# Patient Record
Sex: MALE | Race: WHITE | URBAN - METROPOLITAN AREA
[De-identification: names, ages, dates, MRNs, and addresses within clinical notes are randomized per-mention and may not be internally consistent; named-entity substitution may affect disease eponyms.]

---

## 2017-11-14 ENCOUNTER — INPATIENT (INPATIENT)
Facility: HOSPITAL | Age: 47
LOS: 1 days | Discharge: HOME | End: 2017-11-16
Attending: INTERNAL MEDICINE

## 2017-11-14 DIAGNOSIS — K92.2 GASTROINTESTINAL HEMORRHAGE, UNSPECIFIED: ICD-10-CM

## 2017-11-21 DIAGNOSIS — I10 ESSENTIAL (PRIMARY) HYPERTENSION: ICD-10-CM

## 2017-11-21 DIAGNOSIS — K29.60 OTHER GASTRITIS WITHOUT BLEEDING: ICD-10-CM

## 2017-11-21 DIAGNOSIS — D62 ACUTE POSTHEMORRHAGIC ANEMIA: ICD-10-CM

## 2017-11-21 DIAGNOSIS — Z72.89 OTHER PROBLEMS RELATED TO LIFESTYLE: ICD-10-CM

## 2017-11-21 DIAGNOSIS — K25.4 CHRONIC OR UNSPECIFIED GASTRIC ULCER WITH HEMORRHAGE: ICD-10-CM

## 2017-11-21 DIAGNOSIS — K44.9 DIAPHRAGMATIC HERNIA WITHOUT OBSTRUCTION OR GANGRENE: ICD-10-CM

## 2017-11-21 DIAGNOSIS — D64.9 ANEMIA, UNSPECIFIED: ICD-10-CM

## 2019-02-14 ENCOUNTER — INPATIENT (INPATIENT)
Facility: HOSPITAL | Age: 49
LOS: 2 days | Discharge: HOME | End: 2019-02-17
Attending: STUDENT IN AN ORGANIZED HEALTH CARE EDUCATION/TRAINING PROGRAM | Admitting: STUDENT IN AN ORGANIZED HEALTH CARE EDUCATION/TRAINING PROGRAM

## 2019-02-14 VITALS
DIASTOLIC BLOOD PRESSURE: 105 MMHG | SYSTOLIC BLOOD PRESSURE: 181 MMHG | RESPIRATION RATE: 20 BRPM | HEART RATE: 105 BPM | TEMPERATURE: 97 F | OXYGEN SATURATION: 99 %

## 2019-02-14 DIAGNOSIS — S42.302A UNSPECIFIED FRACTURE OF SHAFT OF HUMERUS, LEFT ARM, INITIAL ENCOUNTER FOR CLOSED FRACTURE: Chronic | ICD-10-CM

## 2019-02-14 LAB
ABO RH CONFIRMATION: SIGNIFICANT CHANGE UP
ALBUMIN SERPL ELPH-MCNC: 4 G/DL — SIGNIFICANT CHANGE UP (ref 3.5–5.2)
ALP SERPL-CCNC: 65 U/L — SIGNIFICANT CHANGE UP (ref 30–115)
ALT FLD-CCNC: 23 U/L — SIGNIFICANT CHANGE UP (ref 0–41)
ANION GAP SERPL CALC-SCNC: 14 MMOL/L — SIGNIFICANT CHANGE UP (ref 7–14)
APTT BLD: 16.6 SEC — CRITICAL LOW (ref 27–39.2)
AST SERPL-CCNC: 23 U/L — SIGNIFICANT CHANGE UP (ref 0–41)
BASOPHILS # BLD AUTO: 0.06 K/UL — SIGNIFICANT CHANGE UP (ref 0–0.2)
BASOPHILS # BLD AUTO: 0.08 K/UL — SIGNIFICANT CHANGE UP (ref 0–0.2)
BASOPHILS NFR BLD AUTO: 0.6 % — SIGNIFICANT CHANGE UP (ref 0–1)
BASOPHILS NFR BLD AUTO: 0.9 % — SIGNIFICANT CHANGE UP (ref 0–1)
BILIRUB SERPL-MCNC: <0.2 MG/DL — SIGNIFICANT CHANGE UP (ref 0.2–1.2)
BUN SERPL-MCNC: 28 MG/DL — HIGH (ref 10–20)
CALCIUM SERPL-MCNC: 8.9 MG/DL — SIGNIFICANT CHANGE UP (ref 8.5–10.1)
CHLORIDE SERPL-SCNC: 101 MMOL/L — SIGNIFICANT CHANGE UP (ref 98–110)
CO2 SERPL-SCNC: 23 MMOL/L — SIGNIFICANT CHANGE UP (ref 17–32)
CREAT SERPL-MCNC: 1 MG/DL — SIGNIFICANT CHANGE UP (ref 0.7–1.5)
EOSINOPHIL # BLD AUTO: 0.13 K/UL — SIGNIFICANT CHANGE UP (ref 0–0.7)
EOSINOPHIL # BLD AUTO: 0.14 K/UL — SIGNIFICANT CHANGE UP (ref 0–0.7)
EOSINOPHIL NFR BLD AUTO: 1.3 % — SIGNIFICANT CHANGE UP (ref 0–8)
EOSINOPHIL NFR BLD AUTO: 1.5 % — SIGNIFICANT CHANGE UP (ref 0–8)
GLUCOSE SERPL-MCNC: 113 MG/DL — HIGH (ref 70–99)
HCT VFR BLD CALC: 22.8 % — LOW (ref 42–52)
HCT VFR BLD CALC: 27.4 % — LOW (ref 42–52)
HGB BLD-MCNC: 7.2 G/DL — CRITICAL LOW (ref 14–18)
HGB BLD-MCNC: 8.8 G/DL — LOW (ref 14–18)
IMM GRANULOCYTES NFR BLD AUTO: 1.4 % — HIGH (ref 0.1–0.3)
IMM GRANULOCYTES NFR BLD AUTO: 1.9 % — HIGH (ref 0.1–0.3)
INR BLD: 0.99 RATIO — SIGNIFICANT CHANGE UP (ref 0.65–1.3)
IRON SATN MFR SERPL: 16 UG/DL — LOW (ref 35–150)
IRON SATN MFR SERPL: 5 % — LOW (ref 15–50)
LACTATE SERPL-SCNC: 1.9 MMOL/L — SIGNIFICANT CHANGE UP (ref 0.5–2.2)
LIDOCAIN IGE QN: 48 U/L — SIGNIFICANT CHANGE UP (ref 7–60)
LYMPHOCYTES # BLD AUTO: 2.45 K/UL — SIGNIFICANT CHANGE UP (ref 1.2–3.4)
LYMPHOCYTES # BLD AUTO: 2.67 K/UL — SIGNIFICANT CHANGE UP (ref 1.2–3.4)
LYMPHOCYTES # BLD AUTO: 26.9 % — SIGNIFICANT CHANGE UP (ref 20.5–51.1)
LYMPHOCYTES # BLD AUTO: 27.5 % — SIGNIFICANT CHANGE UP (ref 20.5–51.1)
MAGNESIUM SERPL-MCNC: 2 MG/DL — SIGNIFICANT CHANGE UP (ref 1.8–2.4)
MCHC RBC-ENTMCNC: 25.2 PG — LOW (ref 27–31)
MCHC RBC-ENTMCNC: 25.2 PG — LOW (ref 27–31)
MCHC RBC-ENTMCNC: 31.6 G/DL — LOW (ref 32–37)
MCHC RBC-ENTMCNC: 32.1 G/DL — SIGNIFICANT CHANGE UP (ref 32–37)
MCV RBC AUTO: 78.5 FL — LOW (ref 80–94)
MCV RBC AUTO: 79.7 FL — LOW (ref 80–94)
MONOCYTES # BLD AUTO: 0.53 K/UL — SIGNIFICANT CHANGE UP (ref 0.1–0.6)
MONOCYTES # BLD AUTO: 0.59 K/UL — SIGNIFICANT CHANGE UP (ref 0.1–0.6)
MONOCYTES NFR BLD AUTO: 5.5 % — SIGNIFICANT CHANGE UP (ref 1.7–9.3)
MONOCYTES NFR BLD AUTO: 6.5 % — SIGNIFICANT CHANGE UP (ref 1.7–9.3)
NEUTROPHILS # BLD AUTO: 5.67 K/UL — SIGNIFICANT CHANGE UP (ref 1.4–6.5)
NEUTROPHILS # BLD AUTO: 6.17 K/UL — SIGNIFICANT CHANGE UP (ref 1.4–6.5)
NEUTROPHILS NFR BLD AUTO: 62.3 % — SIGNIFICANT CHANGE UP (ref 42.2–75.2)
NEUTROPHILS NFR BLD AUTO: 63.7 % — SIGNIFICANT CHANGE UP (ref 42.2–75.2)
NRBC # BLD: 0 /100 WBCS — SIGNIFICANT CHANGE UP (ref 0–0)
NRBC # BLD: 0 /100 WBCS — SIGNIFICANT CHANGE UP (ref 0–0)
PLATELET # BLD AUTO: 279 K/UL — SIGNIFICANT CHANGE UP (ref 130–400)
PLATELET # BLD AUTO: 354 K/UL — SIGNIFICANT CHANGE UP (ref 130–400)
POTASSIUM SERPL-MCNC: 4.5 MMOL/L — SIGNIFICANT CHANGE UP (ref 3.5–5)
POTASSIUM SERPL-SCNC: 4.5 MMOL/L — SIGNIFICANT CHANGE UP (ref 3.5–5)
PROT SERPL-MCNC: 5.8 G/DL — LOW (ref 6–8)
PROTHROM AB SERPL-ACNC: 11.4 SEC — SIGNIFICANT CHANGE UP (ref 9.95–12.87)
RBC # BLD: 2.86 M/UL — LOW (ref 4.7–6.1)
RBC # BLD: 3.49 M/UL — LOW (ref 4.7–6.1)
RBC # FLD: 15.5 % — HIGH (ref 11.5–14.5)
RBC # FLD: 15.7 % — HIGH (ref 11.5–14.5)
SODIUM SERPL-SCNC: 138 MMOL/L — SIGNIFICANT CHANGE UP (ref 135–146)
TIBC SERPL-MCNC: 317 UG/DL — SIGNIFICANT CHANGE UP (ref 220–430)
UIBC SERPL-MCNC: 301 UG/DL — SIGNIFICANT CHANGE UP (ref 110–370)
WBC # BLD: 9.1 K/UL — SIGNIFICANT CHANGE UP (ref 4.8–10.8)
WBC # BLD: 9.7 K/UL — SIGNIFICANT CHANGE UP (ref 4.8–10.8)
WBC # FLD AUTO: 9.1 K/UL — SIGNIFICANT CHANGE UP (ref 4.8–10.8)
WBC # FLD AUTO: 9.7 K/UL — SIGNIFICANT CHANGE UP (ref 4.8–10.8)

## 2019-02-14 RX ORDER — CHLORHEXIDINE GLUCONATE 213 G/1000ML
1 SOLUTION TOPICAL
Qty: 0 | Refills: 0 | Status: DISCONTINUED | OUTPATIENT
Start: 2019-02-14 | End: 2019-02-17

## 2019-02-14 RX ORDER — ONDANSETRON 8 MG/1
4 TABLET, FILM COATED ORAL EVERY 6 HOURS
Qty: 0 | Refills: 0 | Status: DISCONTINUED | OUTPATIENT
Start: 2019-02-14 | End: 2019-02-17

## 2019-02-14 RX ORDER — ACETAMINOPHEN 500 MG
650 TABLET ORAL EVERY 6 HOURS
Qty: 0 | Refills: 0 | Status: DISCONTINUED | OUTPATIENT
Start: 2019-02-14 | End: 2019-02-17

## 2019-02-14 RX ORDER — PANTOPRAZOLE SODIUM 20 MG/1
40 TABLET, DELAYED RELEASE ORAL EVERY 12 HOURS
Qty: 0 | Refills: 0 | Status: DISCONTINUED | OUTPATIENT
Start: 2019-02-14 | End: 2019-02-14

## 2019-02-14 RX ORDER — PANTOPRAZOLE SODIUM 20 MG/1
8 TABLET, DELAYED RELEASE ORAL
Qty: 80 | Refills: 0 | Status: DISCONTINUED | OUTPATIENT
Start: 2019-02-14 | End: 2019-02-15

## 2019-02-14 RX ORDER — FAMOTIDINE 10 MG/ML
20 INJECTION INTRAVENOUS DAILY
Qty: 0 | Refills: 0 | Status: DISCONTINUED | OUTPATIENT
Start: 2019-02-14 | End: 2019-02-15

## 2019-02-14 RX ORDER — SODIUM CHLORIDE 9 MG/ML
1000 INJECTION, SOLUTION INTRAVENOUS ONCE
Qty: 0 | Refills: 0 | Status: COMPLETED | OUTPATIENT
Start: 2019-02-14 | End: 2019-02-14

## 2019-02-14 RX ADMIN — PANTOPRAZOLE SODIUM 10 MG/HR: 20 TABLET, DELAYED RELEASE ORAL at 17:45

## 2019-02-14 RX ADMIN — SODIUM CHLORIDE 2000 MILLILITER(S): 9 INJECTION, SOLUTION INTRAVENOUS at 13:06

## 2019-02-14 RX ADMIN — FAMOTIDINE 104 MILLIGRAM(S): 10 INJECTION INTRAVENOUS at 15:38

## 2019-02-14 NOTE — ED PROVIDER NOTE - CARE PLAN
Principal Discharge DX:	Melena  Secondary Diagnosis:	Gastric ulcer  Secondary Diagnosis:	PUD (peptic ulcer disease)  Secondary Diagnosis:	GI bleed

## 2019-02-14 NOTE — ED PROVIDER NOTE - CLINICAL SUMMARY MEDICAL DECISION MAKING FREE TEXT BOX
47 Y/O M PUD C/O MELENA X 3-4 DAYS. + EPIGASTRIC DISCOMFORT. GI CONSULTED. HGB STABLE, HEMODYNAMICALLY STABLE. PT ADMITTED TO MEDICINE. 47 Y/O M PUD C/O MELENA X 3-4 DAYS. + EPIGASTRIC DISCOMFORT. GI CONSULTED. HGB-8, HEMODYNAMICALLY STABLE. PT ADMITTED TO MEDICINE.

## 2019-02-14 NOTE — ED PROVIDER NOTE - OBJECTIVE STATEMENT
48 y m pmh peptic ulcer with active bleeding requiring endoscopic cauterization 2 years ago pw melena. Melena for the past 3-4 days. Associated with lightheadedness and some epigastric abdominal discomfort. States he feels the same as when he needed endoscopy a few years ago. Unsure who performed the endoscopy. Denies cp, sob, back pain, n/v, diarrhea, brbpr, headache.

## 2019-02-14 NOTE — ED ADULT NURSE NOTE - PAIN RATING/NUMBER SCALE (0-10): REST
Haldol not helping with the aggitation at this time. Low dose Precedex gtt ordered. Hopes of once behavioral issues reside we can start cares (EKG, supplementing potassium).   0

## 2019-02-14 NOTE — ED ADULT NURSE NOTE - NSIMPLEMENTINTERV_GEN_ALL_ED
Implemented All Fall with Harm Risk Interventions:  [place] to call system. Call bell, personal items and telephone within reach. Instruct patient to call for assistance. Room bathroom lighting operational. Non-slip footwear when patient is off stretcher. Physically safe environment: no spills, clutter or unnecessary equipment. Stretcher in lowest position, wheels locked, appropriate side rails in place. Provide visual cue, wrist band, yellow gown, etc. Monitor gait and stability. Monitor for mental status changes and reorient to person, place, and time. Review medications for side effects contributing to fall risk. Reinforce activity limits and safety measures with patient and family. Provide visual clues: red socks.

## 2019-02-14 NOTE — ED PROVIDER NOTE - PROGRESS NOTE DETAILS
GI FELLOW AWARE AND WILL EVALUATE PT IN THE ED. I personally evaluated the patient. I reviewed the Resident’s or Physician Assistant’s note (as assigned above), and agree with the findings and plan except as documented in my note.   47 Y/O M H/O BLEEDING PEPTIC ULCER S/P CAUTERIZATION 2017 BY DR. TRIPATHI C/O MELENA X 4 DAYS. + ASSOCIATED EPIGASTRIC PAIN. NO N/V. NO BRBPR. + DIZZINESS AND LIGHTHEADEDNESS. NO CP, SOB, PALPITATIONS, SCHUSTER. NO OTHER ABNORMAL BLEEDING. NO LOOSE STOOL. NO PRIOR COLONOSCOPY. VITALS NOTED. ALERT OX3 NAD NO PALLOR OR PALE CONJUNCTIVA. OP NORMAL. MMM. LUNGS CLEAR B/L. RRR S1S2. ABD- SOFT + MILD EPIGASTRIC TENDERNESS. NO REBOUND OR GUARDING. BACK NONTENDER.

## 2019-02-14 NOTE — CONSULT NOTE ADULT - SUBJECTIVE AND OBJECTIVE BOX
GI HPI:  47M with pmhx of HTN and PUD s/p upper GIB in 2017 (3u PRBC given at that time) presents from home for melenic stools since sunday. Patient states that he has been straining to defecate but noticing black stools 4-5 episodes/day since sunday every day last bowel movement today before coming to hospital black stools, and it has been associated with mild nausea, abdominal cramping, minimal palpitations. Also reported having heart burn for couple of weeks never used any medication. Denies any dysphagia, odynophagia, weight loss, family history of colon and stomach cancer.  Denies hematemesis, hematochezia, or hematuria, diarrhea, CP, Shortness of breath, LOC, easy bruising or bleeding. Otherwise patient has been in his normal state of health. While in   ED patient was reported to have a near syncopal episode but never lost actual consciousness. Exact details are unclear.     EtOH use: 6 beers per week, last drink over a week ago, uses Exendin ~2x week , never had C-Scope Father had pancreatic cancer, no colon or gastric cancers in family.     Anemia was noted to go as far back as 2016, with hb 9.9 at that time with normocytic MCV.  Admitted to [facility] in 2017 november for low Hb of 5.5 improved to 8.2 and discharged to follow up with GI but never followed up.  Endoscopy in 2017: esophagitis in distal 1/3, hiatal hernia, gastritis in body and antrum, ulcers were found in the antrum, duodenitis in 2nd part of duodenum, pathology is positive for H. pylori.     in ED vitals BP: 180/105, tachycardia 105, 97F, 20 RR, 99% on room air, s/p 1 litre LR, pepcid BP went down to 140/97, 78 HR, rectal examination showed black stools in ED,            PAST MEDICAL & SURGICAL HISTORY  GI bleed  Gastric ulcer  PUD (peptic ulcer disease)      FAMILY HISTORY:  FAMILY HISTORY:      SOCIAL HISTORY:  smoker: less than1/2 a pack  Alcohol: 6 beers/week  Drug: no    ALLERGIES:  No Known Allergies      MEDICATIONS:  MEDICATIONS  (STANDING):  chlorhexidine 4% Liquid 1 Application(s) Topical <User Schedule>  famotidine  IVPB 20 milliGRAM(s) IV Intermittent daily  pantoprazole  Injectable 40 milliGRAM(s) IV Push every 12 hours    MEDICATIONS  (PRN):  acetaminophen   Tablet .. 650 milliGRAM(s) Oral every 6 hours PRN Mild Pain (1 - 3)      HOME MEDICATIONS:  Home Medications:      ROS:     REVIEW OF SYSTEMS  General:  No fevers  Eyes:  No reported pain   ENT:  No sore throat   NECK: No stiffness   CV:  No chest pain   Resp:  No shortness of breath  GI:  See HPI  :  No dysuria  Muscle:  No weakness  Neuro:  No tingling  Endocrine:  No polyuria  Heme:  No ecchymosis          VITALS:   T(F): 97.5 (02-14 @ 13:02), Max: 97.5 (02-14 @ 13:02)  HR: 78 (02-14 @ 13:02) (78 - 105)  BP: 141/97 (02-14 @ 13:02) (141/97 - 181/105)  BP(mean): --  RR: 18 (02-14 @ 13:02) (18 - 20)  SpO2: 100% (02-14 @ 13:02) (99% - 100%)    I&O's Summary      PHYSICAL EXAM:  GENERAL: NAD, speaks in full sentences, no signs of respiratory distress  HEAD:  Atraumatic, Normocephalic  EYES: EOMI, PERRLA, conjunctiva and sclera clear  NECK: Supple, No JVD  CHEST/LUNG: Clear to auscultation bilaterally; No wheeze; No crackles; No accessory muscles used  HEART: Regular rate and rhythm; No murmurs;   ABDOMEN: Soft, Nontender, Nondistended; Bowel sounds present; No guarding  EXTREMITIES:  2+ Peripheral Pulses, No cyanosis or edema  PSYCH: AAOx3  NEUROLOGY: non-focal      LABS:                        8.8    9.10  )-----------( 354      ( 14 Feb 2019 12:29 )             27.4     PT/INR - ( 14 Feb 2019 12:29 )  INR: 0.99          PTT - ( 14 Feb 2019 12:29 )  PTT:16.6<LL>  LIVER FUNCTIONS - ( 14 Feb 2019 12:29 )  Alb: 4.0 g/dL / Pro: 5.8 g/dL / ALK PHOS: 65 U/L / ALT: 23 U/L / AST: 23 U/L / GGT: x           02-14    138  |  101  |  28<H>  ----------------------------<  113<H>  4.5   |  23  |  1.0    Ca    8.9      14 Feb 2019 12:29  Mg     2.0     02-14      RADIOLOGY: GI HPI:  47M with pmhx of HTN not on any medications and PUD s/p upper GIB in 2017 (3u PRBC given at that time) presents from home for melenic stools since sunday. Patient states that he has been straining to defecate but noticing black stools 4-5 episodes/day since sunday every day last bowel movement today before coming to hospital black stools, and it has been associated with mild nausea, abdominal cramping, minimal palpitations. Also reported having heart burn for couple of weeks never used any medication. Denies any dysphagia, odynophagia, weight loss, family history of colon and stomach cancer.  Denies hematemesis, hematochezia, or hematuria, diarrhea, CP, Shortness of breath, LOC, easy bruising or bleeding. Otherwise patient has been in his normal state of health. While in   ED patient was reported to have a near syncopal episode but never lost actual consciousness. Exact details are unclear.     EtOH use: 6 beers per week, last drink over a week ago, uses Exendin ~2x week , never had C-Scope Father had pancreatic cancer, no colon or gastric cancers in family.     Anemia was noted to go as far back as 2016, with hb 9.9 at that time with normocytic MCV.  Admitted to [facility] in 2017 november for low Hb of 5.5 improved to 8.2 and discharged to follow up with GI but never followed up.  Endoscopy in 2017: esophagitis in distal 1/3, hiatal hernia, gastritis in body and antrum, ulcers were found in the antrum, duodenitis in 2nd part of duodenum, pathology is positive for H. pylori.     in ED vitals BP: 180/105, tachycardia 105, 97F, 20 RR, 99% on room air, s/p 1 litre LR, pepcid BP went down to 140/97, 78 HR, rectal examination showed black stools in ED,    PAST MEDICAL & SURGICAL HISTORY  GI bleed  Gastric ulcer  PUD (peptic ulcer disease)      FAMILY HISTORY:  FAMILY HISTORY: father : pancraetic cancer  Mother breast cancer      SOCIAL HISTORY:  smoker: less than1/2 a pack  Alcohol: 6 beers/week  Drug: no    ALLERGIES:  No Known Allergies      MEDICATIONS:  MEDICATIONS  (STANDING):  chlorhexidine 4% Liquid 1 Application(s) Topical <User Schedule>  famotidine  IVPB 20 milliGRAM(s) IV Intermittent daily  pantoprazole  Injectable 40 milliGRAM(s) IV Push every 12 hours    MEDICATIONS  (PRN):  acetaminophen   Tablet .. 650 milliGRAM(s) Oral every 6 hours PRN Mild Pain (1 - 3)      HOME MEDICATIONS:  Home Medications:      ROS:     REVIEW OF SYSTEMS  General:  No fevers  Eyes:  No reported pain   ENT:  No sore throat   NECK: No stiffness   CV:  No chest pain   Resp:  No shortness of breath  GI:  See HPI  :  No dysuria  Muscle:  No weakness  Neuro:  dizziness  Endocrine:  No polyuria  Heme:  No ecchymosis          VITALS:   T(F): 97.5 (02-14 @ 13:02), Max: 97.5 (02-14 @ 13:02)  HR: 78 (02-14 @ 13:02) (78 - 105)  BP: 141/97 (02-14 @ 13:02) (141/97 - 181/105)  BP(mean): --  RR: 18 (02-14 @ 13:02) (18 - 20)  SpO2: 100% (02-14 @ 13:02) (99% - 100%)    I&O's Summary      PHYSICAL EXAM:  GENERAL: NAD, speaks in full sentences, no signs of respiratory distress  HEAD:  Atraumatic, Normocephalic  EYES: EOMI, PERRLA, conjunctiva and sclera clear  NECK: Supple, No JVD  CHEST/LUNG: Clear to auscultation bilaterally; No wheeze; No crackles; No accessory muscles used  HEART: Regular rate and rhythm; No murmurs;   ABDOMEN: Soft, Nontender, Nondistended; Bowel sounds present; No guarding  EXTREMITIES:  2+ Peripheral Pulses, No cyanosis or edema  PSYCH: AAOx3  NEUROLOGY: non-focal  Rectal exam: Melena      LABS:                        8.8    9.10  )-----------( 354      ( 14 Feb 2019 12:29 )             27.4     PT/INR - ( 14 Feb 2019 12:29 )  INR: 0.99          PTT - ( 14 Feb 2019 12:29 )  PTT:16.6<LL>  LIVER FUNCTIONS - ( 14 Feb 2019 12:29 )  Alb: 4.0 g/dL / Pro: 5.8 g/dL / ALK PHOS: 65 U/L / ALT: 23 U/L / AST: 23 U/L / GGT: x           02-14    138  |  101  |  28<H>  ----------------------------<  113<H>  4.5   |  23  |  1.0    Ca    8.9      14 Feb 2019 12:29  Mg     2.0     02-14      RADIOLOGY:

## 2019-02-14 NOTE — H&P ADULT - NSHPLABSRESULTS_GEN_ALL_CORE
8.8    9.10  )-----------( 354      ( 14 Feb 2019 12:29 )             27.4       02-14    138  |  101  |  28<H>  ----------------------------<  113<H>  4.5   |  23  |  1.0    Ca    8.9      14 Feb 2019 12:29  Mg     2.0     02-14    TPro  5.8<L>  /  Alb  4.0  /  TBili  <0.2  /  DBili  x   /  AST  23  /  ALT  23  /  AlkPhos  65  02-14            PT/INR - ( 14 Feb 2019 12:29 )   PT: 11.40 sec;   INR: 0.99 ratio    PTT - ( 14 Feb 2019 12:29 )  PTT:16.6 sec    Lactate Trend  02-14 @ 12:29 Lactate:1.9     POCT Blood Glucose.: 112 mg/dL (14 Feb 2019 13:05)

## 2019-02-14 NOTE — CONSULT NOTE ADULT - ATTENDING COMMENTS
Pt seen and examined with Med Res.  Yesterday we were called to see patient and when we went to the ER he was away at testing.  I have seen him with the Med Resident today.  The patient is a 48M with a history of 4-5 days of solid black stool.  He has a history of heartburn and abdominal discomfort.  He has a history of drinking a six pack of beer per week.  He underwent an egd in 2017 for GI bleeding at [facility] where by report esophagitis, gastritis and antral ulcers were found.  He was HP positive at the time but he did not follow up.  On adm his Hb was 8.8, currently it is 7.8 and he is receiving one unit of PRBC.  He noted two episodes of black stool in the ED yesterday.  He is normotensive, non tachycardic.  His INR is 0.99 and his PLT is 354K.  We will set up egd for later today.

## 2019-02-14 NOTE — H&P ADULT - HISTORY OF PRESENT ILLNESS
47M with pmhx of HTN and PUD s/p GIB in 2017 (3u PRBC given at that time) presents from home for melenic stools for 4 days. Patient states that he has been straining to defecate but noticing black stools and it has been associated with mild nausea, abdominal cramping, minimal palpitations. No hematemesis, hematochezia, or hematuria, diarrhea, CP, Shortness of breath, LOC, easy bruising or bleeding. Otherwise patient has been in his normal state of health. While in ED patient was reported to have a syncopal episode but never lost actual consciousness. Exact details are unclear.   EtOH use: 6 beers per week, last drink over a week ago  Uses Exendin ~2x week   never had C-Scope   Father had pancreatic cancer, no colon or gastric cancers in family    Of Note: Last EGD in 11/2017 showed Esophagitis Gastritis w/ ulcers, and duodenitis. No active bleeding, no interventions taken. Biopsies of the ulcer at that time + H Pylori and Mod-Severe gastritis. Patient had not followed back up with Dr. Jacobs after his hospitalization.   Anemia was noted to go as far back as 2016, with hb 9.9 at that time with normocytic MCV. In 2017 during hospitalization lowest Hb was 5.5.     In ED: HR: 105        BP: 181/105     T: 97.1      RR: 18, 100% on RA   Famotidine 20 and 1L LR  No melenic stools in ED

## 2019-02-14 NOTE — H&P ADULT - ATTENDING COMMENTS
Agree with resident's assessment and plan with following additions/modifications.   ASSESSMENT:  Principal Diagnosis:  Melena , high suspicion for UGIB  Acute symptomatic anemia secondary to GIB  Active nicotine & ETOH dependence    Associated Active Comorbid Conditions:  history of PUD, H Pylori, untreated  Hypertension     PLAN:   Tele monitoring  Type & Screen stat  Monitor H/H Q8hrs, WIll transfuse , if needed , to keep H/H >8/24.  Clear liquids for now, NPO after midnight  Hold anti-coagulation including ASA and anti-hypertension meds  GI Evaluation appreciated  2 large bore peripheral heplocks.  Monitor BP and HR closely.  GI Prophylaxis with Protonix 40mg intravenous push  twice daily .  DVT Prophylaxis : Contraindicated Agree with resident's assessment and plan with following additions/modifications.   ASSESSMENT:  Principal Diagnosis:  Melena , high suspicion for UGIB  Acute symptomatic anemia secondary to GIB  Active nicotine & ETOH dependence    Associated Active Comorbid Conditions:  history of PUD, H Pylori, untreated  Hypertension     PLAN:   Tele monitoring  Type & Screen stat  Monitor H/H Q8hrs, WIll transfuse , if needed , to keep H/H >8/24.  Clear liquids for now, NPO after midnight  Hold anti-coagulation including ASA   GI Evaluation appreciated  2 large bore peripheral heplocks.  Monitor BP and HR closely.  GI Prophylaxis with Protonix  infusion .  DVT Prophylaxis : Contraindicated

## 2019-02-14 NOTE — ED PROVIDER NOTE - PHYSICAL EXAMINATION
CONSTITUTIONAL: Well-developed; well-nourished; in no acute distress.   SKIN: warm, dry  HEAD: Normocephalic; atraumatic.  EYES: Conjunctival pallor.  ENT: No nasal discharge; airway clear.  NECK: Supple; non tender.  CARD: S1, S2 normal; no murmurs, gallops, or rubs. Regular rate and rhythm.   RESP: No wheezes, rales or rhonchi.  ABD: soft ntnd. +melena with no bright red blood on rectal exam.   EXT: Normal ROM.  No clubbing, cyanosis or edema.   LYMPH: No acute cervical adenopathy.  NEURO: Alert, oriented, grossly unremarkable  PSYCH: Cooperative, appropriate.

## 2019-02-14 NOTE — H&P ADULT - ASSESSMENT
47M with pmhx of HTN and PUD s/p GIB in 2017 (3u PRBC given at that time) presents from home for melenic stools for 4 days.    Melenic Stool 2/2 to likely Slow Upper GIB: No clinical evidence of active hemorrhage  GI consulted   Can has clear diet, NPO after midnight  Hx of H Pylori untreated, will need treatment after hospitalization   Protonix IV 40 q12   No hemodynamic instability, tachycardia resolved s/p 1L LR   Avoid NSAIDs, tylenol for pain  2 18g ivs     Microcytic anemia: Likely secondary to chronic UGIB  Check Iron, TIBC, and Ferritin,  active type and screen  Transfuse if Hb <7 or symptomatic      HTN: Better controlled now  Monitor if elevated start amlodipine     GI Ppx: on PPI q12 IV  DVT ppx: held due to suspicion of bleed   Diet: Clears   Full Code  Activity as tolerated 47M with pmhx of HTN and PUD s/p GIB in 2017 (3u PRBC given at that time) presents from home for melenic stools for 4 days.    Melenic Stool 2/2 to likely Slow Upper GIB: No clinical evidence of active hemorrhage  GI consulted   Can has clear diet, NPO after midnight  Hx of H Pylori untreated, will need treatment after hospitalization   Protonix drip    No hemodynamic instability, tachycardia resolved s/p 1L LR   Avoid NSAIDs, tylenol for pain  2 18g ivs     Microcytic anemia: Likely secondary to chronic UGIB  Check Iron, TIBC, and Ferritin,  active type and screen  Transfuse if Hb <7 or symptomatic      HTN: Better controlled now  Monitor if elevated start amlodipine     T-wave inversion on EKG present in 2016, asymptomatic     GI Ppx: on PPI q12 IV  DVT ppx: held due to suspicion of bleed   Diet: Clears   Full Code  Activity as tolerated

## 2019-02-14 NOTE — H&P ADULT - FAMILY HISTORY
Father  Still living? No  Family history of pancreatic cancer, Age at diagnosis: Age Unknown     Mother  Still living? Unknown  Family history of breast cancer, Age at diagnosis: Age Unknown

## 2019-02-14 NOTE — H&P ADULT - NSHPPHYSICALEXAM_GEN_ALL_CORE
GENERAL: NAD, well-developed, Non-toxic, stated age   HEAD:  Abrasion on L cheek, Normocephalic  EYES: EOMI, Sclera White   NECK: Supple, No JVD  CHEST/LUNG: Clear to auscultation bilaterally; No wheezing, rhonchi, or crackles  HEART: Regular rate and rhythm; s1, s2, No murmurs, rubs, or gallops  ABDOMEN: Soft, mild epigastric and b/l upper quadrant tenderness to palpation, Nondistended; Bowel sounds present, No rebound or guarding noted   EXTREMITIES:  No lower extremity edema or calf tenderness to palpation.  No clubbing or cyanosis. Surgical scar on R fore arm well healed   PSYCH: AAOx3  NEUROLOGY: non-focal  SKIN: No rashes or lesions on exposed areas other than face GENERAL: NAD, well-developed, Non-toxic, stated age   HEAD:  Abrasion on L cheek, Normocephalic  EYES: EOMI, Sclera White   NECK: Supple, No JVD  CHEST/LUNG: Clear to auscultation bilaterally; No wheezing, rhonchi, or crackles  HEART: Regular rate and rhythm; s1, s2, No murmurs, rubs, or gallops  ABDOMEN: Soft, mild epigastric and b/l upper quadrant tenderness to palpation, Nondistended; Bowel sounds present, No rebound or guarding noted   RECTAL: Melenic stool (done in by GI resident)  EXTREMITIES:  No lower extremity edema or calf tenderness to palpation.  No clubbing or cyanosis. Surgical scar on R fore arm well healed   PSYCH: AAOx3  NEUROLOGY: non-focal  SKIN: No rashes or lesions on exposed areas other than face

## 2019-02-14 NOTE — H&P ADULT - NSHPSOCIALHISTORY_GEN_ALL_CORE
Substance Use (street drugs): ( x ) never used  (  ) other:  Tobacco Usage:  (   ) never smoked   (   ) former smoker   ( x ) current smoker  (     ) pack year: 1/2 pack per week   Alcohol Usage: 6 beers per week, last drink ~7 days ago

## 2019-02-14 NOTE — ED PROVIDER NOTE - NS ED ROS FT
Eyes:  No visual changes, eye pain or discharge.  ENMT:  No hearing changes, pain, discharge or infections. No neck pain or stiffness.  Cardiac:  No chest pain, SOB or edema.   Respiratory:  No cough or respiratory distress.   GI:  Melena, abd discomfort. No nausea, vomiting, diarrhea  :  No dysuria, frequency or burning.  MS:  No myalgia, muscle weakness, joint pain or back pain.  Neuro:  LH. No headache or weakness.  No LOC.  Skin:  No skin rash.   Endocrine: No history of thyroid disease or diabetes.

## 2019-02-14 NOTE — CONSULT NOTE ADULT - ASSESSMENT
Admitted to [facility] in 2017 november for low Hb of 5.5 improved to 8.2 and discharged to follow up with GI but never followed up.    in ED vitals BP: 180/105, tachycardia 105, 97F, 20 RR, 99% on room air, s/p 1 litre LR, pepcid BP went down to 140/97, 78 HR    #)Hypochromic microcytic anemia/symptomatic/Melena likely upper GI bleed DD: PUD vs gastritis  -Current Hb 8.8, Hb in 2017 is 8.2, but he is feeling dizziness, loss of focus, Hypertensive 180/105, tachycardia of 105 down to 140/97, 78  -BUN 28, INR 0.99, platelet 354, not on any aspirin or anticoagulation  -trend CBC  -active type and screen  -Keep Hb >7  -avoid NSAID  -Keep NPO after midnight  -will schedule for endoscopy tomorrow  -Protonix drip 47M with pmhx of HTN and PUD s/p upper GIB in 2017 (3u PRBC given at that time) presents from home for melenic stools since sunday. Patient states that he has been straining to defecate but noticing black stools 4-5 episodes/day since sunday every day last bowel movement today before coming to hospital black stools, and it has been associated with mild nausea, abdominal cramping, minimal palpitations. Also reported having heart burn for couple of weeks never used any medication. Denies any dysphagia, odynophagia, weight loss, family history of colon and stomach cancer.  Denies hematemesis, hematochezia, or hematuria, diarrhea, CP, Shortness of breath, LOC, easy bruising or bleeding. Otherwise patient has been in his normal state of health. While in   ED patient was reported to have a near syncopal episode but never lost actual consciousness. Exact details are unclear.     EtOH use: 6 beers per week, last drink over a week ago, uses Exendin ~2x week , never had C-Scope Father had pancreatic cancer, no colon or gastric cancers in family.   Anemia was noted to go as far back as 2016, with hb 9.9 at that time with normocytic MCV.  Admitted to [facility] in 2017 november for low Hb of 5.5 improved to 8.2 and discharged to follow up with GI but never followed up.    in ED vitals BP: 180/105, tachycardia 105, 97F, 20 RR, 99% on room air, s/p 1 litre LR, pepcid BP went down to 140/97, 78 HR, rectal examination showed black stools in ED,  Endoscopy in 2017: esophagitis in distal 1/3, hiatal hernia, gastritis in body and antrum, ulcers were found in the antrum, duodenitis in 2nd part of duodenum, pathology is positive for H. pylori.     #)Hypochromic microcytic anemia/symptomatic/Melena likely upper GI bleed DD: PUD vs gastritis  -Current Hb 8.8, Hb in 2017 is 8.2, but he is feeling dizziness, loss of focus, Hypertensive 180/105, tachycardia of 105 down to 140/97, 78  -BUN 28, INR 0.99, platelet 354, not on any aspirin or anticoagulation  -trend CBC  -active type and screen  -Keep Hb >7  -avoid NSAID  -Keep NPO after mid night  -will schedule for endoscopy tomorrow  -Protonix drip    will discuss with attending 47M with pmhx of HTN and PUD s/p upper GIB in 2017 (3u PRBC given at that time) presents from home for melenic stools since sunday. Patient states that he has been straining to defecate but noticing black stools 4-5 episodes/day since sunday every day last bowel movement today before coming to hospital black stools, and it has been associated with mild nausea, abdominal cramping, minimal palpitations. Also reported having heart burn for couple of weeks never used any medication. Denies any dysphagia, odynophagia, weight loss, family history of colon and stomach cancer.  Denies hematemesis, hematochezia, or hematuria, diarrhea, CP, Shortness of breath, LOC, easy bruising or bleeding. Otherwise patient has been in his normal state of health. While in   ED patient was reported to have a near syncopal episode but never lost actual consciousness. Exact details are unclear.     EtOH use: 6 beers per week, last drink over a week ago, uses Exendin ~2x week , never had C-Scope Father had pancreatic cancer, no colon or gastric cancers in family.   Anemia was noted to go as far back as 2016, with hb 9.9 at that time with normocytic MCV.  Admitted to [facility] in 2017 november for low Hb of 5.5 improved to 8.2 and discharged to follow up with GI but never followed up.    in ED vitals BP: 180/105, tachycardia 105, 97F, 20 RR, 99% on room air, s/p 1 litre LR, pepcid BP went down to 140/97, 78 HR, rectal examination showed black stools in ED,  Endoscopy in 2017: esophagitis in distal 1/3, hiatal hernia, gastritis in body and antrum, ulcers were found in the antrum, duodenitis in 2nd part of duodenum, pathology is positive for H. pylori.     #)Hypochromic microcytic anemia/symptomatic/Melena likely upper GI bleed DD: PUD vs gastritis  -Current Hb 8.8, Hb in 2017 is 8.2, but he is feeling dizziness, loss of focus, Hypertensive 180/105, tachycardia of 105 down to 140/97, 78 HR  -Melena on rectal exam  -BUN 28, INR 0.99, platelet 354, not on any aspirin or anticoagulation  -trend CBC q12, repeat at 2000  -2 18 gauge IV  -active type and screen  -Keep Hb >7  -avoid NSAID  -Keep NPO after mid night  -will schedule for endoscopy tomorrow  -Protonix drip      will discuss with attending

## 2019-02-15 ENCOUNTER — TRANSCRIPTION ENCOUNTER (OUTPATIENT)
Age: 49
End: 2019-02-15

## 2019-02-15 DIAGNOSIS — D62 ACUTE POSTHEMORRHAGIC ANEMIA: ICD-10-CM

## 2019-02-15 DIAGNOSIS — A04.8 OTHER SPECIFIED BACTERIAL INTESTINAL INFECTIONS: ICD-10-CM

## 2019-02-15 DIAGNOSIS — K92.2 GASTROINTESTINAL HEMORRHAGE, UNSPECIFIED: ICD-10-CM

## 2019-02-15 DIAGNOSIS — K27.9 PEPTIC ULCER, SITE UNSPECIFIED, UNSPECIFIED AS ACUTE OR CHRONIC, WITHOUT HEMORRHAGE OR PERFORATION: ICD-10-CM

## 2019-02-15 LAB
ALBUMIN SERPL ELPH-MCNC: 3.8 G/DL — SIGNIFICANT CHANGE UP (ref 3.5–5.2)
ALP SERPL-CCNC: 60 U/L — SIGNIFICANT CHANGE UP (ref 30–115)
ALT FLD-CCNC: 24 U/L — SIGNIFICANT CHANGE UP (ref 0–41)
ANION GAP SERPL CALC-SCNC: 12 MMOL/L — SIGNIFICANT CHANGE UP (ref 7–14)
APTT BLD: 26.6 SEC — LOW (ref 27–39.2)
AST SERPL-CCNC: 22 U/L — SIGNIFICANT CHANGE UP (ref 0–41)
BASOPHILS # BLD AUTO: 0.06 K/UL — SIGNIFICANT CHANGE UP (ref 0–0.2)
BASOPHILS NFR BLD AUTO: 0.8 % — SIGNIFICANT CHANGE UP (ref 0–1)
BILIRUB SERPL-MCNC: 0.2 MG/DL — SIGNIFICANT CHANGE UP (ref 0.2–1.2)
BLD GP AB SCN SERPL QL: SIGNIFICANT CHANGE UP
BUN SERPL-MCNC: 15 MG/DL — SIGNIFICANT CHANGE UP (ref 10–20)
CALCIUM SERPL-MCNC: 8.6 MG/DL — SIGNIFICANT CHANGE UP (ref 8.5–10.1)
CHLORIDE SERPL-SCNC: 105 MMOL/L — SIGNIFICANT CHANGE UP (ref 98–110)
CO2 SERPL-SCNC: 24 MMOL/L — SIGNIFICANT CHANGE UP (ref 17–32)
CREAT SERPL-MCNC: 1 MG/DL — SIGNIFICANT CHANGE UP (ref 0.7–1.5)
EOSINOPHIL # BLD AUTO: 0.2 K/UL — SIGNIFICANT CHANGE UP (ref 0–0.7)
EOSINOPHIL NFR BLD AUTO: 2.5 % — SIGNIFICANT CHANGE UP (ref 0–8)
FERRITIN SERPL-MCNC: 7 NG/ML — LOW (ref 30–400)
GLUCOSE SERPL-MCNC: 95 MG/DL — SIGNIFICANT CHANGE UP (ref 70–99)
HCT VFR BLD CALC: 24.3 % — LOW (ref 42–52)
HCT VFR BLD CALC: 27 % — LOW (ref 42–52)
HGB BLD-MCNC: 7.8 G/DL — LOW (ref 14–18)
HGB BLD-MCNC: 8.5 G/DL — LOW (ref 14–18)
IMM GRANULOCYTES NFR BLD AUTO: 2 % — HIGH (ref 0.1–0.3)
INR BLD: 1.08 RATIO — SIGNIFICANT CHANGE UP (ref 0.65–1.3)
LYMPHOCYTES # BLD AUTO: 2.16 K/UL — SIGNIFICANT CHANGE UP (ref 1.2–3.4)
LYMPHOCYTES # BLD AUTO: 27.2 % — SIGNIFICANT CHANGE UP (ref 20.5–51.1)
MCHC RBC-ENTMCNC: 25.6 PG — LOW (ref 27–31)
MCHC RBC-ENTMCNC: 26.1 PG — LOW (ref 27–31)
MCHC RBC-ENTMCNC: 31.5 G/DL — LOW (ref 32–37)
MCHC RBC-ENTMCNC: 32.1 G/DL — SIGNIFICANT CHANGE UP (ref 32–37)
MCV RBC AUTO: 79.7 FL — LOW (ref 80–94)
MCV RBC AUTO: 82.8 FL — SIGNIFICANT CHANGE UP (ref 80–94)
MONOCYTES # BLD AUTO: 0.54 K/UL — SIGNIFICANT CHANGE UP (ref 0.1–0.6)
MONOCYTES NFR BLD AUTO: 6.8 % — SIGNIFICANT CHANGE UP (ref 1.7–9.3)
NEUTROPHILS # BLD AUTO: 4.81 K/UL — SIGNIFICANT CHANGE UP (ref 1.4–6.5)
NEUTROPHILS NFR BLD AUTO: 60.7 % — SIGNIFICANT CHANGE UP (ref 42.2–75.2)
NRBC # BLD: 0 /100 WBCS — SIGNIFICANT CHANGE UP (ref 0–0)
NRBC # BLD: 0 /100 WBCS — SIGNIFICANT CHANGE UP (ref 0–0)
PLATELET # BLD AUTO: 252 K/UL — SIGNIFICANT CHANGE UP (ref 130–400)
PLATELET # BLD AUTO: 293 K/UL — SIGNIFICANT CHANGE UP (ref 130–400)
POTASSIUM SERPL-MCNC: 4.6 MMOL/L — SIGNIFICANT CHANGE UP (ref 3.5–5)
POTASSIUM SERPL-SCNC: 4.6 MMOL/L — SIGNIFICANT CHANGE UP (ref 3.5–5)
PROT SERPL-MCNC: 5.4 G/DL — LOW (ref 6–8)
PROTHROM AB SERPL-ACNC: 12.4 SEC — SIGNIFICANT CHANGE UP (ref 9.95–12.87)
RBC # BLD: 3.05 M/UL — LOW (ref 4.7–6.1)
RBC # BLD: 3.26 M/UL — LOW (ref 4.7–6.1)
RBC # FLD: 15.7 % — HIGH (ref 11.5–14.5)
RBC # FLD: 15.9 % — HIGH (ref 11.5–14.5)
SODIUM SERPL-SCNC: 141 MMOL/L — SIGNIFICANT CHANGE UP (ref 135–146)
TYPE + AB SCN PNL BLD: SIGNIFICANT CHANGE UP
WBC # BLD: 6.35 K/UL — SIGNIFICANT CHANGE UP (ref 4.8–10.8)
WBC # BLD: 7.93 K/UL — SIGNIFICANT CHANGE UP (ref 4.8–10.8)
WBC # FLD AUTO: 6.35 K/UL — SIGNIFICANT CHANGE UP (ref 4.8–10.8)
WBC # FLD AUTO: 7.93 K/UL — SIGNIFICANT CHANGE UP (ref 4.8–10.8)

## 2019-02-15 RX ORDER — CLARITHROMYCIN 500 MG
500 TABLET ORAL
Qty: 0 | Refills: 0 | Status: DISCONTINUED | OUTPATIENT
Start: 2019-02-15 | End: 2019-02-17

## 2019-02-15 RX ORDER — PANTOPRAZOLE SODIUM 20 MG/1
40 TABLET, DELAYED RELEASE ORAL
Qty: 0 | Refills: 0 | Status: DISCONTINUED | OUTPATIENT
Start: 2019-02-15 | End: 2019-02-17

## 2019-02-15 RX ORDER — METRONIDAZOLE 500 MG
500 TABLET ORAL
Qty: 0 | Refills: 0 | Status: DISCONTINUED | OUTPATIENT
Start: 2019-02-15 | End: 2019-02-17

## 2019-02-15 RX ADMIN — PANTOPRAZOLE SODIUM 40 MILLIGRAM(S): 20 TABLET, DELAYED RELEASE ORAL at 17:26

## 2019-02-15 RX ADMIN — Medication 500 MILLIGRAM(S): at 17:26

## 2019-02-15 RX ADMIN — PANTOPRAZOLE SODIUM 10 MG/HR: 20 TABLET, DELAYED RELEASE ORAL at 04:38

## 2019-02-15 NOTE — PROGRESS NOTE ADULT - PROBLEM SELECTOR PLAN 2
- start treatement for H.Pylori ( see GI recommendations)  - PPIs  - Carafate  - f/u with GI for biopsy results

## 2019-02-15 NOTE — PHARMACOTHERAPY INTERVENTION NOTE - COMMENTS
pt on protonix drip and pepcid iv q12h. I recommended on profile review to d/c pepcid. dr kelly cha did

## 2019-02-15 NOTE — PROGRESS NOTE ADULT - ASSESSMENT
47M with pmhx of HTN and PUD s/p GIB in 2017 (3u PRBC given at that time) presents from home for melenic stools for 4 days. Patient states that he has been straining to defecate but noticing black stools and it has been associated with mild nausea, abdominal cramping, minimal palpitations.  Pt has h/o PUD, H.Pylori ( he was treated for H. pylori but did not complete the course. He was admitted for symptomatic anemia and upper GI bleed, consulted by GI, underwent EGD today and four found to have antral ulcer and erosive antritis, biopsy taken.  Today will advance diet, start treatment for H.Pylori,  f/u repeat CBC in AM, is stable plan discharge in 24 hours.     #Progress Note Handoff  Pending (specify):  Consults_________, Tests___CBC in AM _____, Test Results_______, Other_________  Family discussion: no family available   Disposition: Home__xSNF___/Other________/Unknown at this time________

## 2019-02-15 NOTE — PROGRESS NOTE ADULT - PROBLEM SELECTOR PLAN 4
- start Pantoprazole 40 mg BID, Clarithromycin 500mg BID, Flagyl 500 BID for two weeks and follow up with GI in the clinic.

## 2019-02-15 NOTE — PROGRESS NOTE ADULT - ASSESSMENT
________________________________________________________________________________  DAILY PROGRESS NOTE:    ================== SUBJECTIVE ==================    MELODIE HERNADEZ  /   48y  /  Male  /  MRN#: [id number]  Patient is a 48y old Male who presents with a chief complaint of Melena (15 Feb 2019 16:54)  Currently admitted to medicine with the primary diagnosis of Melena    HOSPITAL DAY: 1d     =================== OBJECTIVE ===================    VITAL SIGNS: Last 24 Hours  T(C): 35.7 (15 Feb 2019 14:20), Max: 36.2 (14 Feb 2019 21:00)  T(F): 96.2 (15 Feb 2019 14:20), Max: 97.2 (14 Feb 2019 21:00)  HR: 61 (15 Feb 2019 14:20) (61 - 89)  BP: 167/91 (15 Feb 2019 14:20) (110/56 - 167/91)  BP(mean): --  RR: 18 (15 Feb 2019 14:20) (16 - 20)  SpO2: 100% (15 Feb 2019 13:23) (96% - 100%)    PHYSICAL EXAM:  GENERAL: NAD, well-developed  CHEST/LUNG: Clear to auscultation bilaterally; No wheeze  HEART: Regular rate and rhythm; No murmurs, rubs, or gallops  ABDOMEN: Soft, Nontender, Nondistended; Bowel sounds present  EXTREMITIES:  2+ Peripheral Pulses, No clubbing, cyanosis, or edema  PSYCH: AAOx3  NEUROLOGY: non-focal  General:  Appearance is consistent with chronologic age.  No abnormal facies.   General: AA&Ox3.  Fund of knowledge is intact and normal.  Language with normal repetition, comprehension and naming.  Nondysarthric.     ===================== LABS =====================                        7.8    7.93  )-----------( 293      ( 15 Feb 2019 08:27 )             24.3     02-15    141  |  105  |  15  ----------------------------<  95  4.6   |  24  |  1.0    Ca    8.6      15 Feb 2019 08:27  Mg     2.0     02-14    TPro  5.4<L>  /  Alb  3.8  /  TBili  0.2  /  DBili  x   /  AST  22  /  ALT  24  /  AlkPhos  60  02-15    PT/INR - ( 15 Feb 2019 09:53 )   PT: 12.40 sec;   INR: 1.08 ratio         PTT - ( 15 Feb 2019 09:53 )  PTT:26.6 sec    ================== OTHER SIGNIFICANT FINDINGS ==================    < from: EGD (02.15.19 @ 11:45) >  Impressions:    Normal mucosa in the whole esophagus.    -There is 1 cm antral ulcer with flat red spot and erosive antritis.    -Random biopsies were done in antrum and body of the stomach. .    Normal mucosa in the duodenal bulb, second part of the duodenum and third part  of the duodenum.     Plan: Given the prior positive result, please begin the patient treatment with  H.Pylori regimen (Pantaprozole 40 mg BID, Clarithromycin 500 mg BID,  Metronidazole 500 mg BID for two weeks).  Follow H.pylori treatment with Pantaprozole 40 mg BID for two months.  Outpatient follow up in GI MAP clinic 1-4 weeks.    ================== ALLERGIES ===================  No Known Allergies    ==================== MEDS =====================  chlorhexidine 4% Liquid 1 Application(s) Topical <User Schedule>  metroNIDAZOLE    Tablet 500 milliGRAM(s) Oral two times a day  pantoprazole    Tablet 40 milliGRAM(s) Oral two times a day    PRN MEDICATIONS  acetaminophen   Tablet .. 650 milliGRAM(s) Oral every 6 hours PRN  ondansetron Injectable 4 milliGRAM(s) IV Push every 6 hours PRN    ================= ASSESS/PLAN ==================  Patient is a 48y old Male who presents with a chief complaint of Melena (15 Feb 2019 16:54)  Currently admitted to medicine with the primary diagnosis of Melena      PLAN  47M with pmhx of HTN and PUD s/p GIB in 2017 (3u PRBC given at that time) presents from home for melenic stools for 4 days.    #Melenic Stool 2/2 to likely Slow Upper GIB: No clinical evidence of active hemorrhage  #Microcytic anemia: Likely secondary to chronic UGIB  No hemodynamic instability, tachycardia resolved s/p 1L LR   EGD done today 2/15/2019 result as above (antral ulcer, no active bleeding, biopsied)   As per GI:   - PPI BID, will need to continue for 2 months   - Flagyl 500 BID for 2 weeks   - Calarithromycin 500 BID for 2 weeks (non formulary sent on 2/15/2019)   - Avoid NSAIDs, tylenol for pain  - 2 18g ivs   - Transfuse below 7     #HTN:   - Better controlled now  - Monitor if elevated start amlodipine     #T-wave inversion on EKG present in 2016, asymptomatic     GI PPX: PPI BID   DVT PPX: SCD    DIET:  () Regular  /  () Cardiac  / () Renal  /  () Diabetic  /  () Fluid restriction  /   () NPO  ACTIVITY:  () Ad Lianet  /  () Advance as Tolerated  /  () Bed Rest  /  () Fall Precaution  /  () Seizure precaution    ================= PRESENT TODAY ==================    1-Donahue Catheter:  Indication:  2-IV access:      A)Peripheral: Yes       Indication: Meds and transfustions   3-IV Fluids: -  Indication:  4-Oxygen: Room Air Sat: 98%     ================= DISPOSITION ==================    Patient to be discharged when condition(s) optimized.            Discharge to:  (X) Home  /  () SNF  /  () Hospice  /  () Other    If stable, could discharge saturday or sunday     ================= CODE STATUS =================                  (X) FULL CODE     |     () DNR     |     () DNI

## 2019-02-15 NOTE — PROGRESS NOTE ADULT - PROBLEM SELECTOR PLAN 1
- no active bleeding for last 24 hours  - s/p EGD today  - c/w PPIs  - monitor H/H  - keep Hb above 7.0

## 2019-02-16 ENCOUNTER — TRANSCRIPTION ENCOUNTER (OUTPATIENT)
Age: 49
End: 2019-02-16

## 2019-02-16 LAB
ANION GAP SERPL CALC-SCNC: 14 MMOL/L — SIGNIFICANT CHANGE UP (ref 7–14)
BUN SERPL-MCNC: 12 MG/DL — SIGNIFICANT CHANGE UP (ref 10–20)
CALCIUM SERPL-MCNC: 8.7 MG/DL — SIGNIFICANT CHANGE UP (ref 8.5–10.1)
CHLORIDE SERPL-SCNC: 101 MMOL/L — SIGNIFICANT CHANGE UP (ref 98–110)
CO2 SERPL-SCNC: 26 MMOL/L — SIGNIFICANT CHANGE UP (ref 17–32)
CREAT SERPL-MCNC: 1 MG/DL — SIGNIFICANT CHANGE UP (ref 0.7–1.5)
GLUCOSE SERPL-MCNC: 101 MG/DL — HIGH (ref 70–99)
HCT VFR BLD CALC: 29.4 % — LOW (ref 42–52)
HGB BLD-MCNC: 9.4 G/DL — LOW (ref 14–18)
MCHC RBC-ENTMCNC: 26 PG — LOW (ref 27–31)
MCHC RBC-ENTMCNC: 32 G/DL — SIGNIFICANT CHANGE UP (ref 32–37)
MCV RBC AUTO: 81.4 FL — SIGNIFICANT CHANGE UP (ref 80–94)
NRBC # BLD: 0 /100 WBCS — SIGNIFICANT CHANGE UP (ref 0–0)
PLATELET # BLD AUTO: 331 K/UL — SIGNIFICANT CHANGE UP (ref 130–400)
POTASSIUM SERPL-MCNC: 4.5 MMOL/L — SIGNIFICANT CHANGE UP (ref 3.5–5)
POTASSIUM SERPL-SCNC: 4.5 MMOL/L — SIGNIFICANT CHANGE UP (ref 3.5–5)
RBC # BLD: 3.61 M/UL — LOW (ref 4.7–6.1)
RBC # FLD: 16 % — HIGH (ref 11.5–14.5)
SODIUM SERPL-SCNC: 141 MMOL/L — SIGNIFICANT CHANGE UP (ref 135–146)
WBC # BLD: 7.55 K/UL — SIGNIFICANT CHANGE UP (ref 4.8–10.8)
WBC # FLD AUTO: 7.55 K/UL — SIGNIFICANT CHANGE UP (ref 4.8–10.8)

## 2019-02-16 RX ORDER — PANTOPRAZOLE SODIUM 20 MG/1
1 TABLET, DELAYED RELEASE ORAL
Qty: 120 | Refills: 0 | OUTPATIENT
Start: 2019-02-16 | End: 2019-04-16

## 2019-02-16 RX ORDER — CLARITHROMYCIN 500 MG
1 TABLET ORAL
Qty: 28 | Refills: 0 | OUTPATIENT
Start: 2019-02-16 | End: 2019-03-01

## 2019-02-16 RX ORDER — METRONIDAZOLE 500 MG
1 TABLET ORAL
Qty: 28 | Refills: 0 | OUTPATIENT
Start: 2019-02-16 | End: 2019-03-01

## 2019-02-16 RX ADMIN — Medication 500 MILLIGRAM(S): at 17:18

## 2019-02-16 RX ADMIN — PANTOPRAZOLE SODIUM 40 MILLIGRAM(S): 20 TABLET, DELAYED RELEASE ORAL at 17:18

## 2019-02-16 RX ADMIN — PANTOPRAZOLE SODIUM 40 MILLIGRAM(S): 20 TABLET, DELAYED RELEASE ORAL at 05:26

## 2019-02-16 RX ADMIN — Medication 500 MILLIGRAM(S): at 05:26

## 2019-02-16 NOTE — DISCHARGE NOTE ADULT - ADDITIONAL INSTRUCTIONS
1-Take the antibiotics for 2 weeks and the pantoprazole for 2 MONTHS   2-Follow up at the GI clinic in 1-4 weeks after discharge to get the results of the biopsy

## 2019-02-16 NOTE — DISCHARGE NOTE ADULT - CARE PROVIDER_API CALL
Brayden Jacobs)  Gastroenterology; Internal Medicine  [address] [place] [address]  Phone: [phone]  Fax: [phone]  Follow Up Time: Brayden Jacobs)  Gastroenterology; Internal Medicine  [address] [place] [id number]  Phone: [phone]  Fax: [phone]  Follow Up Time:     Martin Bustillo)  Gastroenterology; Internal Medicine  [address]  [place] [address]  Phone: [phone]  Fax: [phone]  Follow Up Time:

## 2019-02-16 NOTE — DISCHARGE NOTE ADULT - PROVIDER TOKENS
PROVIDER:[TOKEN:[[id number]]] PROVIDER:[TOKEN:[[id number]]],PROVIDER:[TOKEN:[[id number]]]

## 2019-02-16 NOTE — PROGRESS NOTE ADULT - ASSESSMENT
________________________________________________________________________________  DAILY PROGRESS NOTE:    ================== SUBJECTIVE ==================    MELODIE HERNADEZ  /   48y  /  Male  /  MRN#: [id number]  Patient is a 48y old Male who presents with a chief complaint of Melena (16 Feb 2019 14:35)  Currently admitted to medicine with the primary diagnosis of Acute blood loss anemia    HOSPITAL DAY: 2d     OVERNIGHT EVENTS: None    TODAY: Patient was seen this morning at bedside. Currently, the patient reports feeling great, is walking is tolerating diet, is ready to go home     Review of systems is otherwise negative.    =================== OBJECTIVE ===================    VITAL SIGNS: Last 24 Hours  T(C): 36.3 (16 Feb 2019 14:00), Max: 36.6 (15 Feb 2019 20:50)  T(F): 97.3 (16 Feb 2019 14:00), Max: 97.9 (15 Feb 2019 20:50)  HR: 76 (16 Feb 2019 14:00) (76 - 91)  BP: 168/91 (16 Feb 2019 14:00) (133/82 - 168/91)  BP(mean): --  RR: 16 (16 Feb 2019 14:00) (16 - 18)  SpO2: 100% (16 Feb 2019 08:34) (100% - 100%)    PHYSICAL EXAM:  GENERAL: NAD, well-developed  CHEST/LUNG: Clear to auscultation bilaterally; No wheeze  HEART: Regular rate and rhythm; No murmurs, rubs, or gallops  ABDOMEN: Soft, Nontender, Nondistended; Bowel sounds present  EXTREMITIES:  2+ Peripheral Pulses, No clubbing, cyanosis, or edema  PSYCH: AAOx3  NEUROLOGY: non-focal  General:  Appearance is consistent with chronologic age.  No abnormal facies.   General: AA&Ox3.  Fund of knowledge is intact and normal.  Language with normal repetition, comprehension and naming.  Nondysarthric.     ===================== LABS =====================                        9.4    7.55  )-----------( 331      ( 16 Feb 2019 08:43 )             29.4     02-16    141  |  101  |  12  ----------------------------<  101<H>  4.5   |  26  |  1.0    Ca    8.7      16 Feb 2019 08:43    TPro  5.4<L>  /  Alb  3.8  /  TBili  0.2  /  DBili  x   /  AST  22  /  ALT  24  /  AlkPhos  60  02-15    PT/INR - ( 15 Feb 2019 09:53 )   PT: 12.40 sec;   INR: 1.08 ratio         PTT - ( 15 Feb 2019 09:53 )  PTT:26.6 sec    ================== OTHER SIGNIFICANT FINDINGS ==================      < from: EGD (02.15.19 @ 11:45) >  Impressions:    Normal mucosa in the whole esophagus.    -There is 1 cm antral ulcer with flat red spot and erosive antritis.    -Random biopsies were done in antrum and body of the stomach. .    Normal mucosa in the duodenal bulb, second part of the duodenum and third part  of the duodenum.     Plan: Given the prior positive result, please begin the patient treatment with  H.Pylori regimen (Pantaprozole 40 mg BID, Clarithromycin 500 mg BID,  Metronidazole 500 mg BID for two weeks).  Follow H.pylori treatment with Pantaprozole 40 mg BID for two months.  Outpatient follow up in GI MAP clinic 1-4 weeks.    ================== ALLERGIES ===================  No Known Allergies    ==================== MEDS =====================  chlorhexidine 4% Liquid 1 Application(s) Topical <User Schedule>  clarithromycin 500 milliGRAM(s) Oral two times a day  metroNIDAZOLE    Tablet 500 milliGRAM(s) Oral two times a day  pantoprazole    Tablet 40 milliGRAM(s) Oral two times a day    PRN MEDICATIONS  acetaminophen   Tablet .. 650 milliGRAM(s) Oral every 6 hours PRN  ondansetron Injectable 4 milliGRAM(s) IV Push every 6 hours PRN    ================= ASSESS/PLAN ==================  Patient is a 48y old Male who presents with a chief complaint of Melena (15 Feb 2019 16:54)  Currently admitted to medicine with the primary diagnosis of Melena      PLAN  47M with pmhx of HTN and PUD s/p GIB in 2017 (3u PRBC given at that time) presents from home for melenic stools for 4 days.    #Melenic Stool 2/2 to likely Slow Upper GIB: No clinical evidence of active hemorrhage - resolving   #Microcytic anemia: Likely secondary to chronic UGIB  No hemodynamic instability, tachycardia resolved s/p 1L LR   EGD done 2/15/2019 result as above (antral ulcer, no active bleeding, biopsied)   Hemoglobin stable   Tolerating diet   As per GI:   - PPI BID, will need to continue for 2 months   - Flagyl 500 BID for 2 weeks   - Calarithromycin 500 BID for 2 weeks (non formulary sent on 2/15/2019)   - Avoid NSAIDs, tylenol for pain  - Discharge on 2-17/2019 if hemoglobin stable     #HTN:   - Better controlled now  - Monitor if elevated start amlodipine     #T-wave inversion on EKG present in 2016, asymptomatic     GI PPX: PPI BID   DVT PPX: SCD    DIET:  () Regular  /  () Cardiac  / () Renal  /  () Diabetic  /  () Fluid restriction  /   () NPO  ACTIVITY:  () Ad Lianet  /  () Advance as Tolerated  /  () Bed Rest  /  () Fall Precaution  /  () Seizure precaution    ================= PRESENT TODAY ==================    1-Donahue Catheter:  Indication:  2-IV access:      A)Peripheral: Yes       Indication: Meds and transfustions   3-IV Fluids: -  Indication:  4-Oxygen: Room Air Sat: 98%     ================= DISPOSITION ==================    Patient to be discharged when condition(s) optimized.            Discharge to:  (X) Home  /  () SNF  /  () Hospice  /  () Other    If stable, could discharge saturday or sunday     ================= CODE STATUS =================                  (X) FULL CODE     |     () DNR     |     () DNI

## 2019-02-16 NOTE — DISCHARGE NOTE ADULT - CARE PLAN
Principal Discharge DX:	Acute blood loss anemia  Goal:	Resolution  Assessment and plan of treatment:	You had acute blood loss anemia due to  Secondary Diagnosis:	HTN (hypertension) Principal Discharge DX:	Acute blood loss anemia  Goal:	Resolution  Assessment and plan of treatment:	You had acute blood loss anemia due to a gastroentestinal bleed very likely from an ulcer in your stomach. You have to take the clarithromycin and flagyl as prescribed for 2 weeks and pantoprazole 40 twice daily for 2 months.  You have to follow up with your GI in 1-4 weeks  Secondary Diagnosis:	HTN (hypertension)  Goal:	Stability  Assessment and plan of treatment:	Your blood pressure is table. Continue taking your medications as prescribed and follow up with your primary physician in 1-4 weeks after discharge Principal Discharge DX:	Acute blood loss anemia  Goal:	Resolution  Assessment and plan of treatment:	You had acute blood loss anemia due to a gastroentestinal bleed very likely from an ulcer in your stomach as seen on an EGD done on 2/15/2019. You have to take the clarithromycin and flagyl as prescribed for 2 weeks and pantoprazole 40 twice daily for 2 months.  You have to follow up with your GI in 1-4 weeks  Secondary Diagnosis:	HTN (hypertension)  Goal:	Stability  Assessment and plan of treatment:	Your blood pressure is table. Continue taking your medications as prescribed and follow up with your primary physician in 1-4 weeks after discharge

## 2019-02-16 NOTE — DISCHARGE NOTE ADULT - CARE PROVIDERS DIRECT ADDRESSES
,[email or web address]

## 2019-02-16 NOTE — PROGRESS NOTE ADULT - ASSESSMENT
46yo M with Past Medical History HTN and Peptic Ulcer Disease status post GI bleed (2017) admitted for melanotic stools/upper GI bleed secondary to 1cm antral ulcer.  Status post endoscopy.     Acute blood loss anemia secondary to antral ulcer: complicated by previous H.pylori infection.  His case was discussed with gastroenterology.  Complete treatment with triple therapy (Protonix, Metronidazole, and Clarithromycin) x14d.  Follow-up results from stomach bx with gastrenterology as outpatient  Hypertension: blood pressure stable off medicatio  DVT prophylaxis: patient ambulates independently.  Anticipate discharge home in AM

## 2019-02-16 NOTE — DISCHARGE NOTE ADULT - PLAN OF CARE
Resolution You had acute blood loss anemia due to You had acute blood loss anemia due to a gastroentestinal bleed very likely from an ulcer in your stomach. You have to take the clarithromycin and flagyl as prescribed for 2 weeks and pantoprazole 40 twice daily for 2 months.  You have to follow up with your GI in 1-4 weeks Stability Your blood pressure is table. Continue taking your medications as prescribed and follow up with your primary physician in 1-4 weeks after discharge You had acute blood loss anemia due to a gastroentestinal bleed very likely from an ulcer in your stomach as seen on an EGD done on 2/15/2019. You have to take the clarithromycin and flagyl as prescribed for 2 weeks and pantoprazole 40 twice daily for 2 months.  You have to follow up with your GI in 1-4 weeks

## 2019-02-16 NOTE — PROGRESS NOTE ADULT - ASSESSMENT
47M with pmhx of HTN and PUD s/p upper GIB in 2017 (3u PRBC given at that time) presents from home for melenic stools since sunday.  SP EGD 1 cm antral ulcer with pigmented spot and no intervention done     Rec:  Avoid NSAIDS  Continue protonix 40 po BID  Advance diet to GI soft  Continue H Pylori treatment  Follow GI MAP clinic 47M with pmhx of HTN and PUD s/p upper GIB in 2017 (3u PRBC given at that time) presents from home for melenic stools since sunday.  SP EGD 1 cm antral ulcer with pigmented spot and no intervention done     Rec:  Avoid NSAIDS  Continue protonix 40 po BID  Advance diet to GI soft  Continue H Pylori treatment  If Hg stable tomorrow can go home   Follow GI MAP clinic

## 2019-02-16 NOTE — DISCHARGE NOTE ADULT - HOSPITAL COURSE
47M with pmhx of HTN and PUD s/p GIB in 2017 (3u PRBC given at that time) presents from home for melenic stools for 4 days. Patient states that he has been straining to defecate but noticing black stools and it has been associated with mild nausea, abdominal cramping, minimal palpitations. No hematemesis, hematochezia, or hematuria, diarrhea, CP, Shortness of breath, LOC, easy bruising or bleeding. Otherwise patient has been in his normal state of health. While in ED patient was reported to have a syncopal episode but never lost actual consciousness. Exact details are unclear.   EtOH use: 6 beers per week, last drink over a week ago  Uses Exendin ~2x week   never had C-Scope   Father had pancreatic cancer, no colon or gastric cancers in family    Of Note: Last EGD in 11/2017 showed Esophagitis Gastritis w/ ulcers, and duodenitis. No active bleeding, no interventions taken. Biopsies of the ulcer at that time + H Pylori and Mod-Severe gastritis. Patient had not followed back up with Dr. Jacobs after his hospitalization.   Anemia was noted to go as far back as 2016, with hb 9.9 at that time with normocytic MCV. In 2017 during hospitalization lowest Hb was 5.5.     47M with pmhx of HTN and PUD s/p GIB in 2017 (3u PRBC given at that time) presents from home for melenic stools for 4 days.    #Melenic Stool 2/2 to likely Slow Upper GIB: No clinical evidence of active hemorrhage  #Microcytic anemia: Likely secondary to chronic UGIB  No hemodynamic instability, tachycardia resolved s/p 1L LR   EGD done today 2/15/2019 result as above (antral ulcer, no active bleeding, biopsied)   As per GI:   Hemoglobin responded appropriately to 1 PRBC  - PPI BID, will need to continue for 2 months   - Flagyl 500 BID for 2 weeks   - Calarithromycin 500 BID for 2 weeks (non formulary sent on 2/15/2019)   - Avoid NSAIDs, tylenol for pain  - Transfuse below 7     #HTN:   - Better controlled now  - Monitor if elevated start amlodipine     #T-wave inversion on EKG present in 2016, asymptomatic

## 2019-02-16 NOTE — DISCHARGE NOTE ADULT - MEDICATION SUMMARY - MEDICATIONS TO TAKE
I will START or STAY ON the medications listed below when I get home from the hospital:    metroNIDAZOLE 500 mg oral tablet  -- 1 tab(s) by mouth 2 times a day  -- Indication: For Helicobacter pylori (H. pylori) infection    clarithromycin 500 mg oral tablet  -- 1 tab(s) by mouth 2 times a day  -- Indication: For Helicobacter pylori (H. pylori) infection    pantoprazole 40 mg oral delayed release tablet  -- 1 tab(s) by mouth 2 times a day  -- Indication: For Helicobacter pylori (H. pylori) infection I will START or STAY ON the medications listed below when I get home from the hospital:    metroNIDAZOLE 500 mg oral tablet  -- 1 tab(s) by mouth 2 times a day  -- Indication: For infection    clarithromycin 500 mg oral tablet  -- 1 tab(s) by mouth 2 times a day  -- Indication: For infection    pantoprazole 40 mg oral delayed release tablet  -- 1 tab(s) by mouth 2 times a day  -- Indication: For Acid reflux

## 2019-02-17 VITALS — OXYGEN SATURATION: 99 %

## 2019-02-17 LAB
HCT VFR BLD CALC: 29.7 % — LOW (ref 42–52)
HGB BLD-MCNC: 9.3 G/DL — LOW (ref 14–18)
MCHC RBC-ENTMCNC: 25.6 PG — LOW (ref 27–31)
MCHC RBC-ENTMCNC: 31.3 G/DL — LOW (ref 32–37)
MCV RBC AUTO: 81.8 FL — SIGNIFICANT CHANGE UP (ref 80–94)
NRBC # BLD: 0 /100 WBCS — SIGNIFICANT CHANGE UP (ref 0–0)
PLATELET # BLD AUTO: 344 K/UL — SIGNIFICANT CHANGE UP (ref 130–400)
RBC # BLD: 3.63 M/UL — LOW (ref 4.7–6.1)
RBC # FLD: 15.9 % — HIGH (ref 11.5–14.5)
WBC # BLD: 6 K/UL — SIGNIFICANT CHANGE UP (ref 4.8–10.8)
WBC # FLD AUTO: 6 K/UL — SIGNIFICANT CHANGE UP (ref 4.8–10.8)

## 2019-02-17 RX ORDER — CLARITHROMYCIN 500 MG
1 TABLET ORAL
Qty: 24 | Refills: 0
Start: 2019-02-17 | End: 2019-02-28

## 2019-02-17 RX ORDER — PANTOPRAZOLE SODIUM 20 MG/1
1 TABLET, DELAYED RELEASE ORAL
Qty: 120 | Refills: 0 | OUTPATIENT
Start: 2019-02-17 | End: 2019-04-17

## 2019-02-17 RX ORDER — METRONIDAZOLE 500 MG
1 TABLET ORAL
Qty: 24 | Refills: 0
Start: 2019-02-17 | End: 2019-02-28

## 2019-02-17 RX ADMIN — PANTOPRAZOLE SODIUM 40 MILLIGRAM(S): 20 TABLET, DELAYED RELEASE ORAL at 05:08

## 2019-02-17 RX ADMIN — Medication 500 MILLIGRAM(S): at 05:08

## 2019-02-17 RX ADMIN — Medication 500 MILLIGRAM(S): at 11:19

## 2019-02-17 NOTE — PROGRESS NOTE ADULT - ASSESSMENT
48yo M with Past Medical History HTN and Peptic Ulcer Disease status post GI bleed (2017) admitted for melanotic stools/upper GI bleed secondary to 1cm antral ulcer.  Status post endoscopy.     Acute blood loss anemia secondary to antral ulcer: complicated by previous H.pylori infection.  Hemoglobin has remained stable @ 9.3.  Complete treatment with triple therapy (Protonix, Metronidazole, and Clarithromycin) x14d.  Follow-up results from stomach bx with gastrenterology as outpatient  Hypertension: blood pressure stable off medicatio  DVT prophylaxis: patient ambulates independently.  Discharge home; time spent = 35 minutes

## 2019-02-17 NOTE — PROGRESS NOTE ADULT - SUBJECTIVE AND OBJECTIVE BOX
Patient is a 48y old  Male who presents with a chief complaint of melena, pt has h/o PUD, H.Pylori ( he was treated for H. pylori but did not complete the course. He was admitted for symptomatic anemia and upper GI bleed, consulted by GI, underwent EGD today and four found to have antral ulcer and erosive antritis, biopsy taken.  Today, after EGD pt feels OK, still weak, denies BM in last 24 hours.     Vital Signs Last 24 Hrs  T(C): 35.7 (15 Feb 2019 14:20), Max: 36.4 (14 Feb 2019 17:46)  T(F): 96.2 (15 Feb 2019 14:20), Max: 97.6 (14 Feb 2019 17:46)  HR: 61 (15 Feb 2019 14:20) (61 - 89)  BP: 167/91 (15 Feb 2019 14:20) (110/56 - 168/95)  RR: 18 (15 Feb 2019 14:20) (16 - 20)  SpO2: 100% (15 Feb 2019 13:23) (96% - 100%)  PHYSICAL EXAM:  GENERAL: NAD, pale   HEAD:  Atraumatic, Normocephalic  NECK: Supple, No JVD, Normal thyroid  NERVOUS SYSTEM:  Alert & Oriented X3, Good concentration; Motor Strength 5/5 B/L upper and lower extremities; DTRs 2+ intact and symmetric  CHEST/LUNG: Clear to percussion bilaterally; No rales, rhonchi, wheezing, or rubs  HEART: Regular rate and rhythm; No murmurs, rubs, or gallops  ABDOMEN: Soft, Nontender, Nondistended; Bowel sounds present  EXTREMITIES:  2+ Peripheral Pulses, No clubbing, cyanosis, or edema  LYMPH: No lymphadenopathy noted  SKIN: No rashes or lesions      LABS:                        7.8    7.93  )-----------( 293      ( 15 Feb 2019 08:27 )             24.3     02-15    141  |  105  |  15  ----------------------------<  95  4.6   |  24  |  1.0    Ca    8.6      15 Feb 2019 08:27  Mg     2.0     02-14    TPro  5.4<L>  /  Alb  3.8  /  TBili  0.2  /  DBili  x   /  AST  22  /  ALT  24  /  AlkPhos  60  02-15    PT/INR - ( 15 Feb 2019 09:53 )   PT: 12.40 sec;   INR: 1.08 ratio         PTT - ( 15 Feb 2019 09:53 )  PTT:26.6 sec    RADIOLOGY & ADDITIONAL TESTS:  < from: Xray Chest 2 Views PA/Lat (02.14.19 @ 17:05) >  Impression:      No radiographic evidence of acute cardiopulmonary disease.    < from: EGD (02.15.19 @ 11:45) >  Impressions:    Normal mucosa in the whole esophagus.    -There is 1 cm antral ulcer with flat red spot and erosive antritis.    -Random biopsies were done in antrum and body of the stomach. .    Normal mucosa in the duodenal bulb, second part of the duodenum and third part  of the duodenum.     Plan: Given the prior positive result, please begin the patient treatment with  H.Pylori regimen (Pantaprozole 40 mg BID, Clarithromycin 500 mg BID,  Metronidazole 500 mg BID for two weeks).  Follow H.pylori treatment with Pantaprozole 40 mg BID for two months.  Outpatient follow up in [facility] 1-4 weeks.    MEDICATIONS  (STANDING):  chlorhexidine 4% Liquid 1 Application(s) Topical <User Schedule>  metroNIDAZOLE    Tablet 500 milliGRAM(s) Oral two times a day  pantoprazole    Tablet 40 milliGRAM(s) Oral two times a day    MEDICATIONS  (PRN):  acetaminophen   Tablet .. 650 milliGRAM(s) Oral every 6 hours PRN Mild Pain (1 - 3)  ondansetron Injectable 4 milliGRAM(s) IV Push every 6 hours PRN Nausea
MELODIE HERNADEZ [id number]    Hospitalist Note  48yo M with Past Medical History HTN and Peptic Ulcer Disease status post GI bleed (2017) admitted for melanotic stools/upper GI bleed secondary to 1cm antral ulcer.  Status post endoscopy.  No further melena reported.    Overnight events/Updates:  Hemoglobin improved to 9.4 with conservative management.  GI advised treatment with triple therapy due to previous finding of H.pylori.  Patient was non-compliant with follow-up.    Vital Signs Last 24 Hrs  T(C): 36.3 (16 Feb 2019 14:00), Max: 36.6 (15 Feb 2019 20:50)  T(F): 97.3 (16 Feb 2019 14:00), Max: 97.9 (15 Feb 2019 20:50)  HR: 76 (16 Feb 2019 14:00) (76 - 91)  BP: 168/91 (16 Feb 2019 14:00) (133/82 - 168/91)  BP(mean): --  RR: 16 (16 Feb 2019 14:00) (16 - 18)  SpO2: 100% (16 Feb 2019 08:34) (100% - 100%)    Physical Examination:  General: AAO x 3  HEENT: PERRLA, EOMI  CV= S1 & S2 appreciated  Lungs= CTA BL  Abdominal Examination= + BS, Soft, NT/ND  Extremity Examination= No C/C/E    ROS: No chest pain, no shortness of breath.  All other systems reviewed and are within normal limits except for the complaints in the HPI.    MEDICATIONS  (STANDING):  chlorhexidine 4% Liquid 1 Application(s) Topical <User Schedule>  clarithromycin 500 milliGRAM(s) Oral two times a day  metroNIDAZOLE    Tablet 500 milliGRAM(s) Oral two times a day  pantoprazole    Tablet 40 milliGRAM(s) Oral two times a day    MEDICATIONS  (PRN):  acetaminophen   Tablet .. 650 milliGRAM(s) Oral every 6 hours PRN Mild Pain (1 - 3)  ondansetron Injectable 4 milliGRAM(s) IV Push every 6 hours PRN Nausea                            9.4    7.55  )-----------( 331      ( 16 Feb 2019 08:43 )             29.4     02-16    141  |  101  |  12  ----------------------------<  101<H>  4.5   |  26  |  1.0    Ca    8.7      16 Feb 2019 08:43    TPro  5.4<L>  /  Alb  3.8  /  TBili  0.2  /  DBili  x   /  AST  22  /  ALT  24  /  AlkPhos  60  02-15      Case discussed with housestaff & family  NILESH Granger [id number]
MELODIE HERNADEZ [id number]    Hospitalist Note  48yo M with Past Medical History HTN and Peptic Ulcer Disease status post GI bleed (2017) admitted for melanotic stools/upper GI bleed secondary to 1cm antral ulcer.  Status post endoscopy.  No further melena reported.    Overnight events/Updates:  Hemoglobin remains unchanged @ 9.3.  GI advised treatment with triple therapy due to previous finding of H.pylori.  No further melena reported.    Vital Signs Last 24 Hrs  T(C): 35.9 (17 Feb 2019 04:30), Max: 36.6 (16 Feb 2019 20:03)  T(F): 96.7 (17 Feb 2019 04:30), Max: 97.8 (16 Feb 2019 20:03)  HR: 61 (17 Feb 2019 04:30) (61 - 80)  BP: 162/97 (17 Feb 2019 04:30) (151/95 - 168/91)  BP(mean): --  RR: 20 (17 Feb 2019 04:30) (16 - 20)  SpO2: 99% (17 Feb 2019 08:29) (99% - 100%)    Physical Examination:  General: AAO x 3  HEENT: PERRLA, EOMI  CV= S1 & S2 appreciated  Lungs= CTA BL  Abdominal Examination= + BS, Soft, NT/ND  Extremity Examination= No C/C/E    ROS: No chest pain, no shortness of breath.  All other systems reviewed and are within normal limits except for the complaints in the HPI.    MEDICATIONS  (STANDING):  chlorhexidine 4% Liquid 1 Application(s) Topical <User Schedule>  clarithromycin 500 milliGRAM(s) Oral two times a day  metroNIDAZOLE    Tablet 500 milliGRAM(s) Oral two times a day  pantoprazole    Tablet 40 milliGRAM(s) Oral two times a day    MEDICATIONS  (PRN):  acetaminophen   Tablet .. 650 milliGRAM(s) Oral every 6 hours PRN Mild Pain (1 - 3)  ondansetron Injectable 4 milliGRAM(s) IV Push every 6 hours PRN Nausea                            9.3    6.00  )-----------( 344      ( 17 Feb 2019 07:48 )             29.7     02-16    141  |  101  |  12  ----------------------------<  101<H>  4.5   |  26  |  1.0    Ca    8.7      16 Feb 2019 08:43        Case discussed with housestaff & family  NILESH Granger [phone]
We are following the patient for Upper GI bleed     GI HPI Today:  No BM since yesterday   No vomiting no diarrhea    PAST MEDICAL & SURGICAL HISTORY  HTN (hypertension)  GI bleed  Gastric ulcer  PUD (peptic ulcer disease)  Arm fracture, left: s/p surgical repair      ALLERGIES:  No Known Allergies      MEDICATIONS:  MEDICATIONS  (STANDING):  chlorhexidine 4% Liquid 1 Application(s) Topical <User Schedule>  clarithromycin 500 milliGRAM(s) Oral two times a day  metroNIDAZOLE    Tablet 500 milliGRAM(s) Oral two times a day  pantoprazole    Tablet 40 milliGRAM(s) Oral two times a day    MEDICATIONS  (PRN):  acetaminophen   Tablet .. 650 milliGRAM(s) Oral every 6 hours PRN Mild Pain (1 - 3)  ondansetron Injectable 4 milliGRAM(s) IV Push every 6 hours PRN Nausea      REVIEW OF SYSTEMS  General:  No fevers  Eyes:  No reported pain   ENT:  No sore throat   NECK: No stiffness   CV:  No chest pain   Resp:  No shortness of breath  GI:  See HPI  :  No dysuria  Muscle:  No weakness  Neuro:  No tingling  Endocrine:  No polyuria  Heme:  No ecchymosis        VITALS:   T(F): 97.2 (02-16 @ 05:37), Max: 97.9 (02-15 @ 20:50)  HR: 91 (02-16 @ 05:37) (61 - 105)  BP: 133/82 (02-16 @ 05:37) (110/56 - 181/105)  BP(mean): --  RR: 18 (02-16 @ 05:37) (16 - 20)  SpO2: 100% (02-15 @ 13:23) (96% - 100%)        PHYSICAL EXAM:  GENERAL:  Appears in no distress  HEENT:  Conjunctivae Anicteric   CHEST:  Full & symmetric excursion  HEART:  NS1, S2,   ABDOMEN:  Soft, non tender, non-distended, normoactive bowel sounds,  no masses   EXTREMITIES  no edema  SKIN:  No rash  NEURO:  Alert         Blood Work :                        8.5    6.35  )-----------( 252      ( 15 Feb 2019 18:06 )             27.0     PT/INR - ( 15 Feb 2019 09:53 )  INR: 1.08          PTT - ( 15 Feb 2019 09:53 )  PTT:26.6<L>  02-15    141  |  105  |  15  ----------------------------<  95  4.6   |  24  |  1.0    Ca    8.6      15 Feb 2019 08:27  Mg     2.0     02-14      CBC -  ( 15 Feb 2019 18:06 )  Hemoglobin : 8.5    CBC -  ( 15 Feb 2019 08:27 )  Hemoglobin : 7.8    CBC -  ( 14 Feb 2019 21:47 )  Hemoglobin : 7.2    CBC -  ( 14 Feb 2019 12:29 )  Hemoglobin : 8.8      LIVER FUNCTIONS - ( 15 Feb 2019 08:27 )  Alb: 3.8 [3.5 - 5.2] / Pro: 5.4 [6.0 - 8.0] / ALK PHOS: 60 [30 - 115] / ALT: 24 [0 - 41] / AST: 22 [0 - 41] / GGT: x     LIVER FUNCTIONS - ( 14 Feb 2019 12:29 )  Alb: 4.0 [3.5 - 5.2] / Pro: 5.8 [6.0 - 8.0] / ALK PHOS: 65 [30 - 115] / ALT: 23 [0 - 41] / AST: 23 [0 - 41] / GGT: x

## 2019-02-19 LAB — SURGICAL PATHOLOGY STUDY: SIGNIFICANT CHANGE UP

## 2019-02-21 DIAGNOSIS — K92.1 MELENA: ICD-10-CM

## 2019-02-21 DIAGNOSIS — K25.4 CHRONIC OR UNSPECIFIED GASTRIC ULCER WITH HEMORRHAGE: ICD-10-CM

## 2019-02-21 DIAGNOSIS — K27.9 PEPTIC ULCER, SITE UNSPECIFIED, UNSPECIFIED AS ACUTE OR CHRONIC, WITHOUT HEMORRHAGE OR PERFORATION: ICD-10-CM

## 2019-02-21 DIAGNOSIS — D62 ACUTE POSTHEMORRHAGIC ANEMIA: ICD-10-CM

## 2019-02-21 DIAGNOSIS — F17.210 NICOTINE DEPENDENCE, CIGARETTES, UNCOMPLICATED: ICD-10-CM

## 2019-02-21 DIAGNOSIS — I10 ESSENTIAL (PRIMARY) HYPERTENSION: ICD-10-CM

## 2019-02-21 DIAGNOSIS — F10.10 ALCOHOL ABUSE, UNCOMPLICATED: ICD-10-CM

## 2019-02-21 DIAGNOSIS — A04.8 OTHER SPECIFIED BACTERIAL INTESTINAL INFECTIONS: ICD-10-CM

## 2019-02-21 DIAGNOSIS — D50.9 IRON DEFICIENCY ANEMIA, UNSPECIFIED: ICD-10-CM

## 2021-01-27 ENCOUNTER — INPATIENT (INPATIENT)
Facility: HOSPITAL | Age: 51
LOS: 39 days | Discharge: ANOTHER HOSPITAL | End: 2021-03-08
Attending: INTERNAL MEDICINE | Admitting: INTERNAL MEDICINE
Payer: COMMERCIAL

## 2021-01-27 VITALS — DIASTOLIC BLOOD PRESSURE: 146 MMHG | SYSTOLIC BLOOD PRESSURE: 268 MMHG | HEART RATE: 80 BPM

## 2021-01-27 DIAGNOSIS — I61.9 NONTRAUMATIC INTRACEREBRAL HEMORRHAGE, UNSPECIFIED: ICD-10-CM

## 2021-01-27 DIAGNOSIS — S42.302A UNSPECIFIED FRACTURE OF SHAFT OF HUMERUS, LEFT ARM, INITIAL ENCOUNTER FOR CLOSED FRACTURE: Chronic | ICD-10-CM

## 2021-01-27 LAB
ALBUMIN SERPL ELPH-MCNC: 4.7 G/DL — SIGNIFICANT CHANGE UP (ref 3.5–5.2)
ALP SERPL-CCNC: 88 U/L — SIGNIFICANT CHANGE UP (ref 30–115)
ALT FLD-CCNC: 30 U/L — SIGNIFICANT CHANGE UP (ref 0–41)
ANION GAP SERPL CALC-SCNC: 14 MMOL/L — SIGNIFICANT CHANGE UP (ref 7–14)
AST SERPL-CCNC: 31 U/L — SIGNIFICANT CHANGE UP (ref 0–41)
BASE EXCESS BLDV CALC-SCNC: 0.8 MMOL/L — SIGNIFICANT CHANGE UP (ref -2–2)
BASOPHILS # BLD AUTO: 0.09 K/UL — SIGNIFICANT CHANGE UP (ref 0–0.2)
BASOPHILS NFR BLD AUTO: 0.8 % — SIGNIFICANT CHANGE UP (ref 0–1)
BILIRUB SERPL-MCNC: 0.5 MG/DL — SIGNIFICANT CHANGE UP (ref 0.2–1.2)
BLD GP AB SCN SERPL QL: SIGNIFICANT CHANGE UP
BUN SERPL-MCNC: 19 MG/DL — SIGNIFICANT CHANGE UP (ref 10–20)
CA-I SERPL-SCNC: 1.21 MMOL/L — SIGNIFICANT CHANGE UP (ref 1.12–1.3)
CALCIUM SERPL-MCNC: 9.2 MG/DL — SIGNIFICANT CHANGE UP (ref 8.5–10.1)
CHLORIDE SERPL-SCNC: 104 MMOL/L — SIGNIFICANT CHANGE UP (ref 98–110)
CO2 SERPL-SCNC: 21 MMOL/L — SIGNIFICANT CHANGE UP (ref 17–32)
CREAT SERPL-MCNC: 1.1 MG/DL — SIGNIFICANT CHANGE UP (ref 0.7–1.5)
EOSINOPHIL # BLD AUTO: 0.34 K/UL — SIGNIFICANT CHANGE UP (ref 0–0.7)
EOSINOPHIL NFR BLD AUTO: 3 % — SIGNIFICANT CHANGE UP (ref 0–8)
GAS PNL BLDA: SIGNIFICANT CHANGE UP
GAS PNL BLDV: 141 MMOL/L — SIGNIFICANT CHANGE UP (ref 136–145)
GAS PNL BLDV: SIGNIFICANT CHANGE UP
GLUCOSE SERPL-MCNC: 141 MG/DL — HIGH (ref 70–99)
HCO3 BLDV-SCNC: 26 MMOL/L — SIGNIFICANT CHANGE UP (ref 22–29)
HCT VFR BLD CALC: 46.4 % — SIGNIFICANT CHANGE UP (ref 42–52)
HCT VFR BLDA CALC: 51 % — HIGH (ref 34–44)
HGB BLD CALC-MCNC: 16.7 G/DL — SIGNIFICANT CHANGE UP (ref 14–18)
HGB BLD-MCNC: 16.4 G/DL — SIGNIFICANT CHANGE UP (ref 14–18)
IMM GRANULOCYTES NFR BLD AUTO: 1 % — HIGH (ref 0.1–0.3)
LACTATE BLDV-MCNC: 2.1 MMOL/L — HIGH (ref 0.5–1.6)
LACTATE SERPL-SCNC: 2 MMOL/L — SIGNIFICANT CHANGE UP (ref 0.7–2)
LYMPHOCYTES # BLD AUTO: 3.46 K/UL — HIGH (ref 1.2–3.4)
LYMPHOCYTES # BLD AUTO: 30.2 % — SIGNIFICANT CHANGE UP (ref 20.5–51.1)
MCHC RBC-ENTMCNC: 31.7 PG — HIGH (ref 27–31)
MCHC RBC-ENTMCNC: 35.3 G/DL — SIGNIFICANT CHANGE UP (ref 32–37)
MCV RBC AUTO: 89.6 FL — SIGNIFICANT CHANGE UP (ref 80–94)
MONOCYTES # BLD AUTO: 0.96 K/UL — HIGH (ref 0.1–0.6)
MONOCYTES NFR BLD AUTO: 8.4 % — SIGNIFICANT CHANGE UP (ref 1.7–9.3)
NEUTROPHILS # BLD AUTO: 6.48 K/UL — SIGNIFICANT CHANGE UP (ref 1.4–6.5)
NEUTROPHILS NFR BLD AUTO: 56.6 % — SIGNIFICANT CHANGE UP (ref 42.2–75.2)
NRBC # BLD: 0 /100 WBCS — SIGNIFICANT CHANGE UP (ref 0–0)
PCO2 BLDV: 45 MMHG — SIGNIFICANT CHANGE UP (ref 41–51)
PH BLDV: 7.38 — SIGNIFICANT CHANGE UP (ref 7.26–7.43)
PLATELET # BLD AUTO: 294 K/UL — SIGNIFICANT CHANGE UP (ref 130–400)
PO2 BLDV: 123 MMHG — HIGH (ref 20–40)
POTASSIUM BLDV-SCNC: 3.4 MMOL/L — SIGNIFICANT CHANGE UP (ref 3.3–5.6)
POTASSIUM SERPL-MCNC: 4.2 MMOL/L — SIGNIFICANT CHANGE UP (ref 3.5–5)
POTASSIUM SERPL-SCNC: 4.2 MMOL/L — SIGNIFICANT CHANGE UP (ref 3.5–5)
PROT SERPL-MCNC: 6.9 G/DL — SIGNIFICANT CHANGE UP (ref 6–8)
RAPID RVP RESULT: SIGNIFICANT CHANGE UP
RBC # BLD: 5.18 M/UL — SIGNIFICANT CHANGE UP (ref 4.7–6.1)
RBC # FLD: 12.5 % — SIGNIFICANT CHANGE UP (ref 11.5–14.5)
SAO2 % BLDV: 98 % — SIGNIFICANT CHANGE UP
SARS-COV-2 RNA SPEC QL NAA+PROBE: SIGNIFICANT CHANGE UP
SODIUM SERPL-SCNC: 139 MMOL/L — SIGNIFICANT CHANGE UP (ref 135–146)
TROPONIN T SERPL-MCNC: <0.01 NG/ML — SIGNIFICANT CHANGE UP
WBC # BLD: 11.44 K/UL — HIGH (ref 4.8–10.8)
WBC # FLD AUTO: 11.44 K/UL — HIGH (ref 4.8–10.8)

## 2021-01-27 PROCEDURE — 71260 CT THORAX DX C+: CPT | Mod: 26

## 2021-01-27 PROCEDURE — 36556 INSERT NON-TUNNEL CV CATH: CPT

## 2021-01-27 PROCEDURE — 99291 CRITICAL CARE FIRST HOUR: CPT | Mod: 25

## 2021-01-27 PROCEDURE — 71045 X-RAY EXAM CHEST 1 VIEW: CPT | Mod: 26

## 2021-01-27 PROCEDURE — 36620 INSERTION CATHETER ARTERY: CPT

## 2021-01-27 PROCEDURE — 70496 CT ANGIOGRAPHY HEAD: CPT | Mod: 26

## 2021-01-27 PROCEDURE — 93010 ELECTROCARDIOGRAM REPORT: CPT

## 2021-01-27 PROCEDURE — 70498 CT ANGIOGRAPHY NECK: CPT | Mod: 26

## 2021-01-27 PROCEDURE — 70450 CT HEAD/BRAIN W/O DYE: CPT | Mod: 26,59

## 2021-01-27 PROCEDURE — 74177 CT ABD & PELVIS W/CONTRAST: CPT | Mod: 26

## 2021-01-27 PROCEDURE — 99243 OFF/OP CNSLTJ NEW/EST LOW 30: CPT | Mod: 57

## 2021-01-27 PROCEDURE — 72125 CT NECK SPINE W/O DYE: CPT | Mod: 26

## 2021-01-27 PROCEDURE — 31500 INSERT EMERGENCY AIRWAY: CPT

## 2021-01-27 RX ORDER — MIDAZOLAM HYDROCHLORIDE 1 MG/ML
0.02 INJECTION, SOLUTION INTRAMUSCULAR; INTRAVENOUS
Qty: 100 | Refills: 0 | Status: DISCONTINUED | OUTPATIENT
Start: 2021-01-27 | End: 2021-01-27

## 2021-01-27 RX ORDER — CHLORHEXIDINE GLUCONATE 213 G/1000ML
15 SOLUTION TOPICAL EVERY 12 HOURS
Refills: 0 | Status: DISCONTINUED | OUTPATIENT
Start: 2021-01-27 | End: 2021-02-23

## 2021-01-27 RX ORDER — MIDAZOLAM HYDROCHLORIDE 1 MG/ML
0.02 INJECTION, SOLUTION INTRAMUSCULAR; INTRAVENOUS
Qty: 100 | Refills: 0 | Status: DISCONTINUED | OUTPATIENT
Start: 2021-01-27 | End: 2021-01-29

## 2021-01-27 RX ORDER — CEFAZOLIN SODIUM 1 G
VIAL (EA) INJECTION
Refills: 0 | Status: DISCONTINUED | OUTPATIENT
Start: 2021-01-28 | End: 2021-02-01

## 2021-01-27 RX ORDER — PROPOFOL 10 MG/ML
50 INJECTION, EMULSION INTRAVENOUS
Qty: 500 | Refills: 0 | Status: DISCONTINUED | OUTPATIENT
Start: 2021-01-27 | End: 2021-01-29

## 2021-01-27 RX ORDER — FENTANYL CITRATE 50 UG/ML
0.5 INJECTION INTRAVENOUS
Qty: 2500 | Refills: 0 | Status: DISCONTINUED | OUTPATIENT
Start: 2021-01-27 | End: 2021-01-29

## 2021-01-27 RX ORDER — ROCURONIUM BROMIDE 10 MG/ML
100 VIAL (ML) INTRAVENOUS ONCE
Refills: 0 | Status: COMPLETED | OUTPATIENT
Start: 2021-01-27 | End: 2021-01-27

## 2021-01-27 RX ORDER — LEVETIRACETAM 250 MG/1
1000 TABLET, FILM COATED ORAL ONCE
Refills: 0 | Status: COMPLETED | OUTPATIENT
Start: 2021-01-27 | End: 2021-01-27

## 2021-01-27 RX ORDER — NIMODIPINE 60 MG/10ML
60 SOLUTION ORAL ONCE
Refills: 0 | Status: COMPLETED | OUTPATIENT
Start: 2021-01-27 | End: 2021-01-27

## 2021-01-27 RX ORDER — ETOMIDATE 2 MG/ML
20 INJECTION INTRAVENOUS ONCE
Refills: 0 | Status: COMPLETED | OUTPATIENT
Start: 2021-01-27 | End: 2021-01-27

## 2021-01-27 RX ORDER — FENTANYL CITRATE 50 UG/ML
0.5 INJECTION INTRAVENOUS
Qty: 2500 | Refills: 0 | Status: DISCONTINUED | OUTPATIENT
Start: 2021-01-27 | End: 2021-01-27

## 2021-01-27 RX ADMIN — MIDAZOLAM HYDROCHLORIDE 2 MG/KG/HR: 1 INJECTION, SOLUTION INTRAMUSCULAR; INTRAVENOUS at 22:20

## 2021-01-27 RX ADMIN — Medication 100 MILLIGRAM(S): at 21:00

## 2021-01-27 RX ADMIN — PROPOFOL 30 MICROGRAM(S)/KG/MIN: 10 INJECTION, EMULSION INTRAVENOUS at 22:24

## 2021-01-27 RX ADMIN — ETOMIDATE 20 MILLIGRAM(S): 2 INJECTION INTRAVENOUS at 21:00

## 2021-01-27 RX ADMIN — LEVETIRACETAM 400 MILLIGRAM(S): 250 TABLET, FILM COATED ORAL at 23:00

## 2021-01-27 RX ADMIN — FENTANYL CITRATE 5 MICROGRAM(S)/KG/HR: 50 INJECTION INTRAVENOUS at 22:24

## 2021-01-27 NOTE — ED ADULT NURSE NOTE - NSIMPLEMENTINTERV_GEN_ALL_ED
Implemented All Fall with Harm Risk Interventions:  [place] to call system. Call bell, personal items and telephone within reach. Instruct patient to call for assistance. Room bathroom lighting operational. Non-slip footwear when patient is off stretcher. Physically safe environment: no spills, clutter or unnecessary equipment. Stretcher in lowest position, wheels locked, appropriate side rails in place. Provide visual cue, wrist band, yellow gown, etc. Monitor gait and stability. Monitor for mental status changes and reorient to person, place, and time. Review medications for side effects contributing to fall risk. Reinforce activity limits and safety measures with patient and family. Provide visual clues: red socks.

## 2021-01-27 NOTE — ED PROVIDER NOTE - PHYSICAL EXAMINATION
CONSTITUTIONAL: Trouble speaking, can follow some commands  SKIN: warm, dry  HEAD: Normocephalic; atraumatic.  EYES: no conjunctival injection. PERRL.   ENT: No nasal discharge; airway clear.  NECK: Supple; non tender.  CARD: S1, S2 normal; no murmurs, gallops, or rubs. Regular rate and rhythm.   RESP: No wheezes, rales or rhonchi.  ABD: soft ntnd  EXT: Normal ROM.  No clubbing, cyanosis or edema.   LYMPH: No acute cervical adenopathy.  NEURO: Sleepy, trouble speaking, can follow some commands, 3/5 strength in LUE and LLE, 5/5 strenght in RUE and RLE, right sided gaze

## 2021-01-27 NOTE — CONSULT NOTE ADULT - SUBJECTIVE AND OBJECTIVE BOX
HPI:  Patient is a 49 yo male with PMHx of HTN, GI bleed, PUD, on aspirin c/o AMS and left sided weakness since 40 min PTA. The patient was in the bathroom and was on side of toilet 40 min PTA, seen by wife, and called EMS. Patient was confused, trouble speaking, with evident left sided weakness. Baseline alert and oriented x 3.  On arrival to ED, stroke code was called. Patient was transported to CT where a large right sided intraparenchymal hemorrhage with intraventricular extension was discovered.   Patient in CT was AOx0, speech slurred, moved L side spontaneously and purposefully.   Right side gaze deviation noted.           ROS: unable to answer due to altered mental status    PAST MEDICAL & SURGICAL HISTORY:  HTN (hypertension)    GI bleed    Gastric ulcer    PUD (peptic ulcer disease)    Arm fracture, left  s/p surgical repair        Home Medications:  ASA      Allergies    No Known Allergies    Intolerances        MEDICATIONS  (STANDING):  levETIRAcetam  IVPB 1000 milliGRAM(s) IV Intermittent once    MEDICATIONS  (PRN):      ICU Vital Signs Last 24 Hrs  T(C): --  T(F): --  HR: 80 (27 Jan 2021 21:15) (80 - 80)  BP: 268/146 (27 Jan 2021 21:15) (268/146 - 268/146)  BP(mean): --  ABP: --  ABP(mean): --  RR: --  SpO2: --      I&O's Detail      CBC Full  -  ( 27 Jan 2021 20:28 )  WBC Count : 11.44 K/uL  RBC Count : 5.18 M/uL  Hemoglobin : 16.4 g/dL  Hematocrit : 46.4 %  Platelet Count - Automated : 294 K/uL  Mean Cell Volume : 89.6 fL  Mean Cell Hemoglobin : 31.7 pg  Mean Cell Hemoglobin Concentration : 35.3 g/dL  Auto Neutrophil # : 6.48 K/uL  Auto Lymphocyte # : 3.46 K/uL  Auto Monocyte # : 0.96 K/uL  Auto Eosinophil # : 0.34 K/uL  Auto Basophil # : 0.09 K/uL  Auto Neutrophil % : 56.6 %  Auto Lymphocyte % : 30.2 %  Auto Monocyte % : 8.4 %  Auto Eosinophil % : 3.0 %  Auto Basophil % : 0.8 %    01-27    139  |  104  |  19  ----------------------------<  141<H>  4.2   |  21  |  1.1    Ca    9.2      27 Jan 2021 20:28    TPro  6.9  /  Alb  4.7  /  TBili  0.5  /  DBili  x   /  AST  31  /  ALT  30  /  AlkPhos  88  01-27    CARDIAC MARKERS ( 27 Jan 2021 20:28 )  x     / <0.01 ng/mL / x     / x     / x            lethargic  AAOX0. speech garbled  did not follow commands  face symmetric  PERRLA  R sided gaze deviation  Moves L side spontaneously, purposefully  Sens intact L upper and lower extremities  R upper and lower extremities 0/5  RUE extends to noxious stimuli  RLE triple flexion reflex noted    Imaging:  CTH -R BG ICH ( > 30cc)  extending to cortex, 3rd ventricle and cerebral aqueduct with midline shift.   pending CTA head/neck read.     Assessment/Plan  49 yo male with large R basal ganglia ICP with intraventricular extension, brain compression and midline shift.  Patient intubated in ED.     - ASA reversal with PLT  - emergent EVD placement  - admission to MICU for neurocritical care  - neuro checks q1h  - repeat CTH post-EVD placement and in 6 hrs  - Keppra 1 g bolus, then 500mg BID  - Strict BP control, SBP goal <160  - HOB @30 degrees   HPI:  Patient is a 49 yo male with PMHx of HTN, GI bleed, PUD, on aspirin c/o AMS and left sided weakness since 40 min PTA. The patient was in the bathroom and was on side of toilet 40 min PTA, seen by wife, and called EMS. Patient was confused, trouble speaking, with evident left sided weakness. Baseline alert and oriented x 3.  On arrival to ED, stroke code was called. Patient was transported to CT where a large right sided intraparenchymal hemorrhage with intraventricular extension was discovered.   Patient in CT was AOx0, speech slurred, moved L side spontaneously and purposefully.   Right side gaze deviation noted.           ROS: unable to answer due to altered mental status    PAST MEDICAL & SURGICAL HISTORY:  HTN (hypertension)    GI bleed    Gastric ulcer    PUD (peptic ulcer disease)    Arm fracture, left  s/p surgical repair        Home Medications:  ASA      Allergies    No Known Allergies    Intolerances        MEDICATIONS  (STANDING):  levETIRAcetam  IVPB 1000 milliGRAM(s) IV Intermittent once    MEDICATIONS  (PRN):      ICU Vital Signs Last 24 Hrs  T(C): --  T(F): --  HR: 80 (27 Jan 2021 21:15) (80 - 80)  BP: 268/146 (27 Jan 2021 21:15) (268/146 - 268/146)  BP(mean): --  ABP: --  ABP(mean): --  RR: --  SpO2: --      I&O's Detail      CBC Full  -  ( 27 Jan 2021 20:28 )  WBC Count : 11.44 K/uL  RBC Count : 5.18 M/uL  Hemoglobin : 16.4 g/dL  Hematocrit : 46.4 %  Platelet Count - Automated : 294 K/uL  Mean Cell Volume : 89.6 fL  Mean Cell Hemoglobin : 31.7 pg  Mean Cell Hemoglobin Concentration : 35.3 g/dL  Auto Neutrophil # : 6.48 K/uL  Auto Lymphocyte # : 3.46 K/uL  Auto Monocyte # : 0.96 K/uL  Auto Eosinophil # : 0.34 K/uL  Auto Basophil # : 0.09 K/uL  Auto Neutrophil % : 56.6 %  Auto Lymphocyte % : 30.2 %  Auto Monocyte % : 8.4 %  Auto Eosinophil % : 3.0 %  Auto Basophil % : 0.8 %    01-27    139  |  104  |  19  ----------------------------<  141<H>  4.2   |  21  |  1.1    Ca    9.2      27 Jan 2021 20:28    TPro  6.9  /  Alb  4.7  /  TBili  0.5  /  DBili  x   /  AST  31  /  ALT  30  /  AlkPhos  88  01-27    CARDIAC MARKERS ( 27 Jan 2021 20:28 )  x     / <0.01 ng/mL / x     / x     / x            lethargic  AAOX0. speech garbled  did not follow commands  face symmetric  PERRLA  R sided gaze deviation  Moves L side spontaneously, purposefully  Sens intact L upper and lower extremities  R upper and lower extremities 0/5  RUE extends to noxious stimuli  RLE triple flexion reflex noted    Imaging:  CTH -R BG ICH ( > 30cc)  extending to cortex, 3rd ventricle and cerebral aqueduct with midline shift.   pending CTA head/neck read.     Assessment/Plan  49 yo male with large R basal ganglia ICP with intraventricular extension, brain compression and midline shift.  Patient intubated in ED.     - ASA reversal with PLT  - emergent EVD placement  - admission to MICU for neurocritical care  - neuro checks q1h  - repeat CTH post-EVD placement and in 6 hrs  - Keppra 1 g bolus, then 500mg BID  - Strict BP control, SBP goal <160  - HOB @30 degrees  - Ancef 1g q8hrs for the duration of EVD  - EVD to be maintained at 10cm over tragus  - ICP monitoring  - will follow    please call [phone] if any questions

## 2021-01-27 NOTE — CONSULT NOTE ADULT - ASSESSMENT
Impression:  49 yo RHM with PMHx of HTN, GI bleed, PUD,  Mrankin score of 0 at baseline was brought in by EMS for  AMS and left sided weakness. Wife states that patient was having a usual day today went to bathroom and then the next thing she noted was that he was confused and was lying on the bathroom floor, she reports urinary incontinence, patient was confused, had trouble speaking, and could not move his left side.            Impression:  51 yo RHM with PMHx of HTN, GI bleed, PUD,  Mrankin score of 0 at baseline was brought in by EMS after being found down on the bathroom floor with AMS left sided weakness and difficulty speaking. GCS 11 and NIHSS 24 on arrival. CTH -Revealed a right  BG ICH ( > 30cc)  extending to cortex, 3rd and 4th ventricle and cerebral aqueduct with mass effect and midline shift.  CTA head/neck is negative for AVM or aneurysm. Patient was intubated in ed for worsening mental status and GCS. Started on Nicardipine drip for sbp in 200s and EVD was placed by neurosurgery at bedside. Case was discussed during stroke code with neuro attending on call Dr Paloma Hanna.      Suggestion    Neurology:  - Reverse ASA, administer platelets stat.  - Emergent EVD placement (Placed in ed)  - Neuro checks q1h  -Administer Keppra 1 g bolus x 1 now then 500mg BID for seizure prophylaxis  -CTH in 6-8 hrs or sooner if acute change in neuro status  -Strict BP control, SBP goal 140 -160  - Keep HOB @35 degrees  - No anticoagulation or antiplatelets  - Propofol and fentanyl for sedation Titrate to a RASS of -2        Respiratory:  -Ventilator management as per respiratory       Cardiology:  -Continue Nicardipine drip , and keep sbp 140 -160      GI/Nutrition:  -Bowel Regimen ->Senna  -PPx -> Pantoprazole 40 mg q 24      / Renal/ Fluids/ Electrolytes:  -Maintain euvolemia using normotonic fluids to maintain brain perfusion without exacerbating brain edema  -Keep strict I/Os  -Monitor and correct lytes         Hematology  -DVT PPx     SCDs      Infectious Disease:  -Avoid hyperthermia /Tylenol PRN        Musculoskeletol:  -Bedrest        Endocrine:  -Monitor FS, keep normoglycemia        Tubes/ Lines/ Drains:  Central    Peripheral    A-line    Donahue    NGT/ OGT    Chest    EVD        Disposition:  Continue medical management as per primary team in:   ICU   CCU   SICU   Stroke Unit   Stepdown        CODE STATUS  DNI   DNR   FULL Code       Impression:  51 yo RHM with PMHx of HTN, GI bleed, PUD,  Mrankin score of 0 at baseline was brought in by EMS after being found down on the bathroom floor with AMS left sided weakness and difficulty speaking. GCS 11 and NIHSS 24 on arrival. CTH -Revealed a right  BG ICH ( > 30cc)  extending to cortex, 3rd and 4th ventricle and cerebral aqueduct with mass effect and midline shift.  CTA head/neck is negative for AVM or aneurysm. Patient was intubated in ed for worsening mental status and GCS. Started on Nicardipine drip for sbp in 200s and EVD was placed by neurosurgery at bedside. Case was discussed during stroke code with neuro attending on call Dr Paloma Hanna.      Suggestion    Neurology:  - Reverse ASA, administer platelets stat.  - Emergent EVD placement (Placed in ed)  - Neuro checks q1h  -Administer Keppra 1 g bolus x 1 now then 500mg BID for seizure prophylaxis  -CTH in 6-8 hrs or sooner if acute change in neuro status  -Strict BP control, SBP goal 140 -160  - Keep HOB @35 degrees  - No anticoagulation or antiplatelets  - Propofol and fentanyl for sedation Titrate to a RASS of -2        Respiratory:  -Ventilator management as per respiratory       Cardiology:  -Continue Nicardipine drip , and keep sbp 140 -160      GI/Nutrition:  -Bowel Regimen ->Senna  -PPx -> Pantoprazole 40 mg q 24      / Renal/ Fluids/ Electrolytes:  -Maintain euvolemia using normotonic fluids to maintain brain perfusion without exacerbating brain edema  -Keep strict I/Os  -Monitor and correct lytes         Hematology  -DVT PPx     SCDs      Infectious Disease:  -Avoid hyperthermia /Tylenol PRN        Musculoskeletol:  -Bedrest        Endocrine:  -Monitor FS, maintain normoglycemia        Tubes/ Lines/ Drains:  Central    Peripheral    A-line    Donahue    NGT/ OGT    Chest    EVD        Disposition:  Continue medical management as per primary team in:   ICU   CCU   SICU   Stroke Unit   Stepdown        CODE STATUS  DNI   DNR   FULL Code

## 2021-01-27 NOTE — PROCEDURE NOTE - NSICDXPROCEDURE_GEN_ALL_CORE_FT
PROCEDURES:  Ventriculostomy, for ventricular catheter insertion 27-Jan-2021 23:42:39 Noted on CTH 1/27/2021, intraventricular hemorrhage Francine Christian

## 2021-01-27 NOTE — H&P ADULT - ATTENDING COMMENTS
patient seen and examined, agree with above, altered MS, Large ICH bleed/ hypertensive sp EVD, intubation for airway protection, Keep propofol, if needed add fentanyl, CPP more than 70, monitor ICP, push ETT, dec FIOE, repeat Head CT, Neuro/ Neuro sx eval, NPO, protonix, MICU

## 2021-01-27 NOTE — H&P ADULT - HISTORY OF PRESENT ILLNESS
50 years old right handed Male with PMHx of HTN, GI bleed, PUD,  Mrankin score of 0 at baseline was brought in by EMS for  AMS and left sided weakness.   Wife states that patient was having a usual day today went to bathroom and then the next thing she noted was that he was confused and was lying on the bathroom floor, she reports urinary incontinence, patient was confused, had trouble speaking, and could not move his left side.     In the ED, /148, HR 80, saturating well. Code stroke called, NIHSS 24 and GCS 11, CTH showed Moderate to large intraparenchymal right basal ganglia hemorrhage with mild surrounding edema and associated right sulcal effacement as well as decompression into the ventricles. There is approximately 6.3 mm of midline shift. CTA did not show any evidence of active bleeding, AVM or aneurysm or major vascular stenosis or occlusion. Pt became unresponsive in CT scan and had to be intubated and sedated. Patient was intubated in ED for worsening mental status and GCS. Started on Nicardipine drip for sbp in 200s and EVD was placed by neurosurgery at bedside. Pan trauma scan was negative. To be admitted under ICU for further monitoring.

## 2021-01-27 NOTE — CONSULT NOTE ADULT - SUBJECTIVE AND OBJECTIVE BOX
TRAUMA ACTIVATION LEVEL:      MECHANISM OF INJURY:   [] Blunt     [] MVC	  [x] Fall	  [] Pedestrian Struck	  [] Motorcycle     [] Assault     [] Bicycle collision    [] Sports injury    [] Penetrating    [] Gun Shot Wound      [] Stab Wound    GCS: 3T    HPI:  50yM w/ PMHx of HTN, GI bleed, PUD, on ASA seen as Trauma Consult) after being found down on the side of the toilet. Was c/o left sided weakness for 40 mins prior to arrival as per ED. Stated he was in bathroom on the side of the toilet for those 40 mins, was seen by wife which called EMS. On arrival (per documentation): patient was confused, has trouble speaking & had left sided weakness w/ baseline AAOx3. Stroke code called in ED and patient was taken to CT and large right sided intraparenchymal hemorrhage. Mental status worsened and patient was intubated. GCS 3T on evaluation. no external signs of trauma seen.    PAST MEDICAL & SURGICAL HISTORY:  HTN (hypertension)  GI bleed  Gastric ulcer  PUD (peptic ulcer disease)  Arm fracture, left  s/p surgical repair    Allergies  No Known Allergies  Intolerances    Home Medications:    ROS: 10-system review is otherwise negative except HPI above.      Primary Survey:    A - airway intact  B - bilateral breath sounds and good chest rise  C - palpable pulses in all extremities  D - GCS 15 on arrival, REGALADO  Exposure obtained    Vital Signs Last 24 Hrs  HR: 86 (27 Jan 2021 22:29) (80 - 100)  BP: 121/77 (27 Jan 2021 22:29) (121/77 - 268/146)  BP(mean): 88 (27 Jan 2021 22:29) (88 - 96)  RR: 18 (27 Jan 2021 22:29) (18 - 24)  SpO2: 100% (27 Jan 2021 22:29) (100% - 100%)    Secondary Survey:   General: NAD  HEENT: Normocephalic, atraumatic, no scalp lacerations   Neck: Soft, midline trachea. no c-spine tenderness  Chest:  no subcutaneous emphysema   Cardiac: S1, S2, RRR, hypertension to 260 systolic  Respiratory: symmetric chest wall expansion,   Abdomen: Soft, non-distended obese  Groin: Normal appearing, pelvis stable   Ext: Moving b/l upper and lower extremities. bounding Radial b/l UE, b/l DP bounding in LE.   Back: No palpable runoff/stepoff/deformity    Labs:  CAPILLARY BLOOD GLUCOSE                        16.4   11.44 )-----------( 294      ( 27 Jan 2021 20:28 )             46.4       Auto Neutrophil %: 56.6 % (01-27-21 @ 20:28)  Auto Immature Granulocyte %: 1.0 % (01-27-21 @ 20:28)    01-27    139  |  104  |  19  ----------------------------<  141<H>  4.2   |  21  |  1.1    Calcium, Total Serum: 9.2 mg/dL (01-27-21 @ 20:28)    LFTs:             6.9  | 0.5  | 31       ------------------[88      ( 27 Jan 2021 20:28 )  4.7  | x    | 30          Lipase:x      Amylase:x       Blood Gas Venous - Lactate: 2.1 mmoL/L (01-27-21 @ 21:02)  Lactate, Blood: 2.0 mmol/L (01-27-21 @ 20:28)    Coags:    CARDIAC MARKERS ( 27 Jan 2021 20:28 )  x     / <0.01 ng/mL / x     / x     / x        RADIOLOGY & ADDITIONAL STUDIES:  < from: CT Brain Stroke Protocol (01.27.21 @ 20:37) >  IMPRESSION:  Moderate to large intraparenchymal right basal ganglia hemorrhage with mild surrounding edema and associated right sulcal effacement as well as decompression into the ventricles. There is approximately 6.3 mm of midline shift.  < end of copied text >    < from: CT Angio Neck w/ IV Cont (01.27.21 @ 21:08) >  IMPRESSION  The previously noted area of intracerebral hemorrhage in the right basal ganglia region is again identified. Please see report of the CT scan of the head dated 1/27/2021.  No evidence of active bleeding, AVM or aneurysm.  No evidence of major vascular stenosis or occlusion,  < end of copied text >    < from: CT Cervical Spine No Cont (01.27.21 @ 21:08) >  IMPRESSION:  Degenerative changes as described above. No evidence of fracture or facet subluxation.  < end of copied text >    < from: CT Chest w/ IV Cont (01.27.21 @ 21:09) >    IMPRESSION:  No CT evidence for acute traumatic injury to the chest, abdomen or pelvis.  Mild aneurysmal dilatation of the ascending thoracic aorta to 4.1 cm.  < end of copied text >     TRAUMA ACTIVATION LEVEL:      MECHANISM OF INJURY:   [] Blunt     [] MVC	  [x] Fall	  [] Pedestrian Struck	  [] Motorcycle     [] Assault     [] Bicycle collision    [] Sports injury    [] Penetrating    [] Gun Shot Wound      [] Stab Wound    GCS: 3T    HPI:  50yM w/ PMHx of HTN, GI bleed, PUD, on ASA seen as Trauma Consult s/p found down on the side of the toilet. Was c/o left sided weakness for 40 mins prior to arrival as per ED. Stated he was in bathroom on the side of the toilet for those 40 mins, was seen by wife which called EMS. On arrival (per documentation): patient was confused, has trouble speaking & had left sided weakness w/ baseline AAOx3. Stroke code called in ED and patient was taken to CT and large right sided intraparenchymal hemorrhage. Mental status worsened and patient was intubated. GCS 3T at the time of trauma evaluation. no external signs of trauma seen.    PAST MEDICAL & SURGICAL HISTORY:  HTN (hypertension)  GI bleed  Gastric ulcer  PUD (peptic ulcer disease)  Arm fracture, left  s/p surgical repair    Allergies  No Known Allergies  Intolerances    Home Medications:    ROS: 10-system review is otherwise negative except HPI above.      Primary Survey:    A - airway intact  B - bilateral breath sounds and good chest rise  C - palpable pulses in all extremities  D - GCS 15 on arrival, REGALADO  Exposure obtained    Vital Signs Last 24 Hrs  HR: 86 (27 Jan 2021 22:29) (80 - 100)  BP: 121/77 (27 Jan 2021 22:29) (121/77 - 268/146)  BP(mean): 88 (27 Jan 2021 22:29) (88 - 96)  RR: 18 (27 Jan 2021 22:29) (18 - 24)  SpO2: 100% (27 Jan 2021 22:29) (100% - 100%)    Secondary Survey:   General: NAD  HEENT: Normocephalic, atraumatic, no scalp lacerations   Neck: Soft, midline trachea. no c-spine tenderness  Chest:  no subcutaneous emphysema   Cardiac: S1, S2, RRR, hypertension to 260 systolic  Respiratory: symmetric chest wall expansion,   Abdomen: Soft, non-distended obese  Groin: Normal appearing, pelvis stable   Ext: Moving b/l upper and lower extremities. bounding Radial b/l UE, b/l DP bounding in LE.   Back: No palpable runoff/stepoff/deformity    Labs:  CAPILLARY BLOOD GLUCOSE                        16.4   11.44 )-----------( 294      ( 27 Jan 2021 20:28 )             46.4       Auto Neutrophil %: 56.6 % (01-27-21 @ 20:28)  Auto Immature Granulocyte %: 1.0 % (01-27-21 @ 20:28)    01-27    139  |  104  |  19  ----------------------------<  141<H>  4.2   |  21  |  1.1    Calcium, Total Serum: 9.2 mg/dL (01-27-21 @ 20:28)    LFTs:             6.9  | 0.5  | 31       ------------------[88      ( 27 Jan 2021 20:28 )  4.7  | x    | 30          Lipase:x      Amylase:x       Blood Gas Venous - Lactate: 2.1 mmoL/L (01-27-21 @ 21:02)  Lactate, Blood: 2.0 mmol/L (01-27-21 @ 20:28)    Coags:    CARDIAC MARKERS ( 27 Jan 2021 20:28 )  x     / <0.01 ng/mL / x     / x     / x        RADIOLOGY & ADDITIONAL STUDIES:  < from: CT Brain Stroke Protocol (01.27.21 @ 20:37) >  IMPRESSION:  Moderate to large intraparenchymal right basal ganglia hemorrhage with mild surrounding edema and associated right sulcal effacement as well as decompression into the ventricles. There is approximately 6.3 mm of midline shift.  < end of copied text >    < from: CT Angio Neck w/ IV Cont (01.27.21 @ 21:08) >  IMPRESSION  The previously noted area of intracerebral hemorrhage in the right basal ganglia region is again identified. Please see report of the CT scan of the head dated 1/27/2021.  No evidence of active bleeding, AVM or aneurysm.  No evidence of major vascular stenosis or occlusion,  < end of copied text >    < from: CT Cervical Spine No Cont (01.27.21 @ 21:08) >  IMPRESSION:  Degenerative changes as described above. No evidence of fracture or facet subluxation.  < end of copied text >    < from: CT Chest w/ IV Cont (01.27.21 @ 21:09) >    IMPRESSION:  No CT evidence for acute traumatic injury to the chest, abdomen or pelvis.  Mild aneurysmal dilatation of the ascending thoracic aorta to 4.1 cm.  < end of copied text >

## 2021-01-27 NOTE — ED ADULT NURSE NOTE - OBJECTIVE STATEMENT
Wife called ems after finding pt next to side of toilet, with ams.  pt arrives to ed with left sided weakness and ams. As per wife, pt baseline is aox3. pt taken immediately to ct. iv placed / labs sent. pt dx with brain bleed after ct; sent back to ed trauma room 3. pt sedated / intubated. berry placed.  fish hold procedure performed by neurosurgery pa. pt remains on icp monitoring.

## 2021-01-27 NOTE — H&P ADULT - NSHPPHYSICALEXAM_GEN_ALL_CORE
PHYSICAL EXAM:  GENERAL: NAD, lying in bed comfortably  HEAD:  Atraumatic, Normocephalic  EYES: EOMI, PERRLA, conjunctiva and sclera clear  ENT: Moist mucous membranes  NECK: Supple, No JVD  CHEST/LUNG: Clear to auscultation bilaterally; No rales, rhonchi, wheezing, or rubs. Unlabored respirations  HEART: Regular rate and rhythm; No murmurs, rubs, or gallops  ABDOMEN: Bowel sounds present; Soft, Nontender, Nondistended. No hepatomegally  EXTREMITIES:  2+ Peripheral Pulses, brisk capillary refill. No clubbing, cyanosis, or edema  NERVOUS SYSTEM:  Alert & Oriented X3, speech clear. No deficits   MSK: FROM all 4 extremities, full and equal strength  SKIN: No rashes or lesions PHYSICAL EXAM:  GENERAL: intubated and sedated  HEAD:  Atraumatic, Normocephalic  EYES: EOMI, PERRLA, conjunctiva and sclera clear  ENT: Moist mucous membranes  NECK: Supple, No JVD  CHEST/LUNG: Clear to auscultation bilaterally; No rales, rhonchi, wheezing, or rubs. Unlabored respirations  HEART: Regular rate and rhythm; No murmurs, rubs, or gallops  ABDOMEN: Bowel sounds present; Soft, Nontender, Nondistended. No hepatomegally  EXTREMITIES:  2+ Peripheral Pulses, brisk capillary refill. No clubbing, cyanosis, or edema  NERVOUS SYSTEM:  intubated and sedated  SKIN: No rashes or lesions PHYSICAL EXAM:  GENERAL: intubated and sedated  HEAD:  Atraumatic, Normocephalic  EYES: EOMI, PERRLA, conjunctiva and sclera clear  ENT: Moist mucous membranes  NECK: Supple, No JVD  CHEST/LUNG: Clear to auscultation bilaterally; No rales, rhonchi, wheezing, or rubs. Unlabored respirations  HEART: Regular rate and rhythm; No murmurs, rubs, or gallops  ABDOMEN: Bowel sounds present; Soft, Nontender, Nondistended. No hepatomegaly  EXTREMITIES:  2+ Peripheral Pulses, brisk capillary refill. No clubbing, cyanosis, or edema  NERVOUS SYSTEM:  intubated and sedated, withdraw to painful stimuli  SKIN: No rashes or lesions

## 2021-01-27 NOTE — CONSULT NOTE ADULT - ASSESSMENT
ASSESSMENT:  50yM w/ PMHx of HTN, GI bleed, PUD, on ASA seen as Trauma Consult) after being found down on the side of the toilet found hypertensive to 260/145 & intubated with no external signs of trauma. Pan scan negative except for right sided intraparenchymal bleed.    PLAN:   -Labs: (CBC, BMP, Coags, T&S, UA, EtOH level)  - BP control  - admit to medical ICU  - recs as per Neurosurgery & neurology  - PAN scan negative  - no further trauma management needed    Above plan discussed with attending, Dr. Eason and ED team   ASSESSMENT:  50yM w/ PMHx of HTN, GI bleed, PUD, on ASA seen as Trauma Consult) after being found down on the side of the toilet found hypertensive to 260/145 & intubated with no external signs of trauma. Pan scan negative except for right sided intraparenchymal bleed.    PLAN:   -Labs: (CBC, BMP, Coags, T&S, UA, EtOH level)  - BP control  - admit to medical ICU  - recs as per Neurosurgery & neurology  - PAN scan negative  - no acute trauma surgical intervention at this time  - TTS when extubated, stable    Above plan discussed with attending, Dr. Eason and ED team    Senior Note  I have personally examined and evaluated the patient  I agree with the above plan and note, and I have edited where appropriate  External signs of injury on exam: none  Images reviewed, as above. No acute traumatic findings  Mgmt per nsgy, TTS when extubated  Surgical Attending aware and agrees with plan

## 2021-01-27 NOTE — H&P ADULT - NSHPLABSRESULTS_GEN_ALL_CORE
(01-27 @ 20:28)                      16.4  11.44 )-----------( 294                 46.4    Neutrophils = 6.48 (56.6%)  Lymphocytes = 3.46 (30.2%)  Eosinophils = 0.34 (3.0%)  Basophils = 0.09 (0.8%)  Monocytes = 0.96 (8.4%)  Bands = --%    01-27    139  |  104  |  19  ----------------------------<  141<H>  4.2   |  21  |  1.1    Ca    9.2      27 Jan 2021 20:28    TPro  6.9  /  Alb  4.7  /  TBili  0.5  /  DBili  x   /  AST  31  /  ALT  30  /  AlkPhos  88  01-27      CARDIAC MARKERS ( 27 Jan 2021 20:28 )  Trop <0.01 ng/mL / CK x     / CKMB x           RVP:(01-27 @ 20:30)  NotDetec      Venous Blood Gas:  01-27 @ 21:02  7.38/45/123/26/98  VBG Lactate: 2.1    Arterial Blood Gas:  01-27 @ 22:27  7.31/52/106/26/98/-1.5  ABG lactate: --  < from: CT Abdomen and Pelvis w/ IV Cont (01.27.21 @ 21:09) >    IMPRESSION:    No CT evidence for acute traumatic injury to the chest, abdomen or pelvis.    Mild aneurysmal dilatation of the ascending thoracic aorta to 4.1 cm.    < end of copied text >    < from: CT Brain Stroke Protocol (01.27.21 @ 20:37) >    IMPRESSION:    Moderate to large intraparenchymal right basal ganglia hemorrhage with mild surrounding edema and associated right sulcal effacement as well as decompression into the ventricles. There is approximately 6.3 mm of midline shift.    Dr. Jerome Sevilla discussed preliminary findings with NELL PEÑA NP on 1/27/2021 8:56 PM with readback.    < end of copied text >    < from: CT Angio Neck w/ IV Cont (01.27.21 @ 21:08) >    IMPRESSION    The previously noted area of intracerebral hemorrhage in the right basal ganglia region is again identified. Please see report of the CT scan of the head dated 1/27/2021.    No evidence of active bleeding, AVM or aneurysm.    No evidence of major vascular stenosis or occlusion,    < end of copied text >

## 2021-01-27 NOTE — H&P ADULT - ASSESSMENT
IMPRESSION:  CVA/ICH - NIHSS 24 on admission      PLAN:    CNS: intubated and sedated w/ propofol and fentanyl, neuro checks q1, repeat CTH am, c/w Keppra 500mg q12     HEENT: oral care    PULMONARY: HOB at 30, Aspiration precautions, repeat CXR and ABG    CARDIOVASCULAR: c/w nicardipine gtt, keep SBP < 140    GI: GI prophylaxis, NPO    RENAL: I=0, monitor electrolytes and replete as needed    INFECTIOUS DISEASE: off antibiotics    HEMATOLOGICAL:  DVT prophylaxis w/ SCD, hold ASA, will transfuse 2unit plt unit as per neuro.     ENDOCRINE:  Follow up FS.  Insulin protocol if needed. f/u TSH    MUSCULOSKELETAL: Pt/rehab    Disposition: MICU  Code status: Fullcode     IMPRESSION:    CVA / ICH - NIHSS 24 on admission  HO HTN      PLAN:    CNS: intubated and sedated w/ propofol and fentanyl, neuro checks q1, repeat CTH am, c/w Keppra 500mg q12     HEENT: oral care    PULMONARY: HOB at 30, Aspiration precautions, repeat CXR and ABG    CARDIOVASCULAR: c/w nicardipine gtt, keep SBP < 140    GI: GI prophylaxis w/ Protonix IV, NPO    RENAL: I=0, monitor electrolytes and replete as needed    INFECTIOUS DISEASE: off antibiotics    HEMATOLOGICAL:  DVT prophylaxis w/ SCD, hold ASA, will transfuse 2unit platelets unit as per neuro.     ENDOCRINE:  Follow up FS.  Insulin protocol if needed. f/u TSH    MUSCULOSKELETAL: Pt/rehab    Disposition: MICU  Code status: Fullcode     IMPRESSION:    CVA / ICH - NIHSS 24 on admission sp EVD. hypertensive bleed  sp intubation for airway protection      PLAN:    CNS: intubated and sedated w/ propofol and fentanyl, neuro checks q1, repeat CTH am, c/w Keppra 500mg q12, keep CPP more than, monitor ICP    HEENT: oral care    PULMONARY: HOB at 30, Aspiration precautions, push ETT 2 cm, increase RR, DEC FIO2    CARDIOVASCULAR: c/w nicardipine gtt, keep SBP < 140    GI: GI prophylaxis w/ Protonix IV, NPO    RENAL: I=0, monitor electrolytes and replete as needed    INFECTIOUS DISEASE: off antibiotics    HEMATOLOGICAL:  DVT prophylaxis w/ SCD, hold ASA    ENDOCRINE:  Follow up FS.  Insulin protocol if needed. f/u TSH    MUSCULOSKELETAL: Pt/rehab    Disposition: MICU  Code status: Full code  Poor prognosis

## 2021-01-27 NOTE — ED PROVIDER NOTE - OBJECTIVE STATEMENT
Patient is a 51 yo male with PMHx of HTN, GI bleed, PUD c/o AMS and left sided weakness since 40 min PTA. The patient was in the bathroom and was on side of toilet 40 min PTA, seen by wife, and called EMS. Patient was confused, trouble speaking, left sided weakness. Baseline alert and oriented x 3. Patient is a 51 yo male with PMHx of HTN, GI bleed, PUD, on aspirin c/o AMS and left sided weakness since 40 min PTA. The patient was in the bathroom and was on side of toilet 40 min PTA, seen by wife, and called EMS. Patient was confused, trouble speaking, left sided weakness. Baseline alert and oriented x 3.

## 2021-01-27 NOTE — ED PROVIDER NOTE - ATTENDING CONTRIBUTION TO CARE
51 yo M pmh htn pw altered mental status and L sided weakness. LKW approximately 1 hour prior to arrival. Patient in the restroom and found altered by his wife slumped over next to the toilet. Unable to obtain further history from patient, patient not answering questions. I am unable to obtain a comprehensive history, review of systems, past medical history, and/or physical exam due to constraints imposed by the urgency of the patient's clinical condition and/or mental status.     CONSTITUTIONAL: No acute distress, unresponsive and not answering questions.  SKIN: skin exam is warm and dry, no acute rash.  HEAD: Normocephalic; atraumatic.  EYES: PERRL, 3 mm bilateral, no nystagmus, EOM intact; conjunctiva and sclera clear.  ENT: No nasal discharge; airway clear.  NECK: Supple; non tender. + full passive ROM in all directions. No JVD  CARD: S1, S2 normal; no murmurs, gallops, or rubs. Regular rate and rhythm. + Symmetric Strong Pulses  RESP: No wheezes, rales or rhonchi. Good air movement bilaterally  ABD: soft; non-distended; non-tender. No Rebound, No Guarding, No signs of peritonitis, No CVA tenderness. No pulsatile abdominal mass. + Strong and Symmetric Pulses  EXT: Normal ROM. No clubbing, cyanosis or edema. Dp and Pt Pulses intact. Cap refill less than 3 seconds  NEURO: Not responsive, gaze deviation noted, complete paralysis of LUE. Withdrawing all other extremities to pain. Intermittently speaking sentences but not able to answer questions.   PSYCH: I am unable to obtain a comprehensive history, review of systems, past medical history, and/or physical exam due to constraints imposed by the urgency of the patient's clinical condition and/or mental status.

## 2021-01-27 NOTE — CONSULT NOTE ADULT - SUBJECTIVE AND OBJECTIVE BOX
1.Presentation: Altered mental status and left sided weakness      2. Today's Acute Problems:   -Large right Basal ganglia bleed with edema , as well as decompression into the ventricles with 6.3 mm of midline shift.        3.History:  Patient is a 49 yo RHM with PMHx of HTN, GI bleed, PUD,  Mrankin score of 0 at baseline was brought in by EMS for  AMS and left sided weakness. Wife states that patient was having a usual day today went to bathroom and then the next thing she noted was that he was confused and was lying on the bathroom floor, she reports urinary incontinence, patient was confused, had trouble speaking, and could not move his left side.       5.Medications:  Etomidate Injectable 20 milliGRAM(s) IV Push Once  fentaNYL   Infusion. 0.5 MICROgram(s)/kG/Hr IV Continuous <Continuous>  levETIRAcetam  IVPB 1000 milliGRAM(s) IV Intermittent once  midazolam Infusion 0.02 mG/kG/Hr IV Continuous <Continuous>  Rocuronium Injectable 100 milliGRAM(s) IV Push Once      6.Ancillary Mangement:  Chest PT[]  Head of bed >35 [x]  Out of bed to chair []  PT/OT/ST []  Spirometry[]  DVT prophalaxis[x] Mechanical      7.Neuro Exam:  Patient lethargic but arouses to voice , following 1step commands. Needs frequent instructions to follow commands  CN:Pupils 3mm sluggish response                 Gaze deviation to the right                 Partial left facial droop                 Severe dysarthria  Power: Patient moving right side vogorously             Dense left sided weakness noted              No abnormal involuntary mvmts noted   FTN: No dysmetria on the right, unable on the left  HKS: no obvious ataxia noted on the right  Sensory: severely Decreased sensation to pp and temp on the left  B/L upgoing toe        NIHSS 24  LOC: 1     Questions:  2     Commands:  1  VF: 2        Gaze: 2      Facial:   2     RUE: 0  RLE: 0    LUE:  4   LLE:4  Ataxia: 0               Sensory: 2  Language: 0            Dysarthria: 3  Extinction: 1        Last CTH:  < from: CT Brain Stroke Protocol (01.27.21 @ 20:37) >  IMPRESSION:    Moderate to large intraparenchymal right basal ganglia hemorrhage with mild surrounding edema and associated right sulcal effacement as well as decompression into the ventricles. There is approximately 6.3 mm of midline shift.    Dr. Jerome Angelo discussed preliminary findings with NELL PEÑA NP on 1/27/2021 8:56 PM with readback.      JEROME ANGELO M.D., RESIDENT RADIOLOGIST  This document has been electronically signed.  RAULITO MADSEN MD; Attending Interventional Radiologist  This document has been electronically signed. Jan 27 2021  9:32PM    < end of copied text >        Last CTA h/n      GCS:    11 on arrival        ICHs:    ICH Score on admission  = 3  Age >=80  GCS Score: 3-4 (2 pts)   GCS Score: 5-12 (1 pt)   ICH Volume: >30  (+) IVH  (+) Infratentorial      Estimated 30-day mortality  0 points: 0%  1 point: 13%  2 points: 26%  3 points: 72%  4 points: 97%  5 points: 100%  6 points: 100%      8.CVS:  Vital Signs Last 24 Hrs  T(C): --  T(F): --  HR: 80 (27 Jan 2021 21:15) (80 - 80)  BP: 207/133 (27 Jan 2021 21:15)   BP(mean): --  RR: --  SpO2: --          Last EKG:  < from: 12 Lead ECG (02.14.19 @ 13:03) >  Ventricular Rate 65 BPM    Atrial Rate 65 BPM    P-R Interval 190 ms    QRS Duration 106 ms    Q-T Interval 392 ms    QTC Calculation(Bezet) 407 ms    P Axis 25 degrees    R Axis 43 degrees    T Axis 193 degrees    Diagnosis Line Sinus rhythm with marked sinus arrhythmia  T wave abnormality, consider inferolateral ischemia  Abnormal ECG    Confirmed by Darryl Lagunas ([id number]) on 2/14/2019 1:32:31 PM    < end of copied text >        9.Respiratory:      Chest Xray:        10.GI:  Prophylaxis    GI:  Protonix 40 mg   LIVER FUNCTIONS - ( 27 Jan 2021 20:28 )  Alb: 4.7 g/dL / Pro: 6.9 g/dL / ALK PHOS: 88 U/L / ALT: 30 U/L / AST: 31 U/L / GGT: x             11.Renal/Fluids/Electrolytes:    01-27    139  |  104  |  19  ----------------------------<  141<H>  4.2   |  21  |  1.1    Ca    9.2      27 Jan 2021 20:28    TPro  6.9  /  Alb  4.7  /  TBili  0.5  /  DBili  x   /  AST  31  /  ALT  30  /  AlkPhos  88  01-27    Lactate, Blood: 2.0 mmol/L (01-27 @ 20:28)        13.Hematology:                        16.4   11.44 )-----------( 294      ( 27 Jan 2021 20:28 )             46.4           DVT Prophylaxis  Lovenox[]   Heparin[]   Venodyne []   SCD's[x]         1.Presentation: Altered mental status and left sided weakness      2. Today's Acute Problems:   -Large right Basal ganglia bleed ( > 30cc)  extending to cortex, 3rd and 4th ventricle and cerebral aqueduct with edema and midline shift of 6.3mm          3.History:  Patient is a 49 yo RHM with PMHx of HTN, GI bleed, PUD,  Mrankin score of 0 at baseline was brought in by EMS for  AMS and left sided weakness. Wife states that patient was having a usual day today went to bathroom and then the next thing she noted was that he was confused and was lying on the bathroom floor, she reports urinary incontinence, patient was confused, had trouble speaking, and could not move his left side.       5.Medications:  Etomidate Injectable 20 milliGRAM(s) IV Push Once  fentaNYL   Infusion. 0.5 MICROgram(s)/kG/Hr IV Continuous <Continuous>  levETIRAcetam  IVPB 1000 milliGRAM(s) IV Intermittent once  midazolam Infusion 0.02 mG/kG/Hr IV Continuous <Continuous>  Rocuronium Injectable 100 milliGRAM(s) IV Push Once      6.Ancillary Mangement:  Chest PT[]  Head of bed >35 [x]  Out of bed to chair []  PT/OT/ST []  Spirometry[]  DVT prophalaxis[x] Mechanical      7.Neuro Exam:  Patient lethargic but arouses to voice , following 1step commands. Needs frequent instructions to follow commands  CN:Pupils 3mm sluggish response                 Gaze deviation to the right                 Partial left facial droop                 Severe dysarthria  Power: Patient moving right side vogorously             Dense left sided weakness noted              No abnormal involuntary mvmts noted   FTN: No dysmetria on the right, unable on the left  HKS: no obvious ataxia noted on the right  Sensory: severely Decreased sensation to pp and temp on the left  B/L upgoing toe        NIHSS 24  LOC: 1     Questions:  2     Commands:  1  VF: 2        Gaze: 2      Facial:   2     RUE: 0  RLE: 0    LUE:  4   LLE:4  Ataxia: 0               Sensory: 2  Language: 0            Dysarthria: 3  Extinction: 1        Last CTH:  < from: CT Brain Stroke Protocol (01.27.21 @ 20:37) >  IMPRESSION:    Moderate to large intraparenchymal right basal ganglia hemorrhage with mild surrounding edema and associated right sulcal effacement as well as decompression into the ventricles. There is approximately 6.3 mm of midline shift.    Dr. Jerome Angelo discussed preliminary findings with NELL PEÑA NP on 1/27/2021 8:56 PM with readback.      JEROME ANGELO M.D., RESIDENT RADIOLOGIST  This document has been electronically signed.  RAULITO MADSEN MD; Attending Interventional Radiologist  This document has been electronically signed. Jan 27 2021  9:32PM    < end of copied text >        Last CTA h/n  < from: CT Angio Head w/ IV Cont (01.27.21 @ 21:07) >  IMPRESSION    The previously noted area of intracerebral hemorrhage in the right basal ganglia region is again identified. Please see report of the CT scan of the head dated 1/27/2021.    No evidence of active bleeding, AVM or aneurysm.    No evidence of major vascular stenosis or occlusion,      RAULITO MADSEN MD; Attending Interventional Radiologist  This document has been electronically signed. Jan 27 2021 10:05PM    < end of copied text >            GCS:    11 on arrival        ICHs:    ICH Score on admission  = 3  Age >=80  GCS Score: 3-4 (2 pts)   GCS Score: 5-12 (1 pt)   ICH Volume: >30  (+) IVH  (+) Infratentorial              Estimated 30-day mortality  0 points: 0%  1 point: 13%  2 points: 26%  3 points: 72%  4 points: 97%  5 points: 100%  6 points: 100%          8.CVS:  Vital Signs Last 24 Hrs  T(C): --  T(F): --  HR: 80 (27 Jan 2021 21:15) (80 - 80)  BP: 207/133 (27 Jan 2021 21:15)   BP(mean): --  RR: --  SpO2: --          Last EKG:  < from: 12 Lead ECG (02.14.19 @ 13:03) >  Ventricular Rate 65 BPM    Atrial Rate 65 BPM    P-R Interval 190 ms    QRS Duration 106 ms    Q-T Interval 392 ms    QTC Calculation(Bezet) 407 ms    P Axis 25 degrees    R Axis 43 degrees    T Axis 193 degrees    Diagnosis Line Sinus rhythm with marked sinus arrhythmia  T wave abnormality, consider inferolateral ischemia  Abnormal ECG    Confirmed by Darryl Lagunas ([id number]) on 2/14/2019 1:32:31 PM    < end of copied text >          10.GI:  Prophylaxis    GI:  Protonix 40 mg   LIVER FUNCTIONS - ( 27 Jan 2021 20:28 )  Alb: 4.7 g/dL / Pro: 6.9 g/dL / ALK PHOS: 88 U/L / ALT: 30 U/L / AST: 31 U/L / GGT: x                 11.Renal/Fluids/Electrolytes:    01-27    139  |  104  |  19  ----------------------------<  141<H>  4.2   |  21  |  1.1    Ca    9.2      27 Jan 2021 20:28    TPro  6.9  /  Alb  4.7  /  TBili  0.5  /  DBili  x   /  AST  31  /  ALT  30  /  AlkPhos  88  01-27    Lactate, Blood: 2.0 mmol/L (01-27 @ 20:28)        13.Hematology:                        16.4   11.44 )-----------( 294      ( 27 Jan 2021 20:28 )             46.4           DVT Prophylaxis  Lovenox[]   Heparin[]   Venodyne []   SCD's[x]         1.Presentation: Altered mental status and left sided weakness      2. Today's Acute Problems:   -Large right Basal ganglia bleed ( > 30cc)  extending to cortex, 3rd and 4th ventricle and cerebral aqueduct with edema and midline shift of 6.3mm          3.History:  Patient is a 49 yo RHM with PMHx of HTN, GI bleed, PUD,  Mrankin score of 0 at baseline was brought in by EMS for  AMS and left sided weakness. Wife states that patient was having a usual day today went to bathroom and then the next thing she noted was that he was confused and was lying on the bathroom floor, she reports urinary incontinence, patient was confused, had trouble speaking, and could not move his left side.       5.Medications:  Etomidate Injectable 20 milliGRAM(s) IV Push Once  fentaNYL   Infusion. 0.5 MICROgram(s)/kG/Hr IV Continuous <Continuous>  levETIRAcetam  IVPB 1000 milliGRAM(s) IV Intermittent once  midazolam Infusion 0.02 mG/kG/Hr IV Continuous <Continuous>  Rocuronium Injectable 100 milliGRAM(s) IV Push Once      6.Ancillary Mangement:  Chest PT[]  Head of bed >35 [x]  Out of bed to chair []  PT/OT/ST []  Spirometry[]  DVT prophalaxis[x] Mechanical      7.Neuro Exam:  Patient lethargic but arouses to voice , following 1step commands. Needs frequent instructions to follow commands  CN:Pupils 3mm sluggish response                 Gaze deviation to the right                 Partial left facial droop                 Severe dysarthria  Power: Patient moving right side vogorously             Dense left sided weakness noted              No abnormal involuntary mvmts noted   FTN: No dysmetria on the right, unable on the left  HKS: no obvious ataxia noted on the right  Sensory: severely Decreased sensation to pp and temp on the left  B/L upgoing toe        NIHSS 24  LOC: 1     Questions:  2     Commands:  1  VF: 2        Gaze: 2      Facial:   2     RUE: 0  RLE: 0    LUE:  4   LLE:4  Ataxia: 0               Sensory: 2  Language: 0            Dysarthria: 3  Extinction: 1        Last CTH:  < from: CT Brain Stroke Protocol (01.27.21 @ 20:37) >  IMPRESSION:    Moderate to large intraparenchymal right basal ganglia hemorrhage with mild surrounding edema and associated right sulcal effacement as well as decompression into the ventricles. There is approximately 6.3 mm of midline shift.    Dr. Jerome Angelo discussed preliminary findings with NELL PEÑA NP on 1/27/2021 8:56 PM with readback.      JEROME ANGELO M.D., RESIDENT RADIOLOGIST  This document has been electronically signed.  RAULITO MADSEN MD; Attending Interventional Radiologist  This document has been electronically signed. Jan 27 2021  9:32PM    < end of copied text >        Last CTA h/n  < from: CT Angio Head w/ IV Cont (01.27.21 @ 21:07) >  IMPRESSION    The previously noted area of intracerebral hemorrhage in the right basal ganglia region is again identified. Please see report of the CT scan of the head dated 1/27/2021.    No evidence of active bleeding, AVM or aneurysm.    No evidence of major vascular stenosis or occlusion,      RAULITO MADSEN MD; Attending Interventional Radiologist  This document has been electronically signed. Jan 27 2021 10:05PM    < end of copied text >            GCS:    11 on arrival        ICHs:    ICH Score on admission  = 3  Age >=80  GCS Score: 3-4 (2 pts)   GCS Score: 5-12 (1 pt)   ICH Volume: >30  (+) IVH  (+) Infratentorial              Estimated 30-day mortality  0 points: 0%  1 point: 13%  2 points: 26%  3 points: 72%  4 points: 97%  5 points: 100%  6 points: 100%          8.CVS:  Vital Signs Last 24 Hrs  T(C): --  T(F): --  HR: 80 (27 Jan 2021 21:15) (80 - 80)  BP: 207/133 (27 Jan 2021 21:15)   BP(mean): --  RR: --  SpO2: --          Last EKG:  < from: 12 Lead ECG (02.14.19 @ 13:03) >  Ventricular Rate 65 BPM    Atrial Rate 65 BPM    P-R Interval 190 ms    QRS Duration 106 ms    Q-T Interval 392 ms    QTC Calculation(Bezet) 407 ms    P Axis 25 degrees    R Axis 43 degrees    T Axis 193 degrees    Diagnosis Line Sinus rhythm with marked sinus arrhythmia  T wave abnormality, consider inferolateral ischemia  Abnormal ECG    Confirmed by Darryl Lagunas ([id number]) on 2/14/2019 1:32:31 PM    < end of copied text >          10.GI:  Prophylaxis    GI:  Protonix 40 mg   LIVER FUNCTIONS - ( 27 Jan 2021 20:28 )  Alb: 4.7 g/dL / Pro: 6.9 g/dL / ALK PHOS: 88 U/L / ALT: 30 U/L / AST: 31 U/L / GGT: x                 11.Renal/Fluids/Electrolytes:    01-27    139  |  104  |  19  ----------------------------<  141<H>  4.2   |  21  |  1.1    Ca    9.2      27 Jan 2021 20:28    TPro  6.9  /  Alb  4.7  /  TBili  0.5  /  DBili  x   /  AST  31  /  ALT  30  /  AlkPhos  88  01-27    Lactate, Blood: 2.0 mmol/L (01-27 @ 20:28)        13.Hematology:                        16.4   11.44 )-----------( 294      ( 27 Jan 2021 20:28 )             46.4       Respiratory Viral Panel with COVID-19 by SUZIE (01.27.21 @ 20:30)   Rapid RVP Result: [facility]   SARS-CoV-2: NotDete:        DVT Prophylaxis  Lovenox[]   Heparin[]   Venodyne []   SCD's[x]

## 2021-01-27 NOTE — ED PROVIDER NOTE - PROGRESS NOTE DETAILS
MQ: Stroke code, neurology NP at bedside, pending CT scan and labs MQ: Right sided intracranial bleed, called neurosurgery and will see patient, will give platelets because of aspirin use, will give keppra 1g Patient approved for ICU, neurosurgery at bedside to place fish hole and drain. Patient BP improved to systolic 150's. Will place a line and central line.

## 2021-01-27 NOTE — PROCEDURE NOTE - NSINFORMCONSENT_GEN_A_CORE
phone consent /wife/Benefits, risks, and possible complications of procedure explained to patient/caregiver who verbalized understanding and gave verbal consent.

## 2021-01-27 NOTE — ED PROVIDER NOTE - CLINICAL SUMMARY MEDICAL DECISION MAKING FREE TEXT BOX
I personally evaluated the patient. I reviewed the Resident’s or Physician Assistant’s note (as assigned above), and agree with the findings and plan except as documented in my note. Patient evaluated for altered mental status. Stroke code called upon patient arrival. Labs, ekg, CXR, CT head, CTA head and neck, CT pan scan performed in ED. Patient intubated for airway protection after becoming more obtunded after returning from CT scan. Given keppra, sedation. Started on cardene drip, given nimodipine, platelets. HOB elevated to 30 degrees. Neurosurgery consulted, placed fish hole at bedside. Cleared by trauma. Discussed with ICU fellow, approved for ICU. Central line and A line placed. Patient admitted to ICU for further evaluation and treatment.

## 2021-01-27 NOTE — CHART NOTE - NSCHARTNOTEFT_GEN_A_CORE
called for stroke code:  49 yo w/ HTN, PUD/UGIB currently p/w acute confusion and LHP found on floor.  HCT w/ large R BG ICH ( > 30cc)  extending to cortex, 3rd ventricle and cerebral aqueduct with midline shift.  ICH Score 2.  Pt would likely benefit from neurosurgical intervention given relatively high mortality.  Recommend stat neurosurgery evaluation, keppra 500 Q12 prophylaxis, strict BP control keep SBP <160, platelet reversal.  Discussed with Neurology ACP.

## 2021-01-28 PROBLEM — K27.9 PEPTIC ULCER, SITE UNSPECIFIED, UNSPECIFIED AS ACUTE OR CHRONIC, WITHOUT HEMORRHAGE OR PERFORATION: Chronic | Status: ACTIVE | Noted: 2019-02-14

## 2021-01-28 PROBLEM — I10 ESSENTIAL (PRIMARY) HYPERTENSION: Chronic | Status: ACTIVE | Noted: 2019-02-14

## 2021-01-28 PROBLEM — K25.9 GASTRIC ULCER, UNSPECIFIED AS ACUTE OR CHRONIC, WITHOUT HEMORRHAGE OR PERFORATION: Chronic | Status: ACTIVE | Noted: 2019-02-14

## 2021-01-28 PROBLEM — K92.2 GASTROINTESTINAL HEMORRHAGE, UNSPECIFIED: Chronic | Status: ACTIVE | Noted: 2019-02-14

## 2021-01-28 LAB
A1C WITH ESTIMATED AVERAGE GLUCOSE RESULT: 5.3 % — SIGNIFICANT CHANGE UP (ref 4–5.6)
ALBUMIN SERPL ELPH-MCNC: 4.5 G/DL — SIGNIFICANT CHANGE UP (ref 3.5–5.2)
ALP SERPL-CCNC: 87 U/L — SIGNIFICANT CHANGE UP (ref 30–115)
ALT FLD-CCNC: 26 U/L — SIGNIFICANT CHANGE UP (ref 0–41)
ANION GAP SERPL CALC-SCNC: 11 MMOL/L — SIGNIFICANT CHANGE UP (ref 7–14)
APTT BLD: 28.2 SEC — SIGNIFICANT CHANGE UP (ref 27–39.2)
AST SERPL-CCNC: 23 U/L — SIGNIFICANT CHANGE UP (ref 0–41)
BASE EXCESS BLDA CALC-SCNC: -0.2 MMOL/L — SIGNIFICANT CHANGE UP (ref -2–2)
BASOPHILS # BLD AUTO: 0.05 K/UL — SIGNIFICANT CHANGE UP (ref 0–0.2)
BASOPHILS NFR BLD AUTO: 0.5 % — SIGNIFICANT CHANGE UP (ref 0–1)
BILIRUB SERPL-MCNC: 0.7 MG/DL — SIGNIFICANT CHANGE UP (ref 0.2–1.2)
BUN SERPL-MCNC: 23 MG/DL — HIGH (ref 10–20)
CALCIUM SERPL-MCNC: 9.2 MG/DL — SIGNIFICANT CHANGE UP (ref 8.5–10.1)
CHLORIDE SERPL-SCNC: 101 MMOL/L — SIGNIFICANT CHANGE UP (ref 98–110)
CHOLEST SERPL-MCNC: 209 MG/DL — HIGH
CO2 SERPL-SCNC: 27 MMOL/L — SIGNIFICANT CHANGE UP (ref 17–32)
CREAT SERPL-MCNC: 1.6 MG/DL — HIGH (ref 0.7–1.5)
EOSINOPHIL # BLD AUTO: 0.04 K/UL — SIGNIFICANT CHANGE UP (ref 0–0.7)
EOSINOPHIL NFR BLD AUTO: 0.4 % — SIGNIFICANT CHANGE UP (ref 0–8)
ESTIMATED AVERAGE GLUCOSE: 105 MG/DL — SIGNIFICANT CHANGE UP (ref 68–114)
FERRITIN SERPL-MCNC: 77 NG/ML — SIGNIFICANT CHANGE UP (ref 30–400)
FOLATE SERPL-MCNC: 17.7 NG/ML — SIGNIFICANT CHANGE UP
GLUCOSE SERPL-MCNC: 103 MG/DL — HIGH (ref 70–99)
HCG SERPL-ACNC: <0.6 MIU/ML — HIGH
HCO3 BLDA-SCNC: 27 MMOL/L — SIGNIFICANT CHANGE UP (ref 23–27)
HCT VFR BLD CALC: 43.3 % — SIGNIFICANT CHANGE UP (ref 42–52)
HDLC SERPL-MCNC: 66 MG/DL — SIGNIFICANT CHANGE UP
HGB BLD-MCNC: 15.1 G/DL — SIGNIFICANT CHANGE UP (ref 14–18)
IMM GRANULOCYTES NFR BLD AUTO: 1.2 % — HIGH (ref 0.1–0.3)
INR BLD: 1.01 RATIO — SIGNIFICANT CHANGE UP (ref 0.65–1.3)
IRON SATN MFR SERPL: 116 UG/DL — SIGNIFICANT CHANGE UP (ref 35–150)
IRON SATN MFR SERPL: 31 % — SIGNIFICANT CHANGE UP (ref 15–50)
LIPID PNL WITH DIRECT LDL SERPL: 133 MG/DL — HIGH
LYMPHOCYTES # BLD AUTO: 1.38 K/UL — SIGNIFICANT CHANGE UP (ref 1.2–3.4)
LYMPHOCYTES # BLD AUTO: 13 % — LOW (ref 20.5–51.1)
MAGNESIUM SERPL-MCNC: 2.2 MG/DL — SIGNIFICANT CHANGE UP (ref 1.8–2.4)
MCHC RBC-ENTMCNC: 32.1 PG — HIGH (ref 27–31)
MCHC RBC-ENTMCNC: 34.9 G/DL — SIGNIFICANT CHANGE UP (ref 32–37)
MCV RBC AUTO: 91.9 FL — SIGNIFICANT CHANGE UP (ref 80–94)
MONOCYTES # BLD AUTO: 0.9 K/UL — HIGH (ref 0.1–0.6)
MONOCYTES NFR BLD AUTO: 8.5 % — SIGNIFICANT CHANGE UP (ref 1.7–9.3)
NEUTROPHILS # BLD AUTO: 8.11 K/UL — HIGH (ref 1.4–6.5)
NEUTROPHILS NFR BLD AUTO: 76.4 % — HIGH (ref 42.2–75.2)
NON HDL CHOLESTEROL: 143 MG/DL — HIGH
NRBC # BLD: 0 /100 WBCS — SIGNIFICANT CHANGE UP (ref 0–0)
PCO2 BLDA: 50 MMHG — HIGH (ref 38–42)
PH BLDA: 7.34 — LOW (ref 7.38–7.42)
PLATELET # BLD AUTO: 306 K/UL — SIGNIFICANT CHANGE UP (ref 130–400)
PO2 BLDA: 102 MMHG — HIGH (ref 78–95)
POTASSIUM SERPL-MCNC: 4 MMOL/L — SIGNIFICANT CHANGE UP (ref 3.5–5)
POTASSIUM SERPL-SCNC: 4 MMOL/L — SIGNIFICANT CHANGE UP (ref 3.5–5)
PROT SERPL-MCNC: 6.5 G/DL — SIGNIFICANT CHANGE UP (ref 6–8)
PROTHROM AB SERPL-ACNC: 11.6 SEC — SIGNIFICANT CHANGE UP (ref 9.95–12.87)
RBC # BLD: 4.71 M/UL — SIGNIFICANT CHANGE UP (ref 4.7–6.1)
RBC # FLD: 13 % — SIGNIFICANT CHANGE UP (ref 11.5–14.5)
SAO2 % BLDA: 96 % — SIGNIFICANT CHANGE UP (ref 94–98)
SODIUM SERPL-SCNC: 139 MMOL/L — SIGNIFICANT CHANGE UP (ref 135–146)
TIBC SERPL-MCNC: 372 UG/DL — SIGNIFICANT CHANGE UP (ref 220–430)
TRIGL SERPL-MCNC: 141 MG/DL — SIGNIFICANT CHANGE UP
TROPONIN T SERPL-MCNC: <0.01 NG/ML — SIGNIFICANT CHANGE UP
TSH SERPL-MCNC: 0.6 UIU/ML — SIGNIFICANT CHANGE UP (ref 0.27–4.2)
UIBC SERPL-MCNC: 256 UG/DL — SIGNIFICANT CHANGE UP (ref 110–370)
VIT B12 SERPL-MCNC: 349 PG/ML — SIGNIFICANT CHANGE UP (ref 232–1245)
WBC # BLD: 10.61 K/UL — SIGNIFICANT CHANGE UP (ref 4.8–10.8)
WBC # FLD AUTO: 10.61 K/UL — SIGNIFICANT CHANGE UP (ref 4.8–10.8)

## 2021-01-28 PROCEDURE — 70450 CT HEAD/BRAIN W/O DYE: CPT | Mod: 26,59

## 2021-01-28 PROCEDURE — 99291 CRITICAL CARE FIRST HOUR: CPT

## 2021-01-28 PROCEDURE — 71045 X-RAY EXAM CHEST 1 VIEW: CPT | Mod: 26

## 2021-01-28 PROCEDURE — 71045 X-RAY EXAM CHEST 1 VIEW: CPT | Mod: 26,77

## 2021-01-28 PROCEDURE — 70496 CT ANGIOGRAPHY HEAD: CPT | Mod: 26

## 2021-01-28 PROCEDURE — 99284 EMERGENCY DEPT VISIT MOD MDM: CPT

## 2021-01-28 PROCEDURE — 61107 TDH PNXR IMPLT VENTR CATH: CPT

## 2021-01-28 PROCEDURE — 61120 BURR HOLE FOR VENTR PUNCTURE: CPT

## 2021-01-28 RX ORDER — CEFAZOLIN SODIUM 1 G
500 VIAL (EA) INJECTION ONCE
Refills: 0 | Status: COMPLETED | OUTPATIENT
Start: 2021-01-28 | End: 2021-01-28

## 2021-01-28 RX ORDER — NICARDIPINE HYDROCHLORIDE 30 MG/1
5 CAPSULE, EXTENDED RELEASE ORAL
Qty: 40 | Refills: 0 | Status: DISCONTINUED | OUTPATIENT
Start: 2021-01-28 | End: 2021-02-03

## 2021-01-28 RX ORDER — SENNA PLUS 8.6 MG/1
2 TABLET ORAL AT BEDTIME
Refills: 0 | Status: DISCONTINUED | OUTPATIENT
Start: 2021-01-28 | End: 2021-03-08

## 2021-01-28 RX ORDER — LEVETIRACETAM 250 MG/1
500 TABLET, FILM COATED ORAL EVERY 12 HOURS
Refills: 0 | Status: DISCONTINUED | OUTPATIENT
Start: 2021-01-28 | End: 2021-03-08

## 2021-01-28 RX ORDER — POLYETHYLENE GLYCOL 3350 17 G/17G
17 POWDER, FOR SOLUTION ORAL DAILY
Refills: 0 | Status: DISCONTINUED | OUTPATIENT
Start: 2021-01-28 | End: 2021-03-03

## 2021-01-28 RX ORDER — CHLORHEXIDINE GLUCONATE 213 G/1000ML
1 SOLUTION TOPICAL
Refills: 0 | Status: DISCONTINUED | OUTPATIENT
Start: 2021-01-28 | End: 2021-03-08

## 2021-01-28 RX ORDER — PANTOPRAZOLE SODIUM 20 MG/1
40 TABLET, DELAYED RELEASE ORAL EVERY 12 HOURS
Refills: 0 | Status: DISCONTINUED | OUTPATIENT
Start: 2021-01-28 | End: 2021-03-08

## 2021-01-28 RX ORDER — CEFAZOLIN SODIUM 1 G
500 VIAL (EA) INJECTION EVERY 8 HOURS
Refills: 0 | Status: DISCONTINUED | OUTPATIENT
Start: 2021-01-28 | End: 2021-02-01

## 2021-01-28 RX ADMIN — MIDAZOLAM HYDROCHLORIDE 2 MG/KG/HR: 1 INJECTION, SOLUTION INTRAMUSCULAR; INTRAVENOUS at 06:05

## 2021-01-28 RX ADMIN — CHLORHEXIDINE GLUCONATE 15 MILLILITER(S): 213 SOLUTION TOPICAL at 05:44

## 2021-01-28 RX ADMIN — CHLORHEXIDINE GLUCONATE 15 MILLILITER(S): 213 SOLUTION TOPICAL at 17:58

## 2021-01-28 RX ADMIN — Medication 100 MILLIGRAM(S): at 17:56

## 2021-01-28 RX ADMIN — LEVETIRACETAM 420 MILLIGRAM(S): 250 TABLET, FILM COATED ORAL at 05:39

## 2021-01-28 RX ADMIN — NICARDIPINE HYDROCHLORIDE 25 MG/HR: 30 CAPSULE, EXTENDED RELEASE ORAL at 02:27

## 2021-01-28 RX ADMIN — POLYETHYLENE GLYCOL 3350 17 GRAM(S): 17 POWDER, FOR SOLUTION ORAL at 17:54

## 2021-01-28 RX ADMIN — NIMODIPINE 60 MILLIGRAM(S): 60 SOLUTION ORAL at 01:00

## 2021-01-28 RX ADMIN — PANTOPRAZOLE SODIUM 40 MILLIGRAM(S): 20 TABLET, DELAYED RELEASE ORAL at 02:25

## 2021-01-28 RX ADMIN — LEVETIRACETAM 420 MILLIGRAM(S): 250 TABLET, FILM COATED ORAL at 17:54

## 2021-01-28 RX ADMIN — Medication 100 MILLIGRAM(S): at 02:25

## 2021-01-28 RX ADMIN — PANTOPRAZOLE SODIUM 40 MILLIGRAM(S): 20 TABLET, DELAYED RELEASE ORAL at 17:53

## 2021-01-28 RX ADMIN — CHLORHEXIDINE GLUCONATE 1 APPLICATION(S): 213 SOLUTION TOPICAL at 05:44

## 2021-01-28 NOTE — PROGRESS NOTE ADULT - SUBJECTIVE AND OBJECTIVE BOX
HPI:  50 years old right handed Male with PMHx of HTN, GI bleed, PUD,  Mrankin score of 0 at baseline was brought in by EMS for  AMS and left sided weakness.   Wife states that patient was having a usual day today went to bathroom and then the next thing she noted was that he was confused and was lying on the bathroom floor, she reports urinary incontinence, patient was confused, had trouble speaking, and could not move his left side.     In the ED, /148, HR 80, saturating well. Code stroke called, NIHSS 24 and GCS 11, CTH showed Moderate to large intraparenchymal right basal ganglia hemorrhage with mild surrounding edema and associated right sulcal effacement as well as decompression into the ventricles. There is approximately 6.3 mm of midline shift. CTA did not show any evidence of active bleeding, AVM or aneurysm or major vascular stenosis or occlusion. Pt became unresponsive in CT scan and had to be intubated and sedated. Patient was intubated in ED for worsening mental status and GCS. Started on Nicardipine drip for sbp in 200s and EVD was placed by neurosurgery at bedside. Pan trauma scan was negative. To be admitted under ICU for further monitoring.  (27 Jan 2021 23:13)    NCC following for ICH management.    ICU Vital Signs Last 24 Hrs  T(C): 36.6 (28 Jan 2021 07:00), Max: 37.1 (28 Jan 2021 04:00)  T(F): 97.9 (28 Jan 2021 07:00), Max: 98.8 (28 Jan 2021 04:00)  HR: 67 (28 Jan 2021 07:57) (62 - 100)  BP: 139/68 (28 Jan 2021 07:00) (108/67 - 268/146)  BP(mean): 76 (28 Jan 2021 02:51) (76 - 102)  ABP: --  ABP(mean): --  RR: 18 (28 Jan 2021 07:00) (18 - 24)  SpO2: 98% (28 Jan 2021 07:57) (98% - 100%)  CPP 80  ICP 2 - 10    I&O's Detail    27 Jan 2021 07:01  -  28 Jan 2021 07:00  --------------------------------------------------------  IN:    FentaNYL: 41.6 mL    IV PiggyBack: 100 mL    Midazolam: 31.3 mL    NiCARdipine: 100 mL    Propofol: 24.8 mL  Total IN: 297.7 mL    OUT:    Drain (mL): 100 mL    Indwelling Catheter - Urethral (mL): 135 mL  Total OUT: 235 mL    Total NET: 62.7 mL    MEDICATIONS  (STANDING):  ceFAZolin   IVPB      ceFAZolin   IVPB 500 milliGRAM(s) IV Intermittent every 8 hours  chlorhexidine 0.12% Liquid 15 milliLiter(s) Oral Mucosa every 12 hours  chlorhexidine 4% Liquid 1 Application(s) Topical <User Schedule>  fentaNYL   Infusion. 0.5 MICROgram(s)/kG/Hr (5 mL/Hr) IV Continuous <Continuous>  levETIRAcetam  IVPB 500 milliGRAM(s) IV Intermittent every 12 hours  midazolam Infusion 0.02 mG/kG/Hr (2 mL/Hr) IV Continuous <Continuous>  niCARdipine Infusion 5 mG/Hr (25 mL/Hr) IV Continuous <Continuous>  pantoprazole  Injectable 40 milliGRAM(s) IV Push every 12 hours  propofol Infusion 50 MICROgram(s)/kG/Min (30 mL/Hr) IV Continuous <Continuous>    MEDICATIONS  (PRN):    Labs:                          15.1   10.61 )-----------( 306      ( 28 Jan 2021 05:58 )             43.3     01-28    139  |  101  |  23<H>  ----------------------------<  103<H>  4.0   |  27  |  1.6<H>    Ca    9.2      28 Jan 2021 05:58  Mg     2.2     01-28    TPro  6.5  /  Alb  4.5  /  TBili  0.7  /  DBili  x   /  AST  23  /  ALT  26  /  AlkPhos  87  01-28    LIVER FUNCTIONS - ( 28 Jan 2021 05:58 )  Alb: 4.5 g/dL / Pro: 6.5 g/dL / ALK PHOS: 87 U/L / ALT: 26 U/L / AST: 23 U/L / GGT: x           PT/INR - ( 28 Jan 2021 05:58 )   PT: 11.60 sec;   INR: 1.01 ratio         PTT - ( 28 Jan 2021 05:58 )  PTT:28.2 sec    ABG - ( 28 Jan 2021 04:48 )  pH, Arterial: 7.34  pH, Blood: x     /  pCO2: 50    /  pO2: 102   / HCO3: 27    / Base Excess: -0.2  /  SaO2: 96        Mode: AC/ CMV (Assist Control/ Continuous Mandatory Ventilation)  RR (machine): 24  TV (machine): 450  FiO2: 50  PEEP: 5  ITime: 1  MAP: 14  PIP: 30    Troponin T, Serum: <0.01 ng/mL  Magnesium, Serum: 2.2 mg/dL     Lipid Profile (01.28.21 @ 05:58)   Cholesterol, Serum: 209 mg/dL   Triglycerides, Serum: 141 mg/dL   HDL Cholesterol, Serum: 66 mg/dL   Non HDL Cholesterol: 143      Radiology:    < from: CT Brain Stroke Protocol (01.27.21 @ 20:37) >    EXAM:  CT BRAIN STROKE PROTOCOL            PROCEDURE DATE:  01/27/2021      < end of copied text >  < from: CT Brain Stroke Protocol (01.27.21 @ 20:37) >    IMPRESSION:    Moderate to large intraparenchymal right basal ganglia hemorrhage with mild surrounding edema and associated right sulcal effacement as well as decompression into the ventricles. There is approximately 6.3 mm of midline shift.    < end of copied text >  < from: CT Angio Head w/ IV Cont (01.27.21 @ 21:07) >    EXAM:  CT ANGIO NECK (W)AW IC        EXAM:  CT ANGIO BRAIN (W)AW IC            PROCEDURE DATE:  01/27/2021      < end of copied text >  < from: CT Angio Head w/ IV Cont (01.27.21 @ 21:07) >  IMPRESSION    The previously noted area of intracerebral hemorrhage in the right basal ganglia region is again identified. Please see report of the CT scan of the head dated 1/27/2021.    No evidence of active bleeding, AVM or aneurysm.    No evidence of major vascular stenosis or occlusion,    < end of copied text >    ROS: Unable to obtain 2/2 to patient status    Physical Exam:    Gen: Middle aged male, intubated, sedated, NAD    MSE: GCS 3, RASS -5, CPOT 0    CN: Pupils 2 mm pinpoint b/L, EOM differed, - cough/gag, + corneals    Motor: Flaccid throughout, - posturing, - myoclonus    Sensory: Absent to noxious throughout.     mRS:  0 No symptoms at all    1 No significant disability despite symptoms; able to carry out all usual duties and activities without assistance    2 Slight disability; unable to carry out all previous activities, but able to look after own affairs    3 Moderate disability; requiring some help, but able to walk without assistance    4 Moderately severe disability; unable to walk without assistance and unable to attend to own bodily needs without assistance    5 Severe disability; bedridden, incontinent and requiring constant nursing care and attention    6 Dead    Assessment and Plan:    51 yo Male with PMHx of HTN, GI bleed, PUD, found on the floor of the bathroom with left hemiparesis and dyarthria. CT scan showed right basal ganglia ICH with mild surrounding edema and associated right sulcal effacement and IVH with a 6.3 mm of midline shift. Patient intubated after becoming obtunded, NSx consulted and an EVD was placed. Patient now remains intubated, heavily sedated.     Plan:    #Neuro  - Q1H neuro exams  - Stop Versed drip  - Wean sedation to a RASS - 1  - Maintain CPP > 70, ICP < 20  - If ICP > 20, Bolus fentanyl 50 mcg, increase sedation, consider hypertonics, and STAT CT head  - Continue Keppra  - Na goal 135 - 145  - Appreciate NSx recs, EVD right now @ 10 mmHg  - Repeat CT head this AM  - Normothermia- If patient spikes fever, consider tylenol OTC and infectious workup  - Continue Statin  - Hold AC    #CV  - SBP < 150 as per NSx  - Continue Cardene drip to SBP goal considering CPP maintained above 70  - Normovolemia  - ECHO    #Pulm  - HOB 45 degrees  - Vent management per primary team  - Maintain Plateau pressure < 30 in order to optimize venous drainage and facilitate CPP    #GI/  - Start EN as per nutrition recs  - Strict Is/Os  - Protonix prophylaxis    #Skin  - As per nursing    #Heme  - SCDs    #ID  - As per primary team                   HPI:  50 years old right handed Male with PMHx of HTN, GI bleed, PUD,  Mrankin score of 0 at baseline was brought in by EMS for  AMS and left sided weakness.   Wife states that patient was having a usual day today went to bathroom and then the next thing she noted was that he was confused and was lying on the bathroom floor, she reports urinary incontinence, patient was confused, had trouble speaking, and could not move his left side.     In the ED, /148, HR 80, saturating well. Code stroke called, NIHSS 24 and GCS 11, CTH showed Moderate to large intraparenchymal right basal ganglia hemorrhage with mild surrounding edema and associated right sulcal effacement as well as decompression into the ventricles. There is approximately 6.3 mm of midline shift. CTA did not show any evidence of active bleeding, AVM or aneurysm or major vascular stenosis or occlusion. Pt became unresponsive in CT scan and had to be intubated and sedated. Patient was intubated in ED for worsening mental status and GCS. Started on Nicardipine drip for sbp in 200s and EVD was placed by neurosurgery at bedside. Pan trauma scan was negative. To be admitted under ICU for further monitoring.  (27 Jan 2021 23:13)    NCC following for ICH management.    ICU Vital Signs Last 24 Hrs  T(C): 36.6 (28 Jan 2021 07:00), Max: 37.1 (28 Jan 2021 04:00)  T(F): 97.9 (28 Jan 2021 07:00), Max: 98.8 (28 Jan 2021 04:00)  HR: 67 (28 Jan 2021 07:57) (62 - 100)  BP: 139/68 (28 Jan 2021 07:00) (108/67 - 268/146)  BP(mean): 76 (28 Jan 2021 02:51) (76 - 102)  ABP: --  ABP(mean): --  RR: 18 (28 Jan 2021 07:00) (18 - 24)  SpO2: 98% (28 Jan 2021 07:57) (98% - 100%)  CPP 80  ICP 2 - 10    I&O's Detail    27 Jan 2021 07:01  -  28 Jan 2021 07:00  --------------------------------------------------------  IN:    FentaNYL: 41.6 mL    IV PiggyBack: 100 mL    Midazolam: 31.3 mL    NiCARdipine: 100 mL    Propofol: 24.8 mL  Total IN: 297.7 mL    OUT:    Drain (mL): 100 mL    Indwelling Catheter - Urethral (mL): 135 mL  Total OUT: 235 mL    Total NET: 62.7 mL    MEDICATIONS  (STANDING):  ceFAZolin   IVPB      ceFAZolin   IVPB 500 milliGRAM(s) IV Intermittent every 8 hours  chlorhexidine 0.12% Liquid 15 milliLiter(s) Oral Mucosa every 12 hours  chlorhexidine 4% Liquid 1 Application(s) Topical <User Schedule>  fentaNYL   Infusion. 0.5 MICROgram(s)/kG/Hr (5 mL/Hr) IV Continuous <Continuous>  levETIRAcetam  IVPB 500 milliGRAM(s) IV Intermittent every 12 hours  midazolam Infusion 0.02 mG/kG/Hr (2 mL/Hr) IV Continuous <Continuous>  niCARdipine Infusion 5 mG/Hr (25 mL/Hr) IV Continuous <Continuous>  pantoprazole  Injectable 40 milliGRAM(s) IV Push every 12 hours  propofol Infusion 50 MICROgram(s)/kG/Min (30 mL/Hr) IV Continuous <Continuous>    MEDICATIONS  (PRN):    Labs:                          15.1   10.61 )-----------( 306      ( 28 Jan 2021 05:58 )             43.3     01-28    139  |  101  |  23<H>  ----------------------------<  103<H>  4.0   |  27  |  1.6<H>    Ca    9.2      28 Jan 2021 05:58  Mg     2.2     01-28    TPro  6.5  /  Alb  4.5  /  TBili  0.7  /  DBili  x   /  AST  23  /  ALT  26  /  AlkPhos  87  01-28    LIVER FUNCTIONS - ( 28 Jan 2021 05:58 )  Alb: 4.5 g/dL / Pro: 6.5 g/dL / ALK PHOS: 87 U/L / ALT: 26 U/L / AST: 23 U/L / GGT: x           PT/INR - ( 28 Jan 2021 05:58 )   PT: 11.60 sec;   INR: 1.01 ratio         PTT - ( 28 Jan 2021 05:58 )  PTT:28.2 sec    ABG - ( 28 Jan 2021 04:48 )  pH, Arterial: 7.34  pH, Blood: x     /  pCO2: 50    /  pO2: 102   / HCO3: 27    / Base Excess: -0.2  /  SaO2: 96        Mode: AC/ CMV (Assist Control/ Continuous Mandatory Ventilation)  RR (machine): 24  TV (machine): 450  FiO2: 50  PEEP: 5  ITime: 1  MAP: 14  PIP: 30    Troponin T, Serum: <0.01 ng/mL  Magnesium, Serum: 2.2 mg/dL     Lipid Profile (01.28.21 @ 05:58)   Cholesterol, Serum: 209 mg/dL   Triglycerides, Serum: 141 mg/dL   HDL Cholesterol, Serum: 66 mg/dL   Non HDL Cholesterol: 143      Radiology:    < from: CT Brain Stroke Protocol (01.27.21 @ 20:37) >    EXAM:  CT BRAIN STROKE PROTOCOL            PROCEDURE DATE:  01/27/2021      < end of copied text >  < from: CT Brain Stroke Protocol (01.27.21 @ 20:37) >    IMPRESSION:    Moderate to large intraparenchymal right basal ganglia hemorrhage with mild surrounding edema and associated right sulcal effacement as well as decompression into the ventricles. There is approximately 6.3 mm of midline shift.    < end of copied text >  < from: CT Angio Head w/ IV Cont (01.27.21 @ 21:07) >    EXAM:  CT ANGIO NECK (W)AW IC        EXAM:  CT ANGIO BRAIN (W)AW IC            PROCEDURE DATE:  01/27/2021      < end of copied text >  < from: CT Angio Head w/ IV Cont (01.27.21 @ 21:07) >  IMPRESSION    The previously noted area of intracerebral hemorrhage in the right basal ganglia region is again identified. Please see report of the CT scan of the head dated 1/27/2021.    No evidence of active bleeding, AVM or aneurysm.    No evidence of major vascular stenosis or occlusion,    < end of copied text >    ROS: Unable to obtain 2/2 to patient status    Physical Exam:    Gen: Middle aged male, intubated, sedated, NAD    MSE: GCS 3, RASS -5, CPOT 0    CN: Pupils 2 mm pinpoint b/L, EOM differed, - cough/gag, + corneals    Motor: Flaccid throughout, - posturing, - myoclonus    Sensory: Absent to noxious throughout.     mRS:  0 No symptoms at all    1 No significant disability despite symptoms; able to carry out all usual duties and activities without assistance    2 Slight disability; unable to carry out all previous activities, but able to look after own affairs    3 Moderate disability; requiring some help, but able to walk without assistance    4 Moderately severe disability; unable to walk without assistance and unable to attend to own bodily needs without assistance    5 Severe disability; bedridden, incontinent and requiring constant nursing care and attention    6 Dead    Assessment and Plan:    51 yo Male with PMHx of HTN, GI bleed, PUD, found on the floor of the bathroom with left hemiparesis and dyarthria. CT scan showed right basal ganglia ICH with mild surrounding edema and associated right sulcal effacement and IVH with a 6.3 mm of midline shift. Patient intubated after becoming obtunded, NSx consulted and an EVD was placed. Patient now remains intubated, heavily sedated.     Plan:    #Neuro  - Q1H neuro exams  - Stop Versed and fentanyl drips  - Wean sedation to a RASS - 1  - Fentanyl 50 mcg PRN boluses for agitation and vent synchrony.   - Maintain CPP > 70, ICP < 20  - If ICP > 20, Bolus fentanyl 50 mcg, increase sedation, consider hypertonics, and STAT CT head  - Continue Keppra  - Na goal 135 - 145  - Appreciate NSx recs, EVD right now @ 10 mmHg  - Repeat CT head this AM  - Normothermia- If patient spikes fever, consider tylenol OTC and infectious workup  - Continue Statin  - Hold AC    #CV  - SBP < 150 as per NSx  - Continue Cardene drip to SBP goal considering CPP maintained above 70  - Normovolemia  - ECHO    #Pulm  - HOB 45 degrees  - Vent management per primary team  - Maintain Plateau pressure < 30 in order to optimize venous drainage and facilitate CPP (currently 19)  - PCo2 35 - 45, Avoid hyper or hypocarbia.    #GI/  - Start EN as per nutrition recs  - Strict Is/Os  - Protonix prophylaxis    #Skin  - As per nursing    #Heme  - SCDs    #ID  - As per primary team

## 2021-01-28 NOTE — PROGRESS NOTE ADULT - SUBJECTIVE AND OBJECTIVE BOX
NEUROSURGERY PROGRESS NOTE    [id number]  01-28-21 @ 10:54  [facility] ER Hold 004 A    50y  No Known Allergies      Neurosurgical Operations/procedures during this hospitalization: Placement of LEFT frontal EVD      SUBJECTIVE:    Patient is a 50y Male . Patient is s/p LEFT frontal placement of EVD for IPH with IVH extension. Patient sedated and intubated Unable to assess patient's current complaints due to intubation/sedation; currently on Propofol 10mcg, Fentanyl 1mcg and nicardipine for BP control (5mg). At this time patient does not open eyes or follow commands.    Admitting HPI: 49 yo gentleman w/ PMH HTN, PUD, GI bleed on ASA, presented with AMS and LEFT-sided weakness prior to arrival to hospital. Per report, patient was in the bathroom on the side of the toilet for approx 40 mins, and was seen by wife, who then contacted EMS. In the ED n presentation, patient was confused , difficulty with speech and LEFT-sided weakness. Per wife, patient is alert and oriented at baseline.     In the ED, stroke code was called, at which time patient underwent a CT of the head, which demonstrated an IPH with IVH extension. At that time, patient's exam revealed slurred speech, spontaneous movement of the LEFT side and a right-sided gaze deviation.  After review of imaging and examination a LEFT-sided EVD was placed with ICP monitoring.       REVIEW OF SYSTEMS: All other systems negative and/or noncontributory, except as mentioned in the HPI.    OBJECTIVE:    Focused Physical Exam:    T(C): 36.7 (01-28-21 @ 07:30), Max: 37.1 (01-28-21 @ 04:00)  HR: 67 (01-28-21 @ 07:57) (62 - 100)  BP: 153/68 (01-28-21 @ 07:30) (108/67 - 268/146)  RR: 18 (01-28-21 @ 07:30) (18 - 24)  SpO2: 98% (01-28-21 @ 07:57) (98% - 100%)    01-27-21 @ 07:01  -  01-28-21 @ 07:00  --------------------------------------------------------  IN: 297.7 mL / OUT: 235 mL / NET: 62.7 mL    01-28-21 @ 07:01  -  01-28-21 @ 10:54  --------------------------------------------------------  IN: 0 mL / OUT: 700 mL / NET: -700 mL      Neuro:   GCS = E: 1  V: 1T  M: 5 (on RIGHT)    Mental status: intubated and sedated    RUE and RLE: localizes to painful stimuli.    No movement on LEFT    No CSF or blood noted from nares, ears or mouth; No Stinson's sign  Normocephalic, No visible signs of trauma    Pupils:  PERRLA (Right- 2 pinpoint NR/ Left- 2 pinpoint NR) no michelle-orbital ecchymosis EOMI     Equal rise and fall of the chest w/ unlaboured, quiet breathing    Drains: EVD draining with bright red blood; ICP at time of examination 10

## 2021-01-28 NOTE — CHART NOTE - NSCHARTNOTEFT_GEN_A_CORE
Spoke with patient's wife Em Romero [phone] at 5:30 pm and updated her on patient's condition and plan of care. Results of the repeat CT head were discussed with her.

## 2021-01-28 NOTE — PROGRESS NOTE ADULT - ATTENDING COMMENTS
History, events, data and scans reviewed, patient examined at bedside on 01/28/2021. I agree and approved plan of care as outlined above.  NEURO:  A: R BG hemorrhage with mass effect/edema/IVH & Hydrocephalus  P: Q1H neurochecks  - Stop Versed and fentanyl drips  - Wean sedation to a RASS - 1  - Fentanyl 50 mcg PRN boluses for agitation and vent synchrony.   - Maintain CPP > 70, ICP < 20  - If ICP > 20, Bolus fentanyl 50 mcg, increase sedation, consider hyperosmolar therapy, and STAT CT head  - Continue Keppra  - Na goal high normal ranges  - Appreciate NSx recs, EVD right now @ 10 mmHg  - Repeat CT head this AM  - Normothermia- If patient spikes fever, start patient on artic sun with shivering control as per BSAS protocol  - Continue Statin  - Hold AC    CV  A: - HTN, infrequent PVC's  P: - SBP < 150 as per NSx  - Continue Cardene drip to SBP goal considering CPP maintained above 70  - Normovolemia  - Monitor QTc  -2D-Echo  RESP  On MV (, rate 20, PEEP 5)  - HOB 45 degrees  - Maintain Plateau pressure < 30 in order to optimize venous drainage and facilitate CPP (currently 19)  - PCo2 35 - 45, Avoid hyper or hypocarbia.    FLUID/ELEC  Strict Is/Os, maintain euvolemia, normonatremia    Heme  - DVT proph: mechanical for now, pharmacological from tomorrow if repeat CT head -ve    ID  continous temp monitoring via bladder or rectal probe, fever work up and aggressive control with shivering control measures

## 2021-01-28 NOTE — ED PROCEDURE NOTE - CPROC ED TIME OUT STATEMENT1
“Patient's name, , procedure and correct site were confirmed during the [facility] Timeout.”
“Patient's name, , procedure and correct site were confirmed during the [facility] Timeout.”
“Patient's name, , procedure and correct site were confirmed during the [facility] Timeout.”

## 2021-01-28 NOTE — PROGRESS NOTE ADULT - ASSESSMENT
IMPRESSION:    CVA / ICH - NIHSS 24 on admission sp EVD. hypertensive bleed  sp intubation for airway protection      PLAN:    CNS: intubated and sedated w/ propofol and fentanyl > wean sedation and do neuro checks q1.  repeat CTH today.  c/w Keppra 500mg q12.  monitor ICP, If ICP > 20, Bolus fentanyl 50 mcg, increase sedation, consider hypertonics, and STAT CT head    HEENT: oral care    PULMONARY: HOB at 30, Aspiration precautions.  Vent changes per pulm.    CARDIOVASCULAR: c/w nicardipine gtt, keep SBP < 140    GI: GI prophylaxis w/ Protonix IV, NPO.  Bowel regimen.    RENAL: I=0, monitor electrolytes and replete as needed.  Na goal 135 - 145.    INFECTIOUS DISEASE: off antibiotics    HEMATOLOGICAL:  DVT prophylaxis w/ SCD, hold ASA    ENDOCRINE:  Follow up FS.  Insulin protocol if needed. f/u TSH    MUSCULOSKELETAL: Pt/rehab    Disposition: MICU  Code status: Full code  Poor prognosis

## 2021-01-28 NOTE — ED ADULT NURSE REASSESSMENT NOTE - NS ED NURSE REASSESS COMMENT FT1
pt intubated/ sedated with medications as per order.  pt s/p fish hole procedure. sent for rpt ct. returned in no distress. Arterial and central line placed by resident in ed.  pt on continuous pulse ox / cardiac monitor. arterial bp and icp monitored during shift.  pt has 40 cc of bloody output in external drainage system.200 cc of urine in berry output. spoke with resident at [id number]; made aware pt received 2 u of platelets. goal of systolic bp under 140 maintained.

## 2021-01-28 NOTE — CHART NOTE - NSCHARTNOTEFT_GEN_A_CORE
Spoke with patient's wife Em Romero [phone]. Updated her on patient's condition and plan of care.

## 2021-01-28 NOTE — PROGRESS NOTE ADULT - ASSESSMENT
s/p Placement of LEFT-sided EVD for IPH w/ IVH extension       - Repeat CT head with Brain[facility] (complete; awaiting final read)       Recommendations to follow     - Continue with Q1 hour NC     -  Monitor the drain output and ICP; record both        Keep EVD at [id number]    -  Continue Abx x 24 hours/until drain is removed        - Call NSS if any changes in GCS/motor/Sensory exam    Patient case, exam, labs and imaging discussed with and or evaluated by Neurosurgery Attending Dr Goins

## 2021-01-28 NOTE — PROGRESS NOTE ADULT - SUBJECTIVE AND OBJECTIVE BOX
Patient Information:  MELODIE HERNADEZ / 50y / Male / MRN#:[id number]    Hospital Day:     HPI:  50 years old right handed Male with PMHx of HTN, GI bleed, PUD,  Mrankin score of 0 at baseline was brought in by EMS for  AMS and left sided weakness.   Wife states that patient was having a usual day today went to bathroom and then the next thing she noted was that he was confused and was lying on the bathroom floor, she reports urinary incontinence, patient was confused, had trouble speaking, and could not move his left side.     In the ED, /148, HR 80, saturating well. Code stroke called, NIHSS 24 and GCS 11, CTH showed Moderate to large intraparenchymal right basal ganglia hemorrhage with mild surrounding edema and associated right sulcal effacement as well as decompression into the ventricles. There is approximately 6.3 mm of midline shift. CTA did not show any evidence of active bleeding, AVM or aneurysm or major vascular stenosis or occlusion. Pt became unresponsive in CT scan and had to be intubated and sedated. Patient was intubated in ED for worsening mental status and GCS. Started on Nicardipine drip for sbp in 200s and EVD was placed by neurosurgery at bedside. Pan trauma scan was negative. To be admitted under ICU for further monitoring.     Interval History:  Patient seen and examined at bedside.     Past Medical History:  HTN (hypertension)    GI bleed    Gastric ulcer    PUD (peptic ulcer disease)      Past Surgical History:  Arm fracture, left      Allergies:  No Known Allergies    Medications:  PRN:    Standing:  ceFAZolin   IVPB      ceFAZolin   IVPB 500 milliGRAM(s) IV Intermittent every 8 hours  chlorhexidine 0.12% Liquid 15 milliLiter(s) Oral Mucosa every 12 hours  chlorhexidine 4% Liquid 1 Application(s) Topical <User Schedule>  fentaNYL   Infusion. 0.5 MICROgram(s)/kG/Hr (5 mL/Hr) IV Continuous <Continuous>  levETIRAcetam  IVPB 500 milliGRAM(s) IV Intermittent every 12 hours  midazolam Infusion 0.02 mG/kG/Hr (2 mL/Hr) IV Continuous <Continuous>  niCARdipine Infusion 5 mG/Hr (25 mL/Hr) IV Continuous <Continuous>  pantoprazole  Injectable 40 milliGRAM(s) IV Push every 12 hours  propofol Infusion 50 MICROgram(s)/kG/Min (30 mL/Hr) IV Continuous <Continuous>    Home:    Vitals:  T(C): 37.1, Max: 37.1 (01-28-21 @ 04:00)  T(F): 98.8, Max: 98.8 (01-28-21 @ 04:00)  HR: 70 (62 - 100)  BP: 153/68 (108/67 - 268/146)  RR: 24 (18 - 24)  SpO2: 98% (98% - 100%)    Physical Exam:  General: On MV  Heart: Regular rate and rhythm; S1, S2; No murmurs.  Lungs: Clear to auscultation bilaterally.  Abdomen: Soft, nontender, nondistended.  Extremities: No edema in upper or lower extremities.  Neuro: Sedated    Labs:  CBC (01-28 @ 05:58)                        Hgb: 15.1   WBC: 10.61 )-----------------( Plts: 306                              Hct: 43.3     Chem (01-28 @ 05:58)  Na: 139  |     Cl: 101     |  BUN: 23  -----------------------------------------< Gluc: 103    K: 4.0   |    HCO3: 27  |  Cr: 1.6    Ca 9.2 (01-28 @ 05:58)  Mg 2.2 (01-28 @ 05:58)    LFTs (01-28 @ 05:58)  TPro 6.5  /  Alb 4.5  TBili 0.7  /  DBili     AST 23  /  ALT 26  /  AlkPhos 87    PT/INR (01-28 @ 05:58)  PT: 11.60; INR: 1.01   PTT: 28.2           ABG (01-28 @ 09:00)  pH, Arterial: 7.39 pH, Blood: X /  pCO2: 44 /  pO2: 80 / HCO3: 26 / Base Excess: 0.8 /  SaO2: 95       Microbiology:    Radiology:

## 2021-01-28 NOTE — ED PROCEDURE NOTE - CPROC ED INDICATIONS1
airway protection/critical patient/mental status change
arterial puncture to obtain ABG's/critical patient/monitoring purposes
emergency venous access

## 2021-01-29 LAB
ALBUMIN SERPL ELPH-MCNC: 3.8 G/DL — SIGNIFICANT CHANGE UP (ref 3.5–5.2)
ALP SERPL-CCNC: 84 U/L — SIGNIFICANT CHANGE UP (ref 30–115)
ALT FLD-CCNC: 23 U/L — SIGNIFICANT CHANGE UP (ref 0–41)
ANION GAP SERPL CALC-SCNC: 9 MMOL/L — SIGNIFICANT CHANGE UP (ref 7–14)
AST SERPL-CCNC: 48 U/L — HIGH (ref 0–41)
BASOPHILS # BLD AUTO: 0.04 K/UL — SIGNIFICANT CHANGE UP (ref 0–0.2)
BASOPHILS NFR BLD AUTO: 0.3 % — SIGNIFICANT CHANGE UP (ref 0–1)
BILIRUB SERPL-MCNC: 0.8 MG/DL — SIGNIFICANT CHANGE UP (ref 0.2–1.2)
BUN SERPL-MCNC: 28 MG/DL — HIGH (ref 10–20)
CALCIUM SERPL-MCNC: 9 MG/DL — SIGNIFICANT CHANGE UP (ref 8.5–10.1)
CHLORIDE SERPL-SCNC: 106 MMOL/L — SIGNIFICANT CHANGE UP (ref 98–110)
CO2 SERPL-SCNC: 24 MMOL/L — SIGNIFICANT CHANGE UP (ref 17–32)
CREAT SERPL-MCNC: 1.4 MG/DL — SIGNIFICANT CHANGE UP (ref 0.7–1.5)
EOSINOPHIL # BLD AUTO: 0.02 K/UL — SIGNIFICANT CHANGE UP (ref 0–0.7)
EOSINOPHIL NFR BLD AUTO: 0.1 % — SIGNIFICANT CHANGE UP (ref 0–8)
GLUCOSE SERPL-MCNC: 112 MG/DL — HIGH (ref 70–99)
HCT VFR BLD CALC: 42.7 % — SIGNIFICANT CHANGE UP (ref 42–52)
HGB BLD-MCNC: 14.3 G/DL — SIGNIFICANT CHANGE UP (ref 14–18)
IMM GRANULOCYTES NFR BLD AUTO: 1 % — HIGH (ref 0.1–0.3)
LYMPHOCYTES # BLD AUTO: 0.97 K/UL — LOW (ref 1.2–3.4)
LYMPHOCYTES # BLD AUTO: 7.1 % — LOW (ref 20.5–51.1)
MCHC RBC-ENTMCNC: 31.1 PG — HIGH (ref 27–31)
MCHC RBC-ENTMCNC: 33.5 G/DL — SIGNIFICANT CHANGE UP (ref 32–37)
MCV RBC AUTO: 92.8 FL — SIGNIFICANT CHANGE UP (ref 80–94)
MONOCYTES # BLD AUTO: 1.06 K/UL — HIGH (ref 0.1–0.6)
MONOCYTES NFR BLD AUTO: 7.8 % — SIGNIFICANT CHANGE UP (ref 1.7–9.3)
NEUTROPHILS # BLD AUTO: 11.35 K/UL — HIGH (ref 1.4–6.5)
NEUTROPHILS NFR BLD AUTO: 83.7 % — HIGH (ref 42.2–75.2)
NRBC # BLD: 0 /100 WBCS — SIGNIFICANT CHANGE UP (ref 0–0)
PLATELET # BLD AUTO: 277 K/UL — SIGNIFICANT CHANGE UP (ref 130–400)
POTASSIUM SERPL-MCNC: 3.8 MMOL/L — SIGNIFICANT CHANGE UP (ref 3.5–5)
POTASSIUM SERPL-SCNC: 3.8 MMOL/L — SIGNIFICANT CHANGE UP (ref 3.5–5)
PROT SERPL-MCNC: 5.9 G/DL — LOW (ref 6–8)
RBC # BLD: 4.6 M/UL — LOW (ref 4.7–6.1)
RBC # FLD: 12.9 % — SIGNIFICANT CHANGE UP (ref 11.5–14.5)
SODIUM SERPL-SCNC: 139 MMOL/L — SIGNIFICANT CHANGE UP (ref 135–146)
WBC # BLD: 13.57 K/UL — HIGH (ref 4.8–10.8)
WBC # FLD AUTO: 13.57 K/UL — HIGH (ref 4.8–10.8)

## 2021-01-29 PROCEDURE — 99291 CRITICAL CARE FIRST HOUR: CPT

## 2021-01-29 PROCEDURE — 71045 X-RAY EXAM CHEST 1 VIEW: CPT | Mod: 26

## 2021-01-29 RX ORDER — PROPOFOL 10 MG/ML
20 INJECTION, EMULSION INTRAVENOUS
Qty: 1000 | Refills: 0 | Status: DISCONTINUED | OUTPATIENT
Start: 2021-01-29 | End: 2021-02-19

## 2021-01-29 RX ORDER — MIDAZOLAM HYDROCHLORIDE 1 MG/ML
4 INJECTION, SOLUTION INTRAMUSCULAR; INTRAVENOUS ONCE
Refills: 0 | Status: DISCONTINUED | OUTPATIENT
Start: 2021-01-29 | End: 2021-01-29

## 2021-01-29 RX ORDER — PROPOFOL 10 MG/ML
20 INJECTION, EMULSION INTRAVENOUS
Qty: 500 | Refills: 0 | Status: DISCONTINUED | OUTPATIENT
Start: 2021-01-29 | End: 2021-01-29

## 2021-01-29 RX ORDER — PROPOFOL 10 MG/ML
75 INJECTION, EMULSION INTRAVENOUS
Qty: 500 | Refills: 0 | Status: DISCONTINUED | OUTPATIENT
Start: 2021-01-29 | End: 2021-01-29

## 2021-01-29 RX ADMIN — PROPOFOL 12 MICROGRAM(S)/KG/MIN: 10 INJECTION, EMULSION INTRAVENOUS at 06:00

## 2021-01-29 RX ADMIN — PROPOFOL 12 MICROGRAM(S)/KG/MIN: 10 INJECTION, EMULSION INTRAVENOUS at 22:32

## 2021-01-29 RX ADMIN — Medication 100 MILLIGRAM(S): at 22:32

## 2021-01-29 RX ADMIN — Medication 100 MILLIGRAM(S): at 12:56

## 2021-01-29 RX ADMIN — Medication 100 MILLIGRAM(S): at 01:07

## 2021-01-29 RX ADMIN — CHLORHEXIDINE GLUCONATE 1 APPLICATION(S): 213 SOLUTION TOPICAL at 10:58

## 2021-01-29 RX ADMIN — LEVETIRACETAM 420 MILLIGRAM(S): 250 TABLET, FILM COATED ORAL at 17:46

## 2021-01-29 RX ADMIN — SENNA PLUS 2 TABLET(S): 8.6 TABLET ORAL at 22:32

## 2021-01-29 RX ADMIN — NICARDIPINE HYDROCHLORIDE 25 MG/HR: 30 CAPSULE, EXTENDED RELEASE ORAL at 02:53

## 2021-01-29 RX ADMIN — CHLORHEXIDINE GLUCONATE 15 MILLILITER(S): 213 SOLUTION TOPICAL at 17:01

## 2021-01-29 RX ADMIN — PANTOPRAZOLE SODIUM 40 MILLIGRAM(S): 20 TABLET, DELAYED RELEASE ORAL at 17:01

## 2021-01-29 RX ADMIN — CHLORHEXIDINE GLUCONATE 15 MILLILITER(S): 213 SOLUTION TOPICAL at 06:50

## 2021-01-29 RX ADMIN — LEVETIRACETAM 420 MILLIGRAM(S): 250 TABLET, FILM COATED ORAL at 06:49

## 2021-01-29 RX ADMIN — PROPOFOL 12 MICROGRAM(S)/KG/MIN: 10 INJECTION, EMULSION INTRAVENOUS at 01:28

## 2021-01-29 RX ADMIN — POLYETHYLENE GLYCOL 3350 17 GRAM(S): 17 POWDER, FOR SOLUTION ORAL at 11:44

## 2021-01-29 RX ADMIN — Medication 100 MILLIGRAM(S): at 06:49

## 2021-01-29 RX ADMIN — NICARDIPINE HYDROCHLORIDE 25 MG/HR: 30 CAPSULE, EXTENDED RELEASE ORAL at 06:15

## 2021-01-29 NOTE — CONSULT NOTE ADULT - SUBJECTIVE AND OBJECTIVE BOX
Patient is a 50y old  Male who presents with a chief complaint of Altered mental status (28 Jan 2021 12:27)      HPI:  50 years old right handed Male with PMHx of HTN, GI bleed, PUD,  Mrankin score of 0 at baseline was brought in by EMS for  AMS and left sided weakness.   Wife states that patient was having a usual day today went to bathroom and then the next thing she noted was that he was confused and was lying on the bathroom floor, she reports urinary incontinence, patient was confused, had trouble speaking, and could not move his left side.     In the ED, /148, HR 80, saturating well. Code stroke called, NIHSS 24 and GCS 11, CTH showed Moderate to large intraparenchymal right basal ganglia hemorrhage with mild surrounding edema and associated right sulcal effacement as well as decompression into the ventricles. There is approximately 6.3 mm of midline shift. CTA did not show any evidence of active bleeding, AVM or aneurysm or major vascular stenosis or occlusion. Pt became unresponsive in CT scan and had to be intubated and sedated. Patient was intubated in ED for worsening mental status and GCS. Started on Nicardipine drip for sbp in 200s and EVD was placed by neurosurgery at bedside. Pan trauma scan was negative. To be admitted under ICU for further monitoring.  (27 Jan 2021 23:13)      PAST MEDICAL & SURGICAL HISTORY:  HTN (hypertension)    GI bleed    Gastric ulcer    PUD (peptic ulcer disease)    Arm fracture, left  s/p surgical repair        SOCIAL HX:   Smoking         UTO                 ETOH                            Other    FAMILY HISTORY:  Family history of breast cancer (Mother)    Family history of pancreatic cancer (Father)    :  No known cardiovacular family hisotry     Review Of Systems:     All ROS are negative except per HPI       Allergies    No Known Allergies    Intolerances          PHYSICAL EXAM    ICU Vital Signs Last 24 Hrs  T(C): 37 (29 Jan 2021 07:47), Max: 37.8 (28 Jan 2021 22:30)  T(F): 98.6 (29 Jan 2021 07:47), Max: 100 (28 Jan 2021 22:30)  HR: 75 (29 Jan 2021 08:00) (69 - 87)  BP: 132/70 (29 Jan 2021 08:00) (129/68 - 141/75)  BP(mean): 94 (29 Jan 2021 08:00) (76 - 101)  ABP: 134/55 (29 Jan 2021 08:00) (125/55 - 155/61)  ABP(mean): 78 (29 Jan 2021 08:00) (73 - 82)  RR: 26 (29 Jan 2021 08:00) (24 - 26)  SpO2: 99% (29 Jan 2021 07:00) (97% - 100%)      CONSTITUTIONAL:  Well nourished.  NAD    ENT:   Airway patent,   Mouth with normal mucosa.   No thrush    EYES:   pupils equal,   round and reactive to light.    CARDIAC:   Normal rate,   Regular rhythm.    Heart sounds S1, S2.   No edema      Vascular:   normal systolic impulse  no bruits    RESPIRATORY:   No wheezing   Normal chest expansion  No use of accessory muscles    GASTROINTESTINAL:  Abdomen soft   Non-tender,   No guarding,   + BS    GENITOURINARY  normal genitalia for sex  no edema    MUSCULOSKELETAL:   Range of motion is not limited,  Nno clubbing, cyanosis    NEUROLOGICAL:   Sedated  Left sided weakness     SKIN:   Skin normal color for race,   Warm and dry  No evidence of rash.    PSYCHIATRIC:   Sedated  No apparent risk to self or others.    HEME LYMPH:   No cervical  lymphadenopathy.  No inguinal lymphadenopathy            01-28-21 @ 07:01  -  01-29-21 @ 07:00  --------------------------------------------------------  IN:    IV PiggyBack: 100 mL    NiCARdipine: 377.5 mL    Propofol: 68.7 mL  Total IN: 546.2 mL    OUT:    Drain (mL): 193 mL    Indwelling Catheter - Urethral (mL): 1472 mL  Total OUT: 1665 mL    Total NET: -1118.8 mL      01-29-21 @ 07:01 - 01-29-21 @ 08:27  --------------------------------------------------------  IN:    NiCARdipine: 50 mL    Propofol: 12.5 mL  Total IN: 62.5 mL    OUT:    Drain (mL): 17 mL    Indwelling Catheter - Urethral (mL): 85 mL  Total OUT: 102 mL    Total NET: -39.5 mL          LABS:                          14.3   13.57 )-----------( 277      ( 29 Jan 2021 06:37 )             42.7                                               01-28    139  |  101  |  23<H>  ----------------------------<  103<H>  4.0   |  27  |  1.6<H>    Creatinine Trend  BUN 23, Cr 1.6, (01-28-21 @ 05:58)  Creatinine Trend  BUN 19, Cr 1.1, (01-27-21 @ 20:28)      Ca    9.2      28 Jan 2021 05:58  Mg     2.2     01-28    TPro  6.5  /  Alb  4.5  /  TBili  0.7  /  DBili  x   /  AST  23  /  ALT  26  /  AlkPhos  87  01-28      PT/INR - ( 28 Jan 2021 05:58 )   PT: 11.60 sec;   INR: 1.01 ratio         PTT - ( 28 Jan 2021 05:58 )  PTT:28.2 sec                                           CARDIAC MARKERS ( 28 Jan 2021 05:58 )  x     / <0.01 ng/mL / x     / x     / x      CARDIAC MARKERS ( 27 Jan 2021 20:28 )  x     / <0.01 ng/mL / x     / x     / x                                                LIVER FUNCTIONS - ( 28 Jan 2021 05:58 )  Alb: 4.5 g/dL / Pro: 6.5 g/dL / ALK PHOS: 87 U/L / ALT: 26 U/L / AST: 23 U/L / GGT: x                                                                                               Mode: AC/ CMV (Assist Control/ Continuous Mandatory Ventilation)  RR (machine): 24  TV (machine): 450  FiO2: 50  PEEP: 5  ITime: 1  MAP: 9  PIP: 18                                      ABG - ( 29 Jan 2021 02:58 )  pH, Arterial: 7.42  pH, Blood: x     /  pCO2: 39    /  pO2: 98    / HCO3: 25    / Base Excess: 0.8   /  SaO2: 98                  X-Rays reviewed:                                                                                    ECHO    CXR interpreted by me: ET high.  OG OK>  TLC OK>  LLL atelectasis     MEDICATIONS  (STANDING):  ceFAZolin   IVPB      ceFAZolin   IVPB 500 milliGRAM(s) IV Intermittent every 8 hours  chlorhexidine 0.12% Liquid 15 milliLiter(s) Oral Mucosa every 12 hours  chlorhexidine 4% Liquid 1 Application(s) Topical <User Schedule>  levETIRAcetam  IVPB 500 milliGRAM(s) IV Intermittent every 12 hours  niCARdipine Infusion 5 mG/Hr (25 mL/Hr) IV Continuous <Continuous>  pantoprazole  Injectable 40 milliGRAM(s) IV Push every 12 hours  polyethylene glycol 3350 17 Gram(s) Oral daily  propofol Infusion 20 MICROgram(s)/kG/Min (12 mL/Hr) IV Continuous <Continuous>  senna 2 Tablet(s) Oral at bedtime    MEDICATIONS  (PRN):

## 2021-01-29 NOTE — PROGRESS NOTE ADULT - SUBJECTIVE AND OBJECTIVE BOX
49 yo gentleman w/ PMH HTN, PUD, GI bleed on ASA, presented with AMS and LEFT-sided weakness prior to arrival to hospital. Per report, patient was in the bathroom on the side of the toilet for approx 40 mins, and was seen by wife, who then contacted EMS. In the ED n presentation, patient was confused , difficulty with speech and LEFT-sided weakness. Per wife, patient is alert and oriented at baseline.     In the ED, stroke code was called, at which time patient underwent a CT of the head, which demonstrated an IPH with IVH extension. At that time, patient's exam revealed slurred speech, spontaneous movement of the LEFT side and a right-sided gaze deviation.  After review of imaging and examination a LEFT-sided EVD was placed with ICP monitoring.       T=98.6  KA=331/63  RR=24  HR=72  ICP=5-7  EVD output=5-12/hr    intubated and propofol held x 20 minutes for exam  no eye opening but follows simple command by showed me the thumb  pupils are 2mm and reacted to light bilaterally  head wound site dry and clean, EVD in place and potent  moves right side spontaneously and antigravity   left hemiplegia

## 2021-01-29 NOTE — PROGRESS NOTE ADULT - ATTENDING COMMENTS
History, events, data and scans reviewed, patient examined at bedside on 01/28/2021. I agree and approved plan of care as outlined above.  NEURO:  A: R BG hemorrhage with mass effect/edema/IVH & Hydrocephalus  P: Q1H neurochecks  - Continue to wean sedation as tolerated  - Wean sedation to a RASS - 1  - Fentanyl 50 mcg PRN boluses for agitation and vent synchrony.   - Maintain CPP > 70, ICP < 20  - If ICP > 20, Bolus fentanyl 50 mcg, increase sedation, consider hyperosmolar therapy, and STAT CT head  - Continue Keppra  - Na goal high normal ranges  - Appreciate NSx recs, EVD right now @ 10 mmHg  - Repeat CT head this AM  - Normothermia- If patient spikes fever, start patient on artic sun with shivering control as per BSAS protocol  - Continue Statin  - Hold AC    CV  A: - HTN, infrequent PVC's  P: - SBP < 150 as per NSx  - Continue Cardene drip to SBP goal considering CPP maintained above 70  - Normovolemia  - Monitor QTc  -2D-Echo  RESP  On MV (, rate 20, PEEP 5)  - HOB 45 degrees  - Maintain Plateau pressure < 30 in order to optimize venous drainage and facilitate CPP (currently 19)  - PCo2 35 - 45, Avoid hyper or hypocarbia.    FLUID/ELECT  Strict Is/Os, maintain euvolemia, normonatremia    PROPHYLAXIS  DVT: Mechanical and pharmocological  GI: PPI inhibitors, NGT feeding    ID  continous temp monitoring via bladder or rectal probe, fever work up and aggressive control with shivering control measures

## 2021-01-29 NOTE — PROGRESS NOTE ADULT - ASSESSMENT
s/p Placement of LEFT-sided EVD for IPH w/ IVH extension       - follow up CT head stable     - Continue with Q1 hour     -  Monitor the drain output and ICP        raise EVD to 15cm H20    -  Continue Abx x 24 hours/until drain is removed        - Call NSS if any changes in GCS/motor/Sensory exam    Patient case, exam, labs and imaging discussed with vent unit resident and neurosurgery attending

## 2021-01-29 NOTE — PROGRESS NOTE ADULT - SUBJECTIVE AND OBJECTIVE BOX
Specialty: Neuro Critical Care     HPI:  50 years old right handed Male with PMHx of HTN, GI bleed, PUD,  Mrankin score of 0 at baseline was brought in by EMS for  AMS and left sided weakness.   Wife states that patient was having a usual day today went to bathroom and then the next thing she noted was that he was confused and was lying on the bathroom floor, she reports urinary incontinence, patient was confused, had trouble speaking, and could not move his left side.   In the ED, /148, HR 80, saturating well. Code stroke called, NIHSS 24 and GCS 11, CTH showed Moderate to large intraparenchymal right basal ganglia hemorrhage with mild surrounding edema and associated right sulcal effacement as well as decompression into the ventricles. There is approximately 6.3 mm of midline shift. CTA did not show any evidence of active bleeding, AVM or aneurysm or major vascular stenosis or occlusion. Pt became unresponsive in CT scan and had to be intubated and sedated. Patient was intubated in ED for worsening mental status and GCS. Started on Nicardipine drip for sbp in 200s and EVD was placed by neurosurgery at bedside. Pan trauma scan was negative. To be admitted under ICU for further monitoring.  (27 Jan 2021 23:13)    Past Medical and Surgical Hx:  HTN (hypertension)  GI bleed  Gastric ulcer  PUD (peptic ulcer disease)  Arm fracture, left  s/p surgical repair    Allergies: No Known Allergies    ROS: Patient unable to participate in ROS due to neurologic status.    PE:  General: mechanically vented, sedated, male of appropriate age, lying supine in bed. NAD  Neuro:  MSE: mechanically vented, sedated on Propofol, unarousable to voice, however withdrawal to noxious stimuli. Does not open eyes or follow commands. No blink to threat  CN: Pupils 3mm brisk bilaterally. Slight right gaze preference that can be overcome. (+) corneals (+) cough (+) gag  Motor: Withdrawal to noxious stimuli in RUE/RLE. No movement of LUE/LLE to noxious stimuli  Sensory: No facial grimacing noted to noxious stimuli  EVD drain in place; currently @ 15mmHg    Vital Signs:  ICU Vital Signs Last 24 Hrs  T(C): 37.2 (29 Jan 2021 12:00), Max: 37.8 (28 Jan 2021 22:30)  T(F): 98.9 (29 Jan 2021 12:00), Max: 100 (28 Jan 2021 22:30)  HR: 73 (29 Jan 2021 12:00) (70 - 87)  BP: 132/70 (29 Jan 2021 08:00) (129/68 - 141/75)  BP(mean): 94 (29 Jan 2021 08:00) (76 - 101)  ABP: 129/64 (29 Jan 2021 12:00) (125/55 - 155/61)  ABP(mean): 82 (29 Jan 2021 12:00) (73 - 98)  RR: 26 (29 Jan 2021 12:00) (24 - 26)  SpO2: 100% (29 Jan 2021 12:00) (98% - 100%)    I/O Detail:  28 Jan 2021 07:01  -  29 Jan 2021 07:00  --------------------------------------------------------  IN:    IV PiggyBack: 100 mL    NiCARdipine: 377.5 mL    Propofol: 68.7 mL  Total IN: 546.2 mL    OUT:    Drain (mL): 193 mL    Indwelling Catheter - Urethral (mL): 1472 mL  Total OUT: 1665 mL    Total NET: -1118.8 mL      29 Jan 2021 07:01  -  29 Jan 2021 14:54  --------------------------------------------------------  IN:    Enteral Tube Flush: 130 mL    IV PiggyBack: 50 mL    NiCARdipine: 150 mL    Propofol: 105.5 mL  Total IN: 435.5 mL    OUT:    Drain (mL): 47 mL    Indwelling Catheter - Urethral (mL): 200 mL  Total OUT: 247 mL    Total NET: 188.5 mL    Labs:  01-29    139  |  106  |  28<H>  ----------------------------<  112<H>  3.8   |  24  |  1.4    Ca    9.0      29 Jan 2021 11:50  Mg     2.2     01-28    TPro  5.9<L>  /  Alb  3.8  /  TBili  0.8  /  DBili  x   /  AST  48<H>  /  ALT  23  /  AlkPhos  84  01-29                          14.3   13.57 )-----------( 277      ( 29 Jan 2021 06:37 )             42.7     PT/INR - ( 28 Jan 2021 05:58 )   PT: 11.60 sec;   INR: 1.01 ratio    PTT - ( 28 Jan 2021 05:58 )  PTT:28.2 sec    ABG - ( 29 Jan 2021 02:58 )  pH, Arterial: 7.42  pH, Blood: x     /  pCO2: 39    /  pO2: 98    / HCO3: 25    / Base Excess: 0.8   /  SaO2: 98        Medications Current and PRN:  MEDICATIONS  (STANDING):  ceFAZolin   IVPB      ceFAZolin   IVPB 500 milliGRAM(s) IV Intermittent every 8 hours  chlorhexidine 0.12% Liquid 15 milliLiter(s) Oral Mucosa every 12 hours  chlorhexidine 4% Liquid 1 Application(s) Topical <User Schedule>  levETIRAcetam  IVPB 500 milliGRAM(s) IV Intermittent every 12 hours  niCARdipine Infusion 5 mG/Hr (25 mL/Hr) IV Continuous <Continuous>  pantoprazole  Injectable 40 milliGRAM(s) IV Push every 12 hours  polyethylene glycol 3350 17 Gram(s) Oral daily  propofol Infusion 20 MICROgram(s)/kG/Min (12 mL/Hr) IV Continuous <Continuous>  senna 2 Tablet(s) Oral at bedtime    Imaging:  EXAM:  CT VENOGRAM BRAIN (W)AW IC (1/28/2021):   IMPRESSION:  No evidence of dural venous sinus thrombosis or stenosis.  Similar appearance of right basal ganglia intraparenchymal hemorrhage with local mass effect extending into the ventricles as well as the right frontal and temporal lobes.    EXAM:  CT BRAIN (1/28/2021):   IMPRESSION:  No significant change in large acute right basal ganglia hemorrhage with intraventricular extension as well as extending into right frontal and temporal lobes. Unchanged local mass effect with 5mm of leftward midline shift.  Similar-appearing minimal subarachnoid hemorrhage noted in the left parietal sulci.    EXAM:  CT BRAIN (1/28/2021):   IMPRESSION:  Slightly increased size of the right basal ganglia acute intraparenchymal hemorrhage with extension into the ventricles as well as the frontal and temporal lobes status post placement of a left frontal approach ventriculostomy catheter. Unchanged 0.7 cm midline shift to the left.  Minimal new subarachnoid hemorrhage noted in the left frontoparietal sulci    EXAM:  CT ABDOMEN AND PELVIS IC/CT CHEST IC (1/27/2021):   IMPRESSION:  No CT evidence for acute traumatic injury to the chest, abdomen or pelvis.  Mild aneurysmal dilatation of the ascending thoracic aorta to 4.1 cm.    EXAM:  CT CERVICAL SPINE (1/27/2021):   IMPRESSION:  Degenerative changes as described above. No evidence of fracture or facet subluxation.    EXAM:  CT ANGIO NECK (W)AW IC/CT ANGIO BRAIN (W)AW IC (1/27/2021):   IMPRESSION  The previously noted area of intracerebral hemorrhage in the right basal ganglia region is again identified. Please see report of the CT scan of the head dated 1/27/2021.  No evidence of active bleeding, AVM or aneurysm.  No evidence of major vascular stenosis or occlusion,    EXAM:  CT BRAIN STROKE PROTOCOL (1/27/2021):   IMPRESSION:  Moderate to large intraparenchymal right basal ganglia hemorrhage with mild surrounding edema and associated right sulcal effacement as well as decompression into the ventricles. There is approximately 6.3 mm of midline shift.    Assessment:  50-year-old male with PMHx GIB, HTN, and PUD, BIBA by EMS, found of floor of bathroom with AMS, left hemiparesis, and dysarthria. In ED, /148. Stroke code initiated. Initial NIH = 24/GCS = 11. CTH revealed moderate to large intraparenchymal right basal ganglia ICH with mild edema and associated right sulcal effacement with decompression of ventricles and 6.3mm midline shift. CTA negative for active bleed, AVM, aneurysm major vascular stenosis or occlusion. Intubated for airway protection r/t worsening mental status and neurological decline. NeuroSx c/s, s/p EVD placement. Admitted to ICU for management. On examination today, mechanically vented, sedated on Propofol, PERRLA, does not open eyes to voice or noxious stimuli, does not follow commands, withdrawal to pain RUE/RLE, with left-sided hemiparesis and intact brainstem reflexes. EVD drain @ 15mmHg, open to drain. ICP range 5-12; CPP range 70-76.     Plan:  #Neuro:  - Neuro checks q1hr  - Can do NIHSS qshift  - Wean sedation to RASS -1  - Utilize Fentanyl IVP prn for agitation/vent synchrony  - Maintain CPP > 65; ICP < 20  - Keep Serum Na 135-145  - Repeat CTH, 1/30/2021, unless significant neurological change, then stat CTH and notify NCC team  - Continue Keppra 500mg IV q12hrs  - Keep normothermic; trend WBC/temperatures  - Hold A/C  - Appreciate NeuroSx recs: EVD drain @ 15mmHg; continue ABTx until EVD D/C; monitor ICP and EVD output    #CV:  -Keep SBP < 150, utilize Nicardipine as needed  - Keep euvolemic     #Resp:  - Ventilator management as per primary team  - HOB 35 degrees  - Aspiration precautions   - Maintain Plateau pressure < 30 in order to optimize venous drainage and facilitate CPP   - Keep pCO2 35 - 45, Avoid hypercarbia/hypocarbia    #Renal/Fluid/Electolytes:  - Strict I/Os  - Monitor lytes, replete as needed    #GI:  - Continue Protonix 40mg IV q12hrs  - Continue Miralax 17grams PO q24hrs  - Continue Senna 2 tablets PO q24hrs HS    #DVT prophylaxis:  - Hold A/C at this time  - SCS while in bed          MANJINDER Veliz-BC  Please feel free to contact for any questions or concerns   #[phone]

## 2021-01-29 NOTE — CONSULT NOTE ADULT - ASSESSMENT
IMPRESSION:  Large ICB likely hypertensive  SP IVD  MAGDALENE     PLAN:    CNS: Adequate sedation.  AEDs.  FU with Neuro and NeuroSX.      HEENT: Oral care    PULMONARY:  HOB @ 45 degrees.  Vent changes as follows: Wean O2 as tolerated  Advance ET 2 cm s     CARDIOVASCULAR:  BP control.      GI: GI prophylaxis.  OG Feeding.  Bowel regimen     RENAL:  Follow up lytes.  Correct as needed    INFECTIOUS DISEASE: Follow up cultures    HEMATOLOGICAL:  DVT prophylaxis seq.      ENDOCRINE:  Follow up FS.  Insulin protocol if needed.    MUSCULOSKELETAL:  Bed rest    Prognosis guarded

## 2021-01-30 LAB
ALBUMIN SERPL ELPH-MCNC: 3.9 G/DL — SIGNIFICANT CHANGE UP (ref 3.5–5.2)
ALP SERPL-CCNC: 74 U/L — SIGNIFICANT CHANGE UP (ref 30–115)
ALT FLD-CCNC: 23 U/L — SIGNIFICANT CHANGE UP (ref 0–41)
ANION GAP SERPL CALC-SCNC: 10 MMOL/L — SIGNIFICANT CHANGE UP (ref 7–14)
AST SERPL-CCNC: 56 U/L — HIGH (ref 0–41)
BASOPHILS # BLD AUTO: 0.05 K/UL — SIGNIFICANT CHANGE UP (ref 0–0.2)
BASOPHILS NFR BLD AUTO: 0.4 % — SIGNIFICANT CHANGE UP (ref 0–1)
BILIRUB SERPL-MCNC: 0.7 MG/DL — SIGNIFICANT CHANGE UP (ref 0.2–1.2)
BUN SERPL-MCNC: 31 MG/DL — HIGH (ref 10–20)
CALCIUM SERPL-MCNC: 8.6 MG/DL — SIGNIFICANT CHANGE UP (ref 8.5–10.1)
CHLORIDE SERPL-SCNC: 104 MMOL/L — SIGNIFICANT CHANGE UP (ref 98–110)
CO2 SERPL-SCNC: 24 MMOL/L — SIGNIFICANT CHANGE UP (ref 17–32)
CREAT SERPL-MCNC: 1.3 MG/DL — SIGNIFICANT CHANGE UP (ref 0.7–1.5)
EOSINOPHIL # BLD AUTO: 0.09 K/UL — SIGNIFICANT CHANGE UP (ref 0–0.7)
EOSINOPHIL NFR BLD AUTO: 0.8 % — SIGNIFICANT CHANGE UP (ref 0–8)
GLUCOSE BLDC GLUCOMTR-MCNC: 113 MG/DL — HIGH (ref 70–99)
GLUCOSE SERPL-MCNC: 116 MG/DL — HIGH (ref 70–99)
HCT VFR BLD CALC: 41.5 % — LOW (ref 42–52)
HGB BLD-MCNC: 13.8 G/DL — LOW (ref 14–18)
IMM GRANULOCYTES NFR BLD AUTO: 0.5 % — HIGH (ref 0.1–0.3)
LYMPHOCYTES # BLD AUTO: 1.35 K/UL — SIGNIFICANT CHANGE UP (ref 1.2–3.4)
LYMPHOCYTES # BLD AUTO: 12.1 % — LOW (ref 20.5–51.1)
MAGNESIUM SERPL-MCNC: 2.4 MG/DL — SIGNIFICANT CHANGE UP (ref 1.8–2.4)
MCHC RBC-ENTMCNC: 31.2 PG — HIGH (ref 27–31)
MCHC RBC-ENTMCNC: 33.3 G/DL — SIGNIFICANT CHANGE UP (ref 32–37)
MCV RBC AUTO: 93.9 FL — SIGNIFICANT CHANGE UP (ref 80–94)
MONOCYTES # BLD AUTO: 0.89 K/UL — HIGH (ref 0.1–0.6)
MONOCYTES NFR BLD AUTO: 7.9 % — SIGNIFICANT CHANGE UP (ref 1.7–9.3)
NEUTROPHILS # BLD AUTO: 8.76 K/UL — HIGH (ref 1.4–6.5)
NEUTROPHILS NFR BLD AUTO: 78.3 % — HIGH (ref 42.2–75.2)
NRBC # BLD: 0 /100 WBCS — SIGNIFICANT CHANGE UP (ref 0–0)
PLATELET # BLD AUTO: 253 K/UL — SIGNIFICANT CHANGE UP (ref 130–400)
POTASSIUM SERPL-MCNC: 3.7 MMOL/L — SIGNIFICANT CHANGE UP (ref 3.5–5)
POTASSIUM SERPL-SCNC: 3.7 MMOL/L — SIGNIFICANT CHANGE UP (ref 3.5–5)
PROT SERPL-MCNC: 6 G/DL — SIGNIFICANT CHANGE UP (ref 6–8)
RBC # BLD: 4.42 M/UL — LOW (ref 4.7–6.1)
RBC # FLD: 13.1 % — SIGNIFICANT CHANGE UP (ref 11.5–14.5)
SODIUM SERPL-SCNC: 138 MMOL/L — SIGNIFICANT CHANGE UP (ref 135–146)
WBC # BLD: 11.2 K/UL — HIGH (ref 4.8–10.8)
WBC # FLD AUTO: 11.2 K/UL — HIGH (ref 4.8–10.8)

## 2021-01-30 PROCEDURE — 71045 X-RAY EXAM CHEST 1 VIEW: CPT | Mod: 26

## 2021-01-30 PROCEDURE — 99233 SBSQ HOSP IP/OBS HIGH 50: CPT

## 2021-01-30 PROCEDURE — 99291 CRITICAL CARE FIRST HOUR: CPT

## 2021-01-30 PROCEDURE — 71045 X-RAY EXAM CHEST 1 VIEW: CPT | Mod: 26,77

## 2021-01-30 RX ORDER — DEXMEDETOMIDINE HYDROCHLORIDE IN 0.9% SODIUM CHLORIDE 4 UG/ML
0.2 INJECTION INTRAVENOUS
Qty: 400 | Refills: 0 | Status: DISCONTINUED | OUTPATIENT
Start: 2021-01-30 | End: 2021-02-01

## 2021-01-30 RX ORDER — ACETAMINOPHEN 500 MG
1000 TABLET ORAL ONCE
Refills: 0 | Status: COMPLETED | OUTPATIENT
Start: 2021-01-30 | End: 2021-01-30

## 2021-01-30 RX ADMIN — Medication 100 MILLIGRAM(S): at 04:37

## 2021-01-30 RX ADMIN — PROPOFOL 12 MICROGRAM(S)/KG/MIN: 10 INJECTION, EMULSION INTRAVENOUS at 04:43

## 2021-01-30 RX ADMIN — PANTOPRAZOLE SODIUM 40 MILLIGRAM(S): 20 TABLET, DELAYED RELEASE ORAL at 04:40

## 2021-01-30 RX ADMIN — CHLORHEXIDINE GLUCONATE 1 APPLICATION(S): 213 SOLUTION TOPICAL at 04:42

## 2021-01-30 RX ADMIN — CHLORHEXIDINE GLUCONATE 15 MILLILITER(S): 213 SOLUTION TOPICAL at 18:38

## 2021-01-30 RX ADMIN — Medication 100 MILLIGRAM(S): at 23:05

## 2021-01-30 RX ADMIN — Medication 100 MILLIGRAM(S): at 14:46

## 2021-01-30 RX ADMIN — PROPOFOL 12 MICROGRAM(S)/KG/MIN: 10 INJECTION, EMULSION INTRAVENOUS at 00:43

## 2021-01-30 RX ADMIN — LEVETIRACETAM 420 MILLIGRAM(S): 250 TABLET, FILM COATED ORAL at 20:21

## 2021-01-30 RX ADMIN — DEXMEDETOMIDINE HYDROCHLORIDE IN 0.9% SODIUM CHLORIDE 5 MICROGRAM(S)/KG/HR: 4 INJECTION INTRAVENOUS at 22:26

## 2021-01-30 RX ADMIN — PROPOFOL 12 MICROGRAM(S)/KG/MIN: 10 INJECTION, EMULSION INTRAVENOUS at 22:26

## 2021-01-30 RX ADMIN — SENNA PLUS 2 TABLET(S): 8.6 TABLET ORAL at 21:12

## 2021-01-30 RX ADMIN — CHLORHEXIDINE GLUCONATE 15 MILLILITER(S): 213 SOLUTION TOPICAL at 04:40

## 2021-01-30 RX ADMIN — POLYETHYLENE GLYCOL 3350 17 GRAM(S): 17 POWDER, FOR SOLUTION ORAL at 14:45

## 2021-01-30 RX ADMIN — PANTOPRAZOLE SODIUM 40 MILLIGRAM(S): 20 TABLET, DELAYED RELEASE ORAL at 18:38

## 2021-01-30 RX ADMIN — LEVETIRACETAM 420 MILLIGRAM(S): 250 TABLET, FILM COATED ORAL at 04:39

## 2021-01-30 RX ADMIN — Medication 400 MILLIGRAM(S): at 18:39

## 2021-01-30 NOTE — DIETITIAN INITIAL EVALUATION ADULT. - OTHER INFO
Pertinent Medical Information: Admitted with AMS. Large ICB noted. SP IVD. MAGDALENE noted. Intubated at this time.    Jevity 1.2 at 40 mL/hr ordered 1/29.    Ht: 185.4 cm. Wt: 100 kg (1/27). BMI: 29.1 IBW: 83.6 kg. Pertinent Medical Information: Admitted with AMS. Large ICB noted. SP IVD. MAGDALENE noted. Intubated at this time. Tmx 37.8 C. Ve 9.    Jevity 1.2 at 40 mL/hr ordered 1/29. EN regimen at goal to provide 1152 kcal, 53 g protein, 778 mL free H2O.    Ht: 185.4 cm. Wt: 100 kg (1/27). BMI: 29.1 IBW: 83.6 kg.    1+ generalized edema + 1+ edema (head). Intubated. Last BM 1/29. OG tube in. Skin: ecchymosis noted.    Unable to obtain nutrition hx at this time in setting of intubation.

## 2021-01-30 NOTE — DIETITIAN INITIAL EVALUATION ADULT. - PERTINENT MEDS FT
propofol at 12 mL/hr (to provide 317 kcal/day if infused over 24 hour duration), protonix, miralax, senna

## 2021-01-30 NOTE — PROGRESS NOTE ADULT - SUBJECTIVE AND OBJECTIVE BOX
Neuro Critical Care     HPI:  50 years old right handed Male with PMHx of HTN, GI bleed, PUD,  Mrankin score of 0 at baseline was brought in by EMS for  AMS and left sided weakness.   Wife states that patient was having a usual day today went to bathroom and then the next thing she noted was that he was confused and was lying on the bathroom floor, she reports urinary incontinence, patient was confused, had trouble speaking, and could not move his left side.   In the ED, /148, HR 80, saturating well. Code stroke called, NIHSS 24 and GCS 11, CTH showed Moderate to large intraparenchymal right basal ganglia hemorrhage with mild surrounding edema and associated right sulcal effacement as well as decompression into the ventricles. There is approximately 6.3 mm of midline shift. CTA did not show any evidence of active bleeding, AVM or aneurysm or major vascular stenosis or occlusion. Pt became unresponsive in CT scan and had to be intubated and sedated. Patient was intubated in ED for worsening mental status and GCS. Started on Nicardipine drip for sbp in 200s and EVD was placed by neurosurgery at bedside. Pan trauma scan was negative. To be admitted under ICU for further monitoring.  (27 Jan 2021 23:13)    Past Medical and Surgical Hx:  HTN (hypertension)  GI bleed  Gastric ulcer  PUD (peptic ulcer disease)  Arm fracture, left  s/p surgical repair    Allergies: No Known Allergies    ROS: Patient unable to participate in ROS due to neurologic status.    PE:  General: mechanically vented, sedated, male of appropriate age, lying supine in bed. NAD  Neuro:  MSE: mechanically vented, sedated on Propofol, unarousable to voice, however withdrawal to noxious stimuli. Does not open eyes or follow commands. No blink to threat  CN: Pupils 3mm brisk bilaterally. Slight right gaze preference that can be overcome. (+) corneals (+) cough (+) gag  Motor: Withdrawal to noxious stimuli in RUE/RLE. No movement of LUE/LLE to noxious stimuli  Sensory: No facial grimacing noted to noxious stimuli  EVD drain in place; currently @ 15mmHg    Vital Signs:  ICU Vital Signs Last 24 Hrs  T(C): 37.2 (29 Jan 2021 12:00), Max: 37.8 (28 Jan 2021 22:30)  T(F): 98.9 (29 Jan 2021 12:00), Max: 100 (28 Jan 2021 22:30)  HR: 73 (29 Jan 2021 12:00) (70 - 87)  BP: 132/70 (29 Jan 2021 08:00) (129/68 - 141/75)  BP(mean): 94 (29 Jan 2021 08:00) (76 - 101)  ABP: 129/64 (29 Jan 2021 12:00) (125/55 - 155/61)  ABP(mean): 82 (29 Jan 2021 12:00) (73 - 98)  RR: 26 (29 Jan 2021 12:00) (24 - 26)  SpO2: 100% (29 Jan 2021 12:00) (98% - 100%)    I/O Detail:  28 Jan 2021 07:01  -  29 Jan 2021 07:00  --------------------------------------------------------  IN:    IV PiggyBack: 100 mL    NiCARdipine: 377.5 mL    Propofol: 68.7 mL  Total IN: 546.2 mL    OUT:    Drain (mL): 193 mL    Indwelling Catheter - Urethral (mL): 1472 mL  Total OUT: 1665 mL    Total NET: -1118.8 mL      29 Jan 2021 07:01  -  29 Jan 2021 14:54  --------------------------------------------------------  IN:    Enteral Tube Flush: 130 mL    IV PiggyBack: 50 mL    NiCARdipine: 150 mL    Propofol: 105.5 mL  Total IN: 435.5 mL    OUT:    Drain (mL): 47 mL    Indwelling Catheter - Urethral (mL): 200 mL  Total OUT: 247 mL    Total NET: 188.5 mL    Labs:  01-29    139  |  106  |  28<H>  ----------------------------<  112<H>  3.8   |  24  |  1.4    Ca    9.0      29 Jan 2021 11:50  Mg     2.2     01-28    TPro  5.9<L>  /  Alb  3.8  /  TBili  0.8  /  DBili  x   /  AST  48<H>  /  ALT  23  /  AlkPhos  84  01-29                          14.3   13.57 )-----------( 277      ( 29 Jan 2021 06:37 )             42.7     PT/INR - ( 28 Jan 2021 05:58 )   PT: 11.60 sec;   INR: 1.01 ratio    PTT - ( 28 Jan 2021 05:58 )  PTT:28.2 sec    ABG - ( 29 Jan 2021 02:58 )  pH, Arterial: 7.42  pH, Blood: x     /  pCO2: 39    /  pO2: 98    / HCO3: 25    / Base Excess: 0.8   /  SaO2: 98        Medications Current and PRN:  MEDICATIONS  (STANDING):  ceFAZolin   IVPB      ceFAZolin   IVPB 500 milliGRAM(s) IV Intermittent every 8 hours  chlorhexidine 0.12% Liquid 15 milliLiter(s) Oral Mucosa every 12 hours  chlorhexidine 4% Liquid 1 Application(s) Topical <User Schedule>  levETIRAcetam  IVPB 500 milliGRAM(s) IV Intermittent every 12 hours  niCARdipine Infusion 5 mG/Hr (25 mL/Hr) IV Continuous <Continuous>  pantoprazole  Injectable 40 milliGRAM(s) IV Push every 12 hours  polyethylene glycol 3350 17 Gram(s) Oral daily  propofol Infusion 20 MICROgram(s)/kG/Min (12 mL/Hr) IV Continuous <Continuous>  senna 2 Tablet(s) Oral at bedtime    Imaging:  EXAM:  CT VENOGRAM BRAIN (W)AW IC (1/28/2021):   IMPRESSION:  No evidence of dural venous sinus thrombosis or stenosis.  Similar appearance of right basal ganglia intraparenchymal hemorrhage with local mass effect extending into the ventricles as well as the right frontal and temporal lobes.    EXAM:  CT BRAIN (1/28/2021):   IMPRESSION:  No significant change in large acute right basal ganglia hemorrhage with intraventricular extension as well as extending into right frontal and temporal lobes. Unchanged local mass effect with 5mm of leftward midline shift.  Similar-appearing minimal subarachnoid hemorrhage noted in the left parietal sulci.    EXAM:  CT BRAIN (1/28/2021):   IMPRESSION:  Slightly increased size of the right basal ganglia acute intraparenchymal hemorrhage with extension into the ventricles as well as the frontal and temporal lobes status post placement of a left frontal approach ventriculostomy catheter. Unchanged 0.7 cm midline shift to the left.  Minimal new subarachnoid hemorrhage noted in the left frontoparietal sulci    EXAM:  CT ABDOMEN AND PELVIS IC/CT CHEST IC (1/27/2021):   IMPRESSION:  No CT evidence for acute traumatic injury to the chest, abdomen or pelvis.  Mild aneurysmal dilatation of the ascending thoracic aorta to 4.1 cm.    EXAM:  CT CERVICAL SPINE (1/27/2021):   IMPRESSION:  Degenerative changes as described above. No evidence of fracture or facet subluxation.    EXAM:  CT ANGIO NECK (W)AW IC/CT ANGIO BRAIN (W)AW IC (1/27/2021):   IMPRESSION  The previously noted area of intracerebral hemorrhage in the right basal ganglia region is again identified. Please see report of the CT scan of the head dated 1/27/2021.  No evidence of active bleeding, AVM or aneurysm.  No evidence of major vascular stenosis or occlusion,    EXAM:  CT BRAIN STROKE PROTOCOL (1/27/2021):   IMPRESSION:  Moderate to large intraparenchymal right basal ganglia hemorrhage with mild surrounding edema and associated right sulcal effacement as well as decompression into the ventricles. There is approximately 6.3 mm of midline shift.    Assessment:  50-year-old male with PMHx GIB, HTN, and PUD, BIBA by EMS, found of floor of bathroom with AMS, left hemiparesis, and dysarthria. In ED, /148. Stroke code initiated. Initial NIH = 24/GCS = 11. CTH revealed moderate to large intraparenchymal right basal ganglia ICH with mild edema and associated right sulcal effacement with decompression of ventricles and 6.3mm midline shift. CTA negative for active bleed, AVM, aneurysm major vascular stenosis or occlusion. Intubated for airway protection r/t worsening mental status and neurological decline. NeuroSx c/s, s/p EVD placement. Admitted to ICU for management. On examination today, mechanically vented, sedated on Propofol, PERRLA, does not open eyes to voice or noxious stimuli, does not follow commands, withdrawal to pain RUE/RLE, with left-sided hemiparesis and intact brainstem reflexes. EVD drain @ 15mmHg, open to drain. ICP range 5-12; CPP range 70-76.     NEURO:  A: R BG hemorrhage with mass effect/edema/IVH & Hydrocephalus  P: Q1H neurochecks  - Continue to wean sedation as tolerated  - Wean sedation to a RASS - 1  - Fentanyl 50 mcg PRN boluses for agitation and vent synchrony.   - Maintain CPP > 70, ICP < 20  - If ICP > 20, Bolus fentanyl 50 mcg, increase sedation, consider hyperosmolar therapy, and STAT CT head  - Continue Keppra  - Na goal high normal ranges  - Appreciate NSx recs, EVD right now @ 15 mmHg  - Repeat CT head this AM  - Normothermia- If patient spikes fever, start patient on artic sun with shivering control as per BSAS protocol  - Continue Statin  - Hold AC    CV  A: - HTN, infrequent PVC's  P: - SBP < 150 as per NSx  - Continue Cardene drip to SBP goal considering CPP maintained above 70  - Normovolemia  - Monitor QTc  -2D-Echo  RESP  On MV (, rate 20, PEEP 5)  - HOB 45 degrees  - Maintain Plateau pressure < 30 in order to optimize venous drainage and facilitate CPP (currently 19)  - PCo2 35 - 45, Avoid hyper or hypocarbia.    FLUID/ELECT  Strict Is/Os, maintain euvolemia, normonatremia    PROPHYLAXIS  DVT: Mechanical and pharmocological  GI: PPI inhibitors, NGT feeding    ID  continous temp monitoring via bladder or rectal probe, fever work up and aggressive control with shivering control measures .

## 2021-01-30 NOTE — DIETITIAN INITIAL EVALUATION ADULT. - ADD RECOMMEND
Recommendation: Monitor Mg/PO4/K throughout initiation of EN; replete as needed. Change EN formula to Vital HP at 55 mL/hr. Regimen at goal to provide 1320 kcal, 116 g protein, 1109 mL free H2O. Flushes per LIP.

## 2021-01-30 NOTE — PROGRESS NOTE ADULT - ATTENDING COMMENTS
I have personally seen and examined this patient on 1/30. I have fully participated in the care of this patient.  I have reviewed all pertinent clinical information, including history, physical exam, plan and note.  I have reviewed all pertinent clinical information and reviewed all relevant imaging and diagnostic studies personally.  Recommendations as above.  Agree with above assessment except as noted.

## 2021-01-30 NOTE — PROGRESS NOTE ADULT - SUBJECTIVE AND OBJECTIVE BOX
Subjective: 50yMale with a pmhx of INTRACRANIAL BLEED                                     ADM/AA    INTRACRANIAL BLEED    ^MED EVAL    Family history of breast cancer (Mother)    Family history of pancreatic cancer (Father)    Handoff    MEWS Score    HTN (hypertension)    GI bleed    Gastric ulcer    PUD (peptic ulcer disease)    Intracranial bleed    Intraparenchymal hemorrhage of brain    Insertion, external ventricular drain    Ventriculostomy, for ventricular catheter insertion    Arm fracture, left    MED EVAL    90+    SysAdmin_VisitLink      s/p R basal ganglia hemorrhage with intraventricular extension  s/p EVD 1.27.21    Pt with PMH HTN, PUD, GI bleed on ASA, presented with AMS and LEFT-sided weakness     Pt seen and examined at bedside.  Remains intubated, sedated on Propofol.  EVD @15cm H2O, drained 251cc in 24hrs,  ICP's 8-11.  Pt responding to loud voice, able to follow commands on R side.  Remains weak on left side.     Allergies    No Known Allergies    Intolerances        Imaging:    Vital Signs Last 24 Hrs  T(C): 36.7 (30 Jan 2021 08:00), Max: 37.2 (29 Jan 2021 12:00)  T(F): 98 (30 Jan 2021 08:00), Max: 99 (29 Jan 2021 20:00)  HR: 77 (30 Jan 2021 09:00) (65 - 78)  BP: 152/84 (30 Jan 2021 09:00) (141/83 - 152/84)  BP(mean): 85 (29 Jan 2021 16:00) (85 - 85)  RR: 21 (30 Jan 2021 09:00) (21 - 26)  SpO2: 100% (30 Jan 2021 09:00) (99% - 100%)      ceFAZolin   IVPB      ceFAZolin   IVPB 500 milliGRAM(s) IV Intermittent every 8 hours  chlorhexidine 0.12% Liquid 15 milliLiter(s) Oral Mucosa every 12 hours  chlorhexidine 4% Liquid 1 Application(s) Topical <User Schedule>  levETIRAcetam  IVPB 500 milliGRAM(s) IV Intermittent every 12 hours  niCARdipine Infusion 5 mG/Hr IV Continuous <Continuous>  pantoprazole  Injectable 40 milliGRAM(s) IV Push every 12 hours  polyethylene glycol 3350 17 Gram(s) Oral daily  propofol Infusion 20 MICROgram(s)/kG/Min IV Continuous <Continuous>  senna 2 Tablet(s) Oral at bedtime        01-29-21 @ 07:01  -  01-30-21 @ 07:00  --------------------------------------------------------  IN: 1668.5 mL / OUT: 1176 mL / NET: 492.5 mL    01-30-21 @ 07:01 - 01-30-21 @ 09:46  --------------------------------------------------------  IN: 140 mL / OUT: 103 mL / NET: 37 mL        REVIEW OF SYSTEMS    [ ] A ten-point review of systems was otherwise negative except as noted.  [x ] Due to altered mental status/intubation, subjective information were not able to be obtained from the patient. History was obtained, to the extent possible, from review of the chart and collateral sources of information.      Neuro Exam:  Intubated, sedated on Propofol  not opening eyes  follows commands on right to show "thumbs up"  Pupils reactive  Motor:   moving right side spontaneously   left side hemiparesis  EVD in place, draining clear CSF briskly        CBC Full  -  ( 30 Jan 2021 04:30 )  WBC Count : 11.20 K/uL  RBC Count : 4.42 M/uL  Hemoglobin : 13.8 g/dL  Hematocrit : 41.5 %  Platelet Count - Automated : 253 K/uL  Mean Cell Volume : 93.9 fL  Mean Cell Hemoglobin : 31.2 pg  Mean Cell Hemoglobin Concentration : 33.3 g/dL  Auto Neutrophil # : 8.76 K/uL  Auto Lymphocyte # : 1.35 K/uL  Auto Monocyte # : 0.89 K/uL  Auto Eosinophil # : 0.09 K/uL  Auto Basophil # : 0.05 K/uL  Auto Neutrophil % : 78.3 %  Auto Lymphocyte % : 12.1 %  Auto Monocyte % : 7.9 %  Auto Eosinophil % : 0.8 %  Auto Basophil % : 0.4 %    01-30    138  |  104  |  31<H>  ----------------------------<  116<H>  3.7   |  24  |  1.3    Ca    8.6      30 Jan 2021 04:30  Mg     2.4     01-30    TPro  6.0  /  Alb  3.9  /  TBili  0.7  /  DBili  x   /  AST  56<H>  /  ALT  23  /  AlkPhos  74  01-30            Assessment/Plan:   raise EVD to 20cm H2O  cont to monitor ICP and record  if ICP >20 for longer than 15 minutes while pt not agitated, call [phone]  cont neuro checks q 1hr  cont antibx while EVD in place  Call NeuroSgx  if any changes in GCS/motor/Sensory exam  d/w attg

## 2021-01-30 NOTE — PROGRESS NOTE ADULT - ASSESSMENT
IMPRESSION:  Large ICB likely hypertensive  SP IVD  MAGDALENE     PLAN:    CNS: Adequate sedation.  AEDs.  FU with Neuro and NeuroSX. regarding the drain     HEENT: Oral care    PULMONARY:  HOB @ 45 degrees.  Vent changes as follows: Wean O2 as tolerated again  Advance ET 2 cm s     CARDIOVASCULAR:  BP control.      GI: GI prophylaxis.  OG Feeding.  Bowel regimen     RENAL:  Follow up lytes.  Correct as needed    INFECTIOUS DISEASE: Follow up cultures continue abx while he has the drain   follow cx     HEMATOLOGICAL:  DVT prophylaxis seq.      ENDOCRINE:  Follow up FS.  Insulin protocol if needed.    MUSCULOSKELETAL:  Bed rest    Prognosis guarded          IMPRESSION:  Large ICB likely hypertensive  SP IVD  MAGDALENE     PLAN:    CNS: Adequate sedation.  AEDs.  FU with Neuro and NeuroSX. regarding the drain     HEENT: Oral care    PULMONARY:  HOB @ 45 degrees.  Vent changes as follows: Wean O2 as tolerated again  Advance ET 2 cm s     CARDIOVASCULAR:  BP control.  nicardipine drip     GI: GI prophylaxis.  OG Feeding.  Bowel regimen     RENAL:  Follow up lytes.  Correct as needed    INFECTIOUS DISEASE: Follow up cultures continue abx while he has the drain   follow cx     HEMATOLOGICAL:  DVT prophylaxis seq.      ENDOCRINE:  Follow up FS.  Insulin protocol if needed.    MUSCULOSKELETAL:  Bed rest    Prognosis guarded

## 2021-01-30 NOTE — DIETITIAN INITIAL EVALUATION ADULT. - RD TO REMAIN AVAILABLE
Nutrition Intervention: Enteral Nutrition. Monitor: Energy intake, glucose profile, renal profile, nutrition focused physical findings, body composition./yes

## 2021-01-30 NOTE — DIETITIAN INITIAL EVALUATION ADULT. - OTHER CALCULATIONS
Energy: 4423-5007 kcal/day (% [id number] d/t borderline obese BMI). Protein: 100-120 g/day (1-1.2 g/kg ABW). Fluids: per LIP

## 2021-01-30 NOTE — PROGRESS NOTE ADULT - SUBJECTIVE AND OBJECTIVE BOX
Patient is a 50y old  Male who presents with a chief complaint of Altered mental status (30 Jan 2021 03:45)      Over Night Events:  Patient seen and examined.   no acute changes     ROS:  See HPI    PHYSICAL EXAM    ICU Vital Signs Last 24 Hrs  T(C): 37.1 (30 Jan 2021 04:00), Max: 37.2 (29 Jan 2021 12:00)  T(F): 98.7 (30 Jan 2021 04:00), Max: 99 (29 Jan 2021 20:00)  HR: 65 (30 Jan 2021 06:00) (65 - 78)  BP: 141/83 (29 Jan 2021 16:00) (132/70 - 141/83)  BP(mean): 85 (29 Jan 2021 16:00) (76 - 94)  ABP: 140/72 (30 Jan 2021 06:00) (124/68 - 159/77)  ABP(mean): 93 (30 Jan 2021 06:00) (78 - 99)  RR: 24 (30 Jan 2021 06:00) (24 - 26)  SpO2: 99% (30 Jan 2021 06:00) (99% - 100%)      General: not awake   HEENT:      et tube          Lymph Nodes: NO cervical LN   Lungs: Bilateral BS  Cardiovascular: Regular   Abdomen: Soft, Positive BS  Extremities: No clubbing   Skin: warm   Neurological:   Musculoskeletal: move all ext     I&O's Detail    29 Jan 2021 07:01  -  30 Jan 2021 07:00  --------------------------------------------------------  IN:    Enteral Tube Flush: 230 mL    IV PiggyBack: 150 mL    Jevity: 440 mL    NiCARdipine: 260 mL    Propofol: 588.5 mL  Total IN: 1668.5 mL    OUT:    Drain (mL): 251 mL    Indwelling Catheter - Urethral (mL): 925 mL  Total OUT: 1176 mL    Total NET: 492.5 mL          LABS:                          13.8   11.20 )-----------( 253      ( 30 Jan 2021 04:30 )             41.5         30 Jan 2021 04:30    138    |  104    |  31     ----------------------------<  116    3.7     |  24     |  1.3      Ca    8.6        30 Jan 2021 04:30  Mg     2.4       30 Jan 2021 04:30    TPro  6.0    /  Alb  3.9    /  TBili  0.7    /  DBili  x      /  AST  56     /  ALT  23     /  AlkPhos  74     30 Jan 2021 04:30  Amylase x     lipase x                                                                                            Lactate, Blood: 2.0 mmol/L (01-27-21 @ 20:28)                                                                                                       Mode: AC/ CMV (Assist Control/ Continuous Mandatory Ventilation)  RR (machine): 24  TV (machine): 450  FiO2: 40  PEEP: 5  ITime: 1  MAP: 11  PIP: 22                                      ABG - ( 30 Jan 2021 04:08 )  pH, Arterial: 7.45  pH, Blood: x     /  pCO2: 36    /  pO2: 115   / HCO3: 25    / Base Excess: 1.4   /  SaO2: 97                  MEDICATIONS  (STANDING):  ceFAZolin   IVPB      ceFAZolin   IVPB 500 milliGRAM(s) IV Intermittent every 8 hours  chlorhexidine 0.12% Liquid 15 milliLiter(s) Oral Mucosa every 12 hours  chlorhexidine 4% Liquid 1 Application(s) Topical <User Schedule>  levETIRAcetam  IVPB 500 milliGRAM(s) IV Intermittent every 12 hours  niCARdipine Infusion 5 mG/Hr (25 mL/Hr) IV Continuous <Continuous>  pantoprazole  Injectable 40 milliGRAM(s) IV Push every 12 hours  polyethylene glycol 3350 17 Gram(s) Oral daily  propofol Infusion 20 MICROgram(s)/kG/Min (12 mL/Hr) IV Continuous <Continuous>  senna 2 Tablet(s) Oral at bedtime    MEDICATIONS  (PRN):          Xrays:  TLC:  OG:  ET tube:                                                                                    no infiltrate    ECHO:  CAM ICU:

## 2021-01-31 LAB
ALBUMIN SERPL ELPH-MCNC: 3.6 G/DL — SIGNIFICANT CHANGE UP (ref 3.5–5.2)
ALP SERPL-CCNC: 77 U/L — SIGNIFICANT CHANGE UP (ref 30–115)
ALT FLD-CCNC: 23 U/L — SIGNIFICANT CHANGE UP (ref 0–41)
ANION GAP SERPL CALC-SCNC: 10 MMOL/L — SIGNIFICANT CHANGE UP (ref 7–14)
AST SERPL-CCNC: 42 U/L — HIGH (ref 0–41)
BASOPHILS # BLD AUTO: 0.04 K/UL — SIGNIFICANT CHANGE UP (ref 0–0.2)
BASOPHILS NFR BLD AUTO: 0.4 % — SIGNIFICANT CHANGE UP (ref 0–1)
BILIRUB SERPL-MCNC: 0.9 MG/DL — SIGNIFICANT CHANGE UP (ref 0.2–1.2)
BUN SERPL-MCNC: 31 MG/DL — HIGH (ref 10–20)
CALCIUM SERPL-MCNC: 9 MG/DL — SIGNIFICANT CHANGE UP (ref 8.5–10.1)
CHLORIDE SERPL-SCNC: 105 MMOL/L — SIGNIFICANT CHANGE UP (ref 98–110)
CO2 SERPL-SCNC: 24 MMOL/L — SIGNIFICANT CHANGE UP (ref 17–32)
CREAT SERPL-MCNC: 1.6 MG/DL — HIGH (ref 0.7–1.5)
EOSINOPHIL # BLD AUTO: 0.28 K/UL — SIGNIFICANT CHANGE UP (ref 0–0.7)
EOSINOPHIL NFR BLD AUTO: 2.8 % — SIGNIFICANT CHANGE UP (ref 0–8)
GLUCOSE SERPL-MCNC: 105 MG/DL — HIGH (ref 70–99)
HCT VFR BLD CALC: 40.1 % — LOW (ref 42–52)
HGB BLD-MCNC: 13.7 G/DL — LOW (ref 14–18)
IMM GRANULOCYTES NFR BLD AUTO: 0.9 % — HIGH (ref 0.1–0.3)
LYMPHOCYTES # BLD AUTO: 1.01 K/UL — LOW (ref 1.2–3.4)
LYMPHOCYTES # BLD AUTO: 10.1 % — LOW (ref 20.5–51.1)
MAGNESIUM SERPL-MCNC: 2.4 MG/DL — SIGNIFICANT CHANGE UP (ref 1.8–2.4)
MCHC RBC-ENTMCNC: 31.9 PG — HIGH (ref 27–31)
MCHC RBC-ENTMCNC: 34.2 G/DL — SIGNIFICANT CHANGE UP (ref 32–37)
MCV RBC AUTO: 93.5 FL — SIGNIFICANT CHANGE UP (ref 80–94)
MONOCYTES # BLD AUTO: 0.81 K/UL — HIGH (ref 0.1–0.6)
MONOCYTES NFR BLD AUTO: 8.1 % — SIGNIFICANT CHANGE UP (ref 1.7–9.3)
NEUTROPHILS # BLD AUTO: 7.77 K/UL — HIGH (ref 1.4–6.5)
NEUTROPHILS NFR BLD AUTO: 77.7 % — HIGH (ref 42.2–75.2)
NRBC # BLD: 0 /100 WBCS — SIGNIFICANT CHANGE UP (ref 0–0)
PLATELET # BLD AUTO: 242 K/UL — SIGNIFICANT CHANGE UP (ref 130–400)
POTASSIUM SERPL-MCNC: 3.9 MMOL/L — SIGNIFICANT CHANGE UP (ref 3.5–5)
POTASSIUM SERPL-SCNC: 3.9 MMOL/L — SIGNIFICANT CHANGE UP (ref 3.5–5)
PROT SERPL-MCNC: 5.8 G/DL — LOW (ref 6–8)
RBC # BLD: 4.29 M/UL — LOW (ref 4.7–6.1)
RBC # FLD: 12.9 % — SIGNIFICANT CHANGE UP (ref 11.5–14.5)
SODIUM SERPL-SCNC: 139 MMOL/L — SIGNIFICANT CHANGE UP (ref 135–146)
WBC # BLD: 10 K/UL — SIGNIFICANT CHANGE UP (ref 4.8–10.8)
WBC # FLD AUTO: 10 K/UL — SIGNIFICANT CHANGE UP (ref 4.8–10.8)

## 2021-01-31 PROCEDURE — 71045 X-RAY EXAM CHEST 1 VIEW: CPT | Mod: 26

## 2021-01-31 PROCEDURE — 99233 SBSQ HOSP IP/OBS HIGH 50: CPT

## 2021-01-31 PROCEDURE — 70450 CT HEAD/BRAIN W/O DYE: CPT | Mod: 26

## 2021-01-31 PROCEDURE — 71045 X-RAY EXAM CHEST 1 VIEW: CPT | Mod: 26,77

## 2021-01-31 RX ORDER — ACETAMINOPHEN 500 MG
650 TABLET ORAL EVERY 6 HOURS
Refills: 0 | Status: DISCONTINUED | OUTPATIENT
Start: 2021-01-31 | End: 2021-02-02

## 2021-01-31 RX ORDER — ACETAMINOPHEN 500 MG
1000 TABLET ORAL ONCE
Refills: 0 | Status: COMPLETED | OUTPATIENT
Start: 2021-01-31 | End: 2021-01-31

## 2021-01-31 RX ADMIN — Medication 400 MILLIGRAM(S): at 20:59

## 2021-01-31 RX ADMIN — CHLORHEXIDINE GLUCONATE 15 MILLILITER(S): 213 SOLUTION TOPICAL at 19:31

## 2021-01-31 RX ADMIN — LEVETIRACETAM 420 MILLIGRAM(S): 250 TABLET, FILM COATED ORAL at 04:32

## 2021-01-31 RX ADMIN — Medication 100 MILLIGRAM(S): at 15:50

## 2021-01-31 RX ADMIN — PANTOPRAZOLE SODIUM 40 MILLIGRAM(S): 20 TABLET, DELAYED RELEASE ORAL at 04:33

## 2021-01-31 RX ADMIN — CHLORHEXIDINE GLUCONATE 1 APPLICATION(S): 213 SOLUTION TOPICAL at 04:33

## 2021-01-31 RX ADMIN — PROPOFOL 12 MICROGRAM(S)/KG/MIN: 10 INJECTION, EMULSION INTRAVENOUS at 03:28

## 2021-01-31 RX ADMIN — PROPOFOL 12 MICROGRAM(S)/KG/MIN: 10 INJECTION, EMULSION INTRAVENOUS at 06:00

## 2021-01-31 RX ADMIN — SENNA PLUS 2 TABLET(S): 8.6 TABLET ORAL at 23:48

## 2021-01-31 RX ADMIN — POLYETHYLENE GLYCOL 3350 17 GRAM(S): 17 POWDER, FOR SOLUTION ORAL at 14:39

## 2021-01-31 RX ADMIN — PANTOPRAZOLE SODIUM 40 MILLIGRAM(S): 20 TABLET, DELAYED RELEASE ORAL at 19:32

## 2021-01-31 RX ADMIN — CHLORHEXIDINE GLUCONATE 15 MILLILITER(S): 213 SOLUTION TOPICAL at 04:33

## 2021-01-31 RX ADMIN — Medication 100 MILLIGRAM(S): at 04:32

## 2021-01-31 RX ADMIN — LEVETIRACETAM 420 MILLIGRAM(S): 250 TABLET, FILM COATED ORAL at 19:31

## 2021-01-31 RX ADMIN — Medication 100 MILLIGRAM(S): at 23:48

## 2021-01-31 NOTE — PROGRESS NOTE ADULT - ASSESSMENT
IMPRESSION:  Large ICB likely hypertensive  SP IVD  MAGDALENE     PLAN:    CNS: Adequate sedation.  AEDs.  FU with Neuro and NeuroSX.   ICP monitoring as per neuro sx / neurology     HEENT: Oral care    PULMONARY:  HOB @ 45 degrees.   no Vent changes     CARDIOVASCULAR:  BP control.  nicardipine drip as needed to keep SBP less then 150     GI: GI prophylaxis.  OG Feeding.  Bowel regimen     RENAL:  Follow up lytes.  Correct as needed monitor is and os   follow cr     INFECTIOUS DISEASE: Follow up cultures continue abx while he has the drain   follow cx     HEMATOLOGICAL:  DVT prophylaxis seq.      ENDOCRINE:  Follow up FS.  Insulin protocol if needed.    MUSCULOSKELETAL:  Bed rest    Prognosis guarded

## 2021-01-31 NOTE — PROGRESS NOTE ADULT - ATTENDING COMMENTS
pt seen and examined. TOlerating EVD at 20. Will check CTH, pending results will clamp patient. Recheck CTH in 24 hr.

## 2021-01-31 NOTE — PROGRESS NOTE ADULT - SUBJECTIVE AND OBJECTIVE BOX
Patient is a 50y old  Male who presents with a chief complaint of Altered mental status (30 Jan 2021 20:51)      Over Night Events:  Patient seen and examined.   on sedation has drain intraventricular   off nicardipine this morning     ROS:  See HPI    PHYSICAL EXAM    ICU Vital Signs Last 24 Hrs  T(C): 36.7 (31 Jan 2021 04:00), Max: 38 (30 Jan 2021 20:00)  T(F): 98 (31 Jan 2021 04:00), Max: 100.4 (30 Jan 2021 20:00)  HR: 59 (31 Jan 2021 07:00) (56 - 77)  BP: 139/80 (31 Jan 2021 07:00) (126/67 - 152/84)  BP(mean): --  ABP: 133/68 (31 Jan 2021 07:00) (111/65 - 161/74)  ABP(mean): 101 (30 Jan 2021 09:00) (101 - 101)  RR: 15 (31 Jan 2021 07:00) (15 - 22)  SpO2: 74% (31 Jan 2021 07:00) (74% - 100%)      General on sedation   HEENT:   et tube             Lymph Nodes: NO cervical LN   Lungs: Bilateral BS  Cardiovascular: Regular   Abdomen: Soft, Positive BS  Extremities: No clubbing   Skin: warm   Neurological: positive gag , corneal   Musculoskeletal: move all ext     I&O's Detail    30 Jan 2021 07:01  -  31 Jan 2021 07:00  --------------------------------------------------------  IN:    Dexmedetomidine: 90 mL    IV PiggyBack: 300 mL    Jevity: 320 mL    NiCARdipine: 10 mL    Propofol: 720 mL  Total IN: 1440 mL    OUT:    Drain (mL): 259 mL    Indwelling Catheter - Urethral (mL): 790 mL  Total OUT: 1049 mL    Total NET: 391 mL          LABS:                          13.7   10.00 )-----------( 242      ( 31 Jan 2021 04:30 )             40.1         31 Jan 2021 04:30    139    |  105    |  31     ----------------------------<  105    3.9     |  24     |  1.6      Ca    9.0        31 Jan 2021 04:30  Mg     2.4       31 Jan 2021 04:30    TPro  5.8    /  Alb  3.6    /  TBili  0.9    /  DBili  x      /  AST  42     /  ALT  23     /  AlkPhos  77     31 Jan 2021 04:30  Amylase x     lipase x                                                                                                                                                                                                 Mode: AC/ CMV (Assist Control/ Continuous Mandatory Ventilation)  RR (machine): 24  TV (machine): 450  FiO2: 40  PEEP: 5  ITime: 1  MAP: 10  PIP: 18                                      ABG - ( 31 Jan 2021 02:40 )  pH, Arterial: 7.40  pH, Blood: x     /  pCO2: 41    /  pO2: 112   / HCO3: 26    / Base Excess: 0.5   /  SaO2: 97                  MEDICATIONS  (STANDING):  ceFAZolin   IVPB      ceFAZolin   IVPB 500 milliGRAM(s) IV Intermittent every 8 hours  chlorhexidine 0.12% Liquid 15 milliLiter(s) Oral Mucosa every 12 hours  chlorhexidine 4% Liquid 1 Application(s) Topical <User Schedule>  dexMEDEtomidine Infusion 0.2 MICROgram(s)/kG/Hr (5 mL/Hr) IV Continuous <Continuous>  levETIRAcetam  IVPB 500 milliGRAM(s) IV Intermittent every 12 hours  niCARdipine Infusion 5 mG/Hr (25 mL/Hr) IV Continuous <Continuous>  pantoprazole  Injectable 40 milliGRAM(s) IV Push every 12 hours  polyethylene glycol 3350 17 Gram(s) Oral daily  propofol Infusion 20 MICROgram(s)/kG/Min (12 mL/Hr) IV Continuous <Continuous>  senna 2 Tablet(s) Oral at bedtime    MEDICATIONS  (PRN):          Xrays:  TLC:  OG:  ET tube:                                                                                    no infiltrate    ECHO:  CAM ICU:

## 2021-01-31 NOTE — PROGRESS NOTE ADULT - SUBJECTIVE AND OBJECTIVE BOX
Subjective: 50yMale with a pmhx of INTRACRANIAL BLEED                                     ADM/AA    INTRACRANIAL BLEED    ^MED EVAL    Family history of breast cancer (Mother)    Family history of pancreatic cancer (Father)    Handoff    MEWS Score    HTN (hypertension)    GI bleed    Gastric ulcer    PUD (peptic ulcer disease)    Intracranial bleed    Intraparenchymal hemorrhage of brain    Insertion, external ventricular drain    Ventriculostomy, for ventricular catheter insertion    Arm fracture, left    MED EVAL    90+    SysAdmin_VisitLink      HD#5  s/p R basal ganglia hemorrhage with intraventricular extension  s/p EVD 1.27.21    Pt with PMH HTN, PUD, GI bleed on ASA, presented with AMS and LEFT-sided weakness     Pt seen and examined at bedside.  Remains intubated, sedated on Propofol.  EVD @20 cm H2O, drained 259 cc in 24hrs,  ICP's 8-14.  Pt responding to loud voice, able to follow commands on R side.  Remains weak on left side.     Allergies    No Known Allergies    Intolerances        Imaging:    Vital Signs Last 24 Hrs  T(C): 37.8 (31 Jan 2021 09:00), Max: 38 (30 Jan 2021 20:00)  T(F): 100.1 (31 Jan 2021 09:00), Max: 100.4 (30 Jan 2021 20:00)  HR: 56 (31 Jan 2021 10:00) (56 - 74)  BP: 146/88 (31 Jan 2021 10:00) (126/67 - 146/88)  BP(mean): --  RR: 18 (31 Jan 2021 10:00) (14 - 22)  SpO2: 98% (31 Jan 2021 10:00) (98% - 100%)      ceFAZolin   IVPB      ceFAZolin   IVPB 500 milliGRAM(s) IV Intermittent every 8 hours  chlorhexidine 0.12% Liquid 15 milliLiter(s) Oral Mucosa every 12 hours  chlorhexidine 4% Liquid 1 Application(s) Topical <User Schedule>  dexMEDEtomidine Infusion 0.2 MICROgram(s)/kG/Hr IV Continuous <Continuous>  levETIRAcetam  IVPB 500 milliGRAM(s) IV Intermittent every 12 hours  niCARdipine Infusion 5 mG/Hr IV Continuous <Continuous>  pantoprazole  Injectable 40 milliGRAM(s) IV Push every 12 hours  polyethylene glycol 3350 17 Gram(s) Oral daily  propofol Infusion 20 MICROgram(s)/kG/Min IV Continuous <Continuous>  senna 2 Tablet(s) Oral at bedtime        01-30-21 @ 07:01  -  01-31-21 @ 07:00  --------------------------------------------------------  IN: 1440 mL / OUT: 1049 mL / NET: 391 mL        REVIEW OF SYSTEMS    [ ] A ten-point review of systems was otherwise negative except as noted.  [x ] Due to altered mental status/intubation, subjective information were not able to be obtained from the patient. History was obtained, to the extent possible, from review of the chart and collateral sources of information.      Neuro Exam:  Intubated, sedated on Propofol  not opening eyes  able to follow commands on R side to show his thumb  Pupils reactive  Motor:   moving right side spontaneously   left side hemiparesis  EVD in place, draining clear CSF briskly        CBC Full  -  ( 31 Jan 2021 04:30 )  WBC Count : 10.00 K/uL  RBC Count : 4.29 M/uL  Hemoglobin : 13.7 g/dL  Hematocrit : 40.1 %  Platelet Count - Automated : 242 K/uL  Mean Cell Volume : 93.5 fL  Mean Cell Hemoglobin : 31.9 pg  Mean Cell Hemoglobin Concentration : 34.2 g/dL  Auto Neutrophil # : 7.77 K/uL  Auto Lymphocyte # : 1.01 K/uL  Auto Monocyte # : 0.81 K/uL  Auto Eosinophil # : 0.28 K/uL  Auto Basophil # : 0.04 K/uL  Auto Neutrophil % : 77.7 %  Auto Lymphocyte % : 10.1 %  Auto Monocyte % : 8.1 %  Auto Eosinophil % : 2.8 %  Auto Basophil % : 0.4 %    01-31    139  |  105  |  31<H>  ----------------------------<  105<H>  3.9   |  24  |  1.6<H>    Ca    9.0      31 Jan 2021 04:30  Mg     2.4     01-31    TPro  5.8<L>  /  Alb  3.6  /  TBili  0.9  /  DBili  x   /  AST  42<H>  /  ALT  23  /  AlkPhos  77  01-31            Assessment/Plan:   possible CTH today  will likely clamp EVD until tomorrow   repeat CTH in AM  cont to monitor ICP and record  if ICP >20 for longer than 15 minutes while pt not agitated, call x [phone]  cont neuro checks q 1hr  cont antibx while EVD in place  will d/w attg

## 2021-01-31 NOTE — PROGRESS NOTE ADULT - SUBJECTIVE AND OBJECTIVE BOX
Called by housestaff to evaluate elevated ICP in pt which had the drain clamped earlier today. Neuro  exam unchanged from earlier today, right lat gaze, left hemiparesis but purposeful. Spoke to Neurosurgery Attdg and informed that ICP have sustained pressure of 23 -25 for several minutes.  As per Attdg will unclamp drain and keep drain at 20 cm. Will reevaluate in am.

## 2021-02-01 LAB
ALBUMIN SERPL ELPH-MCNC: 3.2 G/DL — LOW (ref 3.5–5.2)
ALBUMIN SERPL ELPH-MCNC: 3.6 G/DL — SIGNIFICANT CHANGE UP (ref 3.5–5.2)
ALP SERPL-CCNC: 67 U/L — SIGNIFICANT CHANGE UP (ref 30–115)
ALP SERPL-CCNC: 77 U/L — SIGNIFICANT CHANGE UP (ref 30–115)
ALT FLD-CCNC: 27 U/L — SIGNIFICANT CHANGE UP (ref 0–41)
ALT FLD-CCNC: 27 U/L — SIGNIFICANT CHANGE UP (ref 0–41)
ANION GAP SERPL CALC-SCNC: 10 MMOL/L — SIGNIFICANT CHANGE UP (ref 7–14)
ANION GAP SERPL CALC-SCNC: 11 MMOL/L — SIGNIFICANT CHANGE UP (ref 7–14)
APPEARANCE CSF: ABNORMAL
APPEARANCE SPUN FLD: ABNORMAL
APPEARANCE UR: CLEAR — SIGNIFICANT CHANGE UP
AST SERPL-CCNC: 39 U/L — SIGNIFICANT CHANGE UP (ref 0–41)
AST SERPL-CCNC: 48 U/L — HIGH (ref 0–41)
BACTERIA # UR AUTO: NEGATIVE — SIGNIFICANT CHANGE UP
BASOPHILS # BLD AUTO: 0.03 K/UL — SIGNIFICANT CHANGE UP (ref 0–0.2)
BASOPHILS NFR BLD AUTO: 0.3 % — SIGNIFICANT CHANGE UP (ref 0–1)
BILIRUB SERPL-MCNC: 0.6 MG/DL — SIGNIFICANT CHANGE UP (ref 0.2–1.2)
BILIRUB SERPL-MCNC: 1 MG/DL — SIGNIFICANT CHANGE UP (ref 0.2–1.2)
BILIRUB UR-MCNC: NEGATIVE — SIGNIFICANT CHANGE UP
BUN SERPL-MCNC: 23 MG/DL — HIGH (ref 10–20)
BUN SERPL-MCNC: 29 MG/DL — HIGH (ref 10–20)
CALCIUM SERPL-MCNC: 8.4 MG/DL — LOW (ref 8.5–10.1)
CALCIUM SERPL-MCNC: 8.8 MG/DL — SIGNIFICANT CHANGE UP (ref 8.5–10.1)
CHLORIDE SERPL-SCNC: 106 MMOL/L — SIGNIFICANT CHANGE UP (ref 98–110)
CHLORIDE SERPL-SCNC: 107 MMOL/L — SIGNIFICANT CHANGE UP (ref 98–110)
CO2 SERPL-SCNC: 20 MMOL/L — SIGNIFICANT CHANGE UP (ref 17–32)
CO2 SERPL-SCNC: 23 MMOL/L — SIGNIFICANT CHANGE UP (ref 17–32)
COLOR CSF: SIGNIFICANT CHANGE UP
COLOR SPEC: YELLOW — SIGNIFICANT CHANGE UP
CREAT SERPL-MCNC: 1.1 MG/DL — SIGNIFICANT CHANGE UP (ref 0.7–1.5)
CREAT SERPL-MCNC: 1.1 MG/DL — SIGNIFICANT CHANGE UP (ref 0.7–1.5)
DIFF PNL FLD: ABNORMAL
EOSINOPHIL # BLD AUTO: 0.16 K/UL — SIGNIFICANT CHANGE UP (ref 0–0.7)
EOSINOPHIL NFR BLD AUTO: 1.5 % — SIGNIFICANT CHANGE UP (ref 0–8)
EPI CELLS # UR: 2 /HPF — SIGNIFICANT CHANGE UP (ref 0–5)
GLUCOSE BLDC GLUCOMTR-MCNC: 113 MG/DL — HIGH (ref 70–99)
GLUCOSE BLDC GLUCOMTR-MCNC: 143 MG/DL — HIGH (ref 70–99)
GLUCOSE BLDC GLUCOMTR-MCNC: 90 MG/DL — SIGNIFICANT CHANGE UP (ref 70–99)
GLUCOSE CSF-MCNC: 76 MG/DL — HIGH (ref 45–75)
GLUCOSE SERPL-MCNC: 117 MG/DL — HIGH (ref 70–99)
GLUCOSE SERPL-MCNC: 125 MG/DL — HIGH (ref 70–99)
GLUCOSE UR QL: NEGATIVE — SIGNIFICANT CHANGE UP
HCT VFR BLD CALC: 41.1 % — LOW (ref 42–52)
HGB BLD-MCNC: 13.8 G/DL — LOW (ref 14–18)
HYALINE CASTS # UR AUTO: 3 /LPF — SIGNIFICANT CHANGE UP (ref 0–7)
IMM GRANULOCYTES NFR BLD AUTO: 0.9 % — HIGH (ref 0.1–0.3)
KETONES UR-MCNC: NEGATIVE — SIGNIFICANT CHANGE UP
LEUKOCYTE ESTERASE UR-ACNC: NEGATIVE — SIGNIFICANT CHANGE UP
LYMPHOCYTES # BLD AUTO: 0.88 K/UL — LOW (ref 1.2–3.4)
LYMPHOCYTES # BLD AUTO: 8.4 % — LOW (ref 20.5–51.1)
LYMPHOCYTES # CSF: 8 % — SIGNIFICANT CHANGE UP (ref 40–80)
MAGNESIUM SERPL-MCNC: 2.4 MG/DL — SIGNIFICANT CHANGE UP (ref 1.8–2.4)
MCHC RBC-ENTMCNC: 31.2 PG — HIGH (ref 27–31)
MCHC RBC-ENTMCNC: 33.6 G/DL — SIGNIFICANT CHANGE UP (ref 32–37)
MCV RBC AUTO: 92.8 FL — SIGNIFICANT CHANGE UP (ref 80–94)
MONOCYTES # BLD AUTO: 0.87 K/UL — HIGH (ref 0.1–0.6)
MONOCYTES NFR BLD AUTO: 8.3 % — SIGNIFICANT CHANGE UP (ref 1.7–9.3)
MONOS+MACROS NFR CSF: 8 % — LOW (ref 15–45)
MRSA PCR RESULT.: NEGATIVE — SIGNIFICANT CHANGE UP
NEUTROPHILS # BLD AUTO: 8.49 K/UL — HIGH (ref 1.4–6.5)
NEUTROPHILS # CSF: 84 % — HIGH (ref 0–6)
NEUTROPHILS NFR BLD AUTO: 80.6 % — HIGH (ref 42.2–75.2)
NITRITE UR-MCNC: NEGATIVE — SIGNIFICANT CHANGE UP
NRBC # BLD: 0 /100 WBCS — SIGNIFICANT CHANGE UP (ref 0–0)
NRBC NFR CSF: 6 /UL — HIGH (ref 0–5)
PH UR: 6 — SIGNIFICANT CHANGE UP (ref 5–8)
PLATELET # BLD AUTO: 239 K/UL — SIGNIFICANT CHANGE UP (ref 130–400)
POTASSIUM SERPL-MCNC: 3.8 MMOL/L — SIGNIFICANT CHANGE UP (ref 3.5–5)
POTASSIUM SERPL-MCNC: 4 MMOL/L — SIGNIFICANT CHANGE UP (ref 3.5–5)
POTASSIUM SERPL-SCNC: 3.8 MMOL/L — SIGNIFICANT CHANGE UP (ref 3.5–5)
POTASSIUM SERPL-SCNC: 4 MMOL/L — SIGNIFICANT CHANGE UP (ref 3.5–5)
PROT CSF-MCNC: 47 MG/DL — HIGH (ref 15–45)
PROT SERPL-MCNC: 5.8 G/DL — LOW (ref 6–8)
PROT SERPL-MCNC: 5.9 G/DL — LOW (ref 6–8)
PROT UR-MCNC: ABNORMAL
RBC # BLD: 4.43 M/UL — LOW (ref 4.7–6.1)
RBC # CSF: SIGNIFICANT CHANGE UP /UL (ref 0–0)
RBC # FLD: 12 % — SIGNIFICANT CHANGE UP (ref 11.5–14.5)
RBC CASTS # UR COMP ASSIST: 61 /HPF — HIGH (ref 0–4)
SODIUM SERPL-SCNC: 138 MMOL/L — SIGNIFICANT CHANGE UP (ref 135–146)
SODIUM SERPL-SCNC: 139 MMOL/L — SIGNIFICANT CHANGE UP (ref 135–146)
SP GR SPEC: 1.04 — HIGH (ref 1.01–1.03)
TUBE TYPE: SIGNIFICANT CHANGE UP
UROBILINOGEN FLD QL: ABNORMAL
VANCOMYCIN TROUGH SERPL-MCNC: 4.4 UG/ML — LOW (ref 5–10)
WBC # BLD: 10.52 K/UL — SIGNIFICANT CHANGE UP (ref 4.8–10.8)
WBC # FLD AUTO: 10.52 K/UL — SIGNIFICANT CHANGE UP (ref 4.8–10.8)
WBC UR QL: 4 /HPF — SIGNIFICANT CHANGE UP (ref 0–5)

## 2021-02-01 PROCEDURE — 71045 X-RAY EXAM CHEST 1 VIEW: CPT | Mod: 26

## 2021-02-01 PROCEDURE — 93970 EXTREMITY STUDY: CPT | Mod: 26

## 2021-02-01 PROCEDURE — 99233 SBSQ HOSP IP/OBS HIGH 50: CPT

## 2021-02-01 RX ORDER — FENTANYL CITRATE 50 UG/ML
0.5 INJECTION INTRAVENOUS
Qty: 2500 | Refills: 0 | Status: DISCONTINUED | OUTPATIENT
Start: 2021-02-01 | End: 2021-02-02

## 2021-02-01 RX ORDER — IBUPROFEN 200 MG
400 TABLET ORAL ONCE
Refills: 0 | Status: COMPLETED | OUTPATIENT
Start: 2021-02-01 | End: 2021-02-01

## 2021-02-01 RX ORDER — CEFEPIME 1 G/1
INJECTION, POWDER, FOR SOLUTION INTRAMUSCULAR; INTRAVENOUS
Refills: 0 | Status: DISCONTINUED | OUTPATIENT
Start: 2021-02-01 | End: 2021-02-03

## 2021-02-01 RX ORDER — VANCOMYCIN HCL 1 G
VIAL (EA) INTRAVENOUS
Refills: 0 | Status: DISCONTINUED | OUTPATIENT
Start: 2021-02-01 | End: 2021-02-01

## 2021-02-01 RX ORDER — VANCOMYCIN HCL 1 G
1500 VIAL (EA) INTRAVENOUS ONCE
Refills: 0 | Status: COMPLETED | OUTPATIENT
Start: 2021-02-01 | End: 2021-02-01

## 2021-02-01 RX ORDER — VANCOMYCIN HCL 1 G
1500 VIAL (EA) INTRAVENOUS EVERY 12 HOURS
Refills: 0 | Status: DISCONTINUED | OUTPATIENT
Start: 2021-02-01 | End: 2021-02-03

## 2021-02-01 RX ORDER — FENTANYL CITRATE 50 UG/ML
0.5 INJECTION INTRAVENOUS
Qty: 2500 | Refills: 0 | Status: DISCONTINUED | OUTPATIENT
Start: 2021-02-01 | End: 2021-02-01

## 2021-02-01 RX ORDER — CEFEPIME 1 G/1
2000 INJECTION, POWDER, FOR SOLUTION INTRAMUSCULAR; INTRAVENOUS ONCE
Refills: 0 | Status: COMPLETED | OUTPATIENT
Start: 2021-02-01 | End: 2021-02-01

## 2021-02-01 RX ORDER — VANCOMYCIN HCL 1 G
1500 VIAL (EA) INTRAVENOUS EVERY 12 HOURS
Refills: 0 | Status: DISCONTINUED | OUTPATIENT
Start: 2021-02-01 | End: 2021-02-01

## 2021-02-01 RX ORDER — CEFEPIME 1 G/1
2000 INJECTION, POWDER, FOR SOLUTION INTRAMUSCULAR; INTRAVENOUS EVERY 12 HOURS
Refills: 0 | Status: DISCONTINUED | OUTPATIENT
Start: 2021-02-01 | End: 2021-02-03

## 2021-02-01 RX ORDER — FENTANYL CITRATE 50 UG/ML
50 INJECTION INTRAVENOUS ONCE
Refills: 0 | Status: DISCONTINUED | OUTPATIENT
Start: 2021-02-01 | End: 2021-02-01

## 2021-02-01 RX ADMIN — LEVETIRACETAM 420 MILLIGRAM(S): 250 TABLET, FILM COATED ORAL at 17:55

## 2021-02-01 RX ADMIN — Medication 400 MILLIGRAM(S): at 05:22

## 2021-02-01 RX ADMIN — Medication 500 MILLIGRAM(S): at 19:00

## 2021-02-01 RX ADMIN — PANTOPRAZOLE SODIUM 40 MILLIGRAM(S): 20 TABLET, DELAYED RELEASE ORAL at 05:24

## 2021-02-01 RX ADMIN — SENNA PLUS 2 TABLET(S): 8.6 TABLET ORAL at 21:33

## 2021-02-01 RX ADMIN — PROPOFOL 12 MICROGRAM(S)/KG/MIN: 10 INJECTION, EMULSION INTRAVENOUS at 18:10

## 2021-02-01 RX ADMIN — FENTANYL CITRATE 50 MICROGRAM(S): 50 INJECTION INTRAVENOUS at 10:00

## 2021-02-01 RX ADMIN — FENTANYL CITRATE 5 MICROGRAM(S)/KG/HR: 50 INJECTION INTRAVENOUS at 11:03

## 2021-02-01 RX ADMIN — LEVETIRACETAM 420 MILLIGRAM(S): 250 TABLET, FILM COATED ORAL at 05:23

## 2021-02-01 RX ADMIN — PROPOFOL 12 MICROGRAM(S)/KG/MIN: 10 INJECTION, EMULSION INTRAVENOUS at 14:18

## 2021-02-01 RX ADMIN — CHLORHEXIDINE GLUCONATE 15 MILLILITER(S): 213 SOLUTION TOPICAL at 17:55

## 2021-02-01 RX ADMIN — PROPOFOL 12 MICROGRAM(S)/KG/MIN: 10 INJECTION, EMULSION INTRAVENOUS at 10:39

## 2021-02-01 RX ADMIN — CHLORHEXIDINE GLUCONATE 1 APPLICATION(S): 213 SOLUTION TOPICAL at 05:23

## 2021-02-01 RX ADMIN — Medication 650 MILLIGRAM(S): at 03:06

## 2021-02-01 RX ADMIN — NICARDIPINE HYDROCHLORIDE 25 MG/HR: 30 CAPSULE, EXTENDED RELEASE ORAL at 14:17

## 2021-02-01 RX ADMIN — Medication 650 MILLIGRAM(S): at 21:33

## 2021-02-01 RX ADMIN — PANTOPRAZOLE SODIUM 40 MILLIGRAM(S): 20 TABLET, DELAYED RELEASE ORAL at 18:00

## 2021-02-01 RX ADMIN — CEFEPIME 100 MILLIGRAM(S): 1 INJECTION, POWDER, FOR SOLUTION INTRAMUSCULAR; INTRAVENOUS at 05:23

## 2021-02-01 RX ADMIN — Medication 250 MILLIGRAM(S): at 05:23

## 2021-02-01 RX ADMIN — CEFEPIME 100 MILLIGRAM(S): 1 INJECTION, POWDER, FOR SOLUTION INTRAMUSCULAR; INTRAVENOUS at 18:00

## 2021-02-01 RX ADMIN — Medication 650 MILLIGRAM(S): at 17:07

## 2021-02-01 RX ADMIN — POLYETHYLENE GLYCOL 3350 17 GRAM(S): 17 POWDER, FOR SOLUTION ORAL at 11:03

## 2021-02-01 RX ADMIN — CHLORHEXIDINE GLUCONATE 15 MILLILITER(S): 213 SOLUTION TOPICAL at 05:23

## 2021-02-01 NOTE — CONSULT NOTE ADULT - SUBJECTIVE AND OBJECTIVE BOX
MARICARMENMELODIE  50y, Male  Allergy: No Known Allergies      All historical available data reviewed.    HPI:  50 years old right handed Male with PMHx of HTN, GI bleed, PUD,  Mrankin score of 0 at baseline was brought in by EMS for  AMS and left sided weakness.   Wife states that patient was having a usual day today went to bathroom and then the next thing she noted was that he was confused and was lying on the bathroom floor, she reports urinary incontinence, patient was confused, had trouble speaking, and could not move his left side.     In the ED, /148, HR 80, saturating well. Code stroke called, NIHSS 24 and GCS 11, CTH showed Moderate to large intraparenchymal right basal ganglia hemorrhage with mild surrounding edema and associated right sulcal effacement as well as decompression into the ventricles. There is approximately 6.3 mm of midline shift. CTA did not show any evidence of active bleeding, AVM or aneurysm or major vascular stenosis or occlusion. Pt became unresponsive in CT scan and had to be intubated and sedated. Patient was intubated in ED for worsening mental status and GCS. Started on Nicardipine drip for sbp in 200s and EVD was placed by neurosurgery at bedside. Pan trauma scan was negative. To be admitted under ICU for further monitoring.  (2021 23:13)    s/p EVD placement     FAMILY HISTORY:  Family history of breast cancer (Mother)    Family history of pancreatic cancer (Father)      PAST MEDICAL & SURGICAL HISTORY:  HTN (hypertension)    GI bleed    Gastric ulcer    PUD (peptic ulcer disease)    Arm fracture, left  s/p surgical repair          VITALS:  T(F): 100.6, Max: 102.5 (21 @ 04:15)  HR: 68  BP: 143/71  RR: 27Vital Signs Last 24 Hrs  T(C): 38.1 (2021 13:00), Max: 39.2 (2021 04:15)  T(F): 100.6 (2021 13:00), Max: 102.5 (2021 04:15)  HR: 68 (2021 14:28) (52 - 82)  BP: 143/71 (2021 14:28) (123/69 - 177/84)  BP(mean): 99 (2021 14:28) (84 - 125)  RR: 27 (2021 14:28) (15 - 31)  SpO2: 100% (2021 14:28) (97% - 100%)    TESTS & MEASUREMENTS:                        13.8   10.52 )-----------( 239      ( 2021 05:00 )             41.1     02-    139  |  106  |  23<H>  ----------------------------<  117<H>  3.8   |  23  |  1.1    Ca    8.8      2021 05:00  Mg     2.4         TPro  5.9<L>  /  Alb  3.6  /  TBili  1.0  /  DBili  x   /  AST  48<H>  /  ALT  27  /  AlkPhos  77  02-    LIVER FUNCTIONS - ( 2021 05:00 )  Alb: 3.6 g/dL / Pro: 5.9 g/dL / ALK PHOS: 77 U/L / ALT: 27 U/L / AST: 48 U/L / GGT: x             Urinalysis Basic - ( 2021 05:00 )    Color: Yellow / Appearance: Clear / S.038 / pH: x  Gluc: x / Ketone: Negative  / Bili: Negative / Urobili: 3 mg/dL   Blood: x / Protein: 30 mg/dL / Nitrite: Negative   Leuk Esterase: Negative / RBC: 61 /HPF / WBC 4 /HPF   Sq Epi: x / Non Sq Epi: 2 /HPF / Bacteria: Negative          RADIOLOGY & ADDITIONAL TESTS:  Personal review of radiological diagnostics performed  Echo and EKG results noted when applicable.     MEDICATIONS:  acetaminophen   Tablet .. 650 milliGRAM(s) Oral every 6 hours PRN  cefepime   IVPB      cefepime   IVPB 2000 milliGRAM(s) IV Intermittent every 12 hours  chlorhexidine 0.12% Liquid 15 milliLiter(s) Oral Mucosa every 12 hours  chlorhexidine 4% Liquid 1 Application(s) Topical <User Schedule>  fentaNYL   Infusion 0.5 MICROgram(s)/kG/Hr IV Continuous <Continuous>  levETIRAcetam  IVPB 500 milliGRAM(s) IV Intermittent every 12 hours  niCARdipine Infusion 5 mG/Hr IV Continuous <Continuous>  pantoprazole  Injectable 40 milliGRAM(s) IV Push every 12 hours  polyethylene glycol 3350 17 Gram(s) Oral daily  propofol Infusion 20 MICROgram(s)/kG/Min IV Continuous <Continuous>  senna 2 Tablet(s) Oral at bedtime  vancomycin  IVPB 1500 milliGRAM(s) IV Intermittent every 12 hours      ANTIBIOTICS:  cefepime   IVPB      cefepime   IVPB 2000 milliGRAM(s) IV Intermittent every 12 hours  vancomycin  IVPB 1500 milliGRAM(s) IV Intermittent every 12 hours

## 2021-02-01 NOTE — PROGRESS NOTE ADULT - SUBJECTIVE AND OBJECTIVE BOX
Subjective: 50yMale with a pmhx of INTRACRANIAL BLEED                                     ADM/AA    INTRACRANIAL BLEED    ^MED EVAL    Family history of breast cancer (Mother)    Family history of pancreatic cancer (Father)    Handoff    MEWS Score    HTN (hypertension)    GI bleed    Gastric ulcer    PUD (peptic ulcer disease)    Intracranial bleed    Intraparenchymal hemorrhage of brain    Insertion, external ventricular drain    Ventriculostomy, for ventricular catheter insertion    Arm fracture, left    MED EVAL    90+    SysAdmin_VisitLink      Patient is a 50 year-old male HOD #5 s/p EVD placement 1.27.21. Patient was seen and examined today in the Vent unit with Dr. Rosario. Patient lying in bed under sedation with fentanyl, propofol, and on cardene gtt. Patient not following commands but withdrawing extremities to noxious stimuli R>L. Over night, patient's ICP increased to 23-25 so EVD was left open to drain. EVD set to 20 cm H2O currently and has drained 246 cc/24 hours.     Allergies    No Known Allergies    Intolerances        Vital Signs Last 24 Hrs  T(C): 37.8 (01 Feb 2021 09:00), Max: 39.2 (01 Feb 2021 04:15)  T(F): 100.1 (01 Feb 2021 09:00), Max: 102.5 (01 Feb 2021 04:15)  HR: 56 (01 Feb 2021 11:00) (56 - 82)  BP: 137/85 (01 Feb 2021 11:00) (123/69 - 166/97)  BP(mean): 106 (01 Feb 2021 11:00) (84 - 111)  RR: 24 (01 Feb 2021 11:00) (15 - 31)  SpO2: 99% (01 Feb 2021 11:00) (97% - 100%)      acetaminophen   Tablet .. 650 milliGRAM(s) Oral every 6 hours PRN  cefepime   IVPB      cefepime   IVPB 2000 milliGRAM(s) IV Intermittent every 12 hours  chlorhexidine 0.12% Liquid 15 milliLiter(s) Oral Mucosa every 12 hours  chlorhexidine 4% Liquid 1 Application(s) Topical <User Schedule>  fentaNYL   Infusion 0.5 MICROgram(s)/kG/Hr IV Continuous <Continuous>  levETIRAcetam  IVPB 500 milliGRAM(s) IV Intermittent every 12 hours  niCARdipine Infusion 5 mG/Hr IV Continuous <Continuous>  pantoprazole  Injectable 40 milliGRAM(s) IV Push every 12 hours  polyethylene glycol 3350 17 Gram(s) Oral daily  propofol Infusion 20 MICROgram(s)/kG/Min IV Continuous <Continuous>  senna 2 Tablet(s) Oral at bedtime  vancomycin  IVPB 1500 milliGRAM(s) IV Intermittent every 12 hours        01-31-21 @ 07:01  -  02-01-21 @ 07:00  --------------------------------------------------------  IN: 1817.5 mL / OUT: 1842 mL / NET: -24.5 mL    02-01-21 @ 07:01  -  02-01-21 @ 13:05  --------------------------------------------------------  IN: 165 mL / OUT: 202 mL / NET: -37 mL        REVIEW OF SYSTEMS    [ ] A ten-point review of systems was otherwise negative except as noted.  [X] Due to altered mental status/intubation, subjective information were not able to be obtained from the patient. History was obtained, to the extent possible, from review of the chart and collateral sources of information.      Physical Exam:  General: Lying in bed, intubated, on sedation, not following commmands  Not opening eyes  Pupils pinpoint, not-reactive  Motor: Moving right side spontaneously, withdrawing left side to noxious stimuli  Drains: EVD at 20 cm H2O, drained 246 cc/24 hrs, draining csf, ICP 10-16        CBC Full  -  ( 01 Feb 2021 05:00 )  WBC Count : 10.52 K/uL  RBC Count : 4.43 M/uL  Hemoglobin : 13.8 g/dL  Hematocrit : 41.1 %  Platelet Count - Automated : 239 K/uL  Mean Cell Volume : 92.8 fL  Mean Cell Hemoglobin : 31.2 pg  Mean Cell Hemoglobin Concentration : 33.6 g/dL  Auto Neutrophil # : 8.49 K/uL  Auto Lymphocyte # : 0.88 K/uL  Auto Monocyte # : 0.87 K/uL  Auto Eosinophil # : 0.16 K/uL  Auto Basophil # : 0.03 K/uL  Auto Neutrophil % : 80.6 %  Auto Lymphocyte % : 8.4 %  Auto Monocyte % : 8.3 %  Auto Eosinophil % : 1.5 %  Auto Basophil % : 0.3 %    02-01    139  |  106  |  23<H>  ----------------------------<  117<H>  3.8   |  23  |  1.1    Ca    8.8      01 Feb 2021 05:00  Mg     2.4     02-01    TPro  5.9<L>  /  Alb  3.6  /  TBili  1.0  /  DBili  x   /  AST  48<H>  /  ALT  27  /  AlkPhos  77  02-01        Imaging:  < from: CT Head No Cont (01.31.21 @ 13:17) >  IMPRESSION:    There is motion artifact.    1.  No significant interval change in the size/configuration of the right frontal lobe/basal ganglia parenchymal hematoma measuring up to 6.4 x 3.5 x 5.0 cm (trv, AP, CC) with rupture into the right lateral ventricle. The surrounding edema has mildly increased. The mass effect with 6 mm right to left midline shift is about stable.    2.  Mild increase in the intraventricular hemorrhage. Slight increase in ventricular size.    3.  No significant change in scattered subarachnoid hemorrhage.    4.  Stable position of a left transfrontal EVD with distal tip terminating in the region of the suprasellar cistern.      Assessment/Plan:   50 year-old male HOD #5 s/p EVD placement 1.27.21.  -Keep EVD draining at 20 cm H2O  -Obtain CSF cultures  -Continue to monitor ICP's and record  -Keep sedation minimal to obtain proper Neuro exam  -Neuro Checks q 1hr  -Maintain SBP <150  -Continue abx until EVD removed  -Discuss with attending

## 2021-02-01 NOTE — PROGRESS NOTE ADULT - ATTENDING COMMENTS
Patient seen today with neurocritical care team NP Remi.     I was physically present for the key portions of the evaluation and management (E/M) service provided.  I agree with the above history, physical, and plan with the following additions/modifications     R BG hemorrhage, EVD clamp trial today with increasing ICPs, will reattempt tomorrow.  Minimize sedation as able, recommend sending CSF for culture, SBTs as tolerated.    Thomas Koroma MD  Attending Neurointensivist

## 2021-02-01 NOTE — PROGRESS NOTE ADULT - SUBJECTIVE AND OBJECTIVE BOX
Specialty: Neuro Critical Care     HPI:  50 years old right handed Male with PMHx of HTN, GI bleed, PUD,  Mrankin score of 0 at baseline was brought in by EMS for  AMS and left sided weakness.   Wife states that patient was having a usual day today went to bathroom and then the next thing she noted was that he was confused and was lying on the bathroom floor, she reports urinary incontinence, patient was confused, had trouble speaking, and could not move his left side.   In the ED, /148, HR 80, saturating well. Code stroke called, NIHSS 24 and GCS 11, CTH showed Moderate to large intraparenchymal right basal ganglia hemorrhage with mild surrounding edema and associated right sulcal effacement as well as decompression into the ventricles. There is approximately 6.3 mm of midline shift. CTA did not show any evidence of active bleeding, AVM or aneurysm or major vascular stenosis or occlusion. Pt became unresponsive in CT scan and had to be intubated and sedated. Patient was intubated in ED for worsening mental status and GCS. Started on Nicardipine drip for sbp in 200s and EVD was placed by neurosurgery at bedside. Pan trauma scan was negative. To be admitted under ICU for further monitoring.  (27 Jan 2021 23:13)    Past Medical and Surgical Hx:  HTN (hypertension)  GI bleed  Gastric ulcer  PUD (peptic ulcer disease)  Arm fracture, left  s/p surgical repair    Allergies: No Known Allergies    ROS: Patient unable to participate in ROS due to neurologic status.    PE:  General: mechanically vented, sedated on Propofol, male of appropriate age, lying supine in bed. NAD  Neuro:  MSE: mechanically vented, sedated on Propofol @ 50mcg, restless, however, does not follow commands, does not open eyes to voice or noxious stimuli. No blink to threat  CN: Pupils 3mm brisk bilaterally. Right eye right gaze preference that can be overcome. No nystagmus noted. (+) corneal reflex (+) cough (+) gag (+) oculocephalic reflex  Motor: Good muscle bulk/tone. Right UE/LE moves spontaneously however does not follow commands. LUE withdrawal to noxious stimuli. LLE no movement noted to noxious stimuli.  Sensory: No facial grimacing noted to noxious stimuli  EVD drain unclamped @ 20mmHg    Vital Signs:  ICU Vital Signs Last 24 Hrs  T(C): 38.1 (01 Feb 2021 13:00), Max: 39.2 (01 Feb 2021 04:15)  T(F): 100.6 (01 Feb 2021 13:00), Max: 102.5 (01 Feb 2021 04:15)  HR: 68 (01 Feb 2021 14:28) (52 - 82)  BP: 140/75 (01 Feb 2021 15:00) (123/69 - 177/84)  BP(mean): 101 (01 Feb 2021 15:00) (84 - 125)  ABP: 138/67 (01 Feb 2021 02:00) (131/73 - 179/72)  ABP(mean): 87 (01 Feb 2021 02:00) (87 - 108)  RR: 26 (01 Feb 2021 15:00) (15 - 31)  SpO2: 100% (01 Feb 2021 15:00) (97% - 100%)    I/O Detail:  31 Jan 2021 07:01  -  01 Feb 2021 07:00  --------------------------------------------------------  IN:    Dexmedetomidine: 107.5 mL    Jevity: 350 mL    NiCARdipine: 640 mL    Propofol: 720 mL  Total IN: 1817.5 mL    OUT:    Drain (mL): 246 mL    Incontinent per Collection Bag (mL): 1 mL    Indwelling Catheter - Urethral (mL): 1595 mL  Total OUT: 1842 mL    Total NET: -24.5 mL      01 Feb 2021 07:01  -  01 Feb 2021 15:35  --------------------------------------------------------  IN:    Enteral Tube Flush: 40 mL    FentaNYL: 25 mL    Jevity: 160 mL    NiCARdipine: 237.5 mL    Propofol: 240 mL  Total IN: 702.5 mL    OUT:    Dexmedetomidine: 0 mL    Drain (mL): 112 mL    Indwelling Catheter - Urethral (mL): 350 mL  Total OUT: 462 mL    Total NET: 240.5 mL    Labs:  02-01    139  |  106  |  23<H>  ----------------------------<  117<H>  3.8   |  23  |  1.1    Ca    8.8      01 Feb 2021 05:00  Mg     2.4     02-01    TPro  5.9<L>  /  Alb  3.6  /  TBili  1.0  /  DBili  x   /  AST  48<H>  /  ALT  27  /  AlkPhos  77  02-01                          13.8   10.52 )-----------( 239      ( 01 Feb 2021 05:00 )             41.1     Medications Current and PRN:  MEDICATIONS  (STANDING):  cefepime   IVPB      cefepime   IVPB 2000 milliGRAM(s) IV Intermittent every 12 hours  chlorhexidine 0.12% Liquid 15 milliLiter(s) Oral Mucosa every 12 hours  chlorhexidine 4% Liquid 1 Application(s) Topical <User Schedule>  fentaNYL   Infusion 0.5 MICROgram(s)/kG/Hr (5 mL/Hr) IV Continuous <Continuous>  levETIRAcetam  IVPB 500 milliGRAM(s) IV Intermittent every 12 hours  niCARdipine Infusion 5 mG/Hr (25 mL/Hr) IV Continuous <Continuous>  pantoprazole  Injectable 40 milliGRAM(s) IV Push every 12 hours  polyethylene glycol 3350 17 Gram(s) Oral daily  propofol Infusion 20 MICROgram(s)/kG/Min (12 mL/Hr) IV Continuous <Continuous>  senna 2 Tablet(s) Oral at bedtime  vancomycin  IVPB 1500 milliGRAM(s) IV Intermittent every 12 hours    MEDICATIONS  (PRN):  acetaminophen   Tablet .. 650 milliGRAM(s) Oral every 6 hours PRN Temp greater or equal to 38C (100.4F)    Imaging:  EXAM:  CT BRAIN (1/31/2021):   IMPRESSION:  There is motion artifact.  1.  No significant interval change in the size/configuration of the right frontal lobe/basal ganglia parenchymal hematoma measuring up to 6.4 x 3.5 x 5.0 cm (trv, AP, CC) with rupture into the right lateral ventricle. The surrounding edema has mildly increased. The mass effect with 6 mm right to left midline shift is about stable.  2.  Mild increase in the intraventricular hemorrhage. Slight increase in ventricular size.  3.  No significant change in scattered subarachnoid hemorrhage.  4.  Stable position of a left transfrontal EVD with distal tip terminating in the region of the suprasellar cistern.    EXAM:  CT VENOGRAM BRAIN (W)AW IC (1/28/2021):   IMPRESSION:  No evidence of dural venous sinus thrombosis or stenosis.  Similar appearance of right basal ganglia intraparenchymal hemorrhage with local mass effect extending into the ventricles as well as the right frontal and temporal lobes.    EXAM:  CT BRAIN (1/28/2021):   IMPRESSION:  No significant change in large acute right basal ganglia hemorrhage with intraventricular extension as well as extending into right frontal and temporal lobes. Unchanged local mass effect with 5mm of leftward midline shift.  Similar-appearing minimal subarachnoid hemorrhage noted in the left parietal sulci.    EXAM:  CT ANGIO NECK (W)AW IC/CT ANGIO BRAIN (W)AW IC (1/27/2021):   IMPRESSION  The previously noted area of intracerebral hemorrhage in the right basal ganglia region is again identified. Please see report of the CT scan of the head dated 1/27/2021.  No evidence of active bleeding, AVM or aneurysm.  No evidence of major vascular stenosis or occlusion,    EXAM:  CT BRAIN STROKE PROTOCOL (1/27/2021):   IMPRESSION:  Moderate to large intraparenchymal right basal ganglia hemorrhage with mild surrounding edema and associated right sulcal effacement as well as decompression into the ventricles. There is approximately 6.3 mm of midline shift.    Assessment:  50-year-old male with PMHx GIB, HTN, and PUD, BIBA by EMS, found of floor of bathroom with AMS, left hemiparesis, and dysarthria. In ED, /148. Stroke code initiated. Initial NIH = 24/GCS = 11. CTH revealed moderate to large intraparenchymal right basal ganglia ICH with mild edema and associated right sulcal effacement with decompression of ventricles and 6.3mm midline shift. CTA negative for active bleed, AVM, aneurysm major vascular stenosis or occlusion. Intubated for airway protection r/t worsening mental status and neurological decline. NeuroSx c/s, s/p EVD placement. Admitted to ICU for management. On examination today, mechanically vented, sedated on Propofol, PERRLA, does not open eyes to voice or noxious stimuli, does not follow commands, RUE/RLE moves spontaneously yet not to commands, LUE withdrawal to pain, LLE no movement to noxious stimuli, with intact brainstem reflexes. EVD drain @ 20mmHg, open to drain. ICP range 8-17; CPP range 73-86     Plan:  #Neuro:  - Neuro checks q1hr  - EVD as per NeuroSx; keep unclamped @ 20mmHg  - Keep ICP < 20, CPP > 70  - Continue Keppra 500mg IV q12hrs  - Continue to wean sedation as tolerated for neurological prognostication  - Fentanyl IVP prn for agitation/vent synchrony  - Keep serum Na 135-145    #CV:  - Keep SBP < 150 as per NeuroSx  - Utilize Nicardipine as needed while maintaining CPP > 70    #Resp:  - Ventilator management as per primary team  - HOB 35 degrees  - Aspiration precautions  - SAT/SBT as tolerated    #Renal/Fluid/Electolytes:  - Strict I&Os  - Keep euvolemic   - Monitor lytes, replete as needed     #ID:  - Trend temperatures; Tylenol 650mg PO q6hrs prn for temp > 38C  - Keep normothermic  - F/U CSF cultures, drawn by NeuroSx 2/2 increased temps, r/o ventriculitis  - F/U blood cultures, drawn 2/1/2021  - ID recs appreciated: Continue Cefepime/Vancomycin    #GI:  - Continue Protonix 40mg IV q12hrs  - Continue Miralax 17 grams PO q24hrs  - Continue Senna 2 tablets PO q24hrs HS    #DVT prophylaxis:  - SCS while in bed      [facility]  Please feel free to contact for any questions or concerns   #[phone]

## 2021-02-01 NOTE — CONSULT NOTE ADULT - ADDITIONAL PE
on vent  Fio2 40%  no pressors  on propafol  non responsive, does not follow commands, does not open eyes

## 2021-02-01 NOTE — CHART NOTE - NSCHARTNOTEFT_GEN_A_CORE
10mL CSF drawn from ventriculostomy in a sterile manner with out complication. Fluid was sent for culture and sensitivity.

## 2021-02-01 NOTE — CONSULT NOTE ADULT - ASSESSMENT
50 years old right handed Male with PMHx of HTN, GI bleed, PUD, was brought in by EMS for  AMS and left sided weakness.   Wife states that patient was having a usual day today went to bathroom and then the next thing she noted was that he was confused and was lying on the bathroom floor, she reports urinary incontinence, patient was confused, had trouble speaking, and could not move his left side.   CTH showed Moderate to large intraparenchymal right basal ganglia hemorrhage with mild surrounding edema and associated right sulcal effacement as well as decompression into the ventricles. There is approximately 6.3 mm of midline shift.   CTA did not show any evidence of active bleeding, AVM or aneurysm or major vascular stenosis or occlusion. Pt became unresponsive in CT scan and had to be intubated and sedated.  EVD was placed by neurosurgery 1/27   ID called for ABx management    IMPRESSION;  CT Head 1/31  No significant interval change in the size/configuration of the right frontal lobe/basal ganglia parenchymal hematoma measuring up to 6.4 x 3.5 x 5.0 cm (trv, AP, CC) with rupture into the right lateral ventricle. The surrounding edema has mildly increased. The mass effect with 6 mm right to left midline shift is about stable.  Mild increase in the intraventricular hemorrhage. Slight increase in ventricular size.  No significant change in scattered subarachnoid hemorrhage.  Stable position of a left transfrontal EVD with distal tip terminating in the region of the suprasellar cistern.    Right frontal/basal ganglia parenchymal hematoma with rupture into lateral right ventral with stable mass effect with midline shift.  Increasing IVH. No change in SAH    The CNS bleed will cause central fevers  Agree with ABx coverage while EVD in place  Susana JASSO    RECOMMENDATIONS;  BCx   Send of CSF for cell count and bacterial cultures  Vancomycin 1,5  gm iv q12h with trough before the 5th dose and maintain trough around 20   Cefepime 2 gm iv q8h

## 2021-02-01 NOTE — PROGRESS NOTE ADULT - SUBJECTIVE AND OBJECTIVE BOX
OVERNIGHT EVENTS: events noted, still intubated, ventilated, on propofol, cardene 5, spiking T, sp head ct    Vital Signs Last 24 Hrs  T(C): 37.8 (2021 06:00), Max: 39.2 (2021 04:15)  T(F): 100 (2021 06:00), Max: 102.5 (2021 04:15)  HR: 62 (2021 08:00) (56 - 82)  BP: 136/81 (2021 08:00) (123/69 - 166/97)  BP(mean): 102 (2021 08:00) (84 - 111)  RR: 25 (2021 08:00) (15 - 31)  SpO2: 99% (2021 08:00) (97% - 100%)    PHYSICAL EXAMINATION:    GENERAL: sedated  HEENT: Head is normocephalic and atraumatic.     NECK: Supple.    LUNGS: dec bs both bases    HEART: Regular rate and rhythm without murmur.    ABDOMEN: Soft, nontender, and nondistended.      EXTREMITIES: Without any cyanosis, clubbing, rash, lesions or edema.    NEUROLOGIC: l side weakness    SKIN: No ulceration or induration present.      LABS:                        13.8   10.52 )-----------( 239      ( 2021 05:00 )             41.1     02-    139  |  106  |  23<H>  ----------------------------<  117<H>  3.8   |  23  |  1.1    Ca    8.8      2021 05:00  Mg     2.4     02-    TPro  5.9<L>  /  Alb  3.6  /  TBili  1.0  /  DBili  x   /  AST  48<H>  /  ALT  27  /  AlkPhos  77  02-      Urinalysis Basic - ( 2021 05:00 )    Color: Yellow / Appearance: Clear / S.038 / pH: x  Gluc: x / Ketone: Negative  / Bili: Negative / Urobili: 3 mg/dL   Blood: x / Protein: 30 mg/dL / Nitrite: Negative   Leuk Esterase: Negative / RBC: 61 /HPF / WBC 4 /HPF   Sq Epi: x / Non Sq Epi: 2 /HPF / Bacteria: Negative      ABG - ( 2021 04:14 )  pH, Arterial: 7.48  pH, Blood: x     /  pCO2: 34    /  pO2: 96    / HCO3: 25    / Base Excess: 2.2   /  SaO2: 96          24/450/40/5                      21 @ 07:01  21 @ 07:00  --------------------------------------------------------  IN: 1817.5 mL / OUT: 1842 mL / NET: -24.5 mL    21 @ 07:21 @ 08:34  --------------------------------------------------------  IN: 55 mL / OUT: 50 mL / NET: 5 mL        MICROBIOLOGY:      MEDICATIONS  (STANDING):  cefepime   IVPB      cefepime   IVPB 2000 milliGRAM(s) IV Intermittent every 12 hours  chlorhexidine 0.12% Liquid 15 milliLiter(s) Oral Mucosa every 12 hours  chlorhexidine 4% Liquid 1 Application(s) Topical <User Schedule>  dexMEDEtomidine Infusion 0.2 MICROgram(s)/kG/Hr (5 mL/Hr) IV Continuous <Continuous>  levETIRAcetam  IVPB 500 milliGRAM(s) IV Intermittent every 12 hours  niCARdipine Infusion 5 mG/Hr (25 mL/Hr) IV Continuous <Continuous>  pantoprazole  Injectable 40 milliGRAM(s) IV Push every 12 hours  polyethylene glycol 3350 17 Gram(s) Oral daily  propofol Infusion 20 MICROgram(s)/kG/Min (12 mL/Hr) IV Continuous <Continuous>  senna 2 Tablet(s) Oral at bedtime  vancomycin  IVPB      vancomycin  IVPB 1500 milliGRAM(s) IV Intermittent every 12 hours    MEDICATIONS  (PRN):  acetaminophen   Tablet .. 650 milliGRAM(s) Oral every 6 hours PRN Temp greater or equal to 38C (100.4F)      RADIOLOGY & ADDITIONAL STUDIES:

## 2021-02-01 NOTE — PROGRESS NOTE ADULT - ASSESSMENT
IMPRESSION:    Large ICB hypertensive/ SP evd, intubated, for airway protection  spiking T    PLAN:    CNS: propofol .  AEDs.  FU with Neuro and NeuroSX. repeat Head CR reviewed  ICP monitoring as per neuro sx / neurology , CPP more than 70, Neuro fluid CSF fluid    HEENT: Oral care    PULMONARY:  HOB @ 45 degrees.  push ETT, no changes    CARDIOVASCULAR:  BP control.  nicardipine drip as needed to keep SBP less then 140     GI: GI prophylaxis.  OG Feeding.  Bowel regimen     RENAL:  Follow up lytes.  Correct as needed monitor is and os   follow cr     INFECTIOUS DISEASE: Follow up cultures , ID eval  HEMATOLOGICAL:  DVT prophylaxis seq.  LE doppler    ENDOCRINE:  Follow up FS.  Insulin protocol if needed.    MUSCULOSKELETAL:  Bed rest    Prognosis guarded

## 2021-02-01 NOTE — CHART NOTE - NSCHARTNOTEFT_GEN_A_CORE
Registered Dietitian Follow-Up     Patient Profile Reviewed                           Yes [x]   No []     Nutrition History Previously Obtained        Yes []  No [x]--unable to obtain as pt remains intubated        Pertinent Medical Interventions: Pt remains intubated and ventilated, on propofol, cardene 5, spiking T, sp head ct. Repeat Head CR reviewed; ICP monitoring as per neuro sx/neurology.    Diet order: Jevity 1.2 @ 40ml/hr (1944kcal, 53gm pro, 775ml free water. includes additional kcal from propofol--TF currently not running, plan to restart today.       Anthropometrics:  - Ht. 73"  - Wt.: 221#/100kg (1/27)-no new wts   - %wt change  - BMI: 29.1   - IBW: 184#     Pertinent Lab Data: (2/1): H/H 13.8/41.1, BUN 23, Gluc 117     Pertinent Meds: abx, fentanyl, propofol @ 30ml/hr (provides additional 792kcal), keppra, nicardipine, protonix, miralax, senna      Physical Findings:  - Appearance: intubated/ventilated; no edema note currently   - GI function: LBM 1/29   - Tubes: OGT   - Oral/Mouth cavity: NPO   - Skin: intact      Nutrition Requirements  Weight Used: CBW: 221#/100kg, Ve: 12.8, Tmax: 39.2; kcal needs slightly readjusted today      Estimated Energy Needs: 8202-5004 kcal/day (% [id number]: 2568 d/t borderline obese BMI).  Estimated Protein Needs: 100-120 g/day (1-1.2 g/kg ABW)  Estimated Fluid Needs: per Vent team      Nutrient Intake: not meeting kcal/pro needs at this time      Previous Nutrition Diagnosis: Inadequate protein-energy intake (ongoing)      Nutrition Intervention: enteral nutrition    Recommendations:   1. As pt receiving significant amount of additional kcal from propofol, would benefit switching to low-fat formula: Vital HP @ 25ml/hr, increase by 15ml/hr Q4H, until goal rate of 55ml/hr is reached. TF @ goal rate to provide a total of 2112kcal, 116gm pro, 1104ml free water (includes additional kcal provided by propofol). Additional flushes per LIP. All recs discussed with LIP ([phone])      Goal/Expected Outcome: Pt to meet % of estimated nutrient needs within 3 days      Indicator/Monitoring: RD to monitor diet order, energy intake, glucose profile, nutrition focused physical findings

## 2021-02-02 LAB
ALBUMIN SERPL ELPH-MCNC: 3.4 G/DL — LOW (ref 3.5–5.2)
ALP SERPL-CCNC: 68 U/L — SIGNIFICANT CHANGE UP (ref 30–115)
ALT FLD-CCNC: 26 U/L — SIGNIFICANT CHANGE UP (ref 0–41)
ANION GAP SERPL CALC-SCNC: 10 MMOL/L — SIGNIFICANT CHANGE UP (ref 7–14)
AST SERPL-CCNC: 30 U/L — SIGNIFICANT CHANGE UP (ref 0–41)
BASOPHILS # BLD AUTO: 0.05 K/UL — SIGNIFICANT CHANGE UP (ref 0–0.2)
BASOPHILS NFR BLD AUTO: 0.4 % — SIGNIFICANT CHANGE UP (ref 0–1)
BILIRUB SERPL-MCNC: 1 MG/DL — SIGNIFICANT CHANGE UP (ref 0.2–1.2)
BUN SERPL-MCNC: 23 MG/DL — HIGH (ref 10–20)
CALCIUM SERPL-MCNC: 8 MG/DL — LOW (ref 8.5–10.1)
CHLORIDE SERPL-SCNC: 106 MMOL/L — SIGNIFICANT CHANGE UP (ref 98–110)
CO2 SERPL-SCNC: 22 MMOL/L — SIGNIFICANT CHANGE UP (ref 17–32)
CREAT SERPL-MCNC: 1.1 MG/DL — SIGNIFICANT CHANGE UP (ref 0.7–1.5)
CRYPTOC AG CSF-ACNC: NEGATIVE — SIGNIFICANT CHANGE UP
CSF PCR RESULT: SIGNIFICANT CHANGE UP
CULTURE RESULTS: NO GROWTH — SIGNIFICANT CHANGE UP
EOSINOPHIL # BLD AUTO: 0.45 K/UL — SIGNIFICANT CHANGE UP (ref 0–0.7)
EOSINOPHIL NFR BLD AUTO: 4 % — SIGNIFICANT CHANGE UP (ref 0–8)
GLUCOSE SERPL-MCNC: 148 MG/DL — HIGH (ref 70–99)
GRAM STN FLD: SIGNIFICANT CHANGE UP
HCT VFR BLD CALC: 39.4 % — LOW (ref 42–52)
HGB BLD-MCNC: 13.3 G/DL — LOW (ref 14–18)
IMM GRANULOCYTES NFR BLD AUTO: 0.8 % — HIGH (ref 0.1–0.3)
LABORATORY COMMENT REPORT: SIGNIFICANT CHANGE UP
LDH CSF L TO P-CCNC: 112 U/L — SIGNIFICANT CHANGE UP
LDH FLD-CCNC: 112 U/L — SIGNIFICANT CHANGE UP
LYMPHOCYTES # BLD AUTO: 0.84 K/UL — LOW (ref 1.2–3.4)
LYMPHOCYTES # BLD AUTO: 7.4 % — LOW (ref 20.5–51.1)
MAGNESIUM SERPL-MCNC: 2.2 MG/DL — SIGNIFICANT CHANGE UP (ref 1.8–2.4)
MCHC RBC-ENTMCNC: 31.7 PG — HIGH (ref 27–31)
MCHC RBC-ENTMCNC: 33.8 G/DL — SIGNIFICANT CHANGE UP (ref 32–37)
MCV RBC AUTO: 94 FL — SIGNIFICANT CHANGE UP (ref 80–94)
MONOCYTES # BLD AUTO: 1.03 K/UL — HIGH (ref 0.1–0.6)
MONOCYTES NFR BLD AUTO: 9.1 % — SIGNIFICANT CHANGE UP (ref 1.7–9.3)
NEUTROPHILS # BLD AUTO: 8.88 K/UL — HIGH (ref 1.4–6.5)
NEUTROPHILS NFR BLD AUTO: 78.3 % — HIGH (ref 42.2–75.2)
NIGHT BLUE STAIN TISS: SIGNIFICANT CHANGE UP
NRBC # BLD: 0 /100 WBCS — SIGNIFICANT CHANGE UP (ref 0–0)
PHOSPHATE SERPL-MCNC: 3.3 MG/DL — SIGNIFICANT CHANGE UP (ref 2.1–4.9)
PLATELET # BLD AUTO: 230 K/UL — SIGNIFICANT CHANGE UP (ref 130–400)
POTASSIUM SERPL-MCNC: 3.7 MMOL/L — SIGNIFICANT CHANGE UP (ref 3.5–5)
POTASSIUM SERPL-SCNC: 3.7 MMOL/L — SIGNIFICANT CHANGE UP (ref 3.5–5)
PROCALCITONIN SERPL-MCNC: 0.07 NG/ML — SIGNIFICANT CHANGE UP (ref 0.02–0.1)
PROT SERPL-MCNC: 5.5 G/DL — LOW (ref 6–8)
RBC # BLD: 4.19 M/UL — LOW (ref 4.7–6.1)
RBC # FLD: 12.4 % — SIGNIFICANT CHANGE UP (ref 11.5–14.5)
SODIUM SERPL-SCNC: 138 MMOL/L — SIGNIFICANT CHANGE UP (ref 135–146)
SOURCE HSV 1/2: SIGNIFICANT CHANGE UP
SPECIMEN SOURCE: SIGNIFICANT CHANGE UP
WBC # BLD: 11.34 K/UL — HIGH (ref 4.8–10.8)
WBC # FLD AUTO: 11.34 K/UL — HIGH (ref 4.8–10.8)

## 2021-02-02 PROCEDURE — 99231 SBSQ HOSP IP/OBS SF/LOW 25: CPT

## 2021-02-02 PROCEDURE — 71045 X-RAY EXAM CHEST 1 VIEW: CPT | Mod: 26

## 2021-02-02 PROCEDURE — 74018 RADEX ABDOMEN 1 VIEW: CPT | Mod: 26

## 2021-02-02 RX ORDER — HYDRALAZINE HCL 50 MG
2.5 TABLET ORAL ONCE
Refills: 0 | Status: COMPLETED | OUTPATIENT
Start: 2021-02-02 | End: 2021-02-02

## 2021-02-02 RX ORDER — MEPERIDINE HYDROCHLORIDE 50 MG/ML
12.5 INJECTION INTRAMUSCULAR; INTRAVENOUS; SUBCUTANEOUS EVERY 4 HOURS
Refills: 0 | Status: DISCONTINUED | OUTPATIENT
Start: 2021-02-02 | End: 2021-02-04

## 2021-02-02 RX ORDER — ACETAMINOPHEN 500 MG
650 TABLET ORAL EVERY 6 HOURS
Refills: 0 | Status: DISCONTINUED | OUTPATIENT
Start: 2021-02-02 | End: 2021-02-04

## 2021-02-02 RX ADMIN — CEFEPIME 100 MILLIGRAM(S): 1 INJECTION, POWDER, FOR SOLUTION INTRAMUSCULAR; INTRAVENOUS at 20:54

## 2021-02-02 RX ADMIN — PANTOPRAZOLE SODIUM 40 MILLIGRAM(S): 20 TABLET, DELAYED RELEASE ORAL at 17:14

## 2021-02-02 RX ADMIN — LEVETIRACETAM 420 MILLIGRAM(S): 250 TABLET, FILM COATED ORAL at 05:21

## 2021-02-02 RX ADMIN — PANTOPRAZOLE SODIUM 40 MILLIGRAM(S): 20 TABLET, DELAYED RELEASE ORAL at 05:21

## 2021-02-02 RX ADMIN — LEVETIRACETAM 420 MILLIGRAM(S): 250 TABLET, FILM COATED ORAL at 17:42

## 2021-02-02 RX ADMIN — CHLORHEXIDINE GLUCONATE 15 MILLILITER(S): 213 SOLUTION TOPICAL at 17:14

## 2021-02-02 RX ADMIN — Medication 500 MILLIGRAM(S): at 21:09

## 2021-02-02 RX ADMIN — CEFEPIME 100 MILLIGRAM(S): 1 INJECTION, POWDER, FOR SOLUTION INTRAMUSCULAR; INTRAVENOUS at 05:20

## 2021-02-02 RX ADMIN — CHLORHEXIDINE GLUCONATE 15 MILLILITER(S): 213 SOLUTION TOPICAL at 05:21

## 2021-02-02 RX ADMIN — PROPOFOL 12 MICROGRAM(S)/KG/MIN: 10 INJECTION, EMULSION INTRAVENOUS at 22:54

## 2021-02-02 RX ADMIN — CHLORHEXIDINE GLUCONATE 1 APPLICATION(S): 213 SOLUTION TOPICAL at 07:20

## 2021-02-02 RX ADMIN — Medication 650 MILLIGRAM(S): at 16:56

## 2021-02-02 RX ADMIN — SENNA PLUS 2 TABLET(S): 8.6 TABLET ORAL at 22:54

## 2021-02-02 RX ADMIN — Medication 500 MILLIGRAM(S): at 05:21

## 2021-02-02 RX ADMIN — POLYETHYLENE GLYCOL 3350 17 GRAM(S): 17 POWDER, FOR SOLUTION ORAL at 12:17

## 2021-02-02 RX ADMIN — Medication 650 MILLIGRAM(S): at 05:22

## 2021-02-02 RX ADMIN — Medication 650 MILLIGRAM(S): at 22:54

## 2021-02-02 RX ADMIN — Medication 2.5 MILLIGRAM(S): at 23:30

## 2021-02-02 NOTE — PROGRESS NOTE ADULT - SUBJECTIVE AND OBJECTIVE BOX
Subjective: 50yMale with a pmhx of INTRACRANIAL BLEED                                     ADM/AA    INTRACRANIAL BLEED    ^MED EVAL    Family history of breast cancer (Mother)    Family history of pancreatic cancer (Father)    Handoff    MEWS Score    HTN (hypertension)    GI bleed    Gastric ulcer    PUD (peptic ulcer disease)    Intracranial bleed    Intraparenchymal hemorrhage of brain    Insertion, external ventricular drain    Ventriculostomy, for ventricular catheter insertion    Arm fracture, left    MED EVAL    90+    SysAdmin_VisitLink        HD#7  s/p R basal ganglia hemorrhage with intraventricular extension  s/p EVD 1.27.21    Pt with PMH HTN, PUD, GI bleed on ASA, presented with AMS and LEFT-sided weakness     Pt seen and examined at bedside.  Remains intubated, now sedated on Propofol and Fentanyl.  Pt had EVD clamped on Sunday night but failed clamp trial with ICP's >25 so  EVD opened and left at @20 cm H2O, drained 307 cc in 24hrs,  ICP's 14-19.   Pt well sedated, not responding to voice.  Only slight movt R toes with pain.     Allergies    No Known Allergies    Intolerances        Imaging:    Vital Signs Last 24 Hrs  T(C): 37 (02 Feb 2021 08:00), Max: 39.5 (01 Feb 2021 20:00)  T(F): 98.6 (02 Feb 2021 08:00), Max: 103.1 (01 Feb 2021 20:00)  HR: 56 (02 Feb 2021 11:00) (49 - 78)  BP: 129/81 (02 Feb 2021 08:00) (122/77 - 177/84)  BP(mean): 99 (02 Feb 2021 08:00) (89 - 125)  RR: 24 (02 Feb 2021 11:00) (24 - 28)  SpO2: 96% (02 Feb 2021 11:00) (95% - 100%)      acetaminophen   Tablet .. 650 milliGRAM(s) Oral every 6 hours PRN  cefepime   IVPB      cefepime   IVPB 2000 milliGRAM(s) IV Intermittent every 12 hours  chlorhexidine 0.12% Liquid 15 milliLiter(s) Oral Mucosa every 12 hours  chlorhexidine 4% Liquid 1 Application(s) Topical <User Schedule>  levETIRAcetam  IVPB 500 milliGRAM(s) IV Intermittent every 12 hours  niCARdipine Infusion 5 mG/Hr IV Continuous <Continuous>  pantoprazole  Injectable 40 milliGRAM(s) IV Push every 12 hours  polyethylene glycol 3350 17 Gram(s) Oral daily  propofol Infusion 20 MICROgram(s)/kG/Min IV Continuous <Continuous>  senna 2 Tablet(s) Oral at bedtime  vancomycin  IVPB 1500 milliGRAM(s) IV Intermittent every 12 hours        02-01-21 @ 07:01  -  02-02-21 @ 07:00  --------------------------------------------------------  IN: 3953.5 mL / OUT: 1282 mL / NET: 2671.5 mL    02-02-21 @ 07:01  -  02-02-21 @ 11:06  --------------------------------------------------------  IN: 514 mL / OUT: 155 mL / NET: 359 mL        REVIEW OF SYSTEMS    [ ] A ten-point review of systems was otherwise negative except as noted.  [ x] Due to altered mental status/intubation, subjective information were not able to be obtained from the patient. History was obtained, to the extent possible, from review of the chart and collateral sources of information.        Neuro Exam:  Intubated, sedated on Propofol  not opening eyes  not following commands on sedation  Pupils reactive  Motor:   withdraws R side to pain  left side hemiparesis  EVD in place, draining clear CSF briskly      Wound:    CBC Full  -  ( 02 Feb 2021 04:30 )  WBC Count : 11.34 K/uL  RBC Count : 4.19 M/uL  Hemoglobin : 13.3 g/dL  Hematocrit : 39.4 %  Platelet Count - Automated : 230 K/uL  Mean Cell Volume : 94.0 fL  Mean Cell Hemoglobin : 31.7 pg  Mean Cell Hemoglobin Concentration : 33.8 g/dL  Auto Neutrophil # : 8.88 K/uL  Auto Lymphocyte # : 0.84 K/uL  Auto Monocyte # : 1.03 K/uL  Auto Eosinophil # : 0.45 K/uL  Auto Basophil # : 0.05 K/uL  Auto Neutrophil % : 78.3 %  Auto Lymphocyte % : 7.4 %  Auto Monocyte % : 9.1 %  Auto Eosinophil % : 4.0 %  Auto Basophil % : 0.4 %    02-02    138  |  106  |  23<H>  ----------------------------<  148<H>  3.7   |  22  |  1.1    Ca    8.0<L>      02 Feb 2021 04:30  Phos  3.3     02-02  Mg     2.2     02-02    TPro  5.5<L>  /  Alb  3.4<L>  /  TBili  1.0  /  DBili  x   /  AST  30  /  ALT  26  /  AlkPhos  68  02-02            Assessment/Plan:   cont to monitor ICP and record  if ICP >20 for longer than 15 minutes while pt not agitated, call x [phone]  cont neuro checks q 1hr  cont antibx while EVD in place  will d/w attg       Subjective: 50yMale with a pmhx of INTRACRANIAL BLEED                                     ADM/AA    INTRACRANIAL BLEED    ^MED EVAL    Family history of breast cancer (Mother)    Family history of pancreatic cancer (Father)    Handoff    MEWS Score    HTN (hypertension)    GI bleed    Gastric ulcer    PUD (peptic ulcer disease)    Intracranial bleed    Intraparenchymal hemorrhage of brain    Insertion, external ventricular drain    Ventriculostomy, for ventricular catheter insertion    Arm fracture, left    MED EVAL    90+    SysAdmin_VisitLink        HD#7  s/p R basal ganglia hemorrhage with intraventricular extension  s/p EVD 1.27.21    Pt with PMH HTN, PUD, GI bleed on ASA, presented with AMS and LEFT-sided weakness     Pt seen and examined at bedside.  Remains intubated, now sedated on Propofol and Fentanyl.  Pt had EVD clamped on Sunday night but failed clamp trial with ICP's >25 so  EVD opened and left at @20 cm H2O, drained 307 cc in 24hrs,  ICP's 14-19.   Pt well sedated, not responding to voice.  Only slight movt R toes with pain.     Allergies    No Known Allergies    Intolerances        Imaging:    Vital Signs Last 24 Hrs  T(C): 37 (02 Feb 2021 08:00), Max: 39.5 (01 Feb 2021 20:00)  T(F): 98.6 (02 Feb 2021 08:00), Max: 103.1 (01 Feb 2021 20:00)  HR: 56 (02 Feb 2021 11:00) (49 - 78)  BP: 129/81 (02 Feb 2021 08:00) (122/77 - 177/84)  BP(mean): 99 (02 Feb 2021 08:00) (89 - 125)  RR: 24 (02 Feb 2021 11:00) (24 - 28)  SpO2: 96% (02 Feb 2021 11:00) (95% - 100%)      acetaminophen   Tablet .. 650 milliGRAM(s) Oral every 6 hours PRN  cefepime   IVPB      cefepime   IVPB 2000 milliGRAM(s) IV Intermittent every 12 hours  chlorhexidine 0.12% Liquid 15 milliLiter(s) Oral Mucosa every 12 hours  chlorhexidine 4% Liquid 1 Application(s) Topical <User Schedule>  levETIRAcetam  IVPB 500 milliGRAM(s) IV Intermittent every 12 hours  niCARdipine Infusion 5 mG/Hr IV Continuous <Continuous>  pantoprazole  Injectable 40 milliGRAM(s) IV Push every 12 hours  polyethylene glycol 3350 17 Gram(s) Oral daily  propofol Infusion 20 MICROgram(s)/kG/Min IV Continuous <Continuous>  senna 2 Tablet(s) Oral at bedtime  vancomycin  IVPB 1500 milliGRAM(s) IV Intermittent every 12 hours        02-01-21 @ 07:01  -  02-02-21 @ 07:00  --------------------------------------------------------  IN: 3953.5 mL / OUT: 1282 mL / NET: 2671.5 mL    02-02-21 @ 07:01  -  02-02-21 @ 11:06  --------------------------------------------------------  IN: 514 mL / OUT: 155 mL / NET: 359 mL        REVIEW OF SYSTEMS    [ ] A ten-point review of systems was otherwise negative except as noted.  [ x] Due to altered mental status/intubation, subjective information were not able to be obtained from the patient. History was obtained, to the extent possible, from review of the chart and collateral sources of information.        Neuro Exam:  Intubated, sedated on Propofol and Fentanyl  not opening eyes  not following commands on sedation  Pupils reactive  Motor:   withdraws R side to pain  left side hemiparesis  EVD in place, draining clear CSF briskly      Wound:    CBC Full  -  ( 02 Feb 2021 04:30 )  WBC Count : 11.34 K/uL  RBC Count : 4.19 M/uL  Hemoglobin : 13.3 g/dL  Hematocrit : 39.4 %  Platelet Count - Automated : 230 K/uL  Mean Cell Volume : 94.0 fL  Mean Cell Hemoglobin : 31.7 pg  Mean Cell Hemoglobin Concentration : 33.8 g/dL  Auto Neutrophil # : 8.88 K/uL  Auto Lymphocyte # : 0.84 K/uL  Auto Monocyte # : 1.03 K/uL  Auto Eosinophil # : 0.45 K/uL  Auto Basophil # : 0.05 K/uL  Auto Neutrophil % : 78.3 %  Auto Lymphocyte % : 7.4 %  Auto Monocyte % : 9.1 %  Auto Eosinophil % : 4.0 %  Auto Basophil % : 0.4 %    02-02    138  |  106  |  23<H>  ----------------------------<  148<H>  3.7   |  22  |  1.1    Ca    8.0<L>      02 Feb 2021 04:30  Phos  3.3     02-02  Mg     2.2     02-02    TPro  5.5<L>  /  Alb  3.4<L>  /  TBili  1.0  /  DBili  x   /  AST  30  /  ALT  26  /  AlkPhos  68  02-02            Assessment/Plan:   keep EVD open @ 20cm H2O  cont to monitor ICP and record  if ICP >20 for longer than 15 minutes while pt not agitated, call x [phone]  cont neuro checks q 1hr  cont antibx while EVD in place  f/u Blood cxs and CSF cxs  will d/w attg

## 2021-02-02 NOTE — PROGRESS NOTE ADULT - ATTENDING COMMENTS
History, events, data and scans reviewed. Patient examined at bedside on 02/03/2021. I agree and approved plan of care as outlined above.

## 2021-02-02 NOTE — PROGRESS NOTE ADULT - SUBJECTIVE AND OBJECTIVE BOX
OVERNIGHT EVENTS: events noted, on propofol, fentanyl, cardene drip, ICP 14-18 and CPP 17    Vital Signs Last 24 Hrs  T(C): 38.9 (2021 04:00), Max: 39.5 (2021 20:00)  T(F): 102.1 (2021 04:00), Max: 103.1 (2021 20:00)  HR: 57 (2021 07:00) (49 - 78)  BP: 143/85 (2021 07:00) (122/77 - 177/84)  BP(mean): 108 (2021 07:00) (89 - 125)  RR: 24 (2021 07:00) (24 - 28)  SpO2: 95% (2021 07:00) (95% - 100%)    PHYSICAL EXAMINATION:    GENERAL: SEDATED    HEENT: Head is normocephalic and atraumatic.    NECK: Supple.    LUNGS: DEC BS BOTH BASES    HEART: Regular rate and rhythm without murmur.    ABDOMEN: Soft, nontender, and nondistended.      EXTREMITIES: Without any cyanosis, clubbing, rash, lesions or edema.    NEUROLOGIC: moves r side    SKIN: No ulceration or induration present.      LABS:                        13.3   11.34 )-----------( 230      ( 2021 04:30 )             39.4     02-02    138  |  106  |  23<H>  ----------------------------<  148<H>  3.7   |  22  |  1.1    Ca    8.0<L>      2021 04:30  Phos  3.3     02-02  Mg     2.2     02-02    TPro  5.5<L>  /  Alb  3.4<L>  /  TBili  1.0  /  DBili  x   /  AST  30  /  ALT  26  /  AlkPhos  68  02-02      Urinalysis Basic - ( 2021 05:00 )    Color: Yellow / Appearance: Clear / S.038 / pH: x  Gluc: x / Ketone: Negative  / Bili: Negative / Urobili: 3 mg/dL   Blood: x / Protein: 30 mg/dL / Nitrite: Negative   Leuk Esterase: Negative / RBC: 61 /HPF / WBC 4 /HPF   Sq Epi: x / Non Sq Epi: 2 /HPF / Bacteria: Negative      ABG - ( 2021 03:46 )  pH, Arterial: 7.40  pH, Blood: x     /  pCO2: 40    /  pO2: 67    / HCO3: 25    / Base Excess: x     /  SaO2: 93          450/24/40/5                Procalcitonin, Serum: 0.07 ng/mL (21 @ 12:11)        21 @ 07:01  -  21 @ 07:00  --------------------------------------------------------  IN: 3953.5 mL / OUT: 1282 mL / NET: 2671.5 mL        MICROBIOLOGY:      MEDICATIONS  (STANDING):  cefepime   IVPB      cefepime   IVPB 2000 milliGRAM(s) IV Intermittent every 12 hours  chlorhexidine 0.12% Liquid 15 milliLiter(s) Oral Mucosa every 12 hours  chlorhexidine 4% Liquid 1 Application(s) Topical <User Schedule>  fentaNYL   Infusion 0.5 MICROgram(s)/kG/Hr (5 mL/Hr) IV Continuous <Continuous>  levETIRAcetam  IVPB 500 milliGRAM(s) IV Intermittent every 12 hours  niCARdipine Infusion 5 mG/Hr (25 mL/Hr) IV Continuous <Continuous>  pantoprazole  Injectable 40 milliGRAM(s) IV Push every 12 hours  polyethylene glycol 3350 17 Gram(s) Oral daily  propofol Infusion 20 MICROgram(s)/kG/Min (12 mL/Hr) IV Continuous <Continuous>  senna 2 Tablet(s) Oral at bedtime  vancomycin  IVPB 1500 milliGRAM(s) IV Intermittent every 12 hours    MEDICATIONS  (PRN):  acetaminophen   Tablet .. 650 milliGRAM(s) Oral every 6 hours PRN Temp greater or equal to 38C (100.4F)      RADIOLOGY & ADDITIONAL STUDIES:

## 2021-02-02 NOTE — PROGRESS NOTE ADULT - ASSESSMENT
IMPRESSION:    Large ICB (RBG) hypertensive/ SP evd, intubated, for airway protection  spiking T    PLAN:    CNS: propofol . DC FENTANYL, AEDs.  FU with Neuro and NeuroSX.   ICP monitoring as per neuro sx / neurology , CPP more than 70, Neuro fluid CSF fluid    HEENT: Oral care    PULMONARY:  HOB @ 45 degrees.  no changes in vent settings    CARDIOVASCULAR:  BP control.  nicardipine drip as needed to keep SBP less then 140     GI: GI prophylaxis.  OG Feeding.  Bowel regimen , falt plate    RENAL:  Follow up lytes.  Correct as needed monitor is and os       INFECTIOUS DISEASE: Follow up cultures , ID f/up  HEMATOLOGICAL:  DVT prophylaxis seq.  LE doppler    ENDOCRINE:  Follow up FS.  Insulin protocol if needed.    MUSCULOSKELETAL:  Bed rest    Prognosis guarded

## 2021-02-02 NOTE — PROGRESS NOTE ADULT - SUBJECTIVE AND OBJECTIVE BOX
MELODIE HERNADEZ  50y, Male    All available historical data reviewed    OVERNIGHT EVENTS:  fevers  non responsive  fio2 40%    ROS:  unable to obtain history secondary to patient's mental status and/or sedation    VITALS:  T(F): 98.6, Max: 103.1 (21 @ 20:00)  HR: 59  BP: 129/81  RR: 24Vital Signs Last 24 Hrs  T(C): 37 (2021 08:00), Max: 39.5 (2021 20:00)  T(F): 98.6 (2021 08:00), Max: 103.1 (2021 20:00)  HR: 59 (2021 12:00) (49 - 78)  BP: 129/81 (2021 08:00) (122/77 - 177/84)  BP(mean): 99 (2021 08:00) (89 - 125)  RR: 24 (2021 12:00) (24 - 28)  SpO2: 95% (2021 12:00) (95% - 100%)    TESTS & MEASUREMENTS:                        13.3   11.34 )-----------( 230      ( 2021 04:30 )             39.4     02-    138  |  106  |  23<H>  ----------------------------<  148<H>  3.7   |  22  |  1.1    Ca    8.0<L>      2021 04:30  Phos  3.3     02-  Mg     2.2     02-    TPro  5.5<L>  /  Alb  3.4<L>  /  TBili  1.0  /  DBili  x   /  AST  30  /  ALT  26  /  AlkPhos  68  02-02    LIVER FUNCTIONS - ( 2021 04:30 )  Alb: 3.4 g/dL / Pro: 5.5 g/dL / ALK PHOS: 68 U/L / ALT: 26 U/L / AST: 30 U/L / GGT: x             Culture - Acid Fast - CSF (collected 21 @ 20:10)  Source: .CSF CSF    Culture - Fungal, CSF (collected 21 @ 20:10)  Source: .CSF CSF  Preliminary Report (21 @ 09:12):    Testing in progress    Culture - CSF with Gram Stain (collected 21 @ 20:10)  Source: .CSF CSF  Gram Stain (21 @ 08:53):    polymorphonuclear leukocytes seen    No organisms seen    by cytocentrifuge    Culture - Urine (collected 21 @ 05:00)  Source: .Urine Clean Catch (Midstream)  Final Report (21 @ 11:16):    No growth      Urinalysis Basic - ( 2021 05:00 )    Color: Yellow / Appearance: Clear / S.038 / pH: x  Gluc: x / Ketone: Negative  / Bili: Negative / Urobili: 3 mg/dL   Blood: x / Protein: 30 mg/dL / Nitrite: Negative   Leuk Esterase: Negative / RBC: 61 /HPF / WBC 4 /HPF   Sq Epi: x / Non Sq Epi: 2 /HPF / Bacteria: Negative          RADIOLOGY & ADDITIONAL TESTS:  Personal review of radiological diagnostics performed  Echo and EKG results noted when applicable.     MEDICATIONS:  acetaminophen   Tablet .. 650 milliGRAM(s) Oral every 6 hours PRN  cefepime   IVPB      cefepime   IVPB 2000 milliGRAM(s) IV Intermittent every 12 hours  chlorhexidine 0.12% Liquid 15 milliLiter(s) Oral Mucosa every 12 hours  chlorhexidine 4% Liquid 1 Application(s) Topical <User Schedule>  levETIRAcetam  IVPB 500 milliGRAM(s) IV Intermittent every 12 hours  niCARdipine Infusion 5 mG/Hr IV Continuous <Continuous>  pantoprazole  Injectable 40 milliGRAM(s) IV Push every 12 hours  polyethylene glycol 3350 17 Gram(s) Oral daily  propofol Infusion 20 MICROgram(s)/kG/Min IV Continuous <Continuous>  senna 2 Tablet(s) Oral at bedtime  vancomycin  IVPB 1500 milliGRAM(s) IV Intermittent every 12 hours      ANTIBIOTICS:  cefepime   IVPB 2000 milliGRAM(s) IV Intermittent every 12 hours  cefepime   IVPB      vancomycin  IVPB 1500 milliGRAM(s) IV Intermittent every 12 hours

## 2021-02-02 NOTE — PROGRESS NOTE ADULT - SUBJECTIVE AND OBJECTIVE BOX
Specialty: Neuro Critical Care     HPI:  50 years old right handed Male with PMHx of HTN, GI bleed, PUD,  Mrankin score of 0 at baseline was brought in by EMS for  AMS and left sided weakness.   Wife states that patient was having a usual day today went to bathroom and then the next thing she noted was that he was confused and was lying on the bathroom floor, she reports urinary incontinence, patient was confused, had trouble speaking, and could not move his left side.   In the ED, /148, HR 80, saturating well. Code stroke called, NIHSS 24 and GCS 11, CTH showed Moderate to large intraparenchymal right basal ganglia hemorrhage with mild surrounding edema and associated right sulcal effacement as well as decompression into the ventricles. There is approximately 6.3 mm of midline shift. CTA did not show any evidence of active bleeding, AVM or aneurysm or major vascular stenosis or occlusion. Pt became unresponsive in CT scan and had to be intubated and sedated. Patient was intubated in ED for worsening mental status and GCS. Started on Nicardipine drip for sbp in 200s and EVD was placed by neurosurgery at bedside. Pan trauma scan was negative. To be admitted under ICU for further monitoring.  (27 Jan 2021 23:13)    Past Medical and Surgical Hx:  HTN (hypertension)  GI bleed  Gastric ulcer  PUD (peptic ulcer disease)  Arm fracture, left  s/p surgical repair    Allergies: No Known Allergies    ROS: Patient unable to participate in ROS due to neurologic status.    PE:  General: mechanically vented, sedated on Propofol and Fentanyl, male of appropriate age, lying supine in bed. NAD  Neuro:  MSE: mechanically vented, sedated on Propofol and Fentanyl, does not follow commands, flutters eyes to voice and noxious stimuli. No blink to threat  CN: Pupils 3mm brisk bilaterally. Right eye right gaze preference that can be overcome. No nystagmus noted. (+) corneal reflex (+) cough (+) gag (+) oculocephalic reflex  Motor: Good muscle bulk/tone. Right UE/LE moves spontaneously however does not follow commands. LUE withdrawal to noxious stimuli. LLE no movement noted to noxious stimuli.  Sensory: No facial grimacing noted to noxious stimuli  EVD drain unclamped @ 20mmHg    Vital Signs:  ICU Vital Signs Last 24 Hrs  T(C): 37 (02 Feb 2021 08:00), Max: 39.5 (01 Feb 2021 20:00)  T(F): 98.6 (02 Feb 2021 08:00), Max: 103.1 (01 Feb 2021 20:00)  HR: 63 (02 Feb 2021 14:00) (49 - 92)  BP: 129/81 (02 Feb 2021 08:00) (122/77 - 164/95)  BP(mean): 99 (02 Feb 2021 08:00) (89 - 118)  ABP: 146/73 (02 Feb 2021 14:00) (121/79 - 190/92)  ABP(mean): 95 (02 Feb 2021 14:00) (91 - 117)  RR: 24 (02 Feb 2021 14:00) (24 - 26)  SpO2: 93% (02 Feb 2021 14:00) (93% - 100%)    I/O Detail:  01 Feb 2021 07:01  -  02 Feb 2021 07:00  --------------------------------------------------------  IN:    Enteral Tube Flush: 195 mL    FentaNYL: 245 mL    IV PiggyBack: 50 mL    IV PiggyBack: 100 mL    IV PiggyBack: 500 mL    IV PiggyBack: 650 mL    Jevity: 160 mL    NiCARdipine: 632.5 mL    Propofol: 541 mL    Vital High Protein: 880 mL  Total IN: 3953.5 mL    OUT:    Dexmedetomidine: 0 mL    Drain (mL): 307 mL    Indwelling Catheter - Urethral (mL): 975 mL  Total OUT: 1282 mL    Total NET: 2671.5 mL      02 Feb 2021 07:01  -  02 Feb 2021 14:58  --------------------------------------------------------  IN:    FentaNYL: 135 mL    NiCARdipine: 325 mL    Propofol: 93 mL    Vital High Protein: 440 mL  Total IN: 993 mL    OUT:    Drain (mL): 93 mL    Voided (mL): 280 mL  Total OUT: 373 mL    Total NET: 620 mL    EVD output for 24 hours: 307    Labs:  02-02    138  |  106  |  23<H>  ----------------------------<  148<H>  3.7   |  22  |  1.1    Ca    8.0<L>      02 Feb 2021 04:30  Phos  3.3     02-02  Mg     2.2     02-02    TPro  5.5<L>  /  Alb  3.4<L>  /  TBili  1.0  /  DBili  x   /  AST  30  /  ALT  26  /  AlkPhos  68  02-02                          13.3   11.34 )-----------( 230      ( 02 Feb 2021 04:30 )             39.4     ABG - ( 02 Feb 2021 03:46 )  pH, Arterial: 7.40  pH, Blood: x     /  pCO2: 40    /  pO2: 67    / HCO3: 25    / Base Excess: x     /  SaO2: 93        Microbiology:  MRSA: 2/1/2021: negative     Urine culture: 2/1/2021: negative    CSF: 2/1/2021:  Acid Fast Bacilli Smear: less than 5cc CSF received, unable to perform AFB smear  Gram stain: no organisms seen  Fungal: testing in progress  Lactate dehydrogenase: 112   Protein: 47  Glucose: 76  Cerebrospinal Fluid Cell Count-1 (02.01.21 @ 20:10)   Tube Type: Tube 1   CSF Appearance: Cloudy   CSF Lymphocytes: 8 %   CSF Monocytes/Macrophages: 8 %   CSF Segmented Neutrophils: 84 %   Appearance Spun: Xanthochromic   RBC Count - Spinal Fluid: 61907 /uL   Total Nucleated Cell Count, CSF: 6 /uL   CSF Color: Pink     Medications Current and PRN:  MEDICATIONS  (STANDING):  cefepime   IVPB      cefepime   IVPB 2000 milliGRAM(s) IV Intermittent every 12 hours  chlorhexidine 0.12% Liquid 15 milliLiter(s) Oral Mucosa every 12 hours  chlorhexidine 4% Liquid 1 Application(s) Topical <User Schedule>  levETIRAcetam  IVPB 500 milliGRAM(s) IV Intermittent every 12 hours  niCARdipine Infusion 5 mG/Hr (25 mL/Hr) IV Continuous <Continuous>  pantoprazole  Injectable 40 milliGRAM(s) IV Push every 12 hours  polyethylene glycol 3350 17 Gram(s) Oral daily  propofol Infusion 20 MICROgram(s)/kG/Min (12 mL/Hr) IV Continuous <Continuous>  senna 2 Tablet(s) Oral at bedtime  vancomycin  IVPB 1500 milliGRAM(s) IV Intermittent every 12 hours    MEDICATIONS  (PRN):  acetaminophen   Tablet .. 650 milliGRAM(s) Oral every 6 hours PRN Temp greater or equal to 38C (100.4F)    Imaging:  EXAM:  CT BRAIN (1/31/2021):   IMPRESSION:  There is motion artifact.  1.  No significant interval change in the size/configuration of the right frontal lobe/basal ganglia parenchymal hematoma measuring up to 6.4 x 3.5 x 5.0 cm (trv, AP, CC) with rupture into the right lateral ventricle. The surrounding edema has mildly increased. The mass effect with 6 mm right to left midline shift is about stable.  2.  Mild increase in the intraventricular hemorrhage. Slight increase in ventricular size.  3.  No significant change in scattered subarachnoid hemorrhage.  4.  Stable position of a left transfrontal EVD with distal tip terminating in the region of the suprasellar cistern.    EXAM:  CT VENOGRAM BRAIN (W)AW IC (1/28/2021):   IMPRESSION:  No evidence of dural venous sinus thrombosis or stenosis.  Similar appearance of right basal ganglia intraparenchymal hemorrhage with local mass effect extending into the ventricles as well as the right frontal and temporal lobes.    EXAM:  CT BRAIN (1/28/2021):   IMPRESSION:  No significant change in large acute right basal ganglia hemorrhage with intraventricular extension as well as extending into right frontal and temporal lobes. Unchanged local mass effect with 5mm of leftward midline shift.  Similar-appearing minimal subarachnoid hemorrhage noted in the left parietal sulci.    EXAM:  CT ANGIO NECK (W)AW IC/CT ANGIO BRAIN (W)AW IC (1/27/2021):   IMPRESSION  The previously noted area of intracerebral hemorrhage in the right basal ganglia region is again identified. Please see report of the CT scan of the head dated 1/27/2021.  No evidence of active bleeding, AVM or aneurysm.  No evidence of major vascular stenosis or occlusion,    EXAM:  CT BRAIN STROKE PROTOCOL (1/27/2021):   IMPRESSION:  Moderate to large intraparenchymal right basal ganglia hemorrhage with mild surrounding edema and associated right sulcal effacement as well as decompression into the ventricles. There is approximately 6.3 mm of midline shift.    Assessment:  50-year-old male with PMHx GIB, HTN, and PUD, BIBA by EMS, found of floor of bathroom with AMS, left hemiparesis, and dysarthria. In ED, /148. Stroke code initiated. Initial NIH = 24/GCS = 11. CTH revealed moderate to large intraparenchymal right basal ganglia ICH with mild edema and associated right sulcal effacement with decompression of ventricles and 6.3mm midline shift. CTA negative for active bleed, AVM, aneurysm major vascular stenosis or occlusion. Intubated for airway protection r/t worsening mental status and neurological decline. NeuroSx c/s, s/p EVD placement. Admitted to ICU for management. On examination today, mechanically vented, sedated on Propofol and Fentanyl, PERRLA, does not open eyes to voice or noxious stimuli, does not follow commands, RUE/RLE moves spontaneously yet not to commands, LUE withdrawal to pain, LLE no movement to noxious stimuli, with intact brainstem reflexes. EVD drain @ 98paF3P, open to drain. ICP range 15-18; CPP range 77-83. Patient continues to spike temperatures, WBC up-trending 10.52 --> 11.34. CSF cultures sent my NeuroSx, 2/1/2021, blood cultures sent, 2/1/2021.     Plan:  #Neuro:  - Neuro checks q1hr  - EVD as per NeuroSx; keep unclamped @ [id number]  - Keep ICP < 20, CPP > 70  - Continue Keppra 500mg IV q12hrs  - Continue to wean sedation as tolerated for neurological prognostication  - Fentanyl IVP prn for agitation/vent synchrony  - Keep serum Na 135-145    #CV:  - Keep SBP < 150 as per NeuroSx  - Utilize Nicardipine as needed while maintaining CPP > 70  - Recommend initiating PO antihypertensive agents to wean off Nicardipine gtt    #Resp:  - Ventilator management as per primary team  - HOB > 35 degrees  - Aspiration precautions  - SAT/SBT as tolerated    #Renal/Fluid/Electolytes:  - Strict I&Os  - Keep euvolemic   - Monitor lytes, replete as needed     #ID:  - Continuous temperature monitoring  - Keep normothermic; Tylenol 650mg PO q6hrs prn for temp > 38C  - F/U CSF cultures, drawn by NeuroSx 2/2 increased temps, r/o ventriculitis  - F/U blood cultures, drawn 2/1/2021  - ID recs appreciated: Continue Cefepime/Vancomycin    #GI:  - Continue Protonix 40mg IV q12hrs  - Continue Miralax 17 grams PO q24hrs  - Continue Senna 2 tablets PO q24hrs HS    #DVT prophylaxis:  - SCS while in bed  - Okay to start DVT prophylaxis if cleared by NeuroSx and after obtaining B/L LE venous duplex to r/o DVT      Eli Khalil, [facility]  Please feel free to contact for any questions or concerns   #[phone] Specialty: Neuro Critical Care     HPI:  50 years old right handed Male with PMHx of HTN, GI bleed, PUD,  Mrankin score of 0 at baseline was brought in by EMS for  AMS and left sided weakness.   Wife states that patient was having a usual day today went to bathroom and then the next thing she noted was that he was confused and was lying on the bathroom floor, she reports urinary incontinence, patient was confused, had trouble speaking, and could not move his left side.   In the ED, /148, HR 80, saturating well. Code stroke called, NIHSS 24 and GCS 11, CTH showed Moderate to large intraparenchymal right basal ganglia hemorrhage with mild surrounding edema and associated right sulcal effacement as well as decompression into the ventricles. There is approximately 6.3 mm of midline shift. CTA did not show any evidence of active bleeding, AVM or aneurysm or major vascular stenosis or occlusion. Pt became unresponsive in CT scan and had to be intubated and sedated. Patient was intubated in ED for worsening mental status and GCS. Started on Nicardipine drip for sbp in 200s and EVD was placed by neurosurgery at bedside. Pan trauma scan was negative. To be admitted under ICU for further monitoring.  (27 Jan 2021 23:13)    Past Medical and Surgical Hx:  HTN (hypertension)  GI bleed  Gastric ulcer  PUD (peptic ulcer disease)  Arm fracture, left  s/p surgical repair    Allergies: No Known Allergies    ROS: Patient unable to participate in ROS due to neurologic status.    PE:  General: mechanically vented, sedated on Propofol and Fentanyl, male of appropriate age, lying supine in bed. NAD  Neuro:  MSE: mechanically vented, sedated on Propofol and Fentanyl, does not follow commands, flutters eyes to voice and noxious stimuli. No blink to threat  CN: Pupils 3mm brisk bilaterally. Right eye right gaze preference that can be overcome. No nystagmus noted. (+) corneal reflex (+) cough (+) gag (+) oculocephalic reflex  Motor: Good muscle bulk/tone. Right UE/LE moves spontaneously however does not follow commands. LUE withdrawal to noxious stimuli. LLE no movement noted to noxious stimuli.  Sensory: No facial grimacing noted to noxious stimuli  EVD drain unclamped @ 20mmHg    Vital Signs:  ICU Vital Signs Last 24 Hrs  T(C): 37 (02 Feb 2021 08:00), Max: 39.5 (01 Feb 2021 20:00)  T(F): 98.6 (02 Feb 2021 08:00), Max: 103.1 (01 Feb 2021 20:00)  HR: 63 (02 Feb 2021 14:00) (49 - 92)  BP: 129/81 (02 Feb 2021 08:00) (122/77 - 164/95)  BP(mean): 99 (02 Feb 2021 08:00) (89 - 118)  ABP: 146/73 (02 Feb 2021 14:00) (121/79 - 190/92)  ABP(mean): 95 (02 Feb 2021 14:00) (91 - 117)  RR: 24 (02 Feb 2021 14:00) (24 - 26)  SpO2: 93% (02 Feb 2021 14:00) (93% - 100%)    I/O Detail:  01 Feb 2021 07:01  -  02 Feb 2021 07:00  --------------------------------------------------------  IN:    Enteral Tube Flush: 195 mL    FentaNYL: 245 mL    IV PiggyBack: 50 mL    IV PiggyBack: 100 mL    IV PiggyBack: 500 mL    IV PiggyBack: 650 mL    Jevity: 160 mL    NiCARdipine: 632.5 mL    Propofol: 541 mL    Vital High Protein: 880 mL  Total IN: 3953.5 mL    OUT:    Dexmedetomidine: 0 mL    Drain (mL): 307 mL    Indwelling Catheter - Urethral (mL): 975 mL  Total OUT: 1282 mL    Total NET: 2671.5 mL      02 Feb 2021 07:01  -  02 Feb 2021 14:58  --------------------------------------------------------  IN:    FentaNYL: 135 mL    NiCARdipine: 325 mL    Propofol: 93 mL    Vital High Protein: 440 mL  Total IN: 993 mL    OUT:    Drain (mL): 93 mL    Voided (mL): 280 mL  Total OUT: 373 mL    Total NET: 620 mL    EVD output for 24 hours: 307    Labs:  02-02    138  |  106  |  23<H>  ----------------------------<  148<H>  3.7   |  22  |  1.1    Ca    8.0<L>      02 Feb 2021 04:30  Phos  3.3     02-02  Mg     2.2     02-02    TPro  5.5<L>  /  Alb  3.4<L>  /  TBili  1.0  /  DBili  x   /  AST  30  /  ALT  26  /  AlkPhos  68  02-02                          13.3   11.34 )-----------( 230      ( 02 Feb 2021 04:30 )             39.4     ABG - ( 02 Feb 2021 03:46 )  pH, Arterial: 7.40  pH, Blood: x     /  pCO2: 40    /  pO2: 67    / HCO3: 25    / Base Excess: x     /  SaO2: 93        Microbiology:  MRSA: 2/1/2021: negative     Urine culture: 2/1/2021: negative    CSF: 2/1/2021:  Acid Fast Bacilli Smear: less than 5cc CSF received, unable to perform AFB smear  Gram stain: no organisms seen  Fungal: testing in progress  Lactate dehydrogenase: 112   Protein: 47  Glucose: 76  Cerebrospinal Fluid Cell Count-1 (02.01.21 @ 20:10)   Tube Type: Tube 1   CSF Appearance: Cloudy   CSF Lymphocytes: 8 %   CSF Monocytes/Macrophages: 8 %   CSF Segmented Neutrophils: 84 %   Appearance Spun: Xanthochromic   RBC Count - Spinal Fluid: 04571 /uL   Total Nucleated Cell Count, CSF: 6 /uL   CSF Color: Pink     Medications Current and PRN:  MEDICATIONS  (STANDING):  cefepime   IVPB      cefepime   IVPB 2000 milliGRAM(s) IV Intermittent every 12 hours  chlorhexidine 0.12% Liquid 15 milliLiter(s) Oral Mucosa every 12 hours  chlorhexidine 4% Liquid 1 Application(s) Topical <User Schedule>  levETIRAcetam  IVPB 500 milliGRAM(s) IV Intermittent every 12 hours  niCARdipine Infusion 5 mG/Hr (25 mL/Hr) IV Continuous <Continuous>  pantoprazole  Injectable 40 milliGRAM(s) IV Push every 12 hours  polyethylene glycol 3350 17 Gram(s) Oral daily  propofol Infusion 20 MICROgram(s)/kG/Min (12 mL/Hr) IV Continuous <Continuous>  senna 2 Tablet(s) Oral at bedtime  vancomycin  IVPB 1500 milliGRAM(s) IV Intermittent every 12 hours    MEDICATIONS  (PRN):  acetaminophen   Tablet .. 650 milliGRAM(s) Oral every 6 hours PRN Temp greater or equal to 38C (100.4F)    Imaging:  EXAM:  CT BRAIN (1/31/2021):   IMPRESSION:  There is motion artifact.  1.  No significant interval change in the size/configuration of the right frontal lobe/basal ganglia parenchymal hematoma measuring up to 6.4 x 3.5 x 5.0 cm (trv, AP, CC) with rupture into the right lateral ventricle. The surrounding edema has mildly increased. The mass effect with 6 mm right to left midline shift is about stable.  2.  Mild increase in the intraventricular hemorrhage. Slight increase in ventricular size.  3.  No significant change in scattered subarachnoid hemorrhage.  4.  Stable position of a left transfrontal EVD with distal tip terminating in the region of the suprasellar cistern.    EXAM:  CT VENOGRAM BRAIN (W)AW IC (1/28/2021):   IMPRESSION:  No evidence of dural venous sinus thrombosis or stenosis.  Similar appearance of right basal ganglia intraparenchymal hemorrhage with local mass effect extending into the ventricles as well as the right frontal and temporal lobes.    EXAM:  CT BRAIN (1/28/2021):   IMPRESSION:  No significant change in large acute right basal ganglia hemorrhage with intraventricular extension as well as extending into right frontal and temporal lobes. Unchanged local mass effect with 5mm of leftward midline shift.  Similar-appearing minimal subarachnoid hemorrhage noted in the left parietal sulci.    EXAM:  CT ANGIO NECK (W)AW IC/CT ANGIO BRAIN (W)AW IC (1/27/2021):   IMPRESSION  The previously noted area of intracerebral hemorrhage in the right basal ganglia region is again identified. Please see report of the CT scan of the head dated 1/27/2021.  No evidence of active bleeding, AVM or aneurysm.  No evidence of major vascular stenosis or occlusion,    EXAM:  CT BRAIN STROKE PROTOCOL (1/27/2021):   IMPRESSION:  Moderate to large intraparenchymal right basal ganglia hemorrhage with mild surrounding edema and associated right sulcal effacement as well as decompression into the ventricles. There is approximately 6.3 mm of midline shift.    Assessment:  50-year-old male with PMHx GIB, HTN, and PUD, BIBA by EMS, found of floor of bathroom with AMS, left hemiparesis, and dysarthria. In ED, /148. Stroke code initiated. Initial NIH = 24/GCS = 11. CTH revealed moderate to large intraparenchymal right basal ganglia ICH with mild edema and associated right sulcal effacement with decompression of ventricles and 6.3mm midline shift. CTA negative for active bleed, AVM, aneurysm major vascular stenosis or occlusion. Intubated for airway protection r/t worsening mental status and neurological decline. NeuroSx c/s, s/p EVD placement. Admitted to ICU for management. On examination today, mechanically vented, sedated on Propofol and Fentanyl, PERRLA, does not open eyes to voice or noxious stimuli, does not follow commands, RUE/RLE moves spontaneously yet not to commands, LUE withdrawal to pain, LLE no movement to noxious stimuli, with intact brainstem reflexes. EVD drain @ [id number], open to drain. ICP range 15-18; CPP range 77-83. Patient continues to spike temperatures, WBC up-trending 10.52 --> 11.34. CSF cultures sent my NeuroSx, 2/1/2021, blood cultures sent, 2/1/2021.     Plan:  #Neuro:  - Neuro checks q1hr  - EVD as per NeuroSx; keep unclamped @ [id number]  - Keep ICP < 20, CPP > 70  - Continue Keppra 500mg IV q12hrs  - Continue to wean sedation as tolerated for neurological prognostication  - Fentanyl IVP prn for agitation/vent synchrony  - Keep serum Na 135-145    #CV:  - Keep SBP < 150 as per NeuroSx  - Utilize Nicardipine as needed while maintaining CPP > 70  - Recommend initiating PO antihypertensive agents to wean off Nicardipine gtt    #Resp:  - Ventilator management as per primary team  - HOB > 35 degrees  - Aspiration precautions  - SAT/SBT as tolerated    #Renal/Fluid/Electolytes:  - Strict I&Os  - Keep euvolemic   - Monitor lytes, replete as needed     #ID:  - Continuous temperature monitoring  - Keep normothermic; Tylenol 650mg PO q6hrs prn for temp > 38C  - F/U CSF cultures, drawn by NeuroSx 2/2 increased temps, r/o ventriculitis  - F/U blood cultures, drawn 2/1/2021  - ID recs appreciated: Continue Cefepime/Vancomycin    #GI:  - Continue Protonix 40mg IV q12hrs  - Continue Miralax 17 grams PO q24hrs  - Continue Senna 2 tablets PO q24hrs HS    #DVT prophylaxis:  - SCS while in bed  - Okay to start DVT prophylaxis if cleared by NeuroSx and after obtaining B/L LE venous duplex to r/o DVT      Eli Khalil, [facility]-BC  Please feel free to contact for any questions or concerns   #[phone]      ***Addendum, 2/2/2021 @ 1820***  Patient re-evaluated @ 1815, seen to still have increased temperatures, recommend:  - Tylenol 650mg standing order instead of prn  - TTM [place] to goal temperature of 36C  - Bedside shivering assessment protocol  - Infectious work-up still pending, remains on Cefepime/Vanco    Spoke with primary team regarding additional recommendations

## 2021-02-02 NOTE — PROGRESS NOTE ADULT - ASSESSMENT
· Assessment	  50 years old right handed Male with PMHx of HTN, GI bleed, PUD, was brought in by EMS for  AMS and left sided weakness.   Wife states that patient was having a usual day today went to bathroom and then the next thing she noted was that he was confused and was lying on the bathroom floor, she reports urinary incontinence, patient was confused, had trouble speaking, and could not move his left side.   CTH showed Moderate to large intraparenchymal right basal ganglia hemorrhage with mild surrounding edema and associated right sulcal effacement as well as decompression into the ventricles. There is approximately 6.3 mm of midline shift.   CTA did not show any evidence of active bleeding, AVM or aneurysm or major vascular stenosis or occlusion. Pt became unresponsive in CT scan and had to be intubated and sedated.  EVD was placed by neurosurgery 1/27   ID called for ABx management    IMPRESSION;  CSF : 2/1 no evidence of an infectious etiology  Fevers related to extensive CNS bleed  CT Head 1/31  No significant interval change in the size/configuration of the right frontal lobe/basal ganglia parenchymal hematoma measuring up to 6.4 x 3.5 x 5.0 cm (trv, AP, CC) with rupture into the right lateral ventricle. The surrounding edema has mildly increased. The mass effect with 6 mm right to left midline shift is about stable.  Mild increase in the intraventricular hemorrhage. Slight increase in ventricular size.  No significant change in scattered subarachnoid hemorrhage.  Stable position of a left transfrontal EVD with distal tip terminating in the region of the suprasellar cistern.    Right frontal/basal ganglia parenchymal hematoma with rupture into lateral right ventral with stable mass effect with midline shift.  Increasing IVH. No change in SAH    The CNS bleed with central fevers  Susana JASSO    RECOMMENDATIONS;  BCx   F/u CSF bacterial cultures  Vancomycin 1,5  gm iv q12h with trough before the 5th dose and maintain trough around 20   Cefepime 2 gm iv q8h  Will descalate ABx once CSF resulted

## 2021-02-03 LAB
ALBUMIN SERPL ELPH-MCNC: 3.3 G/DL — LOW (ref 3.5–5.2)
ALP SERPL-CCNC: 66 U/L — SIGNIFICANT CHANGE UP (ref 30–115)
ALT FLD-CCNC: 27 U/L — SIGNIFICANT CHANGE UP (ref 0–41)
ANION GAP SERPL CALC-SCNC: 10 MMOL/L — SIGNIFICANT CHANGE UP (ref 7–14)
AST SERPL-CCNC: 29 U/L — SIGNIFICANT CHANGE UP (ref 0–41)
BASOPHILS # BLD AUTO: 0.04 K/UL — SIGNIFICANT CHANGE UP (ref 0–0.2)
BASOPHILS NFR BLD AUTO: 0.4 % — SIGNIFICANT CHANGE UP (ref 0–1)
BILIRUB SERPL-MCNC: 0.7 MG/DL — SIGNIFICANT CHANGE UP (ref 0.2–1.2)
BUN SERPL-MCNC: 27 MG/DL — HIGH (ref 10–20)
CALCIUM SERPL-MCNC: 8.2 MG/DL — LOW (ref 8.5–10.1)
CHLORIDE SERPL-SCNC: 105 MMOL/L — SIGNIFICANT CHANGE UP (ref 98–110)
CO2 SERPL-SCNC: 23 MMOL/L — SIGNIFICANT CHANGE UP (ref 17–32)
CREAT SERPL-MCNC: 1 MG/DL — SIGNIFICANT CHANGE UP (ref 0.7–1.5)
EOSINOPHIL # BLD AUTO: 0.34 K/UL — SIGNIFICANT CHANGE UP (ref 0–0.7)
EOSINOPHIL NFR BLD AUTO: 3.3 % — SIGNIFICANT CHANGE UP (ref 0–8)
FUNGITELL: <31 PG/ML — SIGNIFICANT CHANGE UP
GLUCOSE SERPL-MCNC: 129 MG/DL — HIGH (ref 70–99)
HCT VFR BLD CALC: 38.8 % — LOW (ref 42–52)
HGB BLD-MCNC: 13.2 G/DL — LOW (ref 14–18)
IMM GRANULOCYTES NFR BLD AUTO: 0.9 % — HIGH (ref 0.1–0.3)
INR BLD: 1.1 RATIO — SIGNIFICANT CHANGE UP (ref 0.65–1.3)
LYMPHOCYTES # BLD AUTO: 0.95 K/UL — LOW (ref 1.2–3.4)
LYMPHOCYTES # BLD AUTO: 9.2 % — LOW (ref 20.5–51.1)
MAGNESIUM SERPL-MCNC: 2.2 MG/DL — SIGNIFICANT CHANGE UP (ref 1.8–2.4)
MCHC RBC-ENTMCNC: 31.5 PG — HIGH (ref 27–31)
MCHC RBC-ENTMCNC: 34 G/DL — SIGNIFICANT CHANGE UP (ref 32–37)
MCV RBC AUTO: 92.6 FL — SIGNIFICANT CHANGE UP (ref 80–94)
MONOCYTES # BLD AUTO: 1.06 K/UL — HIGH (ref 0.1–0.6)
MONOCYTES NFR BLD AUTO: 10.2 % — HIGH (ref 1.7–9.3)
NEUTROPHILS # BLD AUTO: 7.9 K/UL — HIGH (ref 1.4–6.5)
NEUTROPHILS NFR BLD AUTO: 76 % — HIGH (ref 42.2–75.2)
NRBC # BLD: 0 /100 WBCS — SIGNIFICANT CHANGE UP (ref 0–0)
PLATELET # BLD AUTO: 232 K/UL — SIGNIFICANT CHANGE UP (ref 130–400)
POTASSIUM SERPL-MCNC: 4.3 MMOL/L — SIGNIFICANT CHANGE UP (ref 3.5–5)
POTASSIUM SERPL-SCNC: 4.3 MMOL/L — SIGNIFICANT CHANGE UP (ref 3.5–5)
PROCALCITONIN SERPL-MCNC: 0.07 NG/ML — SIGNIFICANT CHANGE UP (ref 0.02–0.1)
PROT SERPL-MCNC: 5.5 G/DL — LOW (ref 6–8)
PROTHROM AB SERPL-ACNC: 12.7 SEC — SIGNIFICANT CHANGE UP (ref 9.95–12.87)
RBC # BLD: 4.19 M/UL — LOW (ref 4.7–6.1)
RBC # FLD: 12.2 % — SIGNIFICANT CHANGE UP (ref 11.5–14.5)
SODIUM SERPL-SCNC: 138 MMOL/L — SIGNIFICANT CHANGE UP (ref 135–146)
WBC # BLD: 10.38 K/UL — SIGNIFICANT CHANGE UP (ref 4.8–10.8)
WBC # FLD AUTO: 10.38 K/UL — SIGNIFICANT CHANGE UP (ref 4.8–10.8)

## 2021-02-03 PROCEDURE — 71045 X-RAY EXAM CHEST 1 VIEW: CPT | Mod: 26

## 2021-02-03 PROCEDURE — 99024 POSTOP FOLLOW-UP VISIT: CPT

## 2021-02-03 PROCEDURE — 99233 SBSQ HOSP IP/OBS HIGH 50: CPT

## 2021-02-03 PROCEDURE — 70450 CT HEAD/BRAIN W/O DYE: CPT | Mod: 26

## 2021-02-03 PROCEDURE — 71045 X-RAY EXAM CHEST 1 VIEW: CPT | Mod: 26,77

## 2021-02-03 RX ORDER — SODIUM CHLORIDE 9 MG/ML
30 INJECTION INTRAMUSCULAR; INTRAVENOUS; SUBCUTANEOUS ONCE
Refills: 0 | Status: COMPLETED | OUTPATIENT
Start: 2021-02-03 | End: 2021-02-03

## 2021-02-03 RX ORDER — LABETALOL HCL 100 MG
0.5 TABLET ORAL
Qty: 400 | Refills: 0 | Status: DISCONTINUED | OUTPATIENT
Start: 2021-02-03 | End: 2021-02-04

## 2021-02-03 RX ORDER — FENTANYL CITRATE 50 UG/ML
50 INJECTION INTRAVENOUS ONCE
Refills: 0 | Status: DISCONTINUED | OUTPATIENT
Start: 2021-02-03 | End: 2021-02-03

## 2021-02-03 RX ORDER — NICARDIPINE HYDROCHLORIDE 30 MG/1
15 CAPSULE, EXTENDED RELEASE ORAL
Qty: 40 | Refills: 0 | Status: DISCONTINUED | OUTPATIENT
Start: 2021-02-03 | End: 2021-03-08

## 2021-02-03 RX ORDER — FENTANYL CITRATE 50 UG/ML
0.5 INJECTION INTRAVENOUS
Qty: 2500 | Refills: 0 | Status: DISCONTINUED | OUTPATIENT
Start: 2021-02-03 | End: 2021-02-07

## 2021-02-03 RX ORDER — MIDAZOLAM HYDROCHLORIDE 1 MG/ML
0.02 INJECTION, SOLUTION INTRAMUSCULAR; INTRAVENOUS
Qty: 100 | Refills: 0 | Status: DISCONTINUED | OUTPATIENT
Start: 2021-02-03 | End: 2021-02-09

## 2021-02-03 RX ORDER — MAGNESIUM SULFATE 500 MG/ML
1 VIAL (ML) INJECTION
Qty: 20 | Refills: 0 | Status: DISCONTINUED | OUTPATIENT
Start: 2021-02-03 | End: 2021-02-03

## 2021-02-03 RX ORDER — SODIUM CHLORIDE 9 MG/ML
500 INJECTION, SOLUTION INTRAVENOUS
Refills: 0 | Status: DISCONTINUED | OUTPATIENT
Start: 2021-02-03 | End: 2021-02-04

## 2021-02-03 RX ORDER — ACETAMINOPHEN 500 MG
1000 TABLET ORAL ONCE
Refills: 0 | Status: COMPLETED | OUTPATIENT
Start: 2021-02-03 | End: 2021-02-03

## 2021-02-03 RX ADMIN — FENTANYL CITRATE 5 MICROGRAM(S)/KG/HR: 50 INJECTION INTRAVENOUS at 02:01

## 2021-02-03 RX ADMIN — PANTOPRAZOLE SODIUM 40 MILLIGRAM(S): 20 TABLET, DELAYED RELEASE ORAL at 17:47

## 2021-02-03 RX ADMIN — LEVETIRACETAM 420 MILLIGRAM(S): 250 TABLET, FILM COATED ORAL at 17:47

## 2021-02-03 RX ADMIN — PROPOFOL 12 MICROGRAM(S)/KG/MIN: 10 INJECTION, EMULSION INTRAVENOUS at 05:30

## 2021-02-03 RX ADMIN — Medication 400 MILLIGRAM(S): at 04:15

## 2021-02-03 RX ADMIN — SODIUM CHLORIDE 30 MILLILITER(S): 9 INJECTION INTRAMUSCULAR; INTRAVENOUS; SUBCUTANEOUS at 12:08

## 2021-02-03 RX ADMIN — FENTANYL CITRATE 50 MICROGRAM(S): 50 INJECTION INTRAVENOUS at 10:17

## 2021-02-03 RX ADMIN — MIDAZOLAM HYDROCHLORIDE 2 MG/KG/HR: 1 INJECTION, SOLUTION INTRAMUSCULAR; INTRAVENOUS at 09:54

## 2021-02-03 RX ADMIN — FENTANYL CITRATE 50 MICROGRAM(S): 50 INJECTION INTRAVENOUS at 07:24

## 2021-02-03 RX ADMIN — CHLORHEXIDINE GLUCONATE 15 MILLILITER(S): 213 SOLUTION TOPICAL at 17:47

## 2021-02-03 RX ADMIN — CEFEPIME 100 MILLIGRAM(S): 1 INJECTION, POWDER, FOR SOLUTION INTRAMUSCULAR; INTRAVENOUS at 04:15

## 2021-02-03 RX ADMIN — CHLORHEXIDINE GLUCONATE 1 APPLICATION(S): 213 SOLUTION TOPICAL at 05:14

## 2021-02-03 RX ADMIN — Medication 650 MILLIGRAM(S): at 17:47

## 2021-02-03 RX ADMIN — CHLORHEXIDINE GLUCONATE 15 MILLILITER(S): 213 SOLUTION TOPICAL at 04:15

## 2021-02-03 RX ADMIN — PANTOPRAZOLE SODIUM 40 MILLIGRAM(S): 20 TABLET, DELAYED RELEASE ORAL at 04:15

## 2021-02-03 RX ADMIN — Medication 650 MILLIGRAM(S): at 11:00

## 2021-02-03 RX ADMIN — LEVETIRACETAM 420 MILLIGRAM(S): 250 TABLET, FILM COATED ORAL at 05:30

## 2021-02-03 RX ADMIN — Medication 500 MILLIGRAM(S): at 04:15

## 2021-02-03 RX ADMIN — POLYETHYLENE GLYCOL 3350 17 GRAM(S): 17 POWDER, FOR SOLUTION ORAL at 11:00

## 2021-02-03 NOTE — PROGRESS NOTE ADULT - ATTENDING COMMENTS
Patient seen today with neurocritical care team LAQUITA Mead.    I was physically present for the key portions of the evaluation and management (E/M) service provided.  I agree with the above history, physical, and plan with the following additions/modifications    Patient with right BG IPH, now over the past 24h failed clamp trial and increasing ICPs, up to 20's today in spite of decreasing EVD to 10 and sedation.  Unclear if due to fever and shivering, vs worsening bleed, vs, EVD shift/failure (no waveform though still draining).  Needs repeat CTH today, continue sedation as needed for vent synchrony, would dose hypertonic saline now for Na of 138 empirically.  For shivering, can start skin counterwarming with elena hugger in conjunction with arctic sun, and magnesium drip if still shivering.  Neurology will follow closely.      Thomas Koroma MD  Attending Neurointensivist

## 2021-02-03 NOTE — PROGRESS NOTE ADULT - SUBJECTIVE AND OBJECTIVE BOX
MELODIE HERNADEZ  50y, Male    All available historical data reviewed    OVERNIGHT EVENTS:  fevers  remains non responsive  moves right U/LEs  no pressors  no diarrhea    ROS:  unable to obtain history secondary to patient's mental status and/or sedation    VITALS:  T(F): 97.5, Max: 101.9 (02-03-21 @ 00:00)  HR: 92  BP: 160/83  RR: 26Vital Signs Last 24 Hrs  T(C): 36.4 (03 Feb 2021 11:00), Max: 38.8 (03 Feb 2021 00:00)  T(F): 97.5 (03 Feb 2021 11:00), Max: 101.9 (03 Feb 2021 00:00)  HR: 92 (03 Feb 2021 11:00) (56 - 92)  BP: 160/83 (03 Feb 2021 07:00) (142/80 - 160/94)  BP(mean): 114 (03 Feb 2021 07:00) (103 - 122)  RR: 26 (03 Feb 2021 11:00) (24 - 33)  SpO2: 97% (03 Feb 2021 11:00) (92% - 98%)    TESTS & MEASUREMENTS:                        13.2   10.38 )-----------( 232      ( 03 Feb 2021 04:30 )             38.8     02-03    138  |  105  |  27<H>  ----------------------------<  129<H>  4.3   |  23  |  1.0    Ca    8.2<L>      03 Feb 2021 04:30  Phos  3.3     02-02  Mg     2.2     02-03    TPro  5.5<L>  /  Alb  3.3<L>  /  TBili  0.7  /  DBili  x   /  AST  29  /  ALT  27  /  AlkPhos  66  02-03    LIVER FUNCTIONS - ( 03 Feb 2021 04:30 )  Alb: 3.3 g/dL / Pro: 5.5 g/dL / ALK PHOS: 66 U/L / ALT: 27 U/L / AST: 29 U/L / GGT: x             Culture - Acid Fast - CSF (collected 02-01-21 @ 20:10)  Source: .CSF CSF    Culture - Fungal, CSF (collected 02-01-21 @ 20:10)  Source: .CSF CSF  Preliminary Report (02-02-21 @ 09:12):    Testing in progress    Culture - CSF with Gram Stain (collected 02-01-21 @ 20:10)  Source: .CSF CSF  Gram Stain (02-02-21 @ 08:53):    polymorphonuclear leukocytes seen    No organisms seen    by cytocentrifuge  Preliminary Report (02-02-21 @ 15:32):    No growth    Culture - Blood (collected 02-01-21 @ 18:13)  Source: .Blood None  Preliminary Report (02-03-21 @ 01:01):    No growth to date.    Culture - Blood (collected 02-01-21 @ 18:13)  Source: .Blood None  Preliminary Report (02-03-21 @ 01:01):    No growth to date.    Culture - Blood (collected 02-01-21 @ 11:00)  Source: .Blood Blood-Arterial  Preliminary Report (02-02-21 @ 23:02):    No growth to date.    Culture - Urine (collected 02-01-21 @ 05:00)  Source: .Urine Clean Catch (Midstream)  Final Report (02-02-21 @ 11:16):    No growth    Culture - Blood (collected 02-01-21 @ 04:47)  Source: .Blood Blood-Arterial  Preliminary Report (02-02-21 @ 16:01):    No growth to date.            RADIOLOGY & ADDITIONAL TESTS:  Personal review of radiological diagnostics performed  Echo and EKG results noted when applicable.     MEDICATIONS:  acetaminophen   Tablet .. 650 milliGRAM(s) Oral every 6 hours  cefepime   IVPB 2000 milliGRAM(s) IV Intermittent every 12 hours  cefepime   IVPB      chlorhexidine 0.12% Liquid 15 milliLiter(s) Oral Mucosa every 12 hours  chlorhexidine 4% Liquid 1 Application(s) Topical <User Schedule>  fentaNYL   Infusion. 0.5 MICROgram(s)/kG/Hr IV Continuous <Continuous>  levETIRAcetam  IVPB 500 milliGRAM(s) IV Intermittent every 12 hours  meperidine     Injectable 12.5 milliGRAM(s) IV Push every 4 hours PRN  midazolam Infusion 0.02 mG/kG/Hr IV Continuous <Continuous>  niCARdipine Infusion 5 mG/Hr IV Continuous <Continuous>  pantoprazole  Injectable 40 milliGRAM(s) IV Push every 12 hours  polyethylene glycol 3350 17 Gram(s) Oral daily  propofol Infusion 20 MICROgram(s)/kG/Min IV Continuous <Continuous>  senna 2 Tablet(s) Oral at bedtime  sodium chloride 23.4% Injectable 30 milliLiter(s) IV Push once  vancomycin  IVPB 1500 milliGRAM(s) IV Intermittent every 12 hours      ANTIBIOTICS:  cefepime   IVPB 2000 milliGRAM(s) IV Intermittent every 12 hours  cefepime   IVPB      vancomycin  IVPB 1500 milliGRAM(s) IV Intermittent every 12 hours

## 2021-02-03 NOTE — PROGRESS NOTE ADULT - ASSESSMENT
IMPRESSION:    Large ICB (RBG) hypertensive/ SP evd, intubated, for airway protection  spiking T    PLAN:    CNS: propofol . Add versed.  FU with Neuro and NeuroSX. archtic sun  ICP monitoring as per neuro sx / neurology , CPP more than 70, Neuro fluid CSF fluid    HEENT: Oral care    PULMONARY:  HOB @ 45 degrees.  increase FIO2, Repeat CXR    CARDIOVASCULAR:  BP control.  nicardipine drip as needed to keep SBP less then 140     GI: GI prophylaxis.  OG Feeding.  Bowel regimen , Fleet enema    RENAL:  Follow up lytes.  Correct as needed monitor is and os       INFECTIOUS DISEASE: Follow up cultures , ID f/up  HEMATOLOGICAL:  DVT prophylaxis seq.      ENDOCRINE:  Follow up FS.  Insulin protocol if needed.    MUSCULOSKELETAL:  Bed rest    Prognosis guarded

## 2021-02-03 NOTE — PROGRESS NOTE ADULT - SUBJECTIVE AND OBJECTIVE BOX
Interval Hx: Patient experiencing persistent fevers, Tmax 101.9F. Started on arctic sun. This AM BP increased to SBP in 200s, ICP increased to 23. EVD lowered to 10mmHg by neurosurgery. Patient experienced multiple episodes of emesis. High concern for re-bleeding. Administered 30mLs 23.4% hypertonic saline, arranging for STAT CTH.      ROS:  Patient unable to participate in ROS due to neurologic status      Medications:  MEDICATIONS  (STANDING):  acetaminophen   Tablet .. 650 milliGRAM(s) Oral every 6 hours  chlorhexidine 0.12% Liquid 15 milliLiter(s) Oral Mucosa every 12 hours  chlorhexidine 4% Liquid 1 Application(s) Topical <User Schedule>  fentaNYL   Infusion. 0.5 MICROgram(s)/kG/Hr (5 mL/Hr) IV Continuous <Continuous>  levETIRAcetam  IVPB 500 milliGRAM(s) IV Intermittent every 12 hours  magnesium sulfate Infusion 1 Gm/Hr (25 mL/Hr) IV Continuous <Continuous>  midazolam Infusion 0.02 mG/kG/Hr (2 mL/Hr) IV Continuous <Continuous>  niCARdipine Infusion 5 mG/Hr (25 mL/Hr) IV Continuous <Continuous>  pantoprazole  Injectable 40 milliGRAM(s) IV Push every 12 hours  polyethylene glycol 3350 17 Gram(s) Oral daily  propofol Infusion 20 MICROgram(s)/kG/Min (12 mL/Hr) IV Continuous <Continuous>  senna 2 Tablet(s) Oral at bedtime    MEDICATIONS  (PRN):  meperidine     Injectable 12.5 milliGRAM(s) IV Push every 4 hours PRN Shivering      Intake & output:  I&O's Summary    02 Feb 2021 07:01  -  03 Feb 2021 07:00  --------------------------------------------------------  IN: 4033.5 mL / OUT: 1555 mL / NET: 2478.5 mL    03 Feb 2021 07:01  -  03 Feb 2021 14:11  --------------------------------------------------------  IN: 1168.5 mL / OUT: 740 mL / NET: 428.5 mL      Vital signs:  ICU Vital Signs Last 24 Hrs  T(C): 36 (03 Feb 2021 12:00), Max: 38.8 (03 Feb 2021 00:00)  T(F): 96.8 (03 Feb 2021 12:00), Max: 101.9 (03 Feb 2021 00:00)  HR: 98 (03 Feb 2021 12:00) (56 - 98)  BP: 160/83 (03 Feb 2021 07:00) (142/80 - 160/94)  BP(mean): 114 (03 Feb 2021 07:00) (103 - 122)  ABP: 134/59 (03 Feb 2021 13:00) (119/65 - 197/70)  ABP(mean): 77 (03 Feb 2021 13:00) (77 - 99)  RR: 24 (03 Feb 2021 13:00) (24 - 33)  SpO2: 98% (03 Feb 2021 13:00) (92% - 98%)    Vent settings:  Mode: AC/ CMV (Assist Control/ Continuous Mandatory Ventilation)  RR (machine): 24  TV (machine): 450  FiO2: 70  PEEP: 5  ITime: 1  MAP: 3  PIP: 18      Physical exam:  Gen: Ill-appearing male, intubated and sedated.  Mental status: Sedated. Does not open eyes or move extremities to noxious stimuli.   Cranial nerves: Pupils equal, sluggishly reactive. Right eye abducted. (+) corneals, gag.  Motor:  No spontaneous movements. No response to noxious stimuli.  Sensation: No response to noxious stimuli      Labs:                        13.2   10.38 )-----------( 232      ( 03 Feb 2021 04:30 )             38.8   02-03    138  |  105  |  27<H>  ----------------------------<  129<H>  4.3   |  23  |  1.0    Ca    8.2<L>      03 Feb 2021 04:30  Phos  3.3     02-02  Mg     2.2     02-03    TPro  5.5<L>  /  Alb  3.3<L>  /  TBili  0.7  /  DBili  x   /  AST  29  /  ALT  27  /  AlkPhos  66  02-03      PT/INR - ( 03 Feb 2021 04:30 )   PT: 12.70 sec;   INR: 1.10 ratio        ABG - ( 03 Feb 2021 02:46 )  pH, Arterial: 7.40  pH, Blood: x     /  pCO2: 41    /  pO2: 58    / HCO3: 25    / Base Excess: 0.6   /  SaO2: 90          Culture - CSF with Gram Stain . (02.01.21 @ 20:10)   Gram Stain: polymorphonuclear leukocytes seen No organisms seen by cytocentrifuge     Lactate Dehydrogenase, CSF (02.01.21 @ 20:10)   Lactate Dehydrogenase, CSF: 112  Protein, CSF (02.01.21 @ 20:10)   Protein, CSF: 47 mg/dL   Glucose, CSF (02.01.21 @ 20:10)   Glucose, CSF: 76 mg/dL   Herpes Simplex Virus 1/2 PCR, CSF (02.01.21 @ 20:10)   Source HSV 1/2: CSF     Cerebrospinal Fluid Cell Count-1 (02.01.21 @ 20:10)   CSF Color: Pink   RBC Count - Spinal Fluid: 10545 /uL   Total Nucleated Cell Count, CSF: 6 /uL   Tube Type: Tube 1   CSF Appearance: Cloudy   CSF Lymphocytes: 8 %   CSF Monocytes/Macrophages: 8 %   CSF Segmented Neutrophils: 84 %   Appearance Spun: Xanthochromic     Culture - Blood (02.01.21 @ 18:13)   Specimen Source: .Blood None     Imaging:  EXAM:  CT BRAIN          PROCEDURE DATE:  01/31/2021      FINDINGS/  IMPRESSION:    There is motion artifact.    1.  No significant interval change in the size/configuration of the right frontal lobe/basal ganglia parenchymal hematoma measuring up to 6.4 x 3.5 x 5.0 cm (trv, AP, CC) with rupture into the right lateral ventricle. The surrounding edema has mildly increased. The mass effect with 6 mm right to left midline shift is about stable.    2.  Mild increase in the intraventricular hemorrhage. Slight increase in ventricular size.    3.  No significant change in scattered subarachnoid hemorrhage.    4.  Stable position of a left transfrontal EVD with distal tip terminating in the region of the suprasellar cistern.      Assessment: 50-year-old male with PMHx GIB, HTN, and PUD, BIBA by EMS, found of floor of bathroom with AMS, left hemiparesis, and dysarthria. In ED, /148. Stroke code initiated. Initial NIH = 24/GCS = 11. CTH revealed moderate to large intraparenchymal right basal ganglia ICH with mild edema and associated right sulcal effacement with decompression of ventricles and 6.3mm midline shift. CTA negative for active bleed, AVM, aneurysm major vascular stenosis or occlusion. Intubated for airway protection r/t worsening mental status and neurological decline. NeuroSx c/s, s/p EVD placement. Admitted to ICU for management. Patient experiencing persistent fevers, cooling initiated. Infectious workup has been negative. Today became hypertensive, ICP increased, began vomiting. EVD lowered to 10mmHg by neurosurgery. Given 30mL bolus of 23.4% saline, going for STAT CTH. High concern for re-bleeding.      Interval Hx: Patient experiencing persistent fevers, Tmax 101.9F. Started on arctic sun. Agitated, requiring increased sedation. This AM BP increased to SBP in 200s, ICP increased to 23. EVD lowered to 10mmHg by neurosurgery. Patient experienced multiple episodes of emesis. Concern for re-bleeding, administered 30mLs 23.4% hypertonic saline, STAT CTH pending      ROS:  Patient unable to participate in ROS due to neurologic status      Medications:  MEDICATIONS  (STANDING):  acetaminophen   Tablet .. 650 milliGRAM(s) Oral every 6 hours  chlorhexidine 0.12% Liquid 15 milliLiter(s) Oral Mucosa every 12 hours  chlorhexidine 4% Liquid 1 Application(s) Topical <User Schedule>  fentaNYL   Infusion. 0.5 MICROgram(s)/kG/Hr (5 mL/Hr) IV Continuous <Continuous>  levETIRAcetam  IVPB 500 milliGRAM(s) IV Intermittent every 12 hours  magnesium sulfate Infusion 1 Gm/Hr (25 mL/Hr) IV Continuous <Continuous>  midazolam Infusion 0.02 mG/kG/Hr (2 mL/Hr) IV Continuous <Continuous>  niCARdipine Infusion 5 mG/Hr (25 mL/Hr) IV Continuous <Continuous>  pantoprazole  Injectable 40 milliGRAM(s) IV Push every 12 hours  polyethylene glycol 3350 17 Gram(s) Oral daily  propofol Infusion 20 MICROgram(s)/kG/Min (12 mL/Hr) IV Continuous <Continuous>  senna 2 Tablet(s) Oral at bedtime    MEDICATIONS  (PRN):  meperidine     Injectable 12.5 milliGRAM(s) IV Push every 4 hours PRN Shivering      Intake & output:  I&O's Summary    02 Feb 2021 07:01  -  03 Feb 2021 07:00  --------------------------------------------------------  IN: 4033.5 mL / OUT: 1555 mL / NET: 2478.5 mL    03 Feb 2021 07:01  -  03 Feb 2021 14:11  --------------------------------------------------------  IN: 1168.5 mL / OUT: 740 mL / NET: 428.5 mL      Vital signs:  ICU Vital Signs Last 24 Hrs  T(C): 36 (03 Feb 2021 12:00), Max: 38.8 (03 Feb 2021 00:00)  T(F): 96.8 (03 Feb 2021 12:00), Max: 101.9 (03 Feb 2021 00:00)  HR: 98 (03 Feb 2021 12:00) (56 - 98)  BP: 160/83 (03 Feb 2021 07:00) (142/80 - 160/94)  BP(mean): 114 (03 Feb 2021 07:00) (103 - 122)  ABP: 134/59 (03 Feb 2021 13:00) (119/65 - 197/70)  ABP(mean): 77 (03 Feb 2021 13:00) (77 - 99)  RR: 24 (03 Feb 2021 13:00) (24 - 33)  SpO2: 98% (03 Feb 2021 13:00) (92% - 98%)    Vent settings:  Mode: AC/ CMV (Assist Control/ Continuous Mandatory Ventilation)  RR (machine): 24  TV (machine): 450  FiO2: 70  PEEP: 5  ITime: 1  MAP: 3  PIP: 18      Physical exam:  Gen: Ill-appearing male, intubated and sedated.  Mental status: Sedated. Does not open eyes or move extremities to noxious stimuli.   Cranial nerves: Pupils equal, sluggishly reactive. Right eye abducted. (+) corneals, gag.  Motor:  No spontaneous movements. No response to noxious stimuli.  Sensation: No response to noxious stimuli      Labs:                        13.2   10.38 )-----------( 232      ( 03 Feb 2021 04:30 )             38.8   02-03    138  |  105  |  27<H>  ----------------------------<  129<H>  4.3   |  23  |  1.0    Ca    8.2<L>      03 Feb 2021 04:30  Phos  3.3     02-02  Mg     2.2     02-03    TPro  5.5<L>  /  Alb  3.3<L>  /  TBili  0.7  /  DBili  x   /  AST  29  /  ALT  27  /  AlkPhos  66  02-03      PT/INR - ( 03 Feb 2021 04:30 )   PT: 12.70 sec;   INR: 1.10 ratio        ABG - ( 03 Feb 2021 02:46 )  pH, Arterial: 7.40  pH, Blood: x     /  pCO2: 41    /  pO2: 58    / HCO3: 25    / Base Excess: 0.6   /  SaO2: 90          Culture - CSF with Gram Stain . (02.01.21 @ 20:10)   Gram Stain: polymorphonuclear leukocytes seen No organisms seen by cytocentrifuge     Lactate Dehydrogenase, CSF (02.01.21 @ 20:10)   Lactate Dehydrogenase, CSF: 112  Protein, CSF (02.01.21 @ 20:10)   Protein, CSF: 47 mg/dL   Glucose, CSF (02.01.21 @ 20:10)   Glucose, CSF: 76 mg/dL   Herpes Simplex Virus 1/2 PCR, CSF (02.01.21 @ 20:10)   Source HSV 1/2: CSF     Cerebrospinal Fluid Cell Count-1 (02.01.21 @ 20:10)   CSF Color: Pink   RBC Count - Spinal Fluid: 20169 /uL   Total Nucleated Cell Count, CSF: 6 /uL   Tube Type: Tube 1   CSF Appearance: Cloudy   CSF Lymphocytes: 8 %   CSF Monocytes/Macrophages: 8 %   CSF Segmented Neutrophils: 84 %   Appearance Spun: Xanthochromic     Culture - Blood (02.01.21 @ 18:13)   Specimen Source: .Blood None     Imaging:  EXAM:  CT BRAIN          PROCEDURE DATE:  01/31/2021      FINDINGS/  IMPRESSION:    There is motion artifact.    1.  No significant interval change in the size/configuration of the right frontal lobe/basal ganglia parenchymal hematoma measuring up to 6.4 x 3.5 x 5.0 cm (trv, AP, CC) with rupture into the right lateral ventricle. The surrounding edema has mildly increased. The mass effect with 6 mm right to left midline shift is about stable.    2.  Mild increase in the intraventricular hemorrhage. Slight increase in ventricular size.    3.  No significant change in scattered subarachnoid hemorrhage.    4.  Stable position of a left transfrontal EVD with distal tip terminating in the region of the suprasellar cistern.      Assessment: 50-year-old male with PMHx GIB, HTN, and PUD, BIBA by EMS, found of floor of bathroom with AMS, left hemiparesis, and dysarthria. In ED, /148. Stroke code initiated. Initial NIH = 24/GCS = 11. CTH revealed moderate to large intraparenchymal right basal ganglia ICH with mild edema and associated right sulcal effacement with decompression of ventricles and 6.3mm midline shift. CTA negative for active bleed, AVM, aneurysm major vascular stenosis or occlusion. Intubated for airway protection r/t worsening mental status and neurological decline. NeuroSx c/s, s/p EVD placement. Admitted to ICU for management. Patient experiencing persistent fevers, cooling initiated. Infectious workup has been negative. Today became hypertensive, ICP increased, began vomiting. EVD lowered to 10mmHg by neurosurgery. Given 30mL bolus of 23.4% saline, STAT CTH pending.    Recommendations:    #Neuro:  - CTH pending  - vEEG  - Neuro checks q1hr  - EVD as per NeuroSx  - Keep ICP < 20, CPP > 70  - Continue Keppra 500mg IV q12hrs  - Continue sedation as needed for agitation    #CV:  - Keep SBP < 150 as per NeuroSx  - Utilize Nicardipine as needed while maintaining CPP > 70  - Can add additional agent given persistently high BP today, such as labetalol or hydralazine    #Resp:  - Ventilator management as per primary team  - HOB > 35 degrees  - Aspiration precautions  - SAT/SBT as tolerated    #Renal/Fluid/Electolytes:  - Monitor Na  - Strict I&Os  - Keep euvolemic     #ID:  - Targeted temperature management, goal temperature 36C  - Continuous temperature monitoring  - Bedside shivering assessment; skin counter warming to prevent shivering  - If patient does begin shivering, can start 0.5g/hr magnesium gtt, can also use Buspar or Meperidine  - No abx recommended by ID, NGTD on cultures    #GI:  - Continue Protonix 40mg IV q12hrs  - Continue Miralax 17 grams PO q24hrs  - Continue Senna 2 tablets PO q24hrs HS    #DVT prophylaxis:  - SCDs

## 2021-02-03 NOTE — PROGRESS NOTE ADULT - ASSESSMENT
· Assessment	  50 years old right handed Male with PMHx of HTN, GI bleed, PUD, was brought in by EMS for  AMS and left sided weakness.   Wife states that patient was having a usual day today went to bathroom and then the next thing she noted was that he was confused and was lying on the bathroom floor, she reports urinary incontinence, patient was confused, had trouble speaking, and could not move his left side.   CTH showed Moderate to large intraparenchymal right basal ganglia hemorrhage with mild surrounding edema and associated right sulcal effacement as well as decompression into the ventricles. There is approximately 6.3 mm of midline shift.   CTA did not show any evidence of active bleeding, AVM or aneurysm or major vascular stenosis or occlusion. Pt became unresponsive in CT scan and had to be intubated and sedated.  EVD was placed by neurosurgery 1/27   ID called for ABx management    IMPRESSION;  CSF : 2/1 no evidence of an infectious etiology  CSF cultures NG  BCx NG  Fevers related to extensive CNS bleed  CT Head 1/31  No significant interval change in the size/configuration of the right frontal lobe/basal ganglia parenchymal hematoma measuring up to 6.4 x 3.5 x 5.0 cm (trv, AP, CC) with rupture into the right lateral ventricle. The surrounding edema has mildly increased. The mass effect with 6 mm right to left midline shift is about stable.  Mild increase in the intraventricular hemorrhage. Slight increase in ventricular size.  No significant change in scattered subarachnoid hemorrhage.  Stable position of a left transfrontal EVD with distal tip terminating in the region of the suprasellar cistern.    Right frontal/basal ganglia parenchymal hematoma with rupture into lateral right ventral with stable mass effect with midline shift.  Increasing IVH. No change in SAH    The CNS bleed with central fevers  Susana FERRER NG    RECOMMENDATIONS;  See no need for ABx  recall prn please

## 2021-02-03 NOTE — PROGRESS NOTE ADULT - SUBJECTIVE AND OBJECTIVE BOX
Subjective: 50yMale with a pmhx of INTRACRANIAL BLEED                                     ADM/AA    INTRACRANIAL BLEED    ^MED EVAL    Family history of breast cancer (Mother)    Family history of pancreatic cancer (Father)    Handoff    MEWS Score    HTN (hypertension)    GI bleed    Gastric ulcer    PUD (peptic ulcer disease)    Intracranial bleed    Intraparenchymal hemorrhage of brain    Insertion, external ventricular drain    Ventriculostomy, for ventricular catheter insertion    Arm fracture, left    MED EVAL    90+    [email or web address]        HD#8  s/p R basal ganglia hemorrhage with intraventricular extension  s/p EVD 1.27.21    Pt with PMH HTN, PUD, GI bleed on ASA, presented with AMS and LEFT-sided weakness     Pt seen and examined at bedside.  Remains intubated, now sedated on Propofol and Fentanyl.  EVD open at @20 cm H2O, drained 35cc in 24hrs,  ICP's 15-23.  Patient hypertensive to SBP ~220.    Pt sedated, not responding to voice.  No movement of any extremities to noxious stimuli.       Allergies    No Known Allergies    Intolerances        Vital Signs Last 24 Hrs  T(C): 37.4 (03 Feb 2021 10:00), Max: 38.8 (03 Feb 2021 00:00)  T(F): 99.3 (03 Feb 2021 10:00), Max: 101.9 (03 Feb 2021 00:00)  HR: 76 (03 Feb 2021 10:00) (56 - 92)  BP: 160/83 (03 Feb 2021 07:00) (142/80 - 160/94)  BP(mean): 114 (03 Feb 2021 07:00) (103 - 122)  RR: 30 (03 Feb 2021 10:00) (24 - 33)  SpO2: 92% (03 Feb 2021 10:00) (92% - 98%)      acetaminophen   Tablet .. 650 milliGRAM(s) Oral every 6 hours  cefepime   IVPB      cefepime   IVPB 2000 milliGRAM(s) IV Intermittent every 12 hours  chlorhexidine 0.12% Liquid 15 milliLiter(s) Oral Mucosa every 12 hours  chlorhexidine 4% Liquid 1 Application(s) Topical <User Schedule>  fentaNYL   Infusion. 0.5 MICROgram(s)/kG/Hr IV Continuous <Continuous>  levETIRAcetam  IVPB 500 milliGRAM(s) IV Intermittent every 12 hours  meperidine     Injectable 12.5 milliGRAM(s) IV Push every 4 hours PRN  midazolam Infusion 0.02 mG/kG/Hr IV Continuous <Continuous>  niCARdipine Infusion 5 mG/Hr IV Continuous <Continuous>  pantoprazole  Injectable 40 milliGRAM(s) IV Push every 12 hours  polyethylene glycol 3350 17 Gram(s) Oral daily  propofol Infusion 20 MICROgram(s)/kG/Min IV Continuous <Continuous>  senna 2 Tablet(s) Oral at bedtime  vancomycin  IVPB 1500 milliGRAM(s) IV Intermittent every 12 hours        02-02-21 @ 07:01  -  02-03-21 @ 07:00  --------------------------------------------------------  IN: 4033.5 mL / OUT: 1555 mL / NET: 2478.5 mL    02-03-21 @ 07:01  -  02-03-21 @ 10:49  --------------------------------------------------------  IN: 696 mL / OUT: 465 mL / NET: 231 mL        REVIEW OF SYSTEMS    [ ] A ten-point review of systems was otherwise negative except as noted.  [ ] Due to altered mental status/intubation, subjective information were not able to be obtained from the patient. History was obtained, to the extent possible, from review of the chart and collateral sources of information.      Exam:  intubated, sedated  Pupils 2mm, very sluggish  R gaze deviation  No movement to noxious stimuli x4  No spontaneous movements of extremities  known L hemiparesis  +corneals  +gag  EVD in place, slightly red-tinged fluid in reservoir  EVD outpt 11cc      CBC Full  -  ( 03 Feb 2021 04:30 )  WBC Count : 10.38 K/uL  RBC Count : 4.19 M/uL  Hemoglobin : 13.2 g/dL  Hematocrit : 38.8 %  Platelet Count - Automated : 232 K/uL  Mean Cell Volume : 92.6 fL  Mean Cell Hemoglobin : 31.5 pg  Mean Cell Hemoglobin Concentration : 34.0 g/dL  Auto Neutrophil # : 7.90 K/uL  Auto Lymphocyte # : 0.95 K/uL  Auto Monocyte # : 1.06 K/uL  Auto Eosinophil # : 0.34 K/uL  Auto Basophil # : 0.04 K/uL  Auto Neutrophil % : 76.0 %  Auto Lymphocyte % : 9.2 %  Auto Monocyte % : 10.2 %  Auto Eosinophil % : 3.3 %  Auto Basophil % : 0.4 %    02-03    138  |  105  |  27<H>  ----------------------------<  129<H>  4.3   |  23  |  1.0    Ca    8.2<L>      03 Feb 2021 04:30  Phos  3.3     02-02  Mg     2.2     02-03    TPro  5.5<L>  /  Alb  3.3<L>  /  TBili  0.7  /  DBili  x   /  AST  29  /  ALT  27  /  AlkPhos  66  02-03    PT/INR - ( 03 Feb 2021 04:30 )   PT: 12.70 sec;   INR: 1.10 ratio        Assessment/Plan:   49yo male with R basal ganglia hemorrhage s/p EVD placement.      EVD lowered and open @ 10cm H2O  cont to monitor ICP and outpt and record  if ICP >20 for longer than 15 minutes while pt not agitated, call x [phone]  cont neuro checks q 1hr  cont antibx while EVD in place  f/u Blood cxs and CSF cxs  will d/w attg

## 2021-02-03 NOTE — PROGRESS NOTE ADULT - SUBJECTIVE AND OBJECTIVE BOX
OVERNIGHT EVENTS: events noted, still intubated, ventilated, spiking T, on fentanyl, propofol, cardene, spiking T, ICP 17    Vital Signs Last 24 Hrs  T(C): 36.9 (03 Feb 2021 08:00), Max: 38.8 (03 Feb 2021 00:00)  T(F): 98.5 (03 Feb 2021 08:00), Max: 101.9 (03 Feb 2021 00:00)  HR: 71 (03 Feb 2021 08:00) (50 - 92)  BP: 160/83 (03 Feb 2021 07:00) (142/80 - 160/94)  BP(mean): 114 (03 Feb 2021 07:00) (103 - 122)  RR: 30 (03 Feb 2021 08:00) (24 - 33)  SpO2: 96% (03 Feb 2021 08:00) (93% - 98%)    PHYSICAL EXAMINATION:    GENERAL: ILL looking    HEENT: Head is normocephalic and atraumatic.     NECK: Supple.    LUNGS: bl rhonchi    HEART: Regular rate and rhythm without murmur.    ABDOMEN: Soft, nontender, and nondistended.      EXTREMITIES: Without any cyanosis, clubbing, rash, lesions or edema.    NEUROLOGIC: SEDATED    SKIN: No ulceration or induration present.      LABS:                        13.2   10.38 )-----------( 232      ( 03 Feb 2021 04:30 )             38.8     02-03    138  |  105  |  27<H>  ----------------------------<  129<H>  4.3   |  23  |  1.0    Ca    8.2<L>      03 Feb 2021 04:30  Phos  3.3     02-02  Mg     2.2     02-03    TPro  5.5<L>  /  Alb  3.3<L>  /  TBili  0.7  /  DBili  x   /  AST  29  /  ALT  27  /  AlkPhos  66  02-03    PT/INR - ( 03 Feb 2021 04:30 )   PT: 12.70 sec;   INR: 1.10 ratio             ABG - ( 03 Feb 2021 02:46 )  pH, Arterial: 7.40  pH, Blood: x     /  pCO2: 41    /  pO2: 58    / HCO3: 25    / Base Excess: 0.6   /  SaO2: 90          450/24/5/60                Procalcitonin, Serum: 0.07 ng/mL (02-01-21 @ 12:11)        02-02-21 @ 07:01  -  02-03-21 @ 07:00  --------------------------------------------------------  IN: 4033.5 mL / OUT: 1555 mL / NET: 2478.5 mL    02-03-21 @ 07:01  -  02-03-21 @ 08:12  --------------------------------------------------------  IN: 172 mL / OUT: 190 mL / NET: -18 mL        MICROBIOLOGY:  Culture Results:   No growth (02-01 @ 20:10)  Culture Results:   Testing in progress (02-01 @ 20:10)  Culture Results:   No growth to date. (02-01 @ 18:13)  Culture Results:   No growth to date. (02-01 @ 18:13)  Culture Results:   No growth to date. (02-01 @ 11:00)  Culture Results:   No growth (02-01 @ 05:00)  Culture Results:   No growth to date. (02-01 @ 04:47)      MEDICATIONS  (STANDING):  acetaminophen   Tablet .. 650 milliGRAM(s) Oral every 6 hours  cefepime   IVPB 2000 milliGRAM(s) IV Intermittent every 12 hours  cefepime   IVPB      chlorhexidine 0.12% Liquid 15 milliLiter(s) Oral Mucosa every 12 hours  chlorhexidine 4% Liquid 1 Application(s) Topical <User Schedule>  fentaNYL   Infusion. 0.5 MICROgram(s)/kG/Hr (5 mL/Hr) IV Continuous <Continuous>  levETIRAcetam  IVPB 500 milliGRAM(s) IV Intermittent every 12 hours  niCARdipine Infusion 5 mG/Hr (25 mL/Hr) IV Continuous <Continuous>  pantoprazole  Injectable 40 milliGRAM(s) IV Push every 12 hours  polyethylene glycol 3350 17 Gram(s) Oral daily  propofol Infusion 20 MICROgram(s)/kG/Min (12 mL/Hr) IV Continuous <Continuous>  senna 2 Tablet(s) Oral at bedtime  vancomycin  IVPB 1500 milliGRAM(s) IV Intermittent every 12 hours    MEDICATIONS  (PRN):  meperidine     Injectable 12.5 milliGRAM(s) IV Push every 4 hours PRN Shivering      RADIOLOGY & ADDITIONAL STUDIES:

## 2021-02-04 LAB
ALBUMIN SERPL ELPH-MCNC: 1.3 G/DL — LOW (ref 3.5–5.2)
ALBUMIN SERPL ELPH-MCNC: 2.9 G/DL — LOW (ref 3.5–5.2)
ALBUMIN SERPL ELPH-MCNC: 3 G/DL — LOW (ref 3.5–5.2)
ALP SERPL-CCNC: 33 U/L — SIGNIFICANT CHANGE UP (ref 30–115)
ALP SERPL-CCNC: 66 U/L — SIGNIFICANT CHANGE UP (ref 30–115)
ALP SERPL-CCNC: 69 U/L — SIGNIFICANT CHANGE UP (ref 30–115)
ALT FLD-CCNC: 13 U/L — SIGNIFICANT CHANGE UP (ref 0–41)
ALT FLD-CCNC: 26 U/L — SIGNIFICANT CHANGE UP (ref 0–41)
ALT FLD-CCNC: 30 U/L — SIGNIFICANT CHANGE UP (ref 0–41)
ANION GAP SERPL CALC-SCNC: 8 MMOL/L — SIGNIFICANT CHANGE UP (ref 7–14)
ANION GAP SERPL CALC-SCNC: 9 MMOL/L — SIGNIFICANT CHANGE UP (ref 7–14)
AST SERPL-CCNC: 14 U/L — SIGNIFICANT CHANGE UP (ref 0–41)
AST SERPL-CCNC: 27 U/L — SIGNIFICANT CHANGE UP (ref 0–41)
AST SERPL-CCNC: 29 U/L — SIGNIFICANT CHANGE UP (ref 0–41)
BASOPHILS # BLD AUTO: 0.04 K/UL — SIGNIFICANT CHANGE UP (ref 0–0.2)
BASOPHILS NFR BLD AUTO: 0.4 % — SIGNIFICANT CHANGE UP (ref 0–1)
BILIRUB DIRECT SERPL-MCNC: 0.4 MG/DL — HIGH (ref 0–0.2)
BILIRUB INDIRECT FLD-MCNC: 0.1 MG/DL — LOW (ref 0.2–1.2)
BILIRUB SERPL-MCNC: 0.5 MG/DL — SIGNIFICANT CHANGE UP (ref 0.2–1.2)
BILIRUB SERPL-MCNC: 1 MG/DL — SIGNIFICANT CHANGE UP (ref 0.2–1.2)
BILIRUB SERPL-MCNC: 1 MG/DL — SIGNIFICANT CHANGE UP (ref 0.2–1.2)
BUN SERPL-MCNC: 16 MG/DL — SIGNIFICANT CHANGE UP (ref 10–20)
BUN SERPL-MCNC: 20 MG/DL — SIGNIFICANT CHANGE UP (ref 10–20)
CALCIUM SERPL-MCNC: 8.5 MG/DL — SIGNIFICANT CHANGE UP (ref 8.5–10.1)
CALCIUM SERPL-MCNC: 8.5 MG/DL — SIGNIFICANT CHANGE UP (ref 8.5–10.1)
CHLORIDE SERPL-SCNC: 110 MMOL/L — SIGNIFICANT CHANGE UP (ref 98–110)
CHLORIDE SERPL-SCNC: 112 MMOL/L — HIGH (ref 98–110)
CO2 SERPL-SCNC: 22 MMOL/L — SIGNIFICANT CHANGE UP (ref 17–32)
CO2 SERPL-SCNC: 24 MMOL/L — SIGNIFICANT CHANGE UP (ref 17–32)
CREAT SERPL-MCNC: 0.7 MG/DL — SIGNIFICANT CHANGE UP (ref 0.7–1.5)
CREAT SERPL-MCNC: 0.8 MG/DL — SIGNIFICANT CHANGE UP (ref 0.7–1.5)
CULTURE RESULTS: NO GROWTH — SIGNIFICANT CHANGE UP
EOSINOPHIL # BLD AUTO: 0.71 K/UL — HIGH (ref 0–0.7)
EOSINOPHIL NFR BLD AUTO: 6.4 % — SIGNIFICANT CHANGE UP (ref 0–8)
GLUCOSE SERPL-MCNC: 92 MG/DL — SIGNIFICANT CHANGE UP (ref 70–99)
GLUCOSE SERPL-MCNC: 95 MG/DL — SIGNIFICANT CHANGE UP (ref 70–99)
HCT VFR BLD CALC: 39.7 % — LOW (ref 42–52)
HCT VFR BLD CALC: 40.4 % — LOW (ref 42–52)
HGB BLD-MCNC: 13.3 G/DL — LOW (ref 14–18)
HGB BLD-MCNC: 13.6 G/DL — LOW (ref 14–18)
IMM GRANULOCYTES NFR BLD AUTO: 1 % — HIGH (ref 0.1–0.3)
LYMPHOCYTES # BLD AUTO: 0.81 K/UL — LOW (ref 1.2–3.4)
LYMPHOCYTES # BLD AUTO: 7.3 % — LOW (ref 20.5–51.1)
MAGNESIUM SERPL-MCNC: 2.2 MG/DL — SIGNIFICANT CHANGE UP (ref 1.8–2.4)
MAGNESIUM SERPL-MCNC: 2.3 MG/DL — SIGNIFICANT CHANGE UP (ref 1.8–2.4)
MCHC RBC-ENTMCNC: 31.5 PG — HIGH (ref 27–31)
MCHC RBC-ENTMCNC: 31.6 PG — HIGH (ref 27–31)
MCHC RBC-ENTMCNC: 33.5 G/DL — SIGNIFICANT CHANGE UP (ref 32–37)
MCHC RBC-ENTMCNC: 33.7 G/DL — SIGNIFICANT CHANGE UP (ref 32–37)
MCV RBC AUTO: 94 FL — SIGNIFICANT CHANGE UP (ref 80–94)
MCV RBC AUTO: 94.1 FL — HIGH (ref 80–94)
MONOCYTES # BLD AUTO: 1.04 K/UL — HIGH (ref 0.1–0.6)
MONOCYTES NFR BLD AUTO: 9.3 % — SIGNIFICANT CHANGE UP (ref 1.7–9.3)
NEUTROPHILS # BLD AUTO: 8.42 K/UL — HIGH (ref 1.4–6.5)
NEUTROPHILS NFR BLD AUTO: 75.6 % — HIGH (ref 42.2–75.2)
NRBC # BLD: 0 /100 WBCS — SIGNIFICANT CHANGE UP (ref 0–0)
NRBC # BLD: 0 /100 WBCS — SIGNIFICANT CHANGE UP (ref 0–0)
PLATELET # BLD AUTO: 244 K/UL — SIGNIFICANT CHANGE UP (ref 130–400)
PLATELET # BLD AUTO: 245 K/UL — SIGNIFICANT CHANGE UP (ref 130–400)
POTASSIUM SERPL-MCNC: 4 MMOL/L — SIGNIFICANT CHANGE UP (ref 3.5–5)
POTASSIUM SERPL-MCNC: 4.1 MMOL/L — SIGNIFICANT CHANGE UP (ref 3.5–5)
POTASSIUM SERPL-SCNC: 4 MMOL/L — SIGNIFICANT CHANGE UP (ref 3.5–5)
POTASSIUM SERPL-SCNC: 4.1 MMOL/L — SIGNIFICANT CHANGE UP (ref 3.5–5)
PROT SERPL-MCNC: 2.5 G/DL — LOW (ref 6–8)
PROT SERPL-MCNC: 5.1 G/DL — LOW (ref 6–8)
PROT SERPL-MCNC: 5.3 G/DL — LOW (ref 6–8)
RBC # BLD: 4.22 M/UL — LOW (ref 4.7–6.1)
RBC # BLD: 4.3 M/UL — LOW (ref 4.7–6.1)
RBC # FLD: 11.9 % — SIGNIFICANT CHANGE UP (ref 11.5–14.5)
RBC # FLD: 12.1 % — SIGNIFICANT CHANGE UP (ref 11.5–14.5)
SODIUM SERPL-SCNC: 142 MMOL/L — SIGNIFICANT CHANGE UP (ref 135–146)
SODIUM SERPL-SCNC: 143 MMOL/L — SIGNIFICANT CHANGE UP (ref 135–146)
SPECIMEN SOURCE: SIGNIFICANT CHANGE UP
VDRL CSF-TITR: SIGNIFICANT CHANGE UP
WBC # BLD: 11.13 K/UL — HIGH (ref 4.8–10.8)
WBC # BLD: 11.59 K/UL — HIGH (ref 4.8–10.8)
WBC # FLD AUTO: 11.13 K/UL — HIGH (ref 4.8–10.8)
WBC # FLD AUTO: 11.59 K/UL — HIGH (ref 4.8–10.8)

## 2021-02-04 PROCEDURE — 99233 SBSQ HOSP IP/OBS HIGH 50: CPT

## 2021-02-04 PROCEDURE — 95720 EEG PHY/QHP EA INCR W/VEEG: CPT

## 2021-02-04 PROCEDURE — 71045 X-RAY EXAM CHEST 1 VIEW: CPT | Mod: 26,77

## 2021-02-04 PROCEDURE — 71045 X-RAY EXAM CHEST 1 VIEW: CPT | Mod: 26

## 2021-02-04 RX ORDER — ACETAMINOPHEN 500 MG
650 TABLET ORAL EVERY 6 HOURS
Refills: 0 | Status: DISCONTINUED | OUTPATIENT
Start: 2021-02-04 | End: 2021-02-05

## 2021-02-04 RX ORDER — SODIUM CHLORIDE 5 G/100ML
500 INJECTION, SOLUTION INTRAVENOUS
Refills: 0 | Status: DISCONTINUED | OUTPATIENT
Start: 2021-02-04 | End: 2021-02-07

## 2021-02-04 RX ORDER — SODIUM CHLORIDE 9 MG/ML
500 INJECTION, SOLUTION INTRAVENOUS
Refills: 0 | Status: DISCONTINUED | OUTPATIENT
Start: 2021-02-04 | End: 2021-02-04

## 2021-02-04 RX ORDER — MINERAL OIL
133 OIL (ML) MISCELLANEOUS ONCE
Refills: 0 | Status: COMPLETED | OUTPATIENT
Start: 2021-02-04 | End: 2021-02-04

## 2021-02-04 RX ORDER — MAGNESIUM SULFATE 500 MG/ML
0.5 VIAL (ML) INJECTION
Qty: 20 | Refills: 0 | Status: DISCONTINUED | OUTPATIENT
Start: 2021-02-04 | End: 2021-02-04

## 2021-02-04 RX ORDER — SODIUM CHLORIDE 9 MG/ML
500 INJECTION, SOLUTION INTRAVENOUS
Refills: 0 | Status: DISCONTINUED | OUTPATIENT
Start: 2021-02-04 | End: 2021-02-07

## 2021-02-04 RX ADMIN — CHLORHEXIDINE GLUCONATE 1 APPLICATION(S): 213 SOLUTION TOPICAL at 06:13

## 2021-02-04 RX ADMIN — FENTANYL CITRATE 5 MICROGRAM(S)/KG/HR: 50 INJECTION INTRAVENOUS at 09:00

## 2021-02-04 RX ADMIN — PANTOPRAZOLE SODIUM 40 MILLIGRAM(S): 20 TABLET, DELAYED RELEASE ORAL at 06:13

## 2021-02-04 RX ADMIN — Medication 133 MILLILITER(S): at 21:08

## 2021-02-04 RX ADMIN — Medication 650 MILLIGRAM(S): at 06:12

## 2021-02-04 RX ADMIN — CHLORHEXIDINE GLUCONATE 15 MILLILITER(S): 213 SOLUTION TOPICAL at 06:13

## 2021-02-04 RX ADMIN — LEVETIRACETAM 420 MILLIGRAM(S): 250 TABLET, FILM COATED ORAL at 06:12

## 2021-02-04 RX ADMIN — Medication 650 MILLIGRAM(S): at 13:01

## 2021-02-04 RX ADMIN — LEVETIRACETAM 420 MILLIGRAM(S): 250 TABLET, FILM COATED ORAL at 18:54

## 2021-02-04 RX ADMIN — POLYETHYLENE GLYCOL 3350 17 GRAM(S): 17 POWDER, FOR SOLUTION ORAL at 13:01

## 2021-02-04 RX ADMIN — PANTOPRAZOLE SODIUM 40 MILLIGRAM(S): 20 TABLET, DELAYED RELEASE ORAL at 18:54

## 2021-02-04 RX ADMIN — Medication 650 MILLIGRAM(S): at 00:12

## 2021-02-04 RX ADMIN — MEPERIDINE HYDROCHLORIDE 12.5 MILLIGRAM(S): 50 INJECTION INTRAMUSCULAR; INTRAVENOUS; SUBCUTANEOUS at 12:30

## 2021-02-04 RX ADMIN — SENNA PLUS 2 TABLET(S): 8.6 TABLET ORAL at 21:08

## 2021-02-04 RX ADMIN — CHLORHEXIDINE GLUCONATE 15 MILLILITER(S): 213 SOLUTION TOPICAL at 19:54

## 2021-02-04 RX ADMIN — Medication 650 MILLIGRAM(S): at 18:53

## 2021-02-04 RX ADMIN — PROPOFOL 12 MICROGRAM(S)/KG/MIN: 10 INJECTION, EMULSION INTRAVENOUS at 21:08

## 2021-02-04 RX ADMIN — SODIUM CHLORIDE 50 MILLILITER(S): 5 INJECTION, SOLUTION INTRAVENOUS at 06:11

## 2021-02-04 RX ADMIN — Medication 1 ENEMA: at 13:01

## 2021-02-04 RX ADMIN — SENNA PLUS 2 TABLET(S): 8.6 TABLET ORAL at 00:12

## 2021-02-04 NOTE — PROGRESS NOTE ADULT - ASSESSMENT
IMPRESSION:    Large ICB (RBG) hypertensive/ SP evd, intubated, for airway protection SP repeat head  spiking T    PLAN:    CNS: propofol . versed.  FU with Neuro and NeuroSX. archtic sun  ICP monitoring as per neuro sx / neurology , CPP more than 70, Neuro fluid CSF fluid neg    HEENT: Oral care    PULMONARY:  HOB @ 45 degrees. FIO2, Repeat CXR, peep 10    CARDIOVASCULA keep equal    GI: GI prophylaxis. npo Bowel regimen , Fleet enema    RENAL:  Follow up lytes.  Correct as needed monitor is and os , repeat CMP      INFECTIOUS DISEASE: Follow up cultures , ID f/up  HEMATOLOGICAL:  DVT prophylaxis seq.      ENDOCRINE:  Follow up FS.  Insulin protocol if needed.    MUSCULOSKELETAL:  Bed rest    Prognosis guarded

## 2021-02-04 NOTE — PROGRESS NOTE ADULT - ATTENDING COMMENTS
Patient seen today with neurocritical care ACP team.      I was physically present for the key portions of the evaluation and management (E/M) service provided.  I agree with the above history, physical, and plan with the following additions/modifications     Today ICPs improved with sedation, continue for 24h before attempting to lighten.  Continue 3% with goal 140-150.  Discussed with nsgy, continue EVD at 10.  PAtient with continued microshivering on exam today - please start skin-counterwarming with elena hugger on low setting (see citaitons/evidence behind practice today - was discontinued last night).  Similarly, please start magnesium drip as first-line treatment (See citaiton below) at 0.5grams/hour.  ONgoing shivering and fevers can contribute to elevated ICP and should be addressed with this if possible.    Thomas Koroma MD  Attending Neurointensivist     Skin Counterwarming:  Salvador VERDUZCO, Radu ZEPEDA, Toshia LAM, Compa QUINTERO, Compa WHEELER, Annalee LAM, Janice MENDEZ, Zach NELSON. Metabolic benefits of surface counter warming during therapeutic temperature modulation. Crit Care Med. 2009 Jun;37(6):1893-7. doi: 10.1097/[email or web address]. PMID: 07372156.    Anti-shivering protocol:   Glenn JARRELL, Rene MACDONALD, Danisha LAM, Compa QUINTERO, Ro AM, Lesch C, Makenna E, Rebeca R, Zach NELSON, Berry MAYS, Rae CHAUDHRY, Janice MENDEZ, Salvador VERDUZCO. Prevention of shivering during therapeutic temperature modulation: the [place] anti-shivering protocol. Neurocrit Care. 2011 Jun;14(3):389-94. doi: 10.1007/[id number]. PMID: 47943427.

## 2021-02-04 NOTE — PROGRESS NOTE ADULT - SUBJECTIVE AND OBJECTIVE BOX
HD  # 9    s/p R basal ganglia hemorrhage with intraventricular extension  s/p EVD 1.27.21    pt seen and examined at bedside pt is intubated sedated on fentanyl and versed  , doesnt follow commands     Vital Signs Last 24 Hrs  T(C): 36.1 (04 Feb 2021 10:00), Max: 36.6 (03 Feb 2021 17:00)  T(F): 97 (04 Feb 2021 10:00), Max: 97.9 (03 Feb 2021 17:00)  HR: 58 (04 Feb 2021 10:00) (50 - 68)  BP: 139/66 (03 Feb 2021 16:00) (139/66 - 139/66)  BP(mean): 85 (03 Feb 2021 16:00) (85 - 85)  RR: 24 (04 Feb 2021 10:00) (24 - 24)  SpO2: 99% (04 Feb 2021 10:00) (95% - 100%)    I&O's Detail    03 Feb 2021 07:01  -  04 Feb 2021 07:00  --------------------------------------------------------  IN:    FentaNYL: 930 mL    Midazolam: 195 mL    NiCARdipine: 1207.5 mL    Propofol: 612 mL    sodium chloride 3%: 150 mL    Vital High Protein: 165 mL  Total IN: 3259.5 mL    OUT:    Drain (mL): 244 mL    Indwelling Catheter - Urethral (mL): 1485 mL  Total OUT: 1729 mL    Total NET: 1530.5 mL      04 Feb 2021 07:01  -  04 Feb 2021 13:51  --------------------------------------------------------  IN:    FentaNYL: 120 mL    Midazolam: 26 mL    Propofol: 72 mL    sodium chloride 3%: 150 mL  Total IN: 368 mL    OUT:    Drain (mL): 39 mL    Indwelling Catheter - Urethral (mL): 490 mL  Total OUT: 529 mL    Total NET: -161 mL        I&O's Summary    03 Feb 2021 07:01  -  04 Feb 2021 07:00  --------------------------------------------------------  IN: 3259.5 mL / OUT: 1729 mL / NET: 1530.5 mL    04 Feb 2021 07:01  -  04 Feb 2021 13:51  --------------------------------------------------------  IN: 368 mL / OUT: 529 mL / NET: -161 mL        PHYSICAL EXAM:    Pupils 3 mm sluggish   no withdrawal to noxious stimuli bilateral UE's   no withdrawal to noxious stimuli bilateral LE's   head EVD  in place     ICP 1     LABS:                        13.6   11.13 )-----------( 244      ( 04 Feb 2021 04:30 )             40.4     02-04    143  |  110  |  20  ----------------------------<  95  4.0   |  24  |  0.7    Ca    8.5      04 Feb 2021 04:15  Mg     2.2     02-04    TPro  5.3<L>  /  Alb  3.0<L>  /  TBili  1.0  /  DBili  x   /  AST  27  /  ALT  30  /  AlkPhos  66  02-04    PT/INR - ( 03 Feb 2021 04:30 )   PT: 12.70 sec;   INR: 1.10 ratio                 Drug Levels: [] N/A  Vancomycin Level, Trough: 4.4 ug/mL (02-01 @ 16:00)    CSF Analysis: [] N/A      Allergies    No Known Allergies    Intolerances      MEDICATIONS:  Antibiotics:    Neuro:  acetaminophen    Suspension .. 650 milliGRAM(s) Oral every 6 hours  fentaNYL   Infusion. 0.5 MICROgram(s)/kG/Hr IV Continuous <Continuous>  levETIRAcetam  IVPB 500 milliGRAM(s) IV Intermittent every 12 hours  meperidine     Injectable 12.5 milliGRAM(s) IV Push every 4 hours PRN  midazolam Infusion 0.02 mG/kG/Hr IV Continuous <Continuous>  propofol Infusion 20 MICROgram(s)/kG/Min IV Continuous <Continuous>    Anticoagulation:    OTHER:  chlorhexidine 0.12% Liquid 15 milliLiter(s) Oral Mucosa every 12 hours  chlorhexidine 4% Liquid 1 Application(s) Topical <User Schedule>  niCARdipine Infusion 5 mG/Hr IV Continuous <Continuous>  pantoprazole  Injectable 40 milliGRAM(s) IV Push every 12 hours  polyethylene glycol 3350 17 Gram(s) Oral daily  saline laxative (FLEET) Rectal Enema 1 Enema Rectal daily  senna 2 Tablet(s) Oral at bedtime    IVF:  sodium chloride 0.9% 500 milliLiter(s) IV Continuous <Continuous>  sodium chloride 3%. 500 milliLiter(s) IV Continuous <Continuous>    CULTURES:  Culture Results:   No growth (02-01 @ 20:10)  Culture Results:   Testing in progress (02-01 @ 20:10)    RADIOLOGY & ADDITIONAL TESTS:      ASSESSMENT:  50y Male s/p    INTRACRANIAL BLEED                                     ADM/AA    INTRACRANIAL BLEED    ^MED EVAL    Family history of breast cancer (Mother)    Family history of pancreatic cancer (Father)    Handoff    MEWS Score    HTN (hypertension)    GI bleed    Gastric ulcer    PUD (peptic ulcer disease)    Intracranial bleed    Intraparenchymal hemorrhage of brain    Insertion, external ventricular drain    Ventriculostomy, for ventricular catheter insertion    Arm fracture, left    MED EVAL    90+    [email or web address]        A/p               s/p R basal ganglia hemorrhage with intraventricular extension              s/p EVD 1.27.21                    Neuro checks                     monitor ICP & EVD

## 2021-02-04 NOTE — PROGRESS NOTE ADULT - ASSESSMENT
Impression:  50-year-old male with PMHx GIB, HTN, and PUD, BIBA by EMS, found of floor of bathroom with AMS, left hemiparesis, and dysarthria. In ED, /148. Stroke code initiated. Initial NIH = 24/GCS = 11. CTH revealed moderate to large intraparenchymal right basal ganglia ICH with mild edema and associated right sulcal effacement with decompression of ventricles and 6.3mm midline shift. CTA negative for, AVM, aneurysm major vascular stenosis or occlusion. Intubated for airway protection. S/p EVD placement. Patient experiencing persistent fevers with negative infectious workup, on  Artic sun with today's tmax of 99.4 degree f.  On 2/3 he  became hypertensive, with increased ICP and  vomiting. EVD lowered to 10mmHg by neurosurgery. Given 30mL bolus of 23.4% saline and repeat stat cth was unchanged. Overnight today his ICPs Persistently increased and he was started on hypertonic saline.          Suggestions:  #Neuro:  - Obtain stat cmp   - Initiate 3% hypertonic saline @50cc/hr  - Please monitor Cmp Q4-6hrs  - Keep NA goal of 145-155  - Continue vEEG  - Neuro checks q1hr  - EVD as per Neuro Sx  - Keep ICP < 20, CPP > 70  - Continue Keppra 500mg IV q12hrs  - Continue sedation       #CV:  - Keep SBP < 150   - Utilize Nicardipine as needed while maintaining CPP > 70  - Can add additional agent given persistently high BP today, such as labetalol or hydralazine    #Resp:  - Ventilator management as per primary team  - HOB > 35 degrees  - Aspiration precautions      #Renal/Fluid/Electolytes:  - Monitor Na  - Strict I&Os  - Keep euvolemic     #ID:  - Targeted temperature management, goal temperature 36C  - Continuous temperature monitoring  - Bedside shivering assessment; skin counter warming to prevent shivering  - If patient does begin shivering, can start 0.5g/hr magnesium gtt, can also use Buspar or Meperidine  - No abx recommended by ID, NGTD on cultures    #GI:  - Continue Protonix 40mg IV q12hrs  - Continue Miralax 17 grams PO q24hrs  - Continue Senna 2 tablets PO q24hrs HS    #DVT prophylaxis:  - SCDs      Disposition: Continue critical care  unit       Impression:  50-year-old male with PMHx GIB, HTN, and PUD, BIBA by EMS, found of floor of bathroom with AMS, left hemiparesis, and dysarthria. In ED, /148. Stroke code initiated. Initial NIH = 24/GCS = 11. CTH revealed moderate to large intraparenchymal right basal ganglia ICH with mild edema and associated right sulcal effacement with decompression of ventricles and 6.3mm midline shift. CTA negative for, AVM, aneurysm major vascular stenosis or occlusion. Intubated for airway protection. S/p EVD placement. Patient experiencing persistent fevers with negative infectious workup, on  Artic sun with today's tmax of 99.4 degree f.  On 2/3 he  became hypertensive, with increased ICP and  vomiting. EVD lowered to 10mmHg by neurosurgery. Given 30mL bolus of 23.4% saline and repeat stat cth was unchanged. Overnight today his ICPs Persistently increased and he was started on hypertonic saline.          Suggestions:  #Neuro:  - Initiate 3% hypertonic saline @50cc/hr (stat cmp obtained, na 143)  - Please monitor Cmp Q4-6hrs  - Keep NA goal of 145-155  - Continue vEEG  - Neuro checks q1hr  - EVD as per Neuro Sx  - Keep ICP < 20, CPP > 70  - Continue Keppra 500mg IV q12hrs  - Continue sedation       #CV:  - Keep SBP < 150   - Utilize Nicardipine as needed while maintaining CPP > 70  - Can add additional agent given persistently high BP today, such as labetalol or hydralazine    #Resp:  - Ventilator management as per primary team  - HOB > 35 degrees  - Aspiration precautions      #Renal/Fluid/Electolytes:  - Monitor Na  - Strict I&Os  - Keep euvolemic     #ID:  - Targeted temperature management, goal temperature 36C  - Continuous temperature monitoring  - Bedside shivering assessment; skin counter warming to prevent shivering  - If patient does begin shivering, can start 0.5g/hr magnesium gtt, can also use Buspar or Meperidine  - No abx recommended by ID, NGTD on cultures    #GI:  - Continue Protonix 40mg IV q12hrs  - Continue Miralax 17 grams PO q24hrs  - Continue Senna 2 tablets PO q24hrs HS    #DVT prophylaxis:  - SCDs      Disposition: Continue critical care  unit

## 2021-02-04 NOTE — PROGRESS NOTE ADULT - SUBJECTIVE AND OBJECTIVE BOX
1.Presentation: Unresponsiveness      2. Today's Acute Problems:  -Large ICH (RBG) hypertensive/ SP evd, intubated, for airway protection  -Spiking T on Artic Sun        3.History:  50-year-old male with PMHx GIB, HTN, and PUD, BIBA by EMS, found of floor of bathroom with AMS, left hemiparesis, and dysarthria. In ED, /148. Stroke code initiated. Initial NIH = 24/GCS = 11. CTH revealed moderate to large intraparenchymal right basal ganglia ICH with mild edema and associated right sulcal effacement with decompression of ventricles and 6.3mm midline shift. CTA negative for active bleed, AVM, aneurysm major vascular stenosis or occlusion. Intubated for airway protection r/t worsening mental status and neurological decline. NeuroSx c/s, s/p EVD placement. Admitted to ICU for management. Patient experiencing persistent fevers, cooling initiated. Infectious workup has been negative. Today became hypertensive, ICP increased, began vomiting. EVD lowered to 10mmHg by neurosurgery. Given 30mL bolus of 23.4% saline, STAT CTH pending.          Yesterday's Plan:  #Neuro:  - CTH pending  - vEEG  - Neuro checks q1hr  - EVD as per NeuroSx  - Keep ICP < 20, CPP > 70  - Continue Keppra 500mg IV q12hrs  - Continue sedation as needed for agitation    #CV:  - Keep SBP < 150 as per NeuroSx  - Utilize Nicardipine as needed while maintaining CPP > 70  - Can add additional agent given persistently high BP today, such as labetalol or hydralazine    #Resp:  - Ventilator management as per primary team  - HOB > 35 degrees  - Aspiration precautions  - SAT/SBT as tolerated    #Renal/Fluid/Electolytes:  - Monitor Na  - Strict I&Os  - Keep euvolemic     #ID:  - Targeted temperature management, goal temperature 36C  - Continuous temperature monitoring  - Bedside shivering assessment; skin counter warming to prevent shivering  - If patient does begin shivering, can start 0.5g/hr magnesium gtt, can also use Buspar or Meperidine  - No abx recommended by ID, NGTD on cultures    #GI:  - Continue Protonix 40mg IV q12hrs  - Continue Miralax 17 grams PO q24hrs  - Continue Senna 2 tablets PO q24hrs HS    #DVT prophylaxis:  - SCDs          4.Last 24 hour updates:  Patient examined at bedside , he remains mechanically vented and sedated on propofol 40 mcg/kg/min, versed0.1mcg/kg/min, fentanyl 4mcg/kg/min . On nicardipine drip for bp control. Beginning 1.30 am patient was noted to have increased ICP OF 21 -24. Last na level was 24 hrs ago at 4.30am yesterday and is 138. Will obtain stat cbc and start  hypertonic saline at 50cc/hr .       5.Medications:  acetaminophen   Tablet .. 650 milliGRAM(s) Oral every 6 hours  chlorhexidine 0.12% Liquid 15 milliLiter(s) Oral Mucosa every 12 hours  chlorhexidine 4% Liquid 1 Application(s) Topical <User Schedule>  fentaNYL   Infusion. 0.5 MICROgram(s)/kG/Hr IV Continuous <Continuous>  labetalol Infusion 0.5 mG/Min IV Continuous <Continuous>  levETIRAcetam  IVPB 500 milliGRAM(s) IV Intermittent every 12 hours  midazolam Infusion 0.02 mG/kG/Hr IV Continuous <Continuous>  niCARdipine Infusion 5 mG/Hr IV Continuous <Continuous>  pantoprazole  Injectable 40 milliGRAM(s) IV Push every 12 hours  polyethylene glycol 3350 17 Gram(s) Oral daily  propofol Infusion 20 MICROgram(s)/kG/Min IV Continuous <Continuous>  senna 2 Tablet(s) Oral at bedtime  sodium chloride 0.9% 500 milliLiter(s) IV Continuous <Continuous>      6.Ancillary Management  Chest PT[]  Head of bed >35 [x]  Out of bed to chair []  PT/OT/ST []  Spirometry[]  DVT prophylaxis [x]      7.Neuro Exam:  Patient intubated and sedated not responsive to commands  RASS -5  GCS 3T  CN: Pupils 2mm very sluggish, eyes dysconjugate, +corneal and gag reflex  Power: No spontaneous extremity movements             No abnormal involuntary mvmts  No response to pain  Plantar response mute bilaterally      Last CTH:  < from: CT Head No Cont (02.03.21 @ 14:25) >  FINDINGS:    Redemonstration of a large right basal ganglia intraparenchymal hemorrhage with extension into the frontal and temporal lobes. There is mild surrounding vasogenic edema and local mass effect with midline shift to the left approximately 0.8 cm, unchanged. There is resolution of previously seen intraventricular hemorrhage. A left frontal approach ventriculostomy drainage catheter is noted with tip terminating within the suprasellar cistern. Minimal hemorrhage is noted along the ventriculostomy tract. The dilation of the left lateral ventricle is decreased since the prior examination.    There is minimal scattered subarachnoid hemorrhage.    Air-fluid levels are noted within the right sphenoid and bilateral maxillary sinuses with patchy opacification of the bilateral ethmoid air cells.    IMPRESSION:    Redemonstration of evolving acute right basal ganglia intraparenchymal hemorrhage with local mass effect and mild vasogenic edema, not significantly changed since the prior examination of 1/31/2021.    Interval resolution of previously seen intraventricular hemorrhage with unchanged scattered bilateral subarachnoid hemorrhage and minimal hemorrhage along the left frontal ventriculostomy tract.    Decreased dilation of the left lateral ventricle since prior examination.          TU BRICE M.D., ATTENDING RADIOLOGIST  This document has been electronically signed. Feb  3 2021  2:38PM    < end of copied text >        CPP: 77  ICP 21-24  EVD Set at 10      8.CVS:  Vital Signs Last 24 Hrs  T(C): 36 (04 Feb 2021 00:00), Max: 37.4 (03 Feb 2021 10:00)  T(F): 96.8 (04 Feb 2021 00:00), Max: 99.3 (03 Feb 2021 10:00)  HR: 60 (03 Feb 2021 22:00) (60 - 98)  BP: 139/66 (03 Feb 2021 16:00) (139/66 - 160/83)  BP(mean): 85 (03 Feb 2021 16:00) (85 - 115)  RR: 24 (03 Feb 2021 18:00) (24 - 33)  SpO2: 99% (04 Feb 2021 01:00) (92% - 99%)          Last EKG:  < from: 12 Lead ECG (01.27.21 @ 21:26) >  Ventricular Rate 88 BPM    Atrial Rate 88 BPM    P-R Interval 214 ms    QRS Duration 118 ms    Q-T Interval 412 ms    QTC Calculation(Bazett) 498 ms    P Axis 62 degrees    R Axis 16 degrees    T Axis 210 degrees    Diagnosis Line Sinus rhythm [id number] degree A-V block  Left ventricular hypertrophy with QRS widening  T wave abnormality, consider inferior ischemia  Prolonged QT  Abnormal ECG    Confirmed by Darryl Lagunas ([id number]) on 1/27/2021 11:22:58 PM    < end of copied text >      9.Respiratory:  ABG: ABG - ( 03 Feb 2021 02:46 )  pH, Arterial: 7.40  pH, Blood: x     /  pCO2: 41    /  pO2: 58    / HCO3: 25    / Base Excess: 0.6   /  SaO2: 90            Chest Xray:  < from: Xray Chest 1 View-PORTABLE IMMEDIATE (Xray Chest 1 View-PORTABLE IMMEDIATE .) (02.03.21 @ 10:08) >  Findings:    Support devices: Patient is intubated. Tip of the endotracheal tube is at the level the clavicles. Left-sided central venous catheter seen. Telemetry leads are identified. Enteric catheter extends below the hemidiaphragm.    Cardiac/mediastinum/hilum: Unchanged.    Lung parenchyma/Pleura: Bilateral opacifications.    Skeleton/soft tissues: Unremarkable.    Impression:    Bilateral opacifications. Support devices as described. Follow-up as needed.        MEHDI UNDERWOOD MD; Attending Interventional Radiologist  This document has been electronically signed. Feb  3 2021  9:04AM    < end of copied text >    Mode: AC/ CMV (Assist Control/ Continuous Mandatory Ventilation)  RR (machine): 24  TV (machine): 450  FiO2: 100  PEEP: 5  ITime: 1  MAP: 14  PIP: 30      10.GI:  Prophylaxis    GI:  Protonix 40 mg q 24  LIVER FUNCTIONS - ( 03 Feb 2021 04:30 )  Alb: 3.3 g/dL / Pro: 5.5 g/dL / ALK PHOS: 66 U/L / ALT: 27 U/L / AST: 29 U/L / GGT: x             11.Renal/Fluids/Electrolytes:    02-03    138  |  105  |  27<H>  ----------------------------<  129<H>  4.3   |  23  |  1.0    Ca    8.2<L>      03 Feb 2021 04:30  Phos  3.3     02-02  Mg     2.2     02-03    TPro  5.5<L>  /  Alb  3.3<L>  /  TBili  0.7  /  DBili  x   /  AST  29  /  ALT  27  /  AlkPhos  66  02-03          I&O's Detail    02 Feb 2021 07:01  -  03 Feb 2021 07:00  --------------------------------------------------------  IN:    FentaNYL: 190 mL    FentaNYL: 80 mL    IV PiggyBack: 1200 mL    NiCARdipine: 637.5 mL    NiCARdipine: 270 mL    Propofol: 336 mL    Vital High Protein: 1320 mL  Total IN: 4033.5 mL    OUT:    Drain (mL): 265 mL    Indwelling Catheter - Urethral (mL): 770 mL    Voided (mL): 520 mL  Total OUT: 1555 mL    Total NET: 2478.5 mL      03 Feb 2021 07:01  -  04 Feb 2021 04:06  --------------------------------------------------------  IN:    FentaNYL: 770 mL    Midazolam: 155 mL    NiCARdipine: 1082.5 mL    Propofol: 516 mL    Vital High Protein: 165 mL  Total IN: 2688.5 mL    OUT:    Drain (mL): 183 mL    Indwelling Catheter - Urethral (mL): 1225 mL  Total OUT: 1408 mL    Total NET: 1280.5 mL          12.ID:  TMax:  Vital Signs Last 24 Hrs  T(C): 36 (04 Feb 2021 00:00), Max: 37.4 (03 Feb 2021 10:00)  T(F): 96.8 (04 Feb 2021 00:00), Max: 99.3 (03 Feb 2021 10:00)  HR: 60 (03 Feb 2021 22:00) (60 - 98)  BP: 139/66 (03 Feb 2021 16:00) (139/66 - 160/83)  BP(mean): 85 (03 Feb 2021 16:00) (85 - 115)  RR: 24 (03 Feb 2021 18:00) (24 - 33)  SpO2: 99% (04 Feb 2021 01:00) (92% - 99%)          13.Hematology:                        13.2   10.38 )-----------( 232      ( 03 Feb 2021 04:30 )             38.8       PT/INR - ( 03 Feb 2021 04:30 )   PT: 12.70 sec;   INR: 1.10 ratio             DVT Prophylaxis  Lovenox[]   Heparin[]   Venodyne []   SCD's[x]

## 2021-02-04 NOTE — CHART NOTE - NSCHARTNOTEFT_GEN_A_CORE
Registered Dietitian Follow-Up     Patient Profile Reviewed                           Yes [x]   No []     Nutrition History Previously Obtained        Yes []  No [x]--unable to obtain as pt remains intubated        Pertinent Medical Interventions: Pt remains intubated, sedated on fentanyl and versed. Pt s/p episode of emesis yesterday.     Diet order: currently NPO except meds, however was previously receiving Vital HP @ 55ml/hr--TF currently on hold since 11am yesterday 2/2 episode of emesis. Per discussion with LIP on unit, plan to restart feeds. MAP 96.      Anthropometrics:  - Ht. 73"  - Wt.: 221#/100kg (1/27)-no new wts   - %wt change  - BMI: 29.1   - IBW: 184#     Pertinent Lab Data: (2/4): H/H 13.6/40.4     Pertinent Meds: NS, mag sulfate, mineral oil enema, fentanyl, versed, nicardipine, propofol @24ml/hr (provides additional 634kcal), keppra, protonix, miralax, senna      Physical Findings:  - Appearance: intubated/ventilated; no edema noted currently   - GI function: LBM 1/29   - Tubes: OGT   - Oral/Mouth cavity: NPO   - Skin: intact      Nutrition Requirements  Weight Used: CBW: 221#/100kg, Ve: 10.7, Tmax: 36.6; kcal needs slightly readjusted today      Estimated Energy Needs: 9679-1609 kcal/day (% [id number]: 2069 d/t borderline obese BMI).  Estimated Protein Needs: 100-120 g/day (1-1.2 g/kg ABW)  Estimated Fluid Needs: per Vent team      Nutrient Intake: not meeting kcal/pro needs at this time      Previous Nutrition Diagnosis: Inadequate protein-energy intake (ongoing)      Nutrition Intervention: enteral nutrition    Recommendations:   1. When medically feasible, would resume Vital HP @ 25ml/hr, increase as tolerated to goal rate of 55ml/hr. TF at goal to provide 1954kcal, 116gm pro, 1104ml free water (includes additional kcal provided by propofol). Additional flushes per LIP. All recs discussed with LIP ([phone])       Goal/Expected Outcome: Pt to meet % of estimated nutrient needs within 4 days      Indicator/Monitoring: RD to monitor diet order, energy intake, glucose profile, nutrition focused physical findings.

## 2021-02-04 NOTE — EEG REPORT - NS EEG TEXT BOX
Epilepsy Attending Note:     MELODIE HERNADEZ    50y Male  MRN [id number]    Vital Signs Last 24 Hrs  T(C): 36.1 (04 Feb 2021 10:00), Max: 36.6 (03 Feb 2021 17:00)  T(F): 97 (04 Feb 2021 10:00), Max: 97.9 (03 Feb 2021 17:00)  HR: 58 (04 Feb 2021 10:00) (50 - 98)  BP: 139/66 (03 Feb 2021 16:00) (139/66 - 139/66)  BP(mean): 85 (03 Feb 2021 16:00) (85 - 85)  RR: 24 (04 Feb 2021 10:00) (24 - 27)  SpO2: 99% (04 Feb 2021 10:00) (95% - 99%)                          13.6   11.13 )-----------( 244      ( 04 Feb 2021 04:30 )             40.4       02-04    143  |  110  |  20  ----------------------------<  95  4.0   |  24  |  0.7    Ca    8.5      04 Feb 2021 04:15  Mg     2.2     02-04    TPro  5.3<L>  /  Alb  3.0<L>  /  TBili  1.0  /  DBili  x   /  AST  27  /  ALT  30  /  AlkPhos  66  02-04      MEDICATIONS  (STANDING):  acetaminophen    Suspension .. 650 milliGRAM(s) Oral every 6 hours  chlorhexidine 0.12% Liquid 15 milliLiter(s) Oral Mucosa every 12 hours  chlorhexidine 4% Liquid 1 Application(s) Topical <User Schedule>  fentaNYL   Infusion. 0.5 MICROgram(s)/kG/Hr (5 mL/Hr) IV Continuous <Continuous>  levETIRAcetam  IVPB 500 milliGRAM(s) IV Intermittent every 12 hours  midazolam Infusion 0.02 mG/kG/Hr (2 mL/Hr) IV Continuous <Continuous>  niCARdipine Infusion 5 mG/Hr (25 mL/Hr) IV Continuous <Continuous>  pantoprazole  Injectable 40 milliGRAM(s) IV Push every 12 hours  polyethylene glycol 3350 17 Gram(s) Oral daily  propofol Infusion 20 MICROgram(s)/kG/Min (12 mL/Hr) IV Continuous <Continuous>  saline laxative (FLEET) Rectal Enema 1 Enema Rectal daily  senna 2 Tablet(s) Oral at bedtime  sodium chloride 0.9% 500 milliLiter(s) (25 mL/Hr) IV Continuous <Continuous>  sodium chloride 3%. 500 milliLiter(s) (50 mL/Hr) IV Continuous <Continuous>    MEDICATIONS  (PRN):  meperidine     Injectable 12.5 milliGRAM(s) IV Push every 4 hours PRN Shivering            VEEG in the last 24 hours: sedated and intubated    Background----------------   low amplitude , sleep recording with minimal reactivity    Focal and generalized slowing------------- generalized slowing.  No focal slowing    Interictal activity--------------  none    Events------------------- none    Seizures-------------- none    Impression:   generalized slowing    Plan - discussed with the  [facility] team

## 2021-02-04 NOTE — PROGRESS NOTE ADULT - SUBJECTIVE AND OBJECTIVE BOX
OVERNIGHT EVENTS: events noted, on versed, fentanyl, propofol, hypertonic 50 cc/h,s sp head CT, SP vomiting. ICP 9    Vital Signs Last 24 Hrs  T(C): 35.9 (04 Feb 2021 04:00), Max: 37.4 (03 Feb 2021 10:00)  T(F): 96.6 (04 Feb 2021 04:00), Max: 99.3 (03 Feb 2021 10:00)  HR: 59 (04 Feb 2021 05:00) (58 - 98)  BP: 139/66 (03 Feb 2021 16:00) (139/66 - 139/66)  BP(mean): 85 (03 Feb 2021 16:00) (85 - 85)  RR: 24 (04 Feb 2021 06:00) (24 - 30)  SpO2: 99% (04 Feb 2021 02:00) (92% - 99%)    PHYSICAL EXAMINATION:    GENERAL: sedated    HEENT: Head is normocephalic and atraumatic.     NECK: Supple.    LUNGS: dec bs both abses  HEART: Regular rate and rhythm without murmur.    ABDOMEN: Soft, nontender, and nondistended.      EXTREMITIES: Without any cyanosis, clubbing, rash, lesions or edema.    NEUROLOGIC: sedated  SKIN: No ulceration or induration present.      LABS:                        13.6   11.13 )-----------( 244      ( 04 Feb 2021 04:30 )             40.4     02-04    143  |  110  |  20  ----------------------------<  95  4.0   |  24  |  0.7    Ca    8.5      04 Feb 2021 04:15  Mg     2.2     02-04    TPro  5.3<L>  /  Alb  3.0<L>  /  TBili  1.0  /  DBili  x   /  AST  27  /  ALT  30  /  AlkPhos  66  02-04    PT/INR - ( 03 Feb 2021 04:30 )   PT: 12.70 sec;   INR: 1.10 ratio             ABG - ( 04 Feb 2021 03:44 )  pH, Arterial: 7.38  pH, Blood: x     /  pCO2: 43    /  pO2: 76    / HCO3: 26    / Base Excess: 0.2   /  SaO2: 95          450/24/80/8                Procalcitonin, Serum: 0.07 ng/mL (02-03-21 @ 04:30)  Procalcitonin, Serum: 0.07 ng/mL (02-01-21 @ 12:11)        02-03-21 @ 07:01  -  02-04-21 @ 07:00  --------------------------------------------------------  IN: 3075.5 mL / OUT: 1609 mL / NET: 1466.5 mL        MICROBIOLOGY:  Culture Results:   No growth (02-01 @ 20:10)  Culture Results:   Testing in progress (02-01 @ 20:10)  Culture Results:   Culture is being performed. (02-01 @ 20:10)  Culture Results:   No growth to date. (02-01 @ 18:13)  Culture Results:   No growth to date. (02-01 @ 18:13)  Culture Results:   No growth to date. (02-01 @ 11:00)  Culture Results:   No growth (02-01 @ 05:00)  Culture Results:   No growth to date. (02-01 @ 04:47)      MEDICATIONS  (STANDING):  acetaminophen   Tablet .. 650 milliGRAM(s) Oral every 6 hours  chlorhexidine 0.12% Liquid 15 milliLiter(s) Oral Mucosa every 12 hours  chlorhexidine 4% Liquid 1 Application(s) Topical <User Schedule>  fentaNYL   Infusion. 0.5 MICROgram(s)/kG/Hr (5 mL/Hr) IV Continuous <Continuous>  labetalol Infusion 0.5 mG/Min (30 mL/Hr) IV Continuous <Continuous>  levETIRAcetam  IVPB 500 milliGRAM(s) IV Intermittent every 12 hours  midazolam Infusion 0.02 mG/kG/Hr (2 mL/Hr) IV Continuous <Continuous>  niCARdipine Infusion 5 mG/Hr (25 mL/Hr) IV Continuous <Continuous>  pantoprazole  Injectable 40 milliGRAM(s) IV Push every 12 hours  polyethylene glycol 3350 17 Gram(s) Oral daily  propofol Infusion 20 MICROgram(s)/kG/Min (12 mL/Hr) IV Continuous <Continuous>  senna 2 Tablet(s) Oral at bedtime  sodium chloride 0.9% 500 milliLiter(s) (25 mL/Hr) IV Continuous <Continuous>  sodium chloride 3%. 500 milliLiter(s) (50 mL/Hr) IV Continuous <Continuous>    MEDICATIONS  (PRN):  meperidine     Injectable 12.5 milliGRAM(s) IV Push every 4 hours PRN Shivering      RADIOLOGY & ADDITIONAL STUDIES:

## 2021-02-05 LAB
ALBUMIN SERPL ELPH-MCNC: 2.5 G/DL — LOW (ref 3.5–5.2)
ALP SERPL-CCNC: 67 U/L — SIGNIFICANT CHANGE UP (ref 30–115)
ALT FLD-CCNC: 22 U/L — SIGNIFICANT CHANGE UP (ref 0–41)
ANION GAP SERPL CALC-SCNC: 7 MMOL/L — SIGNIFICANT CHANGE UP (ref 7–14)
AST SERPL-CCNC: 25 U/L — SIGNIFICANT CHANGE UP (ref 0–41)
BASOPHILS # BLD AUTO: 0.05 K/UL — SIGNIFICANT CHANGE UP (ref 0–0.2)
BASOPHILS NFR BLD AUTO: 0.5 % — SIGNIFICANT CHANGE UP (ref 0–1)
BILIRUB SERPL-MCNC: 0.8 MG/DL — SIGNIFICANT CHANGE UP (ref 0.2–1.2)
BUN SERPL-MCNC: 20 MG/DL — SIGNIFICANT CHANGE UP (ref 10–20)
CALCIUM SERPL-MCNC: 8 MG/DL — LOW (ref 8.5–10.1)
CHLORIDE SERPL-SCNC: 114 MMOL/L — HIGH (ref 98–110)
CO2 SERPL-SCNC: 22 MMOL/L — SIGNIFICANT CHANGE UP (ref 17–32)
CREAT SERPL-MCNC: 0.8 MG/DL — SIGNIFICANT CHANGE UP (ref 0.7–1.5)
EOSINOPHIL # BLD AUTO: 0.57 K/UL — SIGNIFICANT CHANGE UP (ref 0–0.7)
EOSINOPHIL NFR BLD AUTO: 5.6 % — SIGNIFICANT CHANGE UP (ref 0–8)
GLUCOSE BLDC GLUCOMTR-MCNC: 63 MG/DL — LOW (ref 70–99)
GLUCOSE BLDC GLUCOMTR-MCNC: 84 MG/DL — SIGNIFICANT CHANGE UP (ref 70–99)
GLUCOSE BLDC GLUCOMTR-MCNC: 94 MG/DL — SIGNIFICANT CHANGE UP (ref 70–99)
GLUCOSE SERPL-MCNC: 96 MG/DL — SIGNIFICANT CHANGE UP (ref 70–99)
HCT VFR BLD CALC: 37 % — LOW (ref 42–52)
HGB BLD-MCNC: 12.4 G/DL — LOW (ref 14–18)
IMM GRANULOCYTES NFR BLD AUTO: 0.8 % — HIGH (ref 0.1–0.3)
LYMPHOCYTES # BLD AUTO: 0.8 K/UL — LOW (ref 1.2–3.4)
LYMPHOCYTES # BLD AUTO: 7.8 % — LOW (ref 20.5–51.1)
MAGNESIUM SERPL-MCNC: 3 MG/DL — HIGH (ref 1.8–2.4)
MCHC RBC-ENTMCNC: 31.2 PG — HIGH (ref 27–31)
MCHC RBC-ENTMCNC: 33.5 G/DL — SIGNIFICANT CHANGE UP (ref 32–37)
MCV RBC AUTO: 93.2 FL — SIGNIFICANT CHANGE UP (ref 80–94)
MONOCYTES # BLD AUTO: 0.96 K/UL — HIGH (ref 0.1–0.6)
MONOCYTES NFR BLD AUTO: 9.4 % — HIGH (ref 1.7–9.3)
NEUTROPHILS # BLD AUTO: 7.76 K/UL — HIGH (ref 1.4–6.5)
NEUTROPHILS NFR BLD AUTO: 75.9 % — HIGH (ref 42.2–75.2)
NRBC # BLD: 0 /100 WBCS — SIGNIFICANT CHANGE UP (ref 0–0)
PLATELET # BLD AUTO: 256 K/UL — SIGNIFICANT CHANGE UP (ref 130–400)
POTASSIUM SERPL-MCNC: 4 MMOL/L — SIGNIFICANT CHANGE UP (ref 3.5–5)
POTASSIUM SERPL-SCNC: 4 MMOL/L — SIGNIFICANT CHANGE UP (ref 3.5–5)
PROT SERPL-MCNC: 4.6 G/DL — LOW (ref 6–8)
RBC # BLD: 3.97 M/UL — LOW (ref 4.7–6.1)
RBC # FLD: 12.1 % — SIGNIFICANT CHANGE UP (ref 11.5–14.5)
SODIUM SERPL-SCNC: 143 MMOL/L — SIGNIFICANT CHANGE UP (ref 135–146)
WBC # BLD: 10.22 K/UL — SIGNIFICANT CHANGE UP (ref 4.8–10.8)
WBC # FLD AUTO: 10.22 K/UL — SIGNIFICANT CHANGE UP (ref 4.8–10.8)

## 2021-02-05 PROCEDURE — 74018 RADEX ABDOMEN 1 VIEW: CPT | Mod: 26

## 2021-02-05 PROCEDURE — 99233 SBSQ HOSP IP/OBS HIGH 50: CPT

## 2021-02-05 PROCEDURE — 99233 SBSQ HOSP IP/OBS HIGH 50: CPT | Mod: 24

## 2021-02-05 PROCEDURE — 71045 X-RAY EXAM CHEST 1 VIEW: CPT | Mod: 26

## 2021-02-05 RX ORDER — DEXTROSE 50 % IN WATER 50 %
50 SYRINGE (ML) INTRAVENOUS ONCE
Refills: 0 | Status: COMPLETED | OUTPATIENT
Start: 2021-02-05 | End: 2021-02-05

## 2021-02-05 RX ORDER — ACETAMINOPHEN 500 MG
650 TABLET ORAL EVERY 6 HOURS
Refills: 0 | Status: DISCONTINUED | OUTPATIENT
Start: 2021-02-05 | End: 2021-03-08

## 2021-02-05 RX ADMIN — PANTOPRAZOLE SODIUM 40 MILLIGRAM(S): 20 TABLET, DELAYED RELEASE ORAL at 06:49

## 2021-02-05 RX ADMIN — Medication 50 MILLILITER(S): at 20:00

## 2021-02-05 RX ADMIN — CHLORHEXIDINE GLUCONATE 15 MILLILITER(S): 213 SOLUTION TOPICAL at 06:48

## 2021-02-05 RX ADMIN — Medication 650 MILLIGRAM(S): at 01:23

## 2021-02-05 RX ADMIN — Medication 640 MILLIGRAM(S): at 12:41

## 2021-02-05 RX ADMIN — MIDAZOLAM HYDROCHLORIDE 2 MG/KG/HR: 1 INJECTION, SOLUTION INTRAMUSCULAR; INTRAVENOUS at 06:49

## 2021-02-05 RX ADMIN — CHLORHEXIDINE GLUCONATE 15 MILLILITER(S): 213 SOLUTION TOPICAL at 17:20

## 2021-02-05 RX ADMIN — Medication 1 ENEMA: at 12:41

## 2021-02-05 RX ADMIN — Medication 650 MILLIGRAM(S): at 06:48

## 2021-02-05 RX ADMIN — CHLORHEXIDINE GLUCONATE 1 APPLICATION(S): 213 SOLUTION TOPICAL at 06:48

## 2021-02-05 RX ADMIN — LEVETIRACETAM 420 MILLIGRAM(S): 250 TABLET, FILM COATED ORAL at 17:20

## 2021-02-05 RX ADMIN — LEVETIRACETAM 420 MILLIGRAM(S): 250 TABLET, FILM COATED ORAL at 06:49

## 2021-02-05 RX ADMIN — PANTOPRAZOLE SODIUM 40 MILLIGRAM(S): 20 TABLET, DELAYED RELEASE ORAL at 17:20

## 2021-02-05 RX ADMIN — PROPOFOL 12 MICROGRAM(S)/KG/MIN: 10 INJECTION, EMULSION INTRAVENOUS at 20:00

## 2021-02-05 RX ADMIN — POLYETHYLENE GLYCOL 3350 17 GRAM(S): 17 POWDER, FOR SOLUTION ORAL at 12:40

## 2021-02-05 RX ADMIN — Medication 650 MILLIGRAM(S): at 17:20

## 2021-02-05 NOTE — PROGRESS NOTE ADULT - SUBJECTIVE AND OBJECTIVE BOX
Specialty: Neuro Critical Care     HPI:  50 years old right handed Male with PMHx of HTN, GI bleed, PUD,  Mrankin score of 0 at baseline was brought in by EMS for  AMS and left sided weakness.   Wife states that patient was having a usual day today went to bathroom and then the next thing she noted was that he was confused and was lying on the bathroom floor, she reports urinary incontinence, patient was confused, had trouble speaking, and could not move his left side.   In the ED, /148, HR 80, saturating well. Code stroke called, NIHSS 24 and GCS 11, CTH showed Moderate to large intraparenchymal right basal ganglia hemorrhage with mild surrounding edema and associated right sulcal effacement as well as decompression into the ventricles. There is approximately 6.3 mm of midline shift. CTA did not show any evidence of active bleeding, AVM or aneurysm or major vascular stenosis or occlusion. Pt became unresponsive in CT scan and had to be intubated and sedated. Patient was intubated in ED for worsening mental status and GCS. Started on Nicardipine drip for sbp in 200s and EVD was placed by neurosurgery at bedside. Pan trauma scan was negative. To be admitted under ICU for further monitoring.  (27 Jan 2021 23:13)    Past Medical and Surgical Hx:  HTN (hypertension)  GI bleed  Gastric ulcer  PUD (peptic ulcer disease)  Arm fracture, left  s/p surgical repair    Allergies: No Known Allergies    ROS: Patient unable to participate in ROS due to neurologic status.    PE:  General: mechanically vented, sedated on Propofol, Fentanyl, and Versed, male of appropriate age, lying supine in bed. NAD  Neuro:  MSE: mechanically vented, sedated on Propofol, Fentanyl, and Versed, does not follow commands, does not open eyes to voice or noxious stimuli. No blink to threat  CN: Pupils 3mm brisk bilaterally. Right eye right gaze preference that can be overcome. No nystagmus noted. (+) corneal reflex (+) cough (+) gag (+) oculocephalic reflex  Motor: Good muscle bulk, flaccid tone. No movement to noxious stimuli noted bilaterally throughout  Sensory: No facial grimacing noted to noxious stimuli  EVD drain unclamped @ 10mmHg    Vital Signs:  ICU Vital Signs Last 24 Hrs  T(C): 36.3 (05 Feb 2021 14:00), Max: 36.7 (05 Feb 2021 00:00)  T(F): 97.3 (05 Feb 2021 14:00), Max: 98.1 (05 Feb 2021 00:00)  HR: 67 (05 Feb 2021 13:00) (56 - 75)  BP: 144/72 (05 Feb 2021 11:00) (111/61 - 144/72)  BP(mean): 105 (05 Feb 2021 11:00) (78 - 107)  ABP: 156/84 (05 Feb 2021 14:00) (84/68 - 159/86)  ABP(mean): 107 (05 Feb 2021 14:00) (79 - 108)  RR: 24 (05 Feb 2021 14:00) (24 - 24)  SpO2: 100% (05 Feb 2021 14:00) (97% - 100%)    I/O Detail:  04 Feb 2021 07:01  -  05 Feb 2021 07:00  --------------------------------------------------------  IN:    Enteral Tube Flush: 310 mL    FentaNYL: 710 mL    IV PiggyBack: 100 mL    Midazolam: 188 mL    NiCARdipine: 37.5 mL    Propofol: 576 mL    sodium chloride 0.9% w/ Additives: 187.5 mL    sodium chloride 3%: 1200 mL  Total IN: 3309 mL    OUT:    Drain (mL): 308 mL    Indwelling Catheter - Urethral (mL): 1420 mL  Total OUT: 1728 mL    Total NET: 1581 mL      05 Feb 2021 07:01  -  05 Feb 2021 15:06  --------------------------------------------------------  IN:    FentaNYL: 170 mL    Midazolam: 42 mL    Propofol: 168 mL    sodium chloride 0.9% w/ Additives: 87.5 mL    sodium chloride 3%: 350 mL  Total IN: 817.5 mL    OUT:    Drain (mL): 93 mL    Indwelling Catheter - Urethral (mL): 300 mL  Total OUT: 393 mL    Total NET: 424.5 mL    EVD output for 24 hours: 308    Labs:  02-05    143  |  114<H>  |  20  ----------------------------<  96  4.0   |  22  |  0.8    Ca    8.0<L>      05 Feb 2021 05:00  Mg     3.0     02-05    TPro  4.6<L>  /  Alb  2.5<L>  /  TBili  0.8  /  DBili  x   /  AST  25  /  ALT  22  /  AlkPhos  67  02-05                          12.4   10.22 )-----------( 256      ( 05 Feb 2021 05:00 )             37.0     ABG - ( 05 Feb 2021 03:14 )  pH, Arterial: 7.41  pH, Blood: x     /  pCO2: 36    /  pO2: 118   / HCO3: 23    / Base Excess: -0.9  /  SaO2: 99        Microbiology:  MRSA: 2/1/2021: negative     Urine culture: 2/1/2021: negative    CSF: 2/1/2021:  Acid Fast Bacilli Smear: less than 5cc CSF received, unable to perform AFB smear  Gram stain: no organisms seen  Fungal: testing in progress  Lactate dehydrogenase: 112   Protein: 47  Glucose: 76  Cerebrospinal Fluid Cell Count-1 (02.01.21 @ 20:10)   Tube Type: Tube 1   CSF Appearance: Cloudy   CSF Lymphocytes: 8 %   CSF Monocytes/Macrophages: 8 %   CSF Segmented Neutrophils: 84 %   Appearance Spun: Xanthochromic   RBC Count - Spinal Fluid: 07409 /uL   Total Nucleated Cell Count, CSF: 6 /uL   CSF Color: Pink     Medications Current and PRN:  MEDICATIONS  (STANDING):  acetaminophen    Suspension .. 650 milliGRAM(s) Oral every 6 hours  chlorhexidine 0.12% Liquid 15 milliLiter(s) Oral Mucosa every 12 hours  chlorhexidine 4% Liquid 1 Application(s) Topical <User Schedule>  fentaNYL   Infusion. 0.5 MICROgram(s)/kG/Hr (5 mL/Hr) IV Continuous <Continuous>  levETIRAcetam  IVPB 500 milliGRAM(s) IV Intermittent every 12 hours  midazolam Infusion 0.02 mG/kG/Hr (2 mL/Hr) IV Continuous <Continuous>  niCARdipine Infusion 5 mG/Hr (25 mL/Hr) IV Continuous <Continuous>  pantoprazole  Injectable 40 milliGRAM(s) IV Push every 12 hours  polyethylene glycol 3350 17 Gram(s) Oral daily  propofol Infusion 20 MICROgram(s)/kG/Min (12 mL/Hr) IV Continuous <Continuous>  saline laxative (FLEET) Rectal Enema 1 Enema Rectal daily  senna 2 Tablet(s) Oral at bedtime  sodium chloride 0.9% 500 milliLiter(s) (12.5 mL/Hr) IV Continuous <Continuous>  sodium chloride 3%. 500 milliLiter(s) (50 mL/Hr) IV Continuous <Continuous>    MEDICATIONS  (PRN):  meperidine     Injectable 12.5 milliGRAM(s) IV Push every 4 hours PRN Shivering    Imaging:  VEEG in the last 24 hours: (2/4/2021):   sedated and intubated  Background----------------   low amplitude , sleep recording with minimal reactivity  Focal and generalized slowing------------- generalized slowing.  No focal slowing  Interictal activity--------------  none  Events------------------- none  Seizures-------------- none  Impression:   generalized slowing    EXAM:  CT BRAIN (2/3/2021):   IMPRESSION:  Re-demonstration of evolving acute right basal ganglia intraparenchymal hemorrhage with local mass effect and mild vasogenic edema, not significantly changed since the prior examination of 1/31/2021.  Interval resolution of previously seen intraventricular hemorrhage with unchanged scattered bilateral subarachnoid hemorrhage and minimal hemorrhage along the left frontal ventriculostomy tract.  Decreased dilation of the left lateral ventricle since prior examination.    EXAM:  CT BRAIN STROKE PROTOCOL (1/27/2021):   IMPRESSION:  Moderate to large intraparenchymal right basal ganglia hemorrhage with mild surrounding edema and associated right sulcal effacement as well as decompression into the ventricles. There is approximately 6.3 mm of midline shift.    Assessment:  50-year-old male with PMHx GIB, HTN, and PUD, BIBA by EMS, found of floor of bathroom with AMS, left hemiparesis, and dysarthria. In ED, /148. Stroke code initiated. Initial NIH = 24/GCS = 11. CTH revealed moderate to large intraparenchymal right basal ganglia ICH with mild edema and associated right sulcal effacement with decompression of ventricles and 6.3mm midline shift. CTA negative for active bleed, AVM, aneurysm major vascular stenosis or occlusion. Intubated for airway protection r/t worsening mental status and neurological decline. NeuroSx c/s, s/p EVD placement. Admitted to ICU for management. Most recent CTH         On examination today, mechanically vented, sedated on Propofol and Fentanyl, PERRLA, does not open eyes to voice or noxious stimuli, does not follow commands, RUE/RLE moves spontaneously yet not to commands, LUE withdrawal to pain, LLE no movement to noxious stimuli, with intact brainstem reflexes. EVD drain @ 89msT4I, open to drain. ICP range 15-18; CPP range 77-83. Patient continues to spike temperatures, WBC up-trending 10.52 --> 11.34. CSF cultures sent my NeuroSx, 2/1/2021, blood cultures sent, 2/1/2021.     Plan:  #Neuro:  - Neuro checks q1hr  - EVD as per NeuroSx; keep unclamped @ [id number]  - Keep ICP < 20, CPP > 70  - Continue Keppra 500mg IV q12hrs  - Continue to wean sedation as tolerated for neurological prognostication  - Fentanyl IVP prn for agitation/vent synchrony  - Keep serum Na 135-145    #CV:  - Keep SBP < 150 as per NeuroSx  - Utilize Nicardipine as needed while maintaining CPP > 70  - Recommend initiating PO antihypertensive agents to wean off Nicardipine gtt    #Resp:  - Ventilator management as per primary team  - HOB > 35 degrees  - Aspiration precautions  - SAT/SBT as tolerated    #Renal/Fluid/Electolytes:  - Strict I&Os  - Keep euvolemic   - Monitor lytes, replete as needed     #ID:        - F/U CSF cultures, drawn by NeuroSx 2/2 increased temps, r/o ventriculitis  - F/U blood cultures, drawn 2/1/2021  - ID recs appreciated: Continue Cefepime/Vancomycin    #GI:  - Continue Protonix 40mg IV q12hrs  - Continue Miralax 17 grams PO q24hrs  - Continue Senna 2 tablets PO q24hrs HS    #DVT prophylaxis:  - SCS while in bed  - Start full dose DVT prophylaxis       Eli Khalil, [facility]-BC  Please feel free to contact for any questions or concerns   #[phone] Specialty: Neuro Critical Care     HPI:  50 years old right handed Male with PMHx of HTN, GI bleed, PUD,  Mrankin score of 0 at baseline was brought in by EMS for  AMS and left sided weakness.   Wife states that patient was having a usual day today went to bathroom and then the next thing she noted was that he was confused and was lying on the bathroom floor, she reports urinary incontinence, patient was confused, had trouble speaking, and could not move his left side.   In the ED, /148, HR 80, saturating well. Code stroke called, NIHSS 24 and GCS 11, CTH showed Moderate to large intraparenchymal right basal ganglia hemorrhage with mild surrounding edema and associated right sulcal effacement as well as decompression into the ventricles. There is approximately 6.3 mm of midline shift. CTA did not show any evidence of active bleeding, AVM or aneurysm or major vascular stenosis or occlusion. Pt became unresponsive in CT scan and had to be intubated and sedated. Patient was intubated in ED for worsening mental status and GCS. Started on Nicardipine drip for sbp in 200s and EVD was placed by neurosurgery at bedside. Pan trauma scan was negative. To be admitted under ICU for further monitoring.  (27 Jan 2021 23:13)    Past Medical and Surgical Hx:  HTN (hypertension)  GI bleed  Gastric ulcer  PUD (peptic ulcer disease)  Arm fracture, left  s/p surgical repair    Allergies: No Known Allergies    ROS: Patient unable to participate in ROS due to neurologic status.    PE:  General: mechanically vented, sedated on Propofol, Fentanyl, and Versed, male of appropriate age, lying supine in bed. NAD  Neuro:  MSE: mechanically vented, sedated on Propofol, Fentanyl, and Versed, does not follow commands, does not open eyes to voice or noxious stimuli. No blink to threat  CN: Pupils 3mm brisk bilaterally. Right eye right gaze preference that can be overcome. No nystagmus noted. (+) corneal reflex (+) cough (+) gag (+) oculocephalic reflex  Motor: Good muscle bulk, flaccid tone. No movement to noxious stimuli noted bilaterally throughout  Sensory: No facial grimacing noted to noxious stimuli  EVD drain unclamped @ 10cm H20    Vital Signs:  ICU Vital Signs Last 24 Hrs  T(C): 36.3 (05 Feb 2021 14:00), Max: 36.7 (05 Feb 2021 00:00)  T(F): 97.3 (05 Feb 2021 14:00), Max: 98.1 (05 Feb 2021 00:00)  HR: 67 (05 Feb 2021 13:00) (56 - 75)  BP: 144/72 (05 Feb 2021 11:00) (111/61 - 144/72)  BP(mean): 105 (05 Feb 2021 11:00) (78 - 107)  ABP: 156/84 (05 Feb 2021 14:00) (84/68 - 159/86)  ABP(mean): 107 (05 Feb 2021 14:00) (79 - 108)  RR: 24 (05 Feb 2021 14:00) (24 - 24)  SpO2: 100% (05 Feb 2021 14:00) (97% - 100%)    Ventilator:  Mode: AC/ CMV (Assist Control/ Continuous Mandatory Ventilation)  RR (machine): 24  TV (machine): 450  FiO2: 80  PEEP: 10  ITime: 1  MAP: 16  PIP: 28    I/O Detail:  04 Feb 2021 07:01  -  05 Feb 2021 07:00  --------------------------------------------------------  IN:    Enteral Tube Flush: 310 mL    FentaNYL: 710 mL    IV PiggyBack: 100 mL    Midazolam: 188 mL    NiCARdipine: 37.5 mL    Propofol: 576 mL    sodium chloride 0.9% w/ Additives: 187.5 mL    sodium chloride 3%: 1200 mL  Total IN: 3309 mL    OUT:    Drain (mL): 308 mL    Indwelling Catheter - Urethral (mL): 1420 mL  Total OUT: 1728 mL    Total NET: 1581 mL      05 Feb 2021 07:01  -  05 Feb 2021 15:06  --------------------------------------------------------  IN:    FentaNYL: 170 mL    Midazolam: 42 mL    Propofol: 168 mL    sodium chloride 0.9% w/ Additives: 87.5 mL    sodium chloride 3%: 350 mL  Total IN: 817.5 mL    OUT:    Drain (mL): 93 mL    Indwelling Catheter - Urethral (mL): 300 mL  Total OUT: 393 mL    Total NET: 424.5 mL    EVD output for 24 hours: 308    Labs:  02-05    143  |  114<H>  |  20  ----------------------------<  96  4.0   |  22  |  0.8    Ca    8.0<L>      05 Feb 2021 05:00  Mg     3.0     02-05    TPro  4.6<L>  /  Alb  2.5<L>  /  TBili  0.8  /  DBili  x   /  AST  25  /  ALT  22  /  AlkPhos  67  02-05                          12.4   10.22 )-----------( 256      ( 05 Feb 2021 05:00 )             37.0     ABG - ( 05 Feb 2021 03:14 )  pH, Arterial: 7.41  pH, Blood: x     /  pCO2: 36    /  pO2: 118   / HCO3: 23    / Base Excess: -0.9  /  SaO2: 99        Microbiology:  MRSA: 2/1/2021: negative     Urine culture: 2/1/2021: negative    CSF: 2/1/2021:  Acid Fast Bacilli Smear: less than 5cc CSF received, unable to perform AFB smear  Gram stain: no organisms seen  Fungal: testing in progress  Lactate dehydrogenase: 112   Protein: 47  Glucose: 76  Cerebrospinal Fluid Cell Count-1 (02.01.21 @ 20:10)   Tube Type: Tube 1   CSF Appearance: Cloudy   CSF Lymphocytes: 8 %   CSF Monocytes/Macrophages: 8 %   CSF Segmented Neutrophils: 84 %   Appearance Spun: Xanthochromic   RBC Count - Spinal Fluid: 32574 /uL   Total Nucleated Cell Count, CSF: 6 /uL   CSF Color: Pink     Medications Current and PRN:  MEDICATIONS  (STANDING):  acetaminophen    Suspension .. 650 milliGRAM(s) Oral every 6 hours  chlorhexidine 0.12% Liquid 15 milliLiter(s) Oral Mucosa every 12 hours  chlorhexidine 4% Liquid 1 Application(s) Topical <User Schedule>  fentaNYL   Infusion. 0.5 MICROgram(s)/kG/Hr (5 mL/Hr) IV Continuous <Continuous>  levETIRAcetam  IVPB 500 milliGRAM(s) IV Intermittent every 12 hours  midazolam Infusion 0.02 mG/kG/Hr (2 mL/Hr) IV Continuous <Continuous>  niCARdipine Infusion 5 mG/Hr (25 mL/Hr) IV Continuous <Continuous>  pantoprazole  Injectable 40 milliGRAM(s) IV Push every 12 hours  polyethylene glycol 3350 17 Gram(s) Oral daily  propofol Infusion 20 MICROgram(s)/kG/Min (12 mL/Hr) IV Continuous <Continuous>  saline laxative (FLEET) Rectal Enema 1 Enema Rectal daily  senna 2 Tablet(s) Oral at bedtime  sodium chloride 0.9% 500 milliLiter(s) (12.5 mL/Hr) IV Continuous <Continuous>  sodium chloride 3%. 500 milliLiter(s) (50 mL/Hr) IV Continuous <Continuous>    MEDICATIONS  (PRN):  meperidine     Injectable 12.5 milliGRAM(s) IV Push every 4 hours PRN Shivering    Imaging:  VEEG in the last 24 hours: (2/4/2021):   sedated and intubated  Background----------------   low amplitude , sleep recording with minimal reactivity  Focal and generalized slowing------------- generalized slowing.  No focal slowing  Interictal activity--------------  none  Events------------------- none  Seizures-------------- none  Impression:   generalized slowing    EXAM:  CT BRAIN (2/3/2021):   IMPRESSION:  Re-demonstration of evolving acute right basal ganglia intraparenchymal hemorrhage with local mass effect and mild vasogenic edema, not significantly changed since the prior examination of 1/31/2021.  Interval resolution of previously seen intraventricular hemorrhage with unchanged scattered bilateral subarachnoid hemorrhage and minimal hemorrhage along the left frontal ventriculostomy tract.  Decreased dilation of the left lateral ventricle since prior examination.    EXAM:  CT BRAIN STROKE PROTOCOL (1/27/2021):   IMPRESSION:  Moderate to large intraparenchymal right basal ganglia hemorrhage with mild surrounding edema and associated right sulcal effacement as well as decompression into the ventricles. There is approximately 6.3 mm of midline shift.    Assessment:  50-year-old male with PMHx GIB, HTN, and PUD, BIBA by EMS, found of floor of bathroom with AMS, left hemiparesis, and dysarthria. In ED, /148. Stroke code initiated. Initial NIH = 24/GCS = 11. CTH revealed moderate to large intraparenchymal right basal ganglia ICH with mild edema and associated right sulcal effacement with decompression of ventricles and 6.3mm midline shift. CTA negative for active bleed, AVM, aneurysm major vascular stenosis or occlusion. Intubated for airway protection r/t worsening mental status and neurological decline. NeuroSx c/s, s/p EVD placement. Admitted to ICU for management. Most recent CTH, 2/3/2021, re-demonstration of evolving acute right basal ganglia intraparenchymal hemorrhage with local mass effect and mild vasogenic edema, decreased dilation of left lateral ventricle, and interval resolution of previously seen intraventricular hemorrhage with unchanged scattered bilateral subarachnoid hemorrhage and minimal hemorrhage along the left frontal ventriculostomy tract. vEEG, 2/4/2021, revealed generalized slowing with no seizure activity. On examination today, patient on TTM, goal 36C, mechanically vented, sedated on Fentanyl, Versed, and Propofol, does not open eyes to voice or noxious stimuli, does not follow commands, no movement to noxious stimuli bilaterally throughout, with intact brainstem reflexes. EVD drain @ [id number], open to drain. ICP range 8-16; CPP range 69-90.     Plan:  #Neuro:  - Neuro checks q1hr  - Continue 3% NS with goal Serum Na level 140-150  - Trend CMP q8hrs  - EVD as per NeuroSx; keep unclamped @ [id number]  - Keep ICP < 20, CPP > 70  - Continue Keppra 500mg IV q12hrs  - Continue to wean sedation as tolerated for neurological prognostication  - Fentanyl IVP prn for agitation/vent synchrony    #CV:  - Keep SBP < 150 to avoid increases in ICP   - Utilize Nicardipine as needed while maintaining CPP > 70 and ICP < 20  - Recommend initiating PO antihypertensive agents to wean off Nicardipine gtt and sedation    #Resp:  - Ventilator management as per primary team  - HOB > 35 degrees  - Aspiration precautions  - SAT/SBT as tolerated    #Renal/Fluid/Electolytes:  - Strict I&Os  - Keep euvolemic   - Monitor lytes, replete as needed     #ID:  - Targeted temperature management, goal temperature 36C  - Continuous temperature monitoring  - Bedside Shivering Assessment; skin counter warming with Patricia Hugger to prevent shivering  - Continue Magnesium Sulfate gtt @ 0.5 grams/hr for bedside shivering  - Can also utilize Buspar or Meperidine prn for bedside shivering  - ID recs appreciated: no need for ABTx, all cultures NGTD    #GI:  - Continue Protonix 40mg IV q12hrs  - Continue Miralax 17 grams PO q24hrs  - Continue Senna 2 tablets PO q24hrs HS  - Continue fleet enema q24hrs    #DVT prophylaxis:  - SCS while in bed  - Can start full dose DVT prophylaxis       SHANTE VelizW[facility]-BC  Please feel free to contact for any questions or concerns   #[phone]

## 2021-02-05 NOTE — PROGRESS NOTE ADULT - ATTENDING COMMENTS
Patient seen today with neurocritical care ACP team NP Remi.      I was physically present for the key portions of the evaluation and management (E/M) service provided.  I agree with the above history, physical, and plan with the following additions/modifications     Remains heavily sedated today, plan to wait until tomorrow to start weaning sedation, ICPs now well controlled.  Suspect worsening ICP levels last week due to combination of uncontrolled hypertension, agitation, fevers+shivering; no major/new structural changes on repeat CT head.  Continue sedation and then wean slowly tomorrow, induced hypernatremia, targeted temperature management for normothermia with aggressive antishivering methods (magnesium drip, skin counter-warming, standing acetominophen, etc per protocol).  Discussed with ICU and neurosurgery teams.      Thomas Koroma MD  Attending Neurointensivist

## 2021-02-05 NOTE — PROGRESS NOTE ADULT - SUBJECTIVE AND OBJECTIVE BOX
Neurosurgery Progress Note      S/P External ventricular Drain placement     Pt seen and examined in the bedside at Vent unit.  Pt continues to be intubated, mechanically ventilated, sedated, off vasopressors, on 3% NaCl @ 55cc/hr, with an archtic sun in place.        Subjective: 50yMale with a pmhx of INTRACRANIAL BLEED                                     ADM/AA    INTRACRANIAL BLEED    ^MED EVAL    Family history of breast cancer (Mother)    Family history of pancreatic cancer (Father)    Handoff    MEWS Score    HTN (hypertension)    GI bleed    Gastric ulcer    PUD (peptic ulcer disease)    Intracranial bleed    Intraparenchymal hemorrhage of brain    Insertion, external ventricular drain    Ventriculostomy, for ventricular catheter insertion    Arm fracture, left    MED EVAL    90+    SysAdmin_VisitLink        s/p EVD 1.27.21      Allergies    No Known Allergies    Intolerances        Vital Signs Last 24 Hrs  T(C): 35.9 (05 Feb 2021 10:00), Max: 36.7 (05 Feb 2021 00:00)  T(F): 96.6 (05 Feb 2021 10:00), Max: 98.1 (05 Feb 2021 00:00)  HR: 63 (05 Feb 2021 10:00) (56 - 75)  BP: 139/86 (05 Feb 2021 10:00) (111/61 - 139/86)  BP(mean): 107 (05 Feb 2021 10:00) (78 - 107)  RR: 24 (05 Feb 2021 10:00) (24 - 24)  SpO2: 100% (05 Feb 2021 10:00) (97% - 100%)      acetaminophen    Suspension .. 650 milliGRAM(s) Oral every 6 hours  chlorhexidine 0.12% Liquid 15 milliLiter(s) Oral Mucosa every 12 hours  chlorhexidine 4% Liquid 1 Application(s) Topical <User Schedule>  fentaNYL   Infusion. 0.5 MICROgram(s)/kG/Hr IV Continuous <Continuous>  levETIRAcetam  IVPB 500 milliGRAM(s) IV Intermittent every 12 hours  meperidine     Injectable 12.5 milliGRAM(s) IV Push every 4 hours PRN  midazolam Infusion 0.02 mG/kG/Hr IV Continuous <Continuous>  niCARdipine Infusion 5 mG/Hr IV Continuous <Continuous>  pantoprazole  Injectable 40 milliGRAM(s) IV Push every 12 hours  polyethylene glycol 3350 17 Gram(s) Oral daily  propofol Infusion 20 MICROgram(s)/kG/Min IV Continuous <Continuous>  saline laxative (FLEET) Rectal Enema 1 Enema Rectal daily  senna 2 Tablet(s) Oral at bedtime  sodium chloride 0.9% 500 milliLiter(s) IV Continuous <Continuous>  sodium chloride 3%. 500 milliLiter(s) IV Continuous <Continuous>        02-04-21 @ 07:01  -  02-05-21 @ 07:00  --------------------------------------------------------  IN: 3309 mL / OUT: 1728 mL / NET: 1581 mL    02-05-21 @ 07:01  -  02-05-21 @ 10:44  --------------------------------------------------------  IN: 337.5 mL / OUT: 172 mL / NET: 165.5 mL        REVIEW OF SYSTEMS    [ ] A ten-point review of systems was otherwise negative except as noted.  [ x] Due to altered mental status/intubation, subjective information were not able to be obtained from the patient. History was obtained, to the extent possible, from review of the chart and collateral sources of information.      Exam:  -Pt is intubated, sedated: propofol, versed, fentanyl ggt  -PERRLA  -Pupils 3 mm sluggish   -No withdrawal to noxious stimuli bilateral UE's   -No withdrawal to noxious stimuli bilateral LE's         CBC Full  -  ( 05 Feb 2021 05:00 )  WBC Count : 10.22 K/uL  RBC Count : 3.97 M/uL  Hemoglobin : 12.4 g/dL  Hematocrit : 37.0 %  Platelet Count - Automated : 256 K/uL  Mean Cell Volume : 93.2 fL  Mean Cell Hemoglobin : 31.2 pg  Mean Cell Hemoglobin Concentration : 33.5 g/dL  Auto Neutrophil # : 7.76 K/uL  Auto Lymphocyte # : 0.80 K/uL  Auto Monocyte # : 0.96 K/uL  Auto Eosinophil # : 0.57 K/uL  Auto Basophil # : 0.05 K/uL  Auto Neutrophil % : 75.9 %  Auto Lymphocyte % : 7.8 %  Auto Monocyte % : 9.4 %  Auto Eosinophil % : 5.6 %  Auto Basophil % : 0.5 %    02-05    143  |  114<H>  |  20  ----------------------------<  96  4.0   |  22  |  0.8    Ca    8.0<L>      05 Feb 2021 05:00  Mg     3.0     02-05    TPro  4.6<L>  /  Alb  2.5<L>  /  TBili  0.8  /  DBili  x   /  AST  25  /  ALT  22  /  AlkPhos  67  02-05            Wound:  EVD in place, site is clean, dry and intact      Imaging:  < from: CT Head No Cont (02.03.21 @ 14:25) >    IMPRESSION:    Redemonstration of evolving acute right basal ganglia intraparenchymal hemorrhage with local mass effect and mild vasogenic edema, not significantly changed since the prior examination of 1/31/2021.    Interval resolution of previously seen intraventricular hemorrhage with unchanged scattered bilateral subarachnoid hemorrhage and minimal hemorrhage along the left frontal ventriculostomy tract.    Decreased dilation of the left lateral ventricle since prior examination.        TU BRICE M.D., ATTENDING RADIOLOGIST  This document has been electronically signed. Feb  3 2021  2:38PM    < end of copied text >      Assessment/Plan:   This is a 50 year old gentelman, POD # 9 s/p EVD placement   Neurosurgery Progress Note      S/P External ventricular Drain placement     Pt seen and examined in the bedside at Vent unit.  Pt continues to be intubated, mechanically ventilated, sedated, off vasopressors, on 3% NaCl @ 55cc/hr, with an archtic sun in place.        Subjective: 50yMale with a pmhx of INTRACRANIAL BLEED                                     ADM/AA    INTRACRANIAL BLEED    ^MED EVAL    Family history of breast cancer (Mother)    Family history of pancreatic cancer (Father)    Handoff    MEWS Score    HTN (hypertension)    GI bleed    Gastric ulcer    PUD (peptic ulcer disease)    Intracranial bleed    Intraparenchymal hemorrhage of brain    Insertion, external ventricular drain    Ventriculostomy, for ventricular catheter insertion    Arm fracture, left    MED EVAL    90+    SysAdmin_VisitLink        s/p EVD 1.27.21      Allergies    No Known Allergies    Intolerances        Vital Signs Last 24 Hrs  T(C): 35.9 (05 Feb 2021 10:00), Max: 36.7 (05 Feb 2021 00:00)  T(F): 96.6 (05 Feb 2021 10:00), Max: 98.1 (05 Feb 2021 00:00)  HR: 63 (05 Feb 2021 10:00) (56 - 75)  BP: 139/86 (05 Feb 2021 10:00) (111/61 - 139/86)  BP(mean): 107 (05 Feb 2021 10:00) (78 - 107)  RR: 24 (05 Feb 2021 10:00) (24 - 24)  SpO2: 100% (05 Feb 2021 10:00) (97% - 100%)      acetaminophen    Suspension .. 650 milliGRAM(s) Oral every 6 hours  chlorhexidine 0.12% Liquid 15 milliLiter(s) Oral Mucosa every 12 hours  chlorhexidine 4% Liquid 1 Application(s) Topical <User Schedule>  fentaNYL   Infusion. 0.5 MICROgram(s)/kG/Hr IV Continuous <Continuous>  levETIRAcetam  IVPB 500 milliGRAM(s) IV Intermittent every 12 hours  meperidine     Injectable 12.5 milliGRAM(s) IV Push every 4 hours PRN  midazolam Infusion 0.02 mG/kG/Hr IV Continuous <Continuous>  niCARdipine Infusion 5 mG/Hr IV Continuous <Continuous>  pantoprazole  Injectable 40 milliGRAM(s) IV Push every 12 hours  polyethylene glycol 3350 17 Gram(s) Oral daily  propofol Infusion 20 MICROgram(s)/kG/Min IV Continuous <Continuous>  saline laxative (FLEET) Rectal Enema 1 Enema Rectal daily  senna 2 Tablet(s) Oral at bedtime  sodium chloride 0.9% 500 milliLiter(s) IV Continuous <Continuous>  sodium chloride 3%. 500 milliLiter(s) IV Continuous <Continuous>        02-04-21 @ 07:01  -  02-05-21 @ 07:00  --------------------------------------------------------  IN: 3309 mL / OUT: 1728 mL / NET: 1581 mL    02-05-21 @ 07:01  -  02-05-21 @ 10:44  --------------------------------------------------------  IN: 337.5 mL / OUT: 172 mL / NET: 165.5 mL        REVIEW OF SYSTEMS    [ ] A ten-point review of systems was otherwise negative except as noted.  [ x] Due to altered mental status/intubation, subjective information were not able to be obtained from the patient. History was obtained, to the extent possible, from review of the chart and collateral sources of information.      Exam:  -Pt is intubated, sedated: propofol, versed, fentanyl ggt  -PERRLA  -Pupils 3 mm sluggish   -No withdrawal to noxious stimuli bilateral UE's   -No withdrawal to noxious stimuli bilateral LE's         CBC Full  -  ( 05 Feb 2021 05:00 )  WBC Count : 10.22 K/uL  RBC Count : 3.97 M/uL  Hemoglobin : 12.4 g/dL  Hematocrit : 37.0 %  Platelet Count - Automated : 256 K/uL  Mean Cell Volume : 93.2 fL  Mean Cell Hemoglobin : 31.2 pg  Mean Cell Hemoglobin Concentration : 33.5 g/dL  Auto Neutrophil # : 7.76 K/uL  Auto Lymphocyte # : 0.80 K/uL  Auto Monocyte # : 0.96 K/uL  Auto Eosinophil # : 0.57 K/uL  Auto Basophil # : 0.05 K/uL  Auto Neutrophil % : 75.9 %  Auto Lymphocyte % : 7.8 %  Auto Monocyte % : 9.4 %  Auto Eosinophil % : 5.6 %  Auto Basophil % : 0.5 %    02-05    143  |  114<H>  |  20  ----------------------------<  96  4.0   |  22  |  0.8    Ca    8.0<L>      05 Feb 2021 05:00  Mg     3.0     02-05    TPro  4.6<L>  /  Alb  2.5<L>  /  TBili  0.8  /  DBili  x   /  AST  25  /  ALT  22  /  AlkPhos  67  02-05            Wound:  EVD in place, site is clean, dry and intact  EVD set at 10, EVD OP 8-10cc/hr, ICP 8    Imaging:  < from: CT Head No Cont (02.03.21 @ 14:25) >    IMPRESSION:    Redemonstration of evolving acute right basal ganglia intraparenchymal hemorrhage with local mass effect and mild vasogenic edema, not significantly changed since the prior examination of 1/31/2021.    Interval resolution of previously seen intraventricular hemorrhage with unchanged scattered bilateral subarachnoid hemorrhage and minimal hemorrhage along the left frontal ventriculostomy tract.    Decreased dilation of the left lateral ventricle since prior examination.        TU BRICE M.D., ATTENDING RADIOLOGIST  This document has been electronically signed. Feb  3 2021  2:38PM    < end of copied text >      Assessment/Plan:   This is a 50 year old gentleman s/p EVD placement for IPH w IVH extension    -Neuro checks Q1hr  -Maintain Propofol, versed, fentanyl ggt    -EVD at 10  -Monitor EVD OP and ICP, CPP hourly    -Continue Abx while drain is inplace  -  -  - Neurosurgery Progress Note      S/P External ventricular Drain placement     Pt seen and examined in the bedside at Vent unit.  Pt continues to be intubated, mechanically ventilated, sedated, off vasopressors, on 3% NaCl @ 55cc/hr, with an archtic sun in place.        Subjective: 50yMale with a pmhx of INTRACRANIAL BLEED                                     ADM/AA    INTRACRANIAL BLEED    ^MED EVAL    Family history of breast cancer (Mother)    Family history of pancreatic cancer (Father)    Handoff    MEWS Score    HTN (hypertension)    GI bleed    Gastric ulcer    PUD (peptic ulcer disease)    Intracranial bleed    Intraparenchymal hemorrhage of brain    Insertion, external ventricular drain    Ventriculostomy, for ventricular catheter insertion    Arm fracture, left    MED EVAL    90+    SysAdmin_VisitLink        s/p EVD 1.27.21      Allergies    No Known Allergies    Intolerances        Vital Signs Last 24 Hrs  T(C): 35.9 (05 Feb 2021 10:00), Max: 36.7 (05 Feb 2021 00:00)  T(F): 96.6 (05 Feb 2021 10:00), Max: 98.1 (05 Feb 2021 00:00)  HR: 63 (05 Feb 2021 10:00) (56 - 75)  BP: 139/86 (05 Feb 2021 10:00) (111/61 - 139/86)  BP(mean): 107 (05 Feb 2021 10:00) (78 - 107)  RR: 24 (05 Feb 2021 10:00) (24 - 24)  SpO2: 100% (05 Feb 2021 10:00) (97% - 100%)      acetaminophen    Suspension .. 650 milliGRAM(s) Oral every 6 hours  chlorhexidine 0.12% Liquid 15 milliLiter(s) Oral Mucosa every 12 hours  chlorhexidine 4% Liquid 1 Application(s) Topical <User Schedule>  fentaNYL   Infusion. 0.5 MICROgram(s)/kG/Hr IV Continuous <Continuous>  levETIRAcetam  IVPB 500 milliGRAM(s) IV Intermittent every 12 hours  meperidine     Injectable 12.5 milliGRAM(s) IV Push every 4 hours PRN  midazolam Infusion 0.02 mG/kG/Hr IV Continuous <Continuous>  niCARdipine Infusion 5 mG/Hr IV Continuous <Continuous>  pantoprazole  Injectable 40 milliGRAM(s) IV Push every 12 hours  polyethylene glycol 3350 17 Gram(s) Oral daily  propofol Infusion 20 MICROgram(s)/kG/Min IV Continuous <Continuous>  saline laxative (FLEET) Rectal Enema 1 Enema Rectal daily  senna 2 Tablet(s) Oral at bedtime  sodium chloride 0.9% 500 milliLiter(s) IV Continuous <Continuous>  sodium chloride 3%. 500 milliLiter(s) IV Continuous <Continuous>        02-04-21 @ 07:01  -  02-05-21 @ 07:00  --------------------------------------------------------  IN: 3309 mL / OUT: 1728 mL / NET: 1581 mL    02-05-21 @ 07:01  -  02-05-21 @ 10:44  --------------------------------------------------------  IN: 337.5 mL / OUT: 172 mL / NET: 165.5 mL        REVIEW OF SYSTEMS    [ ] A ten-point review of systems was otherwise negative except as noted.  [ x] Due to altered mental status/intubation, subjective information were not able to be obtained from the patient. History was obtained, to the extent possible, from review of the chart and collateral sources of information.      Exam:  -Pt is intubated, sedated: propofol, versed, fentanyl ggt  -PERRLA  -Pupils 3 mm sluggish   -No withdrawal to noxious stimuli bilateral UE's   -No withdrawal to noxious stimuli bilateral LE's         CBC Full  -  ( 05 Feb 2021 05:00 )  WBC Count : 10.22 K/uL  RBC Count : 3.97 M/uL  Hemoglobin : 12.4 g/dL  Hematocrit : 37.0 %  Platelet Count - Automated : 256 K/uL  Mean Cell Volume : 93.2 fL  Mean Cell Hemoglobin : 31.2 pg  Mean Cell Hemoglobin Concentration : 33.5 g/dL  Auto Neutrophil # : 7.76 K/uL  Auto Lymphocyte # : 0.80 K/uL  Auto Monocyte # : 0.96 K/uL  Auto Eosinophil # : 0.57 K/uL  Auto Basophil # : 0.05 K/uL  Auto Neutrophil % : 75.9 %  Auto Lymphocyte % : 7.8 %  Auto Monocyte % : 9.4 %  Auto Eosinophil % : 5.6 %  Auto Basophil % : 0.5 %    02-05    143  |  114<H>  |  20  ----------------------------<  96  4.0   |  22  |  0.8    Ca    8.0<L>      05 Feb 2021 05:00  Mg     3.0     02-05    TPro  4.6<L>  /  Alb  2.5<L>  /  TBili  0.8  /  DBili  x   /  AST  25  /  ALT  22  /  AlkPhos  67  02-05            Wound:  EVD in place, site is clean, dry and intact  EVD set at 10, EVD OP 8-10cc/hr, ICP 8    Imaging:  < from: CT Head No Cont (02.03.21 @ 14:25) >    IMPRESSION:    Redemonstration of evolving acute right basal ganglia intraparenchymal hemorrhage with local mass effect and mild vasogenic edema, not significantly changed since the prior examination of 1/31/2021.    Interval resolution of previously seen intraventricular hemorrhage with unchanged scattered bilateral subarachnoid hemorrhage and minimal hemorrhage along the left frontal ventriculostomy tract.    Decreased dilation of the left lateral ventricle since prior examination.        TU BRICE M.D., ATTENDING RADIOLOGIST  This document has been electronically signed. Feb  3 2021  2:38PM    < end of copied text >      Assessment/Plan:   This is a 50 year old gentleman s/p EVD placement for IPH w IVH extension    -Neuro checks Q1hr  -Keppra 500mg BID  -Maintain Propofol, versed, fentanyl ggt  -SBP goal: <150  -EVD at 10  -Monitor EVD OP and ICP, CPP hourly    -Continue Abx while drain is in place  -DVT prophylaxis b/l sequentials

## 2021-02-05 NOTE — PROGRESS NOTE ADULT - ASSESSMENT
IMPRESSION:    Large ICB (RBG) hypertensive/ SP evd, intubated, for airway protection SP repeat head  fever panc x neg    PLAN:    CNS: propofol . versed.  FU with Neuro and NeuroSX. archtic sun  ICP monitoring as per neuro sx / neurology , CPP more than 70, Neuro fluid CSF fluid neg    HEENT: Oral care    PULMONARY:  HOB @ 45 degrees. FIO2 70%, Repeat CXR, peep 10    CARDIOVASCULA keep equal    GI: GI prophylaxis. feeds Bowel regimen , Fleet enema    RENAL:  Follow up lytes.  Correct as needed monitor is and os , repeat CMP      INFECTIOUS DISEASE: Follow up cultures , ID f/up  HEMATOLOGICAL:  DVT prophylaxis seq.      ENDOCRINE:  Follow up FS.  Insulin protocol if needed.    MUSCULOSKELETAL:  Bed rest    Prognosis guarded

## 2021-02-05 NOTE — PROGRESS NOTE ADULT - SUBJECTIVE AND OBJECTIVE BOX
OVERNIGHT EVENTS: still intubated,  ventilated, Mg drip, hypertonic 50 cc/h, propofol, fenrtanyl, versed, afebrile, ICP 8    Vital Signs Last 24 Hrs  T(C): 36.2 (05 Feb 2021 07:00), Max: 36.7 (05 Feb 2021 00:00)  T(F): 97.2 (05 Feb 2021 07:00), Max: 98.1 (05 Feb 2021 00:00)  HR: 61 (05 Feb 2021 07:00) (55 - 75)  BP: 111/61 (05 Feb 2021 07:00) (111/61 - 133/72)  BP(mean): 78 (05 Feb 2021 07:00) (78 - 99)  RR: 24 (05 Feb 2021 08:00) (24 - 24)  SpO2: 100% (05 Feb 2021 08:00) (97% - 100%)    PHYSICAL EXAMINATION:    GENERAL: sedated    HEENT: Head is normocephalic and atraumatic.    NECK: Supple.    LUNGS: dec bs both bases    HEART: Regular rate and rhythm without murmur.    ABDOMEN: Soft, nontender, and nondistended.      EXTREMITIES: Without any cyanosis, clubbing, rash, lesions or edema.    NEUROLOGIC: sedated    SKIN: No ulceration or induration present.      LABS:                        12.4   10.22 )-----------( 256      ( 05 Feb 2021 05:00 )             37.0     02-05    143  |  114<H>  |  20  ----------------------------<  96  4.0   |  22  |  0.8    Ca    8.0<L>      05 Feb 2021 05:00  Mg     3.0     02-05    TPro  4.6<L>  /  Alb  2.5<L>  /  TBili  0.8  /  DBili  x   /  AST  25  /  ALT  22  /  AlkPhos  67  02-05        ABG - ( 05 Feb 2021 03:14 )  pH, Arterial: 7.41  pH, Blood: x     /  pCO2: 36    /  pO2: 118   / HCO3: 23    / Base Excess: -0.9  /  SaO2: 99          24/450/80/10                Procalcitonin, Serum: 0.07 ng/mL (02-03-21 @ 04:30)        02-04-21 @ 07:01  -  02-05-21 @ 07:00  --------------------------------------------------------  IN: 3309 mL / OUT: 1728 mL / NET: 1581 mL    02-05-21 @ 07:01  -  02-05-21 @ 08:08  --------------------------------------------------------  IN: 112.5 mL / OUT: 60 mL / NET: 52.5 mL        MICROBIOLOGY:  Culture Results:   No growth to date. (02-03 @ 05:38)  Culture Results:   No growth (02-01 @ 20:10)  Culture Results:   Testing in progress (02-01 @ 20:10)  Culture Results:   Culture is being performed. (02-01 @ 20:10)  Culture Results:   No growth to date. (02-01 @ 18:13)  Culture Results:   No growth to date. (02-01 @ 18:13)  Culture Results:   No growth to date. (02-01 @ 11:00)      MEDICATIONS  (STANDING):  acetaminophen    Suspension .. 650 milliGRAM(s) Oral every 6 hours  chlorhexidine 0.12% Liquid 15 milliLiter(s) Oral Mucosa every 12 hours  chlorhexidine 4% Liquid 1 Application(s) Topical <User Schedule>  fentaNYL   Infusion. 0.5 MICROgram(s)/kG/Hr (5 mL/Hr) IV Continuous <Continuous>  levETIRAcetam  IVPB 500 milliGRAM(s) IV Intermittent every 12 hours  midazolam Infusion 0.02 mG/kG/Hr (2 mL/Hr) IV Continuous <Continuous>  niCARdipine Infusion 5 mG/Hr (25 mL/Hr) IV Continuous <Continuous>  pantoprazole  Injectable 40 milliGRAM(s) IV Push every 12 hours  polyethylene glycol 3350 17 Gram(s) Oral daily  propofol Infusion 20 MICROgram(s)/kG/Min (12 mL/Hr) IV Continuous <Continuous>  saline laxative (FLEET) Rectal Enema 1 Enema Rectal daily  senna 2 Tablet(s) Oral at bedtime  sodium chloride 0.9% 500 milliLiter(s) (12.5 mL/Hr) IV Continuous <Continuous>  sodium chloride 3%. 500 milliLiter(s) (50 mL/Hr) IV Continuous <Continuous>    MEDICATIONS  (PRN):  meperidine     Injectable 12.5 milliGRAM(s) IV Push every 4 hours PRN Shivering      RADIOLOGY & ADDITIONAL STUDIES:

## 2021-02-06 LAB
ALBUMIN SERPL ELPH-MCNC: 2.6 G/DL — LOW (ref 3.5–5.2)
ALBUMIN SERPL ELPH-MCNC: 3.1 G/DL — LOW (ref 3.5–5.2)
ALP SERPL-CCNC: 70 U/L — SIGNIFICANT CHANGE UP (ref 30–115)
ALP SERPL-CCNC: 77 U/L — SIGNIFICANT CHANGE UP (ref 30–115)
ALT FLD-CCNC: 22 U/L — SIGNIFICANT CHANGE UP (ref 0–41)
ALT FLD-CCNC: 30 U/L — SIGNIFICANT CHANGE UP (ref 0–41)
ANION GAP SERPL CALC-SCNC: 7 MMOL/L — SIGNIFICANT CHANGE UP (ref 7–14)
ANION GAP SERPL CALC-SCNC: 8 MMOL/L — SIGNIFICANT CHANGE UP (ref 7–14)
AST SERPL-CCNC: 25 U/L — SIGNIFICANT CHANGE UP (ref 0–41)
AST SERPL-CCNC: 38 U/L — SIGNIFICANT CHANGE UP (ref 0–41)
BASOPHILS # BLD AUTO: 0.04 K/UL — SIGNIFICANT CHANGE UP (ref 0–0.2)
BASOPHILS NFR BLD AUTO: 0.3 % — SIGNIFICANT CHANGE UP (ref 0–1)
BILIRUB SERPL-MCNC: 0.7 MG/DL — SIGNIFICANT CHANGE UP (ref 0.2–1.2)
BILIRUB SERPL-MCNC: 2 MG/DL — HIGH (ref 0.2–1.2)
BUN SERPL-MCNC: 16 MG/DL — SIGNIFICANT CHANGE UP (ref 10–20)
BUN SERPL-MCNC: 8 MG/DL — LOW (ref 10–20)
CALCIUM SERPL-MCNC: 7.9 MG/DL — LOW (ref 8.5–10.1)
CALCIUM SERPL-MCNC: 8.3 MG/DL — LOW (ref 8.5–10.1)
CHLORIDE SERPL-SCNC: 112 MMOL/L — HIGH (ref 98–110)
CHLORIDE SERPL-SCNC: 124 MMOL/L — HIGH (ref 98–110)
CO2 SERPL-SCNC: 22 MMOL/L — SIGNIFICANT CHANGE UP (ref 17–32)
CO2 SERPL-SCNC: 27 MMOL/L — SIGNIFICANT CHANGE UP (ref 17–32)
CREAT SERPL-MCNC: 0.6 MG/DL — LOW (ref 0.7–1.5)
CREAT SERPL-MCNC: 0.7 MG/DL — SIGNIFICANT CHANGE UP (ref 0.7–1.5)
CULTURE RESULTS: SIGNIFICANT CHANGE UP
CULTURE RESULTS: SIGNIFICANT CHANGE UP
EOSINOPHIL # BLD AUTO: 0.47 K/UL — SIGNIFICANT CHANGE UP (ref 0–0.7)
EOSINOPHIL NFR BLD AUTO: 4.1 % — SIGNIFICANT CHANGE UP (ref 0–8)
GLUCOSE BLDC GLUCOMTR-MCNC: 123 MG/DL — HIGH (ref 70–99)
GLUCOSE BLDC GLUCOMTR-MCNC: 69 MG/DL — LOW (ref 70–99)
GLUCOSE SERPL-MCNC: 102 MG/DL — HIGH (ref 70–99)
GLUCOSE SERPL-MCNC: 125 MG/DL — HIGH (ref 70–99)
HCT VFR BLD CALC: 39.3 % — LOW (ref 42–52)
HGB BLD-MCNC: 12.6 G/DL — LOW (ref 14–18)
IMM GRANULOCYTES NFR BLD AUTO: 1.8 % — HIGH (ref 0.1–0.3)
LYMPHOCYTES # BLD AUTO: 0.57 K/UL — LOW (ref 1.2–3.4)
LYMPHOCYTES # BLD AUTO: 4.9 % — LOW (ref 20.5–51.1)
MAGNESIUM SERPL-MCNC: 2.7 MG/DL — HIGH (ref 1.8–2.4)
MCHC RBC-ENTMCNC: 30.8 PG — SIGNIFICANT CHANGE UP (ref 27–31)
MCHC RBC-ENTMCNC: 32.1 G/DL — SIGNIFICANT CHANGE UP (ref 32–37)
MCV RBC AUTO: 96.1 FL — HIGH (ref 80–94)
MONOCYTES # BLD AUTO: 0.84 K/UL — HIGH (ref 0.1–0.6)
MONOCYTES NFR BLD AUTO: 7.3 % — SIGNIFICANT CHANGE UP (ref 1.7–9.3)
NEUTROPHILS # BLD AUTO: 9.39 K/UL — HIGH (ref 1.4–6.5)
NEUTROPHILS NFR BLD AUTO: 81.6 % — HIGH (ref 42.2–75.2)
NRBC # BLD: 0 /100 WBCS — SIGNIFICANT CHANGE UP (ref 0–0)
PLATELET # BLD AUTO: 293 K/UL — SIGNIFICANT CHANGE UP (ref 130–400)
POTASSIUM SERPL-MCNC: 3.4 MMOL/L — LOW (ref 3.5–5)
POTASSIUM SERPL-MCNC: 3.9 MMOL/L — SIGNIFICANT CHANGE UP (ref 3.5–5)
POTASSIUM SERPL-SCNC: 3.4 MMOL/L — LOW (ref 3.5–5)
POTASSIUM SERPL-SCNC: 3.9 MMOL/L — SIGNIFICANT CHANGE UP (ref 3.5–5)
PROT SERPL-MCNC: 4.8 G/DL — LOW (ref 6–8)
PROT SERPL-MCNC: 5.6 G/DL — LOW (ref 6–8)
RBC # BLD: 4.09 M/UL — LOW (ref 4.7–6.1)
RBC # FLD: 12.6 % — SIGNIFICANT CHANGE UP (ref 11.5–14.5)
SODIUM SERPL-SCNC: 147 MMOL/L — HIGH (ref 135–146)
SODIUM SERPL-SCNC: 153 MMOL/L — HIGH (ref 135–146)
SPECIMEN SOURCE: SIGNIFICANT CHANGE UP
SPECIMEN SOURCE: SIGNIFICANT CHANGE UP
WBC # BLD: 11.52 K/UL — HIGH (ref 4.8–10.8)
WBC # FLD AUTO: 11.52 K/UL — HIGH (ref 4.8–10.8)

## 2021-02-06 PROCEDURE — 71045 X-RAY EXAM CHEST 1 VIEW: CPT | Mod: 26

## 2021-02-06 PROCEDURE — 99233 SBSQ HOSP IP/OBS HIGH 50: CPT | Mod: 24

## 2021-02-06 PROCEDURE — 99233 SBSQ HOSP IP/OBS HIGH 50: CPT

## 2021-02-06 RX ORDER — DEXTROSE 50 % IN WATER 50 %
50 SYRINGE (ML) INTRAVENOUS ONCE
Refills: 0 | Status: COMPLETED | OUTPATIENT
Start: 2021-02-06 | End: 2021-02-06

## 2021-02-06 RX ORDER — POTASSIUM CHLORIDE 20 MEQ
20 PACKET (EA) ORAL
Refills: 0 | Status: COMPLETED | OUTPATIENT
Start: 2021-02-06 | End: 2021-02-07

## 2021-02-06 RX ADMIN — LEVETIRACETAM 420 MILLIGRAM(S): 250 TABLET, FILM COATED ORAL at 17:26

## 2021-02-06 RX ADMIN — PROPOFOL 12 MICROGRAM(S)/KG/MIN: 10 INJECTION, EMULSION INTRAVENOUS at 03:58

## 2021-02-06 RX ADMIN — POLYETHYLENE GLYCOL 3350 17 GRAM(S): 17 POWDER, FOR SOLUTION ORAL at 11:31

## 2021-02-06 RX ADMIN — Medication 1 ENEMA: at 11:30

## 2021-02-06 RX ADMIN — CHLORHEXIDINE GLUCONATE 1 APPLICATION(S): 213 SOLUTION TOPICAL at 06:13

## 2021-02-06 RX ADMIN — PANTOPRAZOLE SODIUM 40 MILLIGRAM(S): 20 TABLET, DELAYED RELEASE ORAL at 17:26

## 2021-02-06 RX ADMIN — PROPOFOL 12 MICROGRAM(S)/KG/MIN: 10 INJECTION, EMULSION INTRAVENOUS at 04:30

## 2021-02-06 RX ADMIN — CHLORHEXIDINE GLUCONATE 15 MILLILITER(S): 213 SOLUTION TOPICAL at 17:26

## 2021-02-06 RX ADMIN — FENTANYL CITRATE 5 MICROGRAM(S)/KG/HR: 50 INJECTION INTRAVENOUS at 03:12

## 2021-02-06 RX ADMIN — SODIUM CHLORIDE 50 MILLILITER(S): 5 INJECTION, SOLUTION INTRAVENOUS at 21:10

## 2021-02-06 RX ADMIN — NICARDIPINE HYDROCHLORIDE 25 MG/HR: 30 CAPSULE, EXTENDED RELEASE ORAL at 02:30

## 2021-02-06 RX ADMIN — CHLORHEXIDINE GLUCONATE 15 MILLILITER(S): 213 SOLUTION TOPICAL at 06:13

## 2021-02-06 RX ADMIN — PROPOFOL 12 MICROGRAM(S)/KG/MIN: 10 INJECTION, EMULSION INTRAVENOUS at 01:00

## 2021-02-06 RX ADMIN — MIDAZOLAM HYDROCHLORIDE 2 MG/KG/HR: 1 INJECTION, SOLUTION INTRAMUSCULAR; INTRAVENOUS at 00:00

## 2021-02-06 RX ADMIN — PANTOPRAZOLE SODIUM 40 MILLIGRAM(S): 20 TABLET, DELAYED RELEASE ORAL at 06:13

## 2021-02-06 RX ADMIN — LEVETIRACETAM 420 MILLIGRAM(S): 250 TABLET, FILM COATED ORAL at 06:14

## 2021-02-06 RX ADMIN — SENNA PLUS 2 TABLET(S): 8.6 TABLET ORAL at 21:11

## 2021-02-06 RX ADMIN — SENNA PLUS 2 TABLET(S): 8.6 TABLET ORAL at 01:22

## 2021-02-06 RX ADMIN — Medication 50 MILLILITER(S): at 03:12

## 2021-02-06 NOTE — PROGRESS NOTE ADULT - ASSESSMENT
IMPRESSION:    Large ICB likely hypertensive/ SP EVD  Intubated, for airway protection    PLAN:    CNS: SAT.  Assess MS.  FU with Neuro and NeuroSX. archtic sun  ICP monitoring as per neuro sx / neurology , CPP more than 70, Neuro fluid CSF fluid neg    HEENT: Oral care    PULMONARY:  HOB @ 45 degrees.  Vent changes:  Wean O2 as tolerated      CARDIOVASCULAR:  Avoid volume overload.  BP control     GI: GI prophylaxis. OG feeds.  Bowel regimen.      RENAL:  Follow up lytes.  Correct as needed monitor is and os , repeat CMP    INFECTIOUS DISEASE: Follow up cultures , ID f/up appreciated     HEMATOLOGICAL:  DVT prophylaxis seq.  Last duplex 2-1 negative     ENDOCRINE:  Follow up FS.  Insulin protocol if needed.    MUSCULOSKELETAL:  Bed rest    Prognosis guarded          IMPRESSION:    Large ICB likely hypertensive/ SP EVD  Intubated, for airway protection    PLAN:    CNS: SAT.  DC Fentanyl.   Assess MS.  SCOTT with Neuro and NeuroSX. archtic sun  ICP monitoring as per neuro sx / neurology , CPP more than 70, Neuro fluid CSF fluid neg    HEENT: Oral care    PULMONARY:  HOB @ 45 degrees.  Vent changes:  Wean O2 as tolerated      CARDIOVASCULAR:  Avoid volume overload.  BP control     GI: GI prophylaxis. OG feeds.  Bowel regimen.      RENAL:  Follow up lytes.  Correct as needed monitor is and os , repeat CMP    INFECTIOUS DISEASE: Follow up cultures , ID f/up appreciated     HEMATOLOGICAL:  DVT prophylaxis seq.  Last duplex 2-1 negative     ENDOCRINE:  Follow up FS.  Insulin protocol if needed.    MUSCULOSKELETAL:  Bed rest    Prognosis guarded

## 2021-02-06 NOTE — PROGRESS NOTE ADULT - SUBJECTIVE AND OBJECTIVE BOX
Patient is a 50y old  Male who presents with a chief complaint of Altered mental status (06 Feb 2021 06:10)        Over Night Events:  Remains critically ill on MV.          ROS:     All pertinent ROS are negative except HPI         PHYSICAL EXAM    ICU Vital Signs Last 24 Hrs  T(C): 36.3 (06 Feb 2021 06:00), Max: 36.4 (05 Feb 2021 13:00)  T(F): 97.3 (06 Feb 2021 06:00), Max: 97.5 (05 Feb 2021 13:00)  HR: 64 (06 Feb 2021 06:00) (60 - 75)  BP: 123/78 (06 Feb 2021 06:00) (111/61 - 144/72)  BP(mean): 96 (06 Feb 2021 06:00) (78 - 107)  ABP: 129/71 (06 Feb 2021 06:00) (120/65 - 160/84)  ABP(mean): 90 (06 Feb 2021 06:00) (83 - 111)  RR: 24 (06 Feb 2021 06:00) (24 - 24)  SpO2: 100% (06 Feb 2021 06:00) (98% - 100%)      CONSTITUTIONAL:   Ill appearing.  Well nourished.  NAD    ENT:   Airway patent,   Mouth with normal mucosa.   No thrush    EYES:   Pupils equal,   Round and reactive to light.    CARDIAC:   Normal rate,   Regular rhythm.     edema      Vascular:  Normal systolic impulse  No Carotid bruits    RESPIRATORY:   No wheezing  Bilateral BS  Normal chest expansion  Not tachypneic,  No use of accessory muscles    GASTROINTESTINAL:  Abdomen soft,   Non-tender,   No guarding,   + BS    GENITOURINARY  normal genitalia for sex  no edema    MUSCULOSKELETAL:   Range of motion is not limited,  No clubbing, cyanosis    NEUROLOGICAL:   Sedated     SKIN:   Skin normal color for race,   Warm and dry  No evidence of rash.    PSYCHIATRIC:   Sedated   No apparent risk to self or others.    HEMATOLOGICAL:  No cervical  lymphadenopathy.  no inguinal lymphadenopathy      02-04-21 @ 07:01  -  02-05-21 @ 07:00  --------------------------------------------------------  IN:    Enteral Tube Flush: 310 mL    FentaNYL: 710 mL    IV PiggyBack: 100 mL    Midazolam: 188 mL    NiCARdipine: 37.5 mL    Propofol: 576 mL    sodium chloride 0.9% w/ Additives: 187.5 mL    sodium chloride 3%: 1200 mL  Total IN: 3309 mL    OUT:    Drain (mL): 308 mL    Indwelling Catheter - Urethral (mL): 1420 mL  Total OUT: 1728 mL    Total NET: 1581 mL      02-05-21 @ 07:01  -  02-06-21 @ 06:15  --------------------------------------------------------  IN:    FentaNYL: 680 mL    Midazolam: 164 mL    NiCARdipine: 329.2 mL    Propofol: 576 mL    sodium chloride 0.9% w/ Additives: 300 mL    sodium chloride 3%: 1200 mL  Total IN: 3249.2 mL    OUT:    Drain (mL): 301 mL    Indwelling Catheter - Urethral (mL): 1100 mL  Total OUT: 1401 mL    Total NET: 1848.2 mL          LABS:                            12.6   11.52 )-----------( 293      ( 06 Feb 2021 04:30 )             39.3                                               02-05    143  |  114<H>  |  20  ----------------------------<  96  4.0   |  22  |  0.8    Ca    8.0<L>      05 Feb 2021 05:00  Mg     3.0     02-05    TPro  4.6<L>  /  Alb  2.5<L>  /  TBili  0.8  /  DBili  x   /  AST  25  /  ALT  22  /  AlkPhos  67  02-05                                                                                           LIVER FUNCTIONS - ( 05 Feb 2021 05:00 )  Alb: 2.5 g/dL / Pro: 4.6 g/dL / ALK PHOS: 67 U/L / ALT: 22 U/L / AST: 25 U/L / GGT: x                                                                                               Mode: AC/ CMV (Assist Control/ Continuous Mandatory Ventilation)  RR (machine): 24  TV (machine): 450  FiO2: 80  PEEP: 10  ITime: 1  MAP: 17  PIP: 30                                      ABG - ( 05 Feb 2021 03:14 )  pH, Arterial: 7.41  pH, Blood: x     /  pCO2: 36    /  pO2: 118   / HCO3: 23    / Base Excess: -0.9  /  SaO2: 99                  MEDICATIONS  (STANDING):  chlorhexidine 0.12% Liquid 15 milliLiter(s) Oral Mucosa every 12 hours  chlorhexidine 4% Liquid 1 Application(s) Topical <User Schedule>  fentaNYL   Infusion. 0.5 MICROgram(s)/kG/Hr (5 mL/Hr) IV Continuous <Continuous>  levETIRAcetam  IVPB 500 milliGRAM(s) IV Intermittent every 12 hours  midazolam Infusion 0.02 mG/kG/Hr (2 mL/Hr) IV Continuous <Continuous>  niCARdipine Infusion 5 mG/Hr (25 mL/Hr) IV Continuous <Continuous>  pantoprazole  Injectable 40 milliGRAM(s) IV Push every 12 hours  polyethylene glycol 3350 17 Gram(s) Oral daily  propofol Infusion 20 MICROgram(s)/kG/Min (12 mL/Hr) IV Continuous <Continuous>  saline laxative (FLEET) Rectal Enema 1 Enema Rectal daily  senna 2 Tablet(s) Oral at bedtime  sodium chloride 0.9% 500 milliLiter(s) (12.5 mL/Hr) IV Continuous <Continuous>  sodium chloride 3%. 500 milliLiter(s) (50 mL/Hr) IV Continuous <Continuous>    MEDICATIONS  (PRN):  acetaminophen   Tablet .. 650 milliGRAM(s) Oral every 6 hours PRN Temp greater or equal to 38C (100.4F)  meperidine     Injectable 12.5 milliGRAM(s) IV Push every 4 hours PRN Shivering      New X-rays reviewed:                                                                                  ECHO    CXR interpreted by me:  ET OG OK>  low lung volume.  LLL atelectasis      Patient is a 50y old  Male who presents with a chief complaint of Altered mental status (06 Feb 2021 06:10)        Over Night Events:  Remains critically ill on MV.  Sedated.          ROS:     All pertinent ROS are negative except HPI         PHYSICAL EXAM    ICU Vital Signs Last 24 Hrs  T(C): 36.3 (06 Feb 2021 06:00), Max: 36.4 (05 Feb 2021 13:00)  T(F): 97.3 (06 Feb 2021 06:00), Max: 97.5 (05 Feb 2021 13:00)  HR: 64 (06 Feb 2021 06:00) (60 - 75)  BP: 123/78 (06 Feb 2021 06:00) (111/61 - 144/72)  BP(mean): 96 (06 Feb 2021 06:00) (78 - 107)  ABP: 129/71 (06 Feb 2021 06:00) (120/65 - 160/84)  ABP(mean): 90 (06 Feb 2021 06:00) (83 - 111)  RR: 24 (06 Feb 2021 06:00) (24 - 24)  SpO2: 100% (06 Feb 2021 06:00) (98% - 100%)      CONSTITUTIONAL:   Ill appearing.  Well nourished.  NAD    ENT:   Airway patent,   Mouth with normal mucosa.   No thrush    EYES:   Pupils equal,   Round and reactive to light.    CARDIAC:   Normal rate,   Regular rhythm.     edema      Vascular:  Normal systolic impulse  No Carotid bruits    RESPIRATORY:   No wheezing  Bilateral BS  Normal chest expansion  Not tachypneic,  No use of accessory muscles    GASTROINTESTINAL:  Abdomen soft,   Non-tender,   No guarding,   + BS    GENITOURINARY  normal genitalia for sex  no edema    MUSCULOSKELETAL:   Range of motion is not limited,  No clubbing, cyanosis    NEUROLOGICAL:   Sedated     SKIN:   Skin normal color for race,   Warm and dry  No evidence of rash.    PSYCHIATRIC:   Sedated   No apparent risk to self or others.    HEMATOLOGICAL:  No cervical  lymphadenopathy.  no inguinal lymphadenopathy      02-04-21 @ 07:01  -  02-05-21 @ 07:00  --------------------------------------------------------  IN:    Enteral Tube Flush: 310 mL    FentaNYL: 710 mL    IV PiggyBack: 100 mL    Midazolam: 188 mL    NiCARdipine: 37.5 mL    Propofol: 576 mL    sodium chloride 0.9% w/ Additives: 187.5 mL    sodium chloride 3%: 1200 mL  Total IN: 3309 mL    OUT:    Drain (mL): 308 mL    Indwelling Catheter - Urethral (mL): 1420 mL  Total OUT: 1728 mL    Total NET: 1581 mL      02-05-21 @ 07:01  -  02-06-21 @ 06:15  --------------------------------------------------------  IN:    FentaNYL: 680 mL    Midazolam: 164 mL    NiCARdipine: 329.2 mL    Propofol: 576 mL    sodium chloride 0.9% w/ Additives: 300 mL    sodium chloride 3%: 1200 mL  Total IN: 3249.2 mL    OUT:    Drain (mL): 301 mL    Indwelling Catheter - Urethral (mL): 1100 mL  Total OUT: 1401 mL    Total NET: 1848.2 mL          LABS:                            12.6   11.52 )-----------( 293      ( 06 Feb 2021 04:30 )             39.3                                               02-05    143  |  114<H>  |  20  ----------------------------<  96  4.0   |  22  |  0.8    Ca    8.0<L>      05 Feb 2021 05:00  Mg     3.0     02-05    TPro  4.6<L>  /  Alb  2.5<L>  /  TBili  0.8  /  DBili  x   /  AST  25  /  ALT  22  /  AlkPhos  67  02-05                                                                                           LIVER FUNCTIONS - ( 05 Feb 2021 05:00 )  Alb: 2.5 g/dL / Pro: 4.6 g/dL / ALK PHOS: 67 U/L / ALT: 22 U/L / AST: 25 U/L / GGT: x                                                                                               Mode: AC/ CMV (Assist Control/ Continuous Mandatory Ventilation)  RR (machine): 24  TV (machine): 450  FiO2: 80  PEEP: 10  ITime: 1  MAP: 17  PIP: 30                                      ABG - ( 05 Feb 2021 03:14 )  pH, Arterial: 7.41  pH, Blood: x     /  pCO2: 36    /  pO2: 118   / HCO3: 23    / Base Excess: -0.9  /  SaO2: 99                  MEDICATIONS  (STANDING):  chlorhexidine 0.12% Liquid 15 milliLiter(s) Oral Mucosa every 12 hours  chlorhexidine 4% Liquid 1 Application(s) Topical <User Schedule>  fentaNYL   Infusion. 0.5 MICROgram(s)/kG/Hr (5 mL/Hr) IV Continuous <Continuous>  levETIRAcetam  IVPB 500 milliGRAM(s) IV Intermittent every 12 hours  midazolam Infusion 0.02 mG/kG/Hr (2 mL/Hr) IV Continuous <Continuous>  niCARdipine Infusion 5 mG/Hr (25 mL/Hr) IV Continuous <Continuous>  pantoprazole  Injectable 40 milliGRAM(s) IV Push every 12 hours  polyethylene glycol 3350 17 Gram(s) Oral daily  propofol Infusion 20 MICROgram(s)/kG/Min (12 mL/Hr) IV Continuous <Continuous>  saline laxative (FLEET) Rectal Enema 1 Enema Rectal daily  senna 2 Tablet(s) Oral at bedtime  sodium chloride 0.9% 500 milliLiter(s) (12.5 mL/Hr) IV Continuous <Continuous>  sodium chloride 3%. 500 milliLiter(s) (50 mL/Hr) IV Continuous <Continuous>    MEDICATIONS  (PRN):  acetaminophen   Tablet .. 650 milliGRAM(s) Oral every 6 hours PRN Temp greater or equal to 38C (100.4F)  meperidine     Injectable 12.5 milliGRAM(s) IV Push every 4 hours PRN Shivering      New X-rays reviewed:                                                                                  ECHO    CXR interpreted by me:  ET OG OK>  low lung volume.  LLL atelectasis

## 2021-02-06 NOTE — PROGRESS NOTE ADULT - SUBJECTIVE AND OBJECTIVE BOX
Specialty: Neuro Critical Care     HPI:  50 years old right handed Male with PMHx of HTN, GI bleed, PUD,  Mrankin score of 0 at baseline was brought in by EMS for  AMS and left sided weakness.   Wife states that patient was having a usual day today went to bathroom and then the next thing she noted was that he was confused and was lying on the bathroom floor, she reports urinary incontinence, patient was confused, had trouble speaking, and could not move his left side.   In the ED, /148, HR 80, saturating well. Code stroke called, NIHSS 24 and GCS 11, CTH showed Moderate to large intraparenchymal right basal ganglia hemorrhage with mild surrounding edema and associated right sulcal effacement as well as decompression into the ventricles. There is approximately 6.3 mm of midline shift. CTA did not show any evidence of active bleeding, AVM or aneurysm or major vascular stenosis or occlusion. Pt became unresponsive in CT scan and had to be intubated and sedated. Patient was intubated in ED for worsening mental status and GCS. Started on Nicardipine drip for sbp in 200s and EVD was placed by neurosurgery at bedside. Pan trauma scan was negative. To be admitted under ICU for further monitoring.  (27 Jan 2021 23:13)    Past Medical and Surgical Hx:  HTN (hypertension)  GI bleed  Gastric ulcer  PUD (peptic ulcer disease)  Arm fracture, left  s/p surgical repair    Allergies: No Known Allergies    ROS: Patient unable to participate in ROS due to neurologic status.    PE:  General: mechanically vented, sedated on Propofol, Fentanyl, and Versed, male of appropriate age, lying supine in bed. NAD  Neuro:  MSE: mechanically vented, sedated on Propofol, Fentanyl, and Versed, does not follow commands, does not open eyes to voice or noxious stimuli. No blink to threat  CN: Pupils 3mm brisk bilaterally. Right eye right gaze preference that can be overcome. No nystagmus noted. (+) corneal reflex (+) cough (+) gag (+) oculocephalic reflex  Motor: Good muscle bulk, flaccid tone. No movement to noxious stimuli noted bilaterally throughout  Sensory: No facial grimacing noted to noxious stimuli  EVD drain unclamped @ 10cm H20    Vital Signs:  ICU Vital Signs Last 24 Hrs  T(C): 36.3 (05 Feb 2021 14:00), Max: 36.7 (05 Feb 2021 00:00)  T(F): 97.3 (05 Feb 2021 14:00), Max: 98.1 (05 Feb 2021 00:00)  HR: 67 (05 Feb 2021 13:00) (56 - 75)  BP: 144/72 (05 Feb 2021 11:00) (111/61 - 144/72)  BP(mean): 105 (05 Feb 2021 11:00) (78 - 107)  ABP: 156/84 (05 Feb 2021 14:00) (84/68 - 159/86)  ABP(mean): 107 (05 Feb 2021 14:00) (79 - 108)  RR: 24 (05 Feb 2021 14:00) (24 - 24)  SpO2: 100% (05 Feb 2021 14:00) (97% - 100%)    Ventilator:  Mode: AC/ CMV (Assist Control/ Continuous Mandatory Ventilation)  RR (machine): 24  TV (machine): 450  FiO2: 80  PEEP: 10  ITime: 1  MAP: 16  PIP: 28    I/O Detail:  04 Feb 2021 07:01  -  05 Feb 2021 07:00  --------------------------------------------------------  IN:    Enteral Tube Flush: 310 mL    FentaNYL: 710 mL    IV PiggyBack: 100 mL    Midazolam: 188 mL    NiCARdipine: 37.5 mL    Propofol: 576 mL    sodium chloride 0.9% w/ Additives: 187.5 mL    sodium chloride 3%: 1200 mL  Total IN: 3309 mL    OUT:    Drain (mL): 308 mL    Indwelling Catheter - Urethral (mL): 1420 mL  Total OUT: 1728 mL    Total NET: 1581 mL      05 Feb 2021 07:01  -  05 Feb 2021 15:06  --------------------------------------------------------  IN:    FentaNYL: 170 mL    Midazolam: 42 mL    Propofol: 168 mL    sodium chloride 0.9% w/ Additives: 87.5 mL    sodium chloride 3%: 350 mL  Total IN: 817.5 mL    OUT:    Drain (mL): 93 mL    Indwelling Catheter - Urethral (mL): 300 mL  Total OUT: 393 mL    Total NET: 424.5 mL    EVD output for 24 hours: 308    Labs:  02-05    143  |  114<H>  |  20  ----------------------------<  96  4.0   |  22  |  0.8    Ca    8.0<L>      05 Feb 2021 05:00  Mg     3.0     02-05    TPro  4.6<L>  /  Alb  2.5<L>  /  TBili  0.8  /  DBili  x   /  AST  25  /  ALT  22  /  AlkPhos  67  02-05                          12.4   10.22 )-----------( 256      ( 05 Feb 2021 05:00 )             37.0     ABG - ( 05 Feb 2021 03:14 )  pH, Arterial: 7.41  pH, Blood: x     /  pCO2: 36    /  pO2: 118   / HCO3: 23    / Base Excess: -0.9  /  SaO2: 99        Microbiology:  MRSA: 2/1/2021: negative     Urine culture: 2/1/2021: negative    CSF: 2/1/2021:  Acid Fast Bacilli Smear: less than 5cc CSF received, unable to perform AFB smear  Gram stain: no organisms seen  Fungal: testing in progress  Lactate dehydrogenase: 112   Protein: 47  Glucose: 76  Cerebrospinal Fluid Cell Count-1 (02.01.21 @ 20:10)   Tube Type: Tube 1   CSF Appearance: Cloudy   CSF Lymphocytes: 8 %   CSF Monocytes/Macrophages: 8 %   CSF Segmented Neutrophils: 84 %   Appearance Spun: Xanthochromic   RBC Count - Spinal Fluid: 89530 /uL   Total Nucleated Cell Count, CSF: 6 /uL   CSF Color: Pink     Medications Current and PRN:  MEDICATIONS  (STANDING):  acetaminophen    Suspension .. 650 milliGRAM(s) Oral every 6 hours  chlorhexidine 0.12% Liquid 15 milliLiter(s) Oral Mucosa every 12 hours  chlorhexidine 4% Liquid 1 Application(s) Topical <User Schedule>  fentaNYL   Infusion. 0.5 MICROgram(s)/kG/Hr (5 mL/Hr) IV Continuous <Continuous>  levETIRAcetam  IVPB 500 milliGRAM(s) IV Intermittent every 12 hours  midazolam Infusion 0.02 mG/kG/Hr (2 mL/Hr) IV Continuous <Continuous>  niCARdipine Infusion 5 mG/Hr (25 mL/Hr) IV Continuous <Continuous>  pantoprazole  Injectable 40 milliGRAM(s) IV Push every 12 hours  polyethylene glycol 3350 17 Gram(s) Oral daily  propofol Infusion 20 MICROgram(s)/kG/Min (12 mL/Hr) IV Continuous <Continuous>  saline laxative (FLEET) Rectal Enema 1 Enema Rectal daily  senna 2 Tablet(s) Oral at bedtime  sodium chloride 0.9% 500 milliLiter(s) (12.5 mL/Hr) IV Continuous <Continuous>  sodium chloride 3%. 500 milliLiter(s) (50 mL/Hr) IV Continuous <Continuous>    MEDICATIONS  (PRN):  meperidine     Injectable 12.5 milliGRAM(s) IV Push every 4 hours PRN Shivering    Imaging:  VEEG in the last 24 hours: (2/4/2021):   sedated and intubated  Background----------------   low amplitude , sleep recording with minimal reactivity  Focal and generalized slowing------------- generalized slowing.  No focal slowing  Interictal activity--------------  none  Events------------------- none  Seizures-------------- none  Impression:   generalized slowing    EXAM:  CT BRAIN (2/3/2021):   IMPRESSION:  Re-demonstration of evolving acute right basal ganglia intraparenchymal hemorrhage with local mass effect and mild vasogenic edema, not significantly changed since the prior examination of 1/31/2021.  Interval resolution of previously seen intraventricular hemorrhage with unchanged scattered bilateral subarachnoid hemorrhage and minimal hemorrhage along the left frontal ventriculostomy tract.  Decreased dilation of the left lateral ventricle since prior examination.    EXAM:  CT BRAIN STROKE PROTOCOL (1/27/2021):   IMPRESSION:  Moderate to large intraparenchymal right basal ganglia hemorrhage with mild surrounding edema and associated right sulcal effacement as well as decompression into the ventricles. There is approximately 6.3 mm of midline shift.    Assessment:  50-year-old male with PMHx GIB, HTN, and PUD, BIBA by EMS, found of floor of bathroom with AMS, left hemiparesis, and dysarthria. In ED, /148. Stroke code initiated. Initial NIH = 24/GCS = 11. CTH revealed moderate to large intraparenchymal right basal ganglia ICH with mild edema and associated right sulcal effacement with decompression of ventricles and 6.3mm midline shift. CTA negative for active bleed, AVM, aneurysm major vascular stenosis or occlusion. Intubated for airway protection r/t worsening mental status and neurological decline. NeuroSx c/s, s/p EVD placement. Admitted to ICU for management. Most recent CTH, 2/3/2021, re-demonstration of evolving acute right basal ganglia intraparenchymal hemorrhage with local mass effect and mild vasogenic edema, decreased dilation of left lateral ventricle, and interval resolution of previously seen intraventricular hemorrhage with unchanged scattered bilateral subarachnoid hemorrhage and minimal hemorrhage along the left frontal ventriculostomy tract. vEEG, 2/4/2021, revealed generalized slowing with no seizure activity. On examination today, patient on TTM, goal 36C, mechanically vented, sedated on Fentanyl, Versed, and Propofol, does not open eyes to voice or noxious stimuli, does not follow commands, no movement to noxious stimuli bilaterally throughout, with intact brainstem reflexes. EVD drain @ 99bjH0G, open to drain. ICP range 8-16; CPP range 69-90.     Plan:  #Neuro:  - Neuro checks q1hr  - Continue 3% NS with goal Serum Na level 140-150  - Trend CMP q8hrs  - EVD as per NeuroSx; keep unclamped @ 84hoP5D  - Keep ICP < 20, CPP > 70  - Continue Keppra 500mg IV q12hrs  - Continue to wean sedation as tolerated for neurological prognostication  - Fentanyl IVP prn for agitation/vent synchrony    #CV:  - Keep SBP < 150 to avoid increases in ICP   - Utilize Nicardipine as needed while maintaining CPP > 70 and ICP < 20  - Recommend initiating PO antihypertensive agents to wean off Nicardipine gtt and sedation    #Resp:  - Ventilator management as per primary team  - HOB > 35 degrees  - Aspiration precautions  - SAT/SBT as tolerated    #Renal/Fluid/Electolytes:  - Strict I&Os  - Keep euvolemic   - Monitor lytes, replete as needed     #ID:  - Targeted temperature management, goal temperature 36C  - Continuous temperature monitoring  - Bedside Shivering Assessment; skin counter warming with Patricia Hugger to prevent shivering  - Continue Magnesium Sulfate gtt @ 0.5 grams/hr for bedside shivering  - Can also utilize Buspar or Meperidine prn for bedside shivering  - ID recs appreciated: no need for ABTx, all cultures NGTD    #GI:  - Continue Protonix 40mg IV q12hrs  - Continue Miralax 17 grams PO q24hrs  - Continue Senna 2 tablets PO q24hrs HS  - Continue fleet enema q24hrs    #DVT prophylaxis:  - SCS while in bed  - Pharmacologic DVT prophylaxis

## 2021-02-06 NOTE — PROGRESS NOTE ADULT - SUBJECTIVE AND OBJECTIVE BOX
PPD # 10    S/P Placement of EVD    Pt seen and examined at bedside.     Vital Signs Last 24 Hrs  T(C): 35.6 (06 Feb 2021 10:00), Max: 36.4 (05 Feb 2021 13:00)  T(F): 96.1 (06 Feb 2021 10:00), Max: 97.5 (05 Feb 2021 13:00)  HR: 60 (06 Feb 2021 10:00) (60 - 75)  BP: 123/78 (06 Feb 2021 06:00) (123/78 - 123/78)  BP(mean): 96 (06 Feb 2021 06:00) (96 - 96)  RR: 24 (06 Feb 2021 10:00) (24 - 24)  SpO2: 100% (06 Feb 2021 10:00) (98% - 100%)    PHYSICAL EXAM:          MEDICATIONS:  Antibiotics:    Neuro:  acetaminophen   Tablet .. 650 milliGRAM(s) Oral every 6 hours PRN  fentaNYL   Infusion. 0.5 MICROgram(s)/kG/Hr IV Continuous <Continuous>  levETIRAcetam  IVPB 500 milliGRAM(s) IV Intermittent every 12 hours  meperidine     Injectable 12.5 milliGRAM(s) IV Push every 4 hours PRN  midazolam Infusion 0.02 mG/kG/Hr IV Continuous <Continuous>  propofol Infusion 20 MICROgram(s)/kG/Min IV Continuous <Continuous>    Anticoagulation:    OTHER:  chlorhexidine 0.12% Liquid 15 milliLiter(s) Oral Mucosa every 12 hours  chlorhexidine 4% Liquid 1 Application(s) Topical <User Schedule>  niCARdipine Infusion 5 mG/Hr IV Continuous <Continuous>  pantoprazole  Injectable 40 milliGRAM(s) IV Push every 12 hours  polyethylene glycol 3350 17 Gram(s) Oral daily  saline laxative (FLEET) Rectal Enema 1 Enema Rectal daily  senna 2 Tablet(s) Oral at bedtime    IVF:  sodium chloride 0.9% 500 milliLiter(s) IV Continuous <Continuous>  sodium chloride 3%. 500 milliLiter(s) IV Continuous <Continuous>        LABS:                        12.6   11.52 )-----------( 293      ( 06 Feb 2021 04:30 )             39.3     02-06    153<H>  |  124<H>  |  16  ----------------------------<  125<H>  3.9   |  22  |  0.7    Ca    7.9<L>      06 Feb 2021 04:30  Mg     2.7     02-06    TPro  4.8<L>  /  Alb  2.6<L>  /  TBili  0.7  /  DBili  x   /  AST  25  /  ALT  22  /  AlkPhos  77  02-06          RADIOLOGY:      Assessment:      Plan:   PPD # 10    S/P Placement of EVD    Pt seen and examined at bedside. Pt remains intubated, sedated with Fentanyl, Propofol, Flagyl.     Vital Signs Last 24 Hrs  T(C): 35.6 (06 Feb 2021 10:00), Max: 36.4 (05 Feb 2021 13:00)  T(F): 96.1 (06 Feb 2021 10:00), Max: 97.5 (05 Feb 2021 13:00)  HR: 60 (06 Feb 2021 10:00) (60 - 75)  BP: 123/78 (06 Feb 2021 06:00) (123/78 - 123/78)  BP(mean): 96 (06 Feb 2021 06:00) (96 - 96)  RR: 24 (06 Feb 2021 10:00) (24 - 24)  SpO2: 100% (06 Feb 2021 10:00) (98% - 100%)  ICP 4-10  EVD 313cc/24hrs      PHYSICAL EXAM:  Pupils pinpoint  + brisk corneals   No mvmt of UE to pain  No mvmt of LE to pain  Does not respond to verbal    MEDICATIONS:  Antibiotics:    Neuro:  acetaminophen   Tablet .. 650 milliGRAM(s) Oral every 6 hours PRN  fentaNYL   Infusion. 0.5 MICROgram(s)/kG/Hr IV Continuous <Continuous>  levETIRAcetam  IVPB 500 milliGRAM(s) IV Intermittent every 12 hours  meperidine     Injectable 12.5 milliGRAM(s) IV Push every 4 hours PRN  midazolam Infusion 0.02 mG/kG/Hr IV Continuous <Continuous>  propofol Infusion 20 MICROgram(s)/kG/Min IV Continuous <Continuous>    Anticoagulation:    OTHER:  chlorhexidine 0.12% Liquid 15 milliLiter(s) Oral Mucosa every 12 hours  chlorhexidine 4% Liquid 1 Application(s) Topical <User Schedule>  niCARdipine Infusion 5 mG/Hr IV Continuous <Continuous>  pantoprazole  Injectable 40 milliGRAM(s) IV Push every 12 hours  polyethylene glycol 3350 17 Gram(s) Oral daily  saline laxative (FLEET) Rectal Enema 1 Enema Rectal daily  senna 2 Tablet(s) Oral at bedtime    IVF:  sodium chloride 0.9% 500 milliLiter(s) IV Continuous <Continuous>  sodium chloride 3%. 500 milliLiter(s) IV Continuous <Continuous>    LABS:                        12.6   11.52 )-----------( 293      ( 06 Feb 2021 04:30 )             39.3     02-06    153<H>  |  124<H>  |  16  ----------------------------<  125<H>  3.9   |  22  |  0.7    Ca    7.9<L>      06 Feb 2021 04:30  Mg     2.7     02-06    TPro  4.8<L>  /  Alb  2.6<L>  /  TBili  0.7  /  DBili  x   /  AST  25  /  ALT  22  /  AlkPhos  77  02-06    Assessment:  As above    Plan:  Continue to monitor EVD/ICP  Neuro checks  Will send CSF

## 2021-02-06 NOTE — PROGRESS NOTE ADULT - ATTENDING COMMENTS
I have personally seen and examined this patient.  I have fully participated in the care of this patient.  I have reviewed all pertinent clinical information, including history, physical exam, plan and note.   I have reviewed all pertinent clinical information and reviewed all relevant imaging and diagnostic studies personally.  Last shivering episode > 24 hrs ago therefore recommend d/c magnesium and continue warming blanket.  Rest of recommendations as above.  Agree with above assessment except as noted.

## 2021-02-07 LAB
ALBUMIN SERPL ELPH-MCNC: 2.6 G/DL — LOW (ref 3.5–5.2)
ALBUMIN SERPL ELPH-MCNC: 2.6 G/DL — LOW (ref 3.5–5.2)
ALP SERPL-CCNC: 77 U/L — SIGNIFICANT CHANGE UP (ref 30–115)
ALP SERPL-CCNC: 81 U/L — SIGNIFICANT CHANGE UP (ref 30–115)
ALT FLD-CCNC: 24 U/L — SIGNIFICANT CHANGE UP (ref 0–41)
ALT FLD-CCNC: 27 U/L — SIGNIFICANT CHANGE UP (ref 0–41)
ANION GAP SERPL CALC-SCNC: 11 MMOL/L — SIGNIFICANT CHANGE UP (ref 7–14)
ANION GAP SERPL CALC-SCNC: 12 MMOL/L — SIGNIFICANT CHANGE UP (ref 7–14)
ANION GAP SERPL CALC-SCNC: 8 MMOL/L — SIGNIFICANT CHANGE UP (ref 7–14)
ANION GAP SERPL CALC-SCNC: 9 MMOL/L — SIGNIFICANT CHANGE UP (ref 7–14)
APPEARANCE CSF: ABNORMAL
APPEARANCE SPUN FLD: ABNORMAL
AST SERPL-CCNC: 25 U/L — SIGNIFICANT CHANGE UP (ref 0–41)
AST SERPL-CCNC: 26 U/L — SIGNIFICANT CHANGE UP (ref 0–41)
BASOPHILS # BLD AUTO: 0 K/UL — SIGNIFICANT CHANGE UP (ref 0–0.2)
BASOPHILS NFR BLD AUTO: 0 % — SIGNIFICANT CHANGE UP (ref 0–1)
BILIRUB SERPL-MCNC: 0.7 MG/DL — SIGNIFICANT CHANGE UP (ref 0.2–1.2)
BILIRUB SERPL-MCNC: 0.7 MG/DL — SIGNIFICANT CHANGE UP (ref 0.2–1.2)
BUN SERPL-MCNC: 15 MG/DL — SIGNIFICANT CHANGE UP (ref 10–20)
BUN SERPL-MCNC: 15 MG/DL — SIGNIFICANT CHANGE UP (ref 10–20)
BUN SERPL-MCNC: 16 MG/DL — SIGNIFICANT CHANGE UP (ref 10–20)
BUN SERPL-MCNC: 17 MG/DL — SIGNIFICANT CHANGE UP (ref 10–20)
CALCIUM SERPL-MCNC: 8 MG/DL — LOW (ref 8.5–10.1)
CALCIUM SERPL-MCNC: 8.1 MG/DL — LOW (ref 8.5–10.1)
CALCIUM SERPL-MCNC: 8.2 MG/DL — LOW (ref 8.5–10.1)
CALCIUM SERPL-MCNC: 8.3 MG/DL — LOW (ref 8.5–10.1)
CHLORIDE SERPL-SCNC: 125 MMOL/L — HIGH (ref 98–110)
CHLORIDE SERPL-SCNC: 126 MMOL/L — HIGH (ref 98–110)
CHLORIDE SERPL-SCNC: 127 MMOL/L — HIGH (ref 98–110)
CHLORIDE SERPL-SCNC: 128 MMOL/L — HIGH (ref 98–110)
CO2 SERPL-SCNC: 20 MMOL/L — SIGNIFICANT CHANGE UP (ref 17–32)
CO2 SERPL-SCNC: 20 MMOL/L — SIGNIFICANT CHANGE UP (ref 17–32)
CO2 SERPL-SCNC: 21 MMOL/L — SIGNIFICANT CHANGE UP (ref 17–32)
CO2 SERPL-SCNC: 21 MMOL/L — SIGNIFICANT CHANGE UP (ref 17–32)
COLOR CSF: SIGNIFICANT CHANGE UP
CREAT SERPL-MCNC: 0.8 MG/DL — SIGNIFICANT CHANGE UP (ref 0.7–1.5)
CULTURE RESULTS: SIGNIFICANT CHANGE UP
CULTURE RESULTS: SIGNIFICANT CHANGE UP
EOSINOPHIL # BLD AUTO: 1.06 K/UL — HIGH (ref 0–0.7)
EOSINOPHIL NFR BLD AUTO: 7.9 % — SIGNIFICANT CHANGE UP (ref 0–8)
GAS PNL BLDA: SIGNIFICANT CHANGE UP
GLUCOSE CSF-MCNC: 76 MG/DL — HIGH (ref 45–75)
GLUCOSE SERPL-MCNC: 102 MG/DL — HIGH (ref 70–99)
GLUCOSE SERPL-MCNC: 104 MG/DL — HIGH (ref 70–99)
GLUCOSE SERPL-MCNC: 113 MG/DL — HIGH (ref 70–99)
GLUCOSE SERPL-MCNC: 115 MG/DL — HIGH (ref 70–99)
GRAM STN FLD: SIGNIFICANT CHANGE UP
HCT VFR BLD CALC: 41.2 % — LOW (ref 42–52)
HGB BLD-MCNC: 13.1 G/DL — LOW (ref 14–18)
LYMPHOCYTES # BLD AUTO: 0.47 K/UL — LOW (ref 1.2–3.4)
LYMPHOCYTES # BLD AUTO: 3.5 % — LOW (ref 20.5–51.1)
LYMPHOCYTES # CSF: 27 % — SIGNIFICANT CHANGE UP (ref 40–80)
MAGNESIUM SERPL-MCNC: 3.3 MG/DL — CRITICAL HIGH (ref 1.8–2.4)
MCHC RBC-ENTMCNC: 31 PG — SIGNIFICANT CHANGE UP (ref 27–31)
MCHC RBC-ENTMCNC: 31.8 G/DL — LOW (ref 32–37)
MCV RBC AUTO: 97.6 FL — HIGH (ref 80–94)
MONOCYTES # BLD AUTO: 0.71 K/UL — HIGH (ref 0.1–0.6)
MONOCYTES NFR BLD AUTO: 5.3 % — SIGNIFICANT CHANGE UP (ref 1.7–9.3)
MONOS+MACROS NFR CSF: 45 % — SIGNIFICANT CHANGE UP (ref 15–45)
NEUTROPHILS # BLD AUTO: 10.56 K/UL — HIGH (ref 1.4–6.5)
NEUTROPHILS # CSF: 28 % — HIGH (ref 0–6)
NEUTROPHILS NFR BLD AUTO: 77.9 % — HIGH (ref 42.2–75.2)
NRBC NFR CSF: 14 /UL — HIGH (ref 0–5)
PHOSPHATE SERPL-MCNC: 4.1 MG/DL — SIGNIFICANT CHANGE UP (ref 2.1–4.9)
PLATELET # BLD AUTO: 324 K/UL — SIGNIFICANT CHANGE UP (ref 130–400)
POTASSIUM SERPL-MCNC: 3.9 MMOL/L — SIGNIFICANT CHANGE UP (ref 3.5–5)
POTASSIUM SERPL-MCNC: 3.9 MMOL/L — SIGNIFICANT CHANGE UP (ref 3.5–5)
POTASSIUM SERPL-MCNC: 4 MMOL/L — SIGNIFICANT CHANGE UP (ref 3.5–5)
POTASSIUM SERPL-MCNC: 4.3 MMOL/L — SIGNIFICANT CHANGE UP (ref 3.5–5)
POTASSIUM SERPL-SCNC: 3.9 MMOL/L — SIGNIFICANT CHANGE UP (ref 3.5–5)
POTASSIUM SERPL-SCNC: 3.9 MMOL/L — SIGNIFICANT CHANGE UP (ref 3.5–5)
POTASSIUM SERPL-SCNC: 4 MMOL/L — SIGNIFICANT CHANGE UP (ref 3.5–5)
POTASSIUM SERPL-SCNC: 4.3 MMOL/L — SIGNIFICANT CHANGE UP (ref 3.5–5)
PROT CSF-MCNC: 61 MG/DL — HIGH (ref 15–45)
PROT SERPL-MCNC: 4.8 G/DL — LOW (ref 6–8)
PROT SERPL-MCNC: 4.9 G/DL — LOW (ref 6–8)
RBC # BLD: 4.22 M/UL — LOW (ref 4.7–6.1)
RBC # CSF: 1427 /UL — SIGNIFICANT CHANGE UP (ref 0–0)
RBC # FLD: 13 % — SIGNIFICANT CHANGE UP (ref 11.5–14.5)
SODIUM SERPL-SCNC: 155 MMOL/L — HIGH (ref 135–146)
SODIUM SERPL-SCNC: 156 MMOL/L — HIGH (ref 135–146)
SODIUM SERPL-SCNC: 157 MMOL/L — HIGH (ref 135–146)
SODIUM SERPL-SCNC: 160 MMOL/L — HIGH (ref 135–146)
SPECIMEN SOURCE: SIGNIFICANT CHANGE UP
TUBE TYPE: SIGNIFICANT CHANGE UP
WBC # BLD: 13.4 K/UL — HIGH (ref 4.8–10.8)
WBC # FLD AUTO: 13.4 K/UL — HIGH (ref 4.8–10.8)

## 2021-02-07 PROCEDURE — 99232 SBSQ HOSP IP/OBS MODERATE 35: CPT | Mod: 24

## 2021-02-07 PROCEDURE — 99233 SBSQ HOSP IP/OBS HIGH 50: CPT

## 2021-02-07 PROCEDURE — 71045 X-RAY EXAM CHEST 1 VIEW: CPT | Mod: 26

## 2021-02-07 RX ORDER — AMLODIPINE BESYLATE 2.5 MG/1
10 TABLET ORAL DAILY
Refills: 0 | Status: DISCONTINUED | OUTPATIENT
Start: 2021-02-07 | End: 2021-02-11

## 2021-02-07 RX ORDER — DEXMEDETOMIDINE HYDROCHLORIDE IN 0.9% SODIUM CHLORIDE 4 UG/ML
0.05 INJECTION INTRAVENOUS
Qty: 400 | Refills: 0 | Status: DISCONTINUED | OUTPATIENT
Start: 2021-02-07 | End: 2021-02-16

## 2021-02-07 RX ORDER — SODIUM CHLORIDE 9 MG/ML
1000 INJECTION, SOLUTION INTRAVENOUS
Refills: 0 | Status: DISCONTINUED | OUTPATIENT
Start: 2021-02-07 | End: 2021-02-07

## 2021-02-07 RX ORDER — SODIUM CHLORIDE 5 G/100ML
500 INJECTION, SOLUTION INTRAVENOUS
Refills: 0 | Status: DISCONTINUED | OUTPATIENT
Start: 2021-02-07 | End: 2021-02-07

## 2021-02-07 RX ADMIN — CHLORHEXIDINE GLUCONATE 15 MILLILITER(S): 213 SOLUTION TOPICAL at 05:32

## 2021-02-07 RX ADMIN — Medication 50 MILLIEQUIVALENT(S): at 02:37

## 2021-02-07 RX ADMIN — CHLORHEXIDINE GLUCONATE 1 APPLICATION(S): 213 SOLUTION TOPICAL at 06:35

## 2021-02-07 RX ADMIN — MIDAZOLAM HYDROCHLORIDE 2 MG/KG/HR: 1 INJECTION, SOLUTION INTRAMUSCULAR; INTRAVENOUS at 00:32

## 2021-02-07 RX ADMIN — Medication 1 ENEMA: at 12:22

## 2021-02-07 RX ADMIN — PROPOFOL 12 MICROGRAM(S)/KG/MIN: 10 INJECTION, EMULSION INTRAVENOUS at 02:00

## 2021-02-07 RX ADMIN — FENTANYL CITRATE 5 MICROGRAM(S)/KG/HR: 50 INJECTION INTRAVENOUS at 00:47

## 2021-02-07 RX ADMIN — POLYETHYLENE GLYCOL 3350 17 GRAM(S): 17 POWDER, FOR SOLUTION ORAL at 12:12

## 2021-02-07 RX ADMIN — DEXMEDETOMIDINE HYDROCHLORIDE IN 0.9% SODIUM CHLORIDE 1.25 MICROGRAM(S)/KG/HR: 4 INJECTION INTRAVENOUS at 12:00

## 2021-02-07 RX ADMIN — LEVETIRACETAM 420 MILLIGRAM(S): 250 TABLET, FILM COATED ORAL at 06:01

## 2021-02-07 RX ADMIN — Medication 50 MILLIEQUIVALENT(S): at 01:26

## 2021-02-07 RX ADMIN — PANTOPRAZOLE SODIUM 40 MILLIGRAM(S): 20 TABLET, DELAYED RELEASE ORAL at 17:11

## 2021-02-07 RX ADMIN — LEVETIRACETAM 420 MILLIGRAM(S): 250 TABLET, FILM COATED ORAL at 17:10

## 2021-02-07 RX ADMIN — SODIUM CHLORIDE 50 MILLILITER(S): 5 INJECTION, SOLUTION INTRAVENOUS at 02:53

## 2021-02-07 RX ADMIN — CHLORHEXIDINE GLUCONATE 15 MILLILITER(S): 213 SOLUTION TOPICAL at 17:11

## 2021-02-07 RX ADMIN — PANTOPRAZOLE SODIUM 40 MILLIGRAM(S): 20 TABLET, DELAYED RELEASE ORAL at 05:32

## 2021-02-07 RX ADMIN — SENNA PLUS 2 TABLET(S): 8.6 TABLET ORAL at 21:13

## 2021-02-07 RX ADMIN — Medication 50 MILLIEQUIVALENT(S): at 00:09

## 2021-02-07 NOTE — PROGRESS NOTE ADULT - SUBJECTIVE AND OBJECTIVE BOX
Neurocritical Care Progress Note:    1. Brief Presentation: AMS    2. Today's Acute Problems: remains sedated, intubated, dysconjugate gaze, no bowel movements for 2 days, EVD open at 10, draining bloody CSF, ICP and CPP are stable, had BP spike and was restarted on Cardene gtt    3. Relevant brief History:50 years old right handed Male with PMHx of HTN, GI bleed, PUD,  Mrankin score of 0 at baseline was brought in by EMS for  AMS and left sided weakness.   Wife states that patient was having a usual day today went to bathroom and then the next thing she noted was that he was confused and was lying on the bathroom floor, she reports urinary incontinence, patient was confused, had trouble speaking, and could not move his left side.   In the ED, /148, HR 80, saturating well. Code stroke called, NIHSS 24 and GCS 11, CTH showed Moderate to large intraparenchymal right basal ganglia hemorrhage with mild surrounding edema and associated right sulcal effacement as well as decompression into the ventricles. There is approximately 6.3 mm of midline shift. CTA did not show any evidence of active bleeding, AVM or aneurysm or major vascular stenosis or occlusion. Pt became unresponsive in CT scan and had to be intubated and sedated. Patient was intubated in ED for worsening mental status and GCS. Started on Nicardipine drip for sbp in 200s and EVD was placed by neurosurgery at bedside. Pan trauma scan was negative. To be admitted under ICU for further monitoring.  (27 Jan 2021 23:13)        6. Medications:   chlorhexidine 0.12% Liquid 15 milliLiter(s) Oral Mucosa every 12 hours  chlorhexidine 4% Liquid 1 Application(s) Topical <User Schedule>  dextrose 5%. 1000 milliLiter(s) IV Continuous <Continuous>  fentaNYL   Infusion. 0.5 MICROgram(s)/kG/Hr IV Continuous <Continuous>  levETIRAcetam  IVPB 500 milliGRAM(s) IV Intermittent every 12 hours  midazolam Infusion 0.02 mG/kG/Hr IV Continuous <Continuous>  niCARdipine Infusion 5 mG/Hr IV Continuous <Continuous>  pantoprazole  Injectable 40 milliGRAM(s) IV Push every 12 hours  polyethylene glycol 3350 17 Gram(s) Oral daily  propofol Infusion 20 MICROgram(s)/kG/Min IV Continuous <Continuous>  saline laxative (FLEET) Rectal Enema 1 Enema Rectal daily  senna 2 Tablet(s) Oral at bedtime      7. Ancillary Management:   Chest PT[ ]   Head of bed >35 [ ]   Out of bed to chair [ ]   PT/OT/SP Eval [ ]   Spirometry[ ]   DVT prophalaxis[ ]    8.Neuro:   Awake: Spontaneously[ ] Occasionally[ ] To Voice [ ] To painful stimuli [ ]   AIert [ ]. Following commands: 3 steps[ ], 2 steps[ ], 1 step [ ], None [x ]   Orientation: 0[x ], 1[ ], 2[ ], 3[ ]. Tracking objects with eyes: [ ]   Language: intubated, comatose  Time off sedation for exam: N/A  Pupils: Right   >2   Left    >2  sluggish    Corneal: +     Gag reflex: +    Eomi: dysconjugate gaze  Motor/sensory: no localizing or withdrawal to pain X4        mRS:  0 No symptoms at all  1 No significant disability despite symptoms; able to carry out all usual duties and activities without assistance  2 Slight disability; unable to carry out all previous activities, but able to look after own affairs  3 Moderate disability; requiring some help, but able to walk without assistance  4 Moderately severe disability; unable to walk without assistance and unable to attend to own bodily needs without assistance  5 Severe disability; bedridden, incontinent and requiring constant nursing care and attention  6 Dead    ICHs:  Age >=80  GCS Score: 3-4 (2 pts)   GCS Score: 5-12 (1 pt)   ICH Volume: >30  (+) IVH  (+) Infratentorial      Last CTH: < from: CT Head No Cont (02.03.21 @ 14:25) >    EXAM:  CT BRAIN            PROCEDURE DATE:  02/03/2021            INTERPRETATION:  CLINICAL INDICATION: Altered mental status.    Technique: CT of the head was performed without contrast.    Multiple contiguous axial images were acquired from the skullbase to the vertex without the administration of intravenous contrast.  Coronal and sagittal reformations were made.    COMPARISON:  prior head CT dated 1/31/2021    FINDINGS:    Redemonstration of a large right basal ganglia intraparenchymal hemorrhage with extension into the frontal and temporal lobes. There is mild surrounding vasogenic edema and local mass effect with midline shift to the left approximately 0.8 cm, unchanged. There is resolution of previously seen intraventricular hemorrhage. A left frontal approach ventriculostomy drainage catheter is noted with tip terminating within the suprasellar cistern. Minimal hemorrhage is noted along the ventriculostomy tract. The dilation of the left lateral ventricle is decreased since the prior examination.    There is minimal scattered subarachnoid hemorrhage.    Air-fluid levels are noted within the right sphenoid and bilateral maxillary sinuses with patchy opacification of the bilateral ethmoid air cells.    IMPRESSION:    Redemonstration of evolving acute right basal ganglia intraparenchymal hemorrhage with local mass effect and mild vasogenic edema, not significantly changed since the prior examination of 1/31/2021.    Interval resolution of previously seen intraventricular hemorrhage with unchanged scattered bilateral subarachnoid hemorrhage and minimal hemorrhage along the left frontal ventriculostomy tract.    Decreased dilation of the left lateral ventricle since prior examination.      < end of copied text >      Last CTA/MRA:    Last CTP:    Last MRI:    Last TCD:    Last EEG:  VEEG in the last 24 hours: sedated and intubated    Background----------------   low amplitude , sleep recording with minimal reactivity    Focal and generalized slowing------------- generalized slowing.  No focal slowing    Interictal activity--------------  none    Events------------------- none    Seizures-------------- none    Impression:   generalized slowing    Plan - discussed with the  NCC team          EVD: [ ] Ledger: [ ]     ICP: 8     CPP:  88   Level(cm): 10    24hr(ml):     CSF: 313    W:     R:     C:     P:     G:     LA:    9. Cardiovascular:   Vital Signs Last 24 Hrs  T(C): 36.2 (07 Feb 2021 11:00), Max: 36.2 (06 Feb 2021 13:00)  T(F): 97.2 (07 Feb 2021 11:00), Max: 97.2 (06 Feb 2021 13:00)  HR: 79 (07 Feb 2021 11:00) (67 - 90)  BP: --  BP(mean): --  RR: 24 (07 Feb 2021 11:00) (24 - 24)  SpO2: 99% (07 Feb 2021 11:00) (98% - 100%)     Last Echo:    Last EKG:    CVP   MAP/CPP/SBP target:   CO:      CI:       Enzymes/Trop:    10. Respiratory:   ABG:    VBG:    Chest Xray:    Mode: AC/ CMV (Assist Control/ Continuous Mandatory Ventilation)  RR (machine): 24  TV (machine): 450  FiO2: 50  PEEP: 10  ITime: 1  MAP: 17  PIP: 32      Peak Pressure/[place] Pressure:    11.GI:    Prophalaxis:     Bowel mvt:     Abd distension:   LIVER FUNCTIONS - ( 07 Feb 2021 05:10 )  Alb: 2.6 g/dL / Pro: 4.8 g/dL / ALK PHOS: 81 U/L / ALT: 24 U/L / AST: 25 U/L / GGT: x             12.Renal/Fluids/Electrolytes:    02-07    160<H>  |  128<H>  |  15  ----------------------------<  102<H>  4.3   |  20  |  0.8    Ca    8.0<L>      07 Feb 2021 05:10  Phos  4.1     02-07  Mg     3.3     02-07    TPro  4.8<L>  /  Alb  2.6<L>  /  TBili  0.7  /  DBili  x   /  AST  25  /  ALT  24  /  AlkPhos  81  02-07      I&O's Detail    06 Feb 2021 07:01  -  07 Feb 2021 07:00  --------------------------------------------------------  IN:    FentaNYL: 445 mL    Midazolam: 192 mL    NiCARdipine: 200 mL    Propofol: 576 mL    sodium chloride 0.9% w/ Additives: 300 mL    sodium chloride 3%: 1200 mL  Total IN: 2913 mL    OUT:    Drain (mL): 255 mL    Indwelling Catheter - Urethral (mL): 770 mL  Total OUT: 1025 mL    Total NET: 1888 mL      07 Feb 2021 07:01 - 07 Feb 2021 11:38  --------------------------------------------------------  IN:    FentaNYL: 55 mL    Midazolam: 32 mL    NiCARdipine: 120 mL    Propofol: 96 mL    sodium chloride 0.9% w/ Additives: 12.5 mL    sodium chloride 3%: 50 mL    sodium chloride 3%: 90 mL  Total IN: 455.5 mL    OUT:    Drain (mL): 34 mL    Indwelling Catheter - Urethral (mL): 580 mL  Total OUT: 614 mL    Total NET: -158.5 mL          13.ID:   TMax:   Vital Signs Last 24 Hrs  T(C): 36.2 (07 Feb 2021 11:00), Max: 36.2 (06 Feb 2021 13:00)  T(F): 97.2 (07 Feb 2021 11:00), Max: 97.2 (06 Feb 2021 13:00)  HR: 79 (07 Feb 2021 11:00) (67 - 90)  BP: --  BP(mean): --  RR: 24 (07 Feb 2021 11:00) (24 - 24)  SpO2: 99% (07 Feb 2021 11:00) (98% - 100%)           Lines: Central[] Date inserted: Peripheral[]    14. Hematology:                         13.1   13.40 )-----------( 324      ( 07 Feb 2021 05:10 )             41.2      02-07    160<H>  |  128<H>  |  15  ----------------------------<  102<H>  4.3   |  20  |  0.8    Ca    8.0<L>      07 Feb 2021 05:10  Phos  4.1     02-07  Mg     3.3     02-07    TPro  4.8<L>  /  Alb  2.6<L>  /  TBili  0.7  /  DBili  x   /  AST  25  /  ALT  24  /  AlkPhos  81  02-07         DVT Prophylaxis Lovenox[ ] Heparin[ ] Venodynes[ ] SCD's[ ]    15. Impression:50-year-old male with PMHx GIB, HTN, and PUD, BIBA by EMS, found of floor of bathroom with AMS, left hemiparesis, and dysarthria. In ED, /148. Stroke code initiated. Initial NIH = 24/GCS = 11. CTH revealed moderate to large intraparenchymal right basal ganglia ICH with mild edema and associated right sulcal effacement with decompression of ventricles and 6.3mm midline shift. CTA negative for active bleed, AVM, aneurysm major vascular stenosis or occlusion. Intubated for airway protection r/t worsening mental status and neurological decline. NeuroSx c/s, s/p EVD placement. Admitted to ICU for management. Most recent CTH, 2/3/2021, re-demonstration of evolving acute right basal ganglia intraparenchymal hemorrhage with local mass effect and mild vasogenic edema, decreased dilation of left lateral ventricle, and interval resolution of previously seen intraventricular hemorrhage with unchanged scattered bilateral subarachnoid hemorrhage and minimal hemorrhage along the left frontal ventriculostomy tract. vEEG, 2/4/2021, revealed generalized slowing with no seizure activity. On examination today, patient on TTM, goal 36C, mechanically vented, sedated on Fentanyl, Versed, and Propofol, does not open eyes to voice or noxious stimuli, does not follow commands, no movement to noxious stimuli bilaterally throughout, with intact brainstem reflexes. EVD drain @ [id number], open to drain. ICP range 8-16; CPP range 69-90. Patient's Na is 160, Mag is 3.3 so infusion was stopped, patient also developed ileus.    Plan:  #Neuro:  - Neuro checks q1hr  - D/c  3% HS since Na is 160, can do 0.9 NS instead  - Trend CMP q8hrs  - EVD as per NeuroSx; keep unclamped @ [id number]  - Keep ICP < 20, CPP > 70  - Continue Keppra 500mg IV q12hrs  - Continue to wean sedation as tolerated for neurological prognostication  - D/C Fentanyl    #CV:  - Keep SBP < 150 to avoid increases in ICP   - Utilize Nicardipine as needed while maintaining CPP > 70 and ICP < 20  - Recommend initiating PO antihypertensive agents to wean off Nicardipine gtt and sedation    #Resp:  - Ventilator management as per primary team  - HOB > 35 degrees  - Aspiration precautions  - SAT/SBT as tolerated    #Renal/Fluid/Electolytes:  - Strict I&Os  - Keep euvolemic   - Monitor lytes, replete as needed     #ID:  - Targeted temperature management, goal temperature 36C  - Continuous temperature monitoring  - Bedside Shivering Assessment; skin counter warming with Patricia Hugger to prevent shivering    #GI:  - Continue Protonix 40mg IV q12hrs  - Continue Miralax 17 grams PO q24hrs  - Continue Senna 2 tablets PO q24hrs HS  - Continue fleet enema q24hrs    #DVT prophylaxis:  - SCS while in bed  - Pharmacologic DVT prophylaxis           17. Disposition: ICU     Neurocritical Care Progress Note:    1. Brief Presentation: AMS    2. Today's Acute Problems: remains sedated, intubated, dysconjugate gaze, no bowel movements for 2 days, EVD open at 10, draining bloody CSF, ICP and CPP are stable, had BP spike and was restarted on Cardene gtt.  Fentanyl switched to Precedex since possible Ileus.      3. Relevant brief History:50 years old right handed Male with PMHx of HTN, GI bleed, PUD,  Mrankin score of 0 at baseline was brought in by EMS for  AMS and left sided weakness.   Wife states that patient was having a usual day today went to bathroom and then the next thing she noted was that he was confused and was lying on the bathroom floor, she reports urinary incontinence, patient was confused, had trouble speaking, and could not move his left side.   In the ED, /148, HR 80, saturating well. Code stroke called, NIHSS 24 and GCS 11, CTH showed Moderate to large intraparenchymal right basal ganglia hemorrhage with mild surrounding edema and associated right sulcal effacement as well as decompression into the ventricles. There is approximately 6.3 mm of midline shift. CTA did not show any evidence of active bleeding, AVM or aneurysm or major vascular stenosis or occlusion. Pt became unresponsive in CT scan and had to be intubated and sedated. Patient was intubated in ED for worsening mental status and GCS. Started on Nicardipine drip for sbp in 200s and EVD was placed by neurosurgery at bedside. Pan trauma scan was negative. To be admitted under ICU for further monitoring.  (27 Jan 2021 23:13)    6. Medications:   chlorhexidine 0.12% Liquid 15 milliLiter(s) Oral Mucosa every 12 hours  chlorhexidine 4% Liquid 1 Application(s) Topical <User Schedule>  dextrose 5%. 1000 milliLiter(s) IV Continuous <Continuous>  fentaNYL   Infusion. 0.5 MICROgram(s)/kG/Hr IV Continuous <Continuous>  levETIRAcetam  IVPB 500 milliGRAM(s) IV Intermittent every 12 hours  midazolam Infusion 0.02 mG/kG/Hr IV Continuous <Continuous>  niCARdipine Infusion 5 mG/Hr IV Continuous <Continuous>  pantoprazole  Injectable 40 milliGRAM(s) IV Push every 12 hours  polyethylene glycol 3350 17 Gram(s) Oral daily  propofol Infusion 20 MICROgram(s)/kG/Min IV Continuous <Continuous>  saline laxative (FLEET) Rectal Enema 1 Enema Rectal daily  senna 2 Tablet(s) Oral at bedtime    7. Ancillary Management:   Chest PT[ ]   Head of bed >35 [ ]   Out of bed to chair [ ]   PT/OT/SP Eval [ ]   Spirometry[ ]   DVT prophalaxis[ ]    8.Neuro:   Awake: Spontaneously[ ] Occasionally[ ] To Voice [ ] To painful stimuli [ ]   AIert [ ]. Following commands: 3 steps[ ], 2 steps[ ], 1 step [ ], None [x ]   Orientation: 0[x ], 1[ ], 2[ ], 3[ ]. Tracking objects with eyes: [ ]   Language: intubated, comatose  Time off sedation for exam: N/A  Pupils: Right   >2   Left    >2  sluggish    Corneal: +     Gag reflex: +    Eomi: dysconjugate gaze  Motor/sensory: no localizing or withdrawal to pain X4    mRS:  0 No symptoms at all  1 No significant disability despite symptoms; able to carry out all usual duties and activities without assistance  2 Slight disability; unable to carry out all previous activities, but able to look after own affairs  3 Moderate disability; requiring some help, but able to walk without assistance  4 Moderately severe disability; unable to walk without assistance and unable to attend to own bodily needs without assistance  5 Severe disability; bedridden, incontinent and requiring constant nursing care and attention  6 Dead    ICHs:  Age >=80  GCS Score: 3-4 (2 pts)   GCS Score: 5-12 (1 pt)   ICH Volume: >30  (+) IVH  (+) Infratentorial    Last CTH: < from: CT Head No Cont (02.03.21 @ 14:25) >    EXAM:  CT BRAIN          PROCEDURE DATE:  02/03/2021      INTERPRETATION:  CLINICAL INDICATION: Altered mental status.    Technique: CT of the head was performed without contrast.    Multiple contiguous axial images were acquired from the skullbase to the vertex without the administration of intravenous contrast.  Coronal and sagittal reformations were made.    COMPARISON:  prior head CT dated 1/31/2021    FINDINGS:    Redemonstration of a large right basal ganglia intraparenchymal hemorrhage with extension into the frontal and temporal lobes. There is mild surrounding vasogenic edema and local mass effect with midline shift to the left approximately 0.8 cm, unchanged. There is resolution of previously seen intraventricular hemorrhage. A left frontal approach ventriculostomy drainage catheter is noted with tip terminating within the suprasellar cistern. Minimal hemorrhage is noted along the ventriculostomy tract. The dilation of the left lateral ventricle is decreased since the prior examination.    There is minimal scattered subarachnoid hemorrhage.    Air-fluid levels are noted within the right sphenoid and bilateral maxillary sinuses with patchy opacification of the bilateral ethmoid air cells.    IMPRESSION:    Redemonstration of evolving acute right basal ganglia intraparenchymal hemorrhage with local mass effect and mild vasogenic edema, not significantly changed since the prior examination of 1/31/2021.    Interval resolution of previously seen intraventricular hemorrhage with unchanged scattered bilateral subarachnoid hemorrhage and minimal hemorrhage along the left frontal ventriculostomy tract.    Decreased dilation of the left lateral ventricle since prior examination.    < end of copied text >    Last CTA/MRA:    Last CTP:    Last MRI:    Last TCD:    Last EEG:  VEEG in the last 24 hours: sedated and intubated    Background----------------   low amplitude , sleep recording with minimal reactivity    Focal and generalized slowing------------- generalized slowing.  No focal slowing    Interictal activity--------------  none    Events------------------- none    Seizures-------------- none    Impression:   generalized slowing    Plan - discussed with the  NCC team    EVD: [ ] [place]: [ ]     ICP: 8     CPP:  88   Level(cm): 10    24hr(ml):     CSF: 313    W:     R:     C:     P:     G:     LA:    9. Cardiovascular:   Vital Signs Last 24 Hrs  T(C): 36.2 (07 Feb 2021 11:00), Max: 36.2 (06 Feb 2021 13:00)  T(F): 97.2 (07 Feb 2021 11:00), Max: 97.2 (06 Feb 2021 13:00)  HR: 79 (07 Feb 2021 11:00) (67 - 90)  BP: --  BP(mean): --  RR: 24 (07 Feb 2021 11:00) (24 - 24)  SpO2: 99% (07 Feb 2021 11:00) (98% - 100%)     Last Echo:    Last EKG:    CVP   MAP/CPP/SBP target:   CO:      CI:       Enzymes/Trop:    10. Respiratory:   ABG:    VBG:    Chest Xray:    Mode: AC/ CMV (Assist Control/ Continuous Mandatory Ventilation)  RR (machine): 24  TV (machine): 450  FiO2: 50  PEEP: 10  ITime: 1  MAP: 17  PIP: 32    Peak Pressure/[place] Pressure:    11.GI:    Prophalaxis:     Bowel mvt:     Abd distension:   LIVER FUNCTIONS - ( 07 Feb 2021 05:10 )  Alb: 2.6 g/dL / Pro: 4.8 g/dL / ALK PHOS: 81 U/L / ALT: 24 U/L / AST: 25 U/L / GGT: x           12.Renal/Fluids/Electrolytes:    02-07    160<H>  |  128<H>  |  15  ----------------------------<  102<H>  4.3   |  20  |  0.8    Ca    8.0<L>      07 Feb 2021 05:10  Phos  4.1     02-07  Mg     3.3     02-07    TPro  4.8<L>  /  Alb  2.6<L>  /  TBili  0.7  /  DBili  x   /  AST  25  /  ALT  24  /  AlkPhos  81  02-07    I&O's Detail    06 Feb 2021 07:01  -  07 Feb 2021 07:00  --------------------------------------------------------  IN:    FentaNYL: 445 mL    Midazolam: 192 mL    NiCARdipine: 200 mL    Propofol: 576 mL    sodium chloride 0.9% w/ Additives: 300 mL    sodium chloride 3%: 1200 mL  Total IN: 2913 mL    OUT:    Drain (mL): 255 mL    Indwelling Catheter - Urethral (mL): 770 mL  Total OUT: 1025 mL    Total NET: 1888 mL    07 Feb 2021 07:01  -  07 Feb 2021 11:38  --------------------------------------------------------  IN:    FentaNYL: 55 mL    Midazolam: 32 mL    NiCARdipine: 120 mL    Propofol: 96 mL    sodium chloride 0.9% w/ Additives: 12.5 mL    sodium chloride 3%: 50 mL    sodium chloride 3%: 90 mL  Total IN: 455.5 mL    OUT:    Drain (mL): 34 mL    Indwelling Catheter - Urethral (mL): 580 mL  Total OUT: 614 mL    Total NET: -158.5 mL    13.ID:   TMax:   Vital Signs Last 24 Hrs  T(C): 36.2 (07 Feb 2021 11:00), Max: 36.2 (06 Feb 2021 13:00)  T(F): 97.2 (07 Feb 2021 11:00), Max: 97.2 (06 Feb 2021 13:00)  HR: 79 (07 Feb 2021 11:00) (67 - 90)  BP: --  BP(mean): --  RR: 24 (07 Feb 2021 11:00) (24 - 24)  SpO2: 99% (07 Feb 2021 11:00) (98% - 100%)      Lines: Central[] Date inserted: Peripheral[]    14. Hematology:                         13.1   13.40 )-----------( 324      ( 07 Feb 2021 05:10 )             41.2      02-07    160<H>  |  128<H>  |  15  ----------------------------<  102<H>  4.3   |  20  |  0.8    Ca    8.0<L>      07 Feb 2021 05:10  Phos  4.1     02-07  Mg     3.3     02-07    TPro  4.8<L>  /  Alb  2.6<L>  /  TBili  0.7  /  DBili  x   /  AST  25  /  ALT  24  /  AlkPhos  81  02-07     DVT Prophylaxis Lovenox[ ] Heparin[ ] Venodynes[ ] SCD's[ ]    15. Impression:50-year-old male with PMHx GIB, HTN, and PUD, BIBA by EMS, found of floor of bathroom with AMS, left hemiparesis, and dysarthria. In ED, /148. Stroke code initiated. Initial NIH = 24/GCS = 11. CTH revealed moderate to large intraparenchymal right basal ganglia ICH with mild edema and associated right sulcal effacement with decompression of ventricles and 6.3mm midline shift. CTA negative for active bleed, AVM, aneurysm major vascular stenosis or occlusion. Intubated for airway protection r/t worsening mental status and neurological decline. NeuroSx c/s, s/p EVD placement. Admitted to ICU for management. Most recent CTH, 2/3/2021, re-demonstration of evolving acute right basal ganglia intraparenchymal hemorrhage with local mass effect and mild vasogenic edema, decreased dilation of left lateral ventricle, and interval resolution of previously seen intraventricular hemorrhage with unchanged scattered bilateral subarachnoid hemorrhage and minimal hemorrhage along the left frontal ventriculostomy tract. vEEG, 2/4/2021, revealed generalized slowing with no seizure activity. On examination today, patient on TTM, goal 36C, mechanically vented, sedated on Fentanyl, Versed, and Propofol, does not open eyes to voice or noxious stimuli, does not follow commands, no movement to noxious stimuli bilaterally throughout, with intact brainstem reflexes. EVD drain @ [id number], open to drain. ICP range 8-16; CPP range 69-90. Patient's Na is 160, Mag is 3.3 so infusion was stopped, patient also developed ileus.    Plan:  #Neuro:  - Neuro checks q1hr  - D/c  3% HS since Na is 160, can do 0.9 NS instead  - Trend CMP q8hrs  - EVD as per NeuroSx; keep unclamped @ [id number]  - Keep ICP < 20, CPP > 70  - Continue Keppra 500mg IV q12hrs  - Continue to wean sedation as tolerated for neurological prognostication  - D/C Fentanyl  - f/u CSF results  - d/c mag gtt    #CV:  - Keep SBP < 150 to avoid increases in ICP   - Utilize Nicardipine as needed while maintaining CPP > 70 and ICP < 20    #Resp:  - Ventilator management as per primary team  - HOB > 35 degrees  - Aspiration precautions  - SAT/SBT as tolerated    #Renal/Fluid/Electolytes:  - Strict I&Os  - Keep euvolemic   - Monitor lytes, replete as needed     #ID:  - Targeted temperature management, goal temperature 36C  - Continuous temperature monitoring  - Bedside Shivering Assessment; skin counter warming with Patricia Hugger to prevent shivering  - d/c Mag gtt    #GI:  - Continue Protonix 40mg IV q12hrs  - Continue Miralax 17 grams PO q24hrs  - Continue Senna 2 tablets PO q24hrs HS  - Continue fleet enema q24hrs    #DVT prophylaxis:  - SCS while in bed  - Pharmacologic DVT prophylaxis     17. Disposition: ICU

## 2021-02-07 NOTE — PROGRESS NOTE ADULT - SUBJECTIVE AND OBJECTIVE BOX
Patient is a 50y old  Male who presents with a chief complaint of Altered mental status (06 Feb 2021 11:13)        Over Night Events:  Remains critically ill on MV.  Sedated.  Off Pressors.  On Cardene drip  for BP control         ROS:     All pertinent ROS are negative except HPI         PHYSICAL EXAM    ICU Vital Signs Last 24 Hrs  T(C): 35.9 (07 Feb 2021 06:00), Max: 36.3 (06 Feb 2021 07:00)  T(F): 96.6 (07 Feb 2021 06:00), Max: 97.3 (06 Feb 2021 07:00)  HR: 77 (07 Feb 2021 05:00) (60 - 83)  BP: --  BP(mean): --  ABP: 165/84 (07 Feb 2021 06:00) (120/88 - 167/83)  ABP(mean): 113 (07 Feb 2021 06:00) (87 - 115)  RR: 24 (07 Feb 2021 06:00) (24 - 24)  SpO2: 100% (07 Feb 2021 06:00) (100% - 100%)      CONSTITUTIONAL:   Ill appearing.  Well nourished.  NAD    ENT:   Airway patent,   Mouth with normal mucosa.   No thrush    EYES:   Pupils equal,   Round and reactive to light.    CARDIAC:   Normal rate,   Regular rhythm.     edema      Vascular:  Normal systolic impulse  No Carotid bruits    RESPIRATORY:   No wheezing  Bilateral BS  Normal chest expansion  Not tachypneic,  No use of accessory muscles    GASTROINTESTINAL:  Abdomen soft,   Non-tender,   No guarding,   + BS    GENITOURINARY  normal genitalia for sex  no edema    MUSCULOSKELETAL:   Range of motion is not limited,  No clubbing, cyanosis    NEUROLOGICAL:   Sedated  Responds to suctioning     SKIN:   Skin normal color for race,   Warm and dry  No evidence of rash.    PSYCHIATRIC:   Sedated   No apparent risk to self or others.    HEMATOLOGICAL:  No cervical  lymphadenopathy.  no inguinal lymphadenopathy      02-05-21 @ 07:01  -  02-06-21 @ 07:00  --------------------------------------------------------  IN:    FentaNYL: 680 mL    IV PiggyBack: 100 mL    Midazolam: 164 mL    NiCARdipine: 329.2 mL    Propofol: 576 mL    sodium chloride 0.9% w/ Additives: 300 mL    sodium chloride 3%: 1200 mL  Total IN: 3349.2 mL    OUT:    Drain (mL): 313 mL    Indwelling Catheter - Urethral (mL): 1100 mL  Total OUT: 1413 mL    Total NET: 1936.2 mL      02-06-21 @ 07:01  -  02-07-21 @ 06:58  --------------------------------------------------------  IN:    FentaNYL: 385 mL    Midazolam: 160 mL    NiCARdipine: 112.5 mL    Propofol: 432 mL    sodium chloride 0.9% w/ Additives: 250 mL    sodium chloride 3%: 1000 mL  Total IN: 2339.5 mL    OUT:    Drain (mL): 194 mL    Indwelling Catheter - Urethral (mL): 625 mL  Total OUT: 819 mL    Total NET: 1520.5 mL          LABS:                            13.1   13.40 )-----------( 324      ( 07 Feb 2021 05:10 )             41.2                                               02-07    160<H>  |  128<H>  |  15  ----------------------------<  102<H>  4.3   |  20  |  0.8    Ca    8.0<L>      07 Feb 2021 05:10  Phos  4.1     02-07  Mg     3.3     02-07    TPro  4.8<L>  /  Alb  2.6<L>  /  TBili  0.7  /  DBili  x   /  AST  25  /  ALT  24  /  AlkPhos  81  02-07                                                                                           LIVER FUNCTIONS - ( 07 Feb 2021 05:10 )  Alb: 2.6 g/dL / Pro: 4.8 g/dL / ALK PHOS: 81 U/L / ALT: 24 U/L / AST: 25 U/L / GGT: x                                                                                               Mode: AC/ CMV (Assist Control/ Continuous Mandatory Ventilation)  RR (machine): 24  TV (machine): 450  FiO2: 50  PEEP: 10  ITime: 1  MAP: 17  PIP: 32                                          MEDICATIONS  (STANDING):  chlorhexidine 0.12% Liquid 15 milliLiter(s) Oral Mucosa every 12 hours  chlorhexidine 4% Liquid 1 Application(s) Topical <User Schedule>  fentaNYL   Infusion. 0.5 MICROgram(s)/kG/Hr (5 mL/Hr) IV Continuous <Continuous>  levETIRAcetam  IVPB 500 milliGRAM(s) IV Intermittent every 12 hours  midazolam Infusion 0.02 mG/kG/Hr (2 mL/Hr) IV Continuous <Continuous>  niCARdipine Infusion 5 mG/Hr (25 mL/Hr) IV Continuous <Continuous>  pantoprazole  Injectable 40 milliGRAM(s) IV Push every 12 hours  polyethylene glycol 3350 17 Gram(s) Oral daily  propofol Infusion 20 MICROgram(s)/kG/Min (12 mL/Hr) IV Continuous <Continuous>  saline laxative (FLEET) Rectal Enema 1 Enema Rectal daily  senna 2 Tablet(s) Oral at bedtime  sodium chloride 0.9% 500 milliLiter(s) (12.5 mL/Hr) IV Continuous <Continuous>  sodium chloride 3%. 500 milliLiter(s) (50 mL/Hr) IV Continuous <Continuous>    MEDICATIONS  (PRN):  acetaminophen   Tablet .. 650 milliGRAM(s) Oral every 6 hours PRN Temp greater or equal to 38C (100.4F)  meperidine     Injectable 12.5 milliGRAM(s) IV Push every 4 hours PRN Shivering      New X-rays reviewed:                                                                                  ECHO    CXR interpreted by me:  ET OG OK>  Bibasilar infiltrates

## 2021-02-07 NOTE — CHART NOTE - NSCHARTNOTEFT_GEN_A_CORE
5 CC of CSF drawn for culture , gram stain , protein , cell count , glucose and PCR under sterile conditions

## 2021-02-07 NOTE — CHART NOTE - NSCHARTNOTEFT_GEN_A_CORE
Registered Dietitian Follow-Up     Patient Profile Reviewed                           Yes [x]   No []     Nutrition History Previously Obtained        Yes []  No [x]--unable to obtain as pt remains intubated        Pertinent Medical Interventions: Pt remains critically ill on MV, sedated on propofol and versed. Per Neurosx, s/p EVD placement; monitor ICP/EVD and will send CSF today.     Diet order: Jevity 1.2 @ 40ml/hr--currently active in EMR, however has been NPO since 2/5 per RN as they had initiated trickle feeds, but pt had vomited shortly after. Per RN, primary team thinks pt may have ileus and therefore kept NPO. Currently, receiving 732kcal from propofol and D5W. MAP 81.     Anthropometrics:  - Ht. 73"  - Wt.: 221#/100kg (1/27)-no new wts   - %wt change  - BMI: 29.1   - IBW: 184#     Pertinent Lab Data: (2/7): H/H 13.1/41.2, Na 160, Gluc 102, Mg 3.3      Pertinent Meds: D5W @ 50ml/hr (provides additional 204kcal), precedex, versed, nicardipine, propofol @20ml/hr (provides additional 528kcal), keppra, protonix, miralax, senna      Physical Findings:  - Appearance: intubated/ventilated; 1+ generalized edema noted   - GI function: LBM 2/7, large and loose per RN   - Tubes: OGT   - Oral/Mouth cavity: NPO   - Skin: intact      Nutrition Requirements  Weight Used: CBW: 221#/100kg, Ve: 11.9, Tmax: 36.3; needs continued from RD note on 2/4 as new [id number]: 2056 comparable      Estimated Energy Needs: 5451-1844 kcal/day (% [id number]: 2069 d/t borderline obese BMI).  Estimated Protein Needs: 100-120 g/day (1-1.2 g/kg ABW)  Estimated Fluid Needs: per Vent team      Nutrient Intake: not meeting kcal/pro needs at this time d/t NPO status      Previous Nutrition Diagnosis: Inadequate protein-energy intake (ongoing)      Nutrition Intervention: enteral nutrition    Recommendations:   1. If SBO resolves and medically feasible to resume enteral nutrition, would initiate trickle feeds of Vital HP @ 10ml/hr. If pt to tolerate trickle feeds over 24-48hrs, would advance as tolerated to goal rate of 55ml/hr. TF at goal to provide 2052kcal, 116gm pro, 1104ml free water (includes additional kcal provided by propofol + D5W). Additional flushes per LIP.   2. If unable to resume enteral nutrition, c/s Nutrition Support Team for parenteral nutrition. All recs discussed with LIP ([phone])       Goal/Expected Outcome: Pt to meet % of estimated nutrient needs within 3 days      Indicator/Monitoring: RD to monitor diet order, energy intake, glucose/electrolyte profiles, nutrition focused physical findings

## 2021-02-07 NOTE — PROGRESS NOTE ADULT - SUBJECTIVE AND OBJECTIVE BOX
PPD#11    S/p EVD placement     pt seen and examined at bedside pt is intubated and sedated on propofol , fentanyl and versed .      Vital Signs Last 24 Hrs  T(C): 36.3 (07 Feb 2021 12:00), Max: 36.3 (07 Feb 2021 12:00)  T(F): 97.3 (07 Feb 2021 12:00), Max: 97.3 (07 Feb 2021 12:00)  HR: 74 (07 Feb 2021 12:00) (67 - 90)  BP: --  BP(mean): --  RR: 24 (07 Feb 2021 12:00) (24 - 24)  SpO2: 99% (07 Feb 2021 12:00) (98% - 100%)    I&O's Detail    06 Feb 2021 07:01  -  07 Feb 2021 07:00  --------------------------------------------------------  IN:    FentaNYL: 445 mL    Midazolam: 192 mL    NiCARdipine: 200 mL    Propofol: 576 mL    sodium chloride 0.9% w/ Additives: 300 mL    sodium chloride 3%: 1200 mL  Total IN: 2913 mL    OUT:    Drain (mL): 255 mL    Indwelling Catheter - Urethral (mL): 770 mL  Total OUT: 1025 mL    Total NET: 1888 mL      07 Feb 2021 07:01  -  07 Feb 2021 12:32  --------------------------------------------------------  IN:    dextrose 5%: 50 mL    FentaNYL: 55 mL    Midazolam: 40 mL    NiCARdipine: 150 mL    Propofol: 120 mL    sodium chloride 0.9% w/ Additives: 12.5 mL    sodium chloride 3%: 50 mL    sodium chloride 3%: 90 mL  Total IN: 567.5 mL    OUT:    Drain (mL): 47 mL    Indwelling Catheter - Urethral (mL): 805 mL  Total OUT: 852 mL    Total NET: -284.5 mL        I&O's Summary    06 Feb 2021 07:01  -  07 Feb 2021 07:00  --------------------------------------------------------  IN: 2913 mL / OUT: 1025 mL / NET: 1888 mL    07 Feb 2021 07:01  -  07 Feb 2021 12:32  --------------------------------------------------------  IN: 567.5 mL / OUT: 852 mL / NET: -284.5 mL        PHYSICAL EXAM:    PERRL   + Corneals   No withdrawal to noxious stimuli bilat UE's   No withdrawal to noxious stimuli bilat LE's   Head EVD in place     ICP  1   EVD 47     LABS:                        13.1   13.40 )-----------( 324      ( 07 Feb 2021 05:10 )             41.2     02-07    160<H>  |  128<H>  |  15  ----------------------------<  102<H>  4.3   |  20  |  0.8    Ca    8.0<L>      07 Feb 2021 05:10  Phos  4.1     02-07  Mg     3.3     02-07    TPro  4.8<L>  /  Alb  2.6<L>  /  TBili  0.7  /  DBili  x   /  AST  25  /  ALT  24  /  AlkPhos  81  02-07            CAPILLARY BLOOD GLUCOSE          Drug Levels: [] N/A  Vancomycin Level, Trough: 4.4 ug/mL (02-01 @ 16:00)    CSF Analysis: [] N/A      Allergies    No Known Allergies    Intolerances      MEDICATIONS:  Antibiotics:    Neuro:  acetaminophen   Tablet .. 650 milliGRAM(s) Oral every 6 hours PRN  dexMEDEtomidine Infusion 0.05 MICROgram(s)/kG/Hr IV Continuous <Continuous>  fentaNYL   Infusion. 0.5 MICROgram(s)/kG/Hr IV Continuous <Continuous>  levETIRAcetam  IVPB 500 milliGRAM(s) IV Intermittent every 12 hours  meperidine     Injectable 12.5 milliGRAM(s) IV Push every 4 hours PRN  midazolam Infusion 0.02 mG/kG/Hr IV Continuous <Continuous>  propofol Infusion 20 MICROgram(s)/kG/Min IV Continuous <Continuous>    Anticoagulation:    OTHER:  chlorhexidine 0.12% Liquid 15 milliLiter(s) Oral Mucosa every 12 hours  chlorhexidine 4% Liquid 1 Application(s) Topical <User Schedule>  niCARdipine Infusion 5 mG/Hr IV Continuous <Continuous>  pantoprazole  Injectable 40 milliGRAM(s) IV Push every 12 hours  polyethylene glycol 3350 17 Gram(s) Oral daily  saline laxative (FLEET) Rectal Enema 1 Enema Rectal daily  senna 2 Tablet(s) Oral at bedtime    IVF:  dextrose 5%. 1000 milliLiter(s) IV Continuous <Continuous>    CULTURES:  Culture Results:   No growth to date. (02-03 @ 05:38)  Culture Results:   No growth (02-01 @ 20:10)    RADIOLOGY & ADDITIONAL TESTS:      ASSESSMENT:  50y Male s/p    INTRACRANIAL BLEED                                     ADM/AA    INTRACRANIAL BLEED    ^MED EVAL    Family history of breast cancer (Mother)    Family history of pancreatic cancer (Father)    Handoff    MEWS Score    HTN (hypertension)    GI bleed    Gastric ulcer    PUD (peptic ulcer disease)    Intracranial bleed    Intraparenchymal hemorrhage of brain    Insertion, external ventricular drain    Ventriculostomy, for ventricular catheter insertion    Arm fracture, left    MED EVAL    90+    SysAdmin_VisitLink      A/p          S/ P EVD placment                 Monitor ICP/EVD                 Will send CSF today

## 2021-02-07 NOTE — PROGRESS NOTE ADULT - ASSESSMENT
IMPRESSION:    Large ICB likely hypertensive/ SP EVD  Intubated, for airway protection    PLAN:    CNS: SAT.  DC Fentanyl.   Assess MS.  FU with Neuro and NeuroSX. archtic sun  ICP monitoring as per neuro sx / neurology , CPP more than 70,    HEENT: Oral care    PULMONARY:  HOB @ 45 degrees.  Vent changes:  Wean O2 as tolerated.  FU ABG.  Pulmonary toilet     CARDIOVASCULAR:  Avoid volume overload.  BP control.  Start Norvasc and wean Cardene off.  No 3 % for now     GI: GI prophylaxis. OG feeds.  Bowel regimen.  Free H2O    RENAL:  Follow up lytes.  Correct as needed monitor is and os , repeat CMP    INFECTIOUS DISEASE: Follow up cultures , ID f/up appreciated     HEMATOLOGICAL:  DVT prophylaxis seq.  Last duplex 2-1 negative     ENDOCRINE:  Follow up FS.  Insulin protocol if needed.    MUSCULOSKELETAL:  Bed rest    Prognosis guarded

## 2021-02-08 LAB
ALBUMIN SERPL ELPH-MCNC: 2.7 G/DL — LOW (ref 3.5–5.2)
ALP SERPL-CCNC: 105 U/L — SIGNIFICANT CHANGE UP (ref 30–115)
ALT FLD-CCNC: 27 U/L — SIGNIFICANT CHANGE UP (ref 0–41)
ANION GAP SERPL CALC-SCNC: 10 MMOL/L — SIGNIFICANT CHANGE UP (ref 7–14)
ANION GAP SERPL CALC-SCNC: 11 MMOL/L — SIGNIFICANT CHANGE UP (ref 7–14)
AST SERPL-CCNC: 23 U/L — SIGNIFICANT CHANGE UP (ref 0–41)
BASOPHILS # BLD AUTO: 0.1 K/UL — SIGNIFICANT CHANGE UP (ref 0–0.2)
BASOPHILS NFR BLD AUTO: 0.7 % — SIGNIFICANT CHANGE UP (ref 0–1)
BILIRUB SERPL-MCNC: 0.8 MG/DL — SIGNIFICANT CHANGE UP (ref 0.2–1.2)
BUN SERPL-MCNC: 19 MG/DL — SIGNIFICANT CHANGE UP (ref 10–20)
BUN SERPL-MCNC: 20 MG/DL — SIGNIFICANT CHANGE UP (ref 10–20)
CALCIUM SERPL-MCNC: 8.6 MG/DL — SIGNIFICANT CHANGE UP (ref 8.5–10.1)
CALCIUM SERPL-MCNC: 8.8 MG/DL — SIGNIFICANT CHANGE UP (ref 8.5–10.1)
CHLORIDE SERPL-SCNC: 129 MMOL/L — HIGH (ref 98–110)
CHLORIDE SERPL-SCNC: 130 MMOL/L — HIGH (ref 98–110)
CO2 SERPL-SCNC: 20 MMOL/L — SIGNIFICANT CHANGE UP (ref 17–32)
CO2 SERPL-SCNC: 20 MMOL/L — SIGNIFICANT CHANGE UP (ref 17–32)
CREAT SERPL-MCNC: 1 MG/DL — SIGNIFICANT CHANGE UP (ref 0.7–1.5)
CREAT SERPL-MCNC: 1.1 MG/DL — SIGNIFICANT CHANGE UP (ref 0.7–1.5)
CSF PCR RESULT: SIGNIFICANT CHANGE UP
CULTURE RESULTS: SIGNIFICANT CHANGE UP
EOSINOPHIL # BLD AUTO: 0.47 K/UL — SIGNIFICANT CHANGE UP (ref 0–0.7)
EOSINOPHIL NFR BLD AUTO: 3.1 % — SIGNIFICANT CHANGE UP (ref 0–8)
GLUCOSE SERPL-MCNC: 107 MG/DL — HIGH (ref 70–99)
GLUCOSE SERPL-MCNC: 122 MG/DL — HIGH (ref 70–99)
HCT VFR BLD CALC: 41.5 % — LOW (ref 42–52)
HGB BLD-MCNC: 13.4 G/DL — LOW (ref 14–18)
IMM GRANULOCYTES NFR BLD AUTO: 5.2 % — HIGH (ref 0.1–0.3)
LYMPHOCYTES # BLD AUTO: 0.79 K/UL — LOW (ref 1.2–3.4)
LYMPHOCYTES # BLD AUTO: 5.2 % — LOW (ref 20.5–51.1)
MAGNESIUM SERPL-MCNC: 2.8 MG/DL — HIGH (ref 1.8–2.4)
MCHC RBC-ENTMCNC: 31.4 PG — HIGH (ref 27–31)
MCHC RBC-ENTMCNC: 32.3 G/DL — SIGNIFICANT CHANGE UP (ref 32–37)
MCV RBC AUTO: 97.2 FL — HIGH (ref 80–94)
MONOCYTES # BLD AUTO: 1.02 K/UL — HIGH (ref 0.1–0.6)
MONOCYTES NFR BLD AUTO: 6.8 % — SIGNIFICANT CHANGE UP (ref 1.7–9.3)
NEUTROPHILS # BLD AUTO: 11.89 K/UL — HIGH (ref 1.4–6.5)
NEUTROPHILS NFR BLD AUTO: 79 % — HIGH (ref 42.2–75.2)
NRBC # BLD: 0 /100 WBCS — SIGNIFICANT CHANGE UP (ref 0–0)
PHOSPHATE SERPL-MCNC: 4.8 MG/DL — SIGNIFICANT CHANGE UP (ref 2.1–4.9)
PLATELET # BLD AUTO: 353 K/UL — SIGNIFICANT CHANGE UP (ref 130–400)
POTASSIUM SERPL-MCNC: 4.4 MMOL/L — SIGNIFICANT CHANGE UP (ref 3.5–5)
POTASSIUM SERPL-MCNC: 4.5 MMOL/L — SIGNIFICANT CHANGE UP (ref 3.5–5)
POTASSIUM SERPL-SCNC: 4.4 MMOL/L — SIGNIFICANT CHANGE UP (ref 3.5–5)
POTASSIUM SERPL-SCNC: 4.5 MMOL/L — SIGNIFICANT CHANGE UP (ref 3.5–5)
PROT SERPL-MCNC: 5 G/DL — LOW (ref 6–8)
RBC # BLD: 4.27 M/UL — LOW (ref 4.7–6.1)
RBC # FLD: 13.5 % — SIGNIFICANT CHANGE UP (ref 11.5–14.5)
SODIUM SERPL-SCNC: 160 MMOL/L — HIGH (ref 135–146)
SODIUM SERPL-SCNC: 160 MMOL/L — HIGH (ref 135–146)
SPECIMEN SOURCE: SIGNIFICANT CHANGE UP
WBC # BLD: 15.06 K/UL — HIGH (ref 4.8–10.8)
WBC # FLD AUTO: 15.06 K/UL — HIGH (ref 4.8–10.8)

## 2021-02-08 PROCEDURE — 71045 X-RAY EXAM CHEST 1 VIEW: CPT | Mod: 26

## 2021-02-08 PROCEDURE — 99232 SBSQ HOSP IP/OBS MODERATE 35: CPT

## 2021-02-08 PROCEDURE — 93970 EXTREMITY STUDY: CPT | Mod: 26

## 2021-02-08 PROCEDURE — 70450 CT HEAD/BRAIN W/O DYE: CPT | Mod: 26

## 2021-02-08 RX ORDER — MEROPENEM 1 G/30ML
1000 INJECTION INTRAVENOUS EVERY 8 HOURS
Refills: 0 | Status: DISCONTINUED | OUTPATIENT
Start: 2021-02-08 | End: 2021-02-11

## 2021-02-08 RX ORDER — SODIUM CHLORIDE 9 MG/ML
1000 INJECTION INTRAMUSCULAR; INTRAVENOUS; SUBCUTANEOUS
Refills: 0 | Status: DISCONTINUED | OUTPATIENT
Start: 2021-02-08 | End: 2021-02-08

## 2021-02-08 RX ORDER — VANCOMYCIN HCL 1 G
1500 VIAL (EA) INTRAVENOUS EVERY 12 HOURS
Refills: 0 | Status: DISCONTINUED | OUTPATIENT
Start: 2021-02-08 | End: 2021-02-09

## 2021-02-08 RX ORDER — METOCLOPRAMIDE HCL 10 MG
10 TABLET ORAL ONCE
Refills: 0 | Status: COMPLETED | OUTPATIENT
Start: 2021-02-08 | End: 2021-02-08

## 2021-02-08 RX ORDER — SODIUM CHLORIDE 9 MG/ML
500 INJECTION INTRAMUSCULAR; INTRAVENOUS; SUBCUTANEOUS ONCE
Refills: 0 | Status: DISCONTINUED | OUTPATIENT
Start: 2021-02-08 | End: 2021-02-08

## 2021-02-08 RX ADMIN — MIDAZOLAM HYDROCHLORIDE 2 MG/KG/HR: 1 INJECTION, SOLUTION INTRAMUSCULAR; INTRAVENOUS at 01:00

## 2021-02-08 RX ADMIN — PROPOFOL 12 MICROGRAM(S)/KG/MIN: 10 INJECTION, EMULSION INTRAVENOUS at 01:00

## 2021-02-08 RX ADMIN — PANTOPRAZOLE SODIUM 40 MILLIGRAM(S): 20 TABLET, DELAYED RELEASE ORAL at 05:39

## 2021-02-08 RX ADMIN — Medication 500 MILLIGRAM(S): at 17:59

## 2021-02-08 RX ADMIN — PANTOPRAZOLE SODIUM 40 MILLIGRAM(S): 20 TABLET, DELAYED RELEASE ORAL at 18:00

## 2021-02-08 RX ADMIN — DEXMEDETOMIDINE HYDROCHLORIDE IN 0.9% SODIUM CHLORIDE 1.25 MICROGRAM(S)/KG/HR: 4 INJECTION INTRAVENOUS at 01:00

## 2021-02-08 RX ADMIN — MEROPENEM 100 MILLIGRAM(S): 1 INJECTION INTRAVENOUS at 12:17

## 2021-02-08 RX ADMIN — SENNA PLUS 2 TABLET(S): 8.6 TABLET ORAL at 22:17

## 2021-02-08 RX ADMIN — POLYETHYLENE GLYCOL 3350 17 GRAM(S): 17 POWDER, FOR SOLUTION ORAL at 12:16

## 2021-02-08 RX ADMIN — SODIUM CHLORIDE 25 MILLILITER(S): 9 INJECTION INTRAMUSCULAR; INTRAVENOUS; SUBCUTANEOUS at 04:27

## 2021-02-08 RX ADMIN — CHLORHEXIDINE GLUCONATE 1 APPLICATION(S): 213 SOLUTION TOPICAL at 05:39

## 2021-02-08 RX ADMIN — AMLODIPINE BESYLATE 10 MILLIGRAM(S): 2.5 TABLET ORAL at 05:38

## 2021-02-08 RX ADMIN — Medication 10 MILLIGRAM(S): at 12:16

## 2021-02-08 RX ADMIN — CHLORHEXIDINE GLUCONATE 15 MILLILITER(S): 213 SOLUTION TOPICAL at 17:59

## 2021-02-08 RX ADMIN — LEVETIRACETAM 420 MILLIGRAM(S): 250 TABLET, FILM COATED ORAL at 17:59

## 2021-02-08 RX ADMIN — MEROPENEM 100 MILLIGRAM(S): 1 INJECTION INTRAVENOUS at 22:13

## 2021-02-08 RX ADMIN — LEVETIRACETAM 420 MILLIGRAM(S): 250 TABLET, FILM COATED ORAL at 05:33

## 2021-02-08 RX ADMIN — SODIUM CHLORIDE 25 MILLILITER(S): 9 INJECTION INTRAMUSCULAR; INTRAVENOUS; SUBCUTANEOUS at 04:30

## 2021-02-08 RX ADMIN — CHLORHEXIDINE GLUCONATE 15 MILLILITER(S): 213 SOLUTION TOPICAL at 05:39

## 2021-02-08 NOTE — PROGRESS NOTE ADULT - ATTENDING COMMENTS
History, events, data and scans reviewed, patient examined at bedside on 02/08/2021. I agree and approved plan of care as outlined above.

## 2021-02-08 NOTE — PROGRESS NOTE ADULT - ASSESSMENT
Impression:  50-year-old male with PMHx GIB, HTN, and PUD, BIBA by EMS, found of floor of bathroom with AMS, left hemiparesis, and dysarthria. In ED, /148. Stroke code initiated. Initial NIH = 24/GCS = 11. CTH revealed moderate to large intraparenchymal right basal ganglia ICH with mild edema and associated right sulcal effacement with decompression of ventricles and 6.3mm midline shift. CTA negative for active bleed, AVM, aneurysm major vascular stenosis or occlusion. Intubated for airway protection r/t worsening mental status and neurological decline. NeuroSx c/s, s/p EVD placement.  CTH, 2/3/2021, re-demonstration of evolving acute right basal ganglia intraparenchymal hemorrhage with local mass effect and mild vasogenic edema, decreased dilation of left lateral ventricle, and interval resolution of previously seen intraventricular hemorrhage with unchanged scattered bilateral subarachnoid hemorrhage and minimal hemorrhage along the left frontal ventriculostomy tract. vEEG, 2/4/2021, revealed generalized slowing with no seizure activity. On examination today, patient on TTM, goal 36C, mechanically vented, sedated not opening eyes to noxious stimuli, not following commands, no movement to noxious stimuli bilaterally throughout.    Plan:  - Neuro checks q1hr  - EVD as per NeuroSx  - Keep ICP < 20, CPP > 70  - Continue Keppra 500mg IV q12hrs  - Continue to wean sedation as tolerated for neurological prognostication  - Keep SBP < 150 to avoid increases in ICP   - Ventilator management as per primary team  - HOB > 35 degrees  - Aspiration precautions  - SAT/SBT as tolerated  - Continue Protonix 40mg IV q12hrs  - Continue Miralax 17 grams PO q24hrs  - Continue Senna 2 tablets PO q24hrs HS  - Continue fleet enema q24hrs  - SCS while in bed  - Pharmacologic DVT prophylaxis     Thomas Sullivan NP  [id number]

## 2021-02-08 NOTE — PROGRESS NOTE ADULT - SUBJECTIVE AND OBJECTIVE BOX
Hospital Course:   s/[p EVD placement 1/27/21 for basal ganglia bleed, IVH      Vital Signs Last 24 Hrs  T(C): 36 (08 Feb 2021 08:00), Max: 36.4 (07 Feb 2021 17:00)  T(F): 96.8 (08 Feb 2021 08:00), Max: 97.5 (07 Feb 2021 17:00)  HR: 62 (08 Feb 2021 08:35) (55 - 83)  BP: --  BP(mean): --  RR: 25 (08 Feb 2021 08:00) (24 - 28)  SpO2: 93% (08 Feb 2021 08:35) (93% - 100%)    I&O's Detail    07 Feb 2021 07:01  -  08 Feb 2021 07:00  --------------------------------------------------------  IN:    Dexmedetomidine: 385 mL    dextrose 5%: 350 mL    FentaNYL: 55 mL    IV PiggyBack: 100 mL    Midazolam: 156 mL    NiCARdipine: 140 mL    Propofol: 604 mL    sodium chloride 0.9%: 75 mL    sodium chloride 0.9% w/ Additives: 12.5 mL    sodium chloride 3%: 90 mL    sodium chloride 3%: 50 mL  Total IN: 2017.5 mL    OUT:    Drain (mL): 244 mL    Indwelling Catheter - Urethral (mL): 1132 mL  Total OUT: 1376 mL    Total NET: 641.5 mL        I&O's Summary    07 Feb 2021 07:01  -  08 Feb 2021 07:00  --------------------------------------------------------  IN: 2017.5 mL / OUT: 1376 mL / NET: 641.5 mL        PHYSICAL EXAM:  Neurological: patient sedated, intubated, moves extremities when off sedation as per nurse, pupils pinpoint, no movement of extremities to painful stimuli, EVD open at 10 to drainagr            DEVICE/DRAIN DRESSING: dry    LABS:                        13.1   13.40 )-----------( 324      ( 07 Feb 2021 05:10 )             41.2     02-07    155<H>  |  126<H>  |  17  ----------------------------<  113<H>  4.0   |  20  |  0.8    Ca    8.3<L>      07 Feb 2021 20:00  Phos  4.1     02-07  Mg     3.3     02-07    TPro  4.9<L>  /  Alb  2.6<L>  /  TBili  0.7  /  DBili  x   /  AST  26  /  ALT  27  /  AlkPhos  77  02-07            CAPILLARY BLOOD GLUCOSE          Drug Levels: [] N/A  Vancomycin Level, Trough: 4.4 ug/mL (02-01 @ 16:00)    CSF Analysis: [] N/A  CSF Lymphocytes: 27 % (02-07 @ 13:51)  RBC Count - Spinal Fluid: 1427 /uL (02-07 @ 13:51)  Glucose, CSF: 76 mg/dL (02-07 @ 13:51)  Protein, CSF: 61 mg/dL (02-07 @ 13:51)      Allergies    No Known Allergies    Intolerances      MEDICATIONS:  Antibiotics:    Neuro:  acetaminophen   Tablet .. 650 milliGRAM(s) Oral every 6 hours PRN  dexMEDEtomidine Infusion 0.05 MICROgram(s)/kG/Hr IV Continuous <Continuous>  levETIRAcetam  IVPB 500 milliGRAM(s) IV Intermittent every 12 hours  meperidine     Injectable 12.5 milliGRAM(s) IV Push every 4 hours PRN  midazolam Infusion 0.02 mG/kG/Hr IV Continuous <Continuous>  propofol Infusion 20 MICROgram(s)/kG/Min IV Continuous <Continuous>    Anticoagulation:    OTHER:  amLODIPine   Tablet 10 milliGRAM(s) Oral daily  chlorhexidine 0.12% Liquid 15 milliLiter(s) Oral Mucosa every 12 hours  chlorhexidine 4% Liquid 1 Application(s) Topical <User Schedule>  niCARdipine Infusion 5 mG/Hr IV Continuous <Continuous>  pantoprazole  Injectable 40 milliGRAM(s) IV Push every 12 hours  polyethylene glycol 3350 17 Gram(s) Oral daily  senna 2 Tablet(s) Oral at bedtime    IVF:    CULTURES:  Culture Results:   No growth to date. (02-03 @ 05:38)  Culture Results:   No growth (02-01 @ 20:10)    RADIOLOGY & ADDITIONAL TESTS:      ASSESSMENT:  50y Male s/p placement of EVD 1/27/21    INTRACRANIAL BLEED                                     ADM/AA    INTRACRANIAL BLEED    ^MED EVAL    Family history of breast cancer (Mother)    Family history of pancreatic cancer (Father)    Handoff    MEWS Score    HTN (hypertension)    GI bleed    Gastric ulcer    PUD (peptic ulcer disease)    Intracranial bleed    Intraparenchymal hemorrhage of brain    Insertion, external ventricular drain    Ventriculostomy, for ventricular catheter insertion    Arm fracture, left    MED EVAL    90+    SysAdmin_VisitLink        PLAN: Patient seen and examined with Dr Rosario, recommend HCT today to evaluate for possible EVD challenge.

## 2021-02-08 NOTE — PROGRESS NOTE ADULT - ASSESSMENT
IMPRESSION:    Large ICB likely hypertensive/ SP EVD  Intubated, for airway protection  Pneumonia      PLAN:    CNS: SAT after CTH.  Assess MS.  SCOTT with Neuro and NeuroSX. archtic sun  ICP monitoring as per neuro sx / neurology , CPP more than 70,    HEENT: Oral care    PULMONARY:  HOB @ 45 degrees.  Vent changes:  Wean O2 as tolerated.  Pulmonary toilet.  DTA     CARDIOVASCULAR:  Avoid volume overload.  BP control.  BP control.      GI: GI prophylaxis. OG feeds.  Bowel regimen.  Free H2O.  Reglan     RENAL:  Follow up lytes.  Correct as needed monitor is and os , repeat CMP 4 pm     INFECTIOUS DISEASE: Follow up cultures , Autumn and Vanc.  rEPEAT NASAL mrsa     HEMATOLOGICAL:  DVT prophylaxis seq.  Last duplex 2-1 negative.  Repeat Duplex.      ENDOCRINE:  Follow up FS.  Insulin protocol if needed.    MUSCULOSKELETAL:  Bed rest    Prognosis guarded

## 2021-02-08 NOTE — PROGRESS NOTE ADULT - SUBJECTIVE AND OBJECTIVE BOX
Neurocritical Care Progress Note:  50 years old right handed Male with PMHx of HTN, GI bleed, PUD,  Mrankin score of 0 at baseline was brought in by EMS for  AMS and left sided weakness.   Wife states that patient was having a usual day today went to bathroom and then the next thing she noted was that he was confused and was lying on the bathroom floor, she reports urinary incontinence, patient was confused, had trouble speaking, and could not move his left side.   In the ED, /148, HR 80, saturating well. Code stroke called, NIHSS 24 and GCS 11, CTH showed Moderate to large intraparenchymal right basal ganglia hemorrhage with mild surrounding edema and associated right sulcal effacement as well as decompression into the ventricles. There is approximately 6.3 mm of midline shift. CTA did not show any evidence of active bleeding, AVM or aneurysm or major vascular stenosis or occlusion. Pt became unresponsive in CT scan and had to be intubated and sedated. Patient was intubated in ED for worsening mental status and GCS. Started on Nicardipine drip for sbp in 200s and EVD was placed by neurosurgery at bedside. Pan trauma scan was negative    Medications:   amLODIPine   Tablet 10 milliGRAM(s) Oral daily  chlorhexidine 0.12% Liquid 15 milliLiter(s) Oral Mucosa every 12 hours  chlorhexidine 4% Liquid 1 Application(s) Topical <User Schedule>  dexMEDEtomidine Infusion 0.05 MICROgram(s)/kG/Hr IV Continuous <Continuous>  levETIRAcetam  IVPB 500 milliGRAM(s) IV Intermittent every 12 hours  meropenem  IVPB 1000 milliGRAM(s) IV Intermittent every 8 hours  midazolam Infusion 0.02 mG/kG/Hr IV Continuous <Continuous>  niCARdipine Infusion 5 mG/Hr IV Continuous <Continuous>  pantoprazole  Injectable 40 milliGRAM(s) IV Push every 12 hours  polyethylene glycol 3350 17 Gram(s) Oral daily  propofol Infusion 20 MICROgram(s)/kG/Min IV Continuous <Continuous>  senna 2 Tablet(s) Oral at bedtime  vancomycin  IVPB 1500 milliGRAM(s) IV Intermittent every 12 hours       Ancillary Management:   Chest PT[ ]   Head of bed >35 [x ]   Out of bed to chair [ ]   PT/OT/SP Eval [ ]   Spirometry[ ]   DVT prophalaxis[ x]    8.Neuro:   Awake: Spontaneously[ ] Occasionally[ ] To Voice [ ] To painful stimuli [ x]   AIert [ ]. Following commands: 3 steps[ ], 2 steps[ ], 1 step [ ], None [x ]   Orientation: 0[ x], 1[ ], 2[ ], 3[ ]. Tracking objects with eyes: [ ]   Language: vented  Time off sedation for exam:   Pupils: Right   2>1.5   Left   2 >1.5      Corneal:    sluggish  Gag reflex: +    EOMI: no gaze      mRS:5  0 No symptoms at all  1 No significant disability despite symptoms; able to carry out all usual duties and activities without assistance  2 Slight disability; unable to carry out all previous activities, but able to look after own affairs  3 Moderate disability; requiring some help, but able to walk without assistance  4 Moderately severe disability; unable to walk without assistance and unable to attend to own bodily needs without assistance  5 Severe disability; bedridden, incontinent and requiring constant nursing care and attention  6 Dead    ICHs:  Age >=80  GCS Score: 3-4 (2 pts)   GCS Score: 5-12 (1 pt)   ICH Volume: >30  (+) IVH  (+) Infratentorial    [place]:  Grade I = Asymptomatic, mild headache, slight nuchal rigidity  Grade II = Moderate to severe headache, nuchal rigidity, no neurological deficit other than cranial nerve palsy  Grade III = Drowiness, confusion, mild focal neurological deficit  Grade IV = Stupor, moderate to severe hemiparesis  Grade V = Coma, decerebrate posturing    [place]:  Grade 0   (No Subarachnoid Hemorrhage (SAH)), No intraventricular hemorrhage (IVH), Incidence of symptomatic vasopasm 0%  Grade 1   (Focal or diffuse, thin SAH), No IVH, incidence of symptomatic vasospasm 24%  Grade 2   (Thin focal or diffuse SAH), IVH present, incidence of symptomatic vasospasm 33%  Grade 3   (Thick focal or diffuse SAH), No IVH, incidence of symptomatic vasospasm 33%  Grade 4   (Thick focal or diffuse SAH), IVH present, incidence of symptomatic vasosasm 40%    Last CTH:  CT Head No Cont:   EXAM:  CT BRAIN        IMPRESSION:  Slight interval decrease in acuteright basal ganglia intraparenchymal hemorrhage with local edema and mass effect with midline shift to the left approximately 0.8 cm.    Unchanged minimal subarachnoid hemorrhage within the left temporoparietal region.        Last CTA/MRA:  < from: CT Angio Head w/ IV Cont (01.27.21 @ 21:07) >  IMPRESSION    The previously noted area of intracerebral hemorrhage in the right basal ganglia region is again identified. Please see report of the CT scan of the head dated 1/27/2021.    No evidence of active bleeding, AVM or aneurysm.    No evidence of major vascular stenosis or occlusion,      < end of copied text >    Last CTP:    Last MRI:    Last TCD:    Last EEG:  VEEG in the last 24 hours: sedated and intubated    Background----------------   low amplitude , sleep recording with minimal reactivity    Focal and generalized slowing------------- generalized slowing.  No focal slowing    Interictal activity--------------  none    Events------------------- none    Seizures-------------- none    Impression:   generalized slowing    Plan - discussed with the  NCC team  EVD: [ ] [place]: [ ]     ICP:  8   CPP: 80    Level(cm):  10   24hr(ml):   244 CSF:     W:     R:     Cx:     P:     G:     LA:       Gram stain:    9. Cardiovascular:   Vital Signs Last 24 Hrs  T(C): 36 (08 Feb 2021 09:00), Max: 36.4 (07 Feb 2021 17:00)  T(F): 96.8 (08 Feb 2021 09:00), Max: 97.5 (07 Feb 2021 17:00)  HR: 61 (08 Feb 2021 09:00) (55 - 74)  BP: --  BP(mean): --  RR: 25 (08 Feb 2021 08:00) (24 - 28)  SpO2: 61% (08 Feb 2021 09:00) (61% - 100%)     Last Echo:    Last EKG:  < from: 12 Lead ECG (01.27.21 @ 21:26) >  Diagnosis Line Sinus rhythm [id number] degree A-V block  Left ventricular hypertrophy with QRS widening  T wave abnormality, consider inferior ischemia  Prolonged QT  Abnormal ECG    < end of copied text >    CVP   MAP/CPP/SBP target:   CO:      CI:       Enzymes/Trop:    10. Respiratory:   ABG:ABG - ( 08 Feb 2021 03:24 )  pH, Arterial: 7.35  pH, Blood: x     /  pCO2: 41    /  pO2: 67    / HCO3: 23    / Base Excess: -2.6  /  SaO2: 93                  VBG:    Chest Xray:  < from: Xray Chest 1 View- PORTABLE-Routine (Xray Chest 1 View- PORTABLE-Routine in AM.) (02.08.21 @ 06:45) >  IMPRESSION:    Unchanged right greater than left bilateral opacities.        < end of copied text >    Mode: AC/ CMV (Assist Control/ Continuous Mandatory Ventilation)  RR (machine): 24  TV (machine): 450  FiO2: 50  PEEP: 10  ITime: 1  MAP: 19  PIP: 37      Peak Pressure/[place] Pressure:    11.GI:    Prophalaxis: protonix    Bowel mvt:     Abd distension:   LIVER FUNCTIONS - ( 08 Feb 2021 08:00 )  Alb: 2.7 g/dL / Pro: 5.0 g/dL / ALK PHOS: 105 U/L / ALT: 27 U/L / AST: 23 U/L / GGT: x             12.Renal/Fluids/Electrolytes:    02-08    160<H>  |  130<H>  |  19  ----------------------------<  122<H>  4.4   |  20  |  1.0    Ca    8.6      08 Feb 2021 08:00  Phos  4.8     02-08  Mg     2.8     02-08    TPro  5.0<L>  /  Alb  2.7<L>  /  TBili  0.8  /  DBili  x   /  AST  23  /  ALT  27  /  AlkPhos  105  02-08      I&O's Detail    07 Feb 2021 07:01  -  08 Feb 2021 07:00  --------------------------------------------------------  IN:    Dexmedetomidine: 385 mL    dextrose 5%: 350 mL    FentaNYL: 55 mL    IV PiggyBack: 100 mL    Midazolam: 156 mL    NiCARdipine: 140 mL    Propofol: 604 mL    sodium chloride 0.9%: 75 mL    sodium chloride 0.9% w/ Additives: 12.5 mL    sodium chloride 3%: 90 mL    sodium chloride 3%: 50 mL  Total IN: 2017.5 mL    OUT:    Drain (mL): 244 mL    Indwelling Catheter - Urethral (mL): 1132 mL  Total OUT: 1376 mL    Total NET: 641.5 mL          13.ID:   TMax: 97.5  Vital Signs Last 24 Hrs  T(C): 36 (08 Feb 2021 09:00), Max: 36.4 (07 Feb 2021 17:00)  T(F): 96.8 (08 Feb 2021 09:00), Max: 97.5 (07 Feb 2021 17:00)  HR: 61 (08 Feb 2021 09:00) (55 - 74)  BP: --  BP(mean): --  RR: 25 (08 Feb 2021 08:00) (24 - 28)  SpO2: 61% (08 Feb 2021 09:00) (61% - 100%)           Lines: Central[] Date inserted: Peripheral[]    14. Hematology:                         13.4   15.06 )-----------( 353      ( 08 Feb 2021 08:00 )             41.5      02-08    160<H>  |  130<H>  |  19  ----------------------------<  122<H>  4.4   |  20  |  1.0    Ca    8.6      08 Feb 2021 08:00  Phos  4.8     02-08  Mg     2.8     02-08    TPro  5.0<L>  /  Alb  2.7<L>  /  TBili  0.8  /  DBili  x   /  AST  23  /  ALT  27  /  AlkPhos  105  02-08         DVT Prophylaxis Lovenox[ ] Heparin[ ] Venodynes[ ] SCD's[x ]

## 2021-02-08 NOTE — PROCEDURE NOTE - NSPROCDETAILS_GEN_ALL_CORE
location identified, draped/prepped, sterile technique used, needle inserted/introduced/positive blood return obtained via catheter/connected to a pressurized flush line/sutured in place/all materials/supplies accounted for at end of procedure

## 2021-02-08 NOTE — PROGRESS NOTE ADULT - SUBJECTIVE AND OBJECTIVE BOX
Patient is a 50y old  Male who presents with a chief complaint of Altered mental status (07 Feb 2021 12:30)        Over Night Events:  On MV.  Off pressors.  Sedated.  Agitated off sedation.  Off Cardene.          ROS:     All pertinent ROS are negative except HPI         PHYSICAL EXAM    ICU Vital Signs Last 24 Hrs  T(C): 36 (08 Feb 2021 08:00), Max: 36.4 (07 Feb 2021 17:00)  T(F): 96.8 (08 Feb 2021 08:00), Max: 97.5 (07 Feb 2021 17:00)  HR: 72 (08 Feb 2021 08:00) (55 - 83)  BP: --  BP(mean): --  ABP: 166/88 (08 Feb 2021 08:00) (113/61 - 174/60)  ABP(mean): 106 (08 Feb 2021 07:00) (81 - 120)  RR: 25 (08 Feb 2021 08:00) (24 - 28)  SpO2: 96% (08 Feb 2021 08:00) (96% - 100%)      CONSTITUTIONAL:   Ill appearing.  Well nourished.  NAD    ENT:   Airway patent,   Mouth with normal mucosa.   No thrush    EYES:   Pupils equal,   Round and reactive to light.    CARDIAC: ,   Regular rhythm.    No edema      Vascular:  Normal systolic impulse  No Carotid bruits    RESPIRATORY:   No wheezing  Bilateral BS  Normal chest expansion  Not tachypneic,  No use of accessory muscles    GASTROINTESTINAL:  Abdomen soft,   Non-tender,   No guarding,   + BS    GENITOURINARY  normal genitalia for sex  no edema    MUSCULOSKELETAL:   Range of motion is not limited,  No clubbing, cyanosis    NEUROLOGICAL:   Sedated     SKIN:   Skin normal color for race,   Warm and dry  No evidence of rash.    PSYCHIATRIC:   Sedated   No apparent risk to self or others.    HEMATOLOGICAL:  No cervical  lymphadenopathy.  no inguinal lymphadenopathy      02-07-21 @ 07:01  -  02-08-21 @ 07:00  --------------------------------------------------------  IN:    Dexmedetomidine: 385 mL    dextrose 5%: 350 mL    FentaNYL: 55 mL    IV PiggyBack: 100 mL    Midazolam: 156 mL    NiCARdipine: 140 mL    Propofol: 604 mL    sodium chloride 0.9%: 75 mL    sodium chloride 0.9% w/ Additives: 12.5 mL    sodium chloride 3%: 90 mL    sodium chloride 3%: 50 mL  Total IN: 2017.5 mL    OUT:    Drain (mL): 244 mL    Indwelling Catheter - Urethral (mL): 1132 mL  Total OUT: 1376 mL    Total NET: 641.5 mL          LABS:                            13.1   13.40 )-----------( 324      ( 07 Feb 2021 05:10 )             41.2                                               02-07    155<H>  |  126<H>  |  17  ----------------------------<  113<H>  4.0   |  20  |  0.8    Ca    8.3<L>      07 Feb 2021 20:00  Phos  4.1     02-07  Mg     3.3     02-07    TPro  4.9<L>  /  Alb  2.6<L>  /  TBili  0.7  /  DBili  x   /  AST  26  /  ALT  27  /  AlkPhos  77  02-07                                                                                           LIVER FUNCTIONS - ( 07 Feb 2021 20:00 )  Alb: 2.6 g/dL / Pro: 4.9 g/dL / ALK PHOS: 77 U/L / ALT: 27 U/L / AST: 26 U/L / GGT: x                                                  Culture - CSF with Gram Stain (collected 07 Feb 2021 13:51)  Source: .CSF CSF  Gram Stain (07 Feb 2021 21:48):    polymorphonuclear leukocytes seen    No organisms seen    by cytocentrifuge                                                   Mode: AC/ CMV (Assist Control/ Continuous Mandatory Ventilation)  RR (machine): 24  TV (machine): 450  FiO2: 50  PEEP: 10  ITime: 1  MAP: 19  PIP: 39                                      ABG - ( 08 Feb 2021 03:24 )  pH, Arterial: 7.35  pH, Blood: x     /  pCO2: 41    /  pO2: 67    / HCO3: 23    / Base Excess: -2.6  /  SaO2: 93                  MEDICATIONS  (STANDING):  amLODIPine   Tablet 10 milliGRAM(s) Oral daily  chlorhexidine 0.12% Liquid 15 milliLiter(s) Oral Mucosa every 12 hours  chlorhexidine 4% Liquid 1 Application(s) Topical <User Schedule>  dexMEDEtomidine Infusion 0.05 MICROgram(s)/kG/Hr (1.25 mL/Hr) IV Continuous <Continuous>  levETIRAcetam  IVPB 500 milliGRAM(s) IV Intermittent every 12 hours  midazolam Infusion 0.02 mG/kG/Hr (2 mL/Hr) IV Continuous <Continuous>  niCARdipine Infusion 5 mG/Hr (25 mL/Hr) IV Continuous <Continuous>  pantoprazole  Injectable 40 milliGRAM(s) IV Push every 12 hours  polyethylene glycol 3350 17 Gram(s) Oral daily  propofol Infusion 20 MICROgram(s)/kG/Min (12 mL/Hr) IV Continuous <Continuous>  senna 2 Tablet(s) Oral at bedtime    MEDICATIONS  (PRN):  acetaminophen   Tablet .. 650 milliGRAM(s) Oral every 6 hours PRN Temp greater or equal to 38C (100.4F)  meperidine     Injectable 12.5 milliGRAM(s) IV Push every 4 hours PRN Shivering      New X-rays reviewed:                                                                                  ECHO    CXR interpreted by me:

## 2021-02-09 LAB
ALBUMIN SERPL ELPH-MCNC: 2.5 G/DL — LOW (ref 3.5–5.2)
ALP SERPL-CCNC: 96 U/L — SIGNIFICANT CHANGE UP (ref 30–115)
ALT FLD-CCNC: 25 U/L — SIGNIFICANT CHANGE UP (ref 0–41)
ANION GAP SERPL CALC-SCNC: 12 MMOL/L — SIGNIFICANT CHANGE UP (ref 7–14)
ANION GAP SERPL CALC-SCNC: 9 MMOL/L — SIGNIFICANT CHANGE UP (ref 7–14)
AST SERPL-CCNC: 19 U/L — SIGNIFICANT CHANGE UP (ref 0–41)
BASOPHILS # BLD AUTO: 0.12 K/UL — SIGNIFICANT CHANGE UP (ref 0–0.2)
BASOPHILS NFR BLD AUTO: 0.7 % — SIGNIFICANT CHANGE UP (ref 0–1)
BILIRUB SERPL-MCNC: 1.3 MG/DL — HIGH (ref 0.2–1.2)
BUN SERPL-MCNC: 18 MG/DL — SIGNIFICANT CHANGE UP (ref 10–20)
BUN SERPL-MCNC: 19 MG/DL — SIGNIFICANT CHANGE UP (ref 10–20)
CALCIUM SERPL-MCNC: 8.5 MG/DL — SIGNIFICANT CHANGE UP (ref 8.5–10.1)
CALCIUM SERPL-MCNC: 8.5 MG/DL — SIGNIFICANT CHANGE UP (ref 8.5–10.1)
CHLORIDE SERPL-SCNC: 124 MMOL/L — HIGH (ref 98–110)
CHLORIDE SERPL-SCNC: 126 MMOL/L — HIGH (ref 98–110)
CO2 SERPL-SCNC: 20 MMOL/L — SIGNIFICANT CHANGE UP (ref 17–32)
CO2 SERPL-SCNC: 21 MMOL/L — SIGNIFICANT CHANGE UP (ref 17–32)
CREAT SERPL-MCNC: 1 MG/DL — SIGNIFICANT CHANGE UP (ref 0.7–1.5)
CREAT SERPL-MCNC: 1.1 MG/DL — SIGNIFICANT CHANGE UP (ref 0.7–1.5)
EOSINOPHIL # BLD AUTO: 0.64 K/UL — SIGNIFICANT CHANGE UP (ref 0–0.7)
EOSINOPHIL NFR BLD AUTO: 3.9 % — SIGNIFICANT CHANGE UP (ref 0–8)
GLUCOSE SERPL-MCNC: 113 MG/DL — HIGH (ref 70–99)
GLUCOSE SERPL-MCNC: 116 MG/DL — HIGH (ref 70–99)
GRAM STN FLD: SIGNIFICANT CHANGE UP
HCT VFR BLD CALC: 41.7 % — LOW (ref 42–52)
HGB BLD-MCNC: 13.5 G/DL — LOW (ref 14–18)
IMM GRANULOCYTES NFR BLD AUTO: 6.4 % — HIGH (ref 0.1–0.3)
LYMPHOCYTES # BLD AUTO: 1.18 K/UL — LOW (ref 1.2–3.4)
LYMPHOCYTES # BLD AUTO: 7.3 % — LOW (ref 20.5–51.1)
MAGNESIUM SERPL-MCNC: 2.3 MG/DL — SIGNIFICANT CHANGE UP (ref 1.8–2.4)
MCHC RBC-ENTMCNC: 31 PG — SIGNIFICANT CHANGE UP (ref 27–31)
MCHC RBC-ENTMCNC: 32.4 G/DL — SIGNIFICANT CHANGE UP (ref 32–37)
MCV RBC AUTO: 95.9 FL — HIGH (ref 80–94)
MONOCYTES # BLD AUTO: 1.17 K/UL — HIGH (ref 0.1–0.6)
MONOCYTES NFR BLD AUTO: 7.2 % — SIGNIFICANT CHANGE UP (ref 1.7–9.3)
NEUTROPHILS # BLD AUTO: 12.12 K/UL — HIGH (ref 1.4–6.5)
NEUTROPHILS NFR BLD AUTO: 74.5 % — SIGNIFICANT CHANGE UP (ref 42.2–75.2)
NRBC # BLD: 0 /100 WBCS — SIGNIFICANT CHANGE UP (ref 0–0)
PHOSPHATE SERPL-MCNC: 4.1 MG/DL — SIGNIFICANT CHANGE UP (ref 2.1–4.9)
PLATELET # BLD AUTO: 353 K/UL — SIGNIFICANT CHANGE UP (ref 130–400)
POTASSIUM SERPL-MCNC: 4.2 MMOL/L — SIGNIFICANT CHANGE UP (ref 3.5–5)
POTASSIUM SERPL-MCNC: 4.4 MMOL/L — SIGNIFICANT CHANGE UP (ref 3.5–5)
POTASSIUM SERPL-SCNC: 4.2 MMOL/L — SIGNIFICANT CHANGE UP (ref 3.5–5)
POTASSIUM SERPL-SCNC: 4.4 MMOL/L — SIGNIFICANT CHANGE UP (ref 3.5–5)
PROT SERPL-MCNC: 4.8 G/DL — LOW (ref 6–8)
RBC # BLD: 4.35 M/UL — LOW (ref 4.7–6.1)
RBC # FLD: 13.2 % — SIGNIFICANT CHANGE UP (ref 11.5–14.5)
SODIUM SERPL-SCNC: 156 MMOL/L — HIGH (ref 135–146)
SODIUM SERPL-SCNC: 156 MMOL/L — HIGH (ref 135–146)
SPECIMEN SOURCE: SIGNIFICANT CHANGE UP
WBC # BLD: 16.27 K/UL — HIGH (ref 4.8–10.8)
WBC # FLD AUTO: 16.27 K/UL — HIGH (ref 4.8–10.8)

## 2021-02-09 PROCEDURE — 71045 X-RAY EXAM CHEST 1 VIEW: CPT | Mod: 26

## 2021-02-09 PROCEDURE — 99232 SBSQ HOSP IP/OBS MODERATE 35: CPT

## 2021-02-09 PROCEDURE — 93010 ELECTROCARDIOGRAM REPORT: CPT

## 2021-02-09 RX ORDER — HYDRALAZINE HCL 50 MG
10 TABLET ORAL ONCE
Refills: 0 | Status: COMPLETED | OUTPATIENT
Start: 2021-02-09 | End: 2021-02-09

## 2021-02-09 RX ORDER — HYDRALAZINE HCL 50 MG
25 TABLET ORAL EVERY 6 HOURS
Refills: 0 | Status: DISCONTINUED | OUTPATIENT
Start: 2021-02-09 | End: 2021-02-11

## 2021-02-09 RX ORDER — METOCLOPRAMIDE HCL 10 MG
5 TABLET ORAL
Refills: 0 | Status: DISCONTINUED | OUTPATIENT
Start: 2021-02-09 | End: 2021-03-08

## 2021-02-09 RX ORDER — METOCLOPRAMIDE HCL 10 MG
10 TABLET ORAL ONCE
Refills: 0 | Status: COMPLETED | OUTPATIENT
Start: 2021-02-09 | End: 2021-02-09

## 2021-02-09 RX ADMIN — CHLORHEXIDINE GLUCONATE 15 MILLILITER(S): 213 SOLUTION TOPICAL at 17:42

## 2021-02-09 RX ADMIN — PROPOFOL 12 MICROGRAM(S)/KG/MIN: 10 INJECTION, EMULSION INTRAVENOUS at 15:00

## 2021-02-09 RX ADMIN — Medication 5 MILLIGRAM(S): at 18:07

## 2021-02-09 RX ADMIN — MEROPENEM 100 MILLIGRAM(S): 1 INJECTION INTRAVENOUS at 13:29

## 2021-02-09 RX ADMIN — CHLORHEXIDINE GLUCONATE 1 APPLICATION(S): 213 SOLUTION TOPICAL at 06:07

## 2021-02-09 RX ADMIN — PROPOFOL 12 MICROGRAM(S)/KG/MIN: 10 INJECTION, EMULSION INTRAVENOUS at 12:00

## 2021-02-09 RX ADMIN — Medication 10 MILLIGRAM(S): at 10:13

## 2021-02-09 RX ADMIN — PANTOPRAZOLE SODIUM 40 MILLIGRAM(S): 20 TABLET, DELAYED RELEASE ORAL at 17:42

## 2021-02-09 RX ADMIN — AMLODIPINE BESYLATE 10 MILLIGRAM(S): 2.5 TABLET ORAL at 06:07

## 2021-02-09 RX ADMIN — Medication 650 MILLIGRAM(S): at 10:13

## 2021-02-09 RX ADMIN — DEXMEDETOMIDINE HYDROCHLORIDE IN 0.9% SODIUM CHLORIDE 1.25 MICROGRAM(S)/KG/HR: 4 INJECTION INTRAVENOUS at 18:47

## 2021-02-09 RX ADMIN — CHLORHEXIDINE GLUCONATE 15 MILLILITER(S): 213 SOLUTION TOPICAL at 06:06

## 2021-02-09 RX ADMIN — PANTOPRAZOLE SODIUM 40 MILLIGRAM(S): 20 TABLET, DELAYED RELEASE ORAL at 06:06

## 2021-02-09 RX ADMIN — MEROPENEM 100 MILLIGRAM(S): 1 INJECTION INTRAVENOUS at 06:07

## 2021-02-09 RX ADMIN — POLYETHYLENE GLYCOL 3350 17 GRAM(S): 17 POWDER, FOR SOLUTION ORAL at 12:52

## 2021-02-09 RX ADMIN — PROPOFOL 12 MICROGRAM(S)/KG/MIN: 10 INJECTION, EMULSION INTRAVENOUS at 18:47

## 2021-02-09 RX ADMIN — LEVETIRACETAM 420 MILLIGRAM(S): 250 TABLET, FILM COATED ORAL at 06:07

## 2021-02-09 RX ADMIN — Medication 10 MILLIGRAM(S): at 13:29

## 2021-02-09 RX ADMIN — DEXMEDETOMIDINE HYDROCHLORIDE IN 0.9% SODIUM CHLORIDE 1.25 MICROGRAM(S)/KG/HR: 4 INJECTION INTRAVENOUS at 15:00

## 2021-02-09 RX ADMIN — DEXMEDETOMIDINE HYDROCHLORIDE IN 0.9% SODIUM CHLORIDE 1.25 MICROGRAM(S)/KG/HR: 4 INJECTION INTRAVENOUS at 09:00

## 2021-02-09 RX ADMIN — PROPOFOL 12 MICROGRAM(S)/KG/MIN: 10 INJECTION, EMULSION INTRAVENOUS at 09:00

## 2021-02-09 RX ADMIN — MIDAZOLAM HYDROCHLORIDE 2 MG/KG/HR: 1 INJECTION, SOLUTION INTRAMUSCULAR; INTRAVENOUS at 12:15

## 2021-02-09 RX ADMIN — MEROPENEM 100 MILLIGRAM(S): 1 INJECTION INTRAVENOUS at 22:12

## 2021-02-09 RX ADMIN — LEVETIRACETAM 420 MILLIGRAM(S): 250 TABLET, FILM COATED ORAL at 17:42

## 2021-02-09 RX ADMIN — Medication 500 MILLIGRAM(S): at 06:08

## 2021-02-09 RX ADMIN — Medication 10 MILLIGRAM(S): at 01:52

## 2021-02-09 RX ADMIN — Medication 25 MILLIGRAM(S): at 17:42

## 2021-02-09 RX ADMIN — SENNA PLUS 2 TABLET(S): 8.6 TABLET ORAL at 22:13

## 2021-02-09 RX ADMIN — Medication 25 MILLIGRAM(S): at 13:33

## 2021-02-09 RX ADMIN — DEXMEDETOMIDINE HYDROCHLORIDE IN 0.9% SODIUM CHLORIDE 1.25 MICROGRAM(S)/KG/HR: 4 INJECTION INTRAVENOUS at 12:00

## 2021-02-09 NOTE — PROGRESS NOTE ADULT - ATTENDING COMMENTS
History, events, data and scans reviewed, patient examined at bedside. I agree and approved plan of care as outlined above.

## 2021-02-09 NOTE — PROGRESS NOTE ADULT - SUBJECTIVE AND OBJECTIVE BOX
PPD #13     S/P Placement of EVD    Pt seen and examined at bedside. Pt remains intubated. Sedated.     Vital Signs Last 24 Hrs  T(C): 37.3 (09 Feb 2021 08:00), Max: 37.3 (09 Feb 2021 08:00)  T(F): 99.1 (09 Feb 2021 08:00), Max: 99.1 (09 Feb 2021 08:00)  HR: 66 (09 Feb 2021 08:00) (52 - 70)  BP: --  BP(mean): --  RR: 29 (09 Feb 2021 08:00) (24 - 29)  SpO2: 96% (09 Feb 2021 04:00) (94% - 99%)    PHYSICAL EXAM:          MEDICATIONS:  Antibiotics:  meropenem  IVPB 1000 milliGRAM(s) IV Intermittent every 8 hours    Neuro:  acetaminophen   Tablet .. 650 milliGRAM(s) Oral every 6 hours PRN  dexMEDEtomidine Infusion 0.05 MICROgram(s)/kG/Hr IV Continuous <Continuous>  levETIRAcetam  IVPB 500 milliGRAM(s) IV Intermittent every 12 hours  meperidine     Injectable 12.5 milliGRAM(s) IV Push every 4 hours PRN  metoclopramide Injectable 10 milliGRAM(s) IV Push once  midazolam Infusion 0.02 mG/kG/Hr IV Continuous <Continuous>  propofol Infusion 20 MICROgram(s)/kG/Min IV Continuous <Continuous>    Anticoagulation:    OTHER:  amLODIPine   Tablet 10 milliGRAM(s) Oral daily  chlorhexidine 0.12% Liquid 15 milliLiter(s) Oral Mucosa every 12 hours  chlorhexidine 4% Liquid 1 Application(s) Topical <User Schedule>  niCARdipine Infusion 5 mG/Hr IV Continuous <Continuous>  pantoprazole  Injectable 40 milliGRAM(s) IV Push every 12 hours  polyethylene glycol 3350 17 Gram(s) Oral daily  senna 2 Tablet(s) Oral at bedtime    LABS:                        13.5   16.27 )-----------( 353      ( 09 Feb 2021 03:25 )             41.7     02-09    156<H>  |  126<H>  |  18  ----------------------------<  116<H>  4.2   |  21  |  1.1    Ca    8.5      09 Feb 2021 03:25  Phos  4.1     02-09  Mg     2.3     02-09    TPro  4.8<L>  /  Alb  2.5<L>  /  TBili  1.3<H>  /  DBili  x   /  AST  19  /  ALT  25  /  AlkPhos  96  02-09    Assessment:  HCP    Plan:  Continue current mgmt  Monitor EVD/ICP

## 2021-02-09 NOTE — PROGRESS NOTE ADULT - ASSESSMENT
Impression:  50-year-old male with PMHx GIB, HTN, and PUD, BIBA by EMS, found of floor of bathroom with AMS, left hemiparesis, and dysarthria. In ED, /148. Stroke code initiated. Initial NIH = 24/GCS = 11. CTH revealed moderate to large intraparenchymal right basal ganglia ICH with mild edema and associated right sulcal effacement with decompression of ventricles and 6.3mm midline shift. CTA negative for active bleed, AVM, aneurysm major vascular stenosis or occlusion. Intubated for airway protection r/t worsening mental status and neurological decline. NeuroSx c/s, s/p EVD placement.  CTH, 2/3/2021, re-demonstration of evolving acute right basal ganglia intraparenchymal hemorrhage with local mass effect and mild vasogenic edema, decreased dilation of left lateral ventricle, and interval resolution of previously seen intraventricular hemorrhage with unchanged scattered bilateral subarachnoid hemorrhage and minimal hemorrhage along the left frontal ventriculostomy tract. vEEG, 2/4/2021, revealed generalized slowing with no seizure activity. On examination today, patient on TTM, goal 36C, mechanically vented, sedated not opening eyes to noxious stimuli, not following commands, no movement to noxious stimuli bilaterally throughout.    Plan:  - Neuro checks q1hr  - EVD as per NeuroSx; consider raise to 15 -->progress to clamp trial  - Keep ICP < 20, CPP > 70  - Continue Keppra 500mg IV q12hrs  - Continue to wean sedation as tolerated for neurological prognostication  - Keep SBP < 150   - Ventilator management as per primary team  - HOB > 35 degrees  - Aspiration precautions  - SAT/SBT as tolerated  - Continue GI ppx  - Continue bowel regimen  - SCS while in bed  - Pharmacologic DVT prophylaxis

## 2021-02-09 NOTE — PROGRESS NOTE ADULT - SUBJECTIVE AND OBJECTIVE BOX
Patient is a 50y old  Male who presents with a chief complaint of Altered mental status (09 Feb 2021 05:27)        Over Night Events:  remains critically ill on MV.  Off Cardene.  Sedated.  Agitated sedated         ROS:     All pertinent ROS are negative except HPI         PHYSICAL EXAM    ICU Vital Signs Last 24 Hrs  T(C): 37.3 (09 Feb 2021 08:00), Max: 37.3 (09 Feb 2021 08:00)  T(F): 99.1 (09 Feb 2021 08:00), Max: 99.1 (09 Feb 2021 08:00)  HR: 66 (09 Feb 2021 08:00) (52 - 70)  BP: --  BP(mean): --  ABP: 145/89 (09 Feb 2021 08:00) (138/71 - 174/88)  ABP(mean): 112 (09 Feb 2021 08:00) (16 - 112)  RR: 29 (09 Feb 2021 08:00) (24 - 29)  SpO2: 96% (09 Feb 2021 04:00) (93% - 99%)      CONSTITUTIONAL:   Ill appearing.  Well nourished.  NAD    ENT:   Airway patent,   Mouth with normal mucosa.   No thrush    EYES:   Pupils equal,   Round and reactive to light.    CARDIAC:   Normal rate,   Regular rhythm.    Edema      Vascular:  Normal systolic impulse  No Carotid bruits    RESPIRATORY:   No wheezing  Bilateral BS  Normal chest expansion  Not tachypneic,  No use of accessory muscles    GASTROINTESTINAL:  Abdomen soft,   Non-tender,   No guarding,   + BS    GENITOURINARY  normal genitalia for sex   edema    MUSCULOSKELETAL:   Range of motion is not limited,  No clubbing, cyanosis    NEUROLOGICAL:   Sedated     SKIN:   Skin normal color for race,   Warm and dry  No evidence of rash.    PSYCHIATRIC:   Sedated   No apparent risk to self or others.    HEMATOLOGICAL:  No cervical  lymphadenopathy.  no inguinal lymphadenopathy      02-08-21 @ 07:01  -  02-09-21 @ 07:00  --------------------------------------------------------  IN:    Dexmedetomidine: 900 mL    IV PiggyBack: 1000 mL    IV PiggyBack: 200 mL    IV PiggyBack: 200 mL    Midazolam: 120 mL    Propofol: 750 mL  Total IN: 3170 mL    OUT:    Drain (mL): 289 mL    Indwelling Catheter - Urethral (mL): 1420 mL  Total OUT: 1709 mL    Total NET: 1461 mL          LABS:                            13.5   16.27 )-----------( 353      ( 09 Feb 2021 03:25 )             41.7                                               02-09    156<H>  |  126<H>  |  18  ----------------------------<  116<H>  4.2   |  21  |  1.1    Ca    8.5      09 Feb 2021 03:25  Phos  4.1     02-09  Mg     2.3     02-09    TPro  4.8<L>  /  Alb  2.5<L>  /  TBili  1.3<H>  /  DBili  x   /  AST  19  /  ALT  25  /  AlkPhos  96  02-09                                                                                           LIVER FUNCTIONS - ( 09 Feb 2021 03:25 )  Alb: 2.5 g/dL / Pro: 4.8 g/dL / ALK PHOS: 96 U/L / ALT: 25 U/L / AST: 19 U/L / GGT: x                                                  Culture - Sputum (collected 08 Feb 2021 12:28)  Source: .Sputum Deep Tracheal  Gram Stain (09 Feb 2021 07:26):    Few polymorphonuclear leukocytes per low power field    Few Squamous epithelial cells per low power field    Few Gram Negative Rods per oil power field    Culture - CSF with Gram Stain (collected 07 Feb 2021 13:51)  Source: .CSF CSF  Gram Stain (07 Feb 2021 21:48):    polymorphonuclear leukocytes seen    No organisms seen    by cytocentrifuge  Preliminary Report (08 Feb 2021 15:29):    No growth                                                   Mode: AC/ CMV (Assist Control/ Continuous Mandatory Ventilation)  RR (machine): 24  TV (machine): 450  FiO2: 50  PEEP: 10  ITime: 1  MAP: 21  PIP: 31                                      ABG - ( 09 Feb 2021 03:44 )  pH, Arterial: 7.39  pH, Blood: x     /  pCO2: 38    /  pO2: 64    / HCO3: 23    / Base Excess: -1.8  /  SaO2: 92                  MEDICATIONS  (STANDING):  amLODIPine   Tablet 10 milliGRAM(s) Oral daily  chlorhexidine 0.12% Liquid 15 milliLiter(s) Oral Mucosa every 12 hours  chlorhexidine 4% Liquid 1 Application(s) Topical <User Schedule>  dexMEDEtomidine Infusion 0.05 MICROgram(s)/kG/Hr (1.25 mL/Hr) IV Continuous <Continuous>  levETIRAcetam  IVPB 500 milliGRAM(s) IV Intermittent every 12 hours  meropenem  IVPB 1000 milliGRAM(s) IV Intermittent every 8 hours  midazolam Infusion 0.02 mG/kG/Hr (2 mL/Hr) IV Continuous <Continuous>  niCARdipine Infusion 5 mG/Hr (25 mL/Hr) IV Continuous <Continuous>  pantoprazole  Injectable 40 milliGRAM(s) IV Push every 12 hours  polyethylene glycol 3350 17 Gram(s) Oral daily  propofol Infusion 20 MICROgram(s)/kG/Min (12 mL/Hr) IV Continuous <Continuous>  senna 2 Tablet(s) Oral at bedtime  vancomycin  IVPB 1500 milliGRAM(s) IV Intermittent every 12 hours    MEDICATIONS  (PRN):  acetaminophen   Tablet .. 650 milliGRAM(s) Oral every 6 hours PRN Temp greater or equal to 38C (100.4F)  meperidine     Injectable 12.5 milliGRAM(s) IV Push every 4 hours PRN Shivering      New X-rays reviewed:                                                                                  ECHO    CXR interpreted by me:

## 2021-02-09 NOTE — PROGRESS NOTE ADULT - SUBJECTIVE AND OBJECTIVE BOX
Neurocritical Care Progress Note:    50 years old right handed Male with PMHx of HTN, GI bleed, PUD,  Mrankin score of 0 at baseline was brought in by EMS for  AMS and left sided weakness.   Wife states that patient was having a usual day today went to bathroom and then the next thing she noted was that he was confused and was lying on the bathroom floor, she reports urinary incontinence, patient was confused, had trouble speaking, and could not move his left side.   In the ED, /148, HR 80, saturating well. Code stroke called, NIHSS 24 and GCS 11, CTH showed Moderate to large intraparenchymal right basal ganglia hemorrhage with mild surrounding edema and associated right sulcal effacement as well as decompression into the ventricles. There is approximately 6.3 mm of midline shift. CTA did not show any evidence of active bleeding, AVM or aneurysm or major vascular stenosis or occlusion. Pt became unresponsive in CT scan and had to be intubated and sedated. Patient was intubated in ED for worsening mental status and GCS. Started on Nicardipine drip for sbp in 200s and EVD was placed by neurosurgery at bedside. Pan trauma scan was negative    Medications:   amLODIPine   Tablet 10 milliGRAM(s) Oral daily  chlorhexidine 0.12% Liquid 15 milliLiter(s) Oral Mucosa every 12 hours  chlorhexidine 4% Liquid 1 Application(s) Topical <User Schedule>  dexMEDEtomidine Infusion 0.05 MICROgram(s)/kG/Hr IV Continuous <Continuous>  levETIRAcetam  IVPB 500 milliGRAM(s) IV Intermittent every 12 hours  meropenem  IVPB 1000 milliGRAM(s) IV Intermittent every 8 hours  midazolam Infusion 0.02 mG/kG/Hr IV Continuous <Continuous>  niCARdipine Infusion 5 mG/Hr IV Continuous <Continuous>  pantoprazole  Injectable 40 milliGRAM(s) IV Push every 12 hours  polyethylene glycol 3350 17 Gram(s) Oral daily  propofol Infusion 20 MICROgram(s)/kG/Min IV Continuous <Continuous>  senna 2 Tablet(s) Oral at bedtime  vancomycin  IVPB 1500 milliGRAM(s) IV Intermittent every 12 hours       Ancillary Management:   Chest PT[ ]   Head of bed >35 [x ]   Out of bed to chair [ ]   PT/OT/SP Eval [ ]   Spirometry[ ]   DVT prophalaxis[ x]    8.Neuro:   Awake: Spontaneously[ ] Occasionally[ ] To Voice [ ] To painful stimuli [ x]   AIert [ ]. Following commands: 3 steps[ ], 2 steps[ ], 1 step [ ], None [x ]   Orientation: 0[ x], 1[ ], 2[ ], 3[ ]. Tracking objects with eyes: [ ]   Language: vented  Time off sedation for exam:   Pupils: Right   2>1.5   Left   2 >1.5      Corneal:    sluggish  Gag reflex: +    EOMI: no gaze      mRS:5  0 No symptoms at all  1 No significant disability despite symptoms; able to carry out all usual duties and activities without assistance  2 Slight disability; unable to carry out all previous activities, but able to look after own affairs  3 Moderate disability; requiring some help, but able to walk without assistance  4 Moderately severe disability; unable to walk without assistance and unable to attend to own bodily needs without assistance  5 Severe disability; bedridden, incontinent and requiring constant nursing care and attention  6 Dead    ICHs:  Age >=80  GCS Score: 3-4 (2 pts)   GCS Score: 5-12 (1 pt)   ICH Volume: >30  (+) IVH  (+) Infratentorial    [place]:  Grade I = Asymptomatic, mild headache, slight nuchal rigidity  Grade II = Moderate to severe headache, nuchal rigidity, no neurological deficit other than cranial nerve palsy  Grade III = Drowiness, confusion, mild focal neurological deficit  Grade IV = Stupor, moderate to severe hemiparesis  Grade V = Coma, decerebrate posturing    [place]:  Grade 0   (No Subarachnoid Hemorrhage (SAH)), No intraventricular hemorrhage (IVH), Incidence of symptomatic vasopasm 0%  Grade 1   (Focal or diffuse, thin SAH), No IVH, incidence of symptomatic vasospasm 24%  Grade 2   (Thin focal or diffuse SAH), IVH present, incidence of symptomatic vasospasm 33%  Grade 3   (Thick focal or diffuse SAH), No IVH, incidence of symptomatic vasospasm 33%  Grade 4   (Thick focal or diffuse SAH), IVH present, incidence of symptomatic vasosasm 40%    Last CTH:  CT Head No Cont:   EXAM:  CT BRAIN        IMPRESSION:  Slight interval decrease in acuteright basal ganglia intraparenchymal hemorrhage with local edema and mass effect with midline shift to the left approximately 0.8 cm.    Unchanged minimal subarachnoid hemorrhage within the left temporoparietal region.        Last CTA/MRA:  < from: CT Angio Head w/ IV Cont (01.27.21 @ 21:07) >  IMPRESSION    The previously noted area of intracerebral hemorrhage in the right basal ganglia region is again identified. Please see report of the CT scan of the head dated 1/27/2021.    No evidence of active bleeding, AVM or aneurysm.    No evidence of major vascular stenosis or occlusion,      < end of copied text >    Last CTP:    Last MRI:    Last TCD:    Last EEG:  VEEG in the last 24 hours: sedated and intubated    Background----------------   low amplitude , sleep recording with minimal reactivity    Focal and generalized slowing------------- generalized slowing.  No focal slowing    Interictal activity--------------  none    Events------------------- none    Seizures-------------- none    Impression:   generalized slowing    Plan - discussed with the  NCC team  EVD: [ ] [place]: [ ]     ICP:  8   CPP: 80    Level(cm):  10   24hr(ml):   244 CSF:     W:     R:     Cx:     P:     G:     LA:       Gram stain:    9. Cardiovascular:   Vital Signs Last 24 Hrs  T(C): 36 (08 Feb 2021 09:00), Max: 36.4 (07 Feb 2021 17:00)  T(F): 96.8 (08 Feb 2021 09:00), Max: 97.5 (07 Feb 2021 17:00)  HR: 61 (08 Feb 2021 09:00) (55 - 74)  BP: --  BP(mean): --  RR: 25 (08 Feb 2021 08:00) (24 - 28)  SpO2: 61% (08 Feb 2021 09:00) (61% - 100%)     Last Echo:    Last EKG:  < from: 12 Lead ECG (01.27.21 @ 21:26) >  Diagnosis Line Sinus rhythm inva2gu degree A-V block  Left ventricular hypertrophy with QRS widening  T wave abnormality, consider inferior ischemia  Prolonged QT  Abnormal ECG    < end of copied text >    CVP   MAP/CPP/SBP target:   CO:      CI:       Enzymes/Trop:    10. Respiratory:   ABG:ABG - ( 08 Feb 2021 03:24 )  pH, Arterial: 7.35  pH, Blood: x     /  pCO2: 41    /  pO2: 67    / HCO3: 23    / Base Excess: -2.6  /  SaO2: 93                  VBG:    Chest Xray:  < from: Xray Chest 1 View- PORTABLE-Routine (Xray Chest 1 View- PORTABLE-Routine in AM.) (02.08.21 @ 06:45) >  IMPRESSION:    Unchanged right greater than left bilateral opacities.        < end of copied text >    Mode: AC/ CMV (Assist Control/ Continuous Mandatory Ventilation)  RR (machine): 24  TV (machine): 450  FiO2: 50  PEEP: 10  ITime: 1  MAP: 19  PIP: 37      Peak Pressure/[place] Pressure:    11.GI:    Prophalaxis: protonix    Bowel mvt:     Abd distension:   LIVER FUNCTIONS - ( 08 Feb 2021 08:00 )  Alb: 2.7 g/dL / Pro: 5.0 g/dL / ALK PHOS: 105 U/L / ALT: 27 U/L / AST: 23 U/L / GGT: x             12.Renal/Fluids/Electrolytes:    02-08    160<H>  |  130<H>  |  19  ----------------------------<  122<H>  4.4   |  20  |  1.0    Ca    8.6      08 Feb 2021 08:00  Phos  4.8     02-08  Mg     2.8     02-08    TPro  5.0<L>  /  Alb  2.7<L>  /  TBili  0.8  /  DBili  x   /  AST  23  /  ALT  27  /  AlkPhos  105  02-08      I&O's Detail    07 Feb 2021 07:01  -  08 Feb 2021 07:00  --------------------------------------------------------  IN:    Dexmedetomidine: 385 mL    dextrose 5%: 350 mL    FentaNYL: 55 mL    IV PiggyBack: 100 mL    Midazolam: 156 mL    NiCARdipine: 140 mL    Propofol: 604 mL    sodium chloride 0.9%: 75 mL    sodium chloride 0.9% w/ Additives: 12.5 mL    sodium chloride 3%: 90 mL    sodium chloride 3%: 50 mL  Total IN: 2017.5 mL    OUT:    Drain (mL): 244 mL    Indwelling Catheter - Urethral (mL): 1132 mL  Total OUT: 1376 mL    Total NET: 641.5 mL          13.ID:   TMax: 97.5  Vital Signs Last 24 Hrs  T(C): 36 (08 Feb 2021 09:00), Max: 36.4 (07 Feb 2021 17:00)  T(F): 96.8 (08 Feb 2021 09:00), Max: 97.5 (07 Feb 2021 17:00)  HR: 61 (08 Feb 2021 09:00) (55 - 74)  BP: --  BP(mean): --  RR: 25 (08 Feb 2021 08:00) (24 - 28)  SpO2: 61% (08 Feb 2021 09:00) (61% - 100%)           Lines: Central[] Date inserted: Peripheral[]    14. Hematology:                         13.4   15.06 )-----------( 353      ( 08 Feb 2021 08:00 )             41.5      02-08    160<H>  |  130<H>  |  19  ----------------------------<  122<H>  4.4   |  20  |  1.0    Ca    8.6      08 Feb 2021 08:00  Phos  4.8     02-08  Mg     2.8     02-08    TPro  5.0<L>  /  Alb  2.7<L>  /  TBili  0.8  /  DBili  x   /  AST  23  /  ALT  27  /  AlkPhos  105  02-08         DVT Prophylaxis Lovenox[ ] Heparin[ ] Venodynes[ ] SCD's[x ]

## 2021-02-09 NOTE — PROGRESS NOTE ADULT - ASSESSMENT
IMPRESSION:    Large ICB likely hypertensive/ SP EVD  Intubated, for airway protection  G- Pneumonia      PLAN:    CNS:  Assess MS.  FU with Neuro and NeuroSX. archtic sun  ICP monitoring as per neuro sx / neurology , CPP more than 70,    HEENT: Oral care    PULMONARY:  HOB @ 45 degrees.  Vent changes:  Wean O2 as tolerated.  Pulmonary toilet.  FU DTA     CARDIOVASCULAR:  Avoid volume overload.  BP control.     GI: GI prophylaxis. OG feeds.  Bowel regimen.  Decrease Free H2O.  Reglan     RENAL:  Follow up lytes.  Correct as needed monitor is and os , repeat CMP 4 pm     INFECTIOUS DISEASE: Follow up cultures , Autumn DC Vanc.     HEMATOLOGICAL:  DVT prophylaxis seq.  Last duplex 2-1 negative.  FU Repeat Duplex.      ENDOCRINE:  Follow up FS.  Insulin protocol if needed.    MUSCULOSKELETAL:  Bed rest    Prognosis guarded

## 2021-02-10 LAB
-  AMIKACIN: SIGNIFICANT CHANGE UP
-  AMOXICILLIN/CLAVULANIC ACID: SIGNIFICANT CHANGE UP
-  AMPICILLIN/SULBACTAM: SIGNIFICANT CHANGE UP
-  AMPICILLIN: SIGNIFICANT CHANGE UP
-  AZTREONAM: SIGNIFICANT CHANGE UP
-  CEFAZOLIN: SIGNIFICANT CHANGE UP
-  CEFEPIME: SIGNIFICANT CHANGE UP
-  CEFOXITIN: SIGNIFICANT CHANGE UP
-  CEFTRIAXONE: SIGNIFICANT CHANGE UP
-  CIPROFLOXACIN: SIGNIFICANT CHANGE UP
-  ERTAPENEM: SIGNIFICANT CHANGE UP
-  GENTAMICIN: SIGNIFICANT CHANGE UP
-  IMIPENEM: SIGNIFICANT CHANGE UP
-  LEVOFLOXACIN: SIGNIFICANT CHANGE UP
-  MEROPENEM: SIGNIFICANT CHANGE UP
-  PIPERACILLIN/TAZOBACTAM: SIGNIFICANT CHANGE UP
-  TOBRAMYCIN: SIGNIFICANT CHANGE UP
-  TRIMETHOPRIM/SULFAMETHOXAZOLE: SIGNIFICANT CHANGE UP
ALBUMIN SERPL ELPH-MCNC: 2.4 G/DL — LOW (ref 3.5–5.2)
ALBUMIN SERPL ELPH-MCNC: 2.4 G/DL — LOW (ref 3.5–5.2)
ALP SERPL-CCNC: 107 U/L — SIGNIFICANT CHANGE UP (ref 30–115)
ALP SERPL-CCNC: 98 U/L — SIGNIFICANT CHANGE UP (ref 30–115)
ALT FLD-CCNC: 29 U/L — SIGNIFICANT CHANGE UP (ref 0–41)
ALT FLD-CCNC: 37 U/L — SIGNIFICANT CHANGE UP (ref 0–41)
ANION GAP SERPL CALC-SCNC: 7 MMOL/L — SIGNIFICANT CHANGE UP (ref 7–14)
ANION GAP SERPL CALC-SCNC: 9 MMOL/L — SIGNIFICANT CHANGE UP (ref 7–14)
AST SERPL-CCNC: 29 U/L — SIGNIFICANT CHANGE UP (ref 0–41)
AST SERPL-CCNC: 40 U/L — SIGNIFICANT CHANGE UP (ref 0–41)
BILIRUB SERPL-MCNC: 1.1 MG/DL — SIGNIFICANT CHANGE UP (ref 0.2–1.2)
BILIRUB SERPL-MCNC: 1.3 MG/DL — HIGH (ref 0.2–1.2)
BUN SERPL-MCNC: 21 MG/DL — HIGH (ref 10–20)
BUN SERPL-MCNC: 21 MG/DL — HIGH (ref 10–20)
CALCIUM SERPL-MCNC: 8.1 MG/DL — LOW (ref 8.5–10.1)
CALCIUM SERPL-MCNC: 8.6 MG/DL — SIGNIFICANT CHANGE UP (ref 8.5–10.1)
CHLORIDE SERPL-SCNC: 123 MMOL/L — HIGH (ref 98–110)
CHLORIDE SERPL-SCNC: 125 MMOL/L — HIGH (ref 98–110)
CO2 SERPL-SCNC: 22 MMOL/L — SIGNIFICANT CHANGE UP (ref 17–32)
CO2 SERPL-SCNC: 23 MMOL/L — SIGNIFICANT CHANGE UP (ref 17–32)
CREAT SERPL-MCNC: 1 MG/DL — SIGNIFICANT CHANGE UP (ref 0.7–1.5)
CREAT SERPL-MCNC: 1 MG/DL — SIGNIFICANT CHANGE UP (ref 0.7–1.5)
CULTURE RESULTS: NO GROWTH — SIGNIFICANT CHANGE UP
CULTURE RESULTS: SIGNIFICANT CHANGE UP
GLUCOSE BLDC GLUCOMTR-MCNC: 103 MG/DL — HIGH (ref 70–99)
GLUCOSE SERPL-MCNC: 134 MG/DL — HIGH (ref 70–99)
GLUCOSE SERPL-MCNC: 141 MG/DL — HIGH (ref 70–99)
HCT VFR BLD CALC: 40.8 % — LOW (ref 42–52)
HGB BLD-MCNC: 12.8 G/DL — LOW (ref 14–18)
MAGNESIUM SERPL-MCNC: 2.4 MG/DL — SIGNIFICANT CHANGE UP (ref 1.8–2.4)
MCHC RBC-ENTMCNC: 30.4 PG — SIGNIFICANT CHANGE UP (ref 27–31)
MCHC RBC-ENTMCNC: 31.4 G/DL — LOW (ref 32–37)
MCV RBC AUTO: 96.9 FL — HIGH (ref 80–94)
METHOD TYPE: SIGNIFICANT CHANGE UP
MRSA PCR RESULT.: NEGATIVE — SIGNIFICANT CHANGE UP
NRBC # BLD: 0 /100 WBCS — SIGNIFICANT CHANGE UP (ref 0–0)
ORGANISM # SPEC MICROSCOPIC CNT: SIGNIFICANT CHANGE UP
ORGANISM # SPEC MICROSCOPIC CNT: SIGNIFICANT CHANGE UP
PHOSPHATE SERPL-MCNC: 3.9 MG/DL — SIGNIFICANT CHANGE UP (ref 2.1–4.9)
PLATELET # BLD AUTO: 348 K/UL — SIGNIFICANT CHANGE UP (ref 130–400)
POTASSIUM SERPL-MCNC: 4 MMOL/L — SIGNIFICANT CHANGE UP (ref 3.5–5)
POTASSIUM SERPL-MCNC: 4.1 MMOL/L — SIGNIFICANT CHANGE UP (ref 3.5–5)
POTASSIUM SERPL-SCNC: 4 MMOL/L — SIGNIFICANT CHANGE UP (ref 3.5–5)
POTASSIUM SERPL-SCNC: 4.1 MMOL/L — SIGNIFICANT CHANGE UP (ref 3.5–5)
PROT SERPL-MCNC: 4.6 G/DL — LOW (ref 6–8)
PROT SERPL-MCNC: 4.9 G/DL — LOW (ref 6–8)
RBC # BLD: 4.21 M/UL — LOW (ref 4.7–6.1)
RBC # FLD: 13.3 % — SIGNIFICANT CHANGE UP (ref 11.5–14.5)
SODIUM SERPL-SCNC: 154 MMOL/L — HIGH (ref 135–146)
SODIUM SERPL-SCNC: 155 MMOL/L — HIGH (ref 135–146)
SPECIMEN SOURCE: SIGNIFICANT CHANGE UP
SPECIMEN SOURCE: SIGNIFICANT CHANGE UP
WBC # BLD: 13.34 K/UL — HIGH (ref 4.8–10.8)
WBC # FLD AUTO: 13.34 K/UL — HIGH (ref 4.8–10.8)

## 2021-02-10 PROCEDURE — 99232 SBSQ HOSP IP/OBS MODERATE 35: CPT

## 2021-02-10 PROCEDURE — 71045 X-RAY EXAM CHEST 1 VIEW: CPT | Mod: 26

## 2021-02-10 RX ORDER — LACTULOSE 10 G/15ML
10 SOLUTION ORAL EVERY 8 HOURS
Refills: 0 | Status: DISCONTINUED | OUTPATIENT
Start: 2021-02-10 | End: 2021-03-01

## 2021-02-10 RX ADMIN — DEXMEDETOMIDINE HYDROCHLORIDE IN 0.9% SODIUM CHLORIDE 1.25 MICROGRAM(S)/KG/HR: 4 INJECTION INTRAVENOUS at 22:34

## 2021-02-10 RX ADMIN — PANTOPRAZOLE SODIUM 40 MILLIGRAM(S): 20 TABLET, DELAYED RELEASE ORAL at 06:10

## 2021-02-10 RX ADMIN — Medication 5 MILLIGRAM(S): at 06:10

## 2021-02-10 RX ADMIN — Medication 25 MILLIGRAM(S): at 11:36

## 2021-02-10 RX ADMIN — MEROPENEM 100 MILLIGRAM(S): 1 INJECTION INTRAVENOUS at 06:09

## 2021-02-10 RX ADMIN — LEVETIRACETAM 420 MILLIGRAM(S): 250 TABLET, FILM COATED ORAL at 06:09

## 2021-02-10 RX ADMIN — AMLODIPINE BESYLATE 10 MILLIGRAM(S): 2.5 TABLET ORAL at 08:03

## 2021-02-10 RX ADMIN — Medication 25 MILLIGRAM(S): at 08:03

## 2021-02-10 RX ADMIN — Medication 25 MILLIGRAM(S): at 17:27

## 2021-02-10 RX ADMIN — CHLORHEXIDINE GLUCONATE 15 MILLILITER(S): 213 SOLUTION TOPICAL at 17:27

## 2021-02-10 RX ADMIN — LEVETIRACETAM 420 MILLIGRAM(S): 250 TABLET, FILM COATED ORAL at 18:20

## 2021-02-10 RX ADMIN — Medication 5 MILLIGRAM(S): at 17:27

## 2021-02-10 RX ADMIN — MEROPENEM 100 MILLIGRAM(S): 1 INJECTION INTRAVENOUS at 14:54

## 2021-02-10 RX ADMIN — LACTULOSE 10 GRAM(S): 10 SOLUTION ORAL at 22:33

## 2021-02-10 RX ADMIN — SENNA PLUS 2 TABLET(S): 8.6 TABLET ORAL at 22:30

## 2021-02-10 RX ADMIN — LACTULOSE 10 GRAM(S): 10 SOLUTION ORAL at 14:54

## 2021-02-10 RX ADMIN — NICARDIPINE HYDROCHLORIDE 25 MG/HR: 30 CAPSULE, EXTENDED RELEASE ORAL at 22:19

## 2021-02-10 RX ADMIN — PANTOPRAZOLE SODIUM 40 MILLIGRAM(S): 20 TABLET, DELAYED RELEASE ORAL at 17:27

## 2021-02-10 RX ADMIN — MEROPENEM 100 MILLIGRAM(S): 1 INJECTION INTRAVENOUS at 22:33

## 2021-02-10 RX ADMIN — POLYETHYLENE GLYCOL 3350 17 GRAM(S): 17 POWDER, FOR SOLUTION ORAL at 11:36

## 2021-02-10 RX ADMIN — Medication 25 MILLIGRAM(S): at 00:38

## 2021-02-10 RX ADMIN — Medication 25 MILLIGRAM(S): at 22:30

## 2021-02-10 RX ADMIN — CHLORHEXIDINE GLUCONATE 15 MILLILITER(S): 213 SOLUTION TOPICAL at 06:09

## 2021-02-10 RX ADMIN — CHLORHEXIDINE GLUCONATE 1 APPLICATION(S): 213 SOLUTION TOPICAL at 06:09

## 2021-02-10 RX ADMIN — PROPOFOL 12 MICROGRAM(S)/KG/MIN: 10 INJECTION, EMULSION INTRAVENOUS at 22:34

## 2021-02-10 NOTE — PROGRESS NOTE ADULT - SUBJECTIVE AND OBJECTIVE BOX
PPD#14    S/P Placement of EVD    Pt seen and examined at bedside. Pt remains intubated, sedated.    Vital Signs Last 24 Hrs  T(C): 37.4 (10 Feb 2021 08:00), Max: 37.6 (09 Feb 2021 19:00)  T(F): 99.3 (10 Feb 2021 08:00), Max: 99.7 (09 Feb 2021 19:00)  HR: 74 (10 Feb 2021 08:00) (58 - 93)  BP: --  BP(mean): --  RR: 31 (10 Feb 2021 08:00) (24 - 34)  SpO2: 98% (10 Feb 2021 08:00) (93% - 98%)  ICP: 7-16    I&O's Detail    09 Feb 2021 07:01  -  10 Feb 2021 07:00  --------------------------------------------------------  IN:    Dexmedetomidine: 675 mL    Enteral Tube Flush: 270 mL    IV PiggyBack: 100 mL    IV PiggyBack: 250 mL    Jevity: 580 mL    Midazolam: 225 mL    Propofol: 756 mL  Total IN: 2856 mL    OUT:    Drain (mL): 140 mL    Indwelling Catheter - Urethral (mL): 1255 mL  Total OUT: 1395 mL    Total NET: 1461 mL      10 Feb 2021 07:01  -  10 Feb 2021 09:56  --------------------------------------------------------  IN:    Dexmedetomidine: 62.5 mL    Midazolam: 62.5 mL    Propofol: 66 mL  Total IN: 191 mL    OUT:    Indwelling Catheter - Urethral (mL): 55 mL  Total OUT: 55 mL    Total NET: 136 mL      I&O's Summary    09 Feb 2021 07:01  -  10 Feb 2021 07:00  --------------------------------------------------------  IN: 2856 mL / OUT: 1395 mL / NET: 1461 mL    10 Feb 2021 07:01  -  10 Feb 2021 09:56  --------------------------------------------------------  IN: 191 mL / OUT: 55 mL / NET: 136 mL      REVIEW OF SYSTEMS    [ ] A ten-point review of systems was otherwise negative except as noted.  [X] Due to altered mental status/intubation, subjective information were not able to be obtained from the patient. History was obtained, to the extent possible, from review of the chart and collateral sources of information.      PHYSICAL EXAM:  Neurological:  Pupils pinpoint  No tracking  No w/d of UE to pain  No w/d of LE to pain  Incision intact    LABS:                        12.8   13.34 )-----------( 348      ( 10 Feb 2021 04:30 )             40.8     02-10    154<H>  |  123<H>  |  21<H>  ----------------------------<  141<H>  4.0   |  22  |  1.0    Ca    8.6      10 Feb 2021 04:30  Phos  3.9     02-10  Mg     2.4     02-10    TPro  4.9<L>  /  Alb  2.4<L>  /  TBili  1.3<H>  /  DBili  x   /  AST  29  /  ALT  29  /  AlkPhos  98  02-10    CSF Analysis: [] N/A    Allergies    No Known Allergies    Intolerances      MEDICATIONS:  Antibiotics:  meropenem  IVPB 1000 milliGRAM(s) IV Intermittent every 8 hours    Neuro:  acetaminophen   Tablet .. 650 milliGRAM(s) Oral every 6 hours PRN  dexMEDEtomidine Infusion 0.05 MICROgram(s)/kG/Hr IV Continuous <Continuous>  levETIRAcetam  IVPB 500 milliGRAM(s) IV Intermittent every 12 hours  midazolam Infusion 0.02 mG/kG/Hr IV Continuous <Continuous>  propofol Infusion 20 MICROgram(s)/kG/Min IV Continuous <Continuous>      IVF:      CULTURES:  Culture Results:   Numerous Klebsiella pneumoniae (02-08 @ 12:28)  Culture Results:   No growth (02-07 @ 13:51)      RADIOLOGY & ADDITIONAL TESTS:      ASSESSMENT:  50y Male s/p    INTRACRANIAL BLEED                                     ADM/AA    INTRACRANIAL BLEED    ^MED EVAL    Family history of breast cancer (Mother)    Family history of pancreatic cancer (Father)    Handoff    MEWS Score    HTN (hypertension)    GI bleed    Gastric ulcer    PUD (peptic ulcer disease)    Intracranial bleed    Intraparenchymal hemorrhage of brain    Insertion, external ventricular drain    Ventriculostomy, for ventricular catheter insertion    Arm fracture, left    MED EVAL    90+    [email or web address]        PLAN:  Will send CSF today  Continue Current Mgmt

## 2021-02-10 NOTE — PROGRESS NOTE ADULT - ASSESSMENT
15. Impression:50 years old right handed Male with PMHx of HTN, GI bleed, PUD,  Mrankin score of 0 at baseline was brought in by EMS for  AMS and left sided weakness. . Code stroke called, NIHSS 24 and GCS 11, CTH showed Moderate to large intraparenchymal right basal ganglia hemorrhage with mild surrounding edema and associated right sulcal effacement as well as decompression into the ventricles. There is approximately 6.3 mm of midline shift.EVD was placed by neurosurgery at bedside. PE pt sedated, with EVD in place        16. Suggestions:  - Neuro checks q1hr  - EVD as per NeuroSx;   - Keep ICP < 20, CPP > 70  - Continue Keppra 500mg IV q12hrs  - Continue to wean sedation as tolerated for neurological prognostication  - Keep SBP < 140   - Ventilator management as per primary team  - HOB > 35 degrees  - Aspiration precautions  - SBT as tolerated  - Continue GI ppx  - Continue bowel regimen  - sequentials while in bed  - attending note to follow         17. Disposition: continue icu care    15. Impression:  50 years old right handed Male with PMHx of HTN, GI bleed, PUD,  Mrankin score of 0 at baseline was brought in by EMS for  AMS and left sided weakness.  Code stroke called, NIHSS 24 and GCS 11, CTH showed Moderate to large intraparenchymal right basal ganglia hemorrhage with mild surrounding edema and associated right sulcal effacement as well as decompression into the ventricles. There is approximately 6.3 mm of midline shift.EVD was placed by neurosurgery at bedside. Patient continued to be intubated heavily sedated , has evd. Neuro exam is unchanged.         16. Suggestions:  - Neuro checks q1hr  - EVD management as per NeuroSx;   - Keep ICP < 20, CPP > 70  - Continue Keppra 500mg IV q12hrs  - Continue to wean sedation as tolerated for neurological prognostication  - Keep SBP < 140   - Ventilator management as per primary team  - HOB > 35 degrees  - Aspiration precautions  - SBT as tolerated  - Continue GI ppx  - Continue bowel regimen  - sequentials while in bed  - attending note to follow         17. Disposition: continue icu care

## 2021-02-10 NOTE — PROGRESS NOTE ADULT - SUBJECTIVE AND OBJECTIVE BOX
Patient is a 50y old  Male who presents with a chief complaint of Altered mental status (10 Feb 2021 01:50)        Over Night Events:  Remains on MV.  Sedated.  off pressors.          ROS:     All ROS are negative except HPI         PHYSICAL EXAM    ICU Vital Signs Last 24 Hrs  T(C): 37.2 (10 Feb 2021 07:00), Max: 37.6 (09 Feb 2021 19:00)  T(F): 99 (10 Feb 2021 07:00), Max: 99.7 (09 Feb 2021 19:00)  HR: 74 (10 Feb 2021 07:00) (58 - 93)  BP: --  BP(mean): --  ABP: 142/68 (10 Feb 2021 07:00) (87/79 - 191/83)  ABP(mean): 88 (10 Feb 2021 07:00) (83 - 125)  RR: 24 (10 Feb 2021 07:00) (24 - 34)  SpO2: 98% (10 Feb 2021 07:00) (93% - 98%)      CONSTITUTIONAL:  Well nourished. Ill appearing.  NAD    ENT:   Airway patent,   Mouth with normal mucosa.   No thrush    EYES:   Pupils equal,   Round and reactive to light.    CARDIAC:   Tachycardic   Regular rhythm.    Edema      Vascular:  Normal systolic impulse  No Carotid bruits    RESPIRATORY:   No wheezing  Bilateral BS  Normal chest expansion  Not tachypneic,  No use of accessory muscles    GASTROINTESTINAL:  Abdomen soft,   Non-tender,   No guarding,   + BS    MUSCULOSKELETAL:   Range of motion is not limited,  No clubbing, cyanosis    NEUROLOGICAL:   Sedated   Agitated off sedation.  Does not follow commands     SKIN:   Skin normal color for race,   Warm and dry   No evidence of rash.    PSYCHIATRIC:   Sedated   No apparent risk to self or others.    HEMATOLOGICAL:  No cervical  lymphadenopathy.  no inguinal lymphadenopathy      02-09-21 @ 07:01  -  02-10-21 @ 07:00  --------------------------------------------------------  IN:    Dexmedetomidine: 675 mL    Enteral Tube Flush: 270 mL    IV PiggyBack: 100 mL    IV PiggyBack: 250 mL    Jevity: 580 mL    Midazolam: 225 mL    Propofol: 756 mL  Total IN: 2856 mL    OUT:    Drain (mL): 140 mL    Indwelling Catheter - Urethral (mL): 1255 mL  Total OUT: 1395 mL    Total NET: 1461 mL          LABS:                            12.8   13.34 )-----------( 348      ( 10 Feb 2021 04:30 )             40.8                                               02-10    154<H>  |  123<H>  |  21<H>  ----------------------------<  141<H>  4.0   |  22  |  1.0    Ca    8.6      10 Feb 2021 04:30  Phos  3.9     02-10  Mg     2.4     02-10    TPro  4.9<L>  /  Alb  2.4<L>  /  TBili  1.3<H>  /  DBili  x   /  AST  29  /  ALT  29  /  AlkPhos  98  02-10                                                                                           LIVER FUNCTIONS - ( 10 Feb 2021 04:30 )  Alb: 2.4 g/dL / Pro: 4.9 g/dL / ALK PHOS: 98 U/L / ALT: 29 U/L / AST: 29 U/L / GGT: x                                                  Culture - Sputum (collected 08 Feb 2021 12:28)  Source: .Sputum Deep Tracheal  Gram Stain (09 Feb 2021 07:26):    Few polymorphonuclear leukocytes per low power field    Few Squamous epithelial cells per low power field    Few Gram Negative Rods per oil power field  Preliminary Report (09 Feb 2021 19:47):    Numerous Klebsiella pneumoniae    Culture - CSF with Gram Stain (collected 07 Feb 2021 13:51)  Source: .CSF CSF  Gram Stain (07 Feb 2021 21:48):    polymorphonuclear leukocytes seen    No organisms seen    by cytocentrifuge  Preliminary Report (08 Feb 2021 15:29):    No growth                                                   Mode: AC/ CMV (Assist Control/ Continuous Mandatory Ventilation)  RR (machine): 24  TV (machine): 450  FiO2: 50  PEEP: 10  ITime: 1  MAP: 19  PIP: 31                                      ABG - ( 10 Feb 2021 02:12 )  pH, Arterial: 7.37  pH, Blood: x     /  pCO2: 40    /  pO2: 85    / HCO3: 23    / Base Excess: -1.8  /  SaO2: 96   ppl 26               MEDICATIONS  (STANDING):  amLODIPine   Tablet 10 milliGRAM(s) Oral daily  chlorhexidine 0.12% Liquid 15 milliLiter(s) Oral Mucosa every 12 hours  chlorhexidine 4% Liquid 1 Application(s) Topical <User Schedule>  dexMEDEtomidine Infusion 0.05 MICROgram(s)/kG/Hr (1.25 mL/Hr) IV Continuous <Continuous>  hydrALAZINE 25 milliGRAM(s) Oral every 6 hours  levETIRAcetam  IVPB 500 milliGRAM(s) IV Intermittent every 12 hours  meropenem  IVPB 1000 milliGRAM(s) IV Intermittent every 8 hours  metoclopramide 5 milliGRAM(s) Oral two times a day  midazolam Infusion 0.02 mG/kG/Hr (2 mL/Hr) IV Continuous <Continuous>  niCARdipine Infusion 5 mG/Hr (25 mL/Hr) IV Continuous <Continuous>  pantoprazole  Injectable 40 milliGRAM(s) IV Push every 12 hours  polyethylene glycol 3350 17 Gram(s) Oral daily  propofol Infusion 20 MICROgram(s)/kG/Min (12 mL/Hr) IV Continuous <Continuous>  senna 2 Tablet(s) Oral at bedtime    MEDICATIONS  (PRN):  acetaminophen   Tablet .. 650 milliGRAM(s) Oral every 6 hours PRN Temp greater or equal to 38C (100.4F)      New X-rays reviewed:                                                                                  ECHO    CXR interpreted by me:  ET OG OK>  Bilateral infiltrates

## 2021-02-10 NOTE — PROGRESS NOTE ADULT - SUBJECTIVE AND OBJECTIVE BOX
Neurocritical Care Progress Note:    1. Brief Presentation: AMS and left sided weakness.     2. Today's Acute Problems: acute bleed s/p evd, g- pna     3. Relevant brief History:50 years old right handed Male with PMHx of HTN, GI bleed, PUD,  Mrankin score of 0 at baseline was brought in by EMS for  AMS and left sided weakness.   Wife states that patient was having a usual day today went to bathroom and then the next thing she noted was that he was confused and was lying on the bathroom floor, she reports urinary incontinence, patient was confused, had trouble speaking, and could not move his left side.     In the ED, /148, HR 80, saturating well. Code stroke called, NIHSS 24 and GCS 11, CTH showed Moderate to large intraparenchymal right basal ganglia hemorrhage with mild surrounding edema and associated right sulcal effacement as well as decompression into the ventricles. There is approximately 6.3 mm of midline shift. CTA did not show any evidence of active bleeding, AVM or aneurysm or major vascular stenosis or occlusion. Pt became unresponsive in CT scan and had to be intubated and sedated. Patient was intubated in ED for worsening mental status and GCS. Started on Nicardipine drip for sbp in 200s and EVD was placed by neurosurgery at bedside. Pan trauma scan was negative. To be admitted under ICU for further monitoring.     4-Yesterday's Plan:  - Neuro checks q1hr  - EVD as per NeuroSx; consider raise to 15 -->progress to clamp trial  - Keep ICP < 20, CPP > 70  - Continue Keppra 500mg IV q12hrs  - Continue to wean sedation as tolerated for neurological prognostication  - Keep SBP < 150   - Ventilator management as per primary team  - HOB > 35 degrees  - Aspiration precautions  - SAT/SBT as tolerated  - Continue GI ppx  - Continue bowel regimen  - SCS while in bed  - Pharmacologic DVT prophylaxis     5. Last 24 hour updates: neuro status unchanged. no acute events      6. Medications:   amLODIPine   Tablet 10 milliGRAM(s) Oral daily  chlorhexidine 0.12% Liquid 15 milliLiter(s) Oral Mucosa every 12 hours  chlorhexidine 4% Liquid 1 Application(s) Topical <User Schedule>  dexMEDEtomidine Infusion 0.05 MICROgram(s)/kG/Hr IV Continuous <Continuous>  hydrALAZINE 25 milliGRAM(s) Oral every 6 hours  levETIRAcetam  IVPB 500 milliGRAM(s) IV Intermittent every 12 hours  meropenem  IVPB 1000 milliGRAM(s) IV Intermittent every 8 hours  metoclopramide 5 milliGRAM(s) Oral two times a day  midazolam Infusion 0.02 mG/kG/Hr IV Continuous <Continuous>  niCARdipine Infusion 5 mG/Hr IV Continuous <Continuous>  pantoprazole  Injectable 40 milliGRAM(s) IV Push every 12 hours  polyethylene glycol 3350 17 Gram(s) Oral daily  propofol Infusion 20 MICROgram(s)/kG/Min IV Continuous <Continuous>  senna 2 Tablet(s) Oral at bedtime      7. Ancillary Management:   Chest PT[ ]   Head of bed >35 [x ]   Out of bed to chair [ ]   PT/OT/SP Eval [x ]   Spirometry[ ]   DVT prophalaxis[ ]    8.Neuro:   sedated, intubated, not following commands, not reactive to peripheral or noxious stimuli   sluggish pupils, disconjugate gaze, - corneal reflex  +gag reflex  not shivering, on arctic sun temp 36.4 C   planter responses mute bilaterally          mRS:  0 No symptoms at all  1 No significant disability despite symptoms; able to carry out all usual duties and activities without assistance  2 Slight disability; unable to carry out all previous activities, but able to look after own affairs  3 Moderate disability; requiring some help, but able to walk without assistance  4 Moderately severe disability; unable to walk without assistance and unable to attend to own bodily needs without assistance  5 Severe disability; bedridden, incontinent and requiring constant nursing care and attention  6 Dead      Last CTH: < from: CT Head No Cont (02.08.21 @ 10:27) >  IMPRESSION:  Slight interval decrease in acuteright basal ganglia intraparenchymal hemorrhage with local edema and mass effect with midline shift to the left approximately 0.8 cm.    Unchanged minimal subarachnoid hemorrhage within the left temporoparietal region.    < end of copied text >      Last CTA/MRA:    < from: CT Angio Neck w/ IV Cont (01.27.21 @ 21:08) >  IMPRESSION    The previously noted area of intracerebral hemorrhage in the right basal ganglia region is again identified. Please see report of the CT scan of the head dated 1/27/2021.    No evidence of active bleeding, AVM or aneurysm.    No evidence of major vascular stenosis or occlusion,      < end of copied text >      EVD: [ x] [place]: [ ]     ICP: 6-16     CPP: 80-84    Level(cm): -10        9. Cardiovascular:   Vital Signs Last 24 Hrs  T(C): 36.7 (10 Feb 2021 02:00), Max: 37.6 (09 Feb 2021 19:00)  T(F): 98.1 (10 Feb 2021 02:00), Max: 99.7 (09 Feb 2021 19:00)  HR: 66 (09 Feb 2021 23:37) (62 - 86)  BP: 152/81  RR: 24 (09 Feb 2021 23:00) (24 - 34)  SpO2: 97% (09 Feb 2021 23:37) (93% - 97%)       Last EKG:   Diagnosis Line Normal sinus rhythm  T wave abnormality, consider inferior ischemia  Abnormal ECG      10. Respiratory:   ABG:ABG - ( 09 Feb 2021 03:44 )  pH, Arterial: 7.39  pH, Blood: x     /  pCO2: 38    /  pO2: 64    / HCO3: 23    / Base Excess: -1.8  /  SaO2: 92          Chest Xray: Impression:    Increased bilateral opacities most pronounced in the right upper lung. No pneumothorax.    Mode: AC/ CMV (Assist Control/ Continuous Mandatory Ventilation)  RR (machine): 24  TV (machine): 450  FiO2: 50  PEEP: 10  ITime: 1  MAP: 19  PIP: 31    11.GI:    Prophalaxis: protonix   LIVER FUNCTIONS - ( 09 Feb 2021 03:25 )  Alb: 2.5 g/dL / Pro: 4.8 g/dL / ALK PHOS: 96 U/L / ALT: 25 U/L / AST: 19 U/L / GGT: x             12.Renal/Fluids/Electrolytes:    02-09    156<H>  |  124<H>  |  19  ----------------------------<  113<H>  4.4   |  20  |  1.0    Ca    8.5      09 Feb 2021 20:10  Phos  4.1     02-09  Mg     2.3     02-09    TPro  4.8<L>  /  Alb  2.5<L>  /  TBili  1.3<H>  /  DBili  x   /  AST  19  /  ALT  25  /  AlkPhos  96  02-09      I&O's Detail    08 Feb 2021 07:01  -  09 Feb 2021 07:00  --------------------------------------------------------  IN:    Dexmedetomidine: 900 mL    IV PiggyBack: 1000 mL    IV PiggyBack: 200 mL    IV PiggyBack: 200 mL    Midazolam: 120 mL    Propofol: 750 mL  Total IN: 3170 mL    OUT:    Drain (mL): 289 mL    Indwelling Catheter - Urethral (mL): 1420 mL  Total OUT: 1709 mL    Total NET: 1461 mL      09 Feb 2021 07:01  -  10 Feb 2021 02:12  --------------------------------------------------------  IN:    Dexmedetomidine: 550 mL    Enteral Tube Flush: 270 mL    IV PiggyBack: 100 mL    IV PiggyBack: 100 mL    Jevity: 340 mL    Midazolam: 175 mL    Propofol: 588 mL  Total IN: 2123 mL    OUT:    Drain (mL): 135 mL    Indwelling Catheter - Urethral (mL): 925 mL  Total OUT: 1060 mL    Total NET: 1063 mL          13.ID:   TMax:   Vital Signs Last 24 Hrs  T(C): 36.7 (10 Feb 2021 02:00), Max: 37.6 (09 Feb 2021 19:00)  T(F): 98.1 (10 Feb 2021 02:00), Max: 99.7 (09 Feb 2021 19:00)  HR: 66 (09 Feb 2021 23:37) (62 - 86)  BP: 152/81  RR: 24 (09 Feb 2021 23:00) (24 - 34)  SpO2: 97% (09 Feb 2021 23:37) (93% - 97%)           Lines: Central[x] Date inserted: Peripheral[]    14. Hematology:                         13.5   16.27 )-----------( 353      ( 09 Feb 2021 03:25 )             41.7      02-09    156<H>  |  124<H>  |  19  ----------------------------<  113<H>  4.4   |  20  |  1.0    Ca    8.5      09 Feb 2021 20:10  Phos  4.1     02-09  Mg     2.3     02-09    TPro  4.8<L>  /  Alb  2.5<L>  /  TBili  1.3<H>  /  DBili  x   /  AST  19  /  ALT  25  /  AlkPhos  96  02-09         DVT Prophylaxis Lovenox[ ] Heparin[ ] Venodynes[ ] SCD's[x]       Neurocritical Care Progress Note:    1. Brief Presentation: AMS and left sided weakness.     2. Today's Acute Problems:   -Acute bleed s/p evd,   -PNA      3. Relevant brief History:  50 years old right handed Male with PMHx of HTN, GI bleed, PUD,  Mrankin score of 0 at baseline was brought in by EMS for  AMS and left sided weakness.   Wife states that patient was having a usual day today went to bathroom and then the next thing she noted was that he was confused and was lying on the bathroom floor, she reports urinary incontinence, patient was confused, had trouble speaking, and could not move his left side. In the ED, /148, HR 80, saturating well. Code stroke called, NIHSS 24 and GCS 11, CTH showed Moderate to large intraparenchymal right basal ganglia hemorrhage with mild surrounding edema and associated right sulcal effacement as well as decompression into the ventricles. There is approximately 6.3 mm of midline shift. CTA did not show any evidence of active bleeding, AVM or aneurysm or major vascular stenosis or occlusion. Pt became unresponsive in CT scan and had to be intubated and sedated. Patient was intubated in ED for worsening mental status and GCS. Started on Nicardipine drip for sbp in 200s and EVD was placed by neurosurgery at bedside. Pan trauma scan was negative. To be admitted under ICU for further monitoring.           4-Yesterday's Plan:  - Neuro checks q1hr  - EVD as per NeuroSx; consider raise to 15 -->progress to clamp trial  - Keep ICP < 20, CPP > 70  - Continue Keppra 500mg IV q12hrs  - Continue to wean sedation as tolerated for neurological prognostication  - Keep SBP < 150   - Ventilator management as per primary team  - HOB > 35 degrees  - Aspiration precautions  - SAT/SBT as tolerated  - Continue GI ppx  - Continue bowel regimen  - SCS while in bed  - Pharmacologic DVT prophylaxis       5. Last 24 hour updates: Patient intubated and sedated on propofol 50mcg, versed 0.1mcg , precedex 1mcg. Not responsive to commands. Temp 36.4 on artic sun. Neuro status unchanged. No acute events            6. Medications:   amLODIPine   Tablet 10 milliGRAM(s) Oral daily  chlorhexidine 0.12% Liquid 15 milliLiter(s) Oral Mucosa every 12 hours  chlorhexidine 4% Liquid 1 Application(s) Topical <User Schedule>  dexMEDEtomidine Infusion 0.05 MICROgram(s)/kG/Hr IV Continuous <Continuous>  hydrALAZINE 25 milliGRAM(s) Oral every 6 hours  levETIRAcetam  IVPB 500 milliGRAM(s) IV Intermittent every 12 hours  meropenem  IVPB 1000 milliGRAM(s) IV Intermittent every 8 hours  metoclopramide 5 milliGRAM(s) Oral two times a day  midazolam Infusion 0.02 mG/kG/Hr IV Continuous <Continuous>  niCARdipine Infusion 5 mG/Hr IV Continuous <Continuous>  pantoprazole  Injectable 40 milliGRAM(s) IV Push every 12 hours  polyethylene glycol 3350 17 Gram(s) Oral daily  propofol Infusion 20 MICROgram(s)/kG/Min IV Continuous <Continuous>  senna 2 Tablet(s) Oral at bedtime      7. Ancillary Management:   Chest PT[ ]   Head of bed >35 [x ]   Out of bed to chair [ ]   PT/OT/SP Eval []   Spirometry[ ]   DVT prophalaxis [X] Mechanical        8.Neuro:   Sedated, intubated, not following commands, not reactive to peripheral or noxious stimuli   sluggish pupils, disconjugate gaze, - corneal reflex  +gag reflex  not shivering, on arctic sun temp 36.4 C   planter responses mute bilaterally          mRS:  0 No symptoms at all  1 No significant disability despite symptoms; able to carry out all usual duties and activities without assistance  2 Slight disability; unable to carry out all previous activities, but able to look after own affairs  3 Moderate disability; requiring some help, but able to walk without assistance  4 Moderately severe disability; unable to walk without assistance and unable to attend to own bodily needs without assistance  5 Severe disability; bedridden, incontinent and requiring constant nursing care and attention  6 Dead      Last CTH: < from: CT Head No Cont (02.08.21 @ 10:27) >  IMPRESSION:  Slight interval decrease in acute right basal ganglia intraparenchymal hemorrhage with local edema and mass effect with midline shift to the left approximately 0.8 cm.    Unchanged minimal subarachnoid hemorrhage within the left temporoparietal region.    < end of copied text >          Last CTA/MRA:    < from: CT Angio Neck w/ IV Cont (01.27.21 @ 21:08) >  IMPRESSION    The previously noted area of intracerebral hemorrhage in the right basal ganglia region is again identified. Please see report of the CT scan of the head dated 1/27/2021.    No evidence of active bleeding, AVM or aneurysm.    No evidence of major vascular stenosis or occlusion,      < end of copied text >      EVD: [ x] [place]: [ ]     ICP: 6-16     CPP: 80-84    Level(cm): -10            9. Cardiovascular:   Vital Signs Last 24 Hrs  T(C): 36.7 (10 Feb 2021 02:00), Max: 37.6 (09 Feb 2021 19:00)  T(F): 98.1 (10 Feb 2021 02:00), Max: 99.7 (09 Feb 2021 19:00)  HR: 66 (09 Feb 2021 23:37) (62 - 86)  BP: 152/81  RR: 24 (09 Feb 2021 23:00) (24 - 34)  SpO2: 97% (09 Feb 2021 23:37) (93% - 97%)         Last EKG:   Diagnosis Line Normal sinus rhythm  T wave abnormality, consider inferior ischemia  Abnormal ECG      10. Respiratory:   ABG:ABG - ( 09 Feb 2021 03:44 )  pH, Arterial: 7.39  pH, Blood: x     /  pCO2: 38    /  pO2: 64    / HCO3: 23    / Base Excess: -1.8  /  SaO2: 92              Chest Xray:   Increased bilateral opacities most pronounced in the right upper lung. No pneumothorax.    Mode: AC/ CMV (Assist Control/ Continuous Mandatory Ventilation)  RR (machine): 24  TV (machine): 450  FiO2: 50  PEEP: 10  ITime: 1  MAP: 19  PIP: 31    11.GI:    Prophylaxis protonix 40mg q 24  LIVER FUNCTIONS - ( 09 Feb 2021 03:25 )  Alb: 2.5 g/dL / Pro: 4.8 g/dL / ALK PHOS: 96 U/L / ALT: 25 U/L / AST: 19 U/L / GGT: x                 12.Renal/Fluids/Electrolytes:    02-09    156<H>  |  124<H>  |  19  ----------------------------<  113<H>  4.4   |  20  |  1.0    Ca    8.5      09 Feb 2021 20:10  Phos  4.1     02-09  Mg     2.3     02-09    TPro  4.8<L>  /  Alb  2.5<L>  /  TBili  1.3<H>  /  DBili  x   /  AST  19  /  ALT  25  /  AlkPhos  96  02-09        I&O's Detail    08 Feb 2021 07:01  -  09 Feb 2021 07:00  --------------------------------------------------------  IN:    Dexmedetomidine: 900 mL    IV PiggyBack: 1000 mL    IV PiggyBack: 200 mL    IV PiggyBack: 200 mL    Midazolam: 120 mL    Propofol: 750 mL  Total IN: 3170 mL    OUT:    Drain (mL): 289 mL    Indwelling Catheter - Urethral (mL): 1420 mL  Total OUT: 1709 mL    Total NET: 1461 mL      09 Feb 2021 07:01  -  10 Feb 2021 02:12  --------------------------------------------------------  IN:    Dexmedetomidine: 550 mL    Enteral Tube Flush: 270 mL    IV PiggyBack: 100 mL    IV PiggyBack: 100 mL    Jevity: 340 mL    Midazolam: 175 mL    Propofol: 588 mL  Total IN: 2123 mL    OUT:    Drain (mL): 135 mL    Indwelling Catheter - Urethral (mL): 925 mL  Total OUT: 1060 mL    Total NET: 1063 mL          13.ID:   TMax:   Vital Signs Last 24 Hrs  T(C): 36.7 (10 Feb 2021 02:00), Max: 37.6 (09 Feb 2021 19:00)  T(F): 98.1 (10 Feb 2021 02:00), Max: 99.7 (09 Feb 2021 19:00)  HR: 66 (09 Feb 2021 23:37) (62 - 86)  BP: 152/81  RR: 24 (09 Feb 2021 23:00) (24 - 34)  SpO2: 97% (09 Feb 2021 23:37) (93% - 97%)           Lines: Central  [x] Date inserted: Peripheral[]            14. Hematology:                         13.5   16.27 )-----------( 353      ( 09 Feb 2021 03:25 )             41.7                 DVT Prophylaxis Lovenox[ ] Heparin[ ] Venodyne [ ] SCD's[x]

## 2021-02-10 NOTE — PROGRESS NOTE ADULT - ASSESSMENT
IMPRESSION:    Large ICB likely hypertensive/ SP EVD  Intubated, for airway protection  Klebsiella Pneumonia      PLAN:    CNS:  Assess MS.  Adequate sedation.  FU with Neuro and NeuroSX. archtic sun.  ICP monitoring as per neuro sx / neurology , CPP more than 70,    HEENT: Oral care    PULMONARY:  HOB @ 45 degrees.  Vent changes:  Wean O2 as tolerated.  Pulmonary toilet.  FU sensitivities     CARDIOVASCULAR:  Avoid volume overload.  BP control.     GI: GI prophylaxis. OG feeds.  Bowel regimen.  Reglan     RENAL:  Follow up lytes.  Correct as needed monitor is and os , repeat CMP 4 pm     INFECTIOUS DISEASE: Follow up cultures.   Continue [place].  Deescalate after sensitivities      HEMATOLOGICAL:  DVT prophylaxis seq.  Last duplex 2-8.  repeat Duplex on 2-11     ENDOCRINE:  Follow up FS.  Insulin protocol if needed.    MUSCULOSKELETAL:  Bed rest    Prognosis guarded     [facility]

## 2021-02-11 LAB
ALBUMIN SERPL ELPH-MCNC: 2.4 G/DL — LOW (ref 3.5–5.2)
ALBUMIN SERPL ELPH-MCNC: 2.5 G/DL — LOW (ref 3.5–5.2)
ALP SERPL-CCNC: 104 U/L — SIGNIFICANT CHANGE UP (ref 30–115)
ALP SERPL-CCNC: 107 U/L — SIGNIFICANT CHANGE UP (ref 30–115)
ALT FLD-CCNC: 35 U/L — SIGNIFICANT CHANGE UP (ref 0–41)
ALT FLD-CCNC: 39 U/L — SIGNIFICANT CHANGE UP (ref 0–41)
ANION GAP SERPL CALC-SCNC: 7 MMOL/L — SIGNIFICANT CHANGE UP (ref 7–14)
ANION GAP SERPL CALC-SCNC: 8 MMOL/L — SIGNIFICANT CHANGE UP (ref 7–14)
AST SERPL-CCNC: 29 U/L — SIGNIFICANT CHANGE UP (ref 0–41)
AST SERPL-CCNC: 39 U/L — SIGNIFICANT CHANGE UP (ref 0–41)
BILIRUB SERPL-MCNC: 0.7 MG/DL — SIGNIFICANT CHANGE UP (ref 0.2–1.2)
BILIRUB SERPL-MCNC: 0.8 MG/DL — SIGNIFICANT CHANGE UP (ref 0.2–1.2)
BUN SERPL-MCNC: 18 MG/DL — SIGNIFICANT CHANGE UP (ref 10–20)
BUN SERPL-MCNC: 19 MG/DL — SIGNIFICANT CHANGE UP (ref 10–20)
CALCIUM SERPL-MCNC: 7.6 MG/DL — LOW (ref 8.5–10.1)
CALCIUM SERPL-MCNC: 8.1 MG/DL — LOW (ref 8.5–10.1)
CHLORIDE SERPL-SCNC: 117 MMOL/L — HIGH (ref 98–110)
CHLORIDE SERPL-SCNC: 120 MMOL/L — HIGH (ref 98–110)
CO2 SERPL-SCNC: 25 MMOL/L — SIGNIFICANT CHANGE UP (ref 17–32)
CO2 SERPL-SCNC: 25 MMOL/L — SIGNIFICANT CHANGE UP (ref 17–32)
CREAT SERPL-MCNC: 0.9 MG/DL — SIGNIFICANT CHANGE UP (ref 0.7–1.5)
CREAT SERPL-MCNC: 1 MG/DL — SIGNIFICANT CHANGE UP (ref 0.7–1.5)
GLUCOSE SERPL-MCNC: 123 MG/DL — HIGH (ref 70–99)
GLUCOSE SERPL-MCNC: 140 MG/DL — HIGH (ref 70–99)
HCO3 BLDA-SCNC: 26 MMOL/L — SIGNIFICANT CHANGE UP (ref 23–27)
HCT VFR BLD CALC: 39.5 % — LOW (ref 42–52)
HGB BLD-MCNC: 12.7 G/DL — LOW (ref 14–18)
INR BLD: 1.17 RATIO — SIGNIFICANT CHANGE UP (ref 0.65–1.3)
MAGNESIUM SERPL-MCNC: 2.3 MG/DL — SIGNIFICANT CHANGE UP (ref 1.8–2.4)
MCHC RBC-ENTMCNC: 31.1 PG — HIGH (ref 27–31)
MCHC RBC-ENTMCNC: 32.2 G/DL — SIGNIFICANT CHANGE UP (ref 32–37)
MCV RBC AUTO: 96.8 FL — HIGH (ref 80–94)
NRBC # BLD: 0 /100 WBCS — SIGNIFICANT CHANGE UP (ref 0–0)
PCO2 BLDA: 45 MMHG — HIGH (ref 38–42)
PH BLDA: 7.37 — LOW (ref 7.38–7.42)
PHOSPHATE SERPL-MCNC: 4.4 MG/DL — SIGNIFICANT CHANGE UP (ref 2.1–4.9)
PLATELET # BLD AUTO: 339 K/UL — SIGNIFICANT CHANGE UP (ref 130–400)
PO2 BLDA: 85 MMHG — SIGNIFICANT CHANGE UP (ref 78–95)
POTASSIUM SERPL-MCNC: 4.1 MMOL/L — SIGNIFICANT CHANGE UP (ref 3.5–5)
POTASSIUM SERPL-MCNC: 4.3 MMOL/L — SIGNIFICANT CHANGE UP (ref 3.5–5)
POTASSIUM SERPL-SCNC: 4.1 MMOL/L — SIGNIFICANT CHANGE UP (ref 3.5–5)
POTASSIUM SERPL-SCNC: 4.3 MMOL/L — SIGNIFICANT CHANGE UP (ref 3.5–5)
PROT SERPL-MCNC: 4.9 G/DL — LOW (ref 6–8)
PROT SERPL-MCNC: 5.1 G/DL — LOW (ref 6–8)
PROTHROM AB SERPL-ACNC: 13.5 SEC — HIGH (ref 9.95–12.87)
RBC # BLD: 4.08 M/UL — LOW (ref 4.7–6.1)
RBC # FLD: 13.4 % — SIGNIFICANT CHANGE UP (ref 11.5–14.5)
SAO2 % BLDA: 96 % — SIGNIFICANT CHANGE UP (ref 94–98)
SODIUM SERPL-SCNC: 150 MMOL/L — HIGH (ref 135–146)
SODIUM SERPL-SCNC: 152 MMOL/L — HIGH (ref 135–146)
WBC # BLD: 11.5 K/UL — HIGH (ref 4.8–10.8)
WBC # FLD AUTO: 11.5 K/UL — HIGH (ref 4.8–10.8)

## 2021-02-11 PROCEDURE — 93970 EXTREMITY STUDY: CPT | Mod: 26

## 2021-02-11 PROCEDURE — 71045 X-RAY EXAM CHEST 1 VIEW: CPT | Mod: 26

## 2021-02-11 PROCEDURE — 70450 CT HEAD/BRAIN W/O DYE: CPT | Mod: 26

## 2021-02-11 RX ORDER — MIDAZOLAM HYDROCHLORIDE 1 MG/ML
0.02 INJECTION, SOLUTION INTRAMUSCULAR; INTRAVENOUS
Qty: 100 | Refills: 0 | Status: DISCONTINUED | OUTPATIENT
Start: 2021-02-11 | End: 2021-02-18

## 2021-02-11 RX ORDER — LISINOPRIL 2.5 MG/1
20 TABLET ORAL DAILY
Refills: 0 | Status: DISCONTINUED | OUTPATIENT
Start: 2021-02-11 | End: 2021-02-14

## 2021-02-11 RX ORDER — LABETALOL HCL 100 MG
100 TABLET ORAL EVERY 8 HOURS
Refills: 0 | Status: DISCONTINUED | OUTPATIENT
Start: 2021-02-11 | End: 2021-02-12

## 2021-02-11 RX ORDER — MINERAL OIL
133 OIL (ML) MISCELLANEOUS DAILY
Refills: 0 | Status: DISCONTINUED | OUTPATIENT
Start: 2021-02-11 | End: 2021-03-08

## 2021-02-11 RX ORDER — CEFEPIME 1 G/1
2000 INJECTION, POWDER, FOR SOLUTION INTRAMUSCULAR; INTRAVENOUS EVERY 12 HOURS
Refills: 0 | Status: DISCONTINUED | OUTPATIENT
Start: 2021-02-11 | End: 2021-02-23

## 2021-02-11 RX ADMIN — SENNA PLUS 2 TABLET(S): 8.6 TABLET ORAL at 21:47

## 2021-02-11 RX ADMIN — CHLORHEXIDINE GLUCONATE 15 MILLILITER(S): 213 SOLUTION TOPICAL at 07:12

## 2021-02-11 RX ADMIN — CEFEPIME 100 MILLIGRAM(S): 1 INJECTION, POWDER, FOR SOLUTION INTRAMUSCULAR; INTRAVENOUS at 17:47

## 2021-02-11 RX ADMIN — MEROPENEM 100 MILLIGRAM(S): 1 INJECTION INTRAVENOUS at 07:13

## 2021-02-11 RX ADMIN — CHLORHEXIDINE GLUCONATE 15 MILLILITER(S): 213 SOLUTION TOPICAL at 17:40

## 2021-02-11 RX ADMIN — Medication 100 MILLIGRAM(S): at 21:47

## 2021-02-11 RX ADMIN — LEVETIRACETAM 420 MILLIGRAM(S): 250 TABLET, FILM COATED ORAL at 17:39

## 2021-02-11 RX ADMIN — LACTULOSE 10 GRAM(S): 10 SOLUTION ORAL at 07:13

## 2021-02-11 RX ADMIN — Medication 25 MILLIGRAM(S): at 07:12

## 2021-02-11 RX ADMIN — LACTULOSE 10 GRAM(S): 10 SOLUTION ORAL at 21:47

## 2021-02-11 RX ADMIN — POLYETHYLENE GLYCOL 3350 17 GRAM(S): 17 POWDER, FOR SOLUTION ORAL at 13:16

## 2021-02-11 RX ADMIN — Medication 100 MILLIGRAM(S): at 13:15

## 2021-02-11 RX ADMIN — AMLODIPINE BESYLATE 10 MILLIGRAM(S): 2.5 TABLET ORAL at 07:12

## 2021-02-11 RX ADMIN — Medication 5 MILLIGRAM(S): at 07:13

## 2021-02-11 RX ADMIN — Medication 5 MILLIGRAM(S): at 17:39

## 2021-02-11 RX ADMIN — PANTOPRAZOLE SODIUM 40 MILLIGRAM(S): 20 TABLET, DELAYED RELEASE ORAL at 07:14

## 2021-02-11 RX ADMIN — PANTOPRAZOLE SODIUM 40 MILLIGRAM(S): 20 TABLET, DELAYED RELEASE ORAL at 17:40

## 2021-02-11 RX ADMIN — CHLORHEXIDINE GLUCONATE 1 APPLICATION(S): 213 SOLUTION TOPICAL at 07:12

## 2021-02-11 RX ADMIN — LACTULOSE 10 GRAM(S): 10 SOLUTION ORAL at 13:15

## 2021-02-11 RX ADMIN — LISINOPRIL 20 MILLIGRAM(S): 2.5 TABLET ORAL at 13:15

## 2021-02-11 RX ADMIN — Medication 0.1 MILLIGRAM(S): at 17:42

## 2021-02-11 RX ADMIN — LEVETIRACETAM 420 MILLIGRAM(S): 250 TABLET, FILM COATED ORAL at 07:13

## 2021-02-11 NOTE — PROGRESS NOTE ADULT - ASSESSMENT
IMPRESSION:    Large ICB likely hypertensive/ SP EVD  Intubated, for airway protection  Klebsiella Pneumonia pansensitive      PLAN:    CNS:  Assess MS.  Adequate sedation.  FU with Neuro and NeuroSX. archtic sun.  ICP monitoring as per neuro sx / neurology , CPP more than 70,  Repeat CTH .     HEENT: Oral care    PULMONARY:  HOB @ 45 degrees.  Vent changes:  Wean O2 as tolerated.  Pulmonary toilet.     CARDIOVASCULAR:  Avoid volume overload.  BP control.     GI: GI prophylaxis. OG feeds.  Bowel regimen.  Reglan     RENAL:  Follow up lytes BID.  Correct as needed monitor is and os     INFECTIOUS DISEASE: Follow up cultures.   Cefepime       HEMATOLOGICAL:  DVT prophylaxis seq.  Last duplex 2-8.  FU Repeat Duplex     ENDOCRINE:  Follow up FS.  Insulin protocol if needed.    MUSCULOSKELETAL:  Bed rest    Prognosis guarded     [facility]

## 2021-02-11 NOTE — PROGRESS NOTE ADULT - ASSESSMENT
· Assessment	  50 years old right handed Male with PMHx of HTN, GI bleed, PUD, was brought in by EMS for  AMS and left sided weakness.   Wife states that patient was having a usual day today went to bathroom and then the next thing she noted was that he was confused and was lying on the bathroom floor, she reports urinary incontinence, patient was confused, had trouble speaking, and could not move his left side.   CTH showed Moderate to large intraparenchymal right basal ganglia hemorrhage with mild surrounding edema and associated right sulcal effacement as well as decompression into the ventricles. There is approximately 6.3 mm of midline shift.   CTA did not show any evidence of active bleeding, AVM or aneurysm or major vascular stenosis or occlusion. Pt became unresponsive in CT scan and had to be intubated and sedated.  EVD was placed by neurosurgery 1/27   ID called for ABx management    IMPRESSION;  Multilobar bacterial PNA  Sputum Klebsiella  CSF : 2/1 no evidence of an infectious etiology  CSF cultures NG  BCx NG  Fevers related to extensive CNS bleed  CT Head 1/31  No significant interval change in the size/configuration of the right frontal lobe/basal ganglia parenchymal hematoma measuring up to 6.4 x 3.5 x 5.0 cm (trv, AP, CC) with rupture into the right lateral ventricle. The surrounding edema has mildly increased. The mass effect with 6 mm right to left midline shift is about stable.  Mild increase in the intraventricular hemorrhage. Slight increase in ventricular size.  No significant change in scattered subarachnoid hemorrhage.  Stable position of a left transfrontal EVD with distal tip terminating in the region of the suprasellar cistern.    Right frontal/basal ganglia parenchymal hematoma with rupture into lateral right ventral with stable mass effect with midline shift.  Increasing IVH. No change in SAH    The CNS bleed with central fevers  Susana FERRER NG    RECOMMENDATIONS;  Cefepime 2 gm iv q8h

## 2021-02-11 NOTE — PROGRESS NOTE ADULT - SUBJECTIVE AND OBJECTIVE BOX
MELODIE HERNADEZ  50y, Male    All available historical data reviewed    OVERNIGHT EVENTS:  no fevers  fio2 50%  no pressors  no diarrhea  non responsive    ROS:  unable to obtain history secondary to patient's mental status and/or sedation     VITALS:  T(F): 98.8, Max: 99.9 (02-11-21 @ 00:00)  HR: 81  BP: --  RR: 28Vital Signs Last 24 Hrs  T(C): 37.1 (11 Feb 2021 08:00), Max: 37.7 (11 Feb 2021 00:00)  T(F): 98.8 (11 Feb 2021 08:00), Max: 99.9 (11 Feb 2021 00:00)  HR: 81 (11 Feb 2021 10:00) (53 - 81)  BP: --  BP(mean): --  RR: 28 (11 Feb 2021 10:00) (24 - 28)  SpO2: 97% (11 Feb 2021 10:00) (94% - 99%)    TESTS & MEASUREMENTS:                        12.7   11.50 )-----------( 339      ( 11 Feb 2021 05:20 )             39.5     02-11    152<H>  |  120<H>  |  18  ----------------------------<  123<H>  4.1   |  25  |  0.9    Ca    7.6<L>      11 Feb 2021 05:20  Phos  4.4     02-11  Mg     2.3     02-11    TPro  5.1<L>  /  Alb  2.5<L>  /  TBili  0.8  /  DBili  x   /  AST  29  /  ALT  35  /  AlkPhos  107  02-11    LIVER FUNCTIONS - ( 11 Feb 2021 05:20 )  Alb: 2.5 g/dL / Pro: 5.1 g/dL / ALK PHOS: 107 U/L / ALT: 35 U/L / AST: 29 U/L / GGT: x             Culture - Sputum (collected 02-08-21 @ 12:28)  Source: .Sputum Deep Tracheal  Gram Stain (02-09-21 @ 07:26):    Few polymorphonuclear leukocytes per low power field    Few Squamous epithelial cells per low power field    Few Gram Negative Rods per oil power field  Final Report (02-10-21 @ 18:05):    Numerous Klebsiella pneumoniae    Normal Respiratory Vanessa absent  Organism: Klebsiella pneumoniae (02-10-21 @ 18:05)  Organism: Klebsiella pneumoniae (02-10-21 @ 18:05)      -  Amikacin: S <=16      -  Amoxicillin/Clavulanic Acid: S <=8/4      -  Ampicillin: R >16 These ampicillin results predict results for amoxicillin      -  Ampicillin/Sulbactam: S <=4/2 Enterobacter, Citrobacter, and Serratia may develop resistance during prolonged therapy (3-4 days)      -  Aztreonam: S <=4      -  Cefazolin: S <=2 Enterobacter, Citrobacter, and Serratia may develop resistance during prolonged therapy (3-4 days)      -  Cefepime: S <=2      -  Cefoxitin: S <=8      -  Ceftriaxone: S <=1 Enterobacter, Citrobacter, and Serratia may develop resistance during prolonged therapy      -  Ciprofloxacin: S <=0.25      -  Ertapenem: S <=0.5      -  Gentamicin: S <=2      -  Imipenem: S <=1      -  Levofloxacin: S <=0.5      -  Meropenem: S <=1      -  Piperacillin/Tazobactam: S <=8      -  Tobramycin: S <=2      -  Trimethoprim/Sulfamethoxazole: S <=0.5/9.5      Method Type: IRENA    Culture - CSF with Gram Stain (collected 02-07-21 @ 13:51)  Source: .CSF CSF  Gram Stain (02-07-21 @ 21:48):    polymorphonuclear leukocytes seen    No organisms seen    by cytocentrifuge  Final Report (02-10-21 @ 16:29):    No growth            RADIOLOGY & ADDITIONAL TESTS:  Personal review of radiological diagnostics performed  Echo and EKG results noted when applicable.     MEDICATIONS:  acetaminophen   Tablet .. 650 milliGRAM(s) Oral every 6 hours PRN  cefepime   IVPB 2000 milliGRAM(s) IV Intermittent every 12 hours  chlorhexidine 0.12% Liquid 15 milliLiter(s) Oral Mucosa every 12 hours  chlorhexidine 4% Liquid 1 Application(s) Topical <User Schedule>  dexMEDEtomidine Infusion 0.05 MICROgram(s)/kG/Hr IV Continuous <Continuous>  labetalol 100 milliGRAM(s) Oral every 8 hours  lactulose Syrup 10 Gram(s) Oral every 8 hours  levETIRAcetam  IVPB 500 milliGRAM(s) IV Intermittent every 12 hours  lisinopril 20 milliGRAM(s) Oral daily  metoclopramide 5 milliGRAM(s) Oral two times a day  midazolam Infusion 0.02 mG/kG/Hr IV Continuous <Continuous>  mineral oil enema 133 milliLiter(s) Rectal daily  niCARdipine Infusion 5 mG/Hr IV Continuous <Continuous>  pantoprazole  Injectable 40 milliGRAM(s) IV Push every 12 hours  polyethylene glycol 3350 17 Gram(s) Oral daily  propofol Infusion 20 MICROgram(s)/kG/Min IV Continuous <Continuous>  senna 2 Tablet(s) Oral at bedtime      ANTIBIOTICS:  cefepime   IVPB 2000 milliGRAM(s) IV Intermittent every 12 hours

## 2021-02-11 NOTE — PROGRESS NOTE ADULT - SUBJECTIVE AND OBJECTIVE BOX
Patient is a 50y old  Male who presents with a chief complaint of Altered mental status (10 Feb 2021 09:56)        Over Night Events:  Remains critically ill on MV.  Sedated.  On Cardene.          ROS:     All pertinent ROS are negative except HPI         PHYSICAL EXAM    ICU Vital Signs Last 24 Hrs  T(C): 37 (11 Feb 2021 04:00), Max: 37.7 (11 Feb 2021 00:00)  T(F): 98.6 (11 Feb 2021 04:00), Max: 99.9 (11 Feb 2021 00:00)  HR: 58 (11 Feb 2021 07:00) (55 - 99)  BP: --  BP(mean): --  ABP: 156/79 (11 Feb 2021 07:00) (135/75 - 186/83)  ABP(mean): 108 (11 Feb 2021 07:00) (92 - 125)  RR: 25 (11 Feb 2021 07:00) (24 - 33)  SpO2: 96% (11 Feb 2021 07:00) (94% - 99%)      CONSTITUTIONAL:   Ill appearing.  Well nourished.  NAD    ENT:   Airway patent,   Mouth with normal mucosa.   No thrush    EYES:   Pupils equal,   Round and reactive to light.    CARDIAC:   Normal rate,   Regular rhythm.     edema      Vascular:  Normal systolic impulse  No Carotid bruits    RESPIRATORY:   No wheezing  Bilateral BS  Normal chest expansion  Not tachypneic,  No use of accessory muscles    GASTROINTESTINAL:  Abdomen soft,   Non-tender,   No guarding,   + BS    GENITOURINARY  normal genitalia for sex  no edema    MUSCULOSKELETAL:   Range of motion is not limited,  No clubbing, cyanosis    NEUROLOGICAL:   Sedated  Agitated off sedation     SKIN:   Skin normal color for race,   Warm and dry  No evidence of rash.    PSYCHIATRIC:   Sedated    No apparent risk to self or others.    HEMATOLOGICAL:  No cervical  lymphadenopathy.  no inguinal lymphadenopathy      02-10-21 @ 07:01  -  02-11-21 @ 07:00  --------------------------------------------------------  IN:    Dexmedetomidine: 887.5 mL    IV PiggyBack: 100 mL    IV PiggyBack: 100 mL    Jevity: 240 mL    Midazolam: 190 mL    Midazolam: 40 mL    NiCARdipine: 225 mL    Propofol: 867 mL  Total IN: 2649.5 mL    OUT:    Drain (mL): 23 mL    Indwelling Catheter - Urethral (mL): 980 mL  Total OUT: 1003 mL    Total NET: 1646.5 mL          LABS:                            12.7   11.50 )-----------( 339      ( 11 Feb 2021 05:20 )             39.5                                               02-11    152<H>  |  120<H>  |  18  ----------------------------<  123<H>  4.1   |  25  |  0.9    Ca    7.6<L>      11 Feb 2021 05:20  Phos  4.4     02-11  Mg     2.3     02-11    TPro  5.1<L>  /  Alb  2.5<L>  /  TBili  0.8  /  DBili  x   /  AST  29  /  ALT  35  /  AlkPhos  107  02-11      PT/INR - ( 11 Feb 2021 05:20 )   PT: 13.50 sec;   INR: 1.17 ratio                                                                                              LIVER FUNCTIONS - ( 11 Feb 2021 05:20 )  Alb: 2.5 g/dL / Pro: 5.1 g/dL / ALK PHOS: 107 U/L / ALT: 35 U/L / AST: 29 U/L / GGT: x                                                  Culture - Sputum (collected 08 Feb 2021 12:28)  Source: .Sputum Deep Tracheal  Gram Stain (09 Feb 2021 07:26):    Few polymorphonuclear leukocytes per low power field    Few Squamous epithelial cells per low power field    Few Gram Negative Rods per oil power field  Final Report (10 Feb 2021 18:05):    Numerous Klebsiella pneumoniae    Normal Respiratory Vanessa absent  Organism: Klebsiella pneumoniae (10 Feb 2021 18:05)  Organism: Klebsiella pneumoniae (10 Feb 2021 18:05)                                                   Mode: AC/ CMV (Assist Control/ Continuous Mandatory Ventilation)  RR (machine): 24  TV (machine): 450  FiO2: 50  PEEP: 10  ITime: 1  MAP: 20  PIP: 37                                      ABG - ( 11 Feb 2021 03:29 )  pH, Arterial: 7.37  pH, Blood: x     /  pCO2: 45    /  pO2: 85    / HCO3: 26    / Base Excess: x     /  SaO2: 96                  MEDICATIONS  (STANDING):  amLODIPine   Tablet 10 milliGRAM(s) Oral daily  chlorhexidine 0.12% Liquid 15 milliLiter(s) Oral Mucosa every 12 hours  chlorhexidine 4% Liquid 1 Application(s) Topical <User Schedule>  dexMEDEtomidine Infusion 0.05 MICROgram(s)/kG/Hr (1.25 mL/Hr) IV Continuous <Continuous>  hydrALAZINE 25 milliGRAM(s) Oral every 6 hours  lactulose Syrup 10 Gram(s) Oral every 8 hours  levETIRAcetam  IVPB 500 milliGRAM(s) IV Intermittent every 12 hours  meropenem  IVPB 1000 milliGRAM(s) IV Intermittent every 8 hours  metoclopramide 5 milliGRAM(s) Oral two times a day  midazolam Infusion 0.02 mG/kG/Hr (2 mL/Hr) IV Continuous <Continuous>  niCARdipine Infusion 5 mG/Hr (25 mL/Hr) IV Continuous <Continuous>  pantoprazole  Injectable 40 milliGRAM(s) IV Push every 12 hours  polyethylene glycol 3350 17 Gram(s) Oral daily  propofol Infusion 20 MICROgram(s)/kG/Min (12 mL/Hr) IV Continuous <Continuous>  senna 2 Tablet(s) Oral at bedtime    MEDICATIONS  (PRN):  acetaminophen   Tablet .. 650 milliGRAM(s) Oral every 6 hours PRN Temp greater or equal to 38C (100.4F)      New X-rays reviewed:                                                                                  ECHO    CXR interpreted by me:  ET OG OK>  Bilateral infiltrates

## 2021-02-11 NOTE — PROGRESS NOTE ADULT - SUBJECTIVE AND OBJECTIVE BOX
HISTORY OF PRESENT ILLNESS: 50y Male S/p EVD Placement for R basal ganglia hemorrhage.   EVD in place set at 15   - Pt seen and examined at bedside in Vent Unit    PAST MEDICAL & SURGICAL HISTORY:  HTN (hypertension)    GI bleed    Gastric ulcer    PUD (peptic ulcer disease)    Arm fracture, left  s/p surgical repair    FAMILY HISTORY:  Family history of breast cancer (Mother)    Family history of pancreatic cancer (Father)    Allergies :   No Known Allergies    REVIEW OF SYSTEMS : All systems negative other than those listed in HPI  General:	-  Skin/Breast: -  Ophthalmologic: -   ENMT: -  Respiratory and Thorax: -  Cardiovascular: -	  Gastrointestinal: -  Genitourinary:-  Musculoskeletal:	-  Neurological:	-  Psychiatric:	-  Hematology/Lymphatics:	-  Endocrine:-  Allergic/Immunologic:	-    MEDICATIONS:  clarithromycin 500 mg oral tablet: 1 tab(s) orally 2 times a day  metroNIDAZOLE 500 mg oral tablet: 1 tab(s) orally 2 times a day  pantoprazole 40 mg oral delayed release tablet: 1 tab(s) orally 2 times a day    Antibiotics:  cefepime   IVPB 2000 milliGRAM(s) IV Intermittent every 12 hours    OTHER:  chlorhexidine 0.12% Liquid 15 milliLiter(s) Oral Mucosa every 12 hours  chlorhexidine 4% Liquid 1 Application(s) Topical <User Schedule>  labetalol 100 milliGRAM(s) Oral every 8 hours  lactulose Syrup 10 Gram(s) Oral every 8 hours  lisinopril 20 milliGRAM(s) Oral daily  metoclopramide 5 milliGRAM(s) Oral two times a day  mineral oil enema 133 milliLiter(s) Rectal daily  niCARdipine Infusion 5 mG/Hr IV Continuous <Continuous>  pantoprazole  Injectable 40 milliGRAM(s) IV Push every 12 hours  polyethylene glycol 3350 17 Gram(s) Oral daily  senna 2 Tablet(s) Oral at bedtime    Vital Signs Last 24 Hrs  T(C): 37.1 (11 Feb 2021 08:00), Max: 37.7 (11 Feb 2021 00:00)  T(F): 98.8 (11 Feb 2021 08:00), Max: 99.9 (11 Feb 2021 00:00)  HR: 81 (11 Feb 2021 10:00) (53 - 81)  BP: --  BP(mean): --  RR: 28 (11 Feb 2021 10:00) (24 - 28)  SpO2: 97% (11 Feb 2021 10:00) (94% - 99%)    Physical Exam :  General : Pt remains intubated / sedated   Eyes closed   Pupils reactive, equal bilaterally  Corneal Reflexes intact   Gag reflex intact  No response to painful stimuli     EVD : open to drain at 15, no CSF in bag   - ICP's at 4-10   - Waveform dampened     LABS:                        12.7   11.50 )-----------( 339      ( 11 Feb 2021 05:20 )             39.5     02-11    152<H>  |  120<H>  |  18  ----------------------------<  123<H>  4.1   |  25  |  0.9    Ca    7.6<L>      11 Feb 2021 05:20  Phos  4.4     02-11  Mg     2.3     02-11    TPro  5.1<L>  /  Alb  2.5<L>  /  TBili  0.8  /  DBili  x   /  AST  29  /  ALT  35  /  AlkPhos  107  02-11    PT/INR - ( 11 Feb 2021 05:20 )   PT: 13.50 sec;   INR: 1.17 ratio      CULTURES:  Culture Results:   Numerous Klebsiella pneumoniae  Normal Respiratory Vanessa absent (02-08 @ 12:28)  Culture Results:   No growth (02-07 @ 13:51)    Assessment / Plan:   - Rpt CTH to be done today, ordered  - Sputum cultures + for Klebsiella PNA   - F/u with SW about [facility]   - Will discuss with attending      HISTORY OF PRESENT ILLNESS: 50y Male S/p EVD Placement for R basal ganglia hemorrhage.   EVD in place set at 15   - Pt seen and examined at bedside in Vent Unit    PAST MEDICAL & SURGICAL HISTORY:  HTN (hypertension)    GI bleed    Gastric ulcer    PUD (peptic ulcer disease)    Arm fracture, left  s/p surgical repair    FAMILY HISTORY:  Family history of breast cancer (Mother)    Family history of pancreatic cancer (Father)    Allergies :   No Known Allergies    REVIEW OF SYSTEMS : All systems negative other than those listed in HPI  General:	-  Skin/Breast: -  Ophthalmologic: -   ENMT: -  Respiratory and Thorax: -  Cardiovascular: -	  Gastrointestinal: -  Genitourinary:-  Musculoskeletal:	-  Neurological:	-  Psychiatric:	-  Hematology/Lymphatics:	-  Endocrine:-  Allergic/Immunologic:	-    MEDICATIONS:  clarithromycin 500 mg oral tablet: 1 tab(s) orally 2 times a day  metroNIDAZOLE 500 mg oral tablet: 1 tab(s) orally 2 times a day  pantoprazole 40 mg oral delayed release tablet: 1 tab(s) orally 2 times a day    Antibiotics:  cefepime   IVPB 2000 milliGRAM(s) IV Intermittent every 12 hours    OTHER:  chlorhexidine 0.12% Liquid 15 milliLiter(s) Oral Mucosa every 12 hours  chlorhexidine 4% Liquid 1 Application(s) Topical <User Schedule>  labetalol 100 milliGRAM(s) Oral every 8 hours  lactulose Syrup 10 Gram(s) Oral every 8 hours  lisinopril 20 milliGRAM(s) Oral daily  metoclopramide 5 milliGRAM(s) Oral two times a day  mineral oil enema 133 milliLiter(s) Rectal daily  niCARdipine Infusion 5 mG/Hr IV Continuous <Continuous>  pantoprazole  Injectable 40 milliGRAM(s) IV Push every 12 hours  polyethylene glycol 3350 17 Gram(s) Oral daily  senna 2 Tablet(s) Oral at bedtime    Vital Signs Last 24 Hrs  T(C): 37.1 (11 Feb 2021 08:00), Max: 37.7 (11 Feb 2021 00:00)  T(F): 98.8 (11 Feb 2021 08:00), Max: 99.9 (11 Feb 2021 00:00)  HR: 81 (11 Feb 2021 10:00) (53 - 81)  BP: --  BP(mean): --  RR: 28 (11 Feb 2021 10:00) (24 - 28)  SpO2: 97% (11 Feb 2021 10:00) (94% - 99%)    Physical Exam :  General : Pt remains intubated / sedated   Eyes closed   Pupils reactive, equal bilaterally  Corneal Reflexes intact   Gag reflex intact  No response to painful stimuli     EVD : open to drain at 15, no CSF in bag   - ICP's at 4-10   - Waveform dampened     LABS:                        12.7   11.50 )-----------( 339      ( 11 Feb 2021 05:20 )             39.5     02-11    152<H>  |  120<H>  |  18  ----------------------------<  123<H>  4.1   |  25  |  0.9    Ca    7.6<L>      11 Feb 2021 05:20  Phos  4.4     02-11  Mg     2.3     02-11    TPro  5.1<L>  /  Alb  2.5<L>  /  TBili  0.8  /  DBili  x   /  AST  29  /  ALT  35  /  AlkPhos  107  02-11    PT/INR - ( 11 Feb 2021 05:20 )   PT: 13.50 sec;   INR: 1.17 ratio      CULTURES:  Culture Results:   Numerous Klebsiella pneumoniae  Normal Respiratory Vanessa absent (02-08 @ 12:28)  Culture Results:   No growth (02-07 @ 13:51)    Assessment / Plan:   - Rpt CTH to be done today, ordered  - Sputum cultures + for Klebsiella PNA   - F/u with SW about [facility], medicine team to decide if pt safe for transport, currently on 3 drips for BP control   - PT/ OT  - Discussed with attending        HISTORY OF PRESENT ILLNESS: 50y Male S/p EVD Placement for R basal ganglia hemorrhage.   EVD in place set at 15   - Pt seen and examined at bedside in Vent Unit    PAST MEDICAL & SURGICAL HISTORY:  HTN (hypertension)    GI bleed    Gastric ulcer    PUD (peptic ulcer disease)    Arm fracture, left  s/p surgical repair    FAMILY HISTORY:  Family history of breast cancer (Mother)    Family history of pancreatic cancer (Father)    Allergies :   No Known Allergies    REVIEW OF SYSTEMS : All systems negative other than those listed in HPI  General:	-  Skin/Breast: -  Ophthalmologic: -   ENMT: -  Respiratory and Thorax: -  Cardiovascular: -	  Gastrointestinal: -  Genitourinary:-  Musculoskeletal:	-  Neurological:	-  Psychiatric:	-  Hematology/Lymphatics:	-  Endocrine:-  Allergic/Immunologic:	-    MEDICATIONS:  clarithromycin 500 mg oral tablet: 1 tab(s) orally 2 times a day  metroNIDAZOLE 500 mg oral tablet: 1 tab(s) orally 2 times a day  pantoprazole 40 mg oral delayed release tablet: 1 tab(s) orally 2 times a day    Antibiotics:  cefepime   IVPB 2000 milliGRAM(s) IV Intermittent every 12 hours    OTHER:  chlorhexidine 0.12% Liquid 15 milliLiter(s) Oral Mucosa every 12 hours  chlorhexidine 4% Liquid 1 Application(s) Topical <User Schedule>  labetalol 100 milliGRAM(s) Oral every 8 hours  lactulose Syrup 10 Gram(s) Oral every 8 hours  lisinopril 20 milliGRAM(s) Oral daily  metoclopramide 5 milliGRAM(s) Oral two times a day  mineral oil enema 133 milliLiter(s) Rectal daily  niCARdipine Infusion 5 mG/Hr IV Continuous <Continuous>  pantoprazole  Injectable 40 milliGRAM(s) IV Push every 12 hours  polyethylene glycol 3350 17 Gram(s) Oral daily  senna 2 Tablet(s) Oral at bedtime    Vital Signs Last 24 Hrs  T(C): 37.1 (11 Feb 2021 08:00), Max: 37.7 (11 Feb 2021 00:00)  T(F): 98.8 (11 Feb 2021 08:00), Max: 99.9 (11 Feb 2021 00:00)  HR: 81 (11 Feb 2021 10:00) (53 - 81)  BP: --  BP(mean): --  RR: 28 (11 Feb 2021 10:00) (24 - 28)  SpO2: 97% (11 Feb 2021 10:00) (94% - 99%)    Physical Exam :  General : Pt remains intubated / sedated   Eyes closed   Pupils reactive, equal bilaterally  Corneal Reflexes intact   Gag reflex intact  No response to painful stimuli     EVD : open to drain at 15, no CSF in bag   - ICP's at 4-10   - Waveform dampened     LABS:                        12.7   11.50 )-----------( 339      ( 11 Feb 2021 05:20 )             39.5     02-11    152<H>  |  120<H>  |  18  ----------------------------<  123<H>  4.1   |  25  |  0.9    Ca    7.6<L>      11 Feb 2021 05:20  Phos  4.4     02-11  Mg     2.3     02-11    TPro  5.1<L>  /  Alb  2.5<L>  /  TBili  0.8  /  DBili  x   /  AST  29  /  ALT  35  /  AlkPhos  107  02-11    PT/INR - ( 11 Feb 2021 05:20 )   PT: 13.50 sec;   INR: 1.17 ratio      CULTURES:  Culture Results:   Numerous Klebsiella pneumoniae  Normal Respiratory Vanessa absent (02-08 @ 12:28)  Culture Results:   No growth (02-07 @ 13:51)    Assessment / Plan:   - Rpt CTH to be done today, ordered  - Sputum cultures + for Klebsiella PNA   - Acute DVT in L posterior tibial and L peroneal   - F/u with SW about [facility], medicine team to decide if pt safe for transport, currently on 3 drips for BP control   - PT/ OT  - Discussed with attending

## 2021-02-11 NOTE — CHART NOTE - NSCHARTNOTEFT_GEN_A_CORE
Was asked to call patient's spouse Em Romero and give update around 10:30 pm. Informed her about interval changes.

## 2021-02-12 LAB
ALBUMIN SERPL ELPH-MCNC: 2.6 G/DL — LOW (ref 3.5–5.2)
ALP SERPL-CCNC: 106 U/L — SIGNIFICANT CHANGE UP (ref 30–115)
ALT FLD-CCNC: 45 U/L — HIGH (ref 0–41)
ANION GAP SERPL CALC-SCNC: 8 MMOL/L — SIGNIFICANT CHANGE UP (ref 7–14)
ANION GAP SERPL CALC-SCNC: 9 MMOL/L — SIGNIFICANT CHANGE UP (ref 7–14)
AST SERPL-CCNC: 39 U/L — SIGNIFICANT CHANGE UP (ref 0–41)
BILIRUB SERPL-MCNC: 0.8 MG/DL — SIGNIFICANT CHANGE UP (ref 0.2–1.2)
BUN SERPL-MCNC: 18 MG/DL — SIGNIFICANT CHANGE UP (ref 10–20)
BUN SERPL-MCNC: 20 MG/DL — SIGNIFICANT CHANGE UP (ref 10–20)
CALCIUM SERPL-MCNC: 8.2 MG/DL — LOW (ref 8.5–10.1)
CALCIUM SERPL-MCNC: 8.6 MG/DL — SIGNIFICANT CHANGE UP (ref 8.5–10.1)
CHLORIDE SERPL-SCNC: 116 MMOL/L — HIGH (ref 98–110)
CHLORIDE SERPL-SCNC: 116 MMOL/L — HIGH (ref 98–110)
CO2 SERPL-SCNC: 24 MMOL/L — SIGNIFICANT CHANGE UP (ref 17–32)
CO2 SERPL-SCNC: 26 MMOL/L — SIGNIFICANT CHANGE UP (ref 17–32)
CREAT SERPL-MCNC: 0.9 MG/DL — SIGNIFICANT CHANGE UP (ref 0.7–1.5)
CREAT SERPL-MCNC: 0.9 MG/DL — SIGNIFICANT CHANGE UP (ref 0.7–1.5)
GLUCOSE SERPL-MCNC: 131 MG/DL — HIGH (ref 70–99)
GLUCOSE SERPL-MCNC: 179 MG/DL — HIGH (ref 70–99)
HCT VFR BLD CALC: 38.1 % — LOW (ref 42–52)
HGB BLD-MCNC: 11.8 G/DL — LOW (ref 14–18)
INR BLD: 1.17 RATIO — SIGNIFICANT CHANGE UP (ref 0.65–1.3)
MAGNESIUM SERPL-MCNC: 2.4 MG/DL — SIGNIFICANT CHANGE UP (ref 1.8–2.4)
MCHC RBC-ENTMCNC: 30.5 PG — SIGNIFICANT CHANGE UP (ref 27–31)
MCHC RBC-ENTMCNC: 31 G/DL — LOW (ref 32–37)
MCV RBC AUTO: 98.4 FL — HIGH (ref 80–94)
NRBC # BLD: 0 /100 WBCS — SIGNIFICANT CHANGE UP (ref 0–0)
PHOSPHATE SERPL-MCNC: 4.1 MG/DL — SIGNIFICANT CHANGE UP (ref 2.1–4.9)
PLATELET # BLD AUTO: 325 K/UL — SIGNIFICANT CHANGE UP (ref 130–400)
POTASSIUM SERPL-MCNC: 4.2 MMOL/L — SIGNIFICANT CHANGE UP (ref 3.5–5)
POTASSIUM SERPL-MCNC: 4.3 MMOL/L — SIGNIFICANT CHANGE UP (ref 3.5–5)
POTASSIUM SERPL-SCNC: 4.2 MMOL/L — SIGNIFICANT CHANGE UP (ref 3.5–5)
POTASSIUM SERPL-SCNC: 4.3 MMOL/L — SIGNIFICANT CHANGE UP (ref 3.5–5)
PROT SERPL-MCNC: 5.1 G/DL — LOW (ref 6–8)
PROTHROM AB SERPL-ACNC: 13.4 SEC — HIGH (ref 9.95–12.87)
RBC # BLD: 3.87 M/UL — LOW (ref 4.7–6.1)
RBC # FLD: 13.3 % — SIGNIFICANT CHANGE UP (ref 11.5–14.5)
SODIUM SERPL-SCNC: 149 MMOL/L — HIGH (ref 135–146)
SODIUM SERPL-SCNC: 150 MMOL/L — HIGH (ref 135–146)
WBC # BLD: 11.26 K/UL — HIGH (ref 4.8–10.8)
WBC # FLD AUTO: 11.26 K/UL — HIGH (ref 4.8–10.8)

## 2021-02-12 PROCEDURE — 71045 X-RAY EXAM CHEST 1 VIEW: CPT | Mod: 26,77

## 2021-02-12 PROCEDURE — 71045 X-RAY EXAM CHEST 1 VIEW: CPT | Mod: 26

## 2021-02-12 RX ORDER — LABETALOL HCL 100 MG
200 TABLET ORAL EVERY 8 HOURS
Refills: 0 | Status: DISCONTINUED | OUTPATIENT
Start: 2021-02-12 | End: 2021-02-23

## 2021-02-12 RX ORDER — HEPARIN SODIUM 5000 [USP'U]/ML
5000 INJECTION INTRAVENOUS; SUBCUTANEOUS EVERY 8 HOURS
Refills: 0 | Status: DISCONTINUED | OUTPATIENT
Start: 2021-02-12 | End: 2021-03-08

## 2021-02-12 RX ADMIN — LEVETIRACETAM 420 MILLIGRAM(S): 250 TABLET, FILM COATED ORAL at 18:35

## 2021-02-12 RX ADMIN — Medication 200 MILLIGRAM(S): at 21:42

## 2021-02-12 RX ADMIN — CHLORHEXIDINE GLUCONATE 15 MILLILITER(S): 213 SOLUTION TOPICAL at 06:02

## 2021-02-12 RX ADMIN — POLYETHYLENE GLYCOL 3350 17 GRAM(S): 17 POWDER, FOR SOLUTION ORAL at 11:03

## 2021-02-12 RX ADMIN — HEPARIN SODIUM 5000 UNIT(S): 5000 INJECTION INTRAVENOUS; SUBCUTANEOUS at 21:42

## 2021-02-12 RX ADMIN — LACTULOSE 10 GRAM(S): 10 SOLUTION ORAL at 14:12

## 2021-02-12 RX ADMIN — CHLORHEXIDINE GLUCONATE 15 MILLILITER(S): 213 SOLUTION TOPICAL at 18:36

## 2021-02-12 RX ADMIN — PROPOFOL 12 MICROGRAM(S)/KG/MIN: 10 INJECTION, EMULSION INTRAVENOUS at 19:07

## 2021-02-12 RX ADMIN — CEFEPIME 100 MILLIGRAM(S): 1 INJECTION, POWDER, FOR SOLUTION INTRAMUSCULAR; INTRAVENOUS at 06:29

## 2021-02-12 RX ADMIN — Medication 133 MILLILITER(S): at 11:04

## 2021-02-12 RX ADMIN — Medication 0.1 MILLIGRAM(S): at 06:04

## 2021-02-12 RX ADMIN — NICARDIPINE HYDROCHLORIDE 25 MG/HR: 30 CAPSULE, EXTENDED RELEASE ORAL at 19:07

## 2021-02-12 RX ADMIN — Medication 133 MILLILITER(S): at 04:00

## 2021-02-12 RX ADMIN — LACTULOSE 10 GRAM(S): 10 SOLUTION ORAL at 06:38

## 2021-02-12 RX ADMIN — NICARDIPINE HYDROCHLORIDE 25 MG/HR: 30 CAPSULE, EXTENDED RELEASE ORAL at 11:03

## 2021-02-12 RX ADMIN — CEFEPIME 100 MILLIGRAM(S): 1 INJECTION, POWDER, FOR SOLUTION INTRAMUSCULAR; INTRAVENOUS at 18:34

## 2021-02-12 RX ADMIN — Medication 5 MILLIGRAM(S): at 06:17

## 2021-02-12 RX ADMIN — CHLORHEXIDINE GLUCONATE 1 APPLICATION(S): 213 SOLUTION TOPICAL at 06:05

## 2021-02-12 RX ADMIN — PANTOPRAZOLE SODIUM 40 MILLIGRAM(S): 20 TABLET, DELAYED RELEASE ORAL at 06:04

## 2021-02-12 RX ADMIN — DEXMEDETOMIDINE HYDROCHLORIDE IN 0.9% SODIUM CHLORIDE 1.25 MICROGRAM(S)/KG/HR: 4 INJECTION INTRAVENOUS at 08:00

## 2021-02-12 RX ADMIN — Medication 0.2 MILLIGRAM(S): at 18:35

## 2021-02-12 RX ADMIN — PROPOFOL 12 MICROGRAM(S)/KG/MIN: 10 INJECTION, EMULSION INTRAVENOUS at 08:00

## 2021-02-12 RX ADMIN — SENNA PLUS 2 TABLET(S): 8.6 TABLET ORAL at 21:42

## 2021-02-12 RX ADMIN — MIDAZOLAM HYDROCHLORIDE 2 MG/KG/HR: 1 INJECTION, SOLUTION INTRAMUSCULAR; INTRAVENOUS at 10:45

## 2021-02-12 RX ADMIN — PANTOPRAZOLE SODIUM 40 MILLIGRAM(S): 20 TABLET, DELAYED RELEASE ORAL at 18:35

## 2021-02-12 RX ADMIN — Medication 100 MILLIGRAM(S): at 07:55

## 2021-02-12 RX ADMIN — HEPARIN SODIUM 5000 UNIT(S): 5000 INJECTION INTRAVENOUS; SUBCUTANEOUS at 14:12

## 2021-02-12 RX ADMIN — Medication 5 MILLIGRAM(S): at 18:35

## 2021-02-12 RX ADMIN — Medication 200 MILLIGRAM(S): at 14:12

## 2021-02-12 RX ADMIN — LISINOPRIL 20 MILLIGRAM(S): 2.5 TABLET ORAL at 06:30

## 2021-02-12 RX ADMIN — LEVETIRACETAM 420 MILLIGRAM(S): 250 TABLET, FILM COATED ORAL at 06:17

## 2021-02-12 NOTE — CHART NOTE - NSCHARTNOTEFT_GEN_A_CORE
Registered Dietitian Follow-Up     Patient Profile Reviewed                           Yes [x]   No []     Nutrition History Previously Obtained        Yes []  No [x]--unable to obtain as pt remains intubated        Pertinent Medical Interventions: Per Neurosx, recommend MR brain +/- when possible and urine catecholamine. [facility].     Diet order: Jevity 1.2 @ 240ml Q6H (1786kcal, 53gm pro, 775ml free water)--per RN, pt tolerating current TF regimen per RN w/o any issues. MAP 95.     Anthropometrics:  - Ht. 73"  - Wt.: 221#/100kg (1/27)-no new wts   - %wt change  - BMI: 29.1   - IBW: 184#     Pertinent Lab Data: (2/12): H/H 11.8/38.1, Na 150, Gluc 131     Pertinent Meds: heparin, abx, lactulose, labetalol, hydralazine, reglan, amlodipine, precedex, versed, nicardipine, propofol @ 24ml/hr (provides additional 634kcal), keppra, protonix, miralax, senna      Physical Findings:  - Appearance: intubated/ventilated; 2+ generalized edema noted  - GI function: LBM 2/12   - Tubes: OGT   - Oral/Mouth cavity: NPO   - Skin: ecchymosis      Nutrition Requirements  Weight Used: CBW: 221#/100kg, Ve: 11.8, Tmax: 36.9; kcal needs slightly readjusted today      Estimated Energy Needs: 3669-9865 kcal/day (% CU1257p: 2153 d/t borderline obese BMI).  Estimated Protein Needs: 100-120 g/day (1-1.2 g/kg ABW)  Estimated Fluid Needs: per Vent team      Nutrient Intake: meeting kcal needs, but only 44-53% of pro needs      Previous Nutrition Diagnosis: Inadequate protein-energy intake (ongoing)      Nutrition Intervention: enteral nutrition    Recommendations:   1. Use of standarized formula (Jevity 1.2) not indicated in intubated pts, also would switch to a low-fat formula as pt receiving significant kcal from propofol. Therefore switch to the following: Vital HP @ 50ml/hr, which provides 1834kcal, 105gm pro, 1003ml free water. Additional flushes per LIP. All recs discussed with LIP ([id number])       Goal/Expected Outcome: Pt to meet % of estimated nutrient needs within 4 days      Indicator/Monitoring: RD to monitor diet order, energy intake, glucose/electrolyte profiles, nutrition focused physical findings. Registered Dietitian Follow-Up     Patient Profile Reviewed                           Yes [x]   No []     Nutrition History Previously Obtained        Yes []  No [x]--unable to obtain as pt remains intubated        Pertinent Medical Interventions: Per Neurosx, recommend MR brain +/- when possible and urine catecholamine. [facility].     Diet order: Jevity 1.2 @ 240ml Q6H (1786kcal, 53gm pro, 775ml free water)--per RN, pt tolerating current TF regimen per RN w/o any issues. MAP 95.     Anthropometrics:  - Ht. 73"  - Wt.: 221#/100kg (1/27)-no new wts   - %wt change  - BMI: 29.1   - IBW: 184#     Pertinent Lab Data: (2/12): H/H 11.8/38.1, Na 150, Gluc 131     Pertinent Meds: heparin, abx, lactulose, labetalol, hydralazine, reglan, amlodipine, precedex, versed, nicardipine, propofol @ 24ml/hr (provides additional 634kcal), keppra, protonix, miralax, senna      Physical Findings:  - Appearance: intubated/ventilated; 2+ generalized edema noted  - GI function: LBM 2/12   - Tubes: OGT   - Oral/Mouth cavity: NPO   - Skin: ecchymosis      Nutrition Requirements  Weight Used: CBW: 221#/100kg, Ve: 11.8, Tmax: 36.9; kcal needs slightly readjusted today      Estimated Energy Needs: 2883-7973 kcal/day (% EG0967m: 2153 d/t borderline obese BMI).  Estimated Protein Needs: 100-120 g/day (1-1.2 g/kg ABW)  Estimated Fluid Needs: per Vent team      Nutrient Intake: meeting kcal needs, but only 44-53% of pro needs      Previous Nutrition Diagnosis: Inadequate protein-energy intake (ongoing)      Nutrition Intervention: enteral nutrition    Recommendations:   1. Use of standarized formula (Jevity 1.2) not indicated in intubated pts, also would switch to a low-fat formula as pt receiving significant kcal from propofol. Therefore switch to the following: Vital HP @ 50ml/hr, which provides 1834kcal, 105gm pro, 1003ml free water (includes additional kcal provided by propofol). Additional flushes per LIP. All recs discussed with LIP ([id number])       Goal/Expected Outcome: Pt to meet % of estimated nutrient needs within 4 days      Indicator/Monitoring: RD to monitor diet order, energy intake, glucose/electrolyte profiles, nutrition focused physical findings.

## 2021-02-12 NOTE — PROGRESS NOTE ADULT - SUBJECTIVE AND OBJECTIVE BOX
Patient is a 50y old  Male who presents with a chief complaint of Altered mental status (11 Feb 2021 11:52)        Over Night Events:  Remains on MV.  Off pressors.  Sedated.  On Cardene         ROS:     All pertinent ROS are negative except HPI         PHYSICAL EXAM    ICU Vital Signs Last 24 Hrs  T(C): 36.1 (12 Feb 2021 06:00), Max: 36.9 (11 Feb 2021 12:00)  T(F): 96.9 (12 Feb 2021 06:00), Max: 98.5 (11 Feb 2021 20:00)  HR: 55 (12 Feb 2021 07:00) (49 - 98)  BP: --  BP(mean): --  ABP: 141/86 (12 Feb 2021 07:00) (124/61 - 197/88)  ABP(mean): 87 (12 Feb 2021 07:00) (63 - 102)  RR: 25 (12 Feb 2021 07:00) (24 - 30)  SpO2: 94% (12 Feb 2021 07:00) (94% - 100%)      CONSTITUTIONAL:   Ill appearing.  Well nourished.  NAD    ENT:   Airway patent,   Mouth with normal mucosa.   No thrush    EYES:   Pupils equal,   Round and reactive to light.    CARDIAC:   Normal rate,   Regular rhythm.     edema      Vascular:  Normal systolic impulse  No Carotid bruits    RESPIRATORY:   No wheezing  Bilateral BS  Normal chest expansion  Not tachypneic,  No use of accessory muscles    GASTROINTESTINAL:  Abdomen soft,   Non-tender,   No guarding,   + BS    GENITOURINARY  normal genitalia for sex  no edema    MUSCULOSKELETAL:   Range of motion is not limited,  No clubbing, cyanosis    NEUROLOGICAL:   Sedated  Agitated off sedation     SKIN:   Skin normal color for race,   Warm and dry  No evidence of rash.    PSYCHIATRIC:   Sedated   No apparent risk to self or others.    HEMATOLOGICAL:  No cervical  lymphadenopathy.  no inguinal lymphadenopathy      02-11-21 @ 07:01  -  02-12-21 @ 07:00  --------------------------------------------------------  IN:    Dexmedetomidine: 837.5 mL    Enteral Tube Flush: 250 mL    IV PiggyBack: 100 mL    IV PiggyBack: 200 mL    Jevity: 720 mL    Midazolam: 135 mL    NiCARdipine: 995 mL    Propofol: 858 mL  Total IN: 4095.5 mL    OUT:    Drain (mL): 4 mL    Indwelling Catheter - Urethral (mL): 2445 mL  Total OUT: 2449 mL    Total NET: 1646.5 mL          LABS:                            11.8   11.26 )-----------( 325      ( 12 Feb 2021 05:10 )             38.1                                               02-12    150<H>  |  116<H>  |  20  ----------------------------<  131<H>  4.3   |  26  |  0.9    Ca    8.6      12 Feb 2021 05:10  Phos  4.1     02-12  Mg     2.4     02-12    TPro  5.1<L>  /  Alb  2.6<L>  /  TBili  0.8  /  DBili  x   /  AST  39  /  ALT  45<H>  /  AlkPhos  106  02-12      PT/INR - ( 12 Feb 2021 05:10 )   PT: 13.40 sec;   INR: 1.17 ratio                                                                                              LIVER FUNCTIONS - ( 12 Feb 2021 05:10 )  Alb: 2.6 g/dL / Pro: 5.1 g/dL / ALK PHOS: 106 U/L / ALT: 45 U/L / AST: 39 U/L / GGT: x                                                  Culture - Blood (collected 10 Feb 2021 07:34)  Source: .Blood None  Preliminary Report (11 Feb 2021 23:02):    No growth to date.                                                   Mode: AC/ CMV (Assist Control/ Continuous Mandatory Ventilation)  RR (machine): 24  TV (machine): 450  FiO2: 40  PEEP: 10  ITime: 1  MAP: 23  PIP: 39                                      ABG - ( 12 Feb 2021 03:23 )  pH, Arterial: 7.35  pH, Blood: x     /  pCO2: 48    /  pO2: 113   / HCO3: 27    / Base Excess: 0.9   /  SaO2: 99                  MEDICATIONS  (STANDING):  cefepime   IVPB 2000 milliGRAM(s) IV Intermittent every 12 hours  chlorhexidine 0.12% Liquid 15 milliLiter(s) Oral Mucosa every 12 hours  chlorhexidine 4% Liquid 1 Application(s) Topical <User Schedule>  cloNIDine 0.1 milliGRAM(s) Oral every 12 hours  dexMEDEtomidine Infusion 0.05 MICROgram(s)/kG/Hr (1.25 mL/Hr) IV Continuous <Continuous>  labetalol 100 milliGRAM(s) Oral every 8 hours  lactulose Syrup 10 Gram(s) Oral every 8 hours  levETIRAcetam  IVPB 500 milliGRAM(s) IV Intermittent every 12 hours  lisinopril 20 milliGRAM(s) Oral daily  metoclopramide 5 milliGRAM(s) Oral two times a day  midazolam Infusion 0.02 mG/kG/Hr (2 mL/Hr) IV Continuous <Continuous>  mineral oil enema 133 milliLiter(s) Rectal daily  niCARdipine Infusion 5 mG/Hr (25 mL/Hr) IV Continuous <Continuous>  pantoprazole  Injectable 40 milliGRAM(s) IV Push every 12 hours  polyethylene glycol 3350 17 Gram(s) Oral daily  propofol Infusion 20 MICROgram(s)/kG/Min (12 mL/Hr) IV Continuous <Continuous>  senna 2 Tablet(s) Oral at bedtime    MEDICATIONS  (PRN):  acetaminophen   Tablet .. 650 milliGRAM(s) Oral every 6 hours PRN Temp greater or equal to 38C (100.4F)      New X-rays reviewed:                                                                                  ECHO    CXR interpreted by me:  ET .  Og OK.  Improved infiltrates

## 2021-02-12 NOTE — PROGRESS NOTE ADULT - ATTENDING COMMENTS
Pt with improved neuro exam. Stable sig edema with midline shift. VPS with no output but ICP 12-15. Na 150. [facility], [facility] and they do not feel they can manage a patient with his level of acuity there. I discussed with wife and she has requested transfer to [place]. We discussed the option of [facility] for internal transfer and she was pleased with this outcome. Dr. Lopez from neurosurgery will accept pt to neurocritical unit at [facility].

## 2021-02-12 NOTE — CHART NOTE - NSCHARTNOTEFT_GEN_A_CORE
MICU Transfer Note    Transfer from: MICU  Transfer to:  (  ) Medicine    (  ) Telemetry    (  ) RCU    (  ) Palliative    (  ) Stroke Unit    (  ) _______[facility]________  Accepting physican: Dr. Aron Lopez, [phone]     HPI:  50 years old right handed Male with PMHx of HTN, GI bleed, PUD,  Mrankin score of 0 at baseline was brought in by EMS for  AMS and left sided weakness.   Wife states that patient was having a usual day today went to bathroom and then the next thing she noted was that he was confused and was lying on the bathroom floor, she reports urinary incontinence, patient was confused, had trouble speaking, and could not move his left side.     In the ED, /148, HR 80, saturating well. Code stroke called, NIHSS 24 and GCS 11, CTH showed Moderate to large intraparenchymal right basal ganglia hemorrhage with mild surrounding edema and associated right sulcal effacement as well as decompression into the ventricles. There is approximately 6.3 mm of midline shift. CTA did not show any evidence of active bleeding, AVM or aneurysm or major vascular stenosis or occlusion. Pt became unresponsive in CT scan and had to be intubated and sedated. Patient was intubated in ED for worsening mental status and GCS. Started on Nicardipine drip for sbp in 200s and EVD was placed by neurosurgery at bedside. Pan trauma scan was negative. To be admitted under ICU for further monitoring.  (27 Jan 2021 23:13)      MICU COURSE:  Pt on triple sedation with poorly controlled HTN likley 2/2 to ICB and EVD. Nicardpine drip was started. Repeat CTH on 2/11 showed increase in the left ventricles, possible mass, needs decompression surgery from neurosx. Wife wants pt to be transferred to [facility]. Medical staff is aware of the situation and continue management there.     Pt febrile with sputum cultures (+) for klebs. started on cefepime and vanc, mrsa (-), vanc was d/cd. Cefepime ordered for 10 days, started first dose on 02/05. CXR improving.     Distal DVT found on le duplex. No ac was given initially 2/2 to ICH. Neurosx approved ac to treat. started on hep q12.     Pt is stable enough for transfer as per med, to be transferred to [facility], accepting physician is Dr. Dc Lopez.    ASSESSMENT & PLAN:   50 years old right handed Male with PMHx of HTN, GI bleed, PUD,  Mrankin score of 0 at baseline was brought in by EMS for  AMS and left sided weakness. Found to have a Large ICB on CTH.     #Large ICB with midline shift  - neurosx following  - intubated for airway protection 1/27/21  - CTH 1/27/21: large intraparenchymal right basal ganglia hemorrhage with mild surrounding edema and associated right sulcal effacement as well as decompression into the ventricles. There is approximately 6.3 mm of midline shift.   - subsequent CTHs, minimal change  - CTH 2/11/21: Since prior examination of 2/8/2021 there is increased dilation of the left lateral ventricle with left frontal ventriculostomy catheter traversing the left frontal horn in appropriate position.  - as per neuro sx, possible mass on CTH, pt needs decompression surgery. Family wants pt transferred to the [facility], [facility] attendings made away of the situation. Will  be transferred and will follow up care there.   - EVD drain placed 01/27/21   - monitor ICP & EVD   - neuro checks q1  - maintain adequate sedation   - as per neurosx recommend MR brain +/- when possible  - Urine catecholamine f/u     #HTN  - blood pressure has been difficult to control in the ICU, likely 2/2 to ICB and EVD  - started on nicardipine drip   - titrate drip to -150    #Klebsiella Pneumonia pansensitive - improving   - Stay complicated by Klebsiella PNA  - Bilateral opacities seen on CXR   - sputum cultures (+), MRSA swab (-)  - started on cefepime 02/05, finish 10 day course (D8/D10)    #Distal Left post tibial and peroneal DVT   - LE Duplex 02/08, 02/11: Acute deep vein thrombus of the left posterior tibial vein and left peroneal vein  No evidence of acute deep vein thrombus or superficial thrombosis of the right lower extremity.  - AC was held 2/2 to ICB, neurosx was consulted, said AC ppx was ok  - start on hep subq q12.   - repeat duplex on 2/16  - monitor res status     # DVT ppx- hep subq q12 + SCDs  # GI ppx- PPI  # Activity- bedbound  # Diet- tube feeds  # Code status - FULL  # [facility]     For Follow-Up:  - Duplex LE on 2/16  - Needs ICU monitoring   - f/u labs q12, keep na between 145-155  - neurosx for management and/or possible decompression   - as per neurosx recommend MR brain +/- when possible  - recommend urine catecholamine    Vital Signs Last 24 Hrs  T(C): 36.7 (12 Feb 2021 08:00), Max: 36.9 (11 Feb 2021 20:00)  T(F): 98.1 (12 Feb 2021 08:00), Max: 98.5 (11 Feb 2021 20:00)  HR: 58 (12 Feb 2021 11:00) (49 - 98)  BP: --  BP(mean): --  RR: 24 (12 Feb 2021 11:00) (24 - 30)  SpO2: 94% (12 Feb 2021 11:00) (94% - 100%)  I&O's Summary    11 Feb 2021 07:01  -  12 Feb 2021 07:00  --------------------------------------------------------  IN: 4095.5 mL / OUT: 2449 mL / NET: 1646.5 mL    12 Feb 2021 07:01  -  12 Feb 2021 12:42  --------------------------------------------------------  IN: 768 mL / OUT: 420 mL / NET: 348 mL          MEDICATIONS  (STANDING):  cefepime   IVPB 2000 milliGRAM(s) IV Intermittent every 12 hours  chlorhexidine 0.12% Liquid 15 milliLiter(s) Oral Mucosa every 12 hours  chlorhexidine 4% Liquid 1 Application(s) Topical <User Schedule>  cloNIDine 0.2 milliGRAM(s) Oral every 12 hours  dexMEDEtomidine Infusion 0.05 MICROgram(s)/kG/Hr (1.25 mL/Hr) IV Continuous <Continuous>  heparin   Injectable 5000 Unit(s) SubCutaneous every 8 hours  labetalol 200 milliGRAM(s) Oral every 8 hours  lactulose Syrup 10 Gram(s) Oral every 8 hours  levETIRAcetam  IVPB 500 milliGRAM(s) IV Intermittent every 12 hours  lisinopril 20 milliGRAM(s) Oral daily  metoclopramide 5 milliGRAM(s) Oral two times a day  midazolam Infusion 0.02 mG/kG/Hr (2 mL/Hr) IV Continuous <Continuous>  mineral oil enema 133 milliLiter(s) Rectal daily  niCARdipine Infusion 5 mG/Hr (25 mL/Hr) IV Continuous <Continuous>  pantoprazole  Injectable 40 milliGRAM(s) IV Push every 12 hours  polyethylene glycol 3350 17 Gram(s) Oral daily  propofol Infusion 20 MICROgram(s)/kG/Min (12 mL/Hr) IV Continuous <Continuous>  senna 2 Tablet(s) Oral at bedtime    MEDICATIONS  (PRN):  acetaminophen   Tablet .. 650 milliGRAM(s) Oral every 6 hours PRN Temp greater or equal to 38C (100.4F)        LABS                                            11.8                  Neurophils% (auto):   x      (02-12 @ 05:10):    11.26)-----------(325          Lymphocytes% (auto):  x                                             38.1                   Eosinphils% (auto):   x        Manual%: Neutrophils x    ; Lymphocytes x    ; Eosinophils x    ; Bands%: x    ; Blasts x                                    150    |  116    |  20                  Calcium: 8.6   / iCa: x      (02-12 @ 05:10)    ----------------------------<  131       Magnesium: 2.4                              4.3     |  26     |  0.9              Phosphorous: 4.1      TPro  5.1    /  Alb  2.6    /  TBili  0.8    /  DBili  x      /  AST  39     /  ALT  45     /  AlkPhos  106    12 Feb 2021 05:10    ( 02-12 @ 05:10 )   PT: 13.40 sec;   INR: 1.17 ratio  aPTT: x

## 2021-02-12 NOTE — PHARMACOTHERAPY INTERVENTION NOTE - COMMENTS
Sodium chloride 23.4% clarified w/ Dr. Levy.  Ordered by neurologist, patient has central line.
Prescriber was contacted to d/c Clonidine 0.1mg po q12 due to duplication.

## 2021-02-12 NOTE — PROGRESS NOTE ADULT - SUBJECTIVE AND OBJECTIVE BOX
Subjective: 50yMale with a pmhx of INTRACRANIAL BLEED                                     ADM/AA    INTRACRANIAL BLEED    ^MED EVAL    Family history of breast cancer (Mother)    Family history of pancreatic cancer (Father)    Handoff    MEWS Score    HTN (hypertension)    GI bleed    Gastric ulcer    PUD (peptic ulcer disease)    Intracranial bleed    Intraparenchymal hemorrhage of brain    Insertion, external ventricular drain    Ventriculostomy, for ventricular catheter insertion    Arm fracture, left    MED EVAL    90+    [email or web address]          50y Male S/p EVD Placement for R basal ganglia hemorrhage.   EVD in place set at 15   - Pt seen and examined at bedside in Vent Unit.   EVD draining when height lowered, tubing flushed distally without issue.                 Allergies    No Known Allergies    Intolerances        Vital Signs Last 24 Hrs  T(C): 36.7 (12 Feb 2021 08:00), Max: 36.9 (11 Feb 2021 12:00)  T(F): 98.1 (12 Feb 2021 08:00), Max: 98.5 (11 Feb 2021 20:00)  HR: 60 (12 Feb 2021 10:00) (49 - 98)  BP: --  BP(mean): --  RR: 26 (12 Feb 2021 10:00) (24 - 30)  SpO2: 95% (12 Feb 2021 10:00) (94% - 100%)      acetaminophen   Tablet .. 650 milliGRAM(s) Oral every 6 hours PRN  cefepime   IVPB 2000 milliGRAM(s) IV Intermittent every 12 hours  chlorhexidine 0.12% Liquid 15 milliLiter(s) Oral Mucosa every 12 hours  chlorhexidine 4% Liquid 1 Application(s) Topical <User Schedule>  cloNIDine 0.2 milliGRAM(s) Oral every 12 hours  dexMEDEtomidine Infusion 0.05 MICROgram(s)/kG/Hr IV Continuous <Continuous>  heparin   Injectable 5000 Unit(s) SubCutaneous every 8 hours  labetalol 200 milliGRAM(s) Oral every 8 hours  lactulose Syrup 10 Gram(s) Oral every 8 hours  levETIRAcetam  IVPB 500 milliGRAM(s) IV Intermittent every 12 hours  lisinopril 20 milliGRAM(s) Oral daily  metoclopramide 5 milliGRAM(s) Oral two times a day  midazolam Infusion 0.02 mG/kG/Hr IV Continuous <Continuous>  mineral oil enema 133 milliLiter(s) Rectal daily  niCARdipine Infusion 5 mG/Hr IV Continuous <Continuous>  pantoprazole  Injectable 40 milliGRAM(s) IV Push every 12 hours  polyethylene glycol 3350 17 Gram(s) Oral daily  propofol Infusion 20 MICROgram(s)/kG/Min IV Continuous <Continuous>  senna 2 Tablet(s) Oral at bedtime        02-11-21 @ 07:01  -  02-12-21 @ 07:00  --------------------------------------------------------  IN: 4095.5 mL / OUT: 2449 mL / NET: 1646.5 mL        REVIEW OF SYSTEMS    [ ] A ten-point review of systems was otherwise negative except as noted.  [ ] Due to altered mental status/intubation, subjective information were not able to be obtained from the patient. History was obtained, to the extent possible, from review of the chart and collateral sources of information.      Exam:    Pupils pinpoint  No tracking  No w/d of UE to pain  No w/d of LE to pain  Incision intact  EVD in place, draining slowly  no CSF in reservoir  Tubing flushed distally without issue  ICP 1-4, waveform dampened        CBC Full  -  ( 12 Feb 2021 05:10 )  WBC Count : 11.26 K/uL  RBC Count : 3.87 M/uL  Hemoglobin : 11.8 g/dL  Hematocrit : 38.1 %  Platelet Count - Automated : 325 K/uL  Mean Cell Volume : 98.4 fL  Mean Cell Hemoglobin : 30.5 pg  Mean Cell Hemoglobin Concentration : 31.0 g/dL  Auto Neutrophil # : x  Auto Lymphocyte # : x  Auto Monocyte # : x  Auto Eosinophil # : x  Auto Basophil # : x  Auto Neutrophil % : x  Auto Lymphocyte % : x  Auto Monocyte % : x  Auto Eosinophil % : x  Auto Basophil % : x    02-12    150<H>  |  116<H>  |  20  ----------------------------<  131<H>  4.3   |  26  |  0.9    Ca    8.6      12 Feb 2021 05:10  Phos  4.1     02-12  Mg     2.4     02-12    TPro  5.1<L>  /  Alb  2.6<L>  /  TBili  0.8  /  DBili  x   /  AST  39  /  ALT  45<H>  /  AlkPhos  106  02-12    PT/INR - ( 12 Feb 2021 05:10 )   PT: 13.40 sec;   INR: 1.17 ratio                   Imaging:   c< from: CT Head No Cont (02.11.21 @ 16:35) >  IMPRESSION:  Since prior examination of 2/8/2021 there is increased dilation of the left lateral ventricle with left frontal ventriculostomy catheter traversing the left frontal horn in appropriate position.    Similar-appearing acute right basal ganglia intraparenchymal hemorrhage with surrounding vasogenic edema and local mass effect with 1 cm midline shift the left, not significantly changed.        < end of copied text >      Assessment/Plan:   51yo male s/p EVD placement    recommend MR brain +/- when possible  recommend urine catecholamine  [facility]  Continue Current Mgmt    d/w attending

## 2021-02-12 NOTE — PROGRESS NOTE ADULT - ASSESSMENT
IMPRESSION:    Large ICB likely hypertensive/ SP EVD  Intubated, for airway protection  Klebsiella Pneumonia pansensitive   Distal DVT    PLAN:    CNS:  Assess MS.  Adequate sedation.  FU with Neuro and NeuroSX.   ICP monitoring as per neuro sx / neurology , CPP more than 70,      HEENT: Oral care    PULMONARY:  HOB @ 45 degrees.  Vent changes:  Wean O2 as tolerated.  PEEP 8.  Pulmonary toilet.     CARDIOVASCULAR:  Avoid volume overload.  BP control.     GI: GI prophylaxis. OG feeds.  Bowel regimen.  Reglan     RENAL:  Follow up lytes BID.  Correct as needed monitor is and os     INFECTIOUS DISEASE: Follow up cultures.   Cefepime       HEMATOLOGICAL:  DVT prophylaxis.  Last duplex 2-11.  repat Duplex 2-16      ENDOCRINE:  Follow up FS.  Insulin protocol if needed.    MUSCULOSKELETAL:  Bed rest    Prognosis guarded     [facility]

## 2021-02-13 LAB
ALBUMIN SERPL ELPH-MCNC: 2.5 G/DL — LOW (ref 3.5–5.2)
ALP SERPL-CCNC: 101 U/L — SIGNIFICANT CHANGE UP (ref 30–115)
ALT FLD-CCNC: 57 U/L — HIGH (ref 0–41)
ANION GAP SERPL CALC-SCNC: 7 MMOL/L — SIGNIFICANT CHANGE UP (ref 7–14)
ANION GAP SERPL CALC-SCNC: 8 MMOL/L — SIGNIFICANT CHANGE UP (ref 7–14)
ANION GAP SERPL CALC-SCNC: 9 MMOL/L — SIGNIFICANT CHANGE UP (ref 7–14)
AST SERPL-CCNC: 47 U/L — HIGH (ref 0–41)
BILIRUB SERPL-MCNC: 0.5 MG/DL — SIGNIFICANT CHANGE UP (ref 0.2–1.2)
BUN SERPL-MCNC: 15 MG/DL — SIGNIFICANT CHANGE UP (ref 10–20)
BUN SERPL-MCNC: 15 MG/DL — SIGNIFICANT CHANGE UP (ref 10–20)
BUN SERPL-MCNC: 16 MG/DL — SIGNIFICANT CHANGE UP (ref 10–20)
BUN SERPL-MCNC: 17 MG/DL — SIGNIFICANT CHANGE UP (ref 10–20)
BUN SERPL-MCNC: 18 MG/DL — SIGNIFICANT CHANGE UP (ref 10–20)
CALCIUM SERPL-MCNC: 7.9 MG/DL — LOW (ref 8.5–10.1)
CALCIUM SERPL-MCNC: 7.9 MG/DL — LOW (ref 8.5–10.1)
CALCIUM SERPL-MCNC: 8.2 MG/DL — LOW (ref 8.5–10.1)
CALCIUM SERPL-MCNC: 8.5 MG/DL — SIGNIFICANT CHANGE UP (ref 8.5–10.1)
CALCIUM SERPL-MCNC: 8.5 MG/DL — SIGNIFICANT CHANGE UP (ref 8.5–10.1)
CHLORIDE SERPL-SCNC: 110 MMOL/L — SIGNIFICANT CHANGE UP (ref 98–110)
CHLORIDE SERPL-SCNC: 111 MMOL/L — HIGH (ref 98–110)
CHLORIDE SERPL-SCNC: 111 MMOL/L — HIGH (ref 98–110)
CHLORIDE SERPL-SCNC: 115 MMOL/L — HIGH (ref 98–110)
CHLORIDE SERPL-SCNC: 115 MMOL/L — HIGH (ref 98–110)
CO2 SERPL-SCNC: 24 MMOL/L — SIGNIFICANT CHANGE UP (ref 17–32)
CO2 SERPL-SCNC: 25 MMOL/L — SIGNIFICANT CHANGE UP (ref 17–32)
CO2 SERPL-SCNC: 26 MMOL/L — SIGNIFICANT CHANGE UP (ref 17–32)
CREAT SERPL-MCNC: 0.8 MG/DL — SIGNIFICANT CHANGE UP (ref 0.7–1.5)
CREAT SERPL-MCNC: 0.9 MG/DL — SIGNIFICANT CHANGE UP (ref 0.7–1.5)
CREAT SERPL-MCNC: 0.9 MG/DL — SIGNIFICANT CHANGE UP (ref 0.7–1.5)
GAS PNL BLDA: SIGNIFICANT CHANGE UP
GLUCOSE SERPL-MCNC: 107 MG/DL — HIGH (ref 70–99)
GLUCOSE SERPL-MCNC: 113 MG/DL — HIGH (ref 70–99)
GLUCOSE SERPL-MCNC: 118 MG/DL — HIGH (ref 70–99)
GLUCOSE SERPL-MCNC: 118 MG/DL — HIGH (ref 70–99)
GLUCOSE SERPL-MCNC: 120 MG/DL — HIGH (ref 70–99)
HCT VFR BLD CALC: 35.8 % — LOW (ref 42–52)
HGB BLD-MCNC: 11.3 G/DL — LOW (ref 14–18)
INR BLD: 1.1 RATIO — SIGNIFICANT CHANGE UP (ref 0.65–1.3)
MAGNESIUM SERPL-MCNC: 2.4 MG/DL — SIGNIFICANT CHANGE UP (ref 1.8–2.4)
MCHC RBC-ENTMCNC: 30.4 PG — SIGNIFICANT CHANGE UP (ref 27–31)
MCHC RBC-ENTMCNC: 31.6 G/DL — LOW (ref 32–37)
MCV RBC AUTO: 96.2 FL — HIGH (ref 80–94)
NRBC # BLD: 0 /100 WBCS — SIGNIFICANT CHANGE UP (ref 0–0)
PHOSPHATE SERPL-MCNC: 3.5 MG/DL — SIGNIFICANT CHANGE UP (ref 2.1–4.9)
PLATELET # BLD AUTO: 311 K/UL — SIGNIFICANT CHANGE UP (ref 130–400)
POTASSIUM SERPL-MCNC: 3.9 MMOL/L — SIGNIFICANT CHANGE UP (ref 3.5–5)
POTASSIUM SERPL-MCNC: 4.1 MMOL/L — SIGNIFICANT CHANGE UP (ref 3.5–5)
POTASSIUM SERPL-MCNC: 4.2 MMOL/L — SIGNIFICANT CHANGE UP (ref 3.5–5)
POTASSIUM SERPL-MCNC: 4.3 MMOL/L — SIGNIFICANT CHANGE UP (ref 3.5–5)
POTASSIUM SERPL-MCNC: 4.3 MMOL/L — SIGNIFICANT CHANGE UP (ref 3.5–5)
POTASSIUM SERPL-SCNC: 3.9 MMOL/L — SIGNIFICANT CHANGE UP (ref 3.5–5)
POTASSIUM SERPL-SCNC: 4.1 MMOL/L — SIGNIFICANT CHANGE UP (ref 3.5–5)
POTASSIUM SERPL-SCNC: 4.2 MMOL/L — SIGNIFICANT CHANGE UP (ref 3.5–5)
POTASSIUM SERPL-SCNC: 4.3 MMOL/L — SIGNIFICANT CHANGE UP (ref 3.5–5)
POTASSIUM SERPL-SCNC: 4.3 MMOL/L — SIGNIFICANT CHANGE UP (ref 3.5–5)
PROT SERPL-MCNC: 5.1 G/DL — LOW (ref 6–8)
PROTHROM AB SERPL-ACNC: 12.7 SEC — SIGNIFICANT CHANGE UP (ref 9.95–12.87)
RBC # BLD: 3.72 M/UL — LOW (ref 4.7–6.1)
RBC # FLD: 13 % — SIGNIFICANT CHANGE UP (ref 11.5–14.5)
SODIUM SERPL-SCNC: 143 MMOL/L — SIGNIFICANT CHANGE UP (ref 135–146)
SODIUM SERPL-SCNC: 145 MMOL/L — SIGNIFICANT CHANGE UP (ref 135–146)
SODIUM SERPL-SCNC: 146 MMOL/L — SIGNIFICANT CHANGE UP (ref 135–146)
SODIUM SERPL-SCNC: 148 MMOL/L — HIGH (ref 135–146)
SODIUM SERPL-SCNC: 149 MMOL/L — HIGH (ref 135–146)
WBC # BLD: 10.75 K/UL — SIGNIFICANT CHANGE UP (ref 4.8–10.8)
WBC # FLD AUTO: 10.75 K/UL — SIGNIFICANT CHANGE UP (ref 4.8–10.8)

## 2021-02-13 PROCEDURE — 71045 X-RAY EXAM CHEST 1 VIEW: CPT | Mod: 26

## 2021-02-13 PROCEDURE — 70450 CT HEAD/BRAIN W/O DYE: CPT | Mod: 26

## 2021-02-13 RX ORDER — SODIUM CHLORIDE 5 G/100ML
500 INJECTION, SOLUTION INTRAVENOUS
Refills: 0 | Status: DISCONTINUED | OUTPATIENT
Start: 2021-02-13 | End: 2021-02-15

## 2021-02-13 RX ADMIN — LEVETIRACETAM 420 MILLIGRAM(S): 250 TABLET, FILM COATED ORAL at 05:25

## 2021-02-13 RX ADMIN — Medication 200 MILLIGRAM(S): at 21:54

## 2021-02-13 RX ADMIN — Medication 5 MILLIGRAM(S): at 05:14

## 2021-02-13 RX ADMIN — CEFEPIME 100 MILLIGRAM(S): 1 INJECTION, POWDER, FOR SOLUTION INTRAMUSCULAR; INTRAVENOUS at 16:18

## 2021-02-13 RX ADMIN — LACTULOSE 10 GRAM(S): 10 SOLUTION ORAL at 14:12

## 2021-02-13 RX ADMIN — Medication 133 MILLILITER(S): at 11:56

## 2021-02-13 RX ADMIN — CEFEPIME 100 MILLIGRAM(S): 1 INJECTION, POWDER, FOR SOLUTION INTRAMUSCULAR; INTRAVENOUS at 05:15

## 2021-02-13 RX ADMIN — Medication 0.2 MILLIGRAM(S): at 05:14

## 2021-02-13 RX ADMIN — CHLORHEXIDINE GLUCONATE 1 APPLICATION(S): 213 SOLUTION TOPICAL at 05:15

## 2021-02-13 RX ADMIN — LACTULOSE 10 GRAM(S): 10 SOLUTION ORAL at 21:54

## 2021-02-13 RX ADMIN — CHLORHEXIDINE GLUCONATE 15 MILLILITER(S): 213 SOLUTION TOPICAL at 05:15

## 2021-02-13 RX ADMIN — PANTOPRAZOLE SODIUM 40 MILLIGRAM(S): 20 TABLET, DELAYED RELEASE ORAL at 05:14

## 2021-02-13 RX ADMIN — Medication 5 MILLIGRAM(S): at 16:19

## 2021-02-13 RX ADMIN — LACTULOSE 10 GRAM(S): 10 SOLUTION ORAL at 05:25

## 2021-02-13 RX ADMIN — CHLORHEXIDINE GLUCONATE 15 MILLILITER(S): 213 SOLUTION TOPICAL at 16:20

## 2021-02-13 RX ADMIN — HEPARIN SODIUM 5000 UNIT(S): 5000 INJECTION INTRAVENOUS; SUBCUTANEOUS at 05:14

## 2021-02-13 RX ADMIN — Medication 200 MILLIGRAM(S): at 05:14

## 2021-02-13 RX ADMIN — Medication 0.2 MILLIGRAM(S): at 16:19

## 2021-02-13 RX ADMIN — HEPARIN SODIUM 5000 UNIT(S): 5000 INJECTION INTRAVENOUS; SUBCUTANEOUS at 14:12

## 2021-02-13 RX ADMIN — PANTOPRAZOLE SODIUM 40 MILLIGRAM(S): 20 TABLET, DELAYED RELEASE ORAL at 16:19

## 2021-02-13 RX ADMIN — POLYETHYLENE GLYCOL 3350 17 GRAM(S): 17 POWDER, FOR SOLUTION ORAL at 11:57

## 2021-02-13 RX ADMIN — Medication 200 MILLIGRAM(S): at 14:12

## 2021-02-13 RX ADMIN — LISINOPRIL 20 MILLIGRAM(S): 2.5 TABLET ORAL at 05:14

## 2021-02-13 RX ADMIN — LEVETIRACETAM 420 MILLIGRAM(S): 250 TABLET, FILM COATED ORAL at 16:18

## 2021-02-13 RX ADMIN — SENNA PLUS 2 TABLET(S): 8.6 TABLET ORAL at 21:55

## 2021-02-13 RX ADMIN — HEPARIN SODIUM 5000 UNIT(S): 5000 INJECTION INTRAVENOUS; SUBCUTANEOUS at 21:55

## 2021-02-13 NOTE — PROGRESS NOTE ADULT - ATTENDING COMMENTS
Patient was seen and examined at bedside.  Patient is heavily sedated, but with reactive pupils, subtle left upper extremity internal rotation to noxious stimuli, cough reflex, and bilateral corneal reflexes.    External ventricular drain demonstrates dampened wave form, with diminished/no output.  Repeat CT scan of the head from today was reviewed, which demonstrates stable appearance of ventricular size, stable placement of ventricular catheter, but with increased edema around right-sided intracranial hematoma.    Patient's serum sodium is 148. At present time, advised reinitiation of hypertonic saline in attempt to control cerebral edema surrounding hematoma.    Attempt to decrease sedation, in order to obtain adequate neurological examination.    Avoid hypotonic fluids, continued to maintain head of bed elevation, continue to maintain euvolemia.

## 2021-02-13 NOTE — PROGRESS NOTE ADULT - SUBJECTIVE AND OBJECTIVE BOX
Patient is a 50y old  Male who presents with a chief complaint of Altered mental status (12 Feb 2021 10:36)    Over Night Events: still intubated on MV, off pressors.  Sedated with Precedex 1, Propofol 0.08 & Versed 50ug.  Cardene drip    ROS:   All ROS are negative except HPI     PHYSICAL EXAM    ICU Vital Signs Last 24 Hrs  T(C): 37.1 (13 Feb 2021 08:03), Max: 37.3 (13 Feb 2021 04:00)  T(F): 98.7 (13 Feb 2021 08:03), Max: 99.2 (13 Feb 2021 04:00)  HR: 66 (13 Feb 2021 08:03) (55 - 79)  BP: 151/73 (13 Feb 2021 08:03) (151/73 - 151/73)  BP(mean): 92 (13 Feb 2021 08:03) (92 - 92)  ABP: 146/68 (13 Feb 2021 07:00) (127/63 - 189/85)  ABP(mean): 89 (13 Feb 2021 07:00) (79 - 113)  RR: 22 (13 Feb 2021 08:03) (22 - 30)  SpO2: 98% (13 Feb 2021 08:03) (92% - 100%)    CONSTITUTIONAL:  Ill appearing.  Well nourished.  NAD    ENT:   Airway patent,   Mouth with normal mucosa.   No thrush  ETT  Left IJ (1/27)    EYES:   Pupils equal,  Round and reactive to light    CARDIAC:   Normal rate,   Regular rhythm.    edema    RESPIRATORY:   No wheezing  Bilateral BS  Normal chest expansion  Not tachypneic,  No use of accessory muscles    GASTROINTESTINAL:  Abdomen soft,   Non-tender,   No guarding,   + BS    MUSCULOSKELETAL:   Unable to assess ROM, agitated when SAT attempted  No clubbing, cyanosis    NEUROLOGICAL:   Sedated  Agitated off sedation    SKIN:   Skin normal color for race,   Warm and dry  No evidence of rash.    PSYCHIATRIC:   Sedated   No apparent risk to self or others.    HEMATOLOGICAL:  No cervical  lymphadenopathy.  no inguinal lymphadenopathy      02-12-21 @ 07:01  -  02-13-21 @ 07:00  --------------------------------------------------------  IN:    Dexmedetomidine: 550 mL    Enteral Tube Flush: 290 mL    IV PiggyBack: 150 mL    IV PiggyBack: 120 mL    Jevity: 960 mL    Midazolam: 219 mL    NiCARdipine: 1102.5 mL    Propofol: 582 mL  Total IN: 3973.5 mL    OUT:    Drain (mL): 0 mL    Indwelling Catheter - Urethral (mL): 1902 mL  Total OUT: 1902 mL    Total NET: 2071.5 mL      02-13-21 @ 07:01  -  02-13-21 @ 08:27  --------------------------------------------------------  IN:    Dexmedetomidine: 50 mL    Midazolam: 16 mL    NiCARdipine: 75 mL    Propofol: 60 mL  Total IN: 201 mL    OUT:    Drain (mL): 0 mL    Indwelling Catheter - Urethral (mL): 60 mL  Total OUT: 60 mL    Total NET: 141 mL        LABS:                            11.3   10.75 )-----------( 311      ( 13 Feb 2021 05:00 )             35.8                                               02-13    148<H>  |  115<H>  |  17  ----------------------------<  120<H>  4.3   |  26  |  0.8    Ca    8.2<L>      13 Feb 2021 05:00  Phos  3.5     02-13  Mg     2.4     02-13    TPro  5.1<L>  /  Alb  2.5<L>  /  TBili  0.5  /  DBili  x   /  AST  47<H>  /  ALT  57<H>  /  AlkPhos  101  02-13    PT/INR - ( 13 Feb 2021 05:00 )   PT: 12.70 sec;   INR: 1.10 ratio      LIVER FUNCTIONS - ( 13 Feb 2021 05:00 )  Alb: 2.5 g/dL / Pro: 5.1 g/dL / ALK PHOS: 101 U/L / ALT: 57 U/L / AST: 47 U/L / GGT: x                                              Mode: AC/ CMV (Assist Control/ Continuous Mandatory Ventilation)  RR (machine): 24  TV (machine): 450  FiO2: 50  PEEP: 8  ITime: 1  MAP: 22  PIP: 34    ABG - ( 13 Feb 2021 03:22 )  pH, Arterial: 7.40  pH, Blood: x     /  pCO2: 46    /  pO2: 106   / HCO3: 28    / Base Excess: 3.1   /  SaO2: 98          MEDICATIONS  (STANDING):  cefepime   IVPB 2000 milliGRAM(s) IV Intermittent every 12 hours  chlorhexidine 0.12% Liquid 15 milliLiter(s) Oral Mucosa every 12 hours  chlorhexidine 4% Liquid 1 Application(s) Topical <User Schedule>  cloNIDine 0.2 milliGRAM(s) Oral every 12 hours  dexMEDEtomidine Infusion 0.05 MICROgram(s)/kG/Hr (1.25 mL/Hr) IV Continuous <Continuous>  heparin   Injectable 5000 Unit(s) SubCutaneous every 8 hours  labetalol 200 milliGRAM(s) Oral every 8 hours  lactulose Syrup 10 Gram(s) Oral every 8 hours  levETIRAcetam  IVPB 500 milliGRAM(s) IV Intermittent every 12 hours  lisinopril 20 milliGRAM(s) Oral daily  metoclopramide 5 milliGRAM(s) Oral two times a day  midazolam Infusion 0.02 mG/kG/Hr (2 mL/Hr) IV Continuous <Continuous>  mineral oil enema 133 milliLiter(s) Rectal daily  niCARdipine Infusion 5 mG/Hr (25 mL/Hr) IV Continuous <Continuous>  pantoprazole  Injectable 40 milliGRAM(s) IV Push every 12 hours  polyethylene glycol 3350 17 Gram(s) Oral daily  propofol Infusion 20 MICROgram(s)/kG/Min (12 mL/Hr) IV Continuous <Continuous>  senna 2 Tablet(s) Oral at bedtime    MEDICATIONS  (PRN):  acetaminophen   Tablet .. 650 milliGRAM(s) Oral every 6 hours PRN Temp greater or equal to 38C (100.4F)      New X-rays reviewed  CXR interpreted by me:  ETT OK, Left IJ TLC, bilateral lower lobe opacities unchanged from yesterday   Patient is a 50y old  Male who presents with a chief complaint of Altered mental status (12 Feb 2021 10:36)    Over Night Events: still intubated on MV, off pressors.  Sedated with Precedex 1, Propofol 0.08 & Versed 50ug.  Cardene drip    ROS:   All ROS are negative except HPI     PHYSICAL EXAM    ICU Vital Signs Last 24 Hrs  T(C): 37.1 (13 Feb 2021 08:03), Max: 37.3 (13 Feb 2021 04:00)  T(F): 98.7 (13 Feb 2021 08:03), Max: 99.2 (13 Feb 2021 04:00)  HR: 66 (13 Feb 2021 08:03) (55 - 79)  BP: 151/73 (13 Feb 2021 08:03) (151/73 - 151/73)  BP(mean): 92 (13 Feb 2021 08:03) (92 - 92)  ABP: 146/68 (13 Feb 2021 07:00) (127/63 - 189/85)  ABP(mean): 89 (13 Feb 2021 07:00) (79 - 113)  RR: 22 (13 Feb 2021 08:03) (22 - 30)  SpO2: 98% (13 Feb 2021 08:03) (92% - 100%)    CONSTITUTIONAL:  Ill appearing.  Well nourished.  NAD    ENT:   Airway patent,   Mouth with normal mucosa.   No thrush  ETT  Left IJ (1/27)    EYES:   Pupils equal,  Round and reactive to light    CARDIAC:   Normal rate,   Regular rhythm.    edema    RESPIRATORY:   No wheezing  Bilateral BS  Normal chest expansion  Not tachypneic,  No use of accessory muscles    GASTROINTESTINAL:  Abdomen soft,   Non-tender,   No guarding,   + BS  abdominal distention    MUSCULOSKELETAL:   Unable to assess ROM, agitated when SAT attempted  No clubbing, cyanosis    NEUROLOGICAL:   Sedated  Agitated off sedation    SKIN:   Skin normal color for race,   Warm and dry  No evidence of rash.    PSYCHIATRIC:   Sedated   No apparent risk to self or others.    HEMATOLOGICAL:  No cervical  lymphadenopathy.  no inguinal lymphadenopathy      02-12-21 @ 07:01  -  02-13-21 @ 07:00  --------------------------------------------------------  IN:    Dexmedetomidine: 550 mL    Enteral Tube Flush: 290 mL    IV PiggyBack: 150 mL    IV PiggyBack: 120 mL    Jevity: 960 mL    Midazolam: 219 mL    NiCARdipine: 1102.5 mL    Propofol: 582 mL  Total IN: 3973.5 mL    OUT:    Drain (mL): 0 mL    Indwelling Catheter - Urethral (mL): 1902 mL  Total OUT: 1902 mL    Total NET: 2071.5 mL      02-13-21 @ 07:01  -  02-13-21 @ 08:27  --------------------------------------------------------  IN:    Dexmedetomidine: 50 mL    Midazolam: 16 mL    NiCARdipine: 75 mL    Propofol: 60 mL  Total IN: 201 mL    OUT:    Drain (mL): 0 mL    Indwelling Catheter - Urethral (mL): 60 mL  Total OUT: 60 mL    Total NET: 141 mL        LABS:                            11.3   10.75 )-----------( 311      ( 13 Feb 2021 05:00 )             35.8                                               02-13    148<H>  |  115<H>  |  17  ----------------------------<  120<H>  4.3   |  26  |  0.8    Ca    8.2<L>      13 Feb 2021 05:00  Phos  3.5     02-13  Mg     2.4     02-13    TPro  5.1<L>  /  Alb  2.5<L>  /  TBili  0.5  /  DBili  x   /  AST  47<H>  /  ALT  57<H>  /  AlkPhos  101  02-13    PT/INR - ( 13 Feb 2021 05:00 )   PT: 12.70 sec;   INR: 1.10 ratio      LIVER FUNCTIONS - ( 13 Feb 2021 05:00 )  Alb: 2.5 g/dL / Pro: 5.1 g/dL / ALK PHOS: 101 U/L / ALT: 57 U/L / AST: 47 U/L / GGT: x                                              Mode: AC/ CMV (Assist Control/ Continuous Mandatory Ventilation)  RR (machine): 24  TV (machine): 450  FiO2: 50  PEEP: 8  ITime: 1  MAP: 22  PIP: 34    ABG - ( 13 Feb 2021 03:22 )  pH, Arterial: 7.40  pH, Blood: x     /  pCO2: 46    /  pO2: 106   / HCO3: 28    / Base Excess: 3.1   /  SaO2: 98          MEDICATIONS  (STANDING):  cefepime   IVPB 2000 milliGRAM(s) IV Intermittent every 12 hours  chlorhexidine 0.12% Liquid 15 milliLiter(s) Oral Mucosa every 12 hours  chlorhexidine 4% Liquid 1 Application(s) Topical <User Schedule>  cloNIDine 0.2 milliGRAM(s) Oral every 12 hours  dexMEDEtomidine Infusion 0.05 MICROgram(s)/kG/Hr (1.25 mL/Hr) IV Continuous <Continuous>  heparin   Injectable 5000 Unit(s) SubCutaneous every 8 hours  labetalol 200 milliGRAM(s) Oral every 8 hours  lactulose Syrup 10 Gram(s) Oral every 8 hours  levETIRAcetam  IVPB 500 milliGRAM(s) IV Intermittent every 12 hours  lisinopril 20 milliGRAM(s) Oral daily  metoclopramide 5 milliGRAM(s) Oral two times a day  midazolam Infusion 0.02 mG/kG/Hr (2 mL/Hr) IV Continuous <Continuous>  mineral oil enema 133 milliLiter(s) Rectal daily  niCARdipine Infusion 5 mG/Hr (25 mL/Hr) IV Continuous <Continuous>  pantoprazole  Injectable 40 milliGRAM(s) IV Push every 12 hours  polyethylene glycol 3350 17 Gram(s) Oral daily  propofol Infusion 20 MICROgram(s)/kG/Min (12 mL/Hr) IV Continuous <Continuous>  senna 2 Tablet(s) Oral at bedtime    MEDICATIONS  (PRN):  acetaminophen   Tablet .. 650 milliGRAM(s) Oral every 6 hours PRN Temp greater or equal to 38C (100.4F)      New X-rays reviewed  CXR interpreted by me:  ETT OK, Left IJ TLC, bilateral lower lobe opacities unchanged from yesterday   Patient is a 50y old  Male who presents with a chief complaint of Altered mental status (12 Feb 2021 10:36)    Over Night Events: still intubated on MV, off pressors.  Sedated with Precedex 1, Propofol 0.08 & Versed 50ug.  Cardene drip, no drainage for drain, BM+    ROS:   All ROS are negative except HPI     PHYSICAL EXAM    ICU Vital Signs Last 24 Hrs  T(C): 37.1 (13 Feb 2021 08:03), Max: 37.3 (13 Feb 2021 04:00)  T(F): 98.7 (13 Feb 2021 08:03), Max: 99.2 (13 Feb 2021 04:00)  HR: 66 (13 Feb 2021 08:03) (55 - 79)  BP: 151/73 (13 Feb 2021 08:03) (151/73 - 151/73)  BP(mean): 92 (13 Feb 2021 08:03) (92 - 92)  ABP: 146/68 (13 Feb 2021 07:00) (127/63 - 189/85)  ABP(mean): 89 (13 Feb 2021 07:00) (79 - 113)  RR: 22 (13 Feb 2021 08:03) (22 - 30)  SpO2: 98% (13 Feb 2021 08:03) (92% - 100%)    CONSTITUTIONAL:  Ill appearing.  Well nourished.  NAD    ENT:   Airway patent,   Mouth with normal mucosa.   No thrush  ETT  Left IJ (1/27)    EYES:   Pupils equal,  Round and reactive to light    CARDIAC:   Normal rate,   Regular rhythm.    edema    RESPIRATORY:   No wheezing  Bilateral BS  Normal chest expansion  Not tachypneic,  No use of accessory muscles    GASTROINTESTINAL:  Abdomen soft,   Non-tender,   No guarding,   + BS  abdominal distention    MUSCULOSKELETAL:   Unable to assess ROM, agitated when SAT attempted  No clubbing, cyanosis    NEUROLOGICAL:   Sedated  Agitated off sedation    SKIN:   Skin normal color for race,   Warm and dry  No evidence of rash.    PSYCHIATRIC:   Sedated   No apparent risk to self or others.    HEMATOLOGICAL:  No cervical  lymphadenopathy.  no inguinal lymphadenopathy      02-12-21 @ 07:01  -  02-13-21 @ 07:00  --------------------------------------------------------  IN:    Dexmedetomidine: 550 mL    Enteral Tube Flush: 290 mL    IV PiggyBack: 150 mL    IV PiggyBack: 120 mL    Jevity: 960 mL    Midazolam: 219 mL    NiCARdipine: 1102.5 mL    Propofol: 582 mL  Total IN: 3973.5 mL    OUT:    Drain (mL): 0 mL    Indwelling Catheter - Urethral (mL): 1902 mL  Total OUT: 1902 mL    Total NET: 2071.5 mL      02-13-21 @ 07:01  -  02-13-21 @ 08:27  --------------------------------------------------------  IN:    Dexmedetomidine: 50 mL    Midazolam: 16 mL    NiCARdipine: 75 mL    Propofol: 60 mL  Total IN: 201 mL    OUT:    Drain (mL): 0 mL    Indwelling Catheter - Urethral (mL): 60 mL  Total OUT: 60 mL    Total NET: 141 mL        LABS:                            11.3   10.75 )-----------( 311      ( 13 Feb 2021 05:00 )             35.8                                               02-13    148<H>  |  115<H>  |  17  ----------------------------<  120<H>  4.3   |  26  |  0.8    Ca    8.2<L>      13 Feb 2021 05:00  Phos  3.5     02-13  Mg     2.4     02-13    TPro  5.1<L>  /  Alb  2.5<L>  /  TBili  0.5  /  DBili  x   /  AST  47<H>  /  ALT  57<H>  /  AlkPhos  101  02-13    PT/INR - ( 13 Feb 2021 05:00 )   PT: 12.70 sec;   INR: 1.10 ratio      LIVER FUNCTIONS - ( 13 Feb 2021 05:00 )  Alb: 2.5 g/dL / Pro: 5.1 g/dL / ALK PHOS: 101 U/L / ALT: 57 U/L / AST: 47 U/L / GGT: x                                              Mode: AC/ CMV (Assist Control/ Continuous Mandatory Ventilation)  RR (machine): 24  TV (machine): 450  FiO2: 50  PEEP: 8  ITime: 1  MAP: 22  PIP: 34    ABG - ( 13 Feb 2021 03:22 )  pH, Arterial: 7.40  pH, Blood: x     /  pCO2: 46    /  pO2: 106   / HCO3: 28    / Base Excess: 3.1   /  SaO2: 98          MEDICATIONS  (STANDING):  cefepime   IVPB 2000 milliGRAM(s) IV Intermittent every 12 hours  chlorhexidine 0.12% Liquid 15 milliLiter(s) Oral Mucosa every 12 hours  chlorhexidine 4% Liquid 1 Application(s) Topical <User Schedule>  cloNIDine 0.2 milliGRAM(s) Oral every 12 hours  dexMEDEtomidine Infusion 0.05 MICROgram(s)/kG/Hr (1.25 mL/Hr) IV Continuous <Continuous>  heparin   Injectable 5000 Unit(s) SubCutaneous every 8 hours  labetalol 200 milliGRAM(s) Oral every 8 hours  lactulose Syrup 10 Gram(s) Oral every 8 hours  levETIRAcetam  IVPB 500 milliGRAM(s) IV Intermittent every 12 hours  lisinopril 20 milliGRAM(s) Oral daily  metoclopramide 5 milliGRAM(s) Oral two times a day  midazolam Infusion 0.02 mG/kG/Hr (2 mL/Hr) IV Continuous <Continuous>  mineral oil enema 133 milliLiter(s) Rectal daily  niCARdipine Infusion 5 mG/Hr (25 mL/Hr) IV Continuous <Continuous>  pantoprazole  Injectable 40 milliGRAM(s) IV Push every 12 hours  polyethylene glycol 3350 17 Gram(s) Oral daily  propofol Infusion 20 MICROgram(s)/kG/Min (12 mL/Hr) IV Continuous <Continuous>  senna 2 Tablet(s) Oral at bedtime    MEDICATIONS  (PRN):  acetaminophen   Tablet .. 650 milliGRAM(s) Oral every 6 hours PRN Temp greater or equal to 38C (100.4F)      New X-rays reviewed  CXR interpreted by me:  ETT OK, Left IJ TLC, bilateral lower lobe opacities unchanged from yesterday

## 2021-02-13 NOTE — PROGRESS NOTE ADULT - ASSESSMENT
IMPRESSION:    Large ICB secondary to Hypertensive Emergency s/p EVD  Intubated, for airway protection  Klebsiella Pneumonia pansensitive   Left posterior tibial & peroneal DVT      PLAN:  CNS:  c/w Propofol, Versed & Precedex.  c/w Keppra, check levels.  FU with Neurology & Neurosurgery regarding EVD (not draining).  Monitor ICP as per Neuro/Neurosurgery, target CPP > 70.    HEENT: Oral care    PULMONARY:  HOB @ 45 degrees.  Vent changes:  Wean O2 as tolerated, decrease FiO2 to 40%.  Pulmonary toilet.     CARDIOVASCULAR:  Avoid volume overload.  BP control.    GI: GI prophylaxis.  OG feeds.  Bowel regimen (last BM overnight).  Reglan    RENAL:  FU with Neuro & Neurosx regarding Na target.  Follow up lytes BID.  Correct as needed.  Monitor UO.    INFECTIOUS DISEASE:   c/w Cefepime.  Follow up cultures & sensitivities.    HEMATOLOGICAL:  SCDs for DVT prophylaxis due to ICH.    ENDOCRINE:  Follow up FS.  Insulin protocol if needed.    MUSCULOSKELETAL:  Bed rest    Prognosis guarded   [facility] IMPRESSION:    Large ICB secondary to Hypertensive Emergency s/p EVD  Intubated, for airway protection  Klebsiella Pneumonia pansensitive   Left posterior tibial & peroneal DVT      PLAN:  CNS:  c/w Propofol, Versed & Precedex.  c/w Keppra, check levels.  FU with Neurology & Neurosurgery regarding EVD (not draining).  Monitor ICP as per Neuro/Neurosurgery, target CPP > 70.    HEENT: Oral care    PULMONARY:  HOB @ 45 degrees.  Vent changes:  Wean O2 as tolerated, decrease FiO2 to 40%.  Pulmonary toilet.     CARDIOVASCULAR:  Avoid volume overload.  BP control.    GI: GI prophylaxis.  OG to suction.  KUB.  Bowel regimen (last BM overnight).  Reglan    RENAL:  FU with Neuro & Neurosx regarding Na target.  Follow up lytes BID.  Correct as needed.  Monitor UO.    INFECTIOUS DISEASE:   c/w Cefepime.  Follow up cultures & sensitivities.    HEMATOLOGICAL:  SCDs for DVT prophylaxis due to ICH.    ENDOCRINE:  Follow up FS.  Insulin protocol if needed.    MUSCULOSKELETAL:  Bed rest    Prognosis guarded   [facility] IMPRESSION:    Large ICB secondary to Hypertensive Emergency s/p EVD  Intubated, for airway protection  Klebsiella Pneumonia pansensitive   Left posterior tibial & peroneal DVT  ABD distention ro ileus      PLAN:  CNS:  c/w Propofol, Versed & Precedex.  c/w Keppra, check levels.  FU with Neurology & Neurosurgery regarding EVD (not draining).  Monitor ICP as per Neuro/Neurosurgery, head ct 2/11 reviewed    HEENT: Oral care    PULMONARY:  HOB @ 45 degrees.  Vent changes:  Wean O2 as tolerated, decrease FiO2 to 40%.  Pulmonary toilet.     CARDIOVASCULAR:  Avoid volume overload.  BP control.    GI: GI prophylaxis.  OG to suction.  KUB.  Bowel regimen (last BM overnight).     RENAL:  FU with Neuro & Neurosx regarding Na target.  Follow up lytes BID.  Correct as needed.  Monitor UO.    INFECTIOUS DISEASE:   c/w Cefepime.  Follow up cultures & sensitivities.    HEMATOLOGICAL:  SCDs for DVT prophylaxis due to ICH.    ENDOCRINE:  Follow up FS.  Insulin protocol if needed.    MUSCULOSKELETAL:  Bed rest    Prognosis guarded   [facility]

## 2021-02-13 NOTE — PROGRESS NOTE ADULT - SUBJECTIVE AND OBJECTIVE BOX
Subjective: 50yMale with a pmhx of INTRACRANIAL BLEED                                     ADM/AA    INTRACRANIAL BLEED    ^MED EVAL    Family history of breast cancer (Mother)    Family history of pancreatic cancer (Father)    Handoff    MEWS Score    HTN (hypertension)    GI bleed    Gastric ulcer    PUD (peptic ulcer disease)    Intracranial bleed    Intraparenchymal hemorrhage of brain    Insertion, external ventricular drain    Ventriculostomy, for ventricular catheter insertion    Arm fracture, left    MED EVAL    90+    [email or web address]      HD#[id number]  S/p EVD Placement for R basal ganglia hemorrhage.     Pt seen and examined at bedside in Vent Unit.  Pt is well sedated on Propofol @50mg, Versed .08, Precedex 1mcg, non responsive to verbal stimulus, not moving to noxious stimuli.   EVD in place set at 15 cm H2O.  Waveform is dampened, unable to withdraw CSF from tubing.     Allergies    No Known Allergies    Intolerances        Imaging:    Vital Signs Last 24 Hrs  T(C): 36.9 (13 Feb 2021 12:05), Max: 37.3 (13 Feb 2021 04:00)  T(F): 98.4 (13 Feb 2021 12:05), Max: 99.2 (13 Feb 2021 04:00)  HR: 61 (13 Feb 2021 12:05) (55 - 79)  BP: 162/77 (13 Feb 2021 12:05) (151/73 - 162/77)  BP(mean): 101 (13 Feb 2021 12:05) (92 - 101)  RR: 22 (13 Feb 2021 12:05) (22 - 30)  SpO2: 99% (13 Feb 2021 11:18) (92% - 100%)      acetaminophen   Tablet .. 650 milliGRAM(s) Oral every 6 hours PRN  cefepime   IVPB 2000 milliGRAM(s) IV Intermittent every 12 hours  chlorhexidine 0.12% Liquid 15 milliLiter(s) Oral Mucosa every 12 hours  chlorhexidine 4% Liquid 1 Application(s) Topical <User Schedule>  cloNIDine 0.2 milliGRAM(s) Oral every 12 hours  dexMEDEtomidine Infusion 0.05 MICROgram(s)/kG/Hr IV Continuous <Continuous>  heparin   Injectable 5000 Unit(s) SubCutaneous every 8 hours  labetalol 200 milliGRAM(s) Oral every 8 hours  lactulose Syrup 10 Gram(s) Oral every 8 hours  levETIRAcetam  IVPB 500 milliGRAM(s) IV Intermittent every 12 hours  lisinopril 20 milliGRAM(s) Oral daily  metoclopramide 5 milliGRAM(s) Oral two times a day  midazolam Infusion 0.02 mG/kG/Hr IV Continuous <Continuous>  mineral oil enema 133 milliLiter(s) Rectal daily  niCARdipine Infusion 5 mG/Hr IV Continuous <Continuous>  pantoprazole  Injectable 40 milliGRAM(s) IV Push every 12 hours  polyethylene glycol 3350 17 Gram(s) Oral daily  propofol Infusion 20 MICROgram(s)/kG/Min IV Continuous <Continuous>  senna 2 Tablet(s) Oral at bedtime        02-12-21 @ 07:01  -  02-13-21 @ 07:00  --------------------------------------------------------  IN: 3973.5 mL / OUT: 1902 mL / NET: 2071.5 mL    02-13-21 @ 07:01  -  02-13-21 @ 13:15  --------------------------------------------------------  IN: 296.5 mL / OUT: 230 mL / NET: 66.5 mL        REVIEW OF SYSTEMS    [ ] A ten-point review of systems was otherwise negative except as noted.  [x ] Due to altered mental status/intubation, subjective information were not able to be obtained from the patient. History was obtained, to the extent possible, from review of the chart and collateral sources of information.          Neuro Exam:  Intubated, sedated  not following commands  no response to noxious stimuli  Pupils pinpoint  + gag  + triggering vent  Incision intact  EVD in place, not draining  no CSF in reservoir  ICP 9-15,  waveform dampened      Wound:    CBC Full  -  ( 13 Feb 2021 05:00 )  WBC Count : 10.75 K/uL  RBC Count : 3.72 M/uL  Hemoglobin : 11.3 g/dL  Hematocrit : 35.8 %  Platelet Count - Automated : 311 K/uL  Mean Cell Volume : 96.2 fL  Mean Cell Hemoglobin : 30.4 pg  Mean Cell Hemoglobin Concentration : 31.6 g/dL  Auto Neutrophil # : x  Auto Lymphocyte # : x  Auto Monocyte # : x  Auto Eosinophil # : x  Auto Basophil # : x  Auto Neutrophil % : x  Auto Lymphocyte % : x  Auto Monocyte % : x  Auto Eosinophil % : x  Auto Basophil % : x    02-13    148<H>  |  115<H>  |  17  ----------------------------<  120<H>  4.3   |  26  |  0.8    Ca    8.2<L>      13 Feb 2021 05:00  Phos  3.5     02-13  Mg     2.4     02-13    TPro  5.1<L>  /  Alb  2.5<L>  /  TBili  0.5  /  DBili  x   /  AST  47<H>  /  ALT  57<H>  /  AlkPhos  101  02-13    PT/INR - ( 13 Feb 2021 05:00 )   PT: 12.70 sec;   INR: 1.10 ratio                 Assessment/Plan:   obtain CT head today  wean off sedation to obtain exam  cont EVD care  Dr. Lopez from neurosurgery will accept pt to neurocritical unit at [facility]  d/w attg

## 2021-02-14 LAB
ALBUMIN SERPL ELPH-MCNC: 2.4 G/DL — LOW (ref 3.5–5.2)
ALP SERPL-CCNC: 115 U/L — SIGNIFICANT CHANGE UP (ref 30–115)
ALT FLD-CCNC: 57 U/L — HIGH (ref 0–41)
ANION GAP SERPL CALC-SCNC: 8 MMOL/L — SIGNIFICANT CHANGE UP (ref 7–14)
ANION GAP SERPL CALC-SCNC: 8 MMOL/L — SIGNIFICANT CHANGE UP (ref 7–14)
ANION GAP SERPL CALC-SCNC: 9 MMOL/L — SIGNIFICANT CHANGE UP (ref 7–14)
AST SERPL-CCNC: 37 U/L — SIGNIFICANT CHANGE UP (ref 0–41)
BASE EXCESS BLDA CALC-SCNC: 4.2 MMOL/L — HIGH (ref -2–2)
BILIRUB SERPL-MCNC: 0.5 MG/DL — SIGNIFICANT CHANGE UP (ref 0.2–1.2)
BUN SERPL-MCNC: 14 MG/DL — SIGNIFICANT CHANGE UP (ref 10–20)
CALCIUM SERPL-MCNC: 8 MG/DL — LOW (ref 8.5–10.1)
CHLORIDE SERPL-SCNC: 109 MMOL/L — SIGNIFICANT CHANGE UP (ref 98–110)
CHLORIDE SERPL-SCNC: 110 MMOL/L — SIGNIFICANT CHANGE UP (ref 98–110)
CHLORIDE SERPL-SCNC: 111 MMOL/L — HIGH (ref 98–110)
CO2 SERPL-SCNC: 25 MMOL/L — SIGNIFICANT CHANGE UP (ref 17–32)
CO2 SERPL-SCNC: 25 MMOL/L — SIGNIFICANT CHANGE UP (ref 17–32)
CO2 SERPL-SCNC: 26 MMOL/L — SIGNIFICANT CHANGE UP (ref 17–32)
CREAT SERPL-MCNC: 0.8 MG/DL — SIGNIFICANT CHANGE UP (ref 0.7–1.5)
GLUCOSE SERPL-MCNC: 107 MG/DL — HIGH (ref 70–99)
GLUCOSE SERPL-MCNC: 114 MG/DL — HIGH (ref 70–99)
GLUCOSE SERPL-MCNC: 117 MG/DL — HIGH (ref 70–99)
HCO3 BLDA-SCNC: 29 MMOL/L — SIGNIFICANT CHANGE UP (ref 21–29)
HCT VFR BLD CALC: 36.3 % — LOW (ref 42–52)
HGB BLD-MCNC: 11.8 G/DL — LOW (ref 14–18)
INR BLD: 1.16 RATIO — SIGNIFICANT CHANGE UP (ref 0.65–1.3)
MAGNESIUM SERPL-MCNC: 2.1 MG/DL — SIGNIFICANT CHANGE UP (ref 1.8–2.4)
MCHC RBC-ENTMCNC: 30.5 PG — SIGNIFICANT CHANGE UP (ref 27–31)
MCHC RBC-ENTMCNC: 32.5 G/DL — SIGNIFICANT CHANGE UP (ref 32–37)
MCV RBC AUTO: 93.8 FL — SIGNIFICANT CHANGE UP (ref 80–94)
NRBC # BLD: 0 /100 WBCS — SIGNIFICANT CHANGE UP (ref 0–0)
PCO2 BLDA: 41 MMHG — SIGNIFICANT CHANGE UP (ref 38–42)
PH BLDA: 7.45 — HIGH (ref 7.38–7.42)
PHOSPHATE SERPL-MCNC: 3.4 MG/DL — SIGNIFICANT CHANGE UP (ref 2.1–4.9)
PLATELET # BLD AUTO: 300 K/UL — SIGNIFICANT CHANGE UP (ref 130–400)
PO2 BLDA: 73 MMHG — LOW (ref 78–95)
POTASSIUM SERPL-MCNC: 3.9 MMOL/L — SIGNIFICANT CHANGE UP (ref 3.5–5)
POTASSIUM SERPL-MCNC: 4 MMOL/L — SIGNIFICANT CHANGE UP (ref 3.5–5)
POTASSIUM SERPL-MCNC: 4 MMOL/L — SIGNIFICANT CHANGE UP (ref 3.5–5)
POTASSIUM SERPL-SCNC: 3.9 MMOL/L — SIGNIFICANT CHANGE UP (ref 3.5–5)
POTASSIUM SERPL-SCNC: 4 MMOL/L — SIGNIFICANT CHANGE UP (ref 3.5–5)
POTASSIUM SERPL-SCNC: 4 MMOL/L — SIGNIFICANT CHANGE UP (ref 3.5–5)
PROT SERPL-MCNC: 5 G/DL — LOW (ref 6–8)
PROTHROM AB SERPL-ACNC: 13.3 SEC — HIGH (ref 9.95–12.87)
RBC # BLD: 3.87 M/UL — LOW (ref 4.7–6.1)
RBC # FLD: 12.5 % — SIGNIFICANT CHANGE UP (ref 11.5–14.5)
SAO2 % BLDA: 96 % — SIGNIFICANT CHANGE UP (ref 92–96)
SODIUM SERPL-SCNC: 142 MMOL/L — SIGNIFICANT CHANGE UP (ref 135–146)
SODIUM SERPL-SCNC: 144 MMOL/L — SIGNIFICANT CHANGE UP (ref 135–146)
SODIUM SERPL-SCNC: 145 MMOL/L — SIGNIFICANT CHANGE UP (ref 135–146)
WBC # BLD: 11.1 K/UL — HIGH (ref 4.8–10.8)
WBC # FLD AUTO: 11.1 K/UL — HIGH (ref 4.8–10.8)

## 2021-02-14 PROCEDURE — 71045 X-RAY EXAM CHEST 1 VIEW: CPT | Mod: 26

## 2021-02-14 PROCEDURE — 74018 RADEX ABDOMEN 1 VIEW: CPT | Mod: 26

## 2021-02-14 PROCEDURE — 93970 EXTREMITY STUDY: CPT | Mod: 26

## 2021-02-14 RX ORDER — LISINOPRIL 2.5 MG/1
40 TABLET ORAL DAILY
Refills: 0 | Status: DISCONTINUED | OUTPATIENT
Start: 2021-02-14 | End: 2021-02-23

## 2021-02-14 RX ORDER — HYDRALAZINE HCL 50 MG
25 TABLET ORAL EVERY 8 HOURS
Refills: 0 | Status: DISCONTINUED | OUTPATIENT
Start: 2021-02-14 | End: 2021-02-15

## 2021-02-14 RX ORDER — HYDRALAZINE HCL 50 MG
10 TABLET ORAL ONCE
Refills: 0 | Status: COMPLETED | OUTPATIENT
Start: 2021-02-14 | End: 2021-02-14

## 2021-02-14 RX ADMIN — Medication 200 MILLIGRAM(S): at 04:44

## 2021-02-14 RX ADMIN — SENNA PLUS 2 TABLET(S): 8.6 TABLET ORAL at 22:20

## 2021-02-14 RX ADMIN — CHLORHEXIDINE GLUCONATE 15 MILLILITER(S): 213 SOLUTION TOPICAL at 04:43

## 2021-02-14 RX ADMIN — Medication 0.2 MILLIGRAM(S): at 04:44

## 2021-02-14 RX ADMIN — CHLORHEXIDINE GLUCONATE 15 MILLILITER(S): 213 SOLUTION TOPICAL at 17:41

## 2021-02-14 RX ADMIN — NICARDIPINE HYDROCHLORIDE 25 MG/HR: 30 CAPSULE, EXTENDED RELEASE ORAL at 11:05

## 2021-02-14 RX ADMIN — LEVETIRACETAM 420 MILLIGRAM(S): 250 TABLET, FILM COATED ORAL at 17:40

## 2021-02-14 RX ADMIN — HEPARIN SODIUM 5000 UNIT(S): 5000 INJECTION INTRAVENOUS; SUBCUTANEOUS at 13:27

## 2021-02-14 RX ADMIN — CHLORHEXIDINE GLUCONATE 1 APPLICATION(S): 213 SOLUTION TOPICAL at 04:43

## 2021-02-14 RX ADMIN — Medication 5 MILLIGRAM(S): at 04:44

## 2021-02-14 RX ADMIN — Medication 0.2 MILLIGRAM(S): at 17:41

## 2021-02-14 RX ADMIN — LEVETIRACETAM 420 MILLIGRAM(S): 250 TABLET, FILM COATED ORAL at 04:44

## 2021-02-14 RX ADMIN — LACTULOSE 10 GRAM(S): 10 SOLUTION ORAL at 04:44

## 2021-02-14 RX ADMIN — POLYETHYLENE GLYCOL 3350 17 GRAM(S): 17 POWDER, FOR SOLUTION ORAL at 11:04

## 2021-02-14 RX ADMIN — Medication 5 MILLIGRAM(S): at 17:41

## 2021-02-14 RX ADMIN — HEPARIN SODIUM 5000 UNIT(S): 5000 INJECTION INTRAVENOUS; SUBCUTANEOUS at 04:43

## 2021-02-14 RX ADMIN — DEXMEDETOMIDINE HYDROCHLORIDE IN 0.9% SODIUM CHLORIDE 1.25 MICROGRAM(S)/KG/HR: 4 INJECTION INTRAVENOUS at 22:20

## 2021-02-14 RX ADMIN — PROPOFOL 12 MICROGRAM(S)/KG/MIN: 10 INJECTION, EMULSION INTRAVENOUS at 22:20

## 2021-02-14 RX ADMIN — PANTOPRAZOLE SODIUM 40 MILLIGRAM(S): 20 TABLET, DELAYED RELEASE ORAL at 17:41

## 2021-02-14 RX ADMIN — NICARDIPINE HYDROCHLORIDE 25 MG/HR: 30 CAPSULE, EXTENDED RELEASE ORAL at 22:20

## 2021-02-14 RX ADMIN — Medication 25 MILLIGRAM(S): at 22:19

## 2021-02-14 RX ADMIN — CEFEPIME 100 MILLIGRAM(S): 1 INJECTION, POWDER, FOR SOLUTION INTRAMUSCULAR; INTRAVENOUS at 17:40

## 2021-02-14 RX ADMIN — PANTOPRAZOLE SODIUM 40 MILLIGRAM(S): 20 TABLET, DELAYED RELEASE ORAL at 04:43

## 2021-02-14 RX ADMIN — LISINOPRIL 40 MILLIGRAM(S): 2.5 TABLET ORAL at 11:04

## 2021-02-14 RX ADMIN — CEFEPIME 100 MILLIGRAM(S): 1 INJECTION, POWDER, FOR SOLUTION INTRAMUSCULAR; INTRAVENOUS at 04:44

## 2021-02-14 RX ADMIN — HEPARIN SODIUM 5000 UNIT(S): 5000 INJECTION INTRAVENOUS; SUBCUTANEOUS at 22:19

## 2021-02-14 RX ADMIN — Medication 200 MILLIGRAM(S): at 13:27

## 2021-02-14 RX ADMIN — Medication 25 MILLIGRAM(S): at 14:35

## 2021-02-14 RX ADMIN — LACTULOSE 10 GRAM(S): 10 SOLUTION ORAL at 13:27

## 2021-02-14 RX ADMIN — Medication 200 MILLIGRAM(S): at 22:18

## 2021-02-14 RX ADMIN — Medication 10 MILLIGRAM(S): at 13:10

## 2021-02-14 RX ADMIN — LACTULOSE 10 GRAM(S): 10 SOLUTION ORAL at 22:18

## 2021-02-14 RX ADMIN — PROPOFOL 12 MICROGRAM(S)/KG/MIN: 10 INJECTION, EMULSION INTRAVENOUS at 11:04

## 2021-02-14 RX ADMIN — Medication 133 MILLILITER(S): at 11:47

## 2021-02-14 RX ADMIN — LISINOPRIL 20 MILLIGRAM(S): 2.5 TABLET ORAL at 04:44

## 2021-02-14 RX ADMIN — SODIUM CHLORIDE 55 MILLILITER(S): 5 INJECTION, SOLUTION INTRAVENOUS at 11:05

## 2021-02-14 RX ADMIN — DEXMEDETOMIDINE HYDROCHLORIDE IN 0.9% SODIUM CHLORIDE 1.25 MICROGRAM(S)/KG/HR: 4 INJECTION INTRAVENOUS at 11:05

## 2021-02-14 NOTE — PROGRESS NOTE ADULT - SUBJECTIVE AND OBJECTIVE BOX
Subjective: 50yMale with a pmhx of INTRACRANIAL BLEED                                     ADM/AA    INTRACRANIAL BLEED    ^MED EVAL    Family history of breast cancer (Mother)    Family history of pancreatic cancer (Father)    Handoff    MEWS Score    HTN (hypertension)    GI bleed    Gastric ulcer    PUD (peptic ulcer disease)    Intracranial bleed    Intraparenchymal hemorrhage of brain    Insertion, external ventricular drain    Ventriculostomy, for ventricular catheter insertion    Arm fracture, left    MED EVAL    90+    SysAdmin_VisitLink        HD#[id number]  S/p EVD Placement for R basal ganglia hemorrhage.     Pt seen and examined at bedside in Vent Unit.  Pt is well sedated on Propofol @ 70mg, Precedex 1.5 mcg/kg/hr, non responsive to verbal stimulus.   Versed gtt was d/c'd yesterday.  Pt now having some movt of UE to pain.  EVD in place set at 15 cm H2O.  Waveform is dampened, no output from EVD.  ICP reads 11-14.  CTH done yesterday showed < from: CT Head No Cont (02.13.21 @ 13:38) >  IMPRESSION:  In comparison with the prior CT scan of the brain dated February 11, 2021:  Again demonstrated is a parenchymal hemorrhage with surrounding edema in the right frontal temporal region and right basal ganglia with associated mass effect and shift, unchanged. The etiology of the hemorrhage is uncertain at the present time and follow-up to resolution followed by postcontrast examination is recommended to exclude an underlying lesion.  The left lateral ventricle is dilated consistent with hydrocephalus, unchanged.  < end of copied text >    Given edema, 3% NaCl was restarted.  Na level today is 144.        Allergies    No Known Allergies    Intolerances        Imaging:    Vital Signs Last 24 Hrs  T(C): 37.4 (14 Feb 2021 08:00), Max: 37.4 (14 Feb 2021 08:00)  T(F): 99.4 (14 Feb 2021 08:00), Max: 99.4 (14 Feb 2021 08:00)  HR: 60 (14 Feb 2021 10:00) (56 - 65)  BP: 162/77 (13 Feb 2021 12:05) (162/77 - 162/77)  BP(mean): 101 (13 Feb 2021 12:05) (101 - 101)  RR: 24 (14 Feb 2021 10:00) (22 - 28)  SpO2: 96% (14 Feb 2021 10:00) (64% - 99%)      acetaminophen   Tablet .. 650 milliGRAM(s) Oral every 6 hours PRN  cefepime   IVPB 2000 milliGRAM(s) IV Intermittent every 12 hours  chlorhexidine 0.12% Liquid 15 milliLiter(s) Oral Mucosa every 12 hours  chlorhexidine 4% Liquid 1 Application(s) Topical <User Schedule>  cloNIDine 0.2 milliGRAM(s) Oral every 12 hours  dexMEDEtomidine Infusion 0.05 MICROgram(s)/kG/Hr IV Continuous <Continuous>  heparin   Injectable 5000 Unit(s) SubCutaneous every 8 hours  labetalol 200 milliGRAM(s) Oral every 8 hours  lactulose Syrup 10 Gram(s) Oral every 8 hours  levETIRAcetam  IVPB 500 milliGRAM(s) IV Intermittent every 12 hours  lisinopril 40 milliGRAM(s) Oral daily  metoclopramide 5 milliGRAM(s) Oral two times a day  midazolam Infusion 0.02 mG/kG/Hr IV Continuous <Continuous>  mineral oil enema 133 milliLiter(s) Rectal daily  niCARdipine Infusion 5 mG/Hr IV Continuous <Continuous>  pantoprazole  Injectable 40 milliGRAM(s) IV Push every 12 hours  polyethylene glycol 3350 17 Gram(s) Oral daily  propofol Infusion 20 MICROgram(s)/kG/Min IV Continuous <Continuous>  senna 2 Tablet(s) Oral at bedtime  sodium chloride 3%. 500 milliLiter(s) IV Continuous <Continuous>        02-13-21 @ 07:01 - 02-14-21 @ 07:00  --------------------------------------------------------  IN: 4317.5 mL / OUT: 4340 mL / NET: -22.5 mL    02-14-21 @ 07:01  -  02-14-21 @ 10:35  --------------------------------------------------------  IN: 702.5 mL / OUT: 630 mL / NET: 72.5 mL        REVIEW OF SYSTEMS    [ ] A ten-point review of systems was otherwise negative except as noted.  [x ] Due to altered mental status/intubation, subjective information were not able to be obtained from the patient. History was obtained, to the extent possible, from review of the chart and collateral sources of information.        Neuro Exam:  Intubated, sedated  not following commands  Pupils equal sluggishly reactive  + gag  + triggering vent  R hand movt with noxious stimuli  LUE turned inward with noxious stimuli  no movt of LE   Incision intact  EVD in place, not draining  no CSF in reservoir  ICP 11-14,  waveform dampened        CBC Full  -  ( 14 Feb 2021 04:53 )  WBC Count : 11.10 K/uL  RBC Count : 3.87 M/uL  Hemoglobin : 11.8 g/dL  Hematocrit : 36.3 %  Platelet Count - Automated : 300 K/uL  Mean Cell Volume : 93.8 fL  Mean Cell Hemoglobin : 30.5 pg  Mean Cell Hemoglobin Concentration : 32.5 g/dL  Auto Neutrophil # : x  Auto Lymphocyte # : x  Auto Monocyte # : x  Auto Eosinophil # : x  Auto Basophil # : x  Auto Neutrophil % : x  Auto Lymphocyte % : x  Auto Monocyte % : x  Auto Eosinophil % : x  Auto Basophil % : x    02-14    144  |  110  |  14  ----------------------------<  107<H>  4.0   |  25  |  0.8    Ca    8.0<L>      14 Feb 2021 04:53  Phos  3.4     02-14  Mg     2.1     02-14    TPro  5.0<L>  /  Alb  2.4<L>  /  TBili  0.5  /  DBili  x   /  AST  37  /  ALT  57<H>  /  AlkPhos  115  02-14    PT/INR - ( 14 Feb 2021 04:53 )   PT: 13.30 sec;   INR: 1.16 ratio                 Assessment/Plan:   cont 3%, target Na >150  neuro checks q 1hr  cont to wean Propofol by 10mg q 1hr to obtain exam  possible CT head tomorrow  cont EVD care, monitor ICP's  Dr. Lopez from neurosurgery will accept pt to neurocritical unit at [facility]  d/w att

## 2021-02-14 NOTE — PROGRESS NOTE ADULT - SUBJECTIVE AND OBJECTIVE BOX
Patient is a 50y old  Male who presents with a chief complaint of Altered mental status (13 Feb 2021 13:15)        Over Night Events:  remains on nicardipine,  3% saline,precedex, propofol      ROS:     All ROS are negative except HPI         PHYSICAL EXAM    ICU Vital Signs Last 24 Hrs  T(C): 37.4 (14 Feb 2021 08:00), Max: 37.4 (14 Feb 2021 08:00)  T(F): 99.4 (14 Feb 2021 08:00), Max: 99.4 (14 Feb 2021 08:00)  HR: 60 (14 Feb 2021 09:00) (56 - 65)  BP: 162/77 (13 Feb 2021 12:05) (156/81 - 162/77)  BP(mean): 101 (13 Feb 2021 12:05) (100 - 101)  ABP: 141/67 (14 Feb 2021 09:00) (141/67 - 190/110)  ABP(mean): 89 (14 Feb 2021 09:00) (83 - 105)  RR: 26 (14 Feb 2021 09:00) (22 - 28)  SpO2: 97% (14 Feb 2021 09:00) (64% - 99%)      CONSTITUTIONAL:  Well nourished.  NAD    ENT:   Airway patent,   Mouth with normal mucosa.   No thrush  intubated    EYES:   Pupils equal,   Round and reactive to light.    CARDIAC:   Normal rate,   Regular rhythm.    edema        RESPIRATORY:   No wheezing  Bilateral BS  Normal chest expansion  Not tachypneic,  No use of accessory muscles    GASTROINTESTINAL:  Abdomen soft,   Non-tender,   No guarding,   distendied    MUSCULOSKELETAL:   Range of motion is not limited,  No clubbing, cyanosis    NEUROLOGICAL:   sedated    SKIN:   Skin normal color for race,   Warm and dry and intact.   No evidence of rash.    PSYCHIATRIC:   sedated        02-13-21 @ 07:01  -  02-14-21 @ 07:00  --------------------------------------------------------  IN:    Dexmedetomidine: 855 mL    Enteral Tube Flush: 170 mL    IV PiggyBack: 50 mL    IV PiggyBack: 100 mL    Midazolam: 88 mL    NiCARdipine: 1579.5 mL    Propofol: 835 mL    sodium chloride 3%: 640 mL  Total IN: 4317.5 mL    OUT:    Drain (mL): 0 mL    Drain (mL): 350 mL    Indwelling Catheter - Urethral (mL): 3990 mL  Total OUT: 4340 mL    Total NET: -22.5 mL      02-14-21 @ 07:01  -  02-14-21 @ 09:11  --------------------------------------------------------  IN:    Dexmedetomidine: 75 mL    NiCARdipine: 150 mL    Propofol: 84 mL    sodium chloride 3%: 110 mL  Total IN: 419 mL    OUT:    Drain (mL): 0 mL    Indwelling Catheter - Urethral (mL): 300 mL    Midazolam: 0 mL  Total OUT: 300 mL    Total NET: 119 mL          LABS:                            11.8   11.10 )-----------( 300      ( 14 Feb 2021 04:53 )             36.3                                               02-14    144  |  110  |  14  ----------------------------<  107<H>  4.0   |  25  |  0.8    Ca    8.0<L>      14 Feb 2021 04:53  Phos  3.4     02-14  Mg     2.1     02-14    TPro  5.0<L>  /  Alb  2.4<L>  /  TBili  0.5  /  DBili  x   /  AST  37  /  ALT  57<H>  /  AlkPhos  115  02-14      PT/INR - ( 14 Feb 2021 04:53 )   PT: 13.30 sec;   INR: 1.16 ratio                                                                                              LIVER FUNCTIONS - ( 14 Feb 2021 04:53 )  Alb: 2.4 g/dL / Pro: 5.0 g/dL / ALK PHOS: 115 U/L / ALT: 57 U/L / AST: 37 U/L / GGT: x                                                                                               Mode: AC/ CMV (Assist Control/ Continuous Mandatory Ventilation)  RR (machine): 24  TV (machine): 450  FiO2: 40  PEEP: 8  ITime: 1  MAP: 13  PIP: 23                                      ABG - ( 14 Feb 2021 02:23 )  pH, Arterial: 7.45  pH, Blood: x     /  pCO2: 41    /  pO2: 73    / HCO3: 29    / Base Excess: 4.2   /  SaO2: 96                  MEDICATIONS  (STANDING):  cefepime   IVPB 2000 milliGRAM(s) IV Intermittent every 12 hours  chlorhexidine 0.12% Liquid 15 milliLiter(s) Oral Mucosa every 12 hours  chlorhexidine 4% Liquid 1 Application(s) Topical <User Schedule>  cloNIDine 0.2 milliGRAM(s) Oral every 12 hours  dexMEDEtomidine Infusion 0.05 MICROgram(s)/kG/Hr (1.25 mL/Hr) IV Continuous <Continuous>  heparin   Injectable 5000 Unit(s) SubCutaneous every 8 hours  labetalol 200 milliGRAM(s) Oral every 8 hours  lactulose Syrup 10 Gram(s) Oral every 8 hours  levETIRAcetam  IVPB 500 milliGRAM(s) IV Intermittent every 12 hours  lisinopril 20 milliGRAM(s) Oral daily  metoclopramide 5 milliGRAM(s) Oral two times a day  midazolam Infusion 0.02 mG/kG/Hr (2 mL/Hr) IV Continuous <Continuous>  mineral oil enema 133 milliLiter(s) Rectal daily  niCARdipine Infusion 5 mG/Hr (25 mL/Hr) IV Continuous <Continuous>  pantoprazole  Injectable 40 milliGRAM(s) IV Push every 12 hours  polyethylene glycol 3350 17 Gram(s) Oral daily  propofol Infusion 20 MICROgram(s)/kG/Min (12 mL/Hr) IV Continuous <Continuous>  senna 2 Tablet(s) Oral at bedtime  sodium chloride 3%. 500 milliLiter(s) (55 mL/Hr) IV Continuous <Continuous>    MEDICATIONS  (PRN):  acetaminophen   Tablet .. 650 milliGRAM(s) Oral every 6 hours PRN Temp greater or equal to 38C (100.4F)      New X-rays reviewed:                                                                                  ECHO    CXR interpreted by me:       Patient is a 50y old  Male who presents with a chief complaint of Altered mental status (13 Feb 2021 13:15)        Over Night Events:    remains on nicardipine,  3% saline, precedex, propofol, SP head CT, Neurosx reviewed      ROS:     All ROS are negative except HPI         PHYSICAL EXAM    ICU Vital Signs Last 24 Hrs  T(C): 37.4 (14 Feb 2021 08:00), Max: 37.4 (14 Feb 2021 08:00)  T(F): 99.4 (14 Feb 2021 08:00), Max: 99.4 (14 Feb 2021 08:00)  HR: 60 (14 Feb 2021 09:00) (56 - 65)  BP: 162/77 (13 Feb 2021 12:05) (156/81 - 162/77)  BP(mean): 101 (13 Feb 2021 12:05) (100 - 101)  ABP: 141/67 (14 Feb 2021 09:00) (141/67 - 190/110)  ABP(mean): 89 (14 Feb 2021 09:00) (83 - 105)  RR: 26 (14 Feb 2021 09:00) (22 - 28)  SpO2: 97% (14 Feb 2021 09:00) (64% - 99%)      CONSTITUTIONAL:  sedated    ENT:   Airway patent,   Mouth with normal mucosa.   No thrush  intubated    EYES:   Pupils equal,   Round and reactive to light.    CARDIAC:   Normal rate,   Regular rhythm.    edema        RESPIRATORY:   No wheezing  Bilateral BS  Normal chest expansion  Not tachypneic,  No use of accessory muscles    GASTROINTESTINAL:  Abdomen soft,   Non-tender,   No guarding,   distended ( BM 2/12)    MUSCULOSKELETAL:   Range of motion is not limited,  No clubbing, cyanosis    NEUROLOGICAL:   sedated    SKIN:   Skin normal color for race,   Warm and dry and intact.   No evidence of rash.    PSYCHIATRIC:   sedated        02-13-21 @ 07:01  -  02-14-21 @ 07:00  --------------------------------------------------------  IN:    Dexmedetomidine: 855 mL    Enteral Tube Flush: 170 mL    IV PiggyBack: 50 mL    IV PiggyBack: 100 mL    Midazolam: 88 mL    NiCARdipine: 1579.5 mL    Propofol: 835 mL    sodium chloride 3%: 640 mL  Total IN: 4317.5 mL    OUT:    Drain (mL): 0 mL    Drain (mL): 350 mL    Indwelling Catheter - Urethral (mL): 3990 mL  Total OUT: 4340 mL    Total NET: -22.5 mL      02-14-21 @ 07:01  -  02-14-21 @ 09:11  --------------------------------------------------------  IN:    Dexmedetomidine: 75 mL    NiCARdipine: 150 mL    Propofol: 84 mL    sodium chloride 3%: 110 mL  Total IN: 419 mL    OUT:    Drain (mL): 0 mL    Indwelling Catheter - Urethral (mL): 300 mL    Midazolam: 0 mL  Total OUT: 300 mL    Total NET: 119 mL          LABS:                            11.8   11.10 )-----------( 300      ( 14 Feb 2021 04:53 )             36.3                                               02-14    144  |  110  |  14  ----------------------------<  107<H>  4.0   |  25  |  0.8    Ca    8.0<L>      14 Feb 2021 04:53  Phos  3.4     02-14  Mg     2.1     02-14    TPro  5.0<L>  /  Alb  2.4<L>  /  TBili  0.5  /  DBili  x   /  AST  37  /  ALT  57<H>  /  AlkPhos  115  02-14      PT/INR - ( 14 Feb 2021 04:53 )   PT: 13.30 sec;   INR: 1.16 ratio                                                                                              LIVER FUNCTIONS - ( 14 Feb 2021 04:53 )  Alb: 2.4 g/dL / Pro: 5.0 g/dL / ALK PHOS: 115 U/L / ALT: 57 U/L / AST: 37 U/L / GGT: x                                                                                               Mode: AC/ CMV (Assist Control/ Continuous Mandatory Ventilation)  RR (machine): 24  TV (machine): 450  FiO2: 40  PEEP: 8  ITime: 1  MAP: 13  PIP: 23                                      ABG - ( 14 Feb 2021 02:23 )  pH, Arterial: 7.45  pH, Blood: x     /  pCO2: 41    /  pO2: 73    / HCO3: 29    / Base Excess: 4.2   /  SaO2: 96                  MEDICATIONS  (STANDING):  cefepime   IVPB 2000 milliGRAM(s) IV Intermittent every 12 hours  chlorhexidine 0.12% Liquid 15 milliLiter(s) Oral Mucosa every 12 hours  chlorhexidine 4% Liquid 1 Application(s) Topical <User Schedule>  cloNIDine 0.2 milliGRAM(s) Oral every 12 hours  dexMEDEtomidine Infusion 0.05 MICROgram(s)/kG/Hr (1.25 mL/Hr) IV Continuous <Continuous>  heparin   Injectable 5000 Unit(s) SubCutaneous every 8 hours  labetalol 200 milliGRAM(s) Oral every 8 hours  lactulose Syrup 10 Gram(s) Oral every 8 hours  levETIRAcetam  IVPB 500 milliGRAM(s) IV Intermittent every 12 hours  lisinopril 20 milliGRAM(s) Oral daily  metoclopramide 5 milliGRAM(s) Oral two times a day  midazolam Infusion 0.02 mG/kG/Hr (2 mL/Hr) IV Continuous <Continuous>  mineral oil enema 133 milliLiter(s) Rectal daily  niCARdipine Infusion 5 mG/Hr (25 mL/Hr) IV Continuous <Continuous>  pantoprazole  Injectable 40 milliGRAM(s) IV Push every 12 hours  polyethylene glycol 3350 17 Gram(s) Oral daily  propofol Infusion 20 MICROgram(s)/kG/Min (12 mL/Hr) IV Continuous <Continuous>  senna 2 Tablet(s) Oral at bedtime  sodium chloride 3%. 500 milliLiter(s) (55 mL/Hr) IV Continuous <Continuous>    MEDICATIONS  (PRN):  acetaminophen   Tablet .. 650 milliGRAM(s) Oral every 6 hours PRN Temp greater or equal to 38C (100.4F)      CXR reviewed

## 2021-02-14 NOTE — PROGRESS NOTE ADULT - ATTENDING COMMENTS
Agree with above.  Continue to wean sedation as tolerated for appropriate neurological exam.  SBP control.

## 2021-02-15 LAB
ALBUMIN SERPL ELPH-MCNC: 2.5 G/DL — LOW (ref 3.5–5.2)
ALBUMIN SERPL ELPH-MCNC: 2.5 G/DL — LOW (ref 3.5–5.2)
ALP SERPL-CCNC: 127 U/L — HIGH (ref 30–115)
ALP SERPL-CCNC: 134 U/L — HIGH (ref 30–115)
ALT FLD-CCNC: 47 U/L — HIGH (ref 0–41)
ALT FLD-CCNC: 49 U/L — HIGH (ref 0–41)
ANION GAP SERPL CALC-SCNC: 10 MMOL/L — SIGNIFICANT CHANGE UP (ref 7–14)
ANION GAP SERPL CALC-SCNC: 7 MMOL/L — SIGNIFICANT CHANGE UP (ref 7–14)
ANION GAP SERPL CALC-SCNC: 9 MMOL/L — SIGNIFICANT CHANGE UP (ref 7–14)
ANION GAP SERPL CALC-SCNC: 9 MMOL/L — SIGNIFICANT CHANGE UP (ref 7–14)
ANISOCYTOSIS BLD QL: SLIGHT — SIGNIFICANT CHANGE UP
AST SERPL-CCNC: 31 U/L — SIGNIFICANT CHANGE UP (ref 0–41)
AST SERPL-CCNC: 34 U/L — SIGNIFICANT CHANGE UP (ref 0–41)
BASOPHILS # BLD AUTO: 0.08 K/UL — SIGNIFICANT CHANGE UP (ref 0–0.2)
BASOPHILS NFR BLD AUTO: 0.9 % — SIGNIFICANT CHANGE UP (ref 0–1)
BILIRUB SERPL-MCNC: 0.6 MG/DL — SIGNIFICANT CHANGE UP (ref 0.2–1.2)
BILIRUB SERPL-MCNC: 0.6 MG/DL — SIGNIFICANT CHANGE UP (ref 0.2–1.2)
BUN SERPL-MCNC: 13 MG/DL — SIGNIFICANT CHANGE UP (ref 10–20)
BUN SERPL-MCNC: 14 MG/DL — SIGNIFICANT CHANGE UP (ref 10–20)
CALCIUM SERPL-MCNC: 7.9 MG/DL — LOW (ref 8.5–10.1)
CALCIUM SERPL-MCNC: 8.1 MG/DL — LOW (ref 8.5–10.1)
CHLORIDE SERPL-SCNC: 108 MMOL/L — SIGNIFICANT CHANGE UP (ref 98–110)
CHLORIDE SERPL-SCNC: 108 MMOL/L — SIGNIFICANT CHANGE UP (ref 98–110)
CHLORIDE SERPL-SCNC: 110 MMOL/L — SIGNIFICANT CHANGE UP (ref 98–110)
CHLORIDE SERPL-SCNC: 110 MMOL/L — SIGNIFICANT CHANGE UP (ref 98–110)
CO2 SERPL-SCNC: 24 MMOL/L — SIGNIFICANT CHANGE UP (ref 17–32)
CO2 SERPL-SCNC: 24 MMOL/L — SIGNIFICANT CHANGE UP (ref 17–32)
CO2 SERPL-SCNC: 25 MMOL/L — SIGNIFICANT CHANGE UP (ref 17–32)
CO2 SERPL-SCNC: 25 MMOL/L — SIGNIFICANT CHANGE UP (ref 17–32)
CREAT SERPL-MCNC: 0.7 MG/DL — SIGNIFICANT CHANGE UP (ref 0.7–1.5)
CULTURE RESULTS: SIGNIFICANT CHANGE UP
EOSINOPHIL # BLD AUTO: 1.41 K/UL — HIGH (ref 0–0.7)
EOSINOPHIL NFR BLD AUTO: 15.2 % — HIGH (ref 0–8)
GAS PNL BLDA: SIGNIFICANT CHANGE UP
GIANT PLATELETS BLD QL SMEAR: PRESENT — SIGNIFICANT CHANGE UP
GLUCOSE SERPL-MCNC: 111 MG/DL — HIGH (ref 70–99)
GLUCOSE SERPL-MCNC: 111 MG/DL — HIGH (ref 70–99)
GLUCOSE SERPL-MCNC: 113 MG/DL — HIGH (ref 70–99)
GLUCOSE SERPL-MCNC: 97 MG/DL — SIGNIFICANT CHANGE UP (ref 70–99)
HCT VFR BLD CALC: 35.6 % — LOW (ref 42–52)
HGB BLD-MCNC: 11.8 G/DL — LOW (ref 14–18)
INR BLD: 1.23 RATIO — SIGNIFICANT CHANGE UP (ref 0.65–1.3)
LYMPHOCYTES # BLD AUTO: 0.74 K/UL — LOW (ref 1.2–3.4)
LYMPHOCYTES # BLD AUTO: 8 % — LOW (ref 20.5–51.1)
MAGNESIUM SERPL-MCNC: 2.1 MG/DL — SIGNIFICANT CHANGE UP (ref 1.8–2.4)
MANUAL SMEAR VERIFICATION: SIGNIFICANT CHANGE UP
MCHC RBC-ENTMCNC: 30.6 PG — SIGNIFICANT CHANGE UP (ref 27–31)
MCHC RBC-ENTMCNC: 33.1 G/DL — SIGNIFICANT CHANGE UP (ref 32–37)
MCV RBC AUTO: 92.2 FL — SIGNIFICANT CHANGE UP (ref 80–94)
METAMYELOCYTES # FLD: 0.9 % — HIGH (ref 0–0)
MICROCYTES BLD QL: SLIGHT — SIGNIFICANT CHANGE UP
MONOCYTES # BLD AUTO: 0.25 K/UL — SIGNIFICANT CHANGE UP (ref 0.1–0.6)
MONOCYTES NFR BLD AUTO: 2.7 % — SIGNIFICANT CHANGE UP (ref 1.7–9.3)
MYELOCYTES NFR BLD: 5.3 % — HIGH (ref 0–0)
NEUTROPHILS # BLD AUTO: 6.03 K/UL — SIGNIFICANT CHANGE UP (ref 1.4–6.5)
NEUTROPHILS NFR BLD AUTO: 65.2 % — SIGNIFICANT CHANGE UP (ref 42.2–75.2)
NRBC # BLD: 1 /100 — HIGH (ref 0–0)
NRBC # BLD: SIGNIFICANT CHANGE UP /100 WBCS (ref 0–0)
PHOSPHATE SERPL-MCNC: 3.6 MG/DL — SIGNIFICANT CHANGE UP (ref 2.1–4.9)
PLAT MORPH BLD: NORMAL — SIGNIFICANT CHANGE UP
PLATELET # BLD AUTO: 301 K/UL — SIGNIFICANT CHANGE UP (ref 130–400)
POLYCHROMASIA BLD QL SMEAR: SLIGHT — SIGNIFICANT CHANGE UP
POTASSIUM SERPL-MCNC: 3.5 MMOL/L — SIGNIFICANT CHANGE UP (ref 3.5–5)
POTASSIUM SERPL-MCNC: 3.6 MMOL/L — SIGNIFICANT CHANGE UP (ref 3.5–5)
POTASSIUM SERPL-MCNC: 3.7 MMOL/L — SIGNIFICANT CHANGE UP (ref 3.5–5)
POTASSIUM SERPL-MCNC: 3.8 MMOL/L — SIGNIFICANT CHANGE UP (ref 3.5–5)
POTASSIUM SERPL-SCNC: 3.5 MMOL/L — SIGNIFICANT CHANGE UP (ref 3.5–5)
POTASSIUM SERPL-SCNC: 3.6 MMOL/L — SIGNIFICANT CHANGE UP (ref 3.5–5)
POTASSIUM SERPL-SCNC: 3.7 MMOL/L — SIGNIFICANT CHANGE UP (ref 3.5–5)
POTASSIUM SERPL-SCNC: 3.8 MMOL/L — SIGNIFICANT CHANGE UP (ref 3.5–5)
PROMYELOCYTES # FLD: 0.9 % — HIGH (ref 0–0)
PROT SERPL-MCNC: 5 G/DL — LOW (ref 6–8)
PROT SERPL-MCNC: 5.2 G/DL — LOW (ref 6–8)
PROTHROM AB SERPL-ACNC: 14.1 SEC — HIGH (ref 9.95–12.87)
RBC # BLD: 3.86 M/UL — LOW (ref 4.7–6.1)
RBC # FLD: 12.2 % — SIGNIFICANT CHANGE UP (ref 11.5–14.5)
RBC BLD AUTO: NORMAL — SIGNIFICANT CHANGE UP
SMUDGE CELLS # BLD: PRESENT — SIGNIFICANT CHANGE UP
SODIUM SERPL-SCNC: 142 MMOL/L — SIGNIFICANT CHANGE UP (ref 135–146)
SODIUM SERPL-SCNC: 143 MMOL/L — SIGNIFICANT CHANGE UP (ref 135–146)
SPECIMEN SOURCE: SIGNIFICANT CHANGE UP
VARIANT LYMPHS # BLD: 0.9 % — SIGNIFICANT CHANGE UP (ref 0–5)
WBC # BLD: 9.25 K/UL — SIGNIFICANT CHANGE UP (ref 4.8–10.8)
WBC # FLD AUTO: 9.25 K/UL — SIGNIFICANT CHANGE UP (ref 4.8–10.8)

## 2021-02-15 PROCEDURE — 99223 1ST HOSP IP/OBS HIGH 75: CPT

## 2021-02-15 PROCEDURE — 74018 RADEX ABDOMEN 1 VIEW: CPT | Mod: 26

## 2021-02-15 PROCEDURE — 99232 SBSQ HOSP IP/OBS MODERATE 35: CPT

## 2021-02-15 PROCEDURE — 70450 CT HEAD/BRAIN W/O DYE: CPT | Mod: 26

## 2021-02-15 PROCEDURE — 71045 X-RAY EXAM CHEST 1 VIEW: CPT | Mod: 26

## 2021-02-15 PROCEDURE — 74177 CT ABD & PELVIS W/CONTRAST: CPT | Mod: 26

## 2021-02-15 RX ORDER — HYDRALAZINE HCL 50 MG
50 TABLET ORAL EVERY 6 HOURS
Refills: 0 | Status: DISCONTINUED | OUTPATIENT
Start: 2021-02-15 | End: 2021-02-23

## 2021-02-15 RX ORDER — SODIUM CHLORIDE 5 G/100ML
500 INJECTION, SOLUTION INTRAVENOUS
Refills: 0 | Status: DISCONTINUED | OUTPATIENT
Start: 2021-02-15 | End: 2021-02-15

## 2021-02-15 RX ORDER — KETOROLAC TROMETHAMINE 30 MG/ML
15 SYRINGE (ML) INJECTION ONCE
Refills: 0 | Status: DISCONTINUED | OUTPATIENT
Start: 2021-02-15 | End: 2021-02-15

## 2021-02-15 RX ORDER — SODIUM CHLORIDE 5 G/100ML
500 INJECTION, SOLUTION INTRAVENOUS
Refills: 0 | Status: DISCONTINUED | OUTPATIENT
Start: 2021-02-15 | End: 2021-02-16

## 2021-02-15 RX ORDER — IOHEXOL 300 MG/ML
30 INJECTION, SOLUTION INTRAVENOUS ONCE
Refills: 0 | Status: COMPLETED | OUTPATIENT
Start: 2021-02-15 | End: 2021-02-15

## 2021-02-15 RX ADMIN — NICARDIPINE HYDROCHLORIDE 75 MG/HR: 30 CAPSULE, EXTENDED RELEASE ORAL at 13:13

## 2021-02-15 RX ADMIN — PROPOFOL 12 MICROGRAM(S)/KG/MIN: 10 INJECTION, EMULSION INTRAVENOUS at 08:22

## 2021-02-15 RX ADMIN — PANTOPRAZOLE SODIUM 40 MILLIGRAM(S): 20 TABLET, DELAYED RELEASE ORAL at 17:35

## 2021-02-15 RX ADMIN — CHLORHEXIDINE GLUCONATE 15 MILLILITER(S): 213 SOLUTION TOPICAL at 17:35

## 2021-02-15 RX ADMIN — PANTOPRAZOLE SODIUM 40 MILLIGRAM(S): 20 TABLET, DELAYED RELEASE ORAL at 05:29

## 2021-02-15 RX ADMIN — NICARDIPINE HYDROCHLORIDE 75 MG/HR: 30 CAPSULE, EXTENDED RELEASE ORAL at 15:19

## 2021-02-15 RX ADMIN — Medication 15 MILLIGRAM(S): at 13:12

## 2021-02-15 RX ADMIN — Medication 0.2 MILLIGRAM(S): at 17:25

## 2021-02-15 RX ADMIN — DEXMEDETOMIDINE HYDROCHLORIDE IN 0.9% SODIUM CHLORIDE 1.25 MICROGRAM(S)/KG/HR: 4 INJECTION INTRAVENOUS at 17:40

## 2021-02-15 RX ADMIN — PROPOFOL 12 MICROGRAM(S)/KG/MIN: 10 INJECTION, EMULSION INTRAVENOUS at 05:35

## 2021-02-15 RX ADMIN — NICARDIPINE HYDROCHLORIDE 25 MG/HR: 30 CAPSULE, EXTENDED RELEASE ORAL at 05:35

## 2021-02-15 RX ADMIN — SODIUM CHLORIDE 55 MILLILITER(S): 5 INJECTION, SOLUTION INTRAVENOUS at 02:45

## 2021-02-15 RX ADMIN — Medication 50 MILLIGRAM(S): at 13:12

## 2021-02-15 RX ADMIN — DEXMEDETOMIDINE HYDROCHLORIDE IN 0.9% SODIUM CHLORIDE 1.25 MICROGRAM(S)/KG/HR: 4 INJECTION INTRAVENOUS at 15:19

## 2021-02-15 RX ADMIN — Medication 50 MILLIGRAM(S): at 17:25

## 2021-02-15 RX ADMIN — MIDAZOLAM HYDROCHLORIDE 2 MG/KG/HR: 1 INJECTION, SOLUTION INTRAMUSCULAR; INTRAVENOUS at 08:21

## 2021-02-15 RX ADMIN — NICARDIPINE HYDROCHLORIDE 25 MG/HR: 30 CAPSULE, EXTENDED RELEASE ORAL at 09:08

## 2021-02-15 RX ADMIN — Medication 50 MILLIGRAM(S): at 23:14

## 2021-02-15 RX ADMIN — HEPARIN SODIUM 5000 UNIT(S): 5000 INJECTION INTRAVENOUS; SUBCUTANEOUS at 15:20

## 2021-02-15 RX ADMIN — DEXMEDETOMIDINE HYDROCHLORIDE IN 0.9% SODIUM CHLORIDE 1.25 MICROGRAM(S)/KG/HR: 4 INJECTION INTRAVENOUS at 10:33

## 2021-02-15 RX ADMIN — IOHEXOL 30 MILLILITER(S): 300 INJECTION, SOLUTION INTRAVENOUS at 09:17

## 2021-02-15 RX ADMIN — PROPOFOL 12 MICROGRAM(S)/KG/MIN: 10 INJECTION, EMULSION INTRAVENOUS at 15:19

## 2021-02-15 RX ADMIN — LACTULOSE 10 GRAM(S): 10 SOLUTION ORAL at 05:29

## 2021-02-15 RX ADMIN — HEPARIN SODIUM 5000 UNIT(S): 5000 INJECTION INTRAVENOUS; SUBCUTANEOUS at 05:28

## 2021-02-15 RX ADMIN — CHLORHEXIDINE GLUCONATE 15 MILLILITER(S): 213 SOLUTION TOPICAL at 05:28

## 2021-02-15 RX ADMIN — CHLORHEXIDINE GLUCONATE 1 APPLICATION(S): 213 SOLUTION TOPICAL at 05:34

## 2021-02-15 RX ADMIN — HEPARIN SODIUM 5000 UNIT(S): 5000 INJECTION INTRAVENOUS; SUBCUTANEOUS at 21:36

## 2021-02-15 RX ADMIN — Medication 25 MILLIGRAM(S): at 05:34

## 2021-02-15 RX ADMIN — NICARDIPINE HYDROCHLORIDE 25 MG/HR: 30 CAPSULE, EXTENDED RELEASE ORAL at 02:44

## 2021-02-15 RX ADMIN — SODIUM CHLORIDE 80 MILLILITER(S): 5 INJECTION, SOLUTION INTRAVENOUS at 15:21

## 2021-02-15 RX ADMIN — DEXMEDETOMIDINE HYDROCHLORIDE IN 0.9% SODIUM CHLORIDE 1.25 MICROGRAM(S)/KG/HR: 4 INJECTION INTRAVENOUS at 02:44

## 2021-02-15 RX ADMIN — MIDAZOLAM HYDROCHLORIDE 2 MG/KG/HR: 1 INJECTION, SOLUTION INTRAMUSCULAR; INTRAVENOUS at 23:49

## 2021-02-15 RX ADMIN — DEXMEDETOMIDINE HYDROCHLORIDE IN 0.9% SODIUM CHLORIDE 1.25 MICROGRAM(S)/KG/HR: 4 INJECTION INTRAVENOUS at 13:14

## 2021-02-15 RX ADMIN — Medication 200 MILLIGRAM(S): at 21:36

## 2021-02-15 RX ADMIN — CEFEPIME 100 MILLIGRAM(S): 1 INJECTION, POWDER, FOR SOLUTION INTRAMUSCULAR; INTRAVENOUS at 17:24

## 2021-02-15 RX ADMIN — Medication 5 MILLIGRAM(S): at 05:30

## 2021-02-15 RX ADMIN — Medication 200 MILLIGRAM(S): at 05:29

## 2021-02-15 RX ADMIN — DEXMEDETOMIDINE HYDROCHLORIDE IN 0.9% SODIUM CHLORIDE 1.25 MICROGRAM(S)/KG/HR: 4 INJECTION INTRAVENOUS at 05:35

## 2021-02-15 RX ADMIN — Medication 0.2 MILLIGRAM(S): at 05:29

## 2021-02-15 RX ADMIN — Medication 5 MILLIGRAM(S): at 17:35

## 2021-02-15 RX ADMIN — LEVETIRACETAM 420 MILLIGRAM(S): 250 TABLET, FILM COATED ORAL at 17:26

## 2021-02-15 RX ADMIN — DEXMEDETOMIDINE HYDROCHLORIDE IN 0.9% SODIUM CHLORIDE 1.25 MICROGRAM(S)/KG/HR: 4 INJECTION INTRAVENOUS at 08:21

## 2021-02-15 RX ADMIN — LISINOPRIL 40 MILLIGRAM(S): 2.5 TABLET ORAL at 05:30

## 2021-02-15 RX ADMIN — PROPOFOL 12 MICROGRAM(S)/KG/MIN: 10 INJECTION, EMULSION INTRAVENOUS at 02:44

## 2021-02-15 RX ADMIN — Medication 200 MILLIGRAM(S): at 15:20

## 2021-02-15 RX ADMIN — PROPOFOL 12 MICROGRAM(S)/KG/MIN: 10 INJECTION, EMULSION INTRAVENOUS at 17:24

## 2021-02-15 RX ADMIN — CEFEPIME 100 MILLIGRAM(S): 1 INJECTION, POWDER, FOR SOLUTION INTRAMUSCULAR; INTRAVENOUS at 05:32

## 2021-02-15 RX ADMIN — LEVETIRACETAM 420 MILLIGRAM(S): 250 TABLET, FILM COATED ORAL at 05:30

## 2021-02-15 NOTE — CONSULT NOTE ADULT - ASSESSMENT
50 years old right handed Male with PMHx of HTN, PUD brought in by EMS for AMS and left sided weakness. Found to have Moderate to large intraparenchymal right basal ganglia hemorrhage with mild surrounding edema and associated right sulcal effacement as well as decompression into the ventricles s/p EVD by neurosurgery. Hospital course complicated with klebsiella PNA, respiratory failure s/p intubation and DVT. GI consulted because patient is distended on exam as per primary team.       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

## 2021-02-15 NOTE — PROGRESS NOTE ADULT - ATTENDING COMMENTS
History, events, data and scans reviewed. Patient examined at bedside on 2/15/2021. I agree and approved plan of care as outlined above.

## 2021-02-15 NOTE — PROGRESS NOTE ADULT - SUBJECTIVE AND OBJECTIVE BOX
OVERNIGHT EVENTS: events noted, on propofol 60, prercedex, hyppertonic 65 cc/h, nicardipine, versed    Vital Signs Last 24 Hrs  T(C): 37.4 (15 Feb 2021 04:00), Max: 37.6 (15 Feb 2021 00:00)  T(F): 99.4 (15 Feb 2021 04:00), Max: 99.6 (15 Feb 2021 00:00)  HR: 62 (15 Feb 2021 07:00) (56 - 71)  BP: 145/67 (14 Feb 2021 15:58) (145/67 - 145/67)  BP(mean): 85 (14 Feb 2021 15:58) (85 - 85)  RR: 24 (15 Feb 2021 07:00) (24 - 29)  SpO2: 96% (15 Feb 2021 07:00) (95% - 98%)    PHYSICAL EXAMINATION:    GENERAL: sedated    HEENT: Head is normocephalic and atraumatic.    NECK: Supple.    LUNGS: dec bs both bases    HEART: OLIVE 3/6    ABDOMEN: Soft, distednded    EXTREMITIES: Without any cyanosis, clubbing, rash, lesions or edema.    NEUROLOGIC: sedated    SKIN: No ulceration or induration present.      LABS:                        11.8   9.25  )-----------( 301      ( 15 Feb 2021 04:30 )             35.6     02-15    142  |  108  |  14  ----------------------------<  111<H>  3.6   |  25  |  0.7    Ca    8.1<L>      15 Feb 2021 04:30  Phos  3.6     02-15  Mg     2.1     02-15    TPro  5.0<L>  /  Alb  2.5<L>  /  TBili  0.6  /  DBili  x   /  AST  31  /  ALT  49<H>  /  AlkPhos  127<H>  02-15    PT/INR - ( 15 Feb 2021 04:30 )   PT: 14.10 sec;   INR: 1.23 ratio             ABG - ( 15 Feb 2021 02:11 )  pH, Arterial: 7.48  pH, Blood: x     /  pCO2: 38    /  pO2: 78    / HCO3: 28    / Base Excess: 4.5   /  SaO2: 96        450/24/40/8                        02-14-21 @ 07:01  -  02-15-21 @ 07:00  --------------------------------------------------------  IN: 5517.5 mL / OUT: 4330 mL / NET: 1187.5 mL        MICROBIOLOGY:  Culture Results:   No growth to date. (02-13 @ 08:46)      MEDICATIONS  (STANDING):  cefepime   IVPB 2000 milliGRAM(s) IV Intermittent every 12 hours  chlorhexidine 0.12% Liquid 15 milliLiter(s) Oral Mucosa every 12 hours  chlorhexidine 4% Liquid 1 Application(s) Topical <User Schedule>  cloNIDine 0.2 milliGRAM(s) Oral every 12 hours  dexMEDEtomidine Infusion 0.05 MICROgram(s)/kG/Hr (1.25 mL/Hr) IV Continuous <Continuous>  heparin   Injectable 5000 Unit(s) SubCutaneous every 8 hours  hydrALAZINE 25 milliGRAM(s) Oral every 8 hours  labetalol 200 milliGRAM(s) Oral every 8 hours  lactulose Syrup 10 Gram(s) Oral every 8 hours  levETIRAcetam  IVPB 500 milliGRAM(s) IV Intermittent every 12 hours  lisinopril 40 milliGRAM(s) Oral daily  metoclopramide 5 milliGRAM(s) Oral two times a day  midazolam Infusion 0.02 mG/kG/Hr (2 mL/Hr) IV Continuous <Continuous>  mineral oil enema 133 milliLiter(s) Rectal daily  niCARdipine Infusion 5 mG/Hr (25 mL/Hr) IV Continuous <Continuous>  pantoprazole  Injectable 40 milliGRAM(s) IV Push every 12 hours  polyethylene glycol 3350 17 Gram(s) Oral daily  propofol Infusion 20 MICROgram(s)/kG/Min (12 mL/Hr) IV Continuous <Continuous>  senna 2 Tablet(s) Oral at bedtime  sodium chloride 3%. 500 milliLiter(s) (65 mL/Hr) IV Continuous <Continuous>    MEDICATIONS  (PRN):  acetaminophen   Tablet .. 650 milliGRAM(s) Oral every 6 hours PRN Temp greater or equal to 38C (100.4F)      RADIOLOGY & ADDITIONAL STUDIES:

## 2021-02-15 NOTE — PROGRESS NOTE ADULT - SUBJECTIVE AND OBJECTIVE BOX
Neurocritical Care Progress Note:    HPI:  50 years old right handed Male with PMHx of HTN, GI bleed, PUD,  Mrankin score of 0 at baseline was brought in by EMS for  AMS and left sided weakness.   Wife states that patient was having a usual day today went to bathroom and then the next thing she noted was that he was confused and was lying on the bathroom floor, she reports urinary incontinence, patient was confused, had trouble speaking, and could not move his left side.     In the ED, /148, HR 80, saturating well. Code stroke called, NIHSS 24 and GCS 11, CTH showed Moderate to large intraparenchymal right basal ganglia hemorrhage with mild surrounding edema and associated right sulcal effacement as well as decompression into the ventricles. There is approximately 6.3 mm of midline shift. CTA did not show any evidence of active bleeding, AVM or aneurysm or major vascular stenosis or occlusion. Pt became unresponsive in CT scan and had to be intubated and sedated. Patient was intubated in ED for worsening mental status and GCS. Started on Nicardipine drip for sbp in 200s and EVD was placed by neurosurgery at bedside. Pan trauma scan was negative. To be admitted under ICU for further monitoring.  (27 Jan 2021 23:13)      Medications:   cefepime   IVPB 2000 milliGRAM(s) IV Intermittent every 12 hours  chlorhexidine 0.12% Liquid 15 milliLiter(s) Oral Mucosa every 12 hours  chlorhexidine 4% Liquid 1 Application(s) Topical <User Schedule>  cloNIDine 0.2 milliGRAM(s) Oral every 12 hours  dexMEDEtomidine Infusion 0.05 MICROgram(s)/kG/Hr IV Continuous <Continuous>  heparin   Injectable 5000 Unit(s) SubCutaneous every 8 hours  hydrALAZINE 50 milliGRAM(s) Oral every 6 hours  ketorolac   Injectable 15 milliGRAM(s) IV Push once  labetalol 200 milliGRAM(s) Oral every 8 hours  lactulose Syrup 10 Gram(s) Oral every 8 hours  levETIRAcetam  IVPB 500 milliGRAM(s) IV Intermittent every 12 hours  lisinopril 40 milliGRAM(s) Oral daily  metoclopramide 5 milliGRAM(s) Oral two times a day  midazolam Infusion 0.02 mG/kG/Hr IV Continuous <Continuous>  mineral oil enema 133 milliLiter(s) Rectal daily  niCARdipine Infusion 15 mG/Hr IV Continuous <Continuous>  pantoprazole  Injectable 40 milliGRAM(s) IV Push every 12 hours  polyethylene glycol 3350 17 Gram(s) Oral daily  propofol Infusion 20 MICROgram(s)/kG/Min IV Continuous <Continuous>  senna 2 Tablet(s) Oral at bedtime  sodium chloride 3%. 500 milliLiter(s) IV Continuous <Continuous>      Ancillary Management:   Chest PT[ ]   Head of bed >35 [ x]   Out of bed to chair [ ]   PT/OT/SP Eval [ ]   Spirometry[ ]   DVT prophalaxis[x ]    8.Neuro:   Awake: Spontaneously[ ] Occasionally[ ] To Voice [ ] To painful stimuli [ ] none[x]  AIert [ ]. Following commands: 3 steps[ ], 2 steps[ ], 1 step [ ], None [ x]   Orientation: 0[x ], 1[ ], 2[ ], 3[ ]. Tracking objects with eyes: [ ]   Language:   Time off sedation for exam:   Pupils: Right   2>1.5   Left    2>1.5      Corneal:   +   Gag reflex:+     EOMI: no gaze    mRS:5  0 No symptoms at all  1 No significant disability despite symptoms; able to carry out all usual duties and activities without assistance  2 Slight disability; unable to carry out all previous activities, but able to look after own affairs  3 Moderate disability; requiring some help, but able to walk without assistance  4 Moderately severe disability; unable to walk without assistance and unable to attend to own bodily needs without assistance  5 Severe disability; bedridden, incontinent and requiring constant nursing care and attention  6 Dead    ICHs:  Age >=80  GCS Score: 3-4 (2 pts)   GCS Score: 5-12 (1 pt)   ICH Volume: >30  (+) IVH  (+) Infratentorial    [place]vasn:  Grade I = Asymptomatic, mild headache, slight nuchal rigidity  Grade II = Moderate to severe headache, nuchal rigidity, no neurological deficit other than cranial nerve palsy  Grade III = Drowiness, confusion, mild focal neurological deficit  Grade IV = Stupor, moderate to severe hemiparesis  Grade V = Coma, decerebrate posturing    m-[place]:  Grade 0   (No Subarachnoid Hemorrhage (SAH)), No intraventricular hemorrhage (IVH), Incidence of symptomatic vasopasm 0%  Grade 1   (Focal or diffuse, thin SAH), No IVH, incidence of symptomatic vasospasm 24%  Grade 2   (Thin focal or diffuse SAH), IVH present, incidence of symptomatic vasospasm 33%  Grade 3   (Thick focal or diffuse SAH), No IVH, incidence of symptomatic vasospasm 33%  Grade 4   (Thick focal or diffuse SAH), IVH present, incidence of symptomatic vasosasm 40%    Last CTH:    < from: CT Head No Cont (02.13.21 @ 13:38) >  IMPRESSION:    In comparison with the prior CT scan of the brain dated February 11, 2021:    Again demonstrated is a parenchymal hemorrhage with surrounding edema in the right frontal temporal region and right basal ganglia with associated mass effect and shift, unchanged. The etiology of the hemorrhage is uncertain at the present time and follow-up to resolution followed by postcontrast examination is recommended to exclude an underlying lesion.    The left lateral ventricle is dilated consistent with hydrocephalus, unchanged.                < end of copied text >  Last CTA/MRA:  < from: CT Angio Neck w/ IV Cont (01.27.21 @ 21:08) >    IMPRESSION    The previously noted area of intracerebral hemorrhage in the right basal ganglia region is again identified. Please see report of the CT scan of the head dated 1/27/2021.    No evidence of active bleeding, AVM or aneurysm.    No evidence of major vascular stenosis or occlusion,          < end of copied text >    Last CTP:    Last MRI:    Last TCD:    Last EEG:    VEEG in the last 24 hours: sedated and intubated    Background----------------   low amplitude , sleep recording with minimal reactivity    Focal and generalized slowing------------- generalized slowing.  No focal slowing    Interictal activity--------------  none    Events------------------- none    Seizures-------------- none    Impression:   generalized slowing    Plan - discussed with the  NCC team  EVD: [ ] [place]: [ ]     ICP:11     CPP:   75  Level(cm):  15   24hr(ml):pending     CSF:     W:     R:     Cx:     P:     G:     LA:       Gram stain:    9. Cardiovascular:   Vital Signs Last 24 Hrs  T(C): 37.2 (15 Feb 2021 08:00), Max: 37.6 (15 Feb 2021 00:00)  T(F): 99 (15 Feb 2021 08:00), Max: 99.6 (15 Feb 2021 00:00)  HR: 61 (15 Feb 2021 11:00) (56 - 71)  BP: 145/67 (14 Feb 2021 15:58) (145/67 - 145/67)  BP(mean): 85 (14 Feb 2021 15:58) (85 - 85)  RR: 24 (15 Feb 2021 11:00) (24 - 29)  SpO2: 97% (15 Feb 2021 11:00) (93% - 98%)     Last Echo:    Last EKG:    CVP   MAP/CPP/SBP target:   CO:      CI:       Enzymes/Trop:    10. Respiratory:   ABG:ABG - ( 15 Feb 2021 02:11 )  pH, Arterial: 7.48  pH, Blood: x     /  pCO2: 38    /  pO2: 78    / HCO3: 28    / Base Excess: 4.5   /  SaO2: 96                  VBG:    Chest Xray:  < from: Xray Chest 1 View- PORTABLE-Routine (Xray Chest 1 View- PORTABLE-Routine in AM.) (02.15.21 @ 05:57) >  Impression:    Bilateral opacities, unchanged.    Support tubes and lines as above.      < end of copied text >    Mode: AC/ CMV (Assist Control/ Continuous Mandatory Ventilation)  RR (machine): 24  TV (machine): 450  FiO2: 40  PEEP: 8  ITime: 1  MAP: 13  PIP: 27      Peak Pressure/Callands Pressure:    11.GI:    Prophalaxis: protonix   Bowel mvt:     Abd distension:   LIVER FUNCTIONS - ( 15 Feb 2021 04:30 )  Alb: 2.5 g/dL / Pro: 5.0 g/dL / ALK PHOS: 127 U/L / ALT: 49 U/L / AST: 31 U/L / GGT: x             12.Renal/Fluids/Electrolytes:    02-15    142  |  108  |  14  ----------------------------<  111<H>  3.6   |  25  |  0.7    Ca    8.1<L>      15 Feb 2021 04:30  Phos  3.6     02-15  Mg     2.1     02-15    TPro  5.0<L>  /  Alb  2.5<L>  /  TBili  0.6  /  DBili  x   /  AST  31  /  ALT  49<H>  /  AlkPhos  127<H>  02-15      I&O's Detail    14 Feb 2021 07:01  -  15 Feb 2021 07:00  --------------------------------------------------------  IN:    Dexmedetomidine: 900 mL    IV PiggyBack: 100 mL    IV PiggyBack: 100 mL    NiCARdipine: 2237.5 mL    Propofol: 840 mL    sodium chloride 3%: 1340 mL  Total IN: 5517.5 mL    OUT:    Drain (mL): 300 mL    Drain (mL): 0 mL    Indwelling Catheter - Urethral (mL): 4030 mL    Midazolam: 0 mL  Total OUT: 4330 mL    Total NET: 1187.5 mL      15 Feb 2021 07:01  -  15 Feb 2021 12:11  --------------------------------------------------------  IN:    Dexmedetomidine: 187.5 mL    Midazolam: 25 mL    NiCARdipine: 135 mL    NiCARdipine: 200 mL    Propofol: 180 mL    sodium chloride 3%: 130 mL    sodium chloride 3%: 160 mL    sodium chloride 3%: 80 mL  Total IN: 1097.5 mL    OUT:    Drain (mL): 0 mL    Indwelling Catheter - Urethral (mL): 675 mL    Rectal Tube (mL): 1000 mL  Total OUT: 1675 mL    Total NET: -577.5 mL          13.ID:   TMax: 99.6  Vital Signs Last 24 Hrs  T(C): 37.2 (15 Feb 2021 08:00), Max: 37.6 (15 Feb 2021 00:00)  T(F): 99 (15 Feb 2021 08:00), Max: 99.6 (15 Feb 2021 00:00)  HR: 61 (15 Feb 2021 11:00) (56 - 71)  BP: 145/67 (14 Feb 2021 15:58) (145/67 - 145/67)  BP(mean): 85 (14 Feb 2021 15:58) (85 - 85)  RR: 24 (15 Feb 2021 11:00) (24 - 29)  SpO2: 97% (15 Feb 2021 11:00) (93% - 98%)           Lines: Central[] Date inserted: Peripheral[]    14. Hematology:                         11.8   9.25  )-----------( 301      ( 15 Feb 2021 04:30 )             35.6      02-15    142  |  108  |  14  ----------------------------<  111<H>  3.6   |  25  |  0.7    Ca    8.1<L>      15 Feb 2021 04:30  Phos  3.6     02-15  Mg     2.1     02-15    TPro  5.0<L>  /  Alb  2.5<L>  /  TBili  0.6  /  DBili  x   /  AST  31  /  ALT  49<H>  /  AlkPhos  127<H>  02-15     PT/INR - ( 15 Feb 2021 04:30 )   PT: 14.10 sec;   INR: 1.23 ratio             DVT Prophylaxis Lovenox[ ] Heparin[ ] Venodynes[ ] SCD's[x ]

## 2021-02-15 NOTE — CONSULT NOTE ADULT - SUBJECTIVE AND OBJECTIVE BOX
Gastroenterology Consultation:    Patient is a 50y old  Male who presents with a chief complaint of Altered mental status (15 Feb 2021 12:11)      Admitted on: 01-28-21  HPI:  50 years old right handed Male with PMHx of HTN, PUD brought in by EMS for AMS and left sided weakness. Found to have Moderate to large intraparenchymal right basal ganglia hemorrhage with mild surrounding edema and associated right sulcal effacement as well as decompression into the ventricles s/p EVD by neurosurgery. Hospital course complicated with klebsiella PNA, respiratory failure s/p intubation and DVT. GI consulted because patient is distended on exam as per primary team. Patient had BM this morning.     Prior records Reviewed (Y/N): Y  History obtained from person other than patient (Y/N): Y/ chart    Prior EGD:  < from: EGD (02.15.19 @ 11:45) >  Impressions:    Normal mucosa in the whole esophagus.    -There is 1 cm antral ulcer with flat red spot and erosive antritis.    -Random biopsies were done in antrum and body of the stomach. .    Normal mucosa in the duodenal bulb, second part of the duodenum and third part  of the duodenum.     < end of copied text >    Prior Colonoscopy:  None in system      PAST MEDICAL & SURGICAL HISTORY:  HTN (hypertension)    GI bleed    Gastric ulcer    PUD (peptic ulcer disease)    Arm fracture, left  s/p surgical repair        FAMILY HISTORY:  Family history of breast cancer (Mother)    Family history of pancreatic cancer (Father)        Social History:  Tobacco: unable to obtain  Alcohol: unable to obtain  Drugs: unable to obtain    Home Medications:    MEDICATIONS  (STANDING):  cefepime   IVPB 2000 milliGRAM(s) IV Intermittent every 12 hours  chlorhexidine 0.12% Liquid 15 milliLiter(s) Oral Mucosa every 12 hours  chlorhexidine 4% Liquid 1 Application(s) Topical <User Schedule>  cloNIDine 0.2 milliGRAM(s) Oral every 12 hours  dexMEDEtomidine Infusion 0.05 MICROgram(s)/kG/Hr (1.25 mL/Hr) IV Continuous <Continuous>  heparin   Injectable 5000 Unit(s) SubCutaneous every 8 hours  hydrALAZINE 50 milliGRAM(s) Oral every 6 hours  labetalol 200 milliGRAM(s) Oral every 8 hours  lactulose Syrup 10 Gram(s) Oral every 8 hours  levETIRAcetam  IVPB 500 milliGRAM(s) IV Intermittent every 12 hours  lisinopril 40 milliGRAM(s) Oral daily  metoclopramide 5 milliGRAM(s) Oral two times a day  midazolam Infusion 0.02 mG/kG/Hr (2 mL/Hr) IV Continuous <Continuous>  mineral oil enema 133 milliLiter(s) Rectal daily  niCARdipine Infusion 15 mG/Hr (75 mL/Hr) IV Continuous <Continuous>  pantoprazole  Injectable 40 milliGRAM(s) IV Push every 12 hours  polyethylene glycol 3350 17 Gram(s) Oral daily  propofol Infusion 20 MICROgram(s)/kG/Min (12 mL/Hr) IV Continuous <Continuous>  senna 2 Tablet(s) Oral at bedtime  sodium chloride 3%. 500 milliLiter(s) (80 mL/Hr) IV Continuous <Continuous>    MEDICATIONS  (PRN):  acetaminophen   Tablet .. 650 milliGRAM(s) Oral every 6 hours PRN Temp greater or equal to 38C (100.4F)      Allergies  No Known Allergies      Review of Systems:   unable to obtain        Physical Examination:  T(C): 37.2 (02-15-21 @ 12:00), Max: 37.6 (02-15-21 @ 00:00)  HR: 59 (02-15-21 @ 13:00) (56 - 66)  BP: 145/67 (02-14-21 @ 15:58) (145/67 - 145/67)  RR: 24 (02-15-21 @ 13:00) (24 - 29)  SpO2: 97% (02-15-21 @ 13:00) (93% - 98%)      02-13-21 @ 07:01  -  02-14-21 @ 07:00  --------------------------------------------------------  IN: 4317.5 mL / OUT: 4340 mL / NET: -22.5 mL    02-14-21 @ 07:01  -  02-15-21 @ 07:00  --------------------------------------------------------  IN: 5517.5 mL / OUT: 4330 mL / NET: 1187.5 mL    02-15-21 @ 07:01  -  02-15-21 @ 14:18  --------------------------------------------------------  IN: 1534.5 mL / OUT: 2025 mL / NET: -490.5 mL          Eyes:. Conjunctivae are clear, Sclera is non-icteric.  Ears Nose and Throat: The external ears are normal appearing,  Oral mucosa is pink and moist.  Respiratory: intubated , breathing sounds bilaterally  Cardiovascular:  S1 S2, Regular rate and rhythm.  GI: Abdomen is soft, symmetric, and non-tender with mild distension distention. There are no visible lesions. Bowel sounds are present and normoactive in all four quadrants. No masses, hepatomegaly, or splenomegaly are noted.   Neuro: No Tremor, No involuntary movements  Skin: No rashes, No Jaundice.          Data: (reviewed by attending)                        11.8   9.25  )-----------( 301      ( 15 Feb 2021 04:30 )             35.6     Hgb Trend:  11.8  02-15-21 @ 04:30  11.8  02-14-21 @ 04:53  11.3  02-13-21 @ 05:00        02-15    142  |  108  |  14  ----------------------------<  113<H>  3.8   |  24  |  0.7    Ca    7.9<L>      15 Feb 2021 12:44  Phos  3.6     02-15  Mg     2.1     02-15    TPro  5.2<L>  /  Alb  2.5<L>  /  TBili  0.6  /  DBili  x   /  AST  34  /  ALT  47<H>  /  AlkPhos  134<H>  02-15    Liver panel trend:  TBili 0.6   /   AST 34   /   ALT 47   /   AlkP 134   /   Tptn 5.2   /   Alb 2.5    /   DBili --      02-15  TBili 0.6   /   AST 31   /   ALT 49   /   AlkP 127   /   Tptn 5.0   /   Alb 2.5    /   DBili --      02-15  TBili 0.5   /   AST 37   /   ALT 57   /   AlkP 115   /   Tptn 5.0   /   Alb 2.4    /   DBili --      02-14  TBili 0.5   /   AST 47   /   ALT 57   /   AlkP 101   /   Tptn 5.1   /   Alb 2.5    /   DBili --      02-13  TBili 0.8   /   AST 39   /   ALT 45   /   AlkP 106   /   Tptn 5.1   /   Alb 2.6    /   DBili --      02-12  TBili 0.7   /   AST 39   /   ALT 39   /   AlkP 104   /   Tptn 4.9   /   Alb 2.4    /   DBili --      02-11  TBili 0.8   /   AST 29   /   ALT 35   /   AlkP 107   /   Tptn 5.1   /   Alb 2.5    /   DBili --      02-11  TBili 1.1   /   AST 40   /   ALT 37   /   AlkP 107   /   Tptn 4.6   /   Alb 2.4    /   DBili --      02-10  TBili 1.3   /   AST 29   /   ALT 29   /   AlkP 98   /   Tptn 4.9   /   Alb 2.4    /   DBili --      02-10  TBili 1.3   /   AST 19   /   ALT 25   /   AlkP 96   /   Tptn 4.8   /   Alb 2.5    /   DBili --      02-09  TBili 0.8   /   AST 23   /   ALT 27   /   AlkP 105   /   Tptn 5.0   /   Alb 2.7    /   DBili --      02-08  TBili 0.7   /   AST 26   /   ALT 27   /   AlkP 77   /   Tptn 4.9   /   Alb 2.6    /   DBili --      02-07  TBili 0.7   /   AST 25   /   ALT 24   /   AlkP 81   /   Tptn 4.8   /   Alb 2.6    /   DBili --      02-07  TBili 2.0   /   AST 38   /   ALT 30   /   AlkP 70   /   Tptn 5.6   /   Alb 3.1    /   DBili --      02-06  TBili 0.7   /   AST 25   /   ALT 22   /   AlkP 77   /   Tptn 4.8   /   Alb 2.6    /   DBili --      02-06      PT/INR - ( 15 Feb 2021 04:30 )   PT: 14.10 sec;   INR: 1.23 ratio             Culture - Blood (collected 13 Feb 2021 08:46)  Source: .Blood None  Preliminary Report (14 Feb 2021 17:01):    No growth to date.

## 2021-02-15 NOTE — PROGRESS NOTE ADULT - ASSESSMENT
15. Impression:  50 years old right handed Male with PMHx of HTN, GI bleed, PUD,  Mrankin score of 0 at baseline was brought in by EMS for  AMS and left sided weakness.  Code stroke called, NIHSS 24 and GCS 11, CTH showed Moderate to large intraparenchymal right basal ganglia hemorrhage with mild surrounding edema and associated right sulcal effacement as well as decompression into the ventricles. EVD was placed by neurosurgery at bedside. Patient continued to be intubated  and sedated ,has evd. Neuro exam is unchanged.         16. Suggestions:  - Neuro checks q1hr  - EVD management as per NeuroSx;   - Keep ICP < 20, CPP > 70  - Continue Keppra 500mg IV q12hrs  - Continue to wean sedation (especially versed)as tolerated for neurological prognostication  - Keep SBP < 140   - Ventilator management as per primary team  - HOB > 35 degrees  - Aspiration precautions  - SBT as tolerated  - Continue GI ppx  - Continue bowel regimen  - sequentials  - attending note to follow       Thomas Sullivan NP  [id number]

## 2021-02-15 NOTE — PROGRESS NOTE ADULT - ASSESSMENT
IMPRESSION:    Large ICB secondary to Hypertensive Emergency s/p EVD  Intubated, for airway protection  Klebsiella Pneumonia pansensitive   Left posterior tibial & peroneal DVT  ABD distention       PLAN:  CNS:  c/w Propofol & Precedex.  c/w Keppra.  FU with Neurology & Neurosurgery regarding EVD (not draining).  Monitor ICP as per Neuro/Neurosurgery, avoid opiates if possible    HEENT: Oral care    PULMONARY:  HOB @ 45 degrees.  Vent changes:  keep plateau less than 30/ DP less than 15    CARDIOVASCULAR:  Avoid volume overload.  BP control. 3% saline per NS.    GI: GI prophylaxis.  OG to suction.  KUB.  GI eval    RENAL:  FU with Neuro & Neurosx regarding Na target. hypertonic saline    INFECTIOUS DISEASE:   c/w Cefepime.  Follow up cultures & sensitivities.    HEMATOLOGICAL:  SCDs for DVT prophylaxis due to ICH. Repeat LE doppler if extension, GFF    ENDOCRINE:  Follow up FS.  Insulin protocol if needed.    MUSCULOSKELETAL:  Bed rest    Prognosis guarded   Possible transfer to [facility]

## 2021-02-15 NOTE — PROGRESS NOTE ADULT - SUBJECTIVE AND OBJECTIVE BOX
EVD#19 / HD#20    S/P  Intraparenchymal Hemorrage placement of external ventricular drain  Pt seen and examined at bedside Pt intubated and sedated w/ Propofol        Vital Signs Last 24 Hrs  T(C): 37.2 (15 Feb 2021 08:00), Max: 37.6 (15 Feb 2021 00:00)  T(F): 99 (15 Feb 2021 08:00), Max: 99.6 (15 Feb 2021 00:00)  HR: 66 (15 Feb 2021 10:00) (56 - 71)  BP: 145/67 (14 Feb 2021 15:58) (145/67 - 145/67)  BP(mean): 85 (14 Feb 2021 15:58) (85 - 85)  RR: 27 (15 Feb 2021 10:00) (24 - 29)  SpO2: 97% (15 Feb 2021 10:00) (93% - 98%)    I&O's Detail    14 Feb 2021 07:01  -  15 Feb 2021 07:00  --------------------------------------------------------  IN:    Dexmedetomidine: 900 mL    IV PiggyBack: 100 mL    IV PiggyBack: 100 mL    NiCARdipine: 2237.5 mL    Propofol: 840 mL    sodium chloride 3%: 1340 mL  Total IN: 5517.5 mL    OUT:    Drain (mL): 300 mL    Drain (mL): 0 mL    Indwelling Catheter - Urethral (mL): 4030 mL    Midazolam: 0 mL  Total OUT: 4330 mL    Total NET: 1187.5 mL      15 Feb 2021 07:01  -  15 Feb 2021 10:40  --------------------------------------------------------  IN:    Dexmedetomidine: 150 mL    Midazolam: 20 mL    NiCARdipine: 75 mL    NiCARdipine: 200 mL    Propofol: 144 mL    sodium chloride 3%: 130 mL    sodium chloride 3%: 80 mL  Total IN: 799 mL    OUT:    Drain (mL): 0 mL    Indwelling Catheter - Urethral (mL): 450 mL    Rectal Tube (mL): 1000 mL  Total OUT: 1450 mL    Total NET: -651 mL        I&O's Summary    14 Feb 2021 07:01  -  15 Feb 2021 07:00  --------------------------------------------------------  IN: 5517.5 mL / OUT: 4330 mL / NET: 1187.5 mL    15 Feb 2021 07:01  -  15 Feb 2021 10:40  --------------------------------------------------------  IN: 799 mL / OUT: 1450 mL / NET: -651 mL        PHYSICAL EXAM:  Neurological:  Intubated sedated  No Eye Opening to vebral /tactile  No Command following  EVD site intact  EVD open at 15 Cm H2O dampened waveform  No Drainage ICP 9  Pupils equal Reactive  Motor Movement L U/E L/E  to noxious stim            DEVICE/DRAIN DRESSING:    TUBES/LINES:  [] Lumbar Drain  [] Ventriculostomy  [] /[facility]    LABS:                        11.8   9.25  )-----------( 301      ( 15 Feb 2021 04:30 )             35.6     02-15    142  |  108  |  14  ----------------------------<  111<H>  3.6   |  25  |  0.7    Ca    8.1<L>      15 Feb 2021 04:30  Phos  3.6     02-15  Mg     2.1     02-15    TPro  5.0<L>  /  Alb  2.5<L>  /  TBili  0.6  /  DBili  x   /  AST  31  /  ALT  49<H>  /  AlkPhos  127<H>  02-15    PT/INR - ( 15 Feb 2021 04:30 )   PT: 14.10 sec;   INR: 1.23 ratio                 CAPILLARY BLOOD GLUCOSE        Magnesium, Serum: 2.1 mg/dL (02-15-21 @ 04:30)          CSF Analysis: [] N/A      Allergies    No Known Allergies    Intolerances      MEDICATIONS:  Antibiotics:  cefepime   IVPB 2000 milliGRAM(s) IV Intermittent every 12 hours    Neuro:  acetaminophen   Tablet .. 650 milliGRAM(s) Oral every 6 hours PRN  dexMEDEtomidine Infusion 0.05 MICROgram(s)/kG/Hr IV Continuous <Continuous>  levETIRAcetam  IVPB 500 milliGRAM(s) IV Intermittent every 12 hours  midazolam Infusion 0.02 mG/kG/Hr IV Continuous <Continuous>  propofol Infusion 20 MICROgram(s)/kG/Min IV Continuous <Continuous>        DVT PROPHYLAXIS:  [] Venodynes                                [] Heparin/Lovenox    OTHER:    IVF:  sodium chloride 3%. 500 milliLiter(s) IV Continuous <Continuous>      CULTURES:  Culture Results:   No growth to date. (02-13 @ 08:46)  Culture Results:   No growth to date. (02-10 @ 07:34)      RADIOLOGY & ADDITIONAL TESTS:      ASSESSMENT:  50y Male s/p    INTRACRANIAL BLEED                                     ADM/AA    INTRACRANIAL BLEED    ^MED EVAL    Family history of breast cancer (Mother)    Family history of pancreatic cancer (Father)    Handoff    MEWS Score    HTN (hypertension)    GI bleed    Gastric ulcer    PUD (peptic ulcer disease)    Intracranial bleed    Intraparenchymal hemorrhage of brain    Insertion, external ventricular drain    Ventriculostomy, for ventricular catheter insertion    Arm fracture, left    MED EVAL    90+    [email or web address]        PLAN: Stable  serial neuro checks  Monitor ICP   Cont hypertonic IVF NA>150 <155  Await Repeat HCT  D/W Attending  awaiting transfer to [facility]

## 2021-02-16 LAB
ALBUMIN SERPL ELPH-MCNC: 2.6 G/DL — LOW (ref 3.5–5.2)
ALP SERPL-CCNC: 121 U/L — HIGH (ref 30–115)
ALT FLD-CCNC: 42 U/L — HIGH (ref 0–41)
ANION GAP SERPL CALC-SCNC: 10 MMOL/L — SIGNIFICANT CHANGE UP (ref 7–14)
AST SERPL-CCNC: 29 U/L — SIGNIFICANT CHANGE UP (ref 0–41)
BASE EXCESS BLDA CALC-SCNC: 9.7 MMOL/L — HIGH (ref -2–2)
BASOPHILS # BLD AUTO: 0.12 K/UL — SIGNIFICANT CHANGE UP (ref 0–0.2)
BASOPHILS NFR BLD AUTO: 1 % — SIGNIFICANT CHANGE UP (ref 0–1)
BILIRUB SERPL-MCNC: 0.5 MG/DL — SIGNIFICANT CHANGE UP (ref 0.2–1.2)
BUN SERPL-MCNC: 13 MG/DL — SIGNIFICANT CHANGE UP (ref 10–20)
CALCIUM SERPL-MCNC: 8 MG/DL — LOW (ref 8.5–10.1)
CHLORIDE SERPL-SCNC: 112 MMOL/L — HIGH (ref 98–110)
CHLORIDE UR-SCNC: 222 — SIGNIFICANT CHANGE UP
CO2 SERPL-SCNC: 23 MMOL/L — SIGNIFICANT CHANGE UP (ref 17–32)
CREAT ?TM UR-MCNC: 29 MG/DL — SIGNIFICANT CHANGE UP
CREAT SERPL-MCNC: 0.8 MG/DL — SIGNIFICANT CHANGE UP (ref 0.7–1.5)
EOSINOPHIL # BLD AUTO: 0.54 K/UL — SIGNIFICANT CHANGE UP (ref 0–0.7)
EOSINOPHIL NFR BLD AUTO: 4.6 % — SIGNIFICANT CHANGE UP (ref 0–8)
GLUCOSE BLDC GLUCOMTR-MCNC: 96 MG/DL — SIGNIFICANT CHANGE UP (ref 70–99)
GLUCOSE SERPL-MCNC: 98 MG/DL — SIGNIFICANT CHANGE UP (ref 70–99)
HCO3 BLDA-SCNC: 37 MMOL/L — HIGH (ref 23–27)
HCT VFR BLD CALC: 34.8 % — LOW (ref 42–52)
HGB BLD-MCNC: 11.7 G/DL — LOW (ref 14–18)
IMM GRANULOCYTES NFR BLD AUTO: 5.4 % — HIGH (ref 0.1–0.3)
LYMPHOCYTES # BLD AUTO: 1.07 K/UL — LOW (ref 1.2–3.4)
LYMPHOCYTES # BLD AUTO: 9 % — LOW (ref 20.5–51.1)
MAGNESIUM SERPL-MCNC: 1.9 MG/DL — SIGNIFICANT CHANGE UP (ref 1.8–2.4)
MCHC RBC-ENTMCNC: 30.9 PG — SIGNIFICANT CHANGE UP (ref 27–31)
MCHC RBC-ENTMCNC: 33.6 G/DL — SIGNIFICANT CHANGE UP (ref 32–37)
MCV RBC AUTO: 91.8 FL — SIGNIFICANT CHANGE UP (ref 80–94)
MONOCYTES # BLD AUTO: 0.68 K/UL — HIGH (ref 0.1–0.6)
MONOCYTES NFR BLD AUTO: 5.7 % — SIGNIFICANT CHANGE UP (ref 1.7–9.3)
NEUTROPHILS # BLD AUTO: 8.8 K/UL — HIGH (ref 1.4–6.5)
NEUTROPHILS NFR BLD AUTO: 74.3 % — SIGNIFICANT CHANGE UP (ref 42.2–75.2)
NRBC # BLD: 0 /100 WBCS — SIGNIFICANT CHANGE UP (ref 0–0)
OSMOLALITY UR: 557 MOS/KG — SIGNIFICANT CHANGE UP (ref 50–1200)
PCO2 BLDA: 60 MMHG — HIGH (ref 38–42)
PH BLDA: 7.4 — SIGNIFICANT CHANGE UP (ref 7.38–7.42)
PHOSPHATE SERPL-MCNC: 4.3 MG/DL — SIGNIFICANT CHANGE UP (ref 2.1–4.9)
PLATELET # BLD AUTO: 333 K/UL — SIGNIFICANT CHANGE UP (ref 130–400)
PO2 BLDA: 84 MMHG — SIGNIFICANT CHANGE UP (ref 78–95)
POTASSIUM SERPL-MCNC: 3.8 MMOL/L — SIGNIFICANT CHANGE UP (ref 3.5–5)
POTASSIUM SERPL-SCNC: 3.8 MMOL/L — SIGNIFICANT CHANGE UP (ref 3.5–5)
PROCALCITONIN SERPL-MCNC: 0.11 NG/ML — HIGH (ref 0.02–0.1)
PROT ?TM UR-MCNC: 10 MG/DLG/24H — SIGNIFICANT CHANGE UP
PROT SERPL-MCNC: 5.2 G/DL — LOW (ref 6–8)
PROT/CREAT UR-RTO: 0.3 RATIO — HIGH (ref 0–0.2)
RBC # BLD: 3.79 M/UL — LOW (ref 4.7–6.1)
RBC # FLD: 12.2 % — SIGNIFICANT CHANGE UP (ref 11.5–14.5)
SAO2 % BLDA: 96 % — SIGNIFICANT CHANGE UP (ref 94–98)
SODIUM SERPL-SCNC: 145 MMOL/L — SIGNIFICANT CHANGE UP (ref 135–146)
SODIUM UR-SCNC: 213 MMOL/L — SIGNIFICANT CHANGE UP
WBC # BLD: 11.85 K/UL — HIGH (ref 4.8–10.8)
WBC # FLD AUTO: 11.85 K/UL — HIGH (ref 4.8–10.8)

## 2021-02-16 PROCEDURE — 99233 SBSQ HOSP IP/OBS HIGH 50: CPT

## 2021-02-16 PROCEDURE — 71045 X-RAY EXAM CHEST 1 VIEW: CPT | Mod: 26

## 2021-02-16 RX ORDER — SODIUM CHLORIDE 5 G/100ML
500 INJECTION, SOLUTION INTRAVENOUS
Refills: 0 | Status: DISCONTINUED | OUTPATIENT
Start: 2021-02-16 | End: 2021-02-17

## 2021-02-16 RX ORDER — DEXMEDETOMIDINE HYDROCHLORIDE IN 0.9% SODIUM CHLORIDE 4 UG/ML
0.05 INJECTION INTRAVENOUS
Qty: 400 | Refills: 0 | Status: DISCONTINUED | OUTPATIENT
Start: 2021-02-16 | End: 2021-02-19

## 2021-02-16 RX ORDER — LABETALOL HCL 100 MG
10 TABLET ORAL ONCE
Refills: 0 | Status: COMPLETED | OUTPATIENT
Start: 2021-02-16 | End: 2021-02-18

## 2021-02-16 RX ORDER — LABETALOL HCL 100 MG
10 TABLET ORAL ONCE
Refills: 0 | Status: COMPLETED | OUTPATIENT
Start: 2021-02-16 | End: 2021-02-16

## 2021-02-16 RX ORDER — LABETALOL HCL 100 MG
10 TABLET ORAL ONCE
Refills: 0 | Status: DISCONTINUED | OUTPATIENT
Start: 2021-02-16 | End: 2021-02-16

## 2021-02-16 RX ORDER — CISATRACURIUM BESYLATE 2 MG/ML
10 INJECTION INTRAVENOUS ONCE
Refills: 0 | Status: COMPLETED | OUTPATIENT
Start: 2021-02-16 | End: 2021-02-16

## 2021-02-16 RX ORDER — CISATRACURIUM BESYLATE 2 MG/ML
2 INJECTION INTRAVENOUS
Qty: 200 | Refills: 0 | Status: DISCONTINUED | OUTPATIENT
Start: 2021-02-16 | End: 2021-02-16

## 2021-02-16 RX ADMIN — CEFEPIME 100 MILLIGRAM(S): 1 INJECTION, POWDER, FOR SOLUTION INTRAMUSCULAR; INTRAVENOUS at 07:26

## 2021-02-16 RX ADMIN — NICARDIPINE HYDROCHLORIDE 75 MG/HR: 30 CAPSULE, EXTENDED RELEASE ORAL at 17:58

## 2021-02-16 RX ADMIN — Medication 50 MILLIGRAM(S): at 17:50

## 2021-02-16 RX ADMIN — MIDAZOLAM HYDROCHLORIDE 2 MG/KG/HR: 1 INJECTION, SOLUTION INTRAMUSCULAR; INTRAVENOUS at 11:44

## 2021-02-16 RX ADMIN — SENNA PLUS 2 TABLET(S): 8.6 TABLET ORAL at 21:31

## 2021-02-16 RX ADMIN — SODIUM CHLORIDE 95 MILLILITER(S): 5 INJECTION, SOLUTION INTRAVENOUS at 15:17

## 2021-02-16 RX ADMIN — Medication 133 MILLILITER(S): at 11:33

## 2021-02-16 RX ADMIN — SODIUM CHLORIDE 100 MILLILITER(S): 5 INJECTION, SOLUTION INTRAVENOUS at 00:04

## 2021-02-16 RX ADMIN — SODIUM CHLORIDE 95 MILLILITER(S): 5 INJECTION, SOLUTION INTRAVENOUS at 09:45

## 2021-02-16 RX ADMIN — Medication 0.2 MILLIGRAM(S): at 21:34

## 2021-02-16 RX ADMIN — Medication 50 MILLIGRAM(S): at 05:23

## 2021-02-16 RX ADMIN — LACTULOSE 10 GRAM(S): 10 SOLUTION ORAL at 13:22

## 2021-02-16 RX ADMIN — Medication 50 MILLIGRAM(S): at 11:33

## 2021-02-16 RX ADMIN — POLYETHYLENE GLYCOL 3350 17 GRAM(S): 17 POWDER, FOR SOLUTION ORAL at 11:33

## 2021-02-16 RX ADMIN — Medication 0.2 MILLIGRAM(S): at 05:22

## 2021-02-16 RX ADMIN — LACTULOSE 10 GRAM(S): 10 SOLUTION ORAL at 21:30

## 2021-02-16 RX ADMIN — LISINOPRIL 40 MILLIGRAM(S): 2.5 TABLET ORAL at 05:22

## 2021-02-16 RX ADMIN — Medication 10 MILLIGRAM(S): at 00:57

## 2021-02-16 RX ADMIN — CISATRACURIUM BESYLATE 10 MILLIGRAM(S): 2 INJECTION INTRAVENOUS at 01:51

## 2021-02-16 RX ADMIN — HEPARIN SODIUM 5000 UNIT(S): 5000 INJECTION INTRAVENOUS; SUBCUTANEOUS at 21:35

## 2021-02-16 RX ADMIN — Medication 5 MILLIGRAM(S): at 17:50

## 2021-02-16 RX ADMIN — HEPARIN SODIUM 5000 UNIT(S): 5000 INJECTION INTRAVENOUS; SUBCUTANEOUS at 05:22

## 2021-02-16 RX ADMIN — Medication 200 MILLIGRAM(S): at 13:21

## 2021-02-16 RX ADMIN — HEPARIN SODIUM 5000 UNIT(S): 5000 INJECTION INTRAVENOUS; SUBCUTANEOUS at 13:21

## 2021-02-16 RX ADMIN — CHLORHEXIDINE GLUCONATE 15 MILLILITER(S): 213 SOLUTION TOPICAL at 05:21

## 2021-02-16 RX ADMIN — Medication 200 MILLIGRAM(S): at 21:34

## 2021-02-16 RX ADMIN — DEXMEDETOMIDINE HYDROCHLORIDE IN 0.9% SODIUM CHLORIDE 1.25 MICROGRAM(S)/KG/HR: 4 INJECTION INTRAVENOUS at 13:13

## 2021-02-16 RX ADMIN — LEVETIRACETAM 420 MILLIGRAM(S): 250 TABLET, FILM COATED ORAL at 05:22

## 2021-02-16 RX ADMIN — Medication 0.2 MILLIGRAM(S): at 13:21

## 2021-02-16 RX ADMIN — CHLORHEXIDINE GLUCONATE 1 APPLICATION(S): 213 SOLUTION TOPICAL at 05:22

## 2021-02-16 RX ADMIN — PROPOFOL 12 MICROGRAM(S)/KG/MIN: 10 INJECTION, EMULSION INTRAVENOUS at 16:11

## 2021-02-16 RX ADMIN — LEVETIRACETAM 420 MILLIGRAM(S): 250 TABLET, FILM COATED ORAL at 17:50

## 2021-02-16 RX ADMIN — NICARDIPINE HYDROCHLORIDE 75 MG/HR: 30 CAPSULE, EXTENDED RELEASE ORAL at 09:21

## 2021-02-16 RX ADMIN — NICARDIPINE HYDROCHLORIDE 75 MG/HR: 30 CAPSULE, EXTENDED RELEASE ORAL at 12:22

## 2021-02-16 RX ADMIN — Medication 200 MILLIGRAM(S): at 05:22

## 2021-02-16 RX ADMIN — Medication 5 MILLIGRAM(S): at 05:22

## 2021-02-16 RX ADMIN — NICARDIPINE HYDROCHLORIDE 75 MG/HR: 30 CAPSULE, EXTENDED RELEASE ORAL at 15:16

## 2021-02-16 RX ADMIN — CEFEPIME 100 MILLIGRAM(S): 1 INJECTION, POWDER, FOR SOLUTION INTRAMUSCULAR; INTRAVENOUS at 17:52

## 2021-02-16 RX ADMIN — PANTOPRAZOLE SODIUM 40 MILLIGRAM(S): 20 TABLET, DELAYED RELEASE ORAL at 05:22

## 2021-02-16 RX ADMIN — PANTOPRAZOLE SODIUM 40 MILLIGRAM(S): 20 TABLET, DELAYED RELEASE ORAL at 17:50

## 2021-02-16 RX ADMIN — Medication 0.2 MILLIGRAM(S): at 09:23

## 2021-02-16 RX ADMIN — CHLORHEXIDINE GLUCONATE 15 MILLILITER(S): 213 SOLUTION TOPICAL at 17:52

## 2021-02-16 RX ADMIN — PROPOFOL 12 MICROGRAM(S)/KG/MIN: 10 INJECTION, EMULSION INTRAVENOUS at 09:22

## 2021-02-16 NOTE — PROGRESS NOTE ADULT - SUBJECTIVE AND OBJECTIVE BOX
OVERNIGHT EVENTS: events noted, still intubated, ventilated, on precedex,  versed, cardene, propofol ct head and ap sp dc EVD, 95 CC HYPERTONIC    Vital Signs Last 24 Hrs  T(C): 37.7 (16 Feb 2021 04:00), Max: 37.7 (16 Feb 2021 04:00)  T(F): 99.8 (16 Feb 2021 04:00), Max: 99.8 (16 Feb 2021 04:00)  HR: 83 (16 Feb 2021 06:00) (56 - 92)  BP: 175/84 (15 Feb 2021 21:00) (175/84 - 175/84)  RR: 24 (16 Feb 2021 06:00) (24 - 53)  SpO2: 98% (16 Feb 2021 06:00) (93% - 100%)    PHYSICAL EXAMINATION:    GENERAL: sedated    HEENT: Head is normocephalic and atraumatic.     NECK: Supple.    LUNGS: dec bs both abses  HEART: Regular rate and rhythm without murmur.    ABDOMEN: Soft,    EXTREMITIES: Without any cyanosis, clubbing, rash, lesions or edema.    NEUROLOGIC: Grossly intact.    SKIN: No ulceration or induration present.      LABS:                        11.7   11.85 )-----------( 333      ( 16 Feb 2021 04:00 )             34.8     02-16    145  |  112<H>  |  13  ----------------------------<  98  3.8   |  23  |  0.8    Ca    8.0<L>      16 Feb 2021 04:00  Phos  4.3     02-16  Mg     1.9     02-16    TPro  5.2<L>  /  Alb  2.6<L>  /  TBili  0.5  /  DBili  x   /  AST  29  /  ALT  42<H>  /  AlkPhos  121<H>  02-16    PT/INR - ( 15 Feb 2021 04:30 )   PT: 14.10 sec;   INR: 1.23 ratio             ABG - ( 16 Feb 2021 04:20 )  pH, Arterial: 7.40  pH, Blood: x     /  pCO2: 60    /  pO2: 84    / HCO3: 37    / Base Excess: 9.7   /  SaO2: 96            450/24/40/8  [facility] 17                    02-15-21 @ 07:01  -  02-16-21 @ 07:00  --------------------------------------------------------  IN: 5337 mL / OUT: 5195 mL / NET: 142 mL        MICROBIOLOGY:  Culture Results:   No growth to date. (02-13 @ 08:46)      MEDICATIONS  (STANDING):  cefepime   IVPB 2000 milliGRAM(s) IV Intermittent every 12 hours  chlorhexidine 0.12% Liquid 15 milliLiter(s) Oral Mucosa every 12 hours  chlorhexidine 4% Liquid 1 Application(s) Topical <User Schedule>  cloNIDine 0.2 milliGRAM(s) Oral every 12 hours  dexMEDEtomidine Infusion 0.05 MICROgram(s)/kG/Hr (1.25 mL/Hr) IV Continuous <Continuous>  heparin   Injectable 5000 Unit(s) SubCutaneous every 8 hours  hydrALAZINE 50 milliGRAM(s) Oral every 6 hours  labetalol 200 milliGRAM(s) Oral every 8 hours  lactulose Syrup 10 Gram(s) Oral every 8 hours  levETIRAcetam  IVPB 500 milliGRAM(s) IV Intermittent every 12 hours  lisinopril 40 milliGRAM(s) Oral daily  metoclopramide 5 milliGRAM(s) Oral two times a day  midazolam Infusion 0.02 mG/kG/Hr (2 mL/Hr) IV Continuous <Continuous>  mineral oil enema 133 milliLiter(s) Rectal daily  niCARdipine Infusion 15 mG/Hr (75 mL/Hr) IV Continuous <Continuous>  pantoprazole  Injectable 40 milliGRAM(s) IV Push every 12 hours  polyethylene glycol 3350 17 Gram(s) Oral daily  propofol Infusion 20 MICROgram(s)/kG/Min (12 mL/Hr) IV Continuous <Continuous>  senna 2 Tablet(s) Oral at bedtime  sodium chloride 3%. 500 milliLiter(s) (95 mL/Hr) IV Continuous <Continuous>    MEDICATIONS  (PRN):  acetaminophen   Tablet .. 650 milliGRAM(s) Oral every 6 hours PRN Temp greater or equal to 38C (100.4F)      RADIOLOGY & ADDITIONAL STUDIES:

## 2021-02-16 NOTE — PROGRESS NOTE ADULT - SUBJECTIVE AND OBJECTIVE BOX
Gastroenterology progress note:     Patient is a 50y old  Male who presents with a chief complaint of Altered mental status (16 Feb 2021 07:46)       Admitted on: 01-28-21    We are following the patient for: abdominal distension     Interval History:  patient had 500 ml BM as per nursing staff over night, still intubated and sedated      PAST MEDICAL & SURGICAL HISTORY:  HTN (hypertension)    GI bleed    Gastric ulcer    PUD (peptic ulcer disease)    Arm fracture, left  s/p surgical repair        MEDICATIONS  (STANDING):  cefepime   IVPB 2000 milliGRAM(s) IV Intermittent every 12 hours  chlorhexidine 0.12% Liquid 15 milliLiter(s) Oral Mucosa every 12 hours  chlorhexidine 4% Liquid 1 Application(s) Topical <User Schedule>  cloNIDine 0.2 milliGRAM(s) Oral every 8 hours  dexMEDEtomidine Infusion 0.05 MICROgram(s)/kG/Hr (1.25 mL/Hr) IV Continuous <Continuous>  heparin   Injectable 5000 Unit(s) SubCutaneous every 8 hours  hydrALAZINE 50 milliGRAM(s) Oral every 6 hours  labetalol 200 milliGRAM(s) Oral every 8 hours  labetalol Injectable 10 milliGRAM(s) IV Push once  lactulose Syrup 10 Gram(s) Oral every 8 hours  levETIRAcetam  IVPB 500 milliGRAM(s) IV Intermittent every 12 hours  lisinopril 40 milliGRAM(s) Oral daily  metoclopramide 5 milliGRAM(s) Oral two times a day  midazolam Infusion 0.02 mG/kG/Hr (2 mL/Hr) IV Continuous <Continuous>  mineral oil enema 133 milliLiter(s) Rectal daily  niCARdipine Infusion 15 mG/Hr (75 mL/Hr) IV Continuous <Continuous>  pantoprazole  Injectable 40 milliGRAM(s) IV Push every 12 hours  polyethylene glycol 3350 17 Gram(s) Oral daily  propofol Infusion 20 MICROgram(s)/kG/Min (12 mL/Hr) IV Continuous <Continuous>  senna 2 Tablet(s) Oral at bedtime  sodium chloride 3%. 500 milliLiter(s) (95 mL/Hr) IV Continuous <Continuous>    MEDICATIONS  (PRN):  acetaminophen   Tablet .. 650 milliGRAM(s) Oral every 6 hours PRN Temp greater or equal to 38C (100.4F)      Allergies  No Known Allergies      Review of Systems:   Unable to obtain    Physical Examination:  T(C): 37.5 (02-16-21 @ 08:00), Max: 37.7 (02-16-21 @ 04:00)  HR: 91 (02-16-21 @ 10:00) (56 - 92)  BP: 175/84 (02-15-21 @ 21:00) (175/84 - 175/84)  RR: 24 (02-16-21 @ 10:00) (24 - 53)  SpO2: 98% (02-16-21 @ 10:00) (96% - 100%)      02-15-21 @ 07:01  -  02-16-21 @ 07:00  --------------------------------------------------------  IN: 5337 mL / OUT: 5195 mL / NET: 142 mL    02-16-21 @ 07:01  -  02-16-21 @ 10:18  --------------------------------------------------------  IN: 818 mL / OUT: 1020 mL / NET: -202 mL        Respiratory:  intubated on ventilator breathing sounds bilaterally  Cardiovascular:  S1 S2, Regular rate and rhythm.  Abdominal: Abdomen is soft, symmetric, and non-tender with mild distention. There are no visible lesions. Bowel sounds are present and normoactive in all four quadrants. No masses, hepatomegaly, or splenomegaly are noted.   Skin: No rashes, No Jaundice.  Neurology:  Non-focal  Skin: No rash, No excoriation  Vascular: No varicose vein, No cyanosis, No edema        Data: (reviewed by attending)                        11.7   11.85 )-----------( 333      ( 16 Feb 2021 04:00 )             34.8     Hgb trend:  11.7  02-16-21 @ 04:00  11.8  02-15-21 @ 04:30  11.8  02-14-21 @ 04:53        02-16    145  |  112<H>  |  13  ----------------------------<  98  3.8   |  23  |  0.8    Ca    8.0<L>      16 Feb 2021 04:00  Phos  4.3     02-16  Mg     1.9     02-16    TPro  5.2<L>  /  Alb  2.6<L>  /  TBili  0.5  /  DBili  x   /  AST  29  /  ALT  42<H>  /  AlkPhos  121<H>  02-16    Liver panel trend:  TBili 0.5   /   AST 29   /   ALT 42   /   AlkP 121   /   Tptn 5.2   /   Alb 2.6    /   DBili --      02-16  TBili 0.6   /   AST 34   /   ALT 47   /   AlkP 134   /   Tptn 5.2   /   Alb 2.5    /   DBili --      02-15  TBili 0.6   /   AST 31   /   ALT 49   /   AlkP 127   /   Tptn 5.0   /   Alb 2.5    /   DBili --      02-15  TBili 0.5   /   AST 37   /   ALT 57   /   AlkP 115   /   Tptn 5.0   /   Alb 2.4    /   DBili --      02-14  TBili 0.5   /   AST 47   /   ALT 57   /   AlkP 101   /   Tptn 5.1   /   Alb 2.5    /   DBili --      02-13  TBili 0.8   /   AST 39   /   ALT 45   /   AlkP 106   /   Tptn 5.1   /   Alb 2.6    /   DBili --      02-12  TBili 0.7   /   AST 39   /   ALT 39   /   AlkP 104   /   Tptn 4.9   /   Alb 2.4    /   DBili --      02-11  TBili 0.8   /   AST 29   /   ALT 35   /   AlkP 107   /   Tptn 5.1   /   Alb 2.5    /   DBili --      02-11  TBili 1.1   /   AST 40   /   ALT 37   /   AlkP 107   /   Tptn 4.6   /   Alb 2.4    /   DBili --      02-10  TBili 1.3   /   AST 29   /   ALT 29   /   AlkP 98   /   Tptn 4.9   /   Alb 2.4    /   DBili --      02-10  TBili 1.3   /   AST 19   /   ALT 25   /   AlkP 96   /   Tptn 4.8   /   Alb 2.5    /   DBili --      02-09  TBili 0.8   /   AST 23   /   ALT 27   /   AlkP 105   /   Tptn 5.0   /   Alb 2.7    /   DBili --      02-08  TBili 0.7   /   AST 26   /   ALT 27   /   AlkP 77   /   Tptn 4.9   /   Alb 2.6    /   DBili --      02-07  TBili 0.7   /   AST 25   /   ALT 24   /   AlkP 81   /   Tptn 4.8   /   Alb 2.6    /   DBili --      02-07  TBili 2.0   /   AST 38   /   ALT 30   /   AlkP 70   /   Tptn 5.6   /   Alb 3.1    /   DBili --      02-06      PT/INR - ( 15 Feb 2021 04:30 )   PT: 14.10 sec;   INR: 1.23 ratio                Radiology: (reviewed by attending)  CT Abdomen and Pelvis w/ Oral Cont and w/ IV Cont:   EXAM:  CT ABDOMEN AND PELVIS OC IC            PROCEDURE DATE:  02/15/2021            INTERPRETATION:  CLINICAL STATEMENT: Abdominal distention    TECHNIQUE: Contiguous axial CT images were obtained from the lower chest to the pubic symphysis following administration of 100cc Optiray 320 intravenous contrast.  Oral contrast was administered.  Reformatted images in the coronal and sagittal planes were acquired.    COMPARISON CT: January 27, 2021    OTHER STUDIES USED FOR CORRELATION: None.      FINDINGS:    LOWER CHEST: New bilateral lower lobe consolidations/atelectasis and small pleural effusions. Feeding tube in stomach..    HEPATOBILIARY: No suspicious parenchymal lesion or biliary ductal dilatation. Mildly distended gallbladder..    SPLEEN: Unremarkable.    PANCREAS: Unremarkable.    ADRENAL GLANDS: Unremarkable.    KIDNEYS: Symmetric renal enhancement without hydronephrosis bilaterally. Unchanged left renal exophytic likely cyst.    ABDOMINOPELVIC NODES: Unremarkable.    PELVIC ORGANS: Donahue catheter within urinary bladder.    PERITONEUM/MESENTERY/BOWEL: Rectal tube in place. Mild distention of the colon. No evidence for bowel obstruction. No ascites or pneumoperitoneum..    BONES/SOFT TISSUES: No acute osseous abnormality. Degenerative changes of the spine, unchanged..      IMPRESSION:    No acute intra-abdominal pathology.    New bilateral lower lobe consolidations/atelectasis and small pleural effusions.              ELLIOT LANDAU MD; Attending Radiologist  This document has been electronically signed. Feb 15 2021  6:47PM (02-15-21 @ 14:37)

## 2021-02-16 NOTE — PROGRESS NOTE ADULT - ASSESSMENT
50 years old right handed Male with PMHx of HTN, PUD brought in by EMS for AMS and left sided weakness. Found to have Moderate to large intraparenchymal right basal ganglia hemorrhage with mild surrounding edema and associated right sulcal effacement as well as decompression into the ventricles s/p EVD by neurosurgery. Hospital course complicated with klebsiella PNA, respiratory failure s/p intubation and DVT. GI consulted because patient is distended on exam as per primary team.       # Abdominal distension on exam possible [place] syndrome  -Patient had large BM yesterday and 500 ml stool in [place] over night   - abdomen soft on exam   - CT abdomen--> Mild distention of the colon. No evidence for bowel obstruction. No ascites or pneumoperitoneum.     rec:  - try to avoid BZD and opioids as much as possible  - c/w bowel regimen  - monitor electrolyte and correct accordingly  - KUB PRN ( if noticed increasing abdominal distension on exam )

## 2021-02-16 NOTE — PROGRESS NOTE ADULT - SUBJECTIVE AND OBJECTIVE BOX
Subjective: 50yMale with a pmhx of INTRACRANIAL BLEED                                     ADM/AA    INTRACRANIAL BLEED    ^MED EVAL    Family history of breast cancer (Mother)    Family history of pancreatic cancer (Father)    Handoff    MEWS Score    HTN (hypertension)    GI bleed    Gastric ulcer    PUD (peptic ulcer disease)    Intracranial bleed    Intraparenchymal hemorrhage of brain    Insertion, external ventricular drain    Ventriculostomy, for ventricular catheter insertion    Arm fracture, left    MED EVAL    90+    [email or web address]      Patient is a 50 year old male s/p EVD placement on 1/27/21. Patient was seen and examined at bedside in Vent unit. He is lying in bed, intubated and sedated on propofol, precedex, and versed. Patient blinks and yawns spontaneously but is not following any commands.      Allergies    No Known Allergies    Intolerances        Vital Signs Last 24 Hrs  T(C): 37.5 (16 Feb 2021 08:00), Max: 37.7 (16 Feb 2021 04:00)  T(F): 99.5 (16 Feb 2021 08:00), Max: 99.8 (16 Feb 2021 04:00)  HR: 91 (16 Feb 2021 10:00) (56 - 92)  BP: 175/84 (15 Feb 2021 21:00) (175/84 - 175/84)  BP(mean): --  RR: 24 (16 Feb 2021 10:00) (24 - 53)  SpO2: 98% (16 Feb 2021 10:00) (96% - 100%)      acetaminophen   Tablet .. 650 milliGRAM(s) Oral every 6 hours PRN  cefepime   IVPB 2000 milliGRAM(s) IV Intermittent every 12 hours  chlorhexidine 0.12% Liquid 15 milliLiter(s) Oral Mucosa every 12 hours  chlorhexidine 4% Liquid 1 Application(s) Topical <User Schedule>  cloNIDine 0.2 milliGRAM(s) Oral every 8 hours  dexMEDEtomidine Infusion 0.05 MICROgram(s)/kG/Hr IV Continuous <Continuous>  heparin   Injectable 5000 Unit(s) SubCutaneous every 8 hours  hydrALAZINE 50 milliGRAM(s) Oral every 6 hours  labetalol 200 milliGRAM(s) Oral every 8 hours  labetalol Injectable 10 milliGRAM(s) IV Push once  lactulose Syrup 10 Gram(s) Oral every 8 hours  levETIRAcetam  IVPB 500 milliGRAM(s) IV Intermittent every 12 hours  lisinopril 40 milliGRAM(s) Oral daily  metoclopramide 5 milliGRAM(s) Oral two times a day  midazolam Infusion 0.02 mG/kG/Hr IV Continuous <Continuous>  mineral oil enema 133 milliLiter(s) Rectal daily  niCARdipine Infusion 15 mG/Hr IV Continuous <Continuous>  pantoprazole  Injectable 40 milliGRAM(s) IV Push every 12 hours  polyethylene glycol 3350 17 Gram(s) Oral daily  propofol Infusion 20 MICROgram(s)/kG/Min IV Continuous <Continuous>  senna 2 Tablet(s) Oral at bedtime  sodium chloride 3%. 500 milliLiter(s) IV Continuous <Continuous>        02-15-21 @ 07:01  -  02-16-21 @ 07:00  --------------------------------------------------------  IN: 5337 mL / OUT: 5195 mL / NET: 142 mL    02-16-21 @ 07:01  -  02-16-21 @ 10:24  --------------------------------------------------------  IN: 818 mL / OUT: 1020 mL / NET: -202 mL        REVIEW OF SYSTEMS    [ ] A ten-point review of systems was otherwise negative except as noted.  [X] Due to altered mental status/intubation, subjective information were not able to be obtained from the patient. History was obtained, to the extent possible, from review of the chart and collateral sources of information.      Physical Exam:  General: Lying in bed, not following commands  Blinks and yawns spontaneously  No droop  PERRL  no withdrawal  Wound: Incision clean, dry, and intact without drainage      CBC Full  -  ( 16 Feb 2021 04:00 )  WBC Count : 11.85 K/uL  RBC Count : 3.79 M/uL  Hemoglobin : 11.7 g/dL  Hematocrit : 34.8 %  Platelet Count - Automated : 333 K/uL  Mean Cell Volume : 91.8 fL  Mean Cell Hemoglobin : 30.9 pg  Mean Cell Hemoglobin Concentration : 33.6 g/dL  Auto Neutrophil # : 8.80 K/uL  Auto Lymphocyte # : 1.07 K/uL  Auto Monocyte # : 0.68 K/uL  Auto Eosinophil # : 0.54 K/uL  Auto Basophil # : 0.12 K/uL  Auto Neutrophil % : 74.3 %  Auto Lymphocyte % : 9.0 %  Auto Monocyte % : 5.7 %  Auto Eosinophil % : 4.6 %  Auto Basophil % : 1.0 %    02-16    145  |  112<H>  |  13  ----------------------------<  98  3.8   |  23  |  0.8    Ca    8.0<L>      16 Feb 2021 04:00  Phos  4.3     02-16  Mg     1.9     02-16    TPro  5.2<L>  /  Alb  2.6<L>  /  TBili  0.5  /  DBili  x   /  AST  29  /  ALT  42<H>  /  AlkPhos  121<H>  02-16    PT/INR - ( 15 Feb 2021 04:30 )   PT: 14.10 sec;   INR: 1.23 ratio                 Imaging:  < from: CT Head No Cont (02.15.21 @ 14:23) >    IMPRESSION:  No significant interval change.        Assessment/Plan:   50 year old male s/p EVD placement on 1/27/21  -Authorization approved, pt being transferred to [facility] -continue 3% for edema  -wean sedation if possible for exam  -daily CTH  -dvt ppx  -discuss with attending

## 2021-02-16 NOTE — PROGRESS NOTE ADULT - ASSESSMENT
IMPRESSION:    Large ICB secondary to Hypertensive Emergency s/p EVD  Intubated, for airway protection  Klebsiella Pneumonia pansensitive   Left posterior tibial & peroneal DVT  ABD distention ASP CT AP      PLAN:  CNS:  c/w Propofol & Precedex taper versed,, neuro sx f/up    HEENT: Oral care    PULMONARY:  HOB @ 45 degrees.  Vent changes:  keep plateau less than 30/ DP less than 15    CARDIOVASCULAR:  Avoid volume overload.  BP control. 3% Hypertonic    GI: GI prophylaxis.  Feeding    RENAL:  FU with Neuro & Neurosx regarding Na target. hypertonic saline    INFECTIOUS DISEASE:   c/w Cefepime.  Follow up cultures & sensitivities. id f/up    HEMATOLOGICAL:  SCDs for DVT prophylaxis due to ICH. Repeat LE doppler if extension, GFF    ENDOCRINE:  Follow up FS.  Insulin protocol if needed.    MUSCULOSKELETAL:  Bed rest    Prognosis guarded   Possible transfer to [facility]

## 2021-02-16 NOTE — CHART NOTE - NSCHARTNOTEFT_GEN_A_CORE
Registered Dietitian Follow-Up--this note was charted remotely      Patient Profile Reviewed                           Yes [x]   No []     Nutrition History Previously Obtained        Yes []  No [x]--unable to obtain as pt remains intubated        Pertinent Medical Interventions: Pt had 500 ml BM as per nursing staff over night, still intubated and sedated. Abdominal distension on exam possible [place] syndrome; CT abdomen shows mild distention of the colon. No evidence for bowel obstruction and no ascites or pneumoperitoneum. Per GI, KUB PRN if noticed increasing abdominal distension on exam.    Diet order: Vital HP @ 40ml/hr (1356kcal, 84gm pro, 803ml free water, includes additional kcal from propofol)--active in EMR, however currently pt NPO except meds. MAP 92.      Anthropometrics:  - Ht. 73"  - Wt.: 289#/131kg (2/16)--will continue to monitor, likely 2/2 bed-scale inaccuracy vs. fluids?  (1/27): 221#/100kg  - %wt change  - BMI: 29.1   - IBW: 184#     Pertinent Lab Data: (2/16): H/H 11.7/34.8     Pertinent Meds: heparin, abx, NS, lactulose, labetalol, hydralazine, reglan, amlodipine, precedex, versed, nicardipine, propofol @ 15ml/hr (provides additional 396kcal), keppra, protonix, miralax, senna      Physical Findings:  - Appearance: intubated/ventilated; 2+ generalized edema noted  - GI function: rectal tube in place; +500ml  - Tubes: OGT   - Oral/Mouth cavity: NPO   - Skin: intact     Nutrition Requirements  Weight Used: CBW: 221#/100kg, Ve: 13.4, Tmax: 37.7; kcal needs slightly readjusted today      Estimated Energy Needs: 5715-3756 kcal/day (% [id number]: 2336 d/t borderline obese BMI).  Estimated Protein Needs: 100-120 g/day (1-1.2 g/kg ABW)  Estimated Fluid Needs: per Vent team      Nutrient Intake: not meeting kcal/pro needs at this time      Previous Nutrition Diagnosis: Inadequate protein-energy intake (ongoing)      Nutrition Intervention: enteral nutrition    Recommendations:   1. If and when medically feasible for resuming enteral nutrition, would increase rate of Vital HP to 55ml/hr, which will provide 1716kcal, 116gm pro, 1104ml free water (includes additional kcal provided by propofol). Additional flushes per LIP.   2. If unable to resume enteral nutrition and/or contraindicated, consult Nutrition Support team for assessment. All recs discussed with LIP ([phone]     Goal/Expected Outcome: Pt to meet % of estimated nutrient needs within 3 days      Indicator/Monitoring: RD to monitor diet order, energy intake, body composition, nutrition focused physical findings.

## 2021-02-17 LAB
ALBUMIN SERPL ELPH-MCNC: 2.4 G/DL — LOW (ref 3.5–5.2)
ALBUMIN SERPL ELPH-MCNC: 2.6 G/DL — LOW (ref 3.5–5.2)
ALP SERPL-CCNC: 116 U/L — HIGH (ref 30–115)
ALP SERPL-CCNC: 128 U/L — HIGH (ref 30–115)
ALT FLD-CCNC: 37 U/L — SIGNIFICANT CHANGE UP (ref 0–41)
ALT FLD-CCNC: 39 U/L — SIGNIFICANT CHANGE UP (ref 0–41)
ANION GAP SERPL CALC-SCNC: 10 MMOL/L — SIGNIFICANT CHANGE UP (ref 7–14)
ANION GAP SERPL CALC-SCNC: 10 MMOL/L — SIGNIFICANT CHANGE UP (ref 7–14)
ANION GAP SERPL CALC-SCNC: 9 MMOL/L — SIGNIFICANT CHANGE UP (ref 7–14)
AST SERPL-CCNC: 28 U/L — SIGNIFICANT CHANGE UP (ref 0–41)
AST SERPL-CCNC: 35 U/L — SIGNIFICANT CHANGE UP (ref 0–41)
BASOPHILS # BLD AUTO: 0.1 K/UL — SIGNIFICANT CHANGE UP (ref 0–0.2)
BASOPHILS NFR BLD AUTO: 1.3 % — HIGH (ref 0–1)
BILIRUB SERPL-MCNC: 0.5 MG/DL — SIGNIFICANT CHANGE UP (ref 0.2–1.2)
BILIRUB SERPL-MCNC: 0.6 MG/DL — SIGNIFICANT CHANGE UP (ref 0.2–1.2)
BUN SERPL-MCNC: 11 MG/DL — SIGNIFICANT CHANGE UP (ref 10–20)
BUN SERPL-MCNC: 12 MG/DL — SIGNIFICANT CHANGE UP (ref 10–20)
BUN SERPL-MCNC: 12 MG/DL — SIGNIFICANT CHANGE UP (ref 10–20)
CALCIUM SERPL-MCNC: 7.9 MG/DL — LOW (ref 8.5–10.1)
CALCIUM SERPL-MCNC: 8.1 MG/DL — LOW (ref 8.5–10.1)
CALCIUM SERPL-MCNC: 8.6 MG/DL — SIGNIFICANT CHANGE UP (ref 8.5–10.1)
CHLORIDE SERPL-SCNC: 116 MMOL/L — HIGH (ref 98–110)
CHLORIDE SERPL-SCNC: 118 MMOL/L — HIGH (ref 98–110)
CHLORIDE SERPL-SCNC: 120 MMOL/L — HIGH (ref 98–110)
CHOLEST SERPL-MCNC: 201 MG/DL — HIGH
CO2 SERPL-SCNC: 23 MMOL/L — SIGNIFICANT CHANGE UP (ref 17–32)
CO2 SERPL-SCNC: 23 MMOL/L — SIGNIFICANT CHANGE UP (ref 17–32)
CO2 SERPL-SCNC: 24 MMOL/L — SIGNIFICANT CHANGE UP (ref 17–32)
CREAT ?TM UR-MCNC: 93 MG/DL — SIGNIFICANT CHANGE UP (ref 20–320)
CREAT SERPL-MCNC: 0.7 MG/DL — SIGNIFICANT CHANGE UP (ref 0.7–1.5)
CREAT SERPL-MCNC: 0.8 MG/DL — SIGNIFICANT CHANGE UP (ref 0.7–1.5)
CREAT SERPL-MCNC: 0.8 MG/DL — SIGNIFICANT CHANGE UP (ref 0.7–1.5)
DOPAMINE UR-MCNC: 100 MCG/G CR — SIGNIFICANT CHANGE UP (ref 40–390)
EOSINOPHIL # BLD AUTO: 0.3 K/UL — SIGNIFICANT CHANGE UP (ref 0–0.7)
EOSINOPHIL NFR BLD AUTO: 4 % — SIGNIFICANT CHANGE UP (ref 0–8)
EPINEPH UR-MCNC: SIGNIFICANT CHANGE UP
EPINEPHRINE/NOREPINEPHRINE - RANDOM URINE: 16 MCG/G CR — SIGNIFICANT CHANGE UP (ref 9–74)
GAS PNL BLDA: SIGNIFICANT CHANGE UP
GLUCOSE SERPL-MCNC: 100 MG/DL — HIGH (ref 70–99)
GLUCOSE SERPL-MCNC: 102 MG/DL — HIGH (ref 70–99)
GLUCOSE SERPL-MCNC: 106 MG/DL — HIGH (ref 70–99)
HCT VFR BLD CALC: 34.6 % — LOW (ref 42–52)
HDLC SERPL-MCNC: 19 MG/DL — LOW
HGB BLD-MCNC: 11.2 G/DL — LOW (ref 14–18)
IMM GRANULOCYTES NFR BLD AUTO: 5.2 % — HIGH (ref 0.1–0.3)
LIPID PNL WITH DIRECT LDL SERPL: 104 MG/DL — HIGH
LYMPHOCYTES # BLD AUTO: 1.27 K/UL — SIGNIFICANT CHANGE UP (ref 1.2–3.4)
LYMPHOCYTES # BLD AUTO: 16.9 % — LOW (ref 20.5–51.1)
MAGNESIUM SERPL-MCNC: 1.9 MG/DL — SIGNIFICANT CHANGE UP (ref 1.8–2.4)
MCHC RBC-ENTMCNC: 30.4 PG — SIGNIFICANT CHANGE UP (ref 27–31)
MCHC RBC-ENTMCNC: 32.4 G/DL — SIGNIFICANT CHANGE UP (ref 32–37)
MCV RBC AUTO: 94 FL — SIGNIFICANT CHANGE UP (ref 80–94)
MONOCYTES # BLD AUTO: 0.67 K/UL — HIGH (ref 0.1–0.6)
MONOCYTES NFR BLD AUTO: 8.9 % — SIGNIFICANT CHANGE UP (ref 1.7–9.3)
NEUTROPHILS # BLD AUTO: 4.79 K/UL — SIGNIFICANT CHANGE UP (ref 1.4–6.5)
NEUTROPHILS NFR BLD AUTO: 63.7 % — SIGNIFICANT CHANGE UP (ref 42.2–75.2)
NON HDL CHOLESTEROL: 182 MG/DL — HIGH
NOREPINEPH 24H UR-MCNC: 16 MCG/G CR — SIGNIFICANT CHANGE UP (ref 7–65)
NRBC # BLD: 0 /100 WBCS — SIGNIFICANT CHANGE UP (ref 0–0)
PHOSPHATE SERPL-MCNC: 3.1 MG/DL — SIGNIFICANT CHANGE UP (ref 2.1–4.9)
PLATELET # BLD AUTO: 306 K/UL — SIGNIFICANT CHANGE UP (ref 130–400)
POTASSIUM SERPL-MCNC: 3.5 MMOL/L — SIGNIFICANT CHANGE UP (ref 3.5–5)
POTASSIUM SERPL-MCNC: 3.5 MMOL/L — SIGNIFICANT CHANGE UP (ref 3.5–5)
POTASSIUM SERPL-MCNC: 3.7 MMOL/L — SIGNIFICANT CHANGE UP (ref 3.5–5)
POTASSIUM SERPL-SCNC: 3.5 MMOL/L — SIGNIFICANT CHANGE UP (ref 3.5–5)
POTASSIUM SERPL-SCNC: 3.5 MMOL/L — SIGNIFICANT CHANGE UP (ref 3.5–5)
POTASSIUM SERPL-SCNC: 3.7 MMOL/L — SIGNIFICANT CHANGE UP (ref 3.5–5)
PROT SERPL-MCNC: 5 G/DL — LOW (ref 6–8)
PROT SERPL-MCNC: 5.3 G/DL — LOW (ref 6–8)
RBC # BLD: 3.68 M/UL — LOW (ref 4.7–6.1)
RBC # FLD: 12.1 % — SIGNIFICANT CHANGE UP (ref 11.5–14.5)
SODIUM SERPL-SCNC: 148 MMOL/L — HIGH (ref 135–146)
SODIUM SERPL-SCNC: 151 MMOL/L — HIGH (ref 135–146)
SODIUM SERPL-SCNC: 154 MMOL/L — HIGH (ref 135–146)
TRIGL SERPL-MCNC: 423 MG/DL — HIGH
WBC # BLD: 7.52 K/UL — SIGNIFICANT CHANGE UP (ref 4.8–10.8)
WBC # FLD AUTO: 7.52 K/UL — SIGNIFICANT CHANGE UP (ref 4.8–10.8)

## 2021-02-17 PROCEDURE — 93970 EXTREMITY STUDY: CPT | Mod: 26

## 2021-02-17 PROCEDURE — 71045 X-RAY EXAM CHEST 1 VIEW: CPT | Mod: 26,77

## 2021-02-17 PROCEDURE — 71045 X-RAY EXAM CHEST 1 VIEW: CPT | Mod: 26

## 2021-02-17 PROCEDURE — 93975 VASCULAR STUDY: CPT | Mod: 26

## 2021-02-17 PROCEDURE — 70450 CT HEAD/BRAIN W/O DYE: CPT | Mod: 26

## 2021-02-17 RX ORDER — SODIUM CHLORIDE 5 G/100ML
500 INJECTION, SOLUTION INTRAVENOUS
Refills: 0 | Status: DISCONTINUED | OUTPATIENT
Start: 2021-02-17 | End: 2021-02-18

## 2021-02-17 RX ADMIN — PANTOPRAZOLE SODIUM 40 MILLIGRAM(S): 20 TABLET, DELAYED RELEASE ORAL at 18:07

## 2021-02-17 RX ADMIN — HEPARIN SODIUM 5000 UNIT(S): 5000 INJECTION INTRAVENOUS; SUBCUTANEOUS at 15:25

## 2021-02-17 RX ADMIN — LEVETIRACETAM 420 MILLIGRAM(S): 250 TABLET, FILM COATED ORAL at 18:00

## 2021-02-17 RX ADMIN — Medication 5 MILLIGRAM(S): at 05:35

## 2021-02-17 RX ADMIN — Medication 200 MILLIGRAM(S): at 22:39

## 2021-02-17 RX ADMIN — LEVETIRACETAM 420 MILLIGRAM(S): 250 TABLET, FILM COATED ORAL at 05:34

## 2021-02-17 RX ADMIN — CEFEPIME 100 MILLIGRAM(S): 1 INJECTION, POWDER, FOR SOLUTION INTRAMUSCULAR; INTRAVENOUS at 05:32

## 2021-02-17 RX ADMIN — CHLORHEXIDINE GLUCONATE 15 MILLILITER(S): 213 SOLUTION TOPICAL at 18:01

## 2021-02-17 RX ADMIN — Medication 50 MILLIGRAM(S): at 00:26

## 2021-02-17 RX ADMIN — Medication 200 MILLIGRAM(S): at 05:34

## 2021-02-17 RX ADMIN — LACTULOSE 10 GRAM(S): 10 SOLUTION ORAL at 22:39

## 2021-02-17 RX ADMIN — SENNA PLUS 2 TABLET(S): 8.6 TABLET ORAL at 22:39

## 2021-02-17 RX ADMIN — HEPARIN SODIUM 5000 UNIT(S): 5000 INJECTION INTRAVENOUS; SUBCUTANEOUS at 05:33

## 2021-02-17 RX ADMIN — Medication 50 MILLIGRAM(S): at 05:36

## 2021-02-17 RX ADMIN — Medication 50 MILLIGRAM(S): at 13:09

## 2021-02-17 RX ADMIN — CHLORHEXIDINE GLUCONATE 1 APPLICATION(S): 213 SOLUTION TOPICAL at 06:22

## 2021-02-17 RX ADMIN — CEFEPIME 100 MILLIGRAM(S): 1 INJECTION, POWDER, FOR SOLUTION INTRAMUSCULAR; INTRAVENOUS at 17:58

## 2021-02-17 RX ADMIN — Medication 0.2 MILLIGRAM(S): at 22:38

## 2021-02-17 RX ADMIN — PANTOPRAZOLE SODIUM 40 MILLIGRAM(S): 20 TABLET, DELAYED RELEASE ORAL at 05:33

## 2021-02-17 RX ADMIN — Medication 50 MILLIGRAM(S): at 17:58

## 2021-02-17 RX ADMIN — Medication 200 MILLIGRAM(S): at 15:25

## 2021-02-17 RX ADMIN — HEPARIN SODIUM 5000 UNIT(S): 5000 INJECTION INTRAVENOUS; SUBCUTANEOUS at 21:16

## 2021-02-17 RX ADMIN — LACTULOSE 10 GRAM(S): 10 SOLUTION ORAL at 05:34

## 2021-02-17 RX ADMIN — Medication 5 MILLIGRAM(S): at 18:07

## 2021-02-17 RX ADMIN — CHLORHEXIDINE GLUCONATE 15 MILLILITER(S): 213 SOLUTION TOPICAL at 05:32

## 2021-02-17 RX ADMIN — Medication 0.2 MILLIGRAM(S): at 15:25

## 2021-02-17 RX ADMIN — LISINOPRIL 40 MILLIGRAM(S): 2.5 TABLET ORAL at 05:34

## 2021-02-17 RX ADMIN — Medication 50 MILLIGRAM(S): at 22:40

## 2021-02-17 RX ADMIN — Medication 0.2 MILLIGRAM(S): at 05:33

## 2021-02-17 RX ADMIN — LACTULOSE 10 GRAM(S): 10 SOLUTION ORAL at 15:25

## 2021-02-17 NOTE — PROGRESS NOTE ADULT - ASSESSMENT
IMPRESSION:    Large ICB secondary to Hypertensive Emergency s/p EVD  Intubated, for airway protection  Klebsiella Pneumonia pansensitive   Left posterior tibial & peroneal DVT  ABD distention ASP CT AP      PLAN:  CNS:  c/w Propofol & Precedex taper versed,, neuro sx f/up 9 try to dc versed)    HEENT: Oral care    PULMONARY:  HOB @ 45 degrees.  Vent changes:  keep plateau less than 30/ DP less than 15    CARDIOVASCULAR:  Avoid volume overload.  BP control. 3% Hypertonic    GI: GI prophylaxis.  Feeding NGT    RENAL:  FU with Neuro & Neurosx regarding Na target.     INFECTIOUS DISEASE:   c/w Cefepime.  Follow up cultures & sensitivities. id f/up    HEMATOLOGICAL:  SCDs for DVT prophylaxis due to ICH. Repeat LE doppler if extension, GFF    ENDOCRINE:  Follow up FS.  Insulin protocol if needed.    MUSCULOSKELETAL:  Bed rest  change TLC  Prognosis poor  Possible transfer to [facility]

## 2021-02-17 NOTE — PROGRESS NOTE ADULT - SUBJECTIVE AND OBJECTIVE BOX
Subjective: 50yMale with a pmhx of INTRACRANIAL BLEED                                     ADM/AA    INTRACRANIAL BLEED    ^MED EVAL    Family history of breast cancer (Mother)    Family history of pancreatic cancer (Father)    Handoff    MEWS Score    HTN (hypertension)    GI bleed    Gastric ulcer    PUD (peptic ulcer disease)    Intracranial bleed    Intraparenchymal hemorrhage of brain    Insertion, external ventricular drain    Ventriculostomy, for ventricular catheter insertion    Arm fracture, left    MED EVAL    90+    SysAdmin_VisitLink            Patient is a 50 year old male s/p EVD placement on 1/27/21. Patient was seen and examined at bedside in Vent unit. He is lying in bed, intubated and sedated on propofol, precedex, and versed. No spontaneous blinking today.                     Allergies    No Known Allergies    Intolerances        Vital Signs Last 24 Hrs  T(C): 37.1 (17 Feb 2021 04:00), Max: 37.7 (16 Feb 2021 12:00)  T(F): 98.8 (17 Feb 2021 04:00), Max: 99.9 (16 Feb 2021 12:00)  HR: 68 (17 Feb 2021 08:00) (68 - 90)  BP: --  BP(mean): --  RR: 24 (17 Feb 2021 08:00) (24 - 31)  SpO2: 98% (17 Feb 2021 08:00) (94% - 98%)      acetaminophen   Tablet .. 650 milliGRAM(s) Oral every 6 hours PRN  cefepime   IVPB 2000 milliGRAM(s) IV Intermittent every 12 hours  chlorhexidine 0.12% Liquid 15 milliLiter(s) Oral Mucosa every 12 hours  chlorhexidine 4% Liquid 1 Application(s) Topical <User Schedule>  cloNIDine 0.2 milliGRAM(s) Oral every 8 hours  dexMEDEtomidine Infusion 0.05 MICROgram(s)/kG/Hr IV Continuous <Continuous>  heparin   Injectable 5000 Unit(s) SubCutaneous every 8 hours  hydrALAZINE 50 milliGRAM(s) Oral every 6 hours  labetalol 200 milliGRAM(s) Oral every 8 hours  labetalol Injectable 10 milliGRAM(s) IV Push once  lactulose Syrup 10 Gram(s) Oral every 8 hours  levETIRAcetam  IVPB 500 milliGRAM(s) IV Intermittent every 12 hours  lisinopril 40 milliGRAM(s) Oral daily  metoclopramide 5 milliGRAM(s) Oral two times a day  midazolam Infusion 0.02 mG/kG/Hr IV Continuous <Continuous>  mineral oil enema 133 milliLiter(s) Rectal daily  niCARdipine Infusion 15 mG/Hr IV Continuous <Continuous>  pantoprazole  Injectable 40 milliGRAM(s) IV Push every 12 hours  polyethylene glycol 3350 17 Gram(s) Oral daily  propofol Infusion 20 MICROgram(s)/kG/Min IV Continuous <Continuous>  senna 2 Tablet(s) Oral at bedtime  sodium chloride 3%. 500 milliLiter(s) IV Continuous <Continuous>        02-16-21 @ 07:01  -  02-17-21 @ 07:00  --------------------------------------------------------  IN: 5926 mL / OUT: 4095 mL / NET: 1831 mL    02-17-21 @ 07:01  -  02-17-21 @ 10:13  --------------------------------------------------------  IN: 0 mL / OUT: 415 mL / NET: -415 mL            REVIEW OF SYSTEMS    [ ] A ten-point review of systems was otherwise negative except as noted.  [X] Due to altered mental status/intubation, subjective information were not able to be obtained from the patient. History was obtained, to the extent possible, from review of the chart and collateral sources of information.      Physical Exam:  General: Lying in bed, not following commands  +corneals  +gag  No droop  PERRL  no withdrawal  Wound: Incision clean, dry, and intact without drainage            CBC Full  -  ( 17 Feb 2021 04:20 )  WBC Count : 7.52 K/uL  RBC Count : 3.68 M/uL  Hemoglobin : 11.2 g/dL  Hematocrit : 34.6 %  Platelet Count - Automated : 306 K/uL  Mean Cell Volume : 94.0 fL  Mean Cell Hemoglobin : 30.4 pg  Mean Cell Hemoglobin Concentration : 32.4 g/dL  Auto Neutrophil # : 4.79 K/uL  Auto Lymphocyte # : 1.27 K/uL  Auto Monocyte # : 0.67 K/uL  Auto Eosinophil # : 0.30 K/uL  Auto Basophil # : 0.10 K/uL  Auto Neutrophil % : 63.7 %  Auto Lymphocyte % : 16.9 %  Auto Monocyte % : 8.9 %  Auto Eosinophil % : 4.0 %  Auto Basophil % : 1.3 %    02-17    148<H>  |  116<H>  |  12  ----------------------------<  100<H>  3.5   |  23  |  0.8    Ca    8.1<L>      17 Feb 2021 04:20  Phos  3.1     02-17  Mg     1.9     02-17    TPro  5.0<L>  /  Alb  2.6<L>  /  TBili  0.5  /  DBili  x   /  AST  28  /  ALT  37  /  AlkPhos  116<H>  02-17            Imaging:    CTH 2/15: No significant change.     CTH 2/16 : appears unchanged, pending final radiology read      Assessment/Plan:     50 year old male s/p EVD placement on 1/27/21, removed 2/15/21.    -Authorization approved, pt being transferred to [facility] -continue 3% for edema  -wean sedation if possible for exam  -daily CTH  -dvt ppx  -discuss with attending

## 2021-02-17 NOTE — PROGRESS NOTE ADULT - SUBJECTIVE AND OBJECTIVE BOX
OVERNIGHT EVENTS:  events noted, still intubated, ventialted, on propofol, versed, precedex, hypert 95 cc/ cardene    Vital Signs Last 24 Hrs  T(C): 37.1 (17 Feb 2021 04:00), Max: 37.7 (16 Feb 2021 12:00)  T(F): 98.8 (17 Feb 2021 04:00), Max: 99.9 (16 Feb 2021 12:00)  HR: 70 (17 Feb 2021 04:00) (70 - 91)  RR: 24 (17 Feb 2021 07:00) (24 - 31)  SpO2: 97% (17 Feb 2021 07:00) (94% - 98%)    PHYSICAL EXAMINATION:    GENERAL: sedated    HEENT: Head is normocephalic and atraumatic.     NECK: Supple.    LUNGS: dec bs both bases    HEART: Regular rate and rhythm without murmur.    ABDOMEN: Soft, nontender, and nondistended.      EXTREMITIES: Without any cyanosis, clubbing, rash, lesions or edema.    NEUROLOGIC: sedated    SKIN: No ulceration or induration present.      LABS:                        11.2   7.52  )-----------( 306      ( 17 Feb 2021 04:20 )             34.6     02-17    148<H>  |  116<H>  |  12  ----------------------------<  100<H>  3.5   |  23  |  0.8    Ca    8.1<L>      17 Feb 2021 04:20  Phos  3.1     02-17  Mg     1.9     02-17    TPro  5.0<L>  /  Alb  2.6<L>  /  TBili  0.5  /  DBili  x   /  AST  28  /  ALT  37  /  AlkPhos  116<H>  02-17        ABG - ( 17 Feb 2021 05:14 )  pH, Arterial: 7.40  pH, Blood: x     /  pCO2: 40    /  pO2: 98    / HCO3: 25    / Base Excess: 0.1   /  SaO2: 97          24/450/40/8  plateau 17                Procalcitonin, Serum: 0.11 ng/mL (02-16-21 @ 04:00)        02-16-21 @ 07:01  -  02-17-21 @ 07:00  --------------------------------------------------------  IN: 5926 mL / OUT: 4095 mL / NET: 1831 mL        MICROBIOLOGY:  Culture Results:   No growth to date. (02-13 @ 08:46)      MEDICATIONS  (STANDING):  cefepime   IVPB 2000 milliGRAM(s) IV Intermittent every 12 hours  chlorhexidine 0.12% Liquid 15 milliLiter(s) Oral Mucosa every 12 hours  chlorhexidine 4% Liquid 1 Application(s) Topical <User Schedule>  cloNIDine 0.2 milliGRAM(s) Oral every 8 hours  dexMEDEtomidine Infusion 0.05 MICROgram(s)/kG/Hr (1.25 mL/Hr) IV Continuous <Continuous>  heparin   Injectable 5000 Unit(s) SubCutaneous every 8 hours  hydrALAZINE 50 milliGRAM(s) Oral every 6 hours  labetalol 200 milliGRAM(s) Oral every 8 hours  labetalol Injectable 10 milliGRAM(s) IV Push once  lactulose Syrup 10 Gram(s) Oral every 8 hours  levETIRAcetam  IVPB 500 milliGRAM(s) IV Intermittent every 12 hours  lisinopril 40 milliGRAM(s) Oral daily  metoclopramide 5 milliGRAM(s) Oral two times a day  midazolam Infusion 0.02 mG/kG/Hr (2 mL/Hr) IV Continuous <Continuous>  mineral oil enema 133 milliLiter(s) Rectal daily  niCARdipine Infusion 15 mG/Hr (75 mL/Hr) IV Continuous <Continuous>  pantoprazole  Injectable 40 milliGRAM(s) IV Push every 12 hours  polyethylene glycol 3350 17 Gram(s) Oral daily  propofol Infusion 20 MICROgram(s)/kG/Min (12 mL/Hr) IV Continuous <Continuous>  senna 2 Tablet(s) Oral at bedtime  sodium chloride 3%. 500 milliLiter(s) (95 mL/Hr) IV Continuous <Continuous>    MEDICATIONS  (PRN):  acetaminophen   Tablet .. 650 milliGRAM(s) Oral every 6 hours PRN Temp greater or equal to 38C (100.4F)      RADIOLOGY & ADDITIONAL STUDIES:

## 2021-02-17 NOTE — PROGRESS NOTE ADULT - SUBJECTIVE AND OBJECTIVE BOX
MELODIE HERNADEZ  50y, Male    All available historical data reviewed    OVERNIGHT EVENTS:  no fevers  non responsive  fio2 40%  no pressors  no diarrhea    ROS:  unable to obtain history secondary to patient's mental status and/or sedation     VITALS:  T(F): 98.8, Max: 99.9 (02-16-21 @ 12:00)  HR: 68  BP: --  RR: 24Vital Signs Last 24 Hrs  T(C): 37.1 (17 Feb 2021 04:00), Max: 37.7 (16 Feb 2021 12:00)  T(F): 98.8 (17 Feb 2021 04:00), Max: 99.9 (16 Feb 2021 12:00)  HR: 68 (17 Feb 2021 08:00) (68 - 91)  BP: --  BP(mean): --  RR: 24 (17 Feb 2021 08:00) (24 - 31)  SpO2: 98% (17 Feb 2021 08:00) (94% - 98%)    TESTS & MEASUREMENTS:                        11.2   7.52  )-----------( 306      ( 17 Feb 2021 04:20 )             34.6     02-17    148<H>  |  116<H>  |  12  ----------------------------<  100<H>  3.5   |  23  |  0.8    Ca    8.1<L>      17 Feb 2021 04:20  Phos  3.1     02-17  Mg     1.9     02-17    TPro  5.0<L>  /  Alb  2.6<L>  /  TBili  0.5  /  DBili  x   /  AST  28  /  ALT  37  /  AlkPhos  116<H>  02-17    LIVER FUNCTIONS - ( 17 Feb 2021 04:20 )  Alb: 2.6 g/dL / Pro: 5.0 g/dL / ALK PHOS: 116 U/L / ALT: 37 U/L / AST: 28 U/L / GGT: x             Culture - Blood (collected 02-13-21 @ 08:46)  Source: .Blood None  Preliminary Report (02-14-21 @ 17:01):    No growth to date.            RADIOLOGY & ADDITIONAL TESTS:  Personal review of radiological diagnostics performed  Echo and EKG results noted when applicable.     MEDICATIONS:  acetaminophen   Tablet .. 650 milliGRAM(s) Oral every 6 hours PRN  cefepime   IVPB 2000 milliGRAM(s) IV Intermittent every 12 hours  chlorhexidine 0.12% Liquid 15 milliLiter(s) Oral Mucosa every 12 hours  chlorhexidine 4% Liquid 1 Application(s) Topical <User Schedule>  cloNIDine 0.2 milliGRAM(s) Oral every 8 hours  dexMEDEtomidine Infusion 0.05 MICROgram(s)/kG/Hr IV Continuous <Continuous>  heparin   Injectable 5000 Unit(s) SubCutaneous every 8 hours  hydrALAZINE 50 milliGRAM(s) Oral every 6 hours  labetalol 200 milliGRAM(s) Oral every 8 hours  labetalol Injectable 10 milliGRAM(s) IV Push once  lactulose Syrup 10 Gram(s) Oral every 8 hours  levETIRAcetam  IVPB 500 milliGRAM(s) IV Intermittent every 12 hours  lisinopril 40 milliGRAM(s) Oral daily  metoclopramide 5 milliGRAM(s) Oral two times a day  midazolam Infusion 0.02 mG/kG/Hr IV Continuous <Continuous>  mineral oil enema 133 milliLiter(s) Rectal daily  niCARdipine Infusion 15 mG/Hr IV Continuous <Continuous>  pantoprazole  Injectable 40 milliGRAM(s) IV Push every 12 hours  polyethylene glycol 3350 17 Gram(s) Oral daily  propofol Infusion 20 MICROgram(s)/kG/Min IV Continuous <Continuous>  senna 2 Tablet(s) Oral at bedtime  sodium chloride 3%. 500 milliLiter(s) IV Continuous <Continuous>      ANTIBIOTICS:  cefepime   IVPB 2000 milliGRAM(s) IV Intermittent every 12 hours

## 2021-02-17 NOTE — PROGRESS NOTE ADULT - ASSESSMENT
· Assessment	  50 years old right handed Male with PMHx of HTN, GI bleed, PUD, was brought in by EMS for  AMS and left sided weakness.   Wife states that patient was having a usual day today went to bathroom and then the next thing she noted was that he was confused and was lying on the bathroom floor, she reports urinary incontinence, patient was confused, had trouble speaking, and could not move his left side.   CTH showed Moderate to large intraparenchymal right basal ganglia hemorrhage with mild surrounding edema and associated right sulcal effacement as well as decompression into the ventricles. There is approximately 6.3 mm of midline shift.   CTA did not show any evidence of active bleeding, AVM or aneurysm or major vascular stenosis or occlusion. Pt became unresponsive in CT scan and had to be intubated and sedated.  EVD was placed by neurosurgery 1/27   ID called for ABx management    IMPRESSION;  Multilobar bacterial PNA which has responded well to ABx  Sputum Klebsiella  CSF : 2/1 no evidence of an infectious etiology  CSF cultures NG  BCx NG  Fevers related to extensive CNS bleed  CT Head 1/31  No significant interval change in the size/configuration of the right frontal lobe/basal ganglia parenchymal hematoma measuring up to 6.4 x 3.5 x 5.0 cm (trv, AP, CC) with rupture into the right lateral ventricle. The surrounding edema has mildly increased. The mass effect with 6 mm right to left midline shift is about stable.  Mild increase in the intraventricular hemorrhage. Slight increase in ventricular size.  No significant change in scattered subarachnoid hemorrhage.  Stable position of a left transfrontal EVD with distal tip terminating in the region of the suprasellar cistern.    Right frontal/basal ganglia parenchymal hematoma with rupture into lateral right ventral with stable mass effect with midline shift.  Increasing IVH. No change in SAH    The CNS bleed with central fevers  Susana FERRER NG    RECOMMENDATIONS;  Cefepime 2 gm iv q8h for 10 days starting 3/11  Could proceed with NS procedure  recall prn please

## 2021-02-17 NOTE — PROGRESS NOTE ADULT - CARDIOVASCULAR
Regular rate & rhythm, normal S1, S2; no murmurs, gallops or rubs; no S3, S4
Regular rate & rhythm, normal S1, S2; no murmurs, gallops or rubs; no S3, S4
detailed exam
Regular rate & rhythm, normal S1, S2; no murmurs, gallops or rubs; no S3, S4

## 2021-02-18 LAB
ALBUMIN SERPL ELPH-MCNC: 2.5 G/DL — LOW (ref 3.5–5.2)
ALBUMIN SERPL ELPH-MCNC: 2.6 G/DL — LOW (ref 3.5–5.2)
ALP SERPL-CCNC: 121 U/L — HIGH (ref 30–115)
ALP SERPL-CCNC: 123 U/L — HIGH (ref 30–115)
ALT FLD-CCNC: 35 U/L — SIGNIFICANT CHANGE UP (ref 0–41)
ALT FLD-CCNC: 37 U/L — SIGNIFICANT CHANGE UP (ref 0–41)
ANION GAP SERPL CALC-SCNC: 8 MMOL/L — SIGNIFICANT CHANGE UP (ref 7–14)
ANION GAP SERPL CALC-SCNC: 9 MMOL/L — SIGNIFICANT CHANGE UP (ref 7–14)
ANION GAP SERPL CALC-SCNC: 9 MMOL/L — SIGNIFICANT CHANGE UP (ref 7–14)
AST SERPL-CCNC: 26 U/L — SIGNIFICANT CHANGE UP (ref 0–41)
AST SERPL-CCNC: 30 U/L — SIGNIFICANT CHANGE UP (ref 0–41)
BASE EXCESS BLDA CALC-SCNC: 2 MMOL/L — SIGNIFICANT CHANGE UP (ref -2–2)
BASOPHILS # BLD AUTO: 0.07 K/UL — SIGNIFICANT CHANGE UP (ref 0–0.2)
BASOPHILS NFR BLD AUTO: 1 % — SIGNIFICANT CHANGE UP (ref 0–1)
BILIRUB SERPL-MCNC: 0.5 MG/DL — SIGNIFICANT CHANGE UP (ref 0.2–1.2)
BILIRUB SERPL-MCNC: 0.5 MG/DL — SIGNIFICANT CHANGE UP (ref 0.2–1.2)
BUN SERPL-MCNC: 10 MG/DL — SIGNIFICANT CHANGE UP (ref 10–20)
BUN SERPL-MCNC: 10 MG/DL — SIGNIFICANT CHANGE UP (ref 10–20)
BUN SERPL-MCNC: 13 MG/DL — SIGNIFICANT CHANGE UP (ref 10–20)
CALCIUM SERPL-MCNC: 8 MG/DL — LOW (ref 8.5–10.1)
CALCIUM SERPL-MCNC: 8.1 MG/DL — LOW (ref 8.5–10.1)
CALCIUM SERPL-MCNC: 8.4 MG/DL — LOW (ref 8.5–10.1)
CHLORIDE SERPL-SCNC: 115 MMOL/L — HIGH (ref 98–110)
CHLORIDE SERPL-SCNC: 118 MMOL/L — HIGH (ref 98–110)
CHLORIDE SERPL-SCNC: 119 MMOL/L — HIGH (ref 98–110)
CO2 SERPL-SCNC: 24 MMOL/L — SIGNIFICANT CHANGE UP (ref 17–32)
CO2 SERPL-SCNC: 25 MMOL/L — SIGNIFICANT CHANGE UP (ref 17–32)
CO2 SERPL-SCNC: 25 MMOL/L — SIGNIFICANT CHANGE UP (ref 17–32)
CREAT SERPL-MCNC: 0.6 MG/DL — LOW (ref 0.7–1.5)
CREAT SERPL-MCNC: 0.7 MG/DL — SIGNIFICANT CHANGE UP (ref 0.7–1.5)
CREAT SERPL-MCNC: 0.7 MG/DL — SIGNIFICANT CHANGE UP (ref 0.7–1.5)
CULTURE RESULTS: SIGNIFICANT CHANGE UP
EOSINOPHIL # BLD AUTO: 0.61 K/UL — SIGNIFICANT CHANGE UP (ref 0–0.7)
EOSINOPHIL NFR BLD AUTO: 8.4 % — HIGH (ref 0–8)
GAS PNL BLDA: SIGNIFICANT CHANGE UP
GAS PNL BLDA: SIGNIFICANT CHANGE UP
GLUCOSE SERPL-MCNC: 103 MG/DL — HIGH (ref 70–99)
GLUCOSE SERPL-MCNC: 115 MG/DL — HIGH (ref 70–99)
GLUCOSE SERPL-MCNC: 118 MG/DL — HIGH (ref 70–99)
HCO3 BLDA-SCNC: 26 MMOL/L — SIGNIFICANT CHANGE UP (ref 23–27)
HCT VFR BLD CALC: 34.3 % — LOW (ref 42–52)
HGB BLD-MCNC: 11.3 G/DL — LOW (ref 14–18)
HOROWITZ INDEX BLDA+IHG-RTO: 40 — SIGNIFICANT CHANGE UP
IMM GRANULOCYTES NFR BLD AUTO: 3.7 % — HIGH (ref 0.1–0.3)
LYMPHOCYTES # BLD AUTO: 0.99 K/UL — LOW (ref 1.2–3.4)
LYMPHOCYTES # BLD AUTO: 13.7 % — LOW (ref 20.5–51.1)
MAGNESIUM SERPL-MCNC: 1.8 MG/DL — SIGNIFICANT CHANGE UP (ref 1.8–2.4)
MCHC RBC-ENTMCNC: 30.5 PG — SIGNIFICANT CHANGE UP (ref 27–31)
MCHC RBC-ENTMCNC: 32.9 G/DL — SIGNIFICANT CHANGE UP (ref 32–37)
MCV RBC AUTO: 92.7 FL — SIGNIFICANT CHANGE UP (ref 80–94)
MONOCYTES # BLD AUTO: 0.55 K/UL — SIGNIFICANT CHANGE UP (ref 0.1–0.6)
MONOCYTES NFR BLD AUTO: 7.6 % — SIGNIFICANT CHANGE UP (ref 1.7–9.3)
NEUTROPHILS # BLD AUTO: 4.76 K/UL — SIGNIFICANT CHANGE UP (ref 1.4–6.5)
NEUTROPHILS NFR BLD AUTO: 65.6 % — SIGNIFICANT CHANGE UP (ref 42.2–75.2)
NRBC # BLD: 0 /100 WBCS — SIGNIFICANT CHANGE UP (ref 0–0)
PCO2 BLDA: 37 MMHG — LOW (ref 38–42)
PH BLDA: 7.45 — HIGH (ref 7.38–7.42)
PHOSPHATE SERPL-MCNC: 2.9 MG/DL — SIGNIFICANT CHANGE UP (ref 2.1–4.9)
PLATELET # BLD AUTO: 319 K/UL — SIGNIFICANT CHANGE UP (ref 130–400)
PO2 BLDA: 68 MMHG — LOW (ref 78–95)
POTASSIUM SERPL-MCNC: 3.5 MMOL/L — SIGNIFICANT CHANGE UP (ref 3.5–5)
POTASSIUM SERPL-MCNC: 3.6 MMOL/L — SIGNIFICANT CHANGE UP (ref 3.5–5)
POTASSIUM SERPL-MCNC: 3.7 MMOL/L — SIGNIFICANT CHANGE UP (ref 3.5–5)
POTASSIUM SERPL-SCNC: 3.5 MMOL/L — SIGNIFICANT CHANGE UP (ref 3.5–5)
POTASSIUM SERPL-SCNC: 3.6 MMOL/L — SIGNIFICANT CHANGE UP (ref 3.5–5)
POTASSIUM SERPL-SCNC: 3.7 MMOL/L — SIGNIFICANT CHANGE UP (ref 3.5–5)
PROT SERPL-MCNC: 5.1 G/DL — LOW (ref 6–8)
PROT SERPL-MCNC: 5.1 G/DL — LOW (ref 6–8)
RBC # BLD: 3.7 M/UL — LOW (ref 4.7–6.1)
RBC # FLD: 11.9 % — SIGNIFICANT CHANGE UP (ref 11.5–14.5)
SAO2 % BLDA: 94 % — SIGNIFICANT CHANGE UP (ref 94–98)
SODIUM SERPL-SCNC: 149 MMOL/L — HIGH (ref 135–146)
SODIUM SERPL-SCNC: 151 MMOL/L — HIGH (ref 135–146)
SODIUM SERPL-SCNC: 152 MMOL/L — HIGH (ref 135–146)
SPECIMEN SOURCE: SIGNIFICANT CHANGE UP
WBC # BLD: 7.25 K/UL — SIGNIFICANT CHANGE UP (ref 4.8–10.8)
WBC # FLD AUTO: 7.25 K/UL — SIGNIFICANT CHANGE UP (ref 4.8–10.8)

## 2021-02-18 PROCEDURE — 71045 X-RAY EXAM CHEST 1 VIEW: CPT | Mod: 26

## 2021-02-18 RX ORDER — NIFEDIPINE 30 MG
90 TABLET, EXTENDED RELEASE 24 HR ORAL DAILY
Refills: 0 | Status: DISCONTINUED | OUTPATIENT
Start: 2021-02-18 | End: 2021-02-22

## 2021-02-18 RX ORDER — SODIUM CHLORIDE 5 G/100ML
500 INJECTION, SOLUTION INTRAVENOUS
Refills: 0 | Status: DISCONTINUED | OUTPATIENT
Start: 2021-02-18 | End: 2021-02-23

## 2021-02-18 RX ORDER — FUROSEMIDE 40 MG
40 TABLET ORAL ONCE
Refills: 0 | Status: COMPLETED | OUTPATIENT
Start: 2021-02-18 | End: 2021-02-18

## 2021-02-18 RX ADMIN — PANTOPRAZOLE SODIUM 40 MILLIGRAM(S): 20 TABLET, DELAYED RELEASE ORAL at 05:41

## 2021-02-18 RX ADMIN — PROPOFOL 12 MICROGRAM(S)/KG/MIN: 10 INJECTION, EMULSION INTRAVENOUS at 09:36

## 2021-02-18 RX ADMIN — Medication 0.2 MILLIGRAM(S): at 05:33

## 2021-02-18 RX ADMIN — Medication 50 MILLIGRAM(S): at 05:33

## 2021-02-18 RX ADMIN — LISINOPRIL 40 MILLIGRAM(S): 2.5 TABLET ORAL at 05:41

## 2021-02-18 RX ADMIN — CHLORHEXIDINE GLUCONATE 15 MILLILITER(S): 213 SOLUTION TOPICAL at 05:37

## 2021-02-18 RX ADMIN — Medication 0.2 MILLIGRAM(S): at 20:48

## 2021-02-18 RX ADMIN — Medication 5 MILLIGRAM(S): at 17:11

## 2021-02-18 RX ADMIN — MIDAZOLAM HYDROCHLORIDE 2 MG/KG/HR: 1 INJECTION, SOLUTION INTRAMUSCULAR; INTRAVENOUS at 04:05

## 2021-02-18 RX ADMIN — SENNA PLUS 2 TABLET(S): 8.6 TABLET ORAL at 20:48

## 2021-02-18 RX ADMIN — Medication 133 MILLILITER(S): at 12:33

## 2021-02-18 RX ADMIN — CHLORHEXIDINE GLUCONATE 1 APPLICATION(S): 213 SOLUTION TOPICAL at 05:40

## 2021-02-18 RX ADMIN — PROPOFOL 12 MICROGRAM(S)/KG/MIN: 10 INJECTION, EMULSION INTRAVENOUS at 00:14

## 2021-02-18 RX ADMIN — DEXMEDETOMIDINE HYDROCHLORIDE IN 0.9% SODIUM CHLORIDE 1.25 MICROGRAM(S)/KG/HR: 4 INJECTION INTRAVENOUS at 18:17

## 2021-02-18 RX ADMIN — HEPARIN SODIUM 5000 UNIT(S): 5000 INJECTION INTRAVENOUS; SUBCUTANEOUS at 05:33

## 2021-02-18 RX ADMIN — NICARDIPINE HYDROCHLORIDE 75 MG/HR: 30 CAPSULE, EXTENDED RELEASE ORAL at 00:14

## 2021-02-18 RX ADMIN — SODIUM CHLORIDE 75 MILLILITER(S): 5 INJECTION, SOLUTION INTRAVENOUS at 00:13

## 2021-02-18 RX ADMIN — CEFEPIME 100 MILLIGRAM(S): 1 INJECTION, POWDER, FOR SOLUTION INTRAMUSCULAR; INTRAVENOUS at 05:39

## 2021-02-18 RX ADMIN — HEPARIN SODIUM 5000 UNIT(S): 5000 INJECTION INTRAVENOUS; SUBCUTANEOUS at 20:48

## 2021-02-18 RX ADMIN — SODIUM CHLORIDE 75 MILLILITER(S): 5 INJECTION, SOLUTION INTRAVENOUS at 17:02

## 2021-02-18 RX ADMIN — PROPOFOL 12 MICROGRAM(S)/KG/MIN: 10 INJECTION, EMULSION INTRAVENOUS at 18:17

## 2021-02-18 RX ADMIN — SODIUM CHLORIDE 75 MILLILITER(S): 5 INJECTION, SOLUTION INTRAVENOUS at 09:36

## 2021-02-18 RX ADMIN — MIDAZOLAM HYDROCHLORIDE 2 MG/KG/HR: 1 INJECTION, SOLUTION INTRAMUSCULAR; INTRAVENOUS at 14:37

## 2021-02-18 RX ADMIN — Medication 200 MILLIGRAM(S): at 20:48

## 2021-02-18 RX ADMIN — SODIUM CHLORIDE 65 MILLILITER(S): 5 INJECTION, SOLUTION INTRAVENOUS at 17:09

## 2021-02-18 RX ADMIN — Medication 50 MILLIGRAM(S): at 17:13

## 2021-02-18 RX ADMIN — DEXMEDETOMIDINE HYDROCHLORIDE IN 0.9% SODIUM CHLORIDE 1.25 MICROGRAM(S)/KG/HR: 4 INJECTION INTRAVENOUS at 13:27

## 2021-02-18 RX ADMIN — Medication 90 MILLIGRAM(S): at 12:33

## 2021-02-18 RX ADMIN — Medication 10 MILLIGRAM(S): at 11:32

## 2021-02-18 RX ADMIN — LEVETIRACETAM 420 MILLIGRAM(S): 250 TABLET, FILM COATED ORAL at 05:39

## 2021-02-18 RX ADMIN — LACTULOSE 10 GRAM(S): 10 SOLUTION ORAL at 20:48

## 2021-02-18 RX ADMIN — LEVETIRACETAM 420 MILLIGRAM(S): 250 TABLET, FILM COATED ORAL at 17:12

## 2021-02-18 RX ADMIN — NICARDIPINE HYDROCHLORIDE 75 MG/HR: 30 CAPSULE, EXTENDED RELEASE ORAL at 11:13

## 2021-02-18 RX ADMIN — Medication 40 MILLIGRAM(S): at 18:47

## 2021-02-18 RX ADMIN — MIDAZOLAM HYDROCHLORIDE 2 MG/KG/HR: 1 INJECTION, SOLUTION INTRAMUSCULAR; INTRAVENOUS at 00:14

## 2021-02-18 RX ADMIN — HEPARIN SODIUM 5000 UNIT(S): 5000 INJECTION INTRAVENOUS; SUBCUTANEOUS at 13:26

## 2021-02-18 RX ADMIN — Medication 200 MILLIGRAM(S): at 05:33

## 2021-02-18 RX ADMIN — POLYETHYLENE GLYCOL 3350 17 GRAM(S): 17 POWDER, FOR SOLUTION ORAL at 12:33

## 2021-02-18 RX ADMIN — Medication 0.2 MILLIGRAM(S): at 13:26

## 2021-02-18 RX ADMIN — PROPOFOL 12 MICROGRAM(S)/KG/MIN: 10 INJECTION, EMULSION INTRAVENOUS at 13:27

## 2021-02-18 RX ADMIN — Medication 5 MILLIGRAM(S): at 05:33

## 2021-02-18 RX ADMIN — DEXMEDETOMIDINE HYDROCHLORIDE IN 0.9% SODIUM CHLORIDE 1.25 MICROGRAM(S)/KG/HR: 4 INJECTION INTRAVENOUS at 09:36

## 2021-02-18 RX ADMIN — Medication 50 MILLIGRAM(S): at 23:37

## 2021-02-18 RX ADMIN — Medication 650 MILLIGRAM(S): at 12:33

## 2021-02-18 RX ADMIN — LACTULOSE 10 GRAM(S): 10 SOLUTION ORAL at 13:26

## 2021-02-18 RX ADMIN — Medication 200 MILLIGRAM(S): at 13:26

## 2021-02-18 RX ADMIN — DEXMEDETOMIDINE HYDROCHLORIDE IN 0.9% SODIUM CHLORIDE 1.25 MICROGRAM(S)/KG/HR: 4 INJECTION INTRAVENOUS at 00:14

## 2021-02-18 RX ADMIN — NICARDIPINE HYDROCHLORIDE 75 MG/HR: 30 CAPSULE, EXTENDED RELEASE ORAL at 14:01

## 2021-02-18 RX ADMIN — LACTULOSE 10 GRAM(S): 10 SOLUTION ORAL at 05:33

## 2021-02-18 RX ADMIN — CHLORHEXIDINE GLUCONATE 15 MILLILITER(S): 213 SOLUTION TOPICAL at 17:09

## 2021-02-18 RX ADMIN — NICARDIPINE HYDROCHLORIDE 75 MG/HR: 30 CAPSULE, EXTENDED RELEASE ORAL at 08:44

## 2021-02-18 RX ADMIN — PROPOFOL 12 MICROGRAM(S)/KG/MIN: 10 INJECTION, EMULSION INTRAVENOUS at 14:01

## 2021-02-18 RX ADMIN — PANTOPRAZOLE SODIUM 40 MILLIGRAM(S): 20 TABLET, DELAYED RELEASE ORAL at 17:11

## 2021-02-18 RX ADMIN — CEFEPIME 100 MILLIGRAM(S): 1 INJECTION, POWDER, FOR SOLUTION INTRAMUSCULAR; INTRAVENOUS at 17:03

## 2021-02-18 RX ADMIN — DEXMEDETOMIDINE HYDROCHLORIDE IN 0.9% SODIUM CHLORIDE 1.25 MICROGRAM(S)/KG/HR: 4 INJECTION INTRAVENOUS at 14:01

## 2021-02-18 RX ADMIN — Medication 50 MILLIGRAM(S): at 12:33

## 2021-02-18 NOTE — PROGRESS NOTE ADULT - SUBJECTIVE AND OBJECTIVE BOX
Patient is a 50y old  Male who presents with a chief complaint of Altered mental status (17 Feb 2021 10:10)        Over Night Events: Remains. Versed, precedex, propofol. Still on 3% saline and cardene gtt      ALLERGIC/IMMUNOLOGIC:   No active allergic or immunologic issues    PHYSICAL EXAM    ICU Vital Signs Last 24 Hrs  T(C): 37.2 (18 Feb 2021 05:00), Max: 37.2 (18 Feb 2021 05:00)  T(F): 98.9 (18 Feb 2021 05:00), Max: 98.9 (18 Feb 2021 05:00)  HR: 67 (18 Feb 2021 07:33) (61 - 72)  ABP: 153/72 (18 Feb 2021 06:00) (138/68 - 171/79)  ABP(mean): 96 (17 Feb 2021 14:00) (88 - 107)  RR: 23 (18 Feb 2021 06:00) (22 - 24)  SpO2: 99% (18 Feb 2021 07:33) (95% - 100%)      CONSTITUTIONAL:  Well nourished.  NAD    ENT:   ET+  Airway patent,   Mouth with normal mucosa.   No thrush    EYES:   Pupils equal,   Round and reactive to light.    CARDIAC:   Normal rate,   Regular rhythm.    No edema    Vascular:  Normal systolic impulse  No Carotid bruits    RESPIRATORY:   No wheezing  Bilateral BS  Normal chest expansion  Not tachypneic,  No use of accessory muscles    GASTROINTESTINAL:  Abdomen soft,   Non-tender,   No guarding,   + BS    GENITOURINARY  no edema    MUSCULOSKELETAL:   No clubbing, cyanosis    NEUROLOGICAL:   Sedated  pertinent DTRs normal    SKIN:   Skin normal color for race,   Warm and dry and intact.   No evidence of rash.    PSYCHIATRIC:   No apparent risk to self or others.    HEME LYMPH:   No splenomegaly.  No cervical  lymphadenopathy.  no inguinal lymphadenopathy      02-17-21 @ 07:01  -  02-18-21 @ 07:00  --------------------------------------------------------  IN:    Dexmedetomidine: 605 mL    IV PiggyBack: 150 mL    Midazolam: 288 mL    NiCARdipine: 1460 mL    Propofol: 480 mL    sodium chloride 3%: 855 mL    sodium chloride 3%: 1155 mL    Vital High Protein: 220 mL  Total IN: 5213 mL    OUT:    Indwelling Catheter - Urethral (mL): 3285 mL    Rectal Tube (mL): 50 mL  Total OUT: 3335 mL    Total NET: 1878 mL      LABS:                        11.3   7.25  )-----------( 319      ( 18 Feb 2021 04:30 )             34.3                                               02-18    152<H>  |  119<H>  |  10  ----------------------------<  118<H>  3.6   |  25  |  0.6<L>    Ca    8.4<L>      18 Feb 2021 04:30  Phos  2.9     02-18  Mg     1.8     02-18    TPro  5.1<L>  /  Alb  2.5<L>  /  TBili  0.5  /  DBili  x   /  AST  30  /  ALT  37  /  AlkPhos  123<H>  02-18                                            LIVER FUNCTIONS - ( 18 Feb 2021 04:30 )  Alb: 2.5 g/dL / Pro: 5.1 g/dL / ALK PHOS: 123 U/L / ALT: 37 U/L / AST: 30 U/L / GGT: x                                               Culture - Blood (collected 16 Feb 2021 08:14)  Source: .Blood None  Preliminary Report (17 Feb 2021 17:02):    No growth to date.                                           Mode: AC/ CMV (Assist Control/ Continuous Mandatory Ventilation)  RR (machine): 24  TV (machine): 450  FiO2: 40  PEEP: 8  ITime: 0.9  MAP: 13  PIP: 22                                      ABG - ( 18 Feb 2021 05:17 )  pH, Arterial: 7.43  pH, Blood: x     /  pCO2: 40    /  pO2: 104   / HCO3: 27    / Base Excess: 2.1   /  SaO2: 98    LA 0.5        MEDICATIONS  (STANDING):  cefepime   IVPB 2000 milliGRAM(s) IV Intermittent every 12 hours  chlorhexidine 0.12% Liquid 15 milliLiter(s) Oral Mucosa every 12 hours  chlorhexidine 4% Liquid 1 Application(s) Topical <User Schedule>  cloNIDine 0.2 milliGRAM(s) Oral every 8 hours  dexMEDEtomidine Infusion 0.05 MICROgram(s)/kG/Hr (1.25 mL/Hr) IV Continuous <Continuous>  heparin   Injectable 5000 Unit(s) SubCutaneous every 8 hours  hydrALAZINE 50 milliGRAM(s) Oral every 6 hours  labetalol 200 milliGRAM(s) Oral every 8 hours  labetalol Injectable 10 milliGRAM(s) IV Push once  lactulose Syrup 10 Gram(s) Oral every 8 hours  levETIRAcetam  IVPB 500 milliGRAM(s) IV Intermittent every 12 hours  lisinopril 40 milliGRAM(s) Oral daily  metoclopramide 5 milliGRAM(s) Oral two times a day  midazolam Infusion 0.02 mG/kG/Hr (2 mL/Hr) IV Continuous <Continuous>  mineral oil enema 133 milliLiter(s) Rectal daily  niCARdipine Infusion 15 mG/Hr (75 mL/Hr) IV Continuous <Continuous>  pantoprazole  Injectable 40 milliGRAM(s) IV Push every 12 hours  polyethylene glycol 3350 17 Gram(s) Oral daily  propofol Infusion 20 MICROgram(s)/kG/Min (12 mL/Hr) IV Continuous <Continuous>  senna 2 Tablet(s) Oral at bedtime  sodium chloride 3%. 500 milliLiter(s) (75 mL/Hr) IV Continuous <Continuous>    MEDICATIONS  (PRN):  acetaminophen   Tablet .. 650 milliGRAM(s) Oral every 6 hours PRN Temp greater or equal to 38C (100.4F)      New X-rays reviewed:                                                                                  ECHO    CXR interpreted by me:       Patient is a 50y old  Male who presents with a chief complaint of Altered mental status (17 Feb 2021 10:10)        Over Night Events: Remains intubated, ventilated on Versed, precedex, propofol. Still on 3% saline and cardene gtt      ALLERGIC/IMMUNOLOGIC:   No active allergic or immunologic issues    PHYSICAL EXAM    ICU Vital Signs Last 24 Hrs  T(C): 37.2 (18 Feb 2021 05:00), Max: 37.2 (18 Feb 2021 05:00)  T(F): 98.9 (18 Feb 2021 05:00), Max: 98.9 (18 Feb 2021 05:00)  HR: 67 (18 Feb 2021 07:33) (61 - 72)  ABP: 153/72 (18 Feb 2021 06:00) (138/68 - 171/79)  ABP(mean): 96 (17 Feb 2021 14:00) (88 - 107)  RR: 23 (18 Feb 2021 06:00) (22 - 24)  SpO2: 99% (18 Feb 2021 07:33) (95% - 100%)      CONSTITUTIONAL:  sedated    ENT:   ET+  Airway patent,   Mouth with normal mucosa.   No thrush    EYES:   Pupils equal,   Round and reactive to light.    CARDIAC:   Normal rate,   Regular rhythm.    No edema    RESPIRATORY:   No wheezing  Bilateral BS  Normal chest expansion  Not tachypneic,  No use of accessory muscles    GASTROINTESTINAL:  Abdomen soft,   Non-tender,   No guarding,   + BS    GENITOURINARY  no edema    MUSCULOSKELETAL:   No clubbing, cyanosis    NEUROLOGICAL:   Sedated  withdraw to painfulstimuli    SKIN:   Skin normal color for race,   Warm and dry and intact.   No evidence of rash.        02-17-21 @ 07:01  -  02-18-21 @ 07:00  --------------------------------------------------------  IN:    Dexmedetomidine: 605 mL    IV PiggyBack: 150 mL    Midazolam: 288 mL    NiCARdipine: 1460 mL    Propofol: 480 mL    sodium chloride 3%: 855 mL    sodium chloride 3%: 1155 mL    Vital High Protein: 220 mL  Total IN: 5213 mL    OUT:    Indwelling Catheter - Urethral (mL): 3285 mL    Rectal Tube (mL): 50 mL  Total OUT: 3335 mL    Total NET: 1878 mL      LABS:                        11.3   7.25  )-----------( 319      ( 18 Feb 2021 04:30 )             34.3                                               02-18    152<H>  |  119<H>  |  10  ----------------------------<  118<H>  3.6   |  25  |  0.6<L>    Ca    8.4<L>      18 Feb 2021 04:30  Phos  2.9     02-18  Mg     1.8     02-18    TPro  5.1<L>  /  Alb  2.5<L>  /  TBili  0.5  /  DBili  x   /  AST  30  /  ALT  37  /  AlkPhos  123<H>  02-18                                            LIVER FUNCTIONS - ( 18 Feb 2021 04:30 )  Alb: 2.5 g/dL / Pro: 5.1 g/dL / ALK PHOS: 123 U/L / ALT: 37 U/L / AST: 30 U/L / GGT: x                                               Culture - Blood (collected 16 Feb 2021 08:14)  Source: .Blood None  Preliminary Report (17 Feb 2021 17:02):    No growth to date.                                           Mode: AC/ CMV (Assist Control/ Continuous Mandatory Ventilation)  RR (machine): 24  TV (machine): 450  FiO2: 40  PEEP: 8  ITime: 0.9  MAP: 13  PIP: 22                                      ABG - ( 18 Feb 2021 05:17 )  pH, Arterial: 7.43  pH, Blood: x     /  pCO2: 40    /  pO2: 104   / HCO3: 27    / Base Excess: 2.1   /  SaO2: 98    LA 0.5        MEDICATIONS  (STANDING):  cefepime   IVPB 2000 milliGRAM(s) IV Intermittent every 12 hours  chlorhexidine 0.12% Liquid 15 milliLiter(s) Oral Mucosa every 12 hours  chlorhexidine 4% Liquid 1 Application(s) Topical <User Schedule>  cloNIDine 0.2 milliGRAM(s) Oral every 8 hours  dexMEDEtomidine Infusion 0.05 MICROgram(s)/kG/Hr (1.25 mL/Hr) IV Continuous <Continuous>  heparin   Injectable 5000 Unit(s) SubCutaneous every 8 hours  hydrALAZINE 50 milliGRAM(s) Oral every 6 hours  labetalol 200 milliGRAM(s) Oral every 8 hours  labetalol Injectable 10 milliGRAM(s) IV Push once  lactulose Syrup 10 Gram(s) Oral every 8 hours  levETIRAcetam  IVPB 500 milliGRAM(s) IV Intermittent every 12 hours  lisinopril 40 milliGRAM(s) Oral daily  metoclopramide 5 milliGRAM(s) Oral two times a day  midazolam Infusion 0.02 mG/kG/Hr (2 mL/Hr) IV Continuous <Continuous>  mineral oil enema 133 milliLiter(s) Rectal daily  niCARdipine Infusion 15 mG/Hr (75 mL/Hr) IV Continuous <Continuous>  pantoprazole  Injectable 40 milliGRAM(s) IV Push every 12 hours  polyethylene glycol 3350 17 Gram(s) Oral daily  propofol Infusion 20 MICROgram(s)/kG/Min (12 mL/Hr) IV Continuous <Continuous>  senna 2 Tablet(s) Oral at bedtime  sodium chloride 3%. 500 milliLiter(s) (75 mL/Hr) IV Continuous <Continuous>    MEDICATIONS  (PRN):  acetaminophen   Tablet .. 650 milliGRAM(s) Oral every 6 hours PRN Temp greater or equal to 38C (100.4F)    x ray reviewed

## 2021-02-18 NOTE — CHART NOTE - NSCHARTNOTEFT_GEN_A_CORE
Registered Dietitian Follow-Up     Patient Profile Reviewed                           Yes [x]   No []     Nutrition History Previously Obtained        Yes []  No [x]--unable to obtain as pt remains intubated        Pertinent Medical Interventions: Pt continues to be sedated, intubated mechanically ventilated on 3% NS and nicardipine ggt.  Pt continues to becomes hyperdynamic, tachypneic and desaturate once sedation is weaned per medical team.  Versed to be slowly weaned to examine neurological status. Pt to transfer to [place] and anticipated for family meeting tomorrow morning.    Diet order: Vital HP @ 40ml/hr (1594kcal, 84gm pro, 803ml free water, includes additional kcal from propofol)--per RN, pt currently tolerating TF at 20ml/hr w/o any GI s/s. States frequency of bowel movements have significantly improved, only 100ml output over 12hr shift overnight per RN report. .      Anthropometrics:  - Ht. 73"  - Wt.: 297#/134.8kg (2/17)--wt trending upwards likely 2/2 bed-scale inaccuracy vs. fluids? Will continue to monitor closely   (2/16): 289#/131kg  (1/27): 221#/100kg  - %wt change  - BMI: 29.1   - IBW: 184#     Pertinent Lab Data: (2/18): H/H 11.3/34.3, Na 152, Cr. 0.6, Gluc 118     Pertinent Meds: heparin, abx, NS, lactulose, labetalol, hydralazine, reglan, amlodipine, precedex, versed, nicardipine, propofol @ 24ml/hr (provides additional 634kcal), keppra, protonix, miralax, senna      Physical Findings:  - Appearance: intubated/ventilated; 2+ generalized edema noted  - GI function: rectal tube in place; +100ml per RN report overnight  - Tubes: OGT   - Oral/Mouth cavity: NPO   - Skin: intact     Nutrition Requirements  Weight Used: CBW: 221#/100kg, Ve: 11.4, Tmax: 37.2; kcal needs slightly readjusted today      Estimated Energy Needs: 9552-5944 kcal/day (% [id number]: 2191 d/t borderline obese BMI).  Estimated Protein Needs: 100-120 g/day (1-1.2 g/kg ABW)  Estimated Fluid Needs: per Vent team      Nutrient Intake: not meeting kcal/pro needs at this time as pt currently not at goal      Previous Nutrition Diagnosis: Inadequate protein-energy intake (ongoing)      Nutrition Intervention: enteral nutrition    Recommendations:   1. Increase Vital HP to 55ml/hr as tolerated, which will provide 1954kcal, 116gm pro, 1104ml free water (includes additional kcal provided by propofol). Additional flushes per LIP. All recs discussed with LIP ([phone]     Goal/Expected Outcome: Pt to meet % of estimated nutrient needs within 4 days      Indicator/Monitoring: RD to monitor diet order, energy intake, body composition, nutrition focused physical findings.

## 2021-02-18 NOTE — PROGRESS NOTE ADULT - ASSESSMENT
IMPRESSION:  Large ICB secondary to Hypertensive Emergency s/p EVD  Intubated, for airway protection  Klebsiella Pneumonia pansensitive   Left posterior tibial & peroneal DVT  ABD distention ASP CT AP      PLAN:  CNS:  c/w Propofol & Precedex taper versed,, neuro sx f/u.    HEENT: Oral care    PULMONARY:  HOB @ 45 degrees.  Vent changes:  keep plateau less than 30/ DP less than 15. FiO2 30%    CARDIOVASCULAR:  Avoid volume overload.  BP control. 3% Hypertonic.     GI: GI prophylaxis.  Feeding NGT    RENAL:  FU with Neuro & Neurosx regarding Na target.     INFECTIOUS DISEASE:   c/w Cefepime.  Follow up cultures & sensitivities. ID f/up    HEMATOLOGICAL:  SCDs for DVT prophylaxis due to ICH. Repeat LE doppler if extension, GFF    ENDOCRINE:  Follow up FS.  Insulin protocol if needed.    MUSCULOSKELETAL:  Bed rest  change TLC  Prognosis poor  Possible transfer to [facility] IMPRESSION:    Large ICB secondary to Hypertensive Emergency s/p EVD now dc  Intubated, for airway protection  Klebsiella Pneumonia pansensitive   Left posterior tibial & peroneal DVT  ABD distention ASP CT AP improved      PLAN:  CNS:  c/w Propofol & Precedex taper and dc versed,, neuro sx f/u.    HEENT: Oral care    PULMONARY:  HOB @ 45 degrees.  Vent changes:  keep plateau less than 30/ DP less than 15. FiO2 30% dec tv    CARDIOVASCULAR:  Avoid volume overload.  BP control. 3% Hypertonic. dec rate    GI: GI prophylaxis.  Feeding NGT    RENAL correct lytes, trend CMP    INFECTIOUS DISEASE:   c/w Cefepime.  Follow up cultures & sensitivities. ID f/up    HEMATOLOGICAL:  SCDs for DVT prophylaxis due to ICH. Repeat LE doppler if extension, GFF    ENDOCRINE:  Follow up FS.  Insulin protocol if needed.    MUSCULOSKELETAL:  Bed rest  change TLC TO [facility]   Prognosis poor  Possible transfer to [facility]

## 2021-02-18 NOTE — PROGRESS NOTE ADULT - SUBJECTIVE AND OBJECTIVE BOX
Neurosurgery Progress Note      Pt seen and examined at the beside.  No major over night events.  Pt continues to be sedated, intubated mechanically ventilated on 3% NS and Nicardipine GGT.  S/p EVD removal on 2/15.  Pt continues to becomes hyperdynamic, tachypneic and desaturate once sedation is weaned per medical team.  Versed to be slowly weaned to examine neurological status.  At present time the pt does not move extremities spont, has + gag, triggers the ventilator, PERRLA.  Pt to transfer to [place] and anticipated for family meeting tomorrow morning.      Subjective: 50yMale with a pmhx of INTRACRANIAL BLEED                                     ADM/AA    INTRACRANIAL BLEED    ^MED EVAL    Family history of breast cancer (Mother)    Family history of pancreatic cancer (Father)    Handoff    MEWS Score    HTN (hypertension)    GI bleed    Gastric ulcer    PUD (peptic ulcer disease)    Intracranial bleed    Intraparenchymal hemorrhage of brain    Insertion, external ventricular drain    Ventriculostomy, for ventricular catheter insertion    Arm fracture, left    MED EVAL    90+    SysAdmin_VisitLink      s/p     Allergies    No Known Allergies    Intolerances        Vital Signs Last 24 Hrs  T(C): 37.1 (18 Feb 2021 08:00), Max: 37.2 (18 Feb 2021 05:00)  T(F): 98.8 (18 Feb 2021 08:00), Max: 98.9 (18 Feb 2021 05:00)  HR: 71 (18 Feb 2021 10:00) (61 - 71)  BP: --  BP(mean): --  RR: 24 (18 Feb 2021 10:00) (22 - 24)  SpO2: 98% (18 Feb 2021 10:00) (95% - 100%)      acetaminophen   Tablet .. 650 milliGRAM(s) Oral every 6 hours PRN  cefepime   IVPB 2000 milliGRAM(s) IV Intermittent every 12 hours  chlorhexidine 0.12% Liquid 15 milliLiter(s) Oral Mucosa every 12 hours  chlorhexidine 4% Liquid 1 Application(s) Topical <User Schedule>  cloNIDine 0.2 milliGRAM(s) Oral every 8 hours  dexMEDEtomidine Infusion 0.05 MICROgram(s)/kG/Hr IV Continuous <Continuous>  heparin   Injectable 5000 Unit(s) SubCutaneous every 8 hours  hydrALAZINE 50 milliGRAM(s) Oral every 6 hours  labetalol 200 milliGRAM(s) Oral every 8 hours  labetalol Injectable 10 milliGRAM(s) IV Push once  lactulose Syrup 10 Gram(s) Oral every 8 hours  levETIRAcetam  IVPB 500 milliGRAM(s) IV Intermittent every 12 hours  lisinopril 40 milliGRAM(s) Oral daily  metoclopramide 5 milliGRAM(s) Oral two times a day  midazolam Infusion 0.02 mG/kG/Hr IV Continuous <Continuous>  mineral oil enema 133 milliLiter(s) Rectal daily  niCARdipine Infusion 15 mG/Hr IV Continuous <Continuous>  NIFEdipine XL 90 milliGRAM(s) Oral daily  pantoprazole  Injectable 40 milliGRAM(s) IV Push every 12 hours  polyethylene glycol 3350 17 Gram(s) Oral daily  propofol Infusion 20 MICROgram(s)/kG/Min IV Continuous <Continuous>  senna 2 Tablet(s) Oral at bedtime  sodium chloride 3%. 500 milliLiter(s) IV Continuous <Continuous>        02-17-21 @ 07:01  -  02-18-21 @ 07:00  --------------------------------------------------------  IN: 5213 mL / OUT: 3335 mL / NET: 1878 mL    02-18-21 @ 07:01  -  02-18-21 @ 10:52  --------------------------------------------------------  IN: 908 mL / OUT: 765 mL / NET: 143 mL        REVIEW OF SYSTEMS    [ ] A ten-point review of systems was otherwise negative except as noted.  [ x] Due to altered mental status/intubation, subjective information were not able to be obtained from the patient. History was obtained, to the extent possible, from review of the chart and collateral sources of information.      Exam:  -Pt is intubated, mechanically ventilated  -Sedated on Propofol/versed/precedes ggt  -Not following commands  -Does not move extremities spont or to noxious/painful stimuli  -positive gag  -Pt triggers the mechanical ventilator  -Pupils 2 b/l,  PERRLA      CBC Full  -  ( 18 Feb 2021 04:30 )  WBC Count : 7.25 K/uL  RBC Count : 3.70 M/uL  Hemoglobin : 11.3 g/dL  Hematocrit : 34.3 %  Platelet Count - Automated : 319 K/uL  Mean Cell Volume : 92.7 fL  Mean Cell Hemoglobin : 30.5 pg  Mean Cell Hemoglobin Concentration : 32.9 g/dL  Auto Neutrophil # : 4.76 K/uL  Auto Lymphocyte # : 0.99 K/uL  Auto Monocyte # : 0.55 K/uL  Auto Eosinophil # : 0.61 K/uL  Auto Basophil # : 0.07 K/uL  Auto Neutrophil % : 65.6 %  Auto Lymphocyte % : 13.7 %  Auto Monocyte % : 7.6 %  Auto Eosinophil % : 8.4 %  Auto Basophil % : 1.0 %    02-18    152<H>  |  119<H>  |  10  ----------------------------<  118<H>  3.6   |  25  |  0.6<L>    Ca    8.4<L>      18 Feb 2021 04:30  Phos  2.9     02-18  Mg     1.8     02-18    TPro  5.1<L>  /  Alb  2.5<L>  /  TBili  0.5  /  DBili  x   /  AST  30  /  ALT  37  /  AlkPhos  123<H>  02-18            Wound:  clean dry and intact, with out drainage and well healing     Imaging:  < from: CT Head No Cont (02.17.21 @ 09:42) >  IMPRESSION:  Since the prior head CT dated 2/15/2021:    1.  Interval removal of the previously seen left transfrontal EVD. No significant interval change in ventricular size.    2.  No significant interval change in the right basal ganglia/frontal lobe parenchymal hematoma with surrounding edema and mass effect including 8 mm right to left midline shift.      SANDY URBAN MD; Attending Radiologist  This documenthas been electronically signed. Feb 17 2021 10:12AM    < end of copied text >      Assessment/Plan:   This is a 50 year old gentleman with R basal ganglia hemorrhage with intraventricular extension s/p EVD removal 2/15    Pt seen and examined at the beside.  No major over night events.  Pt continues to be sedated, intubated mechanically ventilated on 3% NS and Nicardipine GGT.  S/p EVD removal on 2/15.  Pt continues to becomes hyperdynamic, tachypneic and desaturate once sedation is weaned per medical team.  Versed to be slowly weaned to examine neurological status.  At present time the pt does not move extremities spont, has + gag, triggers the ventilator, PERRLA.  Pt to transfer to [place] and anticipated for family meeting tomorrow morning.      -Please obtain a CTH tomorrow AM  -Family meeting tomorrow AM   -Neuro Exams Q1H  -Please continue to slowly wean sedation ggt for neurological exams  -HOB > 45  -Continue 3% NS with Na goal: > 150  -Neuro recs appreciated  -Resp: SBT as tolerated, maintain PPlateau < [id number]  -ID recs appreciated: Cefepime 2gm IV Q8h for a total of 10 days  -DVT prophylaxis: b/l sequentials SQH  -Dispo: Transfer to [place]    Please call Neurosurgery with any questions x [phone]         Neurosurgery Progress Note      Pt seen and examined at the beside.  No major over night events.  Pt continues to be sedated, intubated mechanically ventilated on 3% NS and Nicardipine GGT.  S/p EVD removal on 2/15.  Pt continues to becomes hyperdynamic, tachypneic and desaturate once sedation is weaned per medical team.  Versed to be slowly weaned to examine neurological status.  At present time the pt does not move extremities spont, has + gag, triggers the ventilator, PERRLA.  Pt to transfer to [place] and anticipated for family meeting tomorrow morning.      Subjective: 50yMale with a pmhx of INTRACRANIAL BLEED                                     ADM/AA    INTRACRANIAL BLEED    ^MED EVAL    Family history of breast cancer (Mother)    Family history of pancreatic cancer (Father)    Handoff    MEWS Score    HTN (hypertension)    GI bleed    Gastric ulcer    PUD (peptic ulcer disease)    Intracranial bleed    Intraparenchymal hemorrhage of brain    Insertion, external ventricular drain    Ventriculostomy, for ventricular catheter insertion    Arm fracture, left    MED EVAL    90+    SysAdmin_VisitLink      s/p     Allergies    No Known Allergies    Intolerances        Vital Signs Last 24 Hrs  T(C): 37.1 (18 Feb 2021 08:00), Max: 37.2 (18 Feb 2021 05:00)  T(F): 98.8 (18 Feb 2021 08:00), Max: 98.9 (18 Feb 2021 05:00)  HR: 71 (18 Feb 2021 10:00) (61 - 71)  BP: --  BP(mean): --  RR: 24 (18 Feb 2021 10:00) (22 - 24)  SpO2: 98% (18 Feb 2021 10:00) (95% - 100%)      acetaminophen   Tablet .. 650 milliGRAM(s) Oral every 6 hours PRN  cefepime   IVPB 2000 milliGRAM(s) IV Intermittent every 12 hours  chlorhexidine 0.12% Liquid 15 milliLiter(s) Oral Mucosa every 12 hours  chlorhexidine 4% Liquid 1 Application(s) Topical <User Schedule>  cloNIDine 0.2 milliGRAM(s) Oral every 8 hours  dexMEDEtomidine Infusion 0.05 MICROgram(s)/kG/Hr IV Continuous <Continuous>  heparin   Injectable 5000 Unit(s) SubCutaneous every 8 hours  hydrALAZINE 50 milliGRAM(s) Oral every 6 hours  labetalol 200 milliGRAM(s) Oral every 8 hours  labetalol Injectable 10 milliGRAM(s) IV Push once  lactulose Syrup 10 Gram(s) Oral every 8 hours  levETIRAcetam  IVPB 500 milliGRAM(s) IV Intermittent every 12 hours  lisinopril 40 milliGRAM(s) Oral daily  metoclopramide 5 milliGRAM(s) Oral two times a day  midazolam Infusion 0.02 mG/kG/Hr IV Continuous <Continuous>  mineral oil enema 133 milliLiter(s) Rectal daily  niCARdipine Infusion 15 mG/Hr IV Continuous <Continuous>  NIFEdipine XL 90 milliGRAM(s) Oral daily  pantoprazole  Injectable 40 milliGRAM(s) IV Push every 12 hours  polyethylene glycol 3350 17 Gram(s) Oral daily  propofol Infusion 20 MICROgram(s)/kG/Min IV Continuous <Continuous>  senna 2 Tablet(s) Oral at bedtime  sodium chloride 3%. 500 milliLiter(s) IV Continuous <Continuous>        02-17-21 @ 07:01  -  02-18-21 @ 07:00  --------------------------------------------------------  IN: 5213 mL / OUT: 3335 mL / NET: 1878 mL    02-18-21 @ 07:01  -  02-18-21 @ 10:52  --------------------------------------------------------  IN: 908 mL / OUT: 765 mL / NET: 143 mL        REVIEW OF SYSTEMS    [ ] A ten-point review of systems was otherwise negative except as noted.  [ x] Due to altered mental status/intubation, subjective information were not able to be obtained from the patient. History was obtained, to the extent possible, from review of the chart and collateral sources of information.      Exam:  -Pt is intubated, mechanically ventilated  -Sedated on Propofol/versed/precedes ggt  -Not following commands  -Does not move extremities spont or to noxious/painful stimuli  -positive gag  -Pt triggers the mechanical ventilator  -Pupils 2 b/l,  PERRLA      CBC Full  -  ( 18 Feb 2021 04:30 )  WBC Count : 7.25 K/uL  RBC Count : 3.70 M/uL  Hemoglobin : 11.3 g/dL  Hematocrit : 34.3 %  Platelet Count - Automated : 319 K/uL  Mean Cell Volume : 92.7 fL  Mean Cell Hemoglobin : 30.5 pg  Mean Cell Hemoglobin Concentration : 32.9 g/dL  Auto Neutrophil # : 4.76 K/uL  Auto Lymphocyte # : 0.99 K/uL  Auto Monocyte # : 0.55 K/uL  Auto Eosinophil # : 0.61 K/uL  Auto Basophil # : 0.07 K/uL  Auto Neutrophil % : 65.6 %  Auto Lymphocyte % : 13.7 %  Auto Monocyte % : 7.6 %  Auto Eosinophil % : 8.4 %  Auto Basophil % : 1.0 %    02-18    152<H>  |  119<H>  |  10  ----------------------------<  118<H>  3.6   |  25  |  0.6<L>    Ca    8.4<L>      18 Feb 2021 04:30  Phos  2.9     02-18  Mg     1.8     02-18    TPro  5.1<L>  /  Alb  2.5<L>  /  TBili  0.5  /  DBili  x   /  AST  30  /  ALT  37  /  AlkPhos  123<H>  02-18            Wound:  clean dry and intact, with out drainage and well healing     Imaging:  < from: CT Head No Cont (02.17.21 @ 09:42) >  IMPRESSION:  Since the prior head CT dated 2/15/2021:    1.  Interval removal of the previously seen left transfrontal EVD. No significant interval change in ventricular size.    2.  No significant interval change in the right basal ganglia/frontal lobe parenchymal hematoma with surrounding edema and mass effect including 8 mm right to left midline shift.      SANDY URBAN MD; Attending Radiologist  This documenthas been electronically signed. Feb 17 2021 10:12AM    < end of copied text >      Assessment/Plan:   This is a 50 year old gentleman with R basal ganglia hemorrhage with intraventricular extension s/p EVD removal 2/15    Pt seen and examined at the beside.  No major over night events.  Pt continues to be sedated, intubated mechanically ventilated on 3% NS and Nicardipine GGT.  S/p EVD removal on 2/15.  Pt continues to becomes hyperdynamic, tachypneic and desaturate once sedation is weaned per medical team.  Versed to be slowly weaned to examine neurological status.  At present time the pt does not move extremities spont, has + gag, triggers the ventilator, PERRLA.  Pt to transfer to [place] and anticipated for family meeting tomorrow morning.      -Please obtain a CTH tomorrow AM  -Family meeting tomorrow AM   -Neuro Exams Q1H  -Please continue to slowly wean sedation ggt for neurological exams  -HOB > 45  -Continue 3% NS with Na goal: > 150  -Maintain Keppra 500mg BID  -Neuro recs appreciated  -Resp: SBT as tolerated, maintain PPlateau < [id number]  -ID recs appreciated: Cefepime 2gm IV Q8h for a total of 10 days  -DVT prophylaxis: b/l sequentials SQH  -Dispo: Transfer to [place]    Please call Neurosurgery with any questions x [phone]         Neurosurgery Progress Note      Pt seen and examined at the beside.  No major over night events.  Pt continues to be sedated, intubated mechanically ventilated on 3% NS and Nicardipine GGT.  S/p EVD removal on 2/15.  Pt continues to becomes hyperdynamic, tachypneic and desaturate once sedation is weaned per medical team.  Versed to be slowly weaned to examine neurological status.  At present time the pt does not move extremities spont, has + gag, triggers the ventilator, PERRLA.  Pt to transfer to [place] and anticipated for family meeting tomorrow morning.      Subjective: 50yMale with a pmhx of INTRACRANIAL BLEED                                     ADM/AA    INTRACRANIAL BLEED    ^MED EVAL    Family history of breast cancer (Mother)    Family history of pancreatic cancer (Father)    Handoff    MEWS Score    HTN (hypertension)    GI bleed    Gastric ulcer    PUD (peptic ulcer disease)    Intracranial bleed    Intraparenchymal hemorrhage of brain    Insertion, external ventricular drain    Ventriculostomy, for ventricular catheter insertion    Arm fracture, left    MED EVAL    90+    SysAdmin_VisitLink      s/p     Allergies    No Known Allergies    Intolerances        Vital Signs Last 24 Hrs  T(C): 37.1 (18 Feb 2021 08:00), Max: 37.2 (18 Feb 2021 05:00)  T(F): 98.8 (18 Feb 2021 08:00), Max: 98.9 (18 Feb 2021 05:00)  HR: 71 (18 Feb 2021 10:00) (61 - 71)  BP: --  BP(mean): --  RR: 24 (18 Feb 2021 10:00) (22 - 24)  SpO2: 98% (18 Feb 2021 10:00) (95% - 100%)      acetaminophen   Tablet .. 650 milliGRAM(s) Oral every 6 hours PRN  cefepime   IVPB 2000 milliGRAM(s) IV Intermittent every 12 hours  chlorhexidine 0.12% Liquid 15 milliLiter(s) Oral Mucosa every 12 hours  chlorhexidine 4% Liquid 1 Application(s) Topical <User Schedule>  cloNIDine 0.2 milliGRAM(s) Oral every 8 hours  dexMEDEtomidine Infusion 0.05 MICROgram(s)/kG/Hr IV Continuous <Continuous>  heparin   Injectable 5000 Unit(s) SubCutaneous every 8 hours  hydrALAZINE 50 milliGRAM(s) Oral every 6 hours  labetalol 200 milliGRAM(s) Oral every 8 hours  labetalol Injectable 10 milliGRAM(s) IV Push once  lactulose Syrup 10 Gram(s) Oral every 8 hours  levETIRAcetam  IVPB 500 milliGRAM(s) IV Intermittent every 12 hours  lisinopril 40 milliGRAM(s) Oral daily  metoclopramide 5 milliGRAM(s) Oral two times a day  midazolam Infusion 0.02 mG/kG/Hr IV Continuous <Continuous>  mineral oil enema 133 milliLiter(s) Rectal daily  niCARdipine Infusion 15 mG/Hr IV Continuous <Continuous>  NIFEdipine XL 90 milliGRAM(s) Oral daily  pantoprazole  Injectable 40 milliGRAM(s) IV Push every 12 hours  polyethylene glycol 3350 17 Gram(s) Oral daily  propofol Infusion 20 MICROgram(s)/kG/Min IV Continuous <Continuous>  senna 2 Tablet(s) Oral at bedtime  sodium chloride 3%. 500 milliLiter(s) IV Continuous <Continuous>        02-17-21 @ 07:01  -  02-18-21 @ 07:00  --------------------------------------------------------  IN: 5213 mL / OUT: 3335 mL / NET: 1878 mL    02-18-21 @ 07:01  -  02-18-21 @ 10:52  --------------------------------------------------------  IN: 908 mL / OUT: 765 mL / NET: 143 mL        REVIEW OF SYSTEMS    [ ] A ten-point review of systems was otherwise negative except as noted.  [ x] Due to altered mental status/intubation, subjective information were not able to be obtained from the patient. History was obtained, to the extent possible, from review of the chart and collateral sources of information.      Exam:  -Pt is intubated, mechanically ventilated  -Sedated on Propofol/versed/precedex ggt  -Not following commands  -Does not move extremities spont or to noxious/painful stimuli  -positive gag  -Pt triggers the mechanical ventilator  -Pupils 2 b/l,  PERRLA      CBC Full  -  ( 18 Feb 2021 04:30 )  WBC Count : 7.25 K/uL  RBC Count : 3.70 M/uL  Hemoglobin : 11.3 g/dL  Hematocrit : 34.3 %  Platelet Count - Automated : 319 K/uL  Mean Cell Volume : 92.7 fL  Mean Cell Hemoglobin : 30.5 pg  Mean Cell Hemoglobin Concentration : 32.9 g/dL  Auto Neutrophil # : 4.76 K/uL  Auto Lymphocyte # : 0.99 K/uL  Auto Monocyte # : 0.55 K/uL  Auto Eosinophil # : 0.61 K/uL  Auto Basophil # : 0.07 K/uL  Auto Neutrophil % : 65.6 %  Auto Lymphocyte % : 13.7 %  Auto Monocyte % : 7.6 %  Auto Eosinophil % : 8.4 %  Auto Basophil % : 1.0 %    02-18    152<H>  |  119<H>  |  10  ----------------------------<  118<H>  3.6   |  25  |  0.6<L>    Ca    8.4<L>      18 Feb 2021 04:30  Phos  2.9     02-18  Mg     1.8     02-18    TPro  5.1<L>  /  Alb  2.5<L>  /  TBili  0.5  /  DBili  x   /  AST  30  /  ALT  37  /  AlkPhos  123<H>  02-18            Wound:  clean dry and intact, with out drainage and well healing     Imaging:  < from: CT Head No Cont (02.17.21 @ 09:42) >  IMPRESSION:  Since the prior head CT dated 2/15/2021:    1.  Interval removal of the previously seen left transfrontal EVD. No significant interval change in ventricular size.    2.  No significant interval change in the right basal ganglia/frontal lobe parenchymal hematoma with surrounding edema and mass effect including 8 mm right to left midline shift.      SANDY URBAN MD; Attending Radiologist  This documenthas been electronically signed. Feb 17 2021 10:12AM    < end of copied text >      Assessment/Plan:   This is a 50 year old gentleman with R basal ganglia hemorrhage with intraventricular extension s/p EVD removal 2/15    Pt seen and examined at the beside.  No major over night events.  Pt continues to be sedated, intubated mechanically ventilated on 3% NS and Nicardipine GGT.  S/p EVD removal on 2/15.  Pt continues to becomes hyperdynamic, tachypneic and desaturate once sedation is weaned per medical team.  Versed to be slowly weaned to examine neurological status.  At present time the pt does not move extremities spont, has + gag, triggers the ventilator, PERRLA.  Pt to transfer to [place] and anticipated for family meeting tomorrow morning.      -Please obtain a CTH tomorrow AM  -Family meeting tomorrow AM   -Neuro Exams Q1H  -Please continue to slowly wean sedation ggt for neurological exams  -HOB > 45  -Continue 3% NS with Na goal: > 150  -Maintain Keppra 500mg BID  -Neuro recs appreciated  -Resp: SBT as tolerated, maintain PPlateau < [id number]  -ID recs appreciated: Cefepime 2gm IV Q8h for a total of 10 days  -DVT prophylaxis: b/l sequentials SQH  -Dispo: Transfer to [place]    Please call Neurosurgery with any questions x [phone]

## 2021-02-19 LAB
ALBUMIN SERPL ELPH-MCNC: 2.4 G/DL — LOW (ref 3.5–5.2)
ALBUMIN SERPL ELPH-MCNC: 2.6 G/DL — LOW (ref 3.5–5.2)
ALBUMIN SERPL ELPH-MCNC: 2.8 G/DL — LOW (ref 3.5–5.2)
ALP SERPL-CCNC: 116 U/L — HIGH (ref 30–115)
ALP SERPL-CCNC: 125 U/L — HIGH (ref 30–115)
ALP SERPL-CCNC: 128 U/L — HIGH (ref 30–115)
ALT FLD-CCNC: 31 U/L — SIGNIFICANT CHANGE UP (ref 0–41)
ALT FLD-CCNC: 34 U/L — SIGNIFICANT CHANGE UP (ref 0–41)
ALT FLD-CCNC: 34 U/L — SIGNIFICANT CHANGE UP (ref 0–41)
ANION GAP SERPL CALC-SCNC: 10 MMOL/L — SIGNIFICANT CHANGE UP (ref 7–14)
ANION GAP SERPL CALC-SCNC: 12 MMOL/L — SIGNIFICANT CHANGE UP (ref 7–14)
ANION GAP SERPL CALC-SCNC: 8 MMOL/L — SIGNIFICANT CHANGE UP (ref 7–14)
AST SERPL-CCNC: 23 U/L — SIGNIFICANT CHANGE UP (ref 0–41)
AST SERPL-CCNC: 23 U/L — SIGNIFICANT CHANGE UP (ref 0–41)
AST SERPL-CCNC: 32 U/L — SIGNIFICANT CHANGE UP (ref 0–41)
BASOPHILS # BLD AUTO: 0.08 K/UL — SIGNIFICANT CHANGE UP (ref 0–0.2)
BASOPHILS NFR BLD AUTO: 1 % — SIGNIFICANT CHANGE UP (ref 0–1)
BILIRUB SERPL-MCNC: 0.4 MG/DL — SIGNIFICANT CHANGE UP (ref 0.2–1.2)
BILIRUB SERPL-MCNC: 0.4 MG/DL — SIGNIFICANT CHANGE UP (ref 0.2–1.2)
BILIRUB SERPL-MCNC: 0.5 MG/DL — SIGNIFICANT CHANGE UP (ref 0.2–1.2)
BUN SERPL-MCNC: 11 MG/DL — SIGNIFICANT CHANGE UP (ref 10–20)
BUN SERPL-MCNC: 12 MG/DL — SIGNIFICANT CHANGE UP (ref 10–20)
BUN SERPL-MCNC: 13 MG/DL — SIGNIFICANT CHANGE UP (ref 10–20)
CALCIUM SERPL-MCNC: 8.1 MG/DL — LOW (ref 8.5–10.1)
CALCIUM SERPL-MCNC: 8.3 MG/DL — LOW (ref 8.5–10.1)
CALCIUM SERPL-MCNC: 8.4 MG/DL — LOW (ref 8.5–10.1)
CHLORIDE SERPL-SCNC: 116 MMOL/L — HIGH (ref 98–110)
CHLORIDE SERPL-SCNC: 116 MMOL/L — HIGH (ref 98–110)
CHLORIDE SERPL-SCNC: 120 MMOL/L — HIGH (ref 98–110)
CO2 SERPL-SCNC: 23 MMOL/L — SIGNIFICANT CHANGE UP (ref 17–32)
CO2 SERPL-SCNC: 24 MMOL/L — SIGNIFICANT CHANGE UP (ref 17–32)
CO2 SERPL-SCNC: 25 MMOL/L — SIGNIFICANT CHANGE UP (ref 17–32)
CREAT ?TM UR-MCNC: 21 MG/DL — SIGNIFICANT CHANGE UP
CREAT SERPL-MCNC: 0.7 MG/DL — SIGNIFICANT CHANGE UP (ref 0.7–1.5)
CREAT SERPL-MCNC: 0.7 MG/DL — SIGNIFICANT CHANGE UP (ref 0.7–1.5)
CREAT SERPL-MCNC: 0.8 MG/DL — SIGNIFICANT CHANGE UP (ref 0.7–1.5)
EOSINOPHIL # BLD AUTO: 0.35 K/UL — SIGNIFICANT CHANGE UP (ref 0–0.7)
EOSINOPHIL NFR BLD AUTO: 4.4 % — SIGNIFICANT CHANGE UP (ref 0–8)
GLUCOSE SERPL-MCNC: 102 MG/DL — HIGH (ref 70–99)
GLUCOSE SERPL-MCNC: 118 MG/DL — HIGH (ref 70–99)
GLUCOSE SERPL-MCNC: 119 MG/DL — HIGH (ref 70–99)
HCT VFR BLD CALC: 35.1 % — LOW (ref 42–52)
HGB BLD-MCNC: 11.6 G/DL — LOW (ref 14–18)
IMM GRANULOCYTES NFR BLD AUTO: 2.4 % — HIGH (ref 0.1–0.3)
LYMPHOCYTES # BLD AUTO: 1.1 K/UL — LOW (ref 1.2–3.4)
LYMPHOCYTES # BLD AUTO: 13.9 % — LOW (ref 20.5–51.1)
MAGNESIUM SERPL-MCNC: 1.8 MG/DL — SIGNIFICANT CHANGE UP (ref 1.8–2.4)
MCHC RBC-ENTMCNC: 30.6 PG — SIGNIFICANT CHANGE UP (ref 27–31)
MCHC RBC-ENTMCNC: 33 G/DL — SIGNIFICANT CHANGE UP (ref 32–37)
MCV RBC AUTO: 92.6 FL — SIGNIFICANT CHANGE UP (ref 80–94)
MONOCYTES # BLD AUTO: 0.75 K/UL — HIGH (ref 0.1–0.6)
MONOCYTES NFR BLD AUTO: 9.5 % — HIGH (ref 1.7–9.3)
NEUTROPHILS # BLD AUTO: 5.44 K/UL — SIGNIFICANT CHANGE UP (ref 1.4–6.5)
NEUTROPHILS NFR BLD AUTO: 68.8 % — SIGNIFICANT CHANGE UP (ref 42.2–75.2)
NRBC # BLD: 0 /100 WBCS — SIGNIFICANT CHANGE UP (ref 0–0)
OSMOLALITY UR: 652 MOS/KG — SIGNIFICANT CHANGE UP (ref 50–1200)
PHOSPHATE SERPL-MCNC: 3 MG/DL — SIGNIFICANT CHANGE UP (ref 2.1–4.9)
PLATELET # BLD AUTO: 372 K/UL — SIGNIFICANT CHANGE UP (ref 130–400)
POTASSIUM SERPL-MCNC: 3.2 MMOL/L — LOW (ref 3.5–5)
POTASSIUM SERPL-MCNC: 3.4 MMOL/L — LOW (ref 3.5–5)
POTASSIUM SERPL-MCNC: 3.4 MMOL/L — LOW (ref 3.5–5)
POTASSIUM SERPL-SCNC: 3.2 MMOL/L — LOW (ref 3.5–5)
POTASSIUM SERPL-SCNC: 3.4 MMOL/L — LOW (ref 3.5–5)
POTASSIUM SERPL-SCNC: 3.4 MMOL/L — LOW (ref 3.5–5)
PROT ?TM UR-MCNC: 8 MG/DLG/24H — SIGNIFICANT CHANGE UP
PROT ?TM UR-MCNC: 8 MG/DLG/24H — SIGNIFICANT CHANGE UP
PROT SERPL-MCNC: 5.1 G/DL — LOW (ref 6–8)
PROT SERPL-MCNC: 5.2 G/DL — LOW (ref 6–8)
PROT SERPL-MCNC: 5.4 G/DL — LOW (ref 6–8)
PROT/CREAT UR-RTO: 0.4 RATIO — HIGH (ref 0–0.2)
RBC # BLD: 3.79 M/UL — LOW (ref 4.7–6.1)
RBC # FLD: 12.3 % — SIGNIFICANT CHANGE UP (ref 11.5–14.5)
SODIUM SERPL-SCNC: 149 MMOL/L — HIGH (ref 135–146)
SODIUM SERPL-SCNC: 152 MMOL/L — HIGH (ref 135–146)
SODIUM SERPL-SCNC: 153 MMOL/L — HIGH (ref 135–146)
SODIUM UR-SCNC: 217 MMOL/L — SIGNIFICANT CHANGE UP
TRIGL SERPL-MCNC: 380 MG/DL — HIGH
WBC # BLD: 7.91 K/UL — SIGNIFICANT CHANGE UP (ref 4.8–10.8)
WBC # FLD AUTO: 7.91 K/UL — SIGNIFICANT CHANGE UP (ref 4.8–10.8)

## 2021-02-19 PROCEDURE — 70450 CT HEAD/BRAIN W/O DYE: CPT | Mod: 26

## 2021-02-19 PROCEDURE — 71045 X-RAY EXAM CHEST 1 VIEW: CPT | Mod: 26

## 2021-02-19 PROCEDURE — 74018 RADEX ABDOMEN 1 VIEW: CPT | Mod: 26

## 2021-02-19 PROCEDURE — 99232 SBSQ HOSP IP/OBS MODERATE 35: CPT

## 2021-02-19 PROCEDURE — 71045 X-RAY EXAM CHEST 1 VIEW: CPT | Mod: 26,77

## 2021-02-19 RX ORDER — DEXMEDETOMIDINE HYDROCHLORIDE IN 0.9% SODIUM CHLORIDE 4 UG/ML
0.05 INJECTION INTRAVENOUS
Qty: 400 | Refills: 0 | Status: DISCONTINUED | OUTPATIENT
Start: 2021-02-19 | End: 2021-02-25

## 2021-02-19 RX ORDER — POTASSIUM CHLORIDE 20 MEQ
40 PACKET (EA) ORAL ONCE
Refills: 0 | Status: COMPLETED | OUTPATIENT
Start: 2021-02-19 | End: 2021-02-19

## 2021-02-19 RX ORDER — MIDAZOLAM HYDROCHLORIDE 1 MG/ML
0.02 INJECTION, SOLUTION INTRAMUSCULAR; INTRAVENOUS
Qty: 100 | Refills: 0 | Status: DISCONTINUED | OUTPATIENT
Start: 2021-02-19 | End: 2021-02-19

## 2021-02-19 RX ORDER — PROPOFOL 10 MG/ML
20 INJECTION, EMULSION INTRAVENOUS
Qty: 1000 | Refills: 0 | Status: DISCONTINUED | OUTPATIENT
Start: 2021-02-19 | End: 2021-02-25

## 2021-02-19 RX ORDER — DEXMEDETOMIDINE HYDROCHLORIDE IN 0.9% SODIUM CHLORIDE 4 UG/ML
0.05 INJECTION INTRAVENOUS
Qty: 400 | Refills: 0 | Status: DISCONTINUED | OUTPATIENT
Start: 2021-02-19 | End: 2021-02-19

## 2021-02-19 RX ORDER — PROPOFOL 10 MG/ML
20 INJECTION, EMULSION INTRAVENOUS
Qty: 1000 | Refills: 0 | Status: DISCONTINUED | OUTPATIENT
Start: 2021-02-19 | End: 2021-02-19

## 2021-02-19 RX ADMIN — HEPARIN SODIUM 5000 UNIT(S): 5000 INJECTION INTRAVENOUS; SUBCUTANEOUS at 14:45

## 2021-02-19 RX ADMIN — Medication 133 MILLILITER(S): at 14:43

## 2021-02-19 RX ADMIN — CHLORHEXIDINE GLUCONATE 15 MILLILITER(S): 213 SOLUTION TOPICAL at 17:41

## 2021-02-19 RX ADMIN — CHLORHEXIDINE GLUCONATE 15 MILLILITER(S): 213 SOLUTION TOPICAL at 05:16

## 2021-02-19 RX ADMIN — Medication 5 MILLIGRAM(S): at 17:29

## 2021-02-19 RX ADMIN — POLYETHYLENE GLYCOL 3350 17 GRAM(S): 17 POWDER, FOR SOLUTION ORAL at 12:21

## 2021-02-19 RX ADMIN — Medication 200 MILLIGRAM(S): at 05:16

## 2021-02-19 RX ADMIN — LACTULOSE 10 GRAM(S): 10 SOLUTION ORAL at 21:25

## 2021-02-19 RX ADMIN — CEFEPIME 100 MILLIGRAM(S): 1 INJECTION, POWDER, FOR SOLUTION INTRAMUSCULAR; INTRAVENOUS at 17:30

## 2021-02-19 RX ADMIN — Medication 5 MILLIGRAM(S): at 05:16

## 2021-02-19 RX ADMIN — Medication 90 MILLIGRAM(S): at 05:15

## 2021-02-19 RX ADMIN — LEVETIRACETAM 420 MILLIGRAM(S): 250 TABLET, FILM COATED ORAL at 17:30

## 2021-02-19 RX ADMIN — LACTULOSE 10 GRAM(S): 10 SOLUTION ORAL at 12:21

## 2021-02-19 RX ADMIN — LACTULOSE 10 GRAM(S): 10 SOLUTION ORAL at 05:16

## 2021-02-19 RX ADMIN — Medication 40 MILLIEQUIVALENT(S): at 09:59

## 2021-02-19 RX ADMIN — PANTOPRAZOLE SODIUM 40 MILLIGRAM(S): 20 TABLET, DELAYED RELEASE ORAL at 05:15

## 2021-02-19 RX ADMIN — PANTOPRAZOLE SODIUM 40 MILLIGRAM(S): 20 TABLET, DELAYED RELEASE ORAL at 17:30

## 2021-02-19 RX ADMIN — HEPARIN SODIUM 5000 UNIT(S): 5000 INJECTION INTRAVENOUS; SUBCUTANEOUS at 05:15

## 2021-02-19 RX ADMIN — CEFEPIME 100 MILLIGRAM(S): 1 INJECTION, POWDER, FOR SOLUTION INTRAMUSCULAR; INTRAVENOUS at 05:15

## 2021-02-19 RX ADMIN — Medication 200 MILLIGRAM(S): at 14:45

## 2021-02-19 RX ADMIN — Medication 0.2 MILLIGRAM(S): at 05:16

## 2021-02-19 RX ADMIN — Medication 0.2 MILLIGRAM(S): at 21:23

## 2021-02-19 RX ADMIN — Medication 50 MILLIGRAM(S): at 05:16

## 2021-02-19 RX ADMIN — LISINOPRIL 40 MILLIGRAM(S): 2.5 TABLET ORAL at 05:16

## 2021-02-19 RX ADMIN — CHLORHEXIDINE GLUCONATE 1 APPLICATION(S): 213 SOLUTION TOPICAL at 05:16

## 2021-02-19 RX ADMIN — Medication 200 MILLIGRAM(S): at 21:23

## 2021-02-19 RX ADMIN — Medication 0.2 MILLIGRAM(S): at 14:45

## 2021-02-19 RX ADMIN — Medication 50 MILLIGRAM(S): at 17:30

## 2021-02-19 RX ADMIN — HEPARIN SODIUM 5000 UNIT(S): 5000 INJECTION INTRAVENOUS; SUBCUTANEOUS at 21:24

## 2021-02-19 RX ADMIN — SENNA PLUS 2 TABLET(S): 8.6 TABLET ORAL at 21:23

## 2021-02-19 RX ADMIN — LEVETIRACETAM 420 MILLIGRAM(S): 250 TABLET, FILM COATED ORAL at 05:17

## 2021-02-19 RX ADMIN — Medication 50 MILLIGRAM(S): at 12:21

## 2021-02-19 NOTE — PROCEDURAL SAFETY CHECKLIST WITH OR WITHOUT SEDATION - NSPREPROCFT_GEN_ALL_CORE
central line placement
arterial line placement
central line placement
left ij mlc
placement of RT Fem Central Line
placement of Central line (Rt IJ)

## 2021-02-19 NOTE — PROCEDURAL SAFETY CHECKLIST WITH OR WITHOUT SEDATION - NSPREPROCEDSEDAT_GEN_ALL_CORE
pt intubated and sedated/with sedation
pt intubated and on sedation meds/with sedation
pt intubated and sedated/with sedation
with sedation
with sedation
Precedex and Propofol/with sedation

## 2021-02-19 NOTE — CONSULT NOTE ADULT - SUBJECTIVE AND OBJECTIVE BOX
NEPHROLOGY CONSULTATION NOTE    THIS CONSULT IS INCOMPLETE / FULL CONSULT TO FOLLOW    Patient is a 50y Male with a past medical history of HTN, PUD/GIB whom presented to the hospital with left-sided weakness and was found to have a right intraparenchymal basal ganglial hemorrhage. He is now intubated and sedated, on multiple antihypertensives and IV hypertonic saline. Nephrology consulted for recommendations to control blood pressure.      PAST MEDICAL & SURGICAL HISTORY:  HTN (hypertension)    GI bleed    Gastric ulcer    PUD (peptic ulcer disease)    Arm fracture, left  s/p surgical repair      Allergies:  No Known Allergies    Home Medications Reviewed  Hospital Medications:   MEDICATIONS  (STANDING):  cefepime   IVPB 2000 milliGRAM(s) IV Intermittent every 12 hours  chlorhexidine 0.12% Liquid 15 milliLiter(s) Oral Mucosa every 12 hours  chlorhexidine 4% Liquid 1 Application(s) Topical <User Schedule>  cloNIDine 0.2 milliGRAM(s) Oral every 8 hours  dexMEDEtomidine Infusion 0.05 MICROgram(s)/kG/Hr (1.25 mL/Hr) IV Continuous <Continuous>  heparin   Injectable 5000 Unit(s) SubCutaneous every 8 hours  hydrALAZINE 50 milliGRAM(s) Oral every 6 hours  labetalol 200 milliGRAM(s) Oral every 8 hours  lactulose Syrup 10 Gram(s) Oral every 8 hours  levETIRAcetam  IVPB 500 milliGRAM(s) IV Intermittent every 12 hours  lisinopril 40 milliGRAM(s) Oral daily  metoclopramide 5 milliGRAM(s) Oral two times a day  midazolam Infusion 0.02 mG/kG/Hr (2 mL/Hr) IV Continuous <Continuous>  mineral oil enema 133 milliLiter(s) Rectal daily  niCARdipine Infusion 15 mG/Hr (75 mL/Hr) IV Continuous <Continuous>  NIFEdipine XL 90 milliGRAM(s) Oral daily  pantoprazole  Injectable 40 milliGRAM(s) IV Push every 12 hours  polyethylene glycol 3350 17 Gram(s) Oral daily  potassium chloride   Powder 40 milliEquivalent(s) Oral once  propofol Infusion 20 MICROgram(s)/kG/Min (12 mL/Hr) IV Continuous <Continuous>  senna 2 Tablet(s) Oral at bedtime  sodium chloride 3%. 500 milliLiter(s) (65 mL/Hr) IV Continuous <Continuous>      SOCIAL HISTORY:  Denies ETOH,Smoking,   FAMILY HISTORY:  Family history of breast cancer (Mother)    Family history of pancreatic cancer (Father)          REVIEW OF SYSTEMS:  Unable to obtain secondary to mental status.     VITALS:  T(F): 98.7 (02-19-21 @ 08:00), Max: 99.8 (02-19-21 @ 04:00)  HR: 65 (02-19-21 @ 09:00)  BP: --  RR: 24 (02-18-21 @ 23:00)  SpO2: 99% (02-19-21 @ 09:00)    02-18 @ 07:01  -  02-19 @ 07:00  --------------------------------------------------------  IN: 5233 mL / OUT: 5565 mL / NET: -332 mL    02-19 @ 07:01  -  02-19 @ 09:56  --------------------------------------------------------  IN: 262 mL / OUT: 450 mL / NET: -188 mL          02-18-21 @ 07:01  -  02-19-21 @ 07:00  --------------------------------------------------------  IN: 0 mL / OUT: 5465 mL / NET: -5465 mL    02-19-21 @ 07:01  -  02-19-21 @ 09:56  --------------------------------------------------------  IN: 0 mL / OUT: 450 mL / NET: -450 mL      I&O's Detail    18 Feb 2021 07:01  -  19 Feb 2021 07:00  --------------------------------------------------------  IN:    Dexmedetomidine: 709 mL    Enteral Tube Flush: 240 mL    IV PiggyBack: 50 mL    IV PiggyBack: 100 mL    Midazolam: 2 mL    Midazolam: 98 mL    NiCARdipine: 1285 mL    Propofol: 544 mL    sodium chloride 3%: 750 mL    sodium chloride 3%: 845 mL    Vital High Protein: 610 mL  Total IN: 5233 mL    OUT:    Indwelling Catheter - Urethral (mL): 5465 mL    Rectal Tube (mL): 100 mL  Total OUT: 5565 mL    Total NET: -332 mL      19 Feb 2021 07:01  -  19 Feb 2021 09:56  --------------------------------------------------------  IN:    Dexmedetomidine: 18 mL    Midazolam: 2 mL    Propofol: 32 mL    sodium chloride 3%: 130 mL    Vital High Protein: 80 mL  Total IN: 262 mL    OUT:    Indwelling Catheter - Urethral (mL): 450 mL  Total OUT: 450 mL    Total NET: -188 mL            PHYSICAL EXAM:  Constitutional: NAD  HEENT: anicteric sclera, oropharynx clear, MMM  Neck: No JVD  Respiratory: CTAB, no wheezes, rales or rhonchi. mechanically vented   Cardiovascular: S1, S2, RRR  Gastrointestinal: BS+, soft, NT/ND  Extremities: No cyanosis or clubbing. No peripheral edema  Neurological: chemically sedated   : No CVA tenderness. +berry.   Skin: No rashes  Vascular Access: Central line     LABS:  02-19    152<H>  |  116<H>  |  12  ----------------------------<  119<H>  3.2<L>   |  24  |  0.8    Ca    8.3<L>      19 Feb 2021 05:29  Phos  3.0     02-19  Mg     1.8     02-19    TPro  5.4<L>  /  Alb  2.8<L>  /  TBili  0.5  /  DBili      /  AST  23  /  ALT  34  /  AlkPhos  128<H>  02-19    Creatinine Trend: 0.8 <--, 0.7 <--, 0.6 <--, 0.7 <--, 0.7 <--, 0.8 <--, 0.8 <--, 0.8 <--, 0.7 <--, 0.7 <--, 0.7 <--, 0.7 <--, 0.8 <--, 0.8 <--, 0.8 <--, 0.9 <--, 0.8 <--, 0.8 <--, 0.8 <--, 0.9 <--, 0.9 <--, 0.9 <--, 1.0 <--, 0.9 <--, 1.0 <--, 1.0 <--, 1.0 <--, 1.1 <--, 1.1 <--, 1.0 <--, 0.8 <--, 0.8 <--, 0.8 <--, 0.8 <--, 0.6 <--, 0.7 <--, 0.8 <--, 0.8 <--, 0.7 <--, 1.0 <--, 1.1 <--, 1.1 <--, 1.1 <--, 1.6 <--, 1.3 <--, 1.4 <--, 1.6 <--, 1.1 <--                        11.6   7.91  )-----------( 372      ( 19 Feb 2021 05:29 )             35.1     Urine Studies:    Sodium, Random Urine: 213.0 mmoL/L (02-16 @ 08:00)  Osmolality, Random Urine: 557 mos/kg (02-16 @ 08:00)  Chloride, Random Urine: 222 (02-16 @ 08:00)  Creatinine, Random Urine: 29 mg/dL (02-16 @ 08:00)  Protein/Creatinine Ratio Calculation: 0.3 Ratio (02-16 @ 08:00)  Creatinine, Random Urine: 93 mg/dL (02-12 @ 12:44)            RADIOLOGY & ADDITIONAL STUDIES:                 NEPHROLOGY CONSULTATION NOTE    THIS CONSULT IS INCOMPLETE / FULL CONSULT TO FOLLOW    Patient is a 50y Male with a past medical history of HTN, PUD/GIB whom presented to the hospital with left-sided weakness and was found to have a right intraparenchymal basal ganglial hemorrhage. He is now intubated and sedated, on multiple antihypertensives and IV hypertonic saline. Nephrology consulted for recommendations to control blood pressure.      PAST MEDICAL & SURGICAL HISTORY:  HTN (hypertension)    GI bleed    Gastric ulcer    PUD (peptic ulcer disease)    Arm fracture, left  s/p surgical repair      Allergies:  No Known Allergies    Home Medications Reviewed  Hospital Medications:   MEDICATIONS  (STANDING):  cefepime   IVPB 2000 milliGRAM(s) IV Intermittent every 12 hours  chlorhexidine 0.12% Liquid 15 milliLiter(s) Oral Mucosa every 12 hours  chlorhexidine 4% Liquid 1 Application(s) Topical <User Schedule>  cloNIDine 0.2 milliGRAM(s) Oral every 8 hours  dexMEDEtomidine Infusion 0.05 MICROgram(s)/kG/Hr (1.25 mL/Hr) IV Continuous <Continuous>  heparin   Injectable 5000 Unit(s) SubCutaneous every 8 hours  hydrALAZINE 50 milliGRAM(s) Oral every 6 hours  labetalol 200 milliGRAM(s) Oral every 8 hours  lactulose Syrup 10 Gram(s) Oral every 8 hours  levETIRAcetam  IVPB 500 milliGRAM(s) IV Intermittent every 12 hours  lisinopril 40 milliGRAM(s) Oral daily  metoclopramide 5 milliGRAM(s) Oral two times a day  midazolam Infusion 0.02 mG/kG/Hr (2 mL/Hr) IV Continuous <Continuous>  mineral oil enema 133 milliLiter(s) Rectal daily  niCARdipine Infusion 15 mG/Hr (75 mL/Hr) IV Continuous <Continuous>  NIFEdipine XL 90 milliGRAM(s) Oral daily  pantoprazole  Injectable 40 milliGRAM(s) IV Push every 12 hours  polyethylene glycol 3350 17 Gram(s) Oral daily  potassium chloride   Powder 40 milliEquivalent(s) Oral once  propofol Infusion 20 MICROgram(s)/kG/Min (12 mL/Hr) IV Continuous <Continuous>  senna 2 Tablet(s) Oral at bedtime  sodium chloride 3%. 500 milliLiter(s) (65 mL/Hr) IV Continuous <Continuous>      SOCIAL HISTORY:  Denies ETOH,Smoking,   FAMILY HISTORY:  Family history of breast cancer (Mother)    Family history of pancreatic cancer (Father)          REVIEW OF SYSTEMS:  Unable to obtain secondary to mental status.     VITALS:  T(F): 98.7 (02-19-21 @ 08:00), Max: 99.8 (02-19-21 @ 04:00)  HR: 65 (02-19-21 @ 09:00)  BP: --  RR: 24 (02-18-21 @ 23:00)  SpO2: 99% (02-19-21 @ 09:00)    02-18 @ 07:01  -  02-19 @ 07:00  --------------------------------------------------------  IN: 5233 mL / OUT: 5565 mL / NET: -332 mL    02-19 @ 07:01  -  02-19 @ 09:56  --------------------------------------------------------  IN: 262 mL / OUT: 450 mL / NET: -188 mL          02-18-21 @ 07:01  -  02-19-21 @ 07:00  --------------------------------------------------------  IN: 0 mL / OUT: 5465 mL / NET: -5465 mL    02-19-21 @ 07:01  -  02-19-21 @ 09:56  --------------------------------------------------------  IN: 0 mL / OUT: 450 mL / NET: -450 mL      I&O's Detail    18 Feb 2021 07:01  -  19 Feb 2021 07:00  --------------------------------------------------------  IN:    Dexmedetomidine: 709 mL    Enteral Tube Flush: 240 mL    IV PiggyBack: 50 mL    IV PiggyBack: 100 mL    Midazolam: 2 mL    Midazolam: 98 mL    NiCARdipine: 1285 mL    Propofol: 544 mL    sodium chloride 3%: 750 mL    sodium chloride 3%: 845 mL    Vital High Protein: 610 mL  Total IN: 5233 mL    OUT:    Indwelling Catheter - Urethral (mL): 5465 mL    Rectal Tube (mL): 100 mL  Total OUT: 5565 mL    Total NET: -332 mL      19 Feb 2021 07:01  -  19 Feb 2021 09:56  --------------------------------------------------------  IN:    Dexmedetomidine: 18 mL    Midazolam: 2 mL    Propofol: 32 mL    sodium chloride 3%: 130 mL    Vital High Protein: 80 mL  Total IN: 262 mL    OUT:    Indwelling Catheter - Urethral (mL): 450 mL  Total OUT: 450 mL    Total NET: -188 mL            PHYSICAL EXAM:  Constitutional: NAD  HEENT: anicteric sclera, oropharynx clear, MMM  Neck: No JVD  Respiratory: CTAB, no wheezes, rales or rhonchi. mechanically vented   Cardiovascular: S1, S2, RRR  Gastrointestinal: BS+, soft, NT/ND  Extremities: No cyanosis or clubbing. +diffuse edema   Neurological: chemically sedated   : No CVA tenderness. +berry.   Skin: No rashes  Vascular Access: Central line     LABS:  02-19    152<H>  |  116<H>  |  12  ----------------------------<  119<H>  3.2<L>   |  24  |  0.8    Ca    8.3<L>      19 Feb 2021 05:29  Phos  3.0     02-19  Mg     1.8     02-19    TPro  5.4<L>  /  Alb  2.8<L>  /  TBili  0.5  /  DBili      /  AST  23  /  ALT  34  /  AlkPhos  128<H>  02-19    Creatinine Trend: 0.8 <--, 0.7 <--, 0.6 <--, 0.7 <--, 0.7 <--, 0.8 <--, 0.8 <--, 0.8 <--, 0.7 <--, 0.7 <--, 0.7 <--, 0.7 <--, 0.8 <--, 0.8 <--, 0.8 <--, 0.9 <--, 0.8 <--, 0.8 <--, 0.8 <--, 0.9 <--, 0.9 <--, 0.9 <--, 1.0 <--, 0.9 <--, 1.0 <--, 1.0 <--, 1.0 <--, 1.1 <--, 1.1 <--, 1.0 <--, 0.8 <--, 0.8 <--, 0.8 <--, 0.8 <--, 0.6 <--, 0.7 <--, 0.8 <--, 0.8 <--, 0.7 <--, 1.0 <--, 1.1 <--, 1.1 <--, 1.1 <--, 1.6 <--, 1.3 <--, 1.4 <--, 1.6 <--, 1.1 <--                        11.6   7.91  )-----------( 372      ( 19 Feb 2021 05:29 )             35.1     Urine Studies:    Sodium, Random Urine: 213.0 mmoL/L (02-16 @ 08:00)  Osmolality, Random Urine: 557 mos/kg (02-16 @ 08:00)  Chloride, Random Urine: 222 (02-16 @ 08:00)  Creatinine, Random Urine: 29 mg/dL (02-16 @ 08:00)  Protein/Creatinine Ratio Calculation: 0.3 Ratio (02-16 @ 08:00)  Creatinine, Random Urine: 93 mg/dL (02-12 @ 12:44)            RADIOLOGY & ADDITIONAL STUDIES:                 NEPHROLOGY CONSULTATION NOTE    Patient is a 50y Male with a past medical history of HTN, PUD/GIB whom presented to the hospital with left-sided weakness and was found to have a right intraparenchymal basal ganglial hemorrhage. He is now intubated and sedated, on multiple antihypertensives and IV hypertonic saline. Nephrology consulted for recommendations to control blood pressure.      PAST MEDICAL & SURGICAL HISTORY:  HTN (hypertension)    GI bleed    Gastric ulcer    PUD (peptic ulcer disease)    Arm fracture, left  s/p surgical repair      Allergies:  No Known Allergies    Home Medications Reviewed  Hospital Medications:   MEDICATIONS  (STANDING):  cefepime   IVPB 2000 milliGRAM(s) IV Intermittent every 12 hours  cloNIDine 0.2 milliGRAM(s) Oral every 8 hours  dexMEDEtomidine Infusion 0.05 MICROgram(s)/kG/Hr (1.25 mL/Hr) IV Continuous <Continuous>  heparin   Injectable 5000 Unit(s) SubCutaneous every 8 hours  hydrALAZINE 50 milliGRAM(s) Oral every 6 hours  labetalol 200 milliGRAM(s) Oral every 8 hours  lactulose Syrup 10 Gram(s) Oral every 8 hours  levETIRAcetam  IVPB 500 milliGRAM(s) IV Intermittent every 12 hours  lisinopril 40 milliGRAM(s) Oral daily  metoclopramide 5 milliGRAM(s) Oral two times a day  midazolam Infusion 0.02 mG/kG/Hr (2 mL/Hr) IV Continuous <Continuous>  mineral oil enema 133 milliLiter(s) Rectal daily  niCARdipine Infusion 15 mG/Hr (75 mL/Hr) IV Continuous <Continuous>  NIFEdipine XL 90 milliGRAM(s) Oral daily  pantoprazole  Injectable 40 milliGRAM(s) IV Push every 12 hours  polyethylene glycol 3350 17 Gram(s) Oral daily  potassium chloride   Powder 40 milliEquivalent(s) Oral once  propofol Infusion 20 MICROgram(s)/kG/Min (12 mL/Hr) IV Continuous <Continuous>  senna 2 Tablet(s) Oral at bedtime  sodium chloride 3%. 500 milliLiter(s) (65 mL/Hr) IV Continuous <Continuous>      SOCIAL HISTORY:  Denies ETOH,Smoking,   FAMILY HISTORY:  Family history of breast cancer (Mother)    Family history of pancreatic cancer (Father)          REVIEW OF SYSTEMS:  Unable to obtain secondary to mental status.     VITALS:  T(F): 98.7 (02-19-21 @ 08:00), Max: 99.8 (02-19-21 @ 04:00)  HR: 65 (02-19-21 @ 09:00)  BP: 130/80  RR: 24 (02-18-21 @ 23:00)  SpO2: 99% (02-19-21 @ 09:00)    02-18 @ 07:01  -  02-19 @ 07:00  --------------------------------------------------------  IN: 5233 mL / OUT: 5565 mL / NET: -332 mL    02-19 @ 07:01  -  02-19 @ 09:56  --------------------------------------------------------  IN: 262 mL / OUT: 450 mL / NET: -188 mL          02-18-21 @ 07:01  -  02-19-21 @ 07:00  --------------------------------------------------------  IN: 0 mL / OUT: 5465 mL / NET: -5465 mL    02-19-21 @ 07:01  -  02-19-21 @ 09:56  --------------------------------------------------------  IN: 0 mL / OUT: 450 mL / NET: -450 mL      I&O's Detail    18 Feb 2021 07:01  -  19 Feb 2021 07:00  --------------------------------------------------------  IN:    Dexmedetomidine: 709 mL    Enteral Tube Flush: 240 mL    IV PiggyBack: 50 mL    IV PiggyBack: 100 mL    Midazolam: 2 mL    Midazolam: 98 mL    NiCARdipine: 1285 mL    Propofol: 544 mL    sodium chloride 3%: 750 mL    sodium chloride 3%: 845 mL    Vital High Protein: 610 mL  Total IN: 5233 mL    OUT:    Indwelling Catheter - Urethral (mL): 5465 mL    Rectal Tube (mL): 100 mL  Total OUT: 5565 mL    Total NET: -332 mL      19 Feb 2021 07:01  -  19 Feb 2021 09:56  --------------------------------------------------------  IN:    Dexmedetomidine: 18 mL    Midazolam: 2 mL    Propofol: 32 mL    sodium chloride 3%: 130 mL    Vital High Protein: 80 mL  Total IN: 262 mL    OUT:    Indwelling Catheter - Urethral (mL): 450 mL  Total OUT: 450 mL    Total NET: -188 mL            PHYSICAL EXAM:  Constitutional: NAD  Neck: No JVD  Respiratory: CTAB, no wheezes, rales or rhonchi. mechanically vented   Cardiovascular: S1, S2, RRR  Gastrointestinal: BS+, soft, NT/ND  Extremities: No cyanosis or clubbing. +diffuse edema   Neurological: chemically sedated   : No CVA tenderness. +berry.   Skin: No rashes  Vascular Access: Central line     LABS:  02-19    152<H>  |  116<H>  |  12  ----------------------------<  119<H>  3.2<L>   |  24  |  0.8    Ca    8.3<L>      19 Feb 2021 05:29  Phos  3.0     02-19  Mg     1.8     02-19    TPro  5.4<L>  /  Alb  2.8<L>  /  TBili  0.5  /  DBili      /  AST  23  /  ALT  34  /  AlkPhos  128<H>  02-19    Creatinine Trend: 0.8 <--, 0.7 <--, 0.6 <--, 0.7 <--, 0.7 <--, 0.8 <--, 0.8 <--, 0.8 <--, 0.7 <--, 0.7 <--, 0.7 <--, 0.7 <--, 0.8 <--, 0.8 <--, 0.8 <--, 0.9 <--, 0.8 <--, 0.8 <--, 0.8 <--, 0.9 <--, 0.9 <--, 0.9 <--, 1.0 <--, 0.9 <--, 1.0 <--, 1.0 <--, 1.0 <--, 1.1 <--, 1.1 <--, 1.0 <--, 0.8 <--, 0.8 <--, 0.8 <--, 0.8 <--, 0.6 <--, 0.7 <--, 0.8 <--, 0.8 <--, 0.7 <--, 1.0 <--, 1.1 <--, 1.1 <--, 1.1 <--, 1.6 <--, 1.3 <--, 1.4 <--, 1.6 <--, 1.1 <--                        11.6   7.91  )-----------( 372      ( 19 Feb 2021 05:29 )             35.1     Urine Studies:    Sodium, Random Urine: 213.0 mmoL/L (02-16 @ 08:00)  Osmolality, Random Urine: 557 mos/kg (02-16 @ 08:00)  Chloride, Random Urine: 222 (02-16 @ 08:00)  Creatinine, Random Urine: 29 mg/dL (02-16 @ 08:00)  Protein/Creatinine Ratio Calculation: 0.3 Ratio (02-16 @ 08:00)  Creatinine, Random Urine: 93 mg/dL (02-12 @ 12:44)            RADIOLOGY & ADDITIONAL STUDIES:

## 2021-02-19 NOTE — PROCEDURAL SAFETY CHECKLIST WITH OR WITHOUT SEDATION - NSPREPROCDATE6_GEN_ALL_CORE
17-Feb-2021 13:00
27-Jan-2021 23:53
28-Jan-2021 00:41
17-Feb-2021 15:37
19-Feb-2021 16:00
28-Jan-2021 00:45

## 2021-02-19 NOTE — PROGRESS NOTE ADULT - SUBJECTIVE AND OBJECTIVE BOX
Pt seen and examined at the beside.     Pt remains sedated, intubated mechanically ventilated on 3% NS and Nicardipine GGT.  S/p EVD removal on 2/15.  Pt continues to becomes hyperdynamic, tachypneic and desaturate once sedation is weaned per medical team.  Versed to be slowly weaned to examine neurological status.  At present time the pt does not move extremities spont, has + gag, triggers the ventilator, PERRLA.      PAST MEDICAL & SURGICAL HISTORY:  ICH  HTN (hypertension)  GI bleed  Gastric ulcer  PUD (peptic ulcer disease)  Arm fracture, left  s/p surgical repair    FAMILY HISTORY:  Family history of breast cancer (Mother)    Family history of pancreatic cancer (Father)    Allergies   No Known Allergies    REVIEW OF SYSTEMS : All systems negative other than those listed in HPI  General:	-  Skin/Breast: -  Ophthalmologic: -   ENMT: -  Respiratory and Thorax: -  Cardiovascular: -	  Gastrointestinal: -  Genitourinary:-  Musculoskeletal:	-  Neurological:	-  Psychiatric:	-  Hematology/Lymphatics:	-  Endocrine:-  Allergic/Immunologic:	-    MEDICATIONS:  clarithromycin 500 mg oral tablet: 1 tab(s) orally 2 times a day  metroNIDAZOLE 500 mg oral tablet: 1 tab(s) orally 2 times a day  pantoprazole 40 mg oral delayed release tablet: 1 tab(s) orally 2 times a day    Antibiotics:  cefepime   IVPB 2000 milliGRAM(s) IV Intermittent every 12 hours    Anticoagulation:  heparin   Injectable 5000 Unit(s) SubCutaneous every 8 hours    OTHER:  chlorhexidine 0.12% Liquid 15 milliLiter(s) Oral Mucosa every 12 hours  chlorhexidine 4% Liquid 1 Application(s) Topical <User Schedule>  cloNIDine 0.2 milliGRAM(s) Oral every 8 hours  hydrALAZINE 50 milliGRAM(s) Oral every 6 hours  labetalol 200 milliGRAM(s) Oral every 8 hours  lactulose Syrup 10 Gram(s) Oral every 8 hours  lisinopril 40 milliGRAM(s) Oral daily  metoclopramide 5 milliGRAM(s) Oral two times a day  mineral oil enema 133 milliLiter(s) Rectal daily  niCARdipine Infusion 15 mG/Hr IV Continuous <Continuous>  NIFEdipine XL 90 milliGRAM(s) Oral daily  pantoprazole  Injectable 40 milliGRAM(s) IV Push every 12 hours  polyethylene glycol 3350 17 Gram(s) Oral daily  senna 2 Tablet(s) Oral at bedtime    Vital Signs Last 24 Hrs  T(C): 37.1 (19 Feb 2021 08:00), Max: 37.7 (19 Feb 2021 04:00)  T(F): 98.7 (19 Feb 2021 08:00), Max: 99.8 (19 Feb 2021 04:00)  HR: 68 (19 Feb 2021 08:00) (58 - 84)  BP: --  BP(mean): --  RR: 24 (18 Feb 2021 23:00) (24 - 25)  SpO2: 98% (19 Feb 2021 08:00) (94% - 98%)    Physical Exam :  General :   Intubated / Sedated  Tongue midline  Facial features symmetric, No droop  Does not follow commands   Occular :   PERRLA,no deviation or gaze preference   Does not track   Motor :   slight movement of R hand  Rotates L arm   -exam waxes and wanes based on sedation     LABS:                        11.6   7.91  )-----------( 372      ( 19 Feb 2021 05:29 )             35.1     02-19    152<H>  |  116<H>  |  12  ----------------------------<  119<H>  3.2<L>   |  24  |  0.8    Ca    8.3<L>      19 Feb 2021 05:29  Phos  3.0     02-19  Mg     1.8     02-19    TPro  5.4<L>  /  Alb  2.8<L>  /  TBili  0.5  /  DBili  x   /  AST  23  /  ALT  34  /  AlkPhos  128<H>  02-19    CULTURES:  Culture Results:   No growth to date. (02-16 @ 08:14)    RADIOLOGY & ADDITIONAL STUDIES:  IMPRESSION:  Since the prior head CT dated 2/15/2021:    1.  Interval removal of the previously seen left transfrontal EVD. No significant interval change in ventricular size.    2.  No significant interval change in the right basal ganglia/frontal lobe parenchymal hematoma with surrounding edema and mass effect including 8 mm right to left midline shift.    Assessment / Plan:  CTH from 2.17.21 reviewed    - Continue CTH every other day next CTH today 2.19.21  - Dr. Goins spoke with pts wife about pts status / options for trach / peg  - Continue to keep Na 140-155 BMP qDay  - Continue weaning sedation for optimal neuro exams   - Discussed with attending

## 2021-02-19 NOTE — PROGRESS NOTE ADULT - ASSESSMENT
IMPRESSION:    Large ICB secondary to Hypertensive Emergency s/p EVD now dc  Intubated, for airway protection  Klebsiella Pneumonia pansensitive   Left posterior tibial & peroneal DVT  ABD distention ASP CT AP improved      PLAN:  CNS:  c/w Propofol & Precedex taper and dc versed,, neuro sx f/u.    HEENT: Oral care    PULMONARY:  HOB @ 45 degrees.  Vent changes:  keep plateau less than 30/ DP less than 15. FiO2 30% dec tv    CARDIOVASCULAR:  Avoid volume overload.  BP control. 3% Hypertonic. dec rate    GI: GI prophylaxis.  Feeding OGT    RENAL correct lytes, trend CMP    INFECTIOUS DISEASE:   c/w Cefepime.  Follow up cultures & sensitivities. ID f/up    HEMATOLOGICAL:  SCDs for DVT prophylaxis due to ICH.    ENDOCRINE:  Follow up FS.  Insulin protocol if needed.    MUSCULOSKELETAL:  Bed rest  Family meeting today at 11am  Prognosis poor IMPRESSION:    Large ICB secondary to Hypertensive Emergency s/p EVD now dc  Intubated, for airway protection  Klebsiella Pneumonia pansensitive   Left posterior tibial & peroneal DVT  ABD distention ASP CT AP improved      PLAN:  CNS:  SAT, neuro sx f/u.    HEENT: Oral care    PULMONARY:  HOB @ 45 degrees.  Vent changes:  keep plateau less than 30/ DP less than 15. FiO2 30% , push ETT 2 cm    CARDIOVASCULAR:  Avoid volume overload.  BP control. 3% Hypertonic. dec rate    GI: GI prophylaxis.  Feeding OGT    RENAL correct lytes, trend CMP    INFECTIOUS DISEASE:  ABX per ID    HEMATOLOGICAL:  SCDs for DVT prophylaxis due to ICH. SEE FOR SQ hep if cleared by neurosx    ENDOCRINE:  Follow up FS.  Insulin protocol if needed.    MUSCULOSKELETAL:  Bed rest  Family meeting today at 11am  Prognosis poor  will need peg. trac

## 2021-02-19 NOTE — PROCEDURAL SAFETY CHECKLIST WITH OR WITHOUT SEDATION - NSTIMEOUTDATE_GEN_ALL_CORE
19-Feb-2021 16:03
28-Jan-2021 00:49
27-Jan-2021 22:30
17-Feb-2021 15:15
17-Feb-2021 13:00
28-Jan-2021 00:45

## 2021-02-19 NOTE — CONSULT NOTE ADULT - ATTENDING COMMENTS
I was Physically Present for the key portions of the evaluation   I agree with the above History  , Physical examination Assessment and plan   I have Reviewed , Modified or appended where appropriate.  Please check A and P as above    50y Male with a past medical history as described above who presented for weakness and and was found to have a right intraparenchymal bleed. Nephrology consulted to evaluate refractory hypertension.     # HTN can not really describe it as refractory as patient is not on diuretics   on 3% saline to decrease brain edema   suggest keep on labetalol ( can increase to 400 mg q8h) continue hydralazine switch to 75 mg q8h / keep on lisinopril ( maximum dose) keep clonidine current / keep nifedipine current / DC cardene and start chlorthalidone 12.5 mg q24h   Has polyuria and hypernatremia related to 3% use/ consider holding 3%   check UA and urine lytes U osm   will follow
49yo male with PMHx of HTN, PUD, UGI bleed, left arm fracture presenting as TRAUMA CONSULT after being found down next to the toilet. Prior to episode, he complained of left sided weakness. Primary survey significant for confusion and difficulty speaking, intubated in ED. Secondary survey WNL. Stroke code called. Labs significant for WBC 11, lactate 2. Imaging significant for large right-sided intra-parenchymal hemorrhage. Remaining imaging negative for acute traumatic injury. F/U official reads imaging by radiology/ED attending. Neurosurgery consulted - concern for hypertensive bleed. Cleared by Trauma, pending tertiary survey. Plan for admission to ICU as per ED.

## 2021-02-19 NOTE — PROGRESS NOTE ADULT - SUBJECTIVE AND OBJECTIVE BOX
Patient is a 50y old  Male who presents with a chief complaint of Altered mental status (18 Feb 2021 10:51)        Over Night Events: Remains intubated. MV Precedex and propofol, Off cardene        ALLERGIC/IMMUNOLOGIC:   No active allergic or immunologic issues    PHYSICAL EXAM    ICU Vital Signs Last 24 Hrs  T(C): 37.7 (19 Feb 2021 04:00), Max: 37.7 (19 Feb 2021 04:00)  T(F): 99.8 (19 Feb 2021 04:00), Max: 99.8 (19 Feb 2021 04:00)  HR: 62 (19 Feb 2021 06:00) (59 - 84)  ABP: 110/57 (19 Feb 2021 06:00) (110/57 - 174/80)  ABP(mean): 71 (19 Feb 2021 06:00) (71 - 102)  RR: 24 (18 Feb 2021 23:00) (24 - 25)  SpO2: 94% (19 Feb 2021 06:00) (94% - 98%)      CONSTITUTIONAL:  Well nourished.  NAD    ENT:   ET+  Airway patent,   Mouth with normal mucosa.   No thrush    EYES:   Pupils equal,   Round and reactive to light.    CARDIAC:   Normal rate,   Regular rhythm.    No edema    Vascular:  Normal systolic impulse  No Carotid bruits    RESPIRATORY:   No wheezing  Bilateral BS  Normal chest expansion  Not tachypneic,  No use of accessory muscles    GASTROINTESTINAL:  Abdomen soft,   No guarding,   + BS    GENITOURINARY  normal genitalia for sex  no edema    MUSCULOSKELETAL:   No clubbing, cyanosis    NEUROLOGICAL:   Sedated  Does not withdraw to pain    SKIN:   Skin normal color for race,   Warm and dry and intact.   No evidence of rash.    PSYCHIATRIC:   No apparent risk to self or others.    HEME LYMPH:   No splenomegaly.  No cervical  lymphadenopathy.  no inguinal lymphadenopathy      02-18-21 @ 07:01  -  02-19-21 @ 07:00  --------------------------------------------------------  IN:    Dexmedetomidine: 700 mL    Enteral Tube Flush: 240 mL    IV PiggyBack: 50 mL    IV PiggyBack: 100 mL    Midazolam: 98 mL    NiCARdipine: 1275 mL    Propofol: 528 mL    sodium chloride 3%: 750 mL    sodium chloride 3%: 780 mL    Vital High Protein: 570 mL  Total IN: 5091 mL    OUT:    Indwelling Catheter - Urethral (mL): 5465 mL    Rectal Tube (mL): 100 mL  Total OUT: 5565 mL    Total NET: -474 mL    LABS:                            11.6   7.91  )-----------( 372      ( 19 Feb 2021 05:29 )             35.1                                               02-19    152<H>  |  116<H>  |  12  ----------------------------<  119<H>  3.2<L>   |  24  |  0.8    Ca    8.3<L>      19 Feb 2021 05:29  Phos  3.0     02-19  Mg     1.8     02-19    TPro  5.4<L>  /  Alb  2.8<L>  /  TBili  0.5  /  DBili  x   /  AST  23  /  ALT  34  /  AlkPhos  128<H>  02-19                                                                                           LIVER FUNCTIONS - ( 19 Feb 2021 05:29 )  Alb: 2.8 g/dL / Pro: 5.4 g/dL / ALK PHOS: 128 U/L / ALT: 34 U/L / AST: 23 U/L / GGT: x                                                                                               Mode: AC/ CMV (Assist Control/ Continuous Mandatory Ventilation)  RR (machine): 24  TV (machine): 450  FiO2: 30  PEEP: 8  ITime: 1  MAP: 14  PIP: 25                                      ABG - ( 19 Feb 2021 02:21 )  pH, Arterial: 7.48  pH, Blood: x     /  pCO2: 37    /  pO2: 90    / HCO3: 27    / Base Excess: 3.9   /  SaO2: 97        MEDICATIONS  (STANDING):  cefepime   IVPB 2000 milliGRAM(s) IV Intermittent every 12 hours  chlorhexidine 0.12% Liquid 15 milliLiter(s) Oral Mucosa every 12 hours  chlorhexidine 4% Liquid 1 Application(s) Topical <User Schedule>  cloNIDine 0.2 milliGRAM(s) Oral every 8 hours  dexMEDEtomidine Infusion 0.05 MICROgram(s)/kG/Hr (1.25 mL/Hr) IV Continuous <Continuous>  heparin   Injectable 5000 Unit(s) SubCutaneous every 8 hours  hydrALAZINE 50 milliGRAM(s) Oral every 6 hours  labetalol 200 milliGRAM(s) Oral every 8 hours  lactulose Syrup 10 Gram(s) Oral every 8 hours  levETIRAcetam  IVPB 500 milliGRAM(s) IV Intermittent every 12 hours  lisinopril 40 milliGRAM(s) Oral daily  metoclopramide 5 milliGRAM(s) Oral two times a day  midazolam Infusion 0.02 mG/kG/Hr (2 mL/Hr) IV Continuous <Continuous>  mineral oil enema 133 milliLiter(s) Rectal daily  niCARdipine Infusion 15 mG/Hr (75 mL/Hr) IV Continuous <Continuous>  NIFEdipine XL 90 milliGRAM(s) Oral daily  pantoprazole  Injectable 40 milliGRAM(s) IV Push every 12 hours  polyethylene glycol 3350 17 Gram(s) Oral daily  potassium chloride   Powder 40 milliEquivalent(s) Oral once  propofol Infusion 20 MICROgram(s)/kG/Min (12 mL/Hr) IV Continuous <Continuous>  senna 2 Tablet(s) Oral at bedtime  sodium chloride 3%. 500 milliLiter(s) (65 mL/Hr) IV Continuous <Continuous>    MEDICATIONS  (PRN):  acetaminophen   Tablet .. 650 milliGRAM(s) Oral every 6 hours PRN Temp greater or equal to 38C (100.4F)      New X-rays reviewed:                                                                                  ECHO    CXR interpreted by me:       Patient is a 50y old  Male who presents with a chief complaint of Altered mental status (18 Feb 2021 10:51)        Over Night Events: Remains intubated. MV Precedex and propofol, Off cardene/ versed        ALLERGIC/IMMUNOLOGIC:   No active allergic or immunologic issues    PHYSICAL EXAM    ICU Vital Signs Last 24 Hrs  T(C): 37.7 (19 Feb 2021 04:00), Max: 37.7 (19 Feb 2021 04:00)  T(F): 99.8 (19 Feb 2021 04:00), Max: 99.8 (19 Feb 2021 04:00)  HR: 62 (19 Feb 2021 06:00) (59 - 84)  ABP: 110/57 (19 Feb 2021 06:00) (110/57 - 174/80)  ABP(mean): 71 (19 Feb 2021 06:00) (71 - 102)  RR: 24 (18 Feb 2021 23:00) (24 - 25)  SpO2: 94% (19 Feb 2021 06:00) (94% - 98%)      CONSTITUTIONAL:  ill looking    ENT:   ET+  Airway patent,   Mouth with normal mucosa.   No thrush      CARDIAC:   Normal rate,   Regular rhythm.    No edema      RESPIRATORY:   dec both bases    GASTROINTESTINAL:  Abdomen soft,   No guarding,   + BS      MUSCULOSKELETAL:   No clubbing, cyanosis    NEUROLOGICAL:   Sedated  Does not withdraw to pain    SKIN:   Skin normal color for race,   Warm and dry and intact.   No evidence of rash.            02-18-21 @ 07:01  -  02-19-21 @ 07:00  --------------------------------------------------------  IN:    Dexmedetomidine: 700 mL    Enteral Tube Flush: 240 mL    IV PiggyBack: 50 mL    IV PiggyBack: 100 mL    Midazolam: 98 mL    NiCARdipine: 1275 mL    Propofol: 528 mL    sodium chloride 3%: 750 mL    sodium chloride 3%: 780 mL    Vital High Protein: 570 mL  Total IN: 5091 mL    OUT:    Indwelling Catheter - Urethral (mL): 5465 mL    Rectal Tube (mL): 100 mL  Total OUT: 5565 mL    Total NET: -474 mL    LABS:                            11.6   7.91  )-----------( 372      ( 19 Feb 2021 05:29 )             35.1                                               02-19    152<H>  |  116<H>  |  12  ----------------------------<  119<H>  3.2<L>   |  24  |  0.8    Ca    8.3<L>      19 Feb 2021 05:29  Phos  3.0     02-19  Mg     1.8     02-19    TPro  5.4<L>  /  Alb  2.8<L>  /  TBili  0.5  /  DBili  x   /  AST  23  /  ALT  34  /  AlkPhos  128<H>  02-19                                                                                           LIVER FUNCTIONS - ( 19 Feb 2021 05:29 )  Alb: 2.8 g/dL / Pro: 5.4 g/dL / ALK PHOS: 128 U/L / ALT: 34 U/L / AST: 23 U/L / GGT: x                                                                                               Mode: AC/ CMV (Assist Control/ Continuous Mandatory Ventilation)  RR (machine): 24  TV (machine): 450  FiO2: 30  PEEP: 8  ITime: 1  MAP: 14  PIP: 25                                      ABG - ( 19 Feb 2021 02:21 )  pH, Arterial: 7.48  pH, Blood: x     /  pCO2: 37    /  pO2: 90    / HCO3: 27    / Base Excess: 3.9   /  SaO2: 97        MEDICATIONS  (STANDING):  cefepime   IVPB 2000 milliGRAM(s) IV Intermittent every 12 hours  chlorhexidine 0.12% Liquid 15 milliLiter(s) Oral Mucosa every 12 hours  chlorhexidine 4% Liquid 1 Application(s) Topical <User Schedule>  cloNIDine 0.2 milliGRAM(s) Oral every 8 hours  dexMEDEtomidine Infusion 0.05 MICROgram(s)/kG/Hr (1.25 mL/Hr) IV Continuous <Continuous>  heparin   Injectable 5000 Unit(s) SubCutaneous every 8 hours  hydrALAZINE 50 milliGRAM(s) Oral every 6 hours  labetalol 200 milliGRAM(s) Oral every 8 hours  lactulose Syrup 10 Gram(s) Oral every 8 hours  levETIRAcetam  IVPB 500 milliGRAM(s) IV Intermittent every 12 hours  lisinopril 40 milliGRAM(s) Oral daily  metoclopramide 5 milliGRAM(s) Oral two times a day  midazolam Infusion 0.02 mG/kG/Hr (2 mL/Hr) IV Continuous <Continuous>  mineral oil enema 133 milliLiter(s) Rectal daily  niCARdipine Infusion 15 mG/Hr (75 mL/Hr) IV Continuous <Continuous>  NIFEdipine XL 90 milliGRAM(s) Oral daily  pantoprazole  Injectable 40 milliGRAM(s) IV Push every 12 hours  polyethylene glycol 3350 17 Gram(s) Oral daily  potassium chloride   Powder 40 milliEquivalent(s) Oral once  propofol Infusion 20 MICROgram(s)/kG/Min (12 mL/Hr) IV Continuous <Continuous>  senna 2 Tablet(s) Oral at bedtime  sodium chloride 3%. 500 milliLiter(s) (65 mL/Hr) IV Continuous <Continuous>    MEDICATIONS  (PRN):  acetaminophen   Tablet .. 650 milliGRAM(s) Oral every 6 hours PRN Temp greater or equal to 38C (100.4F)    cxr reviewed

## 2021-02-19 NOTE — CONSULT NOTE ADULT - ASSESSMENT
MELODIE HERNADEZ is a 50y Male with a past medical history as described above who presented for weakness and and was found to have a right intraparenchymal bleed. Nephrology consulted to evaluate refractory hypertension.     # Hypertension, on multiple medications  # Hypernatremia on 3% saline    MELODIE HERNADEZ is a 50y Male with a past medical history as described above who presented for weakness and and was found to have a right intraparenchymal bleed. Nephrology consulted to evaluate refractory hypertension.     # Hypertension, on multiple medications  # Hypernatremia on 3% saline   ·	Not technically "resistant" since not on a diuretic   ·	Would discontinue Cardene drip - BP is acceptable  ·	3% will have a hypertensive effect, and would not start diuretic since it would decrease serum sodium  ·	d/w attending   ·	Will follow

## 2021-02-19 NOTE — PROGRESS NOTE ADULT - ATTENDING COMMENTS
History, events, data and scans reviewed. Patient examined at bedside on 02/19/2021. I agree with findings/ assessment and approved plan of care as outlined above.

## 2021-02-19 NOTE — PROCEDURAL SAFETY CHECKLIST WITH OR WITHOUT SEDATION - NSTEAMPROVIDERFT_GEN_ALL_CORE
Destiny Maynard and Dr. Michaels
Dr. Michaels
Neuro PA
Dr.'s Durbin
Resident kayla diaz
resident Vern Washington

## 2021-02-19 NOTE — PROCEDURAL SAFETY CHECKLIST WITH OR WITHOUT SEDATION - NSPREPROCLOCFT_GEN_ALL_CORE
Vent bed 5
Vent Unit Bed 5
Ed trauma room 3
emergency dept trauma 3
emergency dept trauma 3
vent unit

## 2021-02-19 NOTE — PROGRESS NOTE ADULT - SUBJECTIVE AND OBJECTIVE BOX
Neurocritical Care Progress Note:    1. Brief Presentation: confusion, aphasia    2. Today's Acute Problems:     3. Relevant brief History:50 years old right handed Male with PMHx of HTN, GI bleed, PUD,  Mrankin score of 0 at baseline was brought in by EMS for  AMS and left sided weakness.   Wife states that patient was having a usual day today went to bathroom and then the next thing she noted was that he was confused and was lying on the bathroom floor, she reports urinary incontinence, patient was confused, had trouble speaking, and could not move his left side.     In the ED, /148, HR 80, saturating well. Code stroke called, NIHSS 24 and GCS 11, CTH showed Moderate to large intraparenchymal right basal ganglia hemorrhage with mild surrounding edema and associated right sulcal effacement as well as decompression into the ventricles. There is approximately 6.3 mm of midline shift. CTA did not show any evidence of active bleeding, AVM or aneurysm or major vascular stenosis or occlusion. Pt became unresponsive in CT scan and had to be intubated and sedated. Patient was intubated in ED for worsening mental status and GCS. Started on Nicardipine drip for sbp in 200s and EVD was placed by neurosurgery at bedside. Pan trauma scan was negative. To be admitted under ICU for further monitoring.  (27 Jan 2021 23:13)      4-Yesterday's Plan:    5. Last 24 hour updates:    6. Medications:   cefepime   IVPB 2000 milliGRAM(s) IV Intermittent every 12 hours  chlorhexidine 0.12% Liquid 15 milliLiter(s) Oral Mucosa every 12 hours  chlorhexidine 4% Liquid 1 Application(s) Topical <User Schedule>  cloNIDine 0.2 milliGRAM(s) Oral every 8 hours  dexMEDEtomidine Infusion 0.05 MICROgram(s)/kG/Hr IV Continuous <Continuous>  heparin   Injectable 5000 Unit(s) SubCutaneous every 8 hours  hydrALAZINE 50 milliGRAM(s) Oral every 6 hours  labetalol 200 milliGRAM(s) Oral every 8 hours  lactulose Syrup 10 Gram(s) Oral every 8 hours  levETIRAcetam  IVPB 500 milliGRAM(s) IV Intermittent every 12 hours  lisinopril 40 milliGRAM(s) Oral daily  metoclopramide 5 milliGRAM(s) Oral two times a day  mineral oil enema 133 milliLiter(s) Rectal daily  niCARdipine Infusion 15 mG/Hr IV Continuous <Continuous>  NIFEdipine XL 90 milliGRAM(s) Oral daily  pantoprazole  Injectable 40 milliGRAM(s) IV Push every 12 hours  polyethylene glycol 3350 17 Gram(s) Oral daily  propofol Infusion 20 MICROgram(s)/kG/Min IV Continuous <Continuous>  senna 2 Tablet(s) Oral at bedtime  sodium chloride 3%. 500 milliLiter(s) IV Continuous <Continuous>      7. Ancillary Management:   Chest PT[ ]   Head of bed >35 [ ]   Out of bed to chair [ ]   PT/OT/SP Eval [ ]   Spirometry[ ]   DVT prophalaxis[ ]    8.Neuro:   Awake: Spontaneously[ ] Occasionally[ ] To Voice [ ] To painful stimuli [ ]   AIert [ ]. Following commands: 3 steps[ ], 2 steps[ ], 1 step [ ], None [x ]   Orientation: 0[x ], 1[ ], 2[ ], 3[ ]. Tracking objects with eyes: [ ]   Language: intubated, sedated  Time off sedation for exam:   Pupils: Right   >2   Left    >2      Corneal: +     Gag reflex:  +   EOMI: +  Motor: flaccid paralysis  Sensory: no withdrawal to pain  Reflexes are mute    .      mRS:  0 No symptoms at all  1 No significant disability despite symptoms; able to carry out all usual duties and activities without assistance  2 Slight disability; unable to carry out all previous activities, but able to look after own affairs  3 Moderate disability; requiring some help, but able to walk without assistance  4 Moderately severe disability; unable to walk without assistance and unable to attend to own bodily needs without assistance  5 Severe disability; bedridden, incontinent and requiring constant nursing care and attention  6 Dead        Last CTH: < from: CT Head No Cont (02.19.21 @ 13:42) >    EXAM:  CT BRAIN            PROCEDURE DATE:  02/19/2021            INTERPRETATION:  Clinical History / Reason for exam: Intracranial hemorrhage follow-up    Technique: Noncontrast head CT. Contiguous CT axial images of the head from the base to the vertex.    COMPARISON: CT head 2/17/2021    FINDINGS:    Redemonstrated is a left frontal fish hole with overlying skin staples an underlying prior catheter tract.    There is no significant interval change in ventricular size, not significantly enlarged.    There is no significant interval change in the parenchymal hematoma centered in the right basal ganglia and frontal lobe with surrounding edema.    There is no significant change in right to left midline shift measuring about 8 mm.    No new intracranial hemorrhage or large territory infarct is demonstrated.    There is mucosal thickening throughout the paranasal sinuses, and bilateral mastoid and left middle air opacification.    IMPRESSION:  Since the prior head CT dated 2/17/2021:    Nosignificant interval change in the right basal ganglia/frontal lobe parenchymal hematoma with surrounding edema and mass effect including 8 mm right to left midline shift.    < end of copied text >      Last CTA/MRA:    Last CTP:    Last MRI:    Last TCD:    Last EEG:    EVD: [ ] [place]: [ ]     ICP:     CPP:     Level(cm):     24hr(ml):     CSF:     W:     R:     C:     P:     G:     LA:    9. Cardiovascular:   Vital Signs Last 24 Hrs  T(C): 37.1 (19 Feb 2021 15:45), Max: 37.7 (19 Feb 2021 04:00)  T(F): 98.8 (19 Feb 2021 15:45), Max: 99.8 (19 Feb 2021 04:00)  HR: 74 (19 Feb 2021 18:00) (58 - 89)  BP: --  BP(mean): --  RR: 24 (18 Feb 2021 23:00) (24 - 24)  SpO2: 97% (19 Feb 2021 18:00) (94% - 99%)     Last Echo:    Last EKG:    CVP   MAP/CPP/SBP target:   CO:      CI:       Enzymes/Trop:    10. Respiratory:   ABG:ABG - ( 19 Feb 2021 16:41 )  pH, Arterial: 7.45  pH, Blood: x     /  pCO2: 40    /  pO2: 81    / HCO3: 27    / Base Excess: 3.0   /  SaO2: 96              VBG:    Chest Xray:    Mode: AC/ CMV (Assist Control/ Continuous Mandatory Ventilation)  RR (machine): 20  TV (machine): 450  FiO2: 30  PEEP: 8  ITime: 0.9  MAP: 14  PIP: 26      Peak Pressure/[place] Pressure:    11.GI:    Prophalaxis:     Bowel mvt:     Abd distension:   LIVER FUNCTIONS - ( 19 Feb 2021 12:42 )  Alb: 2.4 g/dL / Pro: 5.2 g/dL / ALK PHOS: 125 U/L / ALT: 31 U/L / AST: 23 U/L / GGT: x             12.Renal/Fluids/Electrolytes:    02-19    153<H>  |  120<H>  |  13  ----------------------------<  118<H>  3.4<L>   |  25  |  0.7    Ca    8.1<L>      19 Feb 2021 12:42  Phos  3.0     02-19  Mg     1.8     02-19    TPro  5.2<L>  /  Alb  2.4<L>  /  TBili  0.4  /  DBili  x   /  AST  23  /  ALT  31  /  AlkPhos  125<H>  02-19      I&O's Detail    18 Feb 2021 07:01  -  19 Feb 2021 07:00  --------------------------------------------------------  IN:    Dexmedetomidine: 709 mL    Enteral Tube Flush: 240 mL    IV PiggyBack: 50 mL    IV PiggyBack: 100 mL    Midazolam: 98 mL    Midazolam: 2 mL    NiCARdipine: 1285 mL    Propofol: 544 mL    sodium chloride 3%: 750 mL    sodium chloride 3%: 845 mL    Vital High Protein: 610 mL  Total IN: 5233 mL    OUT:    Indwelling Catheter - Urethral (mL): 5465 mL    Rectal Tube (mL): 100 mL  Total OUT: 5565 mL    Total NET: -332 mL      19 Feb 2021 07:01  -  19 Feb 2021 20:04  --------------------------------------------------------  IN:    Dexmedetomidine: 45 mL    Dexmedetomidine: 90 mL    Midazolam: 2 mL    NiCARdipine: 90 mL    Oral Fluid: 270 mL    Propofol: 210 mL    Propofol: 141 mL    sodium chloride 3%: 715 mL    Vital High Protein: 280 mL  Total IN: 1843 mL    OUT:    Indwelling Catheter - Urethral (mL): 1650 mL  Total OUT: 1650 mL    Total NET: 193 mL          13.ID:   TMax:   Vital Signs Last 24 Hrs  T(C): 37.1 (19 Feb 2021 15:45), Max: 37.7 (19 Feb 2021 04:00)  T(F): 98.8 (19 Feb 2021 15:45), Max: 99.8 (19 Feb 2021 04:00)  HR: 74 (19 Feb 2021 18:00) (58 - 89)  BP: --  BP(mean): --  RR: 24 (18 Feb 2021 23:00) (24 - 24)  SpO2: 97% (19 Feb 2021 18:00) (94% - 99%)           Lines: Central[] Date inserted: Peripheral[]    14. Hematology:                         11.6   7.91  )-----------( 372      ( 19 Feb 2021 05:29 )             35.1      02-19    153<H>  |  120<H>  |  13  ----------------------------<  118<H>  3.4<L>   |  25  |  0.7    Ca    8.1<L>      19 Feb 2021 12:42  Phos  3.0     02-19  Mg     1.8     02-19    TPro  5.2<L>  /  Alb  2.4<L>  /  TBili  0.4  /  DBili  x   /  AST  23  /  ALT  31  /  AlkPhos  125<H>  02-19         DVT Prophylaxis Lovenox[ ] Heparin[ ] Venodynes[ ] SCD's[ ]    15. Impression:50 years old right handed Male with PMHx of HTN, GI bleed, PUD,  Mrankin score of 0 at baseline was brought in by EMS for  AMS and left sided weakness.  Code stroke called, NIHSS 24 and GCS 11, CTH showed Moderate to large intraparenchymal right basal ganglia hemorrhage with mild surrounding edema and associated right sulcal effacement as well as decompression into the ventricles.  Remains intubated, EVD is out. repeat CTH is stable. Currently on Propofol at 50 and Precedex.      16. Suggestions:  Continue Q1 neurochecks  - Continue Keppra 500mg IV q12hrs  - Continue to wean sedation (may use neuroleptics PRN for agitation)  - Keep SBP < 160  - Ventilator management as per primary team>trach and peg  - HOB > 35 degrees  - Aspiration precautions  - Continue GI ppx  - Continue bowel regimen  - sequentials        Plan discussed with attending Dr. Bey

## 2021-02-20 LAB
ALBUMIN SERPL ELPH-MCNC: 2.5 G/DL — LOW (ref 3.5–5.2)
ALBUMIN SERPL ELPH-MCNC: 2.6 G/DL — LOW (ref 3.5–5.2)
ALBUMIN SERPL ELPH-MCNC: 2.6 G/DL — LOW (ref 3.5–5.2)
ALP SERPL-CCNC: 122 U/L — HIGH (ref 30–115)
ALP SERPL-CCNC: 128 U/L — HIGH (ref 30–115)
ALP SERPL-CCNC: 135 U/L — HIGH (ref 30–115)
ALT FLD-CCNC: 36 U/L — SIGNIFICANT CHANGE UP (ref 0–41)
ALT FLD-CCNC: 37 U/L — SIGNIFICANT CHANGE UP (ref 0–41)
ALT FLD-CCNC: 39 U/L — SIGNIFICANT CHANGE UP (ref 0–41)
ANION GAP SERPL CALC-SCNC: 10 MMOL/L — SIGNIFICANT CHANGE UP (ref 7–14)
ANION GAP SERPL CALC-SCNC: 9 MMOL/L — SIGNIFICANT CHANGE UP (ref 7–14)
ANION GAP SERPL CALC-SCNC: 9 MMOL/L — SIGNIFICANT CHANGE UP (ref 7–14)
AST SERPL-CCNC: 28 U/L — SIGNIFICANT CHANGE UP (ref 0–41)
AST SERPL-CCNC: 32 U/L — SIGNIFICANT CHANGE UP (ref 0–41)
AST SERPL-CCNC: 33 U/L — SIGNIFICANT CHANGE UP (ref 0–41)
BASOPHILS # BLD AUTO: 0.07 K/UL — SIGNIFICANT CHANGE UP (ref 0–0.2)
BASOPHILS NFR BLD AUTO: 0.9 % — SIGNIFICANT CHANGE UP (ref 0–1)
BILIRUB SERPL-MCNC: 0.5 MG/DL — SIGNIFICANT CHANGE UP (ref 0.2–1.2)
BILIRUB SERPL-MCNC: 0.6 MG/DL — SIGNIFICANT CHANGE UP (ref 0.2–1.2)
BILIRUB SERPL-MCNC: 0.7 MG/DL — SIGNIFICANT CHANGE UP (ref 0.2–1.2)
BUN SERPL-MCNC: 10 MG/DL — SIGNIFICANT CHANGE UP (ref 10–20)
BUN SERPL-MCNC: 8 MG/DL — LOW (ref 10–20)
BUN SERPL-MCNC: 9 MG/DL — LOW (ref 10–20)
CALCIUM SERPL-MCNC: 8.1 MG/DL — LOW (ref 8.5–10.1)
CALCIUM SERPL-MCNC: 8.3 MG/DL — LOW (ref 8.5–10.1)
CALCIUM SERPL-MCNC: 8.7 MG/DL — SIGNIFICANT CHANGE UP (ref 8.5–10.1)
CHLORIDE SERPL-SCNC: 112 MMOL/L — HIGH (ref 98–110)
CHLORIDE SERPL-SCNC: 114 MMOL/L — HIGH (ref 98–110)
CHLORIDE SERPL-SCNC: 117 MMOL/L — HIGH (ref 98–110)
CO2 SERPL-SCNC: 24 MMOL/L — SIGNIFICANT CHANGE UP (ref 17–32)
CO2 SERPL-SCNC: 24 MMOL/L — SIGNIFICANT CHANGE UP (ref 17–32)
CO2 SERPL-SCNC: 26 MMOL/L — SIGNIFICANT CHANGE UP (ref 17–32)
CREAT SERPL-MCNC: 0.6 MG/DL — LOW (ref 0.7–1.5)
CREAT SERPL-MCNC: 0.6 MG/DL — LOW (ref 0.7–1.5)
CREAT SERPL-MCNC: 0.7 MG/DL — SIGNIFICANT CHANGE UP (ref 0.7–1.5)
EOSINOPHIL # BLD AUTO: 0.83 K/UL — HIGH (ref 0–0.7)
EOSINOPHIL NFR BLD AUTO: 10.3 % — HIGH (ref 0–8)
GLUCOSE BLDC GLUCOMTR-MCNC: 109 MG/DL — HIGH (ref 70–99)
GLUCOSE SERPL-MCNC: 101 MG/DL — HIGH (ref 70–99)
GLUCOSE SERPL-MCNC: 101 MG/DL — HIGH (ref 70–99)
GLUCOSE SERPL-MCNC: 113 MG/DL — HIGH (ref 70–99)
HCT VFR BLD CALC: 35 % — LOW (ref 42–52)
HGB BLD-MCNC: 11.9 G/DL — LOW (ref 14–18)
IMM GRANULOCYTES NFR BLD AUTO: 1.9 % — HIGH (ref 0.1–0.3)
LYMPHOCYTES # BLD AUTO: 1.14 K/UL — LOW (ref 1.2–3.4)
LYMPHOCYTES # BLD AUTO: 14.2 % — LOW (ref 20.5–51.1)
MAGNESIUM SERPL-MCNC: 1.9 MG/DL — SIGNIFICANT CHANGE UP (ref 1.8–2.4)
MCHC RBC-ENTMCNC: 31.3 PG — HIGH (ref 27–31)
MCHC RBC-ENTMCNC: 34 G/DL — SIGNIFICANT CHANGE UP (ref 32–37)
MCV RBC AUTO: 92.1 FL — SIGNIFICANT CHANGE UP (ref 80–94)
MONOCYTES # BLD AUTO: 0.72 K/UL — HIGH (ref 0.1–0.6)
MONOCYTES NFR BLD AUTO: 8.9 % — SIGNIFICANT CHANGE UP (ref 1.7–9.3)
NEUTROPHILS # BLD AUTO: 5.14 K/UL — SIGNIFICANT CHANGE UP (ref 1.4–6.5)
NEUTROPHILS NFR BLD AUTO: 63.8 % — SIGNIFICANT CHANGE UP (ref 42.2–75.2)
NRBC # BLD: 0 /100 WBCS — SIGNIFICANT CHANGE UP (ref 0–0)
PHOSPHATE SERPL-MCNC: 3.2 MG/DL — SIGNIFICANT CHANGE UP (ref 2.1–4.9)
PLATELET # BLD AUTO: 379 K/UL — SIGNIFICANT CHANGE UP (ref 130–400)
POTASSIUM SERPL-MCNC: 3.2 MMOL/L — LOW (ref 3.5–5)
POTASSIUM SERPL-MCNC: 3.5 MMOL/L — SIGNIFICANT CHANGE UP (ref 3.5–5)
POTASSIUM SERPL-MCNC: 3.7 MMOL/L — SIGNIFICANT CHANGE UP (ref 3.5–5)
POTASSIUM SERPL-SCNC: 3.2 MMOL/L — LOW (ref 3.5–5)
POTASSIUM SERPL-SCNC: 3.5 MMOL/L — SIGNIFICANT CHANGE UP (ref 3.5–5)
POTASSIUM SERPL-SCNC: 3.7 MMOL/L — SIGNIFICANT CHANGE UP (ref 3.5–5)
PROT SERPL-MCNC: 5.3 G/DL — LOW (ref 6–8)
PROT SERPL-MCNC: 5.3 G/DL — LOW (ref 6–8)
PROT SERPL-MCNC: 5.4 G/DL — LOW (ref 6–8)
RBC # BLD: 3.8 M/UL — LOW (ref 4.7–6.1)
RBC # FLD: 12.8 % — SIGNIFICANT CHANGE UP (ref 11.5–14.5)
SODIUM SERPL-SCNC: 147 MMOL/L — HIGH (ref 135–146)
SODIUM SERPL-SCNC: 148 MMOL/L — HIGH (ref 135–146)
SODIUM SERPL-SCNC: 150 MMOL/L — HIGH (ref 135–146)
WBC # BLD: 8.05 K/UL — SIGNIFICANT CHANGE UP (ref 4.8–10.8)
WBC # FLD AUTO: 8.05 K/UL — SIGNIFICANT CHANGE UP (ref 4.8–10.8)

## 2021-02-20 PROCEDURE — 71045 X-RAY EXAM CHEST 1 VIEW: CPT | Mod: 26,77

## 2021-02-20 PROCEDURE — 71045 X-RAY EXAM CHEST 1 VIEW: CPT | Mod: 26

## 2021-02-20 RX ORDER — SODIUM CHLORIDE 5 G/100ML
500 INJECTION, SOLUTION INTRAVENOUS
Refills: 0 | Status: DISCONTINUED | OUTPATIENT
Start: 2021-02-20 | End: 2021-02-23

## 2021-02-20 RX ORDER — FUROSEMIDE 40 MG
40 TABLET ORAL ONCE
Refills: 0 | Status: DISCONTINUED | OUTPATIENT
Start: 2021-02-20 | End: 2021-02-20

## 2021-02-20 RX ORDER — POTASSIUM CHLORIDE 20 MEQ
20 PACKET (EA) ORAL
Refills: 0 | Status: COMPLETED | OUTPATIENT
Start: 2021-02-20 | End: 2021-02-20

## 2021-02-20 RX ORDER — POTASSIUM CHLORIDE 20 MEQ
40 PACKET (EA) ORAL ONCE
Refills: 0 | Status: COMPLETED | OUTPATIENT
Start: 2021-02-20 | End: 2021-02-20

## 2021-02-20 RX ADMIN — Medication 133 MILLILITER(S): at 11:47

## 2021-02-20 RX ADMIN — Medication 50 MILLIGRAM(S): at 22:41

## 2021-02-20 RX ADMIN — LEVETIRACETAM 420 MILLIGRAM(S): 250 TABLET, FILM COATED ORAL at 04:21

## 2021-02-20 RX ADMIN — CEFEPIME 100 MILLIGRAM(S): 1 INJECTION, POWDER, FOR SOLUTION INTRAMUSCULAR; INTRAVENOUS at 04:21

## 2021-02-20 RX ADMIN — HEPARIN SODIUM 5000 UNIT(S): 5000 INJECTION INTRAVENOUS; SUBCUTANEOUS at 04:15

## 2021-02-20 RX ADMIN — Medication 5 MILLIGRAM(S): at 04:15

## 2021-02-20 RX ADMIN — Medication 40 MILLIEQUIVALENT(S): at 06:35

## 2021-02-20 RX ADMIN — Medication 50 MILLIGRAM(S): at 00:46

## 2021-02-20 RX ADMIN — PROPOFOL 12 MICROGRAM(S)/KG/MIN: 10 INJECTION, EMULSION INTRAVENOUS at 20:42

## 2021-02-20 RX ADMIN — DEXMEDETOMIDINE HYDROCHLORIDE IN 0.9% SODIUM CHLORIDE 1.25 MICROGRAM(S)/KG/HR: 4 INJECTION INTRAVENOUS at 15:48

## 2021-02-20 RX ADMIN — LISINOPRIL 40 MILLIGRAM(S): 2.5 TABLET ORAL at 06:03

## 2021-02-20 RX ADMIN — Medication 200 MILLIGRAM(S): at 14:15

## 2021-02-20 RX ADMIN — SODIUM CHLORIDE 65 MILLILITER(S): 5 INJECTION, SOLUTION INTRAVENOUS at 15:04

## 2021-02-20 RX ADMIN — Medication 50 MILLIGRAM(S): at 18:02

## 2021-02-20 RX ADMIN — HEPARIN SODIUM 5000 UNIT(S): 5000 INJECTION INTRAVENOUS; SUBCUTANEOUS at 21:13

## 2021-02-20 RX ADMIN — Medication 0.2 MILLIGRAM(S): at 04:17

## 2021-02-20 RX ADMIN — Medication 5 MILLIGRAM(S): at 18:02

## 2021-02-20 RX ADMIN — Medication 50 MILLIGRAM(S): at 04:17

## 2021-02-20 RX ADMIN — LACTULOSE 10 GRAM(S): 10 SOLUTION ORAL at 22:41

## 2021-02-20 RX ADMIN — LACTULOSE 10 GRAM(S): 10 SOLUTION ORAL at 14:19

## 2021-02-20 RX ADMIN — CHLORHEXIDINE GLUCONATE 1 APPLICATION(S): 213 SOLUTION TOPICAL at 05:13

## 2021-02-20 RX ADMIN — Medication 50 MILLIGRAM(S): at 11:47

## 2021-02-20 RX ADMIN — Medication 50 MILLIEQUIVALENT(S): at 10:00

## 2021-02-20 RX ADMIN — CHLORHEXIDINE GLUCONATE 15 MILLILITER(S): 213 SOLUTION TOPICAL at 04:16

## 2021-02-20 RX ADMIN — CEFEPIME 100 MILLIGRAM(S): 1 INJECTION, POWDER, FOR SOLUTION INTRAMUSCULAR; INTRAVENOUS at 18:02

## 2021-02-20 RX ADMIN — Medication 50 MILLIEQUIVALENT(S): at 11:47

## 2021-02-20 RX ADMIN — Medication 200 MILLIGRAM(S): at 04:17

## 2021-02-20 RX ADMIN — Medication 0.2 MILLIGRAM(S): at 21:13

## 2021-02-20 RX ADMIN — PANTOPRAZOLE SODIUM 40 MILLIGRAM(S): 20 TABLET, DELAYED RELEASE ORAL at 18:02

## 2021-02-20 RX ADMIN — Medication 90 MILLIGRAM(S): at 04:16

## 2021-02-20 RX ADMIN — POLYETHYLENE GLYCOL 3350 17 GRAM(S): 17 POWDER, FOR SOLUTION ORAL at 11:47

## 2021-02-20 RX ADMIN — CHLORHEXIDINE GLUCONATE 15 MILLILITER(S): 213 SOLUTION TOPICAL at 18:02

## 2021-02-20 RX ADMIN — Medication 50 MILLIEQUIVALENT(S): at 08:51

## 2021-02-20 RX ADMIN — PROPOFOL 12 MICROGRAM(S)/KG/MIN: 10 INJECTION, EMULSION INTRAVENOUS at 15:49

## 2021-02-20 RX ADMIN — PANTOPRAZOLE SODIUM 40 MILLIGRAM(S): 20 TABLET, DELAYED RELEASE ORAL at 04:15

## 2021-02-20 RX ADMIN — LEVETIRACETAM 420 MILLIGRAM(S): 250 TABLET, FILM COATED ORAL at 18:02

## 2021-02-20 RX ADMIN — SENNA PLUS 2 TABLET(S): 8.6 TABLET ORAL at 21:13

## 2021-02-20 RX ADMIN — NICARDIPINE HYDROCHLORIDE 75 MG/HR: 30 CAPSULE, EXTENDED RELEASE ORAL at 20:25

## 2021-02-20 RX ADMIN — HEPARIN SODIUM 5000 UNIT(S): 5000 INJECTION INTRAVENOUS; SUBCUTANEOUS at 14:14

## 2021-02-20 RX ADMIN — LACTULOSE 10 GRAM(S): 10 SOLUTION ORAL at 04:16

## 2021-02-20 RX ADMIN — Medication 0.2 MILLIGRAM(S): at 14:14

## 2021-02-20 RX ADMIN — Medication 200 MILLIGRAM(S): at 21:13

## 2021-02-20 RX ADMIN — NICARDIPINE HYDROCHLORIDE 75 MG/HR: 30 CAPSULE, EXTENDED RELEASE ORAL at 15:04

## 2021-02-20 RX ADMIN — DEXMEDETOMIDINE HYDROCHLORIDE IN 0.9% SODIUM CHLORIDE 1.25 MICROGRAM(S)/KG/HR: 4 INJECTION INTRAVENOUS at 21:28

## 2021-02-20 NOTE — PROGRESS NOTE ADULT - SUBJECTIVE AND OBJECTIVE BOX
OVERNIGHT EVENTS: events noted, still intuabted, ventilated, on propofol, precedex, hypert 65%, cardene 2.5, no events overnight, sp repeat head CT, BM    Vital Signs Last 24 Hrs  T(C): 36.6 (20 Feb 2021 04:00), Max: 37.2 (20 Feb 2021 00:00)  T(F): 97.8 (20 Feb 2021 04:00), Max: 99 (20 Feb 2021 00:00)  HR: 65 (20 Feb 2021 07:00) (59 - 78)  BP: 151/84 (19 Feb 2021 19:00) (151/84 - 151/84)  RR: 20 (20 Feb 2021 04:00) (20 - 20)  SpO2: 95% (20 Feb 2021 04:00) (95% - 99%)    PHYSICAL EXAMINATION:    GENERAL: sedated.     HEENT: Head is normocephalic and atraumatic.     NECK: Supple.    LUNGS: dec bs both bases    HEART: Regular rate and rhythm without murmur.    ABDOMEN: Soft, nontender, and nondistended.      EXTREMITIES: Without any cyanosis, clubbing, rash, lesions or edema.    NEUROLOGIC: open his eyes    SKIN: No ulceration or induration present.      LABS:                        11.9   8.05  )-----------( 379      ( 20 Feb 2021 04:30 )             35.0     02-20    150<H>  |  117<H>  |  10  ----------------------------<  113<H>  3.2<L>   |  24  |  0.6<L>    Ca    8.1<L>      20 Feb 2021 04:30  Phos  3.2     02-20  Mg     1.9     02-20    TPro  5.3<L>  /  Alb  2.6<L>  /  TBili  0.6  /  DBili  x   /  AST  32  /  ALT  37  /  AlkPhos  122<H>  02-20        ABG - ( 20 Feb 2021 03:13 )  pH, Arterial: 7.45  pH, Blood: x     /  pCO2: 40    /  pO2: 91    / HCO3: 28    / Base Excess: 3.8   /  SaO2: 97          450/20/30/8                      02-19-21 @ 07:01  -  02-20-21 @ 07:00  --------------------------------------------------------  IN: 3968.5 mL / OUT: 4225 mL / NET: -256.5 mL    02-20-21 @ 07:01  - 02-20-21 @ 08:24  --------------------------------------------------------  IN: 106 mL / OUT: 0 mL / NET: 106 mL        MICROBIOLOGY:      MEDICATIONS  (STANDING):  cefepime   IVPB 2000 milliGRAM(s) IV Intermittent every 12 hours  chlorhexidine 0.12% Liquid 15 milliLiter(s) Oral Mucosa every 12 hours  chlorhexidine 4% Liquid 1 Application(s) Topical <User Schedule>  cloNIDine 0.2 milliGRAM(s) Oral every 8 hours  dexMEDEtomidine Infusion 0.05 MICROgram(s)/kG/Hr (1.25 mL/Hr) IV Continuous <Continuous>  heparin   Injectable 5000 Unit(s) SubCutaneous every 8 hours  hydrALAZINE 50 milliGRAM(s) Oral every 6 hours  labetalol 200 milliGRAM(s) Oral every 8 hours  lactulose Syrup 10 Gram(s) Oral every 8 hours  levETIRAcetam  IVPB 500 milliGRAM(s) IV Intermittent every 12 hours  lisinopril 40 milliGRAM(s) Oral daily  metoclopramide 5 milliGRAM(s) Oral two times a day  mineral oil enema 133 milliLiter(s) Rectal daily  niCARdipine Infusion 15 mG/Hr (75 mL/Hr) IV Continuous <Continuous>  NIFEdipine XL 90 milliGRAM(s) Oral daily  pantoprazole  Injectable 40 milliGRAM(s) IV Push every 12 hours  polyethylene glycol 3350 17 Gram(s) Oral daily  propofol Infusion 20 MICROgram(s)/kG/Min (12 mL/Hr) IV Continuous <Continuous>  senna 2 Tablet(s) Oral at bedtime  sodium chloride 3%. 500 milliLiter(s) (65 mL/Hr) IV Continuous <Continuous>  sodium chloride 3%. 500 milliLiter(s) (65 mL/Hr) IV Continuous <Continuous>    MEDICATIONS  (PRN):  acetaminophen   Tablet .. 650 milliGRAM(s) Oral every 6 hours PRN Temp greater or equal to 38C (100.4F)  LORazepam   Injectable 2 milliGRAM(s) IV Push every 4 hours PRN Agitation      RADIOLOGY & ADDITIONAL STUDIES:

## 2021-02-20 NOTE — PROGRESS NOTE ADULT - SUBJECTIVE AND OBJECTIVE BOX
Subjective: 50yMale with a pmhx of INTRACRANIAL BLEED                                     ADM/AA    INTRACRANIAL BLEED    ^MED EVAL    Family history of breast cancer (Mother)    Family history of pancreatic cancer (Father)    Handoff    MEWS Score    HTN (hypertension)    GI bleed    Gastric ulcer    PUD (peptic ulcer disease)    Intracranial bleed    Intraparenchymal hemorrhage of brain    Insertion, external ventricular drain    Ventriculostomy, for ventricular catheter insertion    Arm fracture, left    MED EVAL    90+    SysAdmin_VisitLink      Patient is a 50 year-old male s/p EVD removal 2/15. Patient seen and examined at bedside. He continues to be mechanically ventilated, on 3% w Na of 148. Patient minimally sedated currently. Pt opening eyes spontaneously, following simple commands - showed thumb on R side. Per family meeting yesterday, wife is refusing trach.     Allergies    No Known Allergies    Intolerances        Vital Signs Last 24 Hrs  T(C): 37.2 (20 Feb 2021 12:00), Max: 37.2 (20 Feb 2021 00:00)  T(F): 99 (20 Feb 2021 12:00), Max: 99 (20 Feb 2021 00:00)  HR: 75 (20 Feb 2021 14:00) (59 - 78)  BP: 151/84 (19 Feb 2021 19:00) (151/84 - 151/84)  BP(mean): --  RR: 16 (20 Feb 2021 14:00) (16 - 22)  SpO2: 98% (20 Feb 2021 14:00) (95% - 99%)      acetaminophen   Tablet .. 650 milliGRAM(s) Oral every 6 hours PRN  cefepime   IVPB 2000 milliGRAM(s) IV Intermittent every 12 hours  chlorhexidine 0.12% Liquid 15 milliLiter(s) Oral Mucosa every 12 hours  chlorhexidine 4% Liquid 1 Application(s) Topical <User Schedule>  cloNIDine 0.2 milliGRAM(s) Oral every 8 hours  dexMEDEtomidine Infusion 0.05 MICROgram(s)/kG/Hr IV Continuous <Continuous>  heparin   Injectable 5000 Unit(s) SubCutaneous every 8 hours  hydrALAZINE 50 milliGRAM(s) Oral every 6 hours  labetalol 200 milliGRAM(s) Oral every 8 hours  lactulose Syrup 10 Gram(s) Oral every 8 hours  levETIRAcetam  IVPB 500 milliGRAM(s) IV Intermittent every 12 hours  lisinopril 40 milliGRAM(s) Oral daily  LORazepam   Injectable 2 milliGRAM(s) IV Push every 4 hours PRN  metoclopramide 5 milliGRAM(s) Oral two times a day  mineral oil enema 133 milliLiter(s) Rectal daily  niCARdipine Infusion 15 mG/Hr IV Continuous <Continuous>  NIFEdipine XL 90 milliGRAM(s) Oral daily  pantoprazole  Injectable 40 milliGRAM(s) IV Push every 12 hours  polyethylene glycol 3350 17 Gram(s) Oral daily  propofol Infusion 20 MICROgram(s)/kG/Min IV Continuous <Continuous>  senna 2 Tablet(s) Oral at bedtime  sodium chloride 3%. 500 milliLiter(s) IV Continuous <Continuous>  sodium chloride 3%. 500 milliLiter(s) IV Continuous <Continuous>        02-19-21 @ 07:01  -  02-20-21 @ 07:00  --------------------------------------------------------  IN: 3968.5 mL / OUT: 4225 mL / NET: -256.5 mL    02-20-21 @ 07:01  -  02-20-21 @ 14:49  --------------------------------------------------------  IN: 1248 mL / OUT: 1700 mL / NET: -452 mL        REVIEW OF SYSTEMS    [ ] A ten-point review of systems was otherwise negative except as noted.  [X] Due to altered mental status/intubation, subjective information were not able to be obtained from the patient. History was obtained, to the extent possible, from review of the chart and collateral sources of information.      Physical Exam:  General: Lying in bed, on mechanical ventilation, follows simple commands  Opens eyes spontaneously  PERRL, EOMI  Motor: Moves RUE and RLE spontaneously, shows R thumb  Grimaces to pain on left         CBC Full  -  ( 20 Feb 2021 04:30 )  WBC Count : 8.05 K/uL  RBC Count : 3.80 M/uL  Hemoglobin : 11.9 g/dL  Hematocrit : 35.0 %  Platelet Count - Automated : 379 K/uL  Mean Cell Volume : 92.1 fL  Mean Cell Hemoglobin : 31.3 pg  Mean Cell Hemoglobin Concentration : 34.0 g/dL  Auto Neutrophil # : 5.14 K/uL  Auto Lymphocyte # : 1.14 K/uL  Auto Monocyte # : 0.72 K/uL  Auto Eosinophil # : 0.83 K/uL  Auto Basophil # : 0.07 K/uL  Auto Neutrophil % : 63.8 %  Auto Lymphocyte % : 14.2 %  Auto Monocyte % : 8.9 %  Auto Eosinophil % : 10.3 %  Auto Basophil % : 0.9 %    02-20    148<H>  |  114<H>  |  9<L>  ----------------------------<  101<H>  3.7   |  24  |  0.7    Ca    8.3<L>      20 Feb 2021 11:00  Phos  3.2     02-20  Mg     1.9     02-20    TPro  5.3<L>  /  Alb  2.5<L>  /  TBili  0.5  /  DBili  x   /  AST  33  /  ALT  39  /  AlkPhos  135<H>  02-20            Imaging:  < from: CT Head No Cont (02.19.21 @ 13:42) >  IMPRESSION:  Since the prior head CT dated 2/17/2021:    Nosignificant interval change in the right basal ganglia/frontal lobe parenchymal hematoma with surrounding edema and mass effect including 8 mm right to left midline shift.        Assessment/Plan:   50 year-old male s/p EVD removal 2/15.  -Continue CTH every other day, next CTH tomorrow 2/21  -Maintain Na 140-155, qd BMP  -Continue weaning sedation for optimal Neuro exams  -DVT ppx  -Continue medical management  -Discuss with attending

## 2021-02-20 NOTE — PROGRESS NOTE ADULT - SUBJECTIVE AND OBJECTIVE BOX
Nephrology progress note    Patient is seen and examined, events over the last 24 h noted .  Off cardene drip  Allergies:  No Known Allergies    Hospital Medications:   MEDICATIONS  (STANDING):  cefepime   IVPB 2000 milliGRAM(s) IV Intermittent every 12 hours  chlorhexidine 0.12% Liquid 15 milliLiter(s) Oral Mucosa every 12 hours  chlorhexidine 4% Liquid 1 Application(s) Topical <User Schedule>  cloNIDine 0.2 milliGRAM(s) Oral every 8 hours  dexMEDEtomidine Infusion 0.05 MICROgram(s)/kG/Hr (1.25 mL/Hr) IV Continuous <Continuous>  heparin   Injectable 5000 Unit(s) SubCutaneous every 8 hours  hydrALAZINE 50 milliGRAM(s) Oral every 6 hours  labetalol 200 milliGRAM(s) Oral every 8 hours  lactulose Syrup 10 Gram(s) Oral every 8 hours  levETIRAcetam  IVPB 500 milliGRAM(s) IV Intermittent every 12 hours  lisinopril 40 milliGRAM(s) Oral daily  metoclopramide 5 milliGRAM(s) Oral two times a day  mineral oil enema 133 milliLiter(s) Rectal daily  niCARdipine Infusion 15 mG/Hr (75 mL/Hr) IV Continuous <Continuous>  NIFEdipine XL 90 milliGRAM(s) Oral daily  pantoprazole  Injectable 40 milliGRAM(s) IV Push every 12 hours  polyethylene glycol 3350 17 Gram(s) Oral daily  propofol Infusion 20 MICROgram(s)/kG/Min (12 mL/Hr) IV Continuous <Continuous>  senna 2 Tablet(s) Oral at bedtime  sodium chloride 3%. 500 milliLiter(s) (65 mL/Hr) IV Continuous <Continuous>  sodium chloride 3%. 500 milliLiter(s) (65 mL/Hr) IV Continuous <Continuous>        VITALS:  T(F): 97.8 (02-20-21 @ 16:00), Max: 99 (02-20-21 @ 00:00)  HR: 78 (02-20-21 @ 16:00)  BP: 151/84 (02-19-21 @ 19:00)  RR: 17 (02-20-21 @ 16:00)  SpO2: 98% (02-20-21 @ 16:00)  Wt(kg): --    02-18 @ 07:01  -  02-19 @ 07:00  --------------------------------------------------------  IN: 5233 mL / OUT: 5565 mL / NET: -332 mL    02-19 @ 07:01  -  02-20 @ 07:00  --------------------------------------------------------  IN: 3968.5 mL / OUT: 4225 mL / NET: -256.5 mL    02-20 @ 07:01 - 02-20 @ 17:19  --------------------------------------------------------  IN: 1414 mL / OUT: 2100 mL / NET: -686 mL          PHYSICAL EXAM:  Constitutional: on vent, OGT in place  Neck: No JVD  Respiratory: CTA  Cardiovascular: S1, S2, RRR  Gastrointestinal: BS+, soft, NT/ND  Extremities: + peripheral edema  Neurological: sedated  : + berry.   Skin: No rashes  Vascular Access:    LABS:  02-20    148<H>  |  114<H>  |  9<L>  ----------------------------<  101<H>  3.7   |  24  |  0.7    Ca    8.3<L>      20 Feb 2021 11:00  Phos  3.2     02-20  Mg     1.9     02-20    TPro  5.3<L>  /  Alb  2.5<L>  /  TBili  0.5  /  DBili      /  AST  33  /  ALT  39  /  AlkPhos  135<H>  02-20                          11.9   8.05  )-----------( 379      ( 20 Feb 2021 04:30 )             35.0       Urine Studies:    Osmolality, Random Urine: 652 mos/kg (02-19 @ 21:22)  Sodium, Random Urine: 217.0 mmoL/L (02-19 @ 21:22)  Protein/Creatinine Ratio Calculation: 0.4 Ratio (02-19 @ 21:22)  Creatinine, Random Urine: 21 mg/dL (02-19 @ 21:22)  Sodium, Random Urine: 213.0 mmoL/L (02-16 @ 08:00)  Osmolality, Random Urine: 557 mos/kg (02-16 @ 08:00)  Chloride, Random Urine: 222 (02-16 @ 08:00)  Creatinine, Random Urine: 29 mg/dL (02-16 @ 08:00)  Protein/Creatinine Ratio Calculation: 0.3 Ratio (02-16 @ 08:00)    RADIOLOGY & ADDITIONAL STUDIES:  < from: Xray Chest 1 View- PORTABLE-Urgent (Xray Chest 1 View- PORTABLE-Urgent .) (02.20.21 @ 12:57) >  Impression:    Right lower lobe opacity/pleural effusion unchanged. No air leak.    < end of copied text >

## 2021-02-20 NOTE — PROGRESS NOTE ADULT - ASSESSMENT
IMPRESSION:    Large ICB secondary to Hypertensive Emergency s/p EVD now dc  Intubated, for airway protection  Klebsiella Pneumonia pansensitive   Left posterior tibial & peroneal DVT  ABD distention ASP CT AP improved      PLAN:  CNS:  SAT, neuro sx f/u.    HEENT: Oral care    PULMONARY:  HOB @ 45 degrees.  Vent changes:  keep plateau less than 30/ DP less than 15. FiO2 30% , SBT    CARDIOVASCULAR:  Avoid volume overload.  BP control. 3% Hypertonic. dec rate IV LASIX once    GI: GI prophylaxis.  Feeding OGT    RENAL correct lytes, trend CMP    INFECTIOUS DISEASE:  ABX per ID    HEMATOLOGICAL:  SCDs for DVT prophylaxis due to ICH. le Doppple    ENDOCRINE:  Follow up FS.  Insulin protocol if needed.    MUSCULOSKELETAL:  Bed rest  change tlc, dc femoral line  Prognosis poor  will need peg. trac

## 2021-02-20 NOTE — PROGRESS NOTE ADULT - ASSESSMENT
MELODIE HERNADEZ is a 50y Male with a past medical history HTN, GIB  who presented for weakness and and was found to have HTN emergency a right intraparenchymal bleed. Nephrology consulted to evaluate refractory hypertension.     # Hypertension, on multiple medications  # Hypernatremia on 3% saline   ·	Not technically "resistant" since not on a diuretic   ·	off Cardene drip - BP is acceptable, cont Nifedipine 90, Hydralazine and LAbetalol  ·	3% will have a hypertensive effect, and would not start diuretic since it would decrease serum sodium

## 2021-02-21 LAB
ALBUMIN SERPL ELPH-MCNC: 2.6 G/DL — LOW (ref 3.5–5.2)
ALBUMIN SERPL ELPH-MCNC: 2.7 G/DL — LOW (ref 3.5–5.2)
ALBUMIN SERPL ELPH-MCNC: 2.8 G/DL — LOW (ref 3.5–5.2)
ALP SERPL-CCNC: 119 U/L — HIGH (ref 30–115)
ALP SERPL-CCNC: 121 U/L — HIGH (ref 30–115)
ALP SERPL-CCNC: 127 U/L — HIGH (ref 30–115)
ALT FLD-CCNC: 31 U/L — SIGNIFICANT CHANGE UP (ref 0–41)
ALT FLD-CCNC: 33 U/L — SIGNIFICANT CHANGE UP (ref 0–41)
ALT FLD-CCNC: 35 U/L — SIGNIFICANT CHANGE UP (ref 0–41)
ANION GAP SERPL CALC-SCNC: 10 MMOL/L — SIGNIFICANT CHANGE UP (ref 7–14)
ANION GAP SERPL CALC-SCNC: 9 MMOL/L — SIGNIFICANT CHANGE UP (ref 7–14)
ANION GAP SERPL CALC-SCNC: 9 MMOL/L — SIGNIFICANT CHANGE UP (ref 7–14)
AST SERPL-CCNC: 24 U/L — SIGNIFICANT CHANGE UP (ref 0–41)
AST SERPL-CCNC: 24 U/L — SIGNIFICANT CHANGE UP (ref 0–41)
AST SERPL-CCNC: 26 U/L — SIGNIFICANT CHANGE UP (ref 0–41)
BASOPHILS # BLD AUTO: 0.08 K/UL — SIGNIFICANT CHANGE UP (ref 0–0.2)
BASOPHILS NFR BLD AUTO: 1 % — SIGNIFICANT CHANGE UP (ref 0–1)
BILIRUB SERPL-MCNC: 0.5 MG/DL — SIGNIFICANT CHANGE UP (ref 0.2–1.2)
BILIRUB SERPL-MCNC: 0.6 MG/DL — SIGNIFICANT CHANGE UP (ref 0.2–1.2)
BILIRUB SERPL-MCNC: 0.6 MG/DL — SIGNIFICANT CHANGE UP (ref 0.2–1.2)
BUN SERPL-MCNC: 10 MG/DL — SIGNIFICANT CHANGE UP (ref 10–20)
BUN SERPL-MCNC: 8 MG/DL — LOW (ref 10–20)
BUN SERPL-MCNC: 9 MG/DL — LOW (ref 10–20)
CALCIUM SERPL-MCNC: 8.4 MG/DL — LOW (ref 8.5–10.1)
CALCIUM SERPL-MCNC: 8.4 MG/DL — LOW (ref 8.5–10.1)
CALCIUM SERPL-MCNC: 8.8 MG/DL — SIGNIFICANT CHANGE UP (ref 8.5–10.1)
CHLORIDE SERPL-SCNC: 111 MMOL/L — HIGH (ref 98–110)
CHLORIDE SERPL-SCNC: 112 MMOL/L — HIGH (ref 98–110)
CHLORIDE SERPL-SCNC: 113 MMOL/L — HIGH (ref 98–110)
CO2 SERPL-SCNC: 25 MMOL/L — SIGNIFICANT CHANGE UP (ref 17–32)
CO2 SERPL-SCNC: 26 MMOL/L — SIGNIFICANT CHANGE UP (ref 17–32)
CO2 SERPL-SCNC: 27 MMOL/L — SIGNIFICANT CHANGE UP (ref 17–32)
CREAT SERPL-MCNC: 0.6 MG/DL — LOW (ref 0.7–1.5)
CREAT SERPL-MCNC: 0.7 MG/DL — SIGNIFICANT CHANGE UP (ref 0.7–1.5)
CREAT SERPL-MCNC: 0.7 MG/DL — SIGNIFICANT CHANGE UP (ref 0.7–1.5)
CULTURE RESULTS: SIGNIFICANT CHANGE UP
EOSINOPHIL # BLD AUTO: 0.87 K/UL — HIGH (ref 0–0.7)
EOSINOPHIL NFR BLD AUTO: 11 % — HIGH (ref 0–8)
GLUCOSE SERPL-MCNC: 107 MG/DL — HIGH (ref 70–99)
GLUCOSE SERPL-MCNC: 111 MG/DL — HIGH (ref 70–99)
GLUCOSE SERPL-MCNC: 97 MG/DL — SIGNIFICANT CHANGE UP (ref 70–99)
HCT VFR BLD CALC: 35.7 % — LOW (ref 42–52)
HGB BLD-MCNC: 12 G/DL — LOW (ref 14–18)
IMM GRANULOCYTES NFR BLD AUTO: 1.4 % — HIGH (ref 0.1–0.3)
LYMPHOCYTES # BLD AUTO: 1.21 K/UL — SIGNIFICANT CHANGE UP (ref 1.2–3.4)
LYMPHOCYTES # BLD AUTO: 15.3 % — LOW (ref 20.5–51.1)
MAGNESIUM SERPL-MCNC: 1.7 MG/DL — LOW (ref 1.8–2.4)
MCHC RBC-ENTMCNC: 31 PG — SIGNIFICANT CHANGE UP (ref 27–31)
MCHC RBC-ENTMCNC: 33.6 G/DL — SIGNIFICANT CHANGE UP (ref 32–37)
MCV RBC AUTO: 92.2 FL — SIGNIFICANT CHANGE UP (ref 80–94)
MONOCYTES # BLD AUTO: 0.67 K/UL — HIGH (ref 0.1–0.6)
MONOCYTES NFR BLD AUTO: 8.5 % — SIGNIFICANT CHANGE UP (ref 1.7–9.3)
NEUTROPHILS # BLD AUTO: 4.96 K/UL — SIGNIFICANT CHANGE UP (ref 1.4–6.5)
NEUTROPHILS NFR BLD AUTO: 62.8 % — SIGNIFICANT CHANGE UP (ref 42.2–75.2)
NRBC # BLD: 0 /100 WBCS — SIGNIFICANT CHANGE UP (ref 0–0)
PHOSPHATE SERPL-MCNC: 3.1 MG/DL — SIGNIFICANT CHANGE UP (ref 2.1–4.9)
PLATELET # BLD AUTO: 384 K/UL — SIGNIFICANT CHANGE UP (ref 130–400)
POTASSIUM SERPL-MCNC: 3.2 MMOL/L — LOW (ref 3.5–5)
POTASSIUM SERPL-MCNC: 3.4 MMOL/L — LOW (ref 3.5–5)
POTASSIUM SERPL-MCNC: 3.5 MMOL/L — SIGNIFICANT CHANGE UP (ref 3.5–5)
POTASSIUM SERPL-SCNC: 3.2 MMOL/L — LOW (ref 3.5–5)
POTASSIUM SERPL-SCNC: 3.4 MMOL/L — LOW (ref 3.5–5)
POTASSIUM SERPL-SCNC: 3.5 MMOL/L — SIGNIFICANT CHANGE UP (ref 3.5–5)
PROT SERPL-MCNC: 5.2 G/DL — LOW (ref 6–8)
PROT SERPL-MCNC: 5.3 G/DL — LOW (ref 6–8)
PROT SERPL-MCNC: 5.5 G/DL — LOW (ref 6–8)
RBC # BLD: 3.87 M/UL — LOW (ref 4.7–6.1)
RBC # FLD: 12.8 % — SIGNIFICANT CHANGE UP (ref 11.5–14.5)
SODIUM SERPL-SCNC: 147 MMOL/L — HIGH (ref 135–146)
SODIUM SERPL-SCNC: 147 MMOL/L — HIGH (ref 135–146)
SODIUM SERPL-SCNC: 148 MMOL/L — HIGH (ref 135–146)
SPECIMEN SOURCE: SIGNIFICANT CHANGE UP
WBC # BLD: 7.9 K/UL — SIGNIFICANT CHANGE UP (ref 4.8–10.8)
WBC # FLD AUTO: 7.9 K/UL — SIGNIFICANT CHANGE UP (ref 4.8–10.8)

## 2021-02-21 PROCEDURE — 71045 X-RAY EXAM CHEST 1 VIEW: CPT | Mod: 26

## 2021-02-21 RX ORDER — SODIUM CHLORIDE 5 G/100ML
500 INJECTION, SOLUTION INTRAVENOUS
Refills: 0 | Status: DISCONTINUED | OUTPATIENT
Start: 2021-02-21 | End: 2021-02-23

## 2021-02-21 RX ADMIN — LISINOPRIL 40 MILLIGRAM(S): 2.5 TABLET ORAL at 04:31

## 2021-02-21 RX ADMIN — HEPARIN SODIUM 5000 UNIT(S): 5000 INJECTION INTRAVENOUS; SUBCUTANEOUS at 13:02

## 2021-02-21 RX ADMIN — CHLORHEXIDINE GLUCONATE 1 APPLICATION(S): 213 SOLUTION TOPICAL at 05:00

## 2021-02-21 RX ADMIN — PROPOFOL 12 MICROGRAM(S)/KG/MIN: 10 INJECTION, EMULSION INTRAVENOUS at 21:18

## 2021-02-21 RX ADMIN — LEVETIRACETAM 420 MILLIGRAM(S): 250 TABLET, FILM COATED ORAL at 16:59

## 2021-02-21 RX ADMIN — Medication 200 MILLIGRAM(S): at 21:17

## 2021-02-21 RX ADMIN — Medication 0.2 MILLIGRAM(S): at 04:30

## 2021-02-21 RX ADMIN — Medication 5 MILLIGRAM(S): at 16:59

## 2021-02-21 RX ADMIN — Medication 133 MILLILITER(S): at 11:16

## 2021-02-21 RX ADMIN — LACTULOSE 10 GRAM(S): 10 SOLUTION ORAL at 21:16

## 2021-02-21 RX ADMIN — LACTULOSE 10 GRAM(S): 10 SOLUTION ORAL at 04:31

## 2021-02-21 RX ADMIN — NICARDIPINE HYDROCHLORIDE 75 MG/HR: 30 CAPSULE, EXTENDED RELEASE ORAL at 02:23

## 2021-02-21 RX ADMIN — PANTOPRAZOLE SODIUM 40 MILLIGRAM(S): 20 TABLET, DELAYED RELEASE ORAL at 04:30

## 2021-02-21 RX ADMIN — HEPARIN SODIUM 5000 UNIT(S): 5000 INJECTION INTRAVENOUS; SUBCUTANEOUS at 21:17

## 2021-02-21 RX ADMIN — Medication 200 MILLIGRAM(S): at 13:02

## 2021-02-21 RX ADMIN — Medication 90 MILLIGRAM(S): at 04:31

## 2021-02-21 RX ADMIN — CHLORHEXIDINE GLUCONATE 15 MILLILITER(S): 213 SOLUTION TOPICAL at 04:31

## 2021-02-21 RX ADMIN — LEVETIRACETAM 420 MILLIGRAM(S): 250 TABLET, FILM COATED ORAL at 04:31

## 2021-02-21 RX ADMIN — DEXMEDETOMIDINE HYDROCHLORIDE IN 0.9% SODIUM CHLORIDE 1.25 MICROGRAM(S)/KG/HR: 4 INJECTION INTRAVENOUS at 21:18

## 2021-02-21 RX ADMIN — Medication 50 MILLIGRAM(S): at 16:59

## 2021-02-21 RX ADMIN — DEXMEDETOMIDINE HYDROCHLORIDE IN 0.9% SODIUM CHLORIDE 1.25 MICROGRAM(S)/KG/HR: 4 INJECTION INTRAVENOUS at 02:23

## 2021-02-21 RX ADMIN — Medication 50 MILLIGRAM(S): at 04:30

## 2021-02-21 RX ADMIN — PANTOPRAZOLE SODIUM 40 MILLIGRAM(S): 20 TABLET, DELAYED RELEASE ORAL at 16:59

## 2021-02-21 RX ADMIN — Medication 200 MILLIGRAM(S): at 04:30

## 2021-02-21 RX ADMIN — NICARDIPINE HYDROCHLORIDE 75 MG/HR: 30 CAPSULE, EXTENDED RELEASE ORAL at 22:11

## 2021-02-21 RX ADMIN — CEFEPIME 100 MILLIGRAM(S): 1 INJECTION, POWDER, FOR SOLUTION INTRAMUSCULAR; INTRAVENOUS at 04:31

## 2021-02-21 RX ADMIN — POLYETHYLENE GLYCOL 3350 17 GRAM(S): 17 POWDER, FOR SOLUTION ORAL at 11:16

## 2021-02-21 RX ADMIN — PROPOFOL 12 MICROGRAM(S)/KG/MIN: 10 INJECTION, EMULSION INTRAVENOUS at 02:24

## 2021-02-21 RX ADMIN — LACTULOSE 10 GRAM(S): 10 SOLUTION ORAL at 13:02

## 2021-02-21 RX ADMIN — CHLORHEXIDINE GLUCONATE 15 MILLILITER(S): 213 SOLUTION TOPICAL at 16:59

## 2021-02-21 RX ADMIN — HEPARIN SODIUM 5000 UNIT(S): 5000 INJECTION INTRAVENOUS; SUBCUTANEOUS at 04:30

## 2021-02-21 RX ADMIN — Medication 50 MILLIGRAM(S): at 11:16

## 2021-02-21 RX ADMIN — SENNA PLUS 2 TABLET(S): 8.6 TABLET ORAL at 21:17

## 2021-02-21 RX ADMIN — Medication 0.2 MILLIGRAM(S): at 21:18

## 2021-02-21 RX ADMIN — SODIUM CHLORIDE 65 MILLILITER(S): 5 INJECTION, SOLUTION INTRAVENOUS at 00:01

## 2021-02-21 RX ADMIN — Medication 5 MILLIGRAM(S): at 04:31

## 2021-02-21 RX ADMIN — Medication 0.2 MILLIGRAM(S): at 13:02

## 2021-02-21 RX ADMIN — CEFEPIME 100 MILLIGRAM(S): 1 INJECTION, POWDER, FOR SOLUTION INTRAMUSCULAR; INTRAVENOUS at 16:59

## 2021-02-21 NOTE — PROGRESS NOTE ADULT - ASSESSMENT
IMPRESSION:    Large ICB secondary to Hypertensive Emergency s/p EVD now dc  Intubated, for airway protection  Klebsiella Pneumonia pansensitive   Left posterior tibial & peroneal DVT  ABD distention ASP CT AP improved  INCREASE UO      PLAN:  CNS:  SAT, neuro sx f/u.    HEENT: Oral care    PULMONARY:  HOB @ 45 degrees.  Vent changes:  keep plateau less than 30/ DP less than 15. FiO2 30% , DEC tv    CARDIOVASCULAR:  Avoid volume overload.  BP control. 3% Hypertonic.    GI: GI prophylaxis.  Feeding OGT    RENAL correct lytes, trend CMP    INFECTIOUS DISEASE:  ABX per ID    HEMATOLOGICAL:  SCDs for DVT prophylaxis due to ICH.     ENDOCRINE:  Follow up FS.  Insulin protocol if needed.    MUSCULOSKELETAL:  Bed rest    Prognosis poor  will need peg. trac

## 2021-02-21 NOTE — PROGRESS NOTE ADULT - SUBJECTIVE AND OBJECTIVE BOX
Subjective: 50yMale with a pmhx of INTRACRANIAL BLEED                                     ADM/AA    INTRACRANIAL BLEED    ^MED EVAL    Family history of breast cancer (Mother)    Family history of pancreatic cancer (Father)    Handoff    MEWS Score    HTN (hypertension)    GI bleed    Gastric ulcer    PUD (peptic ulcer disease)    Intracranial bleed    Intraparenchymal hemorrhage of brain    Insertion, external ventricular drain    Ventriculostomy, for ventricular catheter insertion    Arm fracture, left    MED EVAL    90+    SysAdmin_VisitLink    Patient seen and examined at bedside. Remains on sedation, Propofol and Precedex. Spontaneously awake this AM, does not tend to examiner. Plan for repeat CTH tomorrow, 2/22.     Allergies    No Known Allergies    Intolerances        Vital Signs Last 24 Hrs  T(C): 37.1 (21 Feb 2021 08:00), Max: 37.2 (20 Feb 2021 12:00)  T(F): 98.8 (21 Feb 2021 08:00), Max: 99 (20 Feb 2021 12:00)  HR: 68 (21 Feb 2021 10:00) (65 - 78)  BP: 137/81 (21 Feb 2021 10:00) (126/74 - 157/88)  BP(mean): 93 (21 Feb 2021 07:00) (93 - 117)  RR: 22 (21 Feb 2021 10:00) (16 - 23)  SpO2: 98% (21 Feb 2021 10:00) (96% - 99%)      acetaminophen   Tablet .. 650 milliGRAM(s) Oral every 6 hours PRN  cefepime   IVPB 2000 milliGRAM(s) IV Intermittent every 12 hours  chlorhexidine 0.12% Liquid 15 milliLiter(s) Oral Mucosa every 12 hours  chlorhexidine 4% Liquid 1 Application(s) Topical <User Schedule>  cloNIDine 0.2 milliGRAM(s) Oral every 8 hours  dexMEDEtomidine Infusion 0.05 MICROgram(s)/kG/Hr IV Continuous <Continuous>  heparin   Injectable 5000 Unit(s) SubCutaneous every 8 hours  hydrALAZINE 50 milliGRAM(s) Oral every 6 hours  labetalol 200 milliGRAM(s) Oral every 8 hours  lactulose Syrup 10 Gram(s) Oral every 8 hours  levETIRAcetam  IVPB 500 milliGRAM(s) IV Intermittent every 12 hours  lisinopril 40 milliGRAM(s) Oral daily  LORazepam   Injectable 2 milliGRAM(s) IV Push every 4 hours PRN  metoclopramide 5 milliGRAM(s) Oral two times a day  mineral oil enema 133 milliLiter(s) Rectal daily  niCARdipine Infusion 15 mG/Hr IV Continuous <Continuous>  NIFEdipine XL 90 milliGRAM(s) Oral daily  pantoprazole  Injectable 40 milliGRAM(s) IV Push every 12 hours  polyethylene glycol 3350 17 Gram(s) Oral daily  propofol Infusion 20 MICROgram(s)/kG/Min IV Continuous <Continuous>  senna 2 Tablet(s) Oral at bedtime  sodium chloride 3%. 500 milliLiter(s) IV Continuous <Continuous>  sodium chloride 3%. 500 milliLiter(s) IV Continuous <Continuous>  sodium chloride 3%. 500 milliLiter(s) IV Continuous <Continuous>        02-20-21 @ 07:01  -  02-21-21 @ 07:00  --------------------------------------------------------  IN: 3921.9 mL / OUT: 6270 mL / NET: -2348.1 mL    02-21-21 @ 07:01  -  02-21-21 @ 10:47  --------------------------------------------------------  IN: 615.2 mL / OUT: 775 mL / NET: -159.8 mL      Physical Exam :  Intubated / Sedated, eyes spontaneously open  Tongue midline  Facial features symmetric, No droop  Does not follow commands   PERRLA; Right gaze preference; Does not track examiner   No movement in b/l UE and LE to noxious stimuli  Grimmaces when noxious is applied       CBC Full  -  ( 21 Feb 2021 04:30 )  WBC Count : 7.90 K/uL  RBC Count : 3.87 M/uL  Hemoglobin : 12.0 g/dL  Hematocrit : 35.7 %  Platelet Count - Automated : 384 K/uL  Mean Cell Volume : 92.2 fL  Mean Cell Hemoglobin : 31.0 pg  Mean Cell Hemoglobin Concentration : 33.6 g/dL  Auto Neutrophil # : 4.96 K/uL  Auto Lymphocyte # : 1.21 K/uL  Auto Monocyte # : 0.67 K/uL  Auto Eosinophil # : 0.87 K/uL  Auto Basophil # : 0.08 K/uL  Auto Neutrophil % : 62.8 %  Auto Lymphocyte % : 15.3 %  Auto Monocyte % : 8.5 %  Auto Eosinophil % : 11.0 %  Auto Basophil % : 1.0 %    02-21    148<H>  |  113<H>  |  8<L>  ----------------------------<  97  3.5   |  25  |  0.6<L>    Ca    8.4<L>      21 Feb 2021 04:30  Phos  3.1     02-21  Mg     1.7     02-21    TPro  5.3<L>  /  Alb  2.8<L>  /  TBili  0.6  /  DBili  x   /  AST  26  /  ALT  35  /  AlkPhos  127<H>  02-21      Assessment/Plan:   - Repeat CTH tomorrow. 2/22  - Continue to keep Na 140-155 BMP qDay  - Continue weaning sedation for optimal neuro exams     Above case and plan d/w Dr. López

## 2021-02-21 NOTE — PROGRESS NOTE ADULT - SUBJECTIVE AND OBJECTIVE BOX
OVERNIGHT EVENTS: still intubated, ventilated, on propofol, precedex, nicard, hypertonic 65    Vital Signs Last 24 Hrs  T(C): 36.3 (21 Feb 2021 04:00), Max: 37.2 (20 Feb 2021 12:00)  T(F): 97.4 (21 Feb 2021 04:00), Max: 99 (20 Feb 2021 12:00)  HR: 68 (21 Feb 2021 07:00) (65 - 78)  BP: 126/74 (21 Feb 2021 07:00) (126/74 - 157/88)  BP(mean): 93 (21 Feb 2021 07:00) (93 - 117)  RR: 21 (21 Feb 2021 07:00) (16 - 23)  SpO2: 97% (21 Feb 2021 07:00) (96% - 99%)    PHYSICAL EXAMINATION:    GENERAL: sedated    HEENT: Head is normocephalic and atraumatic    NECK: Supple.    LUNGS: dec bs both abses    HEART: OLIVE 3/6    ABDOMEN: Soft, nontender, and nondistended.      EXTREMITIES: Without any cyanosis, clubbing, rash, lesions or edema.    NEUROLOGIC: sedated, brainstem activity present    SKIN: No ulceration or induration present.      LABS:                        12.0   7.90  )-----------( 384      ( 21 Feb 2021 04:30 )             35.7     02-21    148<H>  |  113<H>  |  8<L>  ----------------------------<  97  3.5   |  25  |  0.6<L>    Ca    8.4<L>      21 Feb 2021 04:30  Phos  3.1     02-21  Mg     1.7     02-21    TPro  5.3<L>  /  Alb  2.8<L>  /  TBili  0.6  /  DBili  x   /  AST  26  /  ALT  35  /  AlkPhos  127<H>  02-21        ABG - ( 21 Feb 2021 03:43 )  pH, Arterial: 7.48  pH, Blood: x     /  pCO2: 39    /  pO2: 87    / HCO3: 29    / Base Excess: 5.1   /  SaO2: 97          450/20/30/8                      02-20-21 @ 07:01  -  02-21-21 @ 07:00  --------------------------------------------------------  IN: 3921.9 mL / OUT: 6270 mL / NET: -2348.1 mL        MICROBIOLOGY:      MEDICATIONS  (STANDING):  cefepime   IVPB 2000 milliGRAM(s) IV Intermittent every 12 hours  chlorhexidine 0.12% Liquid 15 milliLiter(s) Oral Mucosa every 12 hours  chlorhexidine 4% Liquid 1 Application(s) Topical <User Schedule>  cloNIDine 0.2 milliGRAM(s) Oral every 8 hours  dexMEDEtomidine Infusion 0.05 MICROgram(s)/kG/Hr (1.25 mL/Hr) IV Continuous <Continuous>  heparin   Injectable 5000 Unit(s) SubCutaneous every 8 hours  hydrALAZINE 50 milliGRAM(s) Oral every 6 hours  labetalol 200 milliGRAM(s) Oral every 8 hours  lactulose Syrup 10 Gram(s) Oral every 8 hours  levETIRAcetam  IVPB 500 milliGRAM(s) IV Intermittent every 12 hours  lisinopril 40 milliGRAM(s) Oral daily  metoclopramide 5 milliGRAM(s) Oral two times a day  mineral oil enema 133 milliLiter(s) Rectal daily  niCARdipine Infusion 15 mG/Hr (75 mL/Hr) IV Continuous <Continuous>  NIFEdipine XL 90 milliGRAM(s) Oral daily  pantoprazole  Injectable 40 milliGRAM(s) IV Push every 12 hours  polyethylene glycol 3350 17 Gram(s) Oral daily  propofol Infusion 20 MICROgram(s)/kG/Min (12 mL/Hr) IV Continuous <Continuous>  senna 2 Tablet(s) Oral at bedtime  sodium chloride 3%. 500 milliLiter(s) (65 mL/Hr) IV Continuous <Continuous>  sodium chloride 3%. 500 milliLiter(s) (65 mL/Hr) IV Continuous <Continuous>  sodium chloride 3%. 500 milliLiter(s) (65 mL/Hr) IV Continuous <Continuous>    MEDICATIONS  (PRN):  acetaminophen   Tablet .. 650 milliGRAM(s) Oral every 6 hours PRN Temp greater or equal to 38C (100.4F)  LORazepam   Injectable 2 milliGRAM(s) IV Push every 4 hours PRN Agitation      RADIOLOGY & ADDITIONAL STUDIES:

## 2021-02-22 LAB
ALBUMIN SERPL ELPH-MCNC: 2.5 G/DL — LOW (ref 3.5–5.2)
ALBUMIN SERPL ELPH-MCNC: 2.7 G/DL — LOW (ref 3.5–5.2)
ALP SERPL-CCNC: 116 U/L — HIGH (ref 30–115)
ALP SERPL-CCNC: 122 U/L — HIGH (ref 30–115)
ALT FLD-CCNC: 25 U/L — SIGNIFICANT CHANGE UP (ref 0–41)
ALT FLD-CCNC: 29 U/L — SIGNIFICANT CHANGE UP (ref 0–41)
ANION GAP SERPL CALC-SCNC: 10 MMOL/L — SIGNIFICANT CHANGE UP (ref 7–14)
ANION GAP SERPL CALC-SCNC: 11 MMOL/L — SIGNIFICANT CHANGE UP (ref 7–14)
AST SERPL-CCNC: 19 U/L — SIGNIFICANT CHANGE UP (ref 0–41)
AST SERPL-CCNC: 21 U/L — SIGNIFICANT CHANGE UP (ref 0–41)
BASOPHILS # BLD AUTO: 0.08 K/UL — SIGNIFICANT CHANGE UP (ref 0–0.2)
BASOPHILS NFR BLD AUTO: 0.9 % — SIGNIFICANT CHANGE UP (ref 0–1)
BILIRUB SERPL-MCNC: 0.5 MG/DL — SIGNIFICANT CHANGE UP (ref 0.2–1.2)
BILIRUB SERPL-MCNC: 0.6 MG/DL — SIGNIFICANT CHANGE UP (ref 0.2–1.2)
BUN SERPL-MCNC: 11 MG/DL — SIGNIFICANT CHANGE UP (ref 10–20)
BUN SERPL-MCNC: 9 MG/DL — LOW (ref 10–20)
CALCIUM SERPL-MCNC: 8.1 MG/DL — LOW (ref 8.5–10.1)
CALCIUM SERPL-MCNC: 8.5 MG/DL — SIGNIFICANT CHANGE UP (ref 8.5–10.1)
CHLORIDE SERPL-SCNC: 114 MMOL/L — HIGH (ref 98–110)
CHLORIDE SERPL-SCNC: 115 MMOL/L — HIGH (ref 98–110)
CO2 SERPL-SCNC: 22 MMOL/L — SIGNIFICANT CHANGE UP (ref 17–32)
CO2 SERPL-SCNC: 26 MMOL/L — SIGNIFICANT CHANGE UP (ref 17–32)
CREAT SERPL-MCNC: 0.7 MG/DL — SIGNIFICANT CHANGE UP (ref 0.7–1.5)
CREAT SERPL-MCNC: 0.7 MG/DL — SIGNIFICANT CHANGE UP (ref 0.7–1.5)
EOSINOPHIL # BLD AUTO: 0.78 K/UL — HIGH (ref 0–0.7)
EOSINOPHIL NFR BLD AUTO: 8.8 % — HIGH (ref 0–8)
GLUCOSE SERPL-MCNC: 102 MG/DL — HIGH (ref 70–99)
GLUCOSE SERPL-MCNC: 121 MG/DL — HIGH (ref 70–99)
HCT VFR BLD CALC: 36 % — LOW (ref 42–52)
HGB BLD-MCNC: 11.8 G/DL — LOW (ref 14–18)
IMM GRANULOCYTES NFR BLD AUTO: 1.2 % — HIGH (ref 0.1–0.3)
LYMPHOCYTES # BLD AUTO: 1.28 K/UL — SIGNIFICANT CHANGE UP (ref 1.2–3.4)
LYMPHOCYTES # BLD AUTO: 14.4 % — LOW (ref 20.5–51.1)
MAGNESIUM SERPL-MCNC: 1.8 MG/DL — SIGNIFICANT CHANGE UP (ref 1.8–2.4)
MCHC RBC-ENTMCNC: 30.4 PG — SIGNIFICANT CHANGE UP (ref 27–31)
MCHC RBC-ENTMCNC: 32.8 G/DL — SIGNIFICANT CHANGE UP (ref 32–37)
MCV RBC AUTO: 92.8 FL — SIGNIFICANT CHANGE UP (ref 80–94)
MONOCYTES # BLD AUTO: 0.77 K/UL — HIGH (ref 0.1–0.6)
MONOCYTES NFR BLD AUTO: 8.6 % — SIGNIFICANT CHANGE UP (ref 1.7–9.3)
NEUTROPHILS # BLD AUTO: 5.89 K/UL — SIGNIFICANT CHANGE UP (ref 1.4–6.5)
NEUTROPHILS NFR BLD AUTO: 66.1 % — SIGNIFICANT CHANGE UP (ref 42.2–75.2)
NRBC # BLD: 0 /100 WBCS — SIGNIFICANT CHANGE UP (ref 0–0)
PHOSPHATE SERPL-MCNC: 3.4 MG/DL — SIGNIFICANT CHANGE UP (ref 2.1–4.9)
PLATELET # BLD AUTO: 423 K/UL — HIGH (ref 130–400)
POTASSIUM SERPL-MCNC: 3.2 MMOL/L — LOW (ref 3.5–5)
POTASSIUM SERPL-MCNC: 3.5 MMOL/L — SIGNIFICANT CHANGE UP (ref 3.5–5)
POTASSIUM SERPL-SCNC: 3.2 MMOL/L — LOW (ref 3.5–5)
POTASSIUM SERPL-SCNC: 3.5 MMOL/L — SIGNIFICANT CHANGE UP (ref 3.5–5)
PROT SERPL-MCNC: 4.9 G/DL — LOW (ref 6–8)
PROT SERPL-MCNC: 5.2 G/DL — LOW (ref 6–8)
RBC # BLD: 3.88 M/UL — LOW (ref 4.7–6.1)
RBC # FLD: 12.4 % — SIGNIFICANT CHANGE UP (ref 11.5–14.5)
SODIUM SERPL-SCNC: 147 MMOL/L — HIGH (ref 135–146)
SODIUM SERPL-SCNC: 151 MMOL/L — HIGH (ref 135–146)
WBC # BLD: 8.91 K/UL — SIGNIFICANT CHANGE UP (ref 4.8–10.8)
WBC # FLD AUTO: 8.91 K/UL — SIGNIFICANT CHANGE UP (ref 4.8–10.8)

## 2021-02-22 PROCEDURE — 71045 X-RAY EXAM CHEST 1 VIEW: CPT | Mod: 26

## 2021-02-22 PROCEDURE — 70450 CT HEAD/BRAIN W/O DYE: CPT | Mod: 26

## 2021-02-22 RX ORDER — SODIUM CHLORIDE 5 G/100ML
500 INJECTION, SOLUTION INTRAVENOUS
Refills: 0 | Status: DISCONTINUED | OUTPATIENT
Start: 2021-02-22 | End: 2021-02-23

## 2021-02-22 RX ORDER — LABETALOL HCL 100 MG
10 TABLET ORAL ONCE
Refills: 0 | Status: COMPLETED | OUTPATIENT
Start: 2021-02-22 | End: 2021-02-22

## 2021-02-22 RX ORDER — AMLODIPINE BESYLATE 2.5 MG/1
10 TABLET ORAL DAILY
Refills: 0 | Status: DISCONTINUED | OUTPATIENT
Start: 2021-02-22 | End: 2021-02-26

## 2021-02-22 RX ORDER — HYDRALAZINE HCL 50 MG
10 TABLET ORAL ONCE
Refills: 0 | Status: COMPLETED | OUTPATIENT
Start: 2021-02-22 | End: 2021-02-22

## 2021-02-22 RX ORDER — POTASSIUM CHLORIDE 20 MEQ
20 PACKET (EA) ORAL
Refills: 0 | Status: COMPLETED | OUTPATIENT
Start: 2021-02-22 | End: 2021-02-22

## 2021-02-22 RX ORDER — LABETALOL HCL 100 MG
5 TABLET ORAL ONCE
Refills: 0 | Status: COMPLETED | OUTPATIENT
Start: 2021-02-22 | End: 2021-02-22

## 2021-02-22 RX ORDER — SODIUM CHLORIDE 5 G/100ML
500 INJECTION, SOLUTION INTRAVENOUS
Refills: 0 | Status: DISCONTINUED | OUTPATIENT
Start: 2021-02-22 | End: 2021-02-22

## 2021-02-22 RX ADMIN — Medication 50 MILLIGRAM(S): at 04:00

## 2021-02-22 RX ADMIN — Medication 0.2 MILLIGRAM(S): at 04:00

## 2021-02-22 RX ADMIN — SENNA PLUS 2 TABLET(S): 8.6 TABLET ORAL at 21:07

## 2021-02-22 RX ADMIN — CEFEPIME 100 MILLIGRAM(S): 1 INJECTION, POWDER, FOR SOLUTION INTRAMUSCULAR; INTRAVENOUS at 04:01

## 2021-02-22 RX ADMIN — Medication 50 MILLIGRAM(S): at 10:57

## 2021-02-22 RX ADMIN — DEXMEDETOMIDINE HYDROCHLORIDE IN 0.9% SODIUM CHLORIDE 1.25 MICROGRAM(S)/KG/HR: 4 INJECTION INTRAVENOUS at 04:02

## 2021-02-22 RX ADMIN — PROPOFOL 12 MICROGRAM(S)/KG/MIN: 10 INJECTION, EMULSION INTRAVENOUS at 04:02

## 2021-02-22 RX ADMIN — Medication 10 MILLIGRAM(S): at 10:32

## 2021-02-22 RX ADMIN — LISINOPRIL 40 MILLIGRAM(S): 2.5 TABLET ORAL at 04:00

## 2021-02-22 RX ADMIN — CEFEPIME 100 MILLIGRAM(S): 1 INJECTION, POWDER, FOR SOLUTION INTRAMUSCULAR; INTRAVENOUS at 17:38

## 2021-02-22 RX ADMIN — CHLORHEXIDINE GLUCONATE 15 MILLILITER(S): 213 SOLUTION TOPICAL at 04:01

## 2021-02-22 RX ADMIN — CHLORHEXIDINE GLUCONATE 15 MILLILITER(S): 213 SOLUTION TOPICAL at 17:38

## 2021-02-22 RX ADMIN — PANTOPRAZOLE SODIUM 40 MILLIGRAM(S): 20 TABLET, DELAYED RELEASE ORAL at 17:38

## 2021-02-22 RX ADMIN — LEVETIRACETAM 420 MILLIGRAM(S): 250 TABLET, FILM COATED ORAL at 04:01

## 2021-02-22 RX ADMIN — Medication 200 MILLIGRAM(S): at 21:06

## 2021-02-22 RX ADMIN — Medication 200 MILLIGRAM(S): at 04:00

## 2021-02-22 RX ADMIN — AMLODIPINE BESYLATE 10 MILLIGRAM(S): 2.5 TABLET ORAL at 10:32

## 2021-02-22 RX ADMIN — Medication 90 MILLIGRAM(S): at 04:01

## 2021-02-22 RX ADMIN — Medication 50 MILLIEQUIVALENT(S): at 17:23

## 2021-02-22 RX ADMIN — CHLORHEXIDINE GLUCONATE 1 APPLICATION(S): 213 SOLUTION TOPICAL at 04:01

## 2021-02-22 RX ADMIN — HEPARIN SODIUM 5000 UNIT(S): 5000 INJECTION INTRAVENOUS; SUBCUTANEOUS at 21:06

## 2021-02-22 RX ADMIN — PANTOPRAZOLE SODIUM 40 MILLIGRAM(S): 20 TABLET, DELAYED RELEASE ORAL at 04:01

## 2021-02-22 RX ADMIN — NICARDIPINE HYDROCHLORIDE 75 MG/HR: 30 CAPSULE, EXTENDED RELEASE ORAL at 04:02

## 2021-02-22 RX ADMIN — LEVETIRACETAM 420 MILLIGRAM(S): 250 TABLET, FILM COATED ORAL at 17:40

## 2021-02-22 RX ADMIN — Medication 50 MILLIGRAM(S): at 17:38

## 2021-02-22 RX ADMIN — LACTULOSE 10 GRAM(S): 10 SOLUTION ORAL at 21:07

## 2021-02-22 RX ADMIN — Medication 50 MILLIGRAM(S): at 00:11

## 2021-02-22 RX ADMIN — Medication 50 MILLIGRAM(S): at 23:38

## 2021-02-22 RX ADMIN — Medication 0.2 MILLIGRAM(S): at 12:45

## 2021-02-22 RX ADMIN — Medication 10 MILLIGRAM(S): at 12:30

## 2021-02-22 RX ADMIN — Medication 5 MILLIGRAM(S): at 04:00

## 2021-02-22 RX ADMIN — Medication 5 MILLIGRAM(S): at 18:46

## 2021-02-22 RX ADMIN — Medication 50 MILLIEQUIVALENT(S): at 12:54

## 2021-02-22 RX ADMIN — Medication 200 MILLIGRAM(S): at 12:51

## 2021-02-22 RX ADMIN — Medication 50 MILLIEQUIVALENT(S): at 14:55

## 2021-02-22 RX ADMIN — Medication 0.2 MILLIGRAM(S): at 21:06

## 2021-02-22 RX ADMIN — Medication 5 MILLIGRAM(S): at 17:38

## 2021-02-22 RX ADMIN — HEPARIN SODIUM 5000 UNIT(S): 5000 INJECTION INTRAVENOUS; SUBCUTANEOUS at 04:00

## 2021-02-22 RX ADMIN — LACTULOSE 10 GRAM(S): 10 SOLUTION ORAL at 04:00

## 2021-02-22 RX ADMIN — HEPARIN SODIUM 5000 UNIT(S): 5000 INJECTION INTRAVENOUS; SUBCUTANEOUS at 12:45

## 2021-02-22 NOTE — PROGRESS NOTE ADULT - SUBJECTIVE AND OBJECTIVE BOX
Patient is a 50y old  Male who presents with a chief complaint of Altered mental status (21 Feb 2021 10:47)    Over Night Events: Remains intubated and MV. On propofol, precedex and 3% nacl. Off cardene    PHYSICAL EXAM    ICU Vital Signs Last 24 Hrs  T(C): 37.2 (22 Feb 2021 08:00), Max: 37.2 (21 Feb 2021 12:00)  T(F): 99 (22 Feb 2021 08:00), Max: 99 (21 Feb 2021 12:00)  HR: 68 (22 Feb 2021 08:00) (56 - 81)  BP: 142/80 (21 Feb 2021 16:00) (137/79 - 145/80)  BP(mean): 106 (21 Feb 2021 16:00) (101 - 106)  ABP: 147/68 (22 Feb 2021 08:00) (100/60 - 157/73)  ABP(mean): 94 (21 Feb 2021 16:00) (93 - 94)  RR: 21 (22 Feb 2021 08:00) (14 - 27)  SpO2: 99% (22 Feb 2021 08:00) (96% - 100%)      CONSTITUTIONAL:  Well nourished. NAD    ENT:   ET+  Airway patent,   Mouth with normal mucosa.   No thrush    EYES:   Pupils equal,   Round and reactive to light.    CARDIAC:   Normal rate,   Regular rhythm.    No edema    Vascular:  Normal systolic impulse  No Carotid bruits    RESPIRATORY:   No wheezing  Bilateral BS  Normal chest expansion  Not tachypneic,  No use of accessory muscles    GASTROINTESTINAL:  Abdomen soft  Non-tender  No guarding  + BS    GENITOURINARY  no edema    MUSCULOSKELETAL:   KELLI    NEUROLOGICAL:   Sedated    SKIN:   Skin normal color for race,   Warm and dry and intact.   No evidence of rash.    PSYCHIATRIC:   No apparent risk to self or others.    HEME LYMPH:   No cervical  lymphadenopathy.  no inguinal lymphadenopathy      02-21-21 @ 07:01  -  02-22-21 @ 07:00  --------------------------------------------------------  IN:    Dexmedetomidine: 725.6 mL    Enteral Tube Flush: 520 mL    IV PiggyBack: 200 mL    IV PiggyBack: 100 mL    NiCARdipine: 1355 mL    Propofol: 392 mL    sodium chloride 3%: 1560 mL  Total IN: 4852.6 mL    OUT:    Indwelling Catheter - Urethral (mL): 4760 mL    Nasogastric/Oral tube (mL): 150 mL    Rectal Tube (mL): 400 mL  Total OUT: 5310 mL    Total NET: -457.4 mL      02-22-21 @ 07:01  -  02-22-21 @ 08:50  --------------------------------------------------------  IN:    Dexmedetomidine: 9.5 mL    Propofol: 11.4 mL    sodium chloride 3%: 65 mL  Total IN: 85.9 mL    OUT:    Indwelling Catheter - Urethral (mL): 60 mL  Total OUT: 60 mL    Total NET: 25.9 mL    LABS:                     11.8   8.91  )-----------( 423      ( 22 Feb 2021 05:24 )             36.0                                               02-22    151<H>  |  115<H>  |  9<L>  ----------------------------<  121<H>  3.2<L>   |  26  |  0.7    Ca    8.5      22 Feb 2021 05:24  Phos  3.4     02-22  Mg     1.8     02-22    TPro  5.2<L>  /  Alb  2.5<L>  /  TBili  0.6  /  DBili  x   /  AST  21  /  ALT  29  /  AlkPhos  122<H>  02-22                                                                                 LIVER FUNCTIONS - ( 22 Feb 2021 05:24 )  Alb: 2.5 g/dL / Pro: 5.2 g/dL / ALK PHOS: 122 U/L / ALT: 29 U/L / AST: 21 U/L / GGT: x                                               Mode: AC/ CMV (Assist Control/ Continuous Mandatory Ventilation)  RR (machine): 20  TV (machine): 420  FiO2: 30  PEEP: 8  ITime: 1  MAP: 9  PIP: 20                                      ABG - ( 22 Feb 2021 04:01 )  pH, Arterial: 7.44  pH, Blood: x     /  pCO2: 41    /  pO2: 109   / HCO3: 28    / Base Excess: 3.6   /  SaO2: 98          MEDICATIONS  (STANDING):  cefepime   IVPB 2000 milliGRAM(s) IV Intermittent every 12 hours  chlorhexidine 0.12% Liquid 15 milliLiter(s) Oral Mucosa every 12 hours  chlorhexidine 4% Liquid 1 Application(s) Topical <User Schedule>  cloNIDine 0.2 milliGRAM(s) Oral every 8 hours  dexMEDEtomidine Infusion 0.05 MICROgram(s)/kG/Hr (1.25 mL/Hr) IV Continuous <Continuous>  heparin   Injectable 5000 Unit(s) SubCutaneous every 8 hours  hydrALAZINE 50 milliGRAM(s) Oral every 6 hours  labetalol 200 milliGRAM(s) Oral every 8 hours  lactulose Syrup 10 Gram(s) Oral every 8 hours  levETIRAcetam  IVPB 500 milliGRAM(s) IV Intermittent every 12 hours  lisinopril 40 milliGRAM(s) Oral daily  metoclopramide 5 milliGRAM(s) Oral two times a day  mineral oil enema 133 milliLiter(s) Rectal daily  niCARdipine Infusion 15 mG/Hr (75 mL/Hr) IV Continuous <Continuous>  NIFEdipine XL 90 milliGRAM(s) Oral daily  pantoprazole  Injectable 40 milliGRAM(s) IV Push every 12 hours  polyethylene glycol 3350 17 Gram(s) Oral daily  propofol Infusion 20 MICROgram(s)/kG/Min (12 mL/Hr) IV Continuous <Continuous>  senna 2 Tablet(s) Oral at bedtime  sodium chloride 3%. 500 milliLiter(s) (65 mL/Hr) IV Continuous <Continuous>  sodium chloride 3%. 500 milliLiter(s) (65 mL/Hr) IV Continuous <Continuous>  sodium chloride 3%. 500 milliLiter(s) (65 mL/Hr) IV Continuous <Continuous>    MEDICATIONS  (PRN):  acetaminophen   Tablet .. 650 milliGRAM(s) Oral every 6 hours PRN Temp greater or equal to 38C (100.4F)  LORazepam   Injectable 2 milliGRAM(s) IV Push every 4 hours PRN Agitation   Patient is a 50y old  Male who presents with a chief complaint of Altered mental status (21 Feb 2021 10:47)    Over Night Events: Remains intubated and MV. On propofol, precedex and 3% nacl. Off cardene, awake follows commands    PHYSICAL EXAM    ICU Vital Signs Last 24 Hrs  T(C): 37.2 (22 Feb 2021 08:00), Max: 37.2 (21 Feb 2021 12:00)  T(F): 99 (22 Feb 2021 08:00), Max: 99 (21 Feb 2021 12:00)  HR: 68 (22 Feb 2021 08:00) (56 - 81)  BP: 142/80 (21 Feb 2021 16:00) (137/79 - 145/80)  BP(mean): 106 (21 Feb 2021 16:00) (101 - 106)  ABP: 147/68 (22 Feb 2021 08:00) (100/60 - 157/73)  ABP(mean): 94 (21 Feb 2021 16:00) (93 - 94)  RR: 21 (22 Feb 2021 08:00) (14 - 27)  SpO2: 99% (22 Feb 2021 08:00) (96% - 100%)      CONSTITUTIONAL:  ill looking    ENT:   ET+  Airway patent,   Mouth with normal mucosa.   No thrush    EYES:   Pupils equal,   Round and reactive to light.    CARDIAC:   OLIVE 3/6      RESPIRATORY:   DEC BS BOTH BASES    GASTROINTESTINAL:  Abdomen soft  Non-tender  No guarding  + BS    GENITOURINARY  no edema    MUSCULOSKELETAL:   KELLI    NEUROLOGICAL:   awake follows comamnds  l sided weakness    SKIN:   Skin normal color for race,   Warm and dry and intact.   No evidence of rash.        02-21-21 @ 07:01  -  02-22-21 @ 07:00  --------------------------------------------------------  IN:    Dexmedetomidine: 725.6 mL    Enteral Tube Flush: 520 mL    IV PiggyBack: 200 mL    IV PiggyBack: 100 mL    NiCARdipine: 1355 mL    Propofol: 392 mL    sodium chloride 3%: 1560 mL  Total IN: 4852.6 mL    OUT:    Indwelling Catheter - Urethral (mL): 4760 mL    Nasogastric/Oral tube (mL): 150 mL    Rectal Tube (mL): 400 mL  Total OUT: 5310 mL    Total NET: -457.4 mL      02-22-21 @ 07:01  -  02-22-21 @ 08:50  --------------------------------------------------------  IN:    Dexmedetomidine: 9.5 mL    Propofol: 11.4 mL    sodium chloride 3%: 65 mL  Total IN: 85.9 mL    OUT:    Indwelling Catheter - Urethral (mL): 60 mL  Total OUT: 60 mL    Total NET: 25.9 mL    LABS:                     11.8   8.91  )-----------( 423      ( 22 Feb 2021 05:24 )             36.0                                               02-22    151<H>  |  115<H>  |  9<L>  ----------------------------<  121<H>  3.2<L>   |  26  |  0.7    Ca    8.5      22 Feb 2021 05:24  Phos  3.4     02-22  Mg     1.8     02-22    TPro  5.2<L>  /  Alb  2.5<L>  /  TBili  0.6  /  DBili  x   /  AST  21  /  ALT  29  /  AlkPhos  122<H>  02-22                                                                                 LIVER FUNCTIONS - ( 22 Feb 2021 05:24 )  Alb: 2.5 g/dL / Pro: 5.2 g/dL / ALK PHOS: 122 U/L / ALT: 29 U/L / AST: 21 U/L / GGT: x                                               Mode: AC/ CMV (Assist Control/ Continuous Mandatory Ventilation)  RR (machine): 20  TV (machine): 420  FiO2: 30  PEEP: 8  ITime: 1  MAP: 9  PIP: 20                                      ABG - ( 22 Feb 2021 04:01 )  pH, Arterial: 7.44  pH, Blood: x     /  pCO2: 41    /  pO2: 109   / HCO3: 28    / Base Excess: 3.6   /  SaO2: 98          MEDICATIONS  (STANDING):  cefepime   IVPB 2000 milliGRAM(s) IV Intermittent every 12 hours  chlorhexidine 0.12% Liquid 15 milliLiter(s) Oral Mucosa every 12 hours  chlorhexidine 4% Liquid 1 Application(s) Topical <User Schedule>  cloNIDine 0.2 milliGRAM(s) Oral every 8 hours  dexMEDEtomidine Infusion 0.05 MICROgram(s)/kG/Hr (1.25 mL/Hr) IV Continuous <Continuous>  heparin   Injectable 5000 Unit(s) SubCutaneous every 8 hours  hydrALAZINE 50 milliGRAM(s) Oral every 6 hours  labetalol 200 milliGRAM(s) Oral every 8 hours  lactulose Syrup 10 Gram(s) Oral every 8 hours  levETIRAcetam  IVPB 500 milliGRAM(s) IV Intermittent every 12 hours  lisinopril 40 milliGRAM(s) Oral daily  metoclopramide 5 milliGRAM(s) Oral two times a day  mineral oil enema 133 milliLiter(s) Rectal daily  niCARdipine Infusion 15 mG/Hr (75 mL/Hr) IV Continuous <Continuous>  NIFEdipine XL 90 milliGRAM(s) Oral daily  pantoprazole  Injectable 40 milliGRAM(s) IV Push every 12 hours  polyethylene glycol 3350 17 Gram(s) Oral daily  propofol Infusion 20 MICROgram(s)/kG/Min (12 mL/Hr) IV Continuous <Continuous>  senna 2 Tablet(s) Oral at bedtime  sodium chloride 3%. 500 milliLiter(s) (65 mL/Hr) IV Continuous <Continuous>  sodium chloride 3%. 500 milliLiter(s) (65 mL/Hr) IV Continuous <Continuous>  sodium chloride 3%. 500 milliLiter(s) (65 mL/Hr) IV Continuous <Continuous>    MEDICATIONS  (PRN):  acetaminophen   Tablet .. 650 milliGRAM(s) Oral every 6 hours PRN Temp greater or equal to 38C (100.4F)  LORazepam   Injectable 2 milliGRAM(s) IV Push every 4 hours PRN Agitation

## 2021-02-22 NOTE — PROGRESS NOTE ADULT - THIS PATIENT HAS THE FOLLOWING CONDITION(S)/DIAGNOSES ON THIS ADMISSION:
Brain Compression / Herniation
Brain Compression / Herniation
Cerebral Edema/Brain Compression / Herniation
None
None
Brain Compression / Herniation
Cerebral Edema/Brain Compression / Herniation/Acute Respiratory Failure
Cerebral Edema/Brain Compression / Herniation/Acute Respiratory Failure
Brain Compression / Herniation
Cerebral Edema
Cerebral Edema/Brain Compression / Herniation
None
Cerebral Edema
Cerebral Edema
None
None

## 2021-02-22 NOTE — CHART NOTE - NSCHARTNOTEFT_GEN_A_CORE
Registered Dietitian Follow-Up     Patient Profile Reviewed                           Yes [x]   No []     Nutrition History Previously Obtained        Yes []  No [x]--unable to obtain as pt remains intubated        Pertinent Medical Interventions: Pt remains intubated and MV. On propofol, precedex and 3% nacl; off cardene, awake and follows commands. Per Neurosx, f/u head CT today.    Diet order: Vital HP @ 40ml/hr (1594kcal, 84gm pro, 803ml free water, includes additional kcal from propofol)--per RN, pt has been NPO since 2/19 2/2 distended abd despite active diet order. Plan for extubation today per rounds this morning.      Anthropometrics:  - Ht. 73"  - Wt.: 280#/127kg (2/19)--wt fluctuations likely 2/2 bed-scale inaccuracy vs. fluids? Will continue to monitor closely   (2/17): 297#/134.8kg  (2/16): 289#/131kg  (1/27): 221#/100kg  - %wt change  - BMI: 29.1   - IBW: 184#     Pertinent Lab Data: (2/22): H/H 11.8/36.0, Na 151, K+ 3.2, BUN 9, Gluc 121      Pertinent Meds: heparin, abx, NS, lactulose, labetalol, hydralazine, reglan, amlodipine, precedex, versed, nicardipine, propofol (currently not running), keppra, protonix, miralax, senna      Physical Findings:  - Appearance: intubated/ventilated; 2+ edema to B/L hand and scrotum noted  - GI function: abd distended; rectal tube in place; +250ml output 2/22   - Tubes: OGT   - Oral/Mouth cavity: NPO   - Skin: excoriation      Nutrition Requirements  Weight Used: CBW: 221#/100kg, Ve: 10, Tmax: 37.3; needs continued from RD note on 2/18 as new [id number]: [id number] comparable      Estimated Energy Needs: 2602-4739 kcal/day (% [id number]: 2191 d/t borderline obese BMI).  Estimated Protein Needs: 100-120 g/day (1-1.2 g/kg ABW)  Estimated Fluid Needs: per Vent team      Nutrient Intake: not meeting kcal/pro needs 2/2 NPO status      Previous Nutrition Diagnosis: Inadequate protein-energy intake (ongoing)      Nutrition Intervention: enteral nutrition, coordination of care     Recommendations:   1. If pt to be extubated, c/s SLP for speech and swallow eval; diet order to be consistent with their recs  2. If unable to extubate, would c/s Nutrition Support Team for parenteral nutrition assessment as pt w/ abdominal distention and NPO x4 days. All recs discussed with LIP ([phone])     Goal/Expected Outcome: Pt to meet % of estimated nutrient needs within 3days      Indicator/Monitoring: RD to monitor diet order, energy intake, body composition, electrolyte/glucose profiles, nutrition focused physical findings.

## 2021-02-22 NOTE — PROGRESS NOTE ADULT - SUBJECTIVE AND OBJECTIVE BOX
POD # 26    S/P Placement of EVD    Pt seen and examined at bedside.    Vital Signs Last 24 Hrs  T(C): 37.2 (22 Feb 2021 08:00), Max: 37.2 (21 Feb 2021 12:00)  T(F): 99 (22 Feb 2021 08:00), Max: 99 (21 Feb 2021 12:00)  HR: 68 (22 Feb 2021 08:00) (56 - 81)  BP: 142/80 (21 Feb 2021 16:00) (137/79 - 145/80)  BP(mean): 106 (21 Feb 2021 16:00) (101 - 106)  RR: 21 (22 Feb 2021 08:00) (14 - 27)  SpO2: 99% (22 Feb 2021 08:00) (96% - 100%)    PHYSICAL EXAM:          MEDICATIONS:  Antibiotics:  cefepime   IVPB 2000 milliGRAM(s) IV Intermittent every 12 hours    Neuro:  acetaminophen   Tablet .. 650 milliGRAM(s) Oral every 6 hours PRN  dexMEDEtomidine Infusion 0.05 MICROgram(s)/kG/Hr IV Continuous <Continuous>  levETIRAcetam  IVPB 500 milliGRAM(s) IV Intermittent every 12 hours  LORazepam   Injectable 2 milliGRAM(s) IV Push every 4 hours PRN  propofol Infusion 20 MICROgram(s)/kG/Min IV Continuous <Continuous>    Anticoagulation:  heparin   Injectable 5000 Unit(s) SubCutaneous every 8 hours    OTHER:  amLODIPine   Tablet 10 milliGRAM(s) Oral daily  chlorhexidine 0.12% Liquid 15 milliLiter(s) Oral Mucosa every 12 hours  chlorhexidine 4% Liquid 1 Application(s) Topical <User Schedule>  cloNIDine 0.2 milliGRAM(s) Oral every 8 hours  hydrALAZINE 50 milliGRAM(s) Oral every 6 hours  labetalol 200 milliGRAM(s) Oral every 8 hours  lactulose Syrup 10 Gram(s) Oral every 8 hours  lisinopril 40 milliGRAM(s) Oral daily  metoclopramide 5 milliGRAM(s) Oral two times a day  mineral oil enema 133 milliLiter(s) Rectal daily  niCARdipine Infusion 15 mG/Hr IV Continuous <Continuous>  NIFEdipine XL 90 milliGRAM(s) Oral daily  pantoprazole  Injectable 40 milliGRAM(s) IV Push every 12 hours  polyethylene glycol 3350 17 Gram(s) Oral daily  senna 2 Tablet(s) Oral at bedtime    IVF:  sodium chloride 3%. 500 milliLiter(s) IV Continuous <Continuous>  sodium chloride 3%. 500 milliLiter(s) IV Continuous <Continuous>  sodium chloride 3%. 500 milliLiter(s) IV Continuous <Continuous>        LABS:                        11.8   8.91  )-----------( 423      ( 22 Feb 2021 05:24 )             36.0     02-22    151<H>  |  115<H>  |  9<L>  ----------------------------<  121<H>  3.2<L>   |  26  |  0.7    Ca    8.5      22 Feb 2021 05:24  Phos  3.4     02-22  Mg     1.8     02-22    TPro  5.2<L>  /  Alb  2.5<L>  /  TBili  0.6  /  DBili  x   /  AST  21  /  ALT  29  /  AlkPhos  122<H>  02-22    Assessment:  As above    Plan:  F/U Head CT today POD # 26    S/P Placement of EVD    Pt seen and examined at bedside. Pt remains intubated, awake and alert    Vital Signs Last 24 Hrs  T(C): 37.2 (22 Feb 2021 08:00), Max: 37.2 (21 Feb 2021 12:00)  T(F): 99 (22 Feb 2021 08:00), Max: 99 (21 Feb 2021 12:00)  HR: 68 (22 Feb 2021 08:00) (56 - 81)  BP: 142/80 (21 Feb 2021 16:00) (137/79 - 145/80)  BP(mean): 106 (21 Feb 2021 16:00) (101 - 106)  RR: 21 (22 Feb 2021 08:00) (14 - 27)  SpO2: 99% (22 Feb 2021 08:00) (96% - 100%)    PHYSICAL EXAM:  PERRL  Tracks  Moves RUE to command  w/d LUE/LLE/RLE to pain  incision intact    MEDICATIONS:  Antibiotics:  cefepime   IVPB 2000 milliGRAM(s) IV Intermittent every 12 hours    Neuro:  acetaminophen   Tablet .. 650 milliGRAM(s) Oral every 6 hours PRN  dexMEDEtomidine Infusion 0.05 MICROgram(s)/kG/Hr IV Continuous <Continuous>  levETIRAcetam  IVPB 500 milliGRAM(s) IV Intermittent every 12 hours  LORazepam   Injectable 2 milliGRAM(s) IV Push every 4 hours PRN  propofol Infusion 20 MICROgram(s)/kG/Min IV Continuous <Continuous>    Anticoagulation:  heparin   Injectable 5000 Unit(s) SubCutaneous every 8 hours    OTHER:  amLODIPine   Tablet 10 milliGRAM(s) Oral daily  chlorhexidine 0.12% Liquid 15 milliLiter(s) Oral Mucosa every 12 hours  chlorhexidine 4% Liquid 1 Application(s) Topical <User Schedule>  cloNIDine 0.2 milliGRAM(s) Oral every 8 hours  hydrALAZINE 50 milliGRAM(s) Oral every 6 hours  labetalol 200 milliGRAM(s) Oral every 8 hours  lactulose Syrup 10 Gram(s) Oral every 8 hours  lisinopril 40 milliGRAM(s) Oral daily  metoclopramide 5 milliGRAM(s) Oral two times a day  mineral oil enema 133 milliLiter(s) Rectal daily  niCARdipine Infusion 15 mG/Hr IV Continuous <Continuous>  NIFEdipine XL 90 milliGRAM(s) Oral daily  pantoprazole  Injectable 40 milliGRAM(s) IV Push every 12 hours  polyethylene glycol 3350 17 Gram(s) Oral daily  senna 2 Tablet(s) Oral at bedtime    IVF:  sodium chloride 3%. 500 milliLiter(s) IV Continuous <Continuous>  sodium chloride 3%. 500 milliLiter(s) IV Continuous <Continuous>  sodium chloride 3%. 500 milliLiter(s) IV Continuous <Continuous>        LABS:                        11.8   8.91  )-----------( 423      ( 22 Feb 2021 05:24 )             36.0     02-22    151<H>  |  115<H>  |  9<L>  ----------------------------<  121<H>  3.2<L>   |  26  |  0.7    Ca    8.5      22 Feb 2021 05:24  Phos  3.4     02-22  Mg     1.8     02-22    TPro  5.2<L>  /  Alb  2.5<L>  /  TBili  0.6  /  DBili  x   /  AST  21  /  ALT  29  /  AlkPhos  122<H>  02-22    Assessment:  As above    Plan:  F/U Head CT today

## 2021-02-22 NOTE — CHART NOTE - NSCHARTNOTEFT_GEN_A_CORE
Repeat HCT reviewed with Dr. Rosario.    < from: CT Head No Cont (02.22.21 @ 16:18) >    In comparison with the prior CT scan of the brain dated February 19, 2021:    Again demonstrated is a parenchymal hemorrhage with surrounding edema in the right frontal temporal region and right basal ganglia with associated mass effect and shift, there has been a mild decrease in the overall size of the hemorrhage, surrounding edema, mass effect and shift. The etiology of the hemorrhage is uncertain at the present time and follow-up to resolution followed by postcontrast examination is recommended to exclude an underlying lesion.    Possible interval development of diminished attenuation in the right parietal lobe, the possibility of acute ischemic change should be considered and a follow-up examination is recommended.    < end of copied text >    No further Neurosurgical Intervention  Can wean 3% over 3-4 days from our standpoint.   Neurology for potential acute infarct

## 2021-02-22 NOTE — PROGRESS NOTE ADULT - ASSESSMENT
IMPRESSION:    Large ICB secondary to Hypertensive Emergency s/p EVD now dc  Intubated, for airway protection  Klebsiella Pneumonia pansensitive   Left posterior tibial & peroneal DVT  ABD distention ASP CT AP improved  INCREASE UO    PLAN:    CNS:  SAT after CTH, neuro sx f/u.     HEENT: Oral care    PULMONARY:  HOB @ 45 degrees.  Vent changes:  keep plateau less than 30/ DP less than 15. FiO2 30% , SBT post CTH,     CARDIOVASCULAR:  Avoid volume overload.  BP control. 3% Hypertonic as per neurosurgery    GI: GI prophylaxis.  Feeding OGT    RENAL correct lytes, trend CMP    INFECTIOUS DISEASE:  ABX per ID    HEMATOLOGICAL:  SCDs for DVT prophylaxis due to ICH.     ENDOCRINE:  Follow up FS.  Insulin protocol if needed.    MUSCULOSKELETAL:  Bed rest    Prognosis poor  will need peg. trac IMPRESSION:    Large ICB secondary to Hypertensive Emergency s/p EVD now dc  Intubated, for airway protection  Klebsiella Pneumonia pansensitive   Left posterior tibial & peroneal DVT  ABD distention ASP CT AP improved  INCREASE UO    PLAN:    CNS:  SAT after CTH, neuro sx f/u.     HEENT: Oral care    PULMONARY:  HOB @ 45 degrees.  Vent changes:  keep plateau less than 30/ DP less than 15. FiO2 30% , SBT post CTH,     CARDIOVASCULAR:  Avoid volume overload.  BP control. 3% Hypertonic as per neurosurgery    GI: GI prophylaxis.  Feeding OGT    RENAL correct lytes, trend CMP    INFECTIOUS DISEASE:  ABX per ID    HEMATOLOGICAL:  SCDs for DVT prophylaxis due to ICH.     ENDOCRINE:  Follow up FS.  Insulin protocol if needed.    MUSCULOSKELETAL:  Bed rest    Prognosis poor

## 2021-02-23 LAB
ALBUMIN SERPL ELPH-MCNC: 2.9 G/DL — LOW (ref 3.5–5.2)
ALBUMIN SERPL ELPH-MCNC: 3.2 G/DL — LOW (ref 3.5–5.2)
ALP SERPL-CCNC: 108 U/L — SIGNIFICANT CHANGE UP (ref 30–115)
ALP SERPL-CCNC: 143 U/L — HIGH (ref 30–115)
ALT FLD-CCNC: 23 U/L — SIGNIFICANT CHANGE UP (ref 0–41)
ALT FLD-CCNC: 30 U/L — SIGNIFICANT CHANGE UP (ref 0–41)
ANION GAP SERPL CALC-SCNC: 10 MMOL/L — SIGNIFICANT CHANGE UP (ref 7–14)
ANION GAP SERPL CALC-SCNC: 14 MMOL/L — SIGNIFICANT CHANGE UP (ref 7–14)
ANION GAP SERPL CALC-SCNC: 8 MMOL/L — SIGNIFICANT CHANGE UP (ref 7–14)
AST SERPL-CCNC: 17 U/L — SIGNIFICANT CHANGE UP (ref 0–41)
AST SERPL-CCNC: 25 U/L — SIGNIFICANT CHANGE UP (ref 0–41)
BASOPHILS # BLD AUTO: 0.05 K/UL — SIGNIFICANT CHANGE UP (ref 0–0.2)
BASOPHILS NFR BLD AUTO: 0.7 % — SIGNIFICANT CHANGE UP (ref 0–1)
BILIRUB SERPL-MCNC: 0.4 MG/DL — SIGNIFICANT CHANGE UP (ref 0.2–1.2)
BILIRUB SERPL-MCNC: 0.7 MG/DL — SIGNIFICANT CHANGE UP (ref 0.2–1.2)
BUN SERPL-MCNC: 10 MG/DL — SIGNIFICANT CHANGE UP (ref 10–20)
BUN SERPL-MCNC: 12 MG/DL — SIGNIFICANT CHANGE UP (ref 10–20)
BUN SERPL-MCNC: 13 MG/DL — SIGNIFICANT CHANGE UP (ref 10–20)
CALCIUM SERPL-MCNC: 8.5 MG/DL — SIGNIFICANT CHANGE UP (ref 8.5–10.1)
CALCIUM SERPL-MCNC: 8.6 MG/DL — SIGNIFICANT CHANGE UP (ref 8.5–10.1)
CALCIUM SERPL-MCNC: 8.8 MG/DL — SIGNIFICANT CHANGE UP (ref 8.5–10.1)
CHLORIDE SERPL-SCNC: 108 MMOL/L — SIGNIFICANT CHANGE UP (ref 98–110)
CHLORIDE SERPL-SCNC: 117 MMOL/L — HIGH (ref 98–110)
CHLORIDE SERPL-SCNC: 117 MMOL/L — HIGH (ref 98–110)
CO2 SERPL-SCNC: 24 MMOL/L — SIGNIFICANT CHANGE UP (ref 17–32)
CO2 SERPL-SCNC: 25 MMOL/L — SIGNIFICANT CHANGE UP (ref 17–32)
CO2 SERPL-SCNC: 25 MMOL/L — SIGNIFICANT CHANGE UP (ref 17–32)
CREAT SERPL-MCNC: 0.6 MG/DL — LOW (ref 0.7–1.5)
CREAT SERPL-MCNC: 0.7 MG/DL — SIGNIFICANT CHANGE UP (ref 0.7–1.5)
CREAT SERPL-MCNC: 0.7 MG/DL — SIGNIFICANT CHANGE UP (ref 0.7–1.5)
EOSINOPHIL # BLD AUTO: 0.9 K/UL — HIGH (ref 0–0.7)
EOSINOPHIL NFR BLD AUTO: 13.3 % — HIGH (ref 0–8)
GAS PNL BLDA: SIGNIFICANT CHANGE UP
GAS PNL BLDA: SIGNIFICANT CHANGE UP
GLUCOSE SERPL-MCNC: 105 MG/DL — HIGH (ref 70–99)
GLUCOSE SERPL-MCNC: 111 MG/DL — HIGH (ref 70–99)
GLUCOSE SERPL-MCNC: 140 MG/DL — HIGH (ref 70–99)
HCT VFR BLD CALC: 35.4 % — LOW (ref 42–52)
HGB BLD-MCNC: 11.4 G/DL — LOW (ref 14–18)
IMM GRANULOCYTES NFR BLD AUTO: 1.5 % — HIGH (ref 0.1–0.3)
INR BLD: 1.23 RATIO — SIGNIFICANT CHANGE UP (ref 0.65–1.3)
LYMPHOCYTES # BLD AUTO: 1.09 K/UL — LOW (ref 1.2–3.4)
LYMPHOCYTES # BLD AUTO: 16.1 % — LOW (ref 20.5–51.1)
MAGNESIUM SERPL-MCNC: 1.9 MG/DL — SIGNIFICANT CHANGE UP (ref 1.8–2.4)
MCHC RBC-ENTMCNC: 30.6 PG — SIGNIFICANT CHANGE UP (ref 27–31)
MCHC RBC-ENTMCNC: 32.2 G/DL — SIGNIFICANT CHANGE UP (ref 32–37)
MCV RBC AUTO: 94.9 FL — HIGH (ref 80–94)
MONOCYTES # BLD AUTO: 0.6 K/UL — SIGNIFICANT CHANGE UP (ref 0.1–0.6)
MONOCYTES NFR BLD AUTO: 8.8 % — SIGNIFICANT CHANGE UP (ref 1.7–9.3)
NEUTROPHILS # BLD AUTO: 4.05 K/UL — SIGNIFICANT CHANGE UP (ref 1.4–6.5)
NEUTROPHILS NFR BLD AUTO: 59.6 % — SIGNIFICANT CHANGE UP (ref 42.2–75.2)
NRBC # BLD: 0 /100 WBCS — SIGNIFICANT CHANGE UP (ref 0–0)
PHOSPHATE SERPL-MCNC: 3.4 MG/DL — SIGNIFICANT CHANGE UP (ref 2.1–4.9)
PLATELET # BLD AUTO: 397 K/UL — SIGNIFICANT CHANGE UP (ref 130–400)
POTASSIUM SERPL-MCNC: 3.3 MMOL/L — LOW (ref 3.5–5)
POTASSIUM SERPL-MCNC: 3.6 MMOL/L — SIGNIFICANT CHANGE UP (ref 3.5–5)
POTASSIUM SERPL-MCNC: 3.9 MMOL/L — SIGNIFICANT CHANGE UP (ref 3.5–5)
POTASSIUM SERPL-SCNC: 3.3 MMOL/L — LOW (ref 3.5–5)
POTASSIUM SERPL-SCNC: 3.6 MMOL/L — SIGNIFICANT CHANGE UP (ref 3.5–5)
POTASSIUM SERPL-SCNC: 3.9 MMOL/L — SIGNIFICANT CHANGE UP (ref 3.5–5)
PROT SERPL-MCNC: 5.1 G/DL — LOW (ref 6–8)
PROT SERPL-MCNC: 6.4 G/DL — SIGNIFICANT CHANGE UP (ref 6–8)
PROTHROM AB SERPL-ACNC: 14.1 SEC — HIGH (ref 9.95–12.87)
RBC # BLD: 3.73 M/UL — LOW (ref 4.7–6.1)
RBC # FLD: 13.1 % — SIGNIFICANT CHANGE UP (ref 11.5–14.5)
SODIUM SERPL-SCNC: 146 MMOL/L — SIGNIFICANT CHANGE UP (ref 135–146)
SODIUM SERPL-SCNC: 150 MMOL/L — HIGH (ref 135–146)
SODIUM SERPL-SCNC: 152 MMOL/L — HIGH (ref 135–146)
WBC # BLD: 6.79 K/UL — SIGNIFICANT CHANGE UP (ref 4.8–10.8)
WBC # FLD AUTO: 6.79 K/UL — SIGNIFICANT CHANGE UP (ref 4.8–10.8)

## 2021-02-23 PROCEDURE — 71045 X-RAY EXAM CHEST 1 VIEW: CPT | Mod: 26,77

## 2021-02-23 PROCEDURE — 71045 X-RAY EXAM CHEST 1 VIEW: CPT | Mod: 26

## 2021-02-23 PROCEDURE — 99233 SBSQ HOSP IP/OBS HIGH 50: CPT

## 2021-02-23 RX ORDER — SODIUM CHLORIDE 5 G/100ML
500 INJECTION, SOLUTION INTRAVENOUS
Refills: 0 | Status: DISCONTINUED | OUTPATIENT
Start: 2021-02-23 | End: 2021-02-25

## 2021-02-23 RX ORDER — LABETALOL HCL 100 MG
5 TABLET ORAL
Refills: 0 | Status: COMPLETED | OUTPATIENT
Start: 2021-02-23 | End: 2021-02-23

## 2021-02-23 RX ORDER — LABETALOL HCL 100 MG
10 TABLET ORAL ONCE
Refills: 0 | Status: COMPLETED | OUTPATIENT
Start: 2021-02-23 | End: 2021-02-23

## 2021-02-23 RX ORDER — EPINEPHRINE 11.25MG/ML
0.5 SOLUTION, NON-ORAL INHALATION ONCE
Refills: 0 | Status: COMPLETED | OUTPATIENT
Start: 2021-02-23 | End: 2021-02-23

## 2021-02-23 RX ORDER — POTASSIUM CHLORIDE 20 MEQ
20 PACKET (EA) ORAL ONCE
Refills: 0 | Status: COMPLETED | OUTPATIENT
Start: 2021-02-23 | End: 2021-02-23

## 2021-02-23 RX ORDER — HYDRALAZINE HCL 50 MG
100 TABLET ORAL EVERY 8 HOURS
Refills: 0 | Status: DISCONTINUED | OUTPATIENT
Start: 2021-02-23 | End: 2021-03-08

## 2021-02-23 RX ORDER — LABETALOL HCL 100 MG
20 TABLET ORAL ONCE
Refills: 0 | Status: COMPLETED | OUTPATIENT
Start: 2021-02-23 | End: 2021-02-23

## 2021-02-23 RX ORDER — HYDROCORTISONE 20 MG
150 TABLET ORAL ONCE
Refills: 0 | Status: DISCONTINUED | OUTPATIENT
Start: 2021-02-23 | End: 2021-02-23

## 2021-02-23 RX ORDER — FENOLDOPAM MESYLATE 10 MG/ML
0.02 AMPUL (ML) INTRAVENOUS
Qty: 10 | Refills: 0 | Status: DISCONTINUED | OUTPATIENT
Start: 2021-02-23 | End: 2021-02-23

## 2021-02-23 RX ORDER — IPRATROPIUM/ALBUTEROL SULFATE 18-103MCG
3 AEROSOL WITH ADAPTER (GRAM) INHALATION ONCE
Refills: 0 | Status: COMPLETED | OUTPATIENT
Start: 2021-02-23 | End: 2021-02-23

## 2021-02-23 RX ORDER — LABETALOL HCL 100 MG
0.5 TABLET ORAL
Qty: 400 | Refills: 0 | Status: DISCONTINUED | OUTPATIENT
Start: 2021-02-23 | End: 2021-02-26

## 2021-02-23 RX ADMIN — Medication 100 MILLIGRAM(S): at 22:21

## 2021-02-23 RX ADMIN — Medication 0.5 MILLILITER(S): at 19:12

## 2021-02-23 RX ADMIN — CHLORHEXIDINE GLUCONATE 15 MILLILITER(S): 213 SOLUTION TOPICAL at 17:57

## 2021-02-23 RX ADMIN — Medication 50 MILLIGRAM(S): at 04:51

## 2021-02-23 RX ADMIN — CEFEPIME 100 MILLIGRAM(S): 1 INJECTION, POWDER, FOR SOLUTION INTRAMUSCULAR; INTRAVENOUS at 05:04

## 2021-02-23 RX ADMIN — PANTOPRAZOLE SODIUM 40 MILLIGRAM(S): 20 TABLET, DELAYED RELEASE ORAL at 17:57

## 2021-02-23 RX ADMIN — CHLORHEXIDINE GLUCONATE 1 APPLICATION(S): 213 SOLUTION TOPICAL at 05:17

## 2021-02-23 RX ADMIN — AMLODIPINE BESYLATE 10 MILLIGRAM(S): 2.5 TABLET ORAL at 05:04

## 2021-02-23 RX ADMIN — CHLORHEXIDINE GLUCONATE 15 MILLILITER(S): 213 SOLUTION TOPICAL at 05:17

## 2021-02-23 RX ADMIN — PROPOFOL 12 MICROGRAM(S)/KG/MIN: 10 INJECTION, EMULSION INTRAVENOUS at 06:39

## 2021-02-23 RX ADMIN — Medication 10 MILLIGRAM(S): at 05:18

## 2021-02-23 RX ADMIN — HEPARIN SODIUM 5000 UNIT(S): 5000 INJECTION INTRAVENOUS; SUBCUTANEOUS at 20:39

## 2021-02-23 RX ADMIN — Medication 200 MILLIGRAM(S): at 13:10

## 2021-02-23 RX ADMIN — LACTULOSE 10 GRAM(S): 10 SOLUTION ORAL at 13:10

## 2021-02-23 RX ADMIN — Medication 5 MILLIGRAM(S): at 04:51

## 2021-02-23 RX ADMIN — LACTULOSE 10 GRAM(S): 10 SOLUTION ORAL at 05:17

## 2021-02-23 RX ADMIN — LACTULOSE 10 GRAM(S): 10 SOLUTION ORAL at 22:21

## 2021-02-23 RX ADMIN — LEVETIRACETAM 420 MILLIGRAM(S): 250 TABLET, FILM COATED ORAL at 05:04

## 2021-02-23 RX ADMIN — HEPARIN SODIUM 5000 UNIT(S): 5000 INJECTION INTRAVENOUS; SUBCUTANEOUS at 05:17

## 2021-02-23 RX ADMIN — Medication 13.3 MICROGRAM(S)/KG/MIN: at 06:00

## 2021-02-23 RX ADMIN — LISINOPRIL 40 MILLIGRAM(S): 2.5 TABLET ORAL at 04:51

## 2021-02-23 RX ADMIN — Medication 0.2 MILLIGRAM(S): at 22:21

## 2021-02-23 RX ADMIN — Medication 10 MILLIGRAM(S): at 17:56

## 2021-02-23 RX ADMIN — DEXMEDETOMIDINE HYDROCHLORIDE IN 0.9% SODIUM CHLORIDE 1.25 MICROGRAM(S)/KG/HR: 4 INJECTION INTRAVENOUS at 06:39

## 2021-02-23 RX ADMIN — Medication 125 MILLIGRAM(S): at 19:09

## 2021-02-23 RX ADMIN — Medication 3 MILLILITER(S): at 18:49

## 2021-02-23 RX ADMIN — Medication 100 MILLIGRAM(S): at 09:50

## 2021-02-23 RX ADMIN — Medication 5 MILLIGRAM(S): at 18:29

## 2021-02-23 RX ADMIN — LEVETIRACETAM 420 MILLIGRAM(S): 250 TABLET, FILM COATED ORAL at 17:56

## 2021-02-23 RX ADMIN — Medication 60 MILLIGRAM(S): at 20:38

## 2021-02-23 RX ADMIN — HEPARIN SODIUM 5000 UNIT(S): 5000 INJECTION INTRAVENOUS; SUBCUTANEOUS at 13:10

## 2021-02-23 RX ADMIN — Medication 200 MILLIGRAM(S): at 04:51

## 2021-02-23 RX ADMIN — Medication 2 MILLIGRAM(S): at 20:03

## 2021-02-23 RX ADMIN — Medication 100 MILLIGRAM(S): at 18:29

## 2021-02-23 RX ADMIN — Medication 3.81 MICROGRAM(S)/KG/MIN: at 06:10

## 2021-02-23 RX ADMIN — Medication 20 MILLIGRAM(S): at 20:04

## 2021-02-23 RX ADMIN — Medication 50 MILLIEQUIVALENT(S): at 06:38

## 2021-02-23 RX ADMIN — SENNA PLUS 2 TABLET(S): 8.6 TABLET ORAL at 22:21

## 2021-02-23 RX ADMIN — SODIUM CHLORIDE 30 MILLILITER(S): 5 INJECTION, SOLUTION INTRAVENOUS at 21:16

## 2021-02-23 RX ADMIN — Medication 5 MILLIGRAM(S): at 05:15

## 2021-02-23 RX ADMIN — POLYETHYLENE GLYCOL 3350 17 GRAM(S): 17 POWDER, FOR SOLUTION ORAL at 12:08

## 2021-02-23 RX ADMIN — Medication 0.2 MILLIGRAM(S): at 13:10

## 2021-02-23 RX ADMIN — Medication 1.91 MICROGRAM(S)/KG/MIN: at 05:35

## 2021-02-23 RX ADMIN — Medication 50 MILLIEQUIVALENT(S): at 17:57

## 2021-02-23 RX ADMIN — Medication 10 MILLIGRAM(S): at 12:08

## 2021-02-23 RX ADMIN — PANTOPRAZOLE SODIUM 40 MILLIGRAM(S): 20 TABLET, DELAYED RELEASE ORAL at 05:17

## 2021-02-23 RX ADMIN — Medication 5 MILLIGRAM(S): at 05:24

## 2021-02-23 RX ADMIN — Medication 0.2 MILLIGRAM(S): at 04:51

## 2021-02-23 RX ADMIN — Medication 30 MG/MIN: at 21:16

## 2021-02-23 NOTE — PROGRESS NOTE ADULT - SUBJECTIVE AND OBJECTIVE BOX
Patient is a 50y old  Male who presents with a chief complaint of Altered mental status (22 Feb 2021 09:07)        Over Night Events:  remains critically ill on MV.  Failed SBT yesterday          ROS:     All pertinent ROS are negative except HPI         PHYSICAL EXAM    ICU Vital Signs Last 24 Hrs  T(C): 37 (23 Feb 2021 04:00), Max: 37.6 (22 Feb 2021 16:00)  T(F): 98.6 (23 Feb 2021 04:00), Max: 99.6 (22 Feb 2021 16:00)  HR: 69 (23 Feb 2021 07:00) (64 - 115)  BP: --  BP(mean): --  ABP: 169/80 (23 Feb 2021 07:00) (134/78 - 220/94)  ABP(mean): 110 (23 Feb 2021 07:00) (90 - 121)  RR: 20 (23 Feb 2021 07:00) (17 - 27)  SpO2: 97% (23 Feb 2021 07:00) (97% - 100%)      CONSTITUTIONAL:   Ill appearing.  Well nourished.  NAD    ENT:   Airway patent,   Mouth with normal mucosa.   No thrush    EYES:   Pupils equal,   Round and reactive to light.    CARDIAC:   Normal rate,   Regular rhythm.    No edema      Vascular:  Normal systolic impulse  No Carotid bruits    RESPIRATORY:   No wheezing  Bilateral BS  Normal chest expansion  Not tachypneic,  No use of accessory muscles    GASTROINTESTINAL:  Abdomen soft,   Non-tender,   No guarding,   + BS    GENITOURINARY  normal genitalia for sex  no edema    MUSCULOSKELETAL:   Range of motion is not limited,  No clubbing, cyanosis    NEUROLOGICAL:   Sedated   Follows simple commands off sedation     SKIN:   Skin normal color for race,   Warm and dry  No evidence of rash.    PSYCHIATRIC:   Sedated   No apparent risk to self or others.    HEMATOLOGICAL:  No cervical  lymphadenopathy.  no inguinal lymphadenopathy      02-22-21 @ 07:01  -  02-23-21 @ 07:00  --------------------------------------------------------  IN:    Dexmedetomidine: 390.3 mL    Enteral Tube Flush: 80 mL    IV PiggyBack: 250 mL    Propofol: 408 mL    sodium chloride 3%: 910 mL    sodium chloride 3%: 650 mL    Vital High Protein: 140 mL  Total IN: 2828.3 mL    OUT:    Indwelling Catheter - Urethral (mL): 3310 mL    Rectal Tube (mL): 350 mL  Total OUT: 3660 mL    Total NET: -831.7 mL          LABS:                            11.8   8.91  )-----------( 423      ( 22 Feb 2021 05:24 )             36.0                                               02-22    147<H>  |  114<H>  |  11  ----------------------------<  102<H>  3.5   |  22  |  0.7    Ca    8.1<L>      22 Feb 2021 23:16  Phos  3.4     02-22  Mg     1.8     02-22    TPro  4.9<L>  /  Alb  2.7<L>  /  TBili  0.5  /  DBili  x   /  AST  19  /  ALT  25  /  AlkPhos  116<H>  02-22                                                                                           LIVER FUNCTIONS - ( 22 Feb 2021 23:16 )  Alb: 2.7 g/dL / Pro: 4.9 g/dL / ALK PHOS: 116 U/L / ALT: 25 U/L / AST: 19 U/L / GGT: x                                                                                               Mode: AC/ CMV (Assist Control/ Continuous Mandatory Ventilation)  RR (machine): 20  TV (machine): 420  FiO2: 30  PEEP: 8  ITime: 1  MAP: 13  PIP: 25                                      ABG - ( 23 Feb 2021 04:13 )  pH, Arterial: 7.41  pH, Blood: x     /  pCO2: 42    /  pO2: 105   / HCO3: 27    / Base Excess: 1.7   /  SaO2: 98                  MEDICATIONS  (STANDING):  amLODIPine   Tablet 10 milliGRAM(s) Oral daily  cefepime   IVPB 2000 milliGRAM(s) IV Intermittent every 12 hours  chlorhexidine 0.12% Liquid 15 milliLiter(s) Oral Mucosa every 12 hours  chlorhexidine 4% Liquid 1 Application(s) Topical <User Schedule>  cloNIDine 0.2 milliGRAM(s) Oral every 8 hours  dexMEDEtomidine Infusion 0.05 MICROgram(s)/kG/Hr (1.25 mL/Hr) IV Continuous <Continuous>  fenoldopam Infusion 0.02 MICROgram(s)/kG/Min (3.81 mL/Hr) IV Continuous <Continuous>  heparin   Injectable 5000 Unit(s) SubCutaneous every 8 hours  hydrALAZINE 50 milliGRAM(s) Oral every 6 hours  labetalol 200 milliGRAM(s) Oral every 8 hours  lactulose Syrup 10 Gram(s) Oral every 8 hours  levETIRAcetam  IVPB 500 milliGRAM(s) IV Intermittent every 12 hours  lisinopril 40 milliGRAM(s) Oral daily  metoclopramide 5 milliGRAM(s) Oral two times a day  mineral oil enema 133 milliLiter(s) Rectal daily  niCARdipine Infusion 15 mG/Hr (75 mL/Hr) IV Continuous <Continuous>  pantoprazole  Injectable 40 milliGRAM(s) IV Push every 12 hours  polyethylene glycol 3350 17 Gram(s) Oral daily  propofol Infusion 20 MICROgram(s)/kG/Min (12 mL/Hr) IV Continuous <Continuous>  senna 2 Tablet(s) Oral at bedtime  sodium chloride 3%. 500 milliLiter(s) (65 mL/Hr) IV Continuous <Continuous>  sodium chloride 3%. 500 milliLiter(s) (65 mL/Hr) IV Continuous <Continuous>  sodium chloride 3%. 500 milliLiter(s) (65 mL/Hr) IV Continuous <Continuous>  sodium chloride 3%. 500 milliLiter(s) (65 mL/Hr) IV Continuous <Continuous>    MEDICATIONS  (PRN):  acetaminophen   Tablet .. 650 milliGRAM(s) Oral every 6 hours PRN Temp greater or equal to 38C (100.4F)  LORazepam   Injectable 2 milliGRAM(s) IV Push every 4 hours PRN Agitation      New X-rays reviewed:                                                                                  ECHO    CXR interpreted by me:  ET OG OK>  No infiltrates

## 2021-02-23 NOTE — CHART NOTE - NSCHARTNOTEFT_GEN_A_CORE
- The patient was extubated at around 17:00  - Upon extubation saturation was 97%, On physical exam: GBAE  - At around 18:30 RN calls and informs that the patient has some abdominal breathing  - Patient seen and examined at bedside:   a. DBAE  b. Stridor suspected  c. Saturation: 97%    Case discussed with Critical care fellow on call Dr. Miller    Action/Plan:  a. Racemic epinephrine given  b. Duonebs session given   c. Methylprednisolone 125mg IV once given, chart reviewed, patient did not receive steroids prior to extubation and then 60mg IV q8hrs  d. AVAPs will be started, settings, TV: 450mL, FiO2: 80%, Max pressure: [id number], IPEP:EPEP 14:10  Will follow up  e. ABGs drawn ( Before AVAPs) and put in ice bag, RT informed, will follow up.    Note 2:   - Patient's blood pressure is still running high, maximum Nicardipine drip  - OG tube removed while extubated, no NG inserted  - Labetalol 10mg IV push given twice  - NG inserted now, Chest Xray checked --> Ok to use, oral medications started ( Hydralazine given)   - BP still elevated ( SBP dropped from 250mmHg to 200mmHg)  - WIll follow up - The patient was extubated at around 17:00  - Upon extubation saturation was 97%, On physical exam: GBAE  - At around 18:30 RN calls and informs that the patient has some abdominal breathing  - Patient seen and examined at bedside:   a. DBAE  b. Stridor suspected  c. Saturation: 97%    Case discussed with Critical care fellow on call Dr. Miller    Action/Plan:  a. Racemic epinephrine given  b. Duonebs session given   c. Methylprednisolone 125mg IV once given, chart reviewed, patient did not receive steroids prior to extubation and then 60mg IV q8hrs  d. AVAPs will be started, settings, TV: 450mL, FiO2: 80%, Max pressure: 37vhQ4J, IPEP:EPEP 14:10  Will follow up  e. ABGs drawn ( Before AVAPs) and put in ice bag, RT informed, will follow up.    Note 2:   - Patient's blood pressure is still running high, maximum Nicardipine drip  - OG tube removed while extubated, no NG inserted  - Labetalol 10mg IV push given twice  - NG inserted now, Chest Xray checked --> Ok to use, oral medications started ( Hydralazine given)   - BP still elevated ( SBP dropped from 250mmHg to 200mmHg)  - Will follow up    Follow up note:   The patient's blood pressure is still uncontrolled  - On AVAPs, will likely not be able to get PO meds for now  - Will start the patient on IV Enalapril 1.25mg IV q6hrs, can be increased to higher doses.   - Another option is Labetalol drip.  - Will follow up. Sign out will be given.

## 2021-02-23 NOTE — PROGRESS NOTE ADULT - SUBJECTIVE AND OBJECTIVE BOX
Interval events: off most sedation, EVD removed, been VERY hypertensive -200 range in spite of fenoldopam drip and oral agents      Exam:  intubated, on precedex  MSE: wakes, follows commands to show thumbs up and wiggle toes on right side, tracks bilaterally  CN: CHRISTOPHER 3mm, face symmetric, +cough  Motor: antigravity and purposeful on right side.  left side with weak withdrawal on arm and leg    Allergies: No Known Allergies      EXAM:  VITALS:  T(C): 36.4 (02-23-21 @ 08:00), Max: 37.6 (02-22-21 @ 16:00)  HR: 63 (02-23-21 @ 08:00) (63 - 115)  BP: --  RR: 20 (02-23-21 @ 08:00) (17 - 27)  SpO2: 98% (02-23-21 @ 08:00) (97% - 100%)    MEDICATIONS:  acetaminophen   Tablet .. 650 milliGRAM(s) Oral every 6 hours PRN  amLODIPine   Tablet 10 milliGRAM(s) Oral daily  chlorhexidine 0.12% Liquid 15 milliLiter(s) Oral Mucosa every 12 hours  chlorhexidine 4% Liquid 1 Application(s) Topical <User Schedule>  cloNIDine 0.2 milliGRAM(s) Oral every 8 hours  dexMEDEtomidine Infusion 0.05 MICROgram(s)/kG/Hr IV Continuous <Continuous>  fenoldopam Infusion 0.02 MICROgram(s)/kG/Min IV Continuous <Continuous>  heparin   Injectable 5000 Unit(s) SubCutaneous every 8 hours  hydrALAZINE 100 milliGRAM(s) Oral every 8 hours  labetalol 200 milliGRAM(s) Oral every 8 hours  lactulose Syrup 10 Gram(s) Oral every 8 hours  levETIRAcetam  IVPB 500 milliGRAM(s) IV Intermittent every 12 hours  lisinopril 40 milliGRAM(s) Oral daily  LORazepam   Injectable 2 milliGRAM(s) IV Push every 4 hours PRN  metoclopramide 5 milliGRAM(s) Oral two times a day  mineral oil enema 133 milliLiter(s) Rectal daily  niCARdipine Infusion 15 mG/Hr IV Continuous <Continuous>  pantoprazole  Injectable 40 milliGRAM(s) IV Push every 12 hours  polyethylene glycol 3350 17 Gram(s) Oral daily  propofol Infusion 20 MICROgram(s)/kG/Min IV Continuous <Continuous>  senna 2 Tablet(s) Oral at bedtime  sodium chloride 3%. 500 milliLiter(s) IV Continuous <Continuous>      I/O's    02-22-21 @ 07:01  -  02-23-21 @ 07:00  --------------------------------------------------------  IN: 2828.3 mL / OUT: 3660 mL / NET: -831.7 mL    02-23-21 @ 07:01  -  02-23-21 @ 10:01  --------------------------------------------------------  IN: 219.7 mL / OUT: 275 mL / NET: -55.3 mL        Ventilator data:  Mode: AC/ CMV (Assist Control/ Continuous Mandatory Ventilation), RR (machine): 20, TV (machine): 420, FiO2: 30, PEEP: 8, ITime: 1, MAP: 13, PIP: 25    LABS:                          11.4   6.79  )-----------( 397      ( 23 Feb 2021 04:30 )             35.4     02-23    152<H>  |  117<H>  |  12  ----------------------------<  105<H>  3.6   |  25  |  0.7    Ca    8.5      23 Feb 2021 04:30  Phos  3.4     02-23  Mg     1.9     02-23    TPro  5.1<L>  /  Alb  2.9<L>  /  TBili  0.4  /  DBili  x   /  AST  17  /  ALT  23  /  AlkPhos  108  02-23    PT/INR - ( 23 Feb 2021 04:30 )   PT: 14.10 sec;   INR: 1.23 ratio                 CAPILLARY BLOOD GLUCOSE

## 2021-02-23 NOTE — PROGRESS NOTE ADULT - ASSESSMENT
IMPRESSION:    Large ICB secondary to Hypertensive Emergency s/p EVD and rremoval  Intubated, for airway protection  Klebsiella Pneumonia pansensitive   Left posterior tibial & peroneal DVT    PLAN:    CNS:  SAT.  Neurology FU     HEENT: Oral care    PULMONARY:  HOB @ 45 degrees.  Vent changes:  keep plateau less than 30/ DP less than 15. FiO2 30%.  SBT     CARDIOVASCULAR:  Avoid volume overload.  BP control. 3% Hypertonic as per neurosurgery    GI: GI prophylaxis.  FU POG feeding for SBT    RENAL;  FU lytes.  Correct lytes as needed     INFECTIOUS DISEASE:  ABX per ID    HEMATOLOGICAL: DVT prophylaxis.  Repeat LE Duplex in AM     ENDOCRINE:  Follow up FS.  Insulin protocol if needed.    MUSCULOSKELETAL:  Bed rest    Prognosis poor   IMPRESSION:    Large ICB secondary to Hypertensive Emergency s/p EVD and rremoval  Intubated, for airway protection  Klebsiella Pneumonia pansensitive   Left posterior tibial & peroneal DVT    PLAN:    CNS:  SAT.  Neurology FU     HEENT: Oral care    PULMONARY:  HOB @ 45 degrees.  Vent changes:  keep plateau less than 30/ DP less than 15. FiO2 30%.  SBT,  Advance ET 2 cms if not extubated.     CARDIOVASCULAR:  Avoid volume overload.  BP control. 3% Hypertonic as per neurosurgery    GI: GI prophylaxis.  FU POG feeding for SBT    RENAL;  FU lytes.  Correct lytes as needed     INFECTIOUS DISEASE:  ABX per ID    HEMATOLOGICAL: DVT prophylaxis.  Repeat LE Duplex in AM     ENDOCRINE:  Follow up FS.  Insulin protocol if needed.    MUSCULOSKELETAL:  Bed rest    Prognosis poor

## 2021-02-23 NOTE — PROGRESS NOTE ADULT - ASSESSMENT
50/M with HTN, GIB, PUD, with R basal ganglia ICH with IVH and large perihematomal edema.  s/p EVD removal.    Recommendations:  -Needs urgently improved BP control - goal SBP<160 given risk of recurrent hemorrhage likely hypertensive in etiology  -awake and following commands, consider extubation today if tolerating SBT  -continue keppra 500mg BID  -Na: can decrease 3% drip, monitor Na BID, do not allow to fall rapidly or below 140, goal range 140-150 should be adequate at this time    Thomas Koroma MD  Attending Neurointensivist

## 2021-02-24 LAB
BASE EXCESS BLDA CALC-SCNC: 4.1 MMOL/L — HIGH (ref -2–2)
GAS PNL BLDA: SIGNIFICANT CHANGE UP
HCO3 BLDA-SCNC: 28 MMOL/L — HIGH (ref 23–27)
PCO2 BLDA: 40 MMHG — SIGNIFICANT CHANGE UP (ref 38–42)
PH BLDA: 7.46 — HIGH (ref 7.38–7.42)
PO2 BLDA: 297 MMHG — HIGH (ref 78–95)
SAO2 % BLDA: 98 % — SIGNIFICANT CHANGE UP (ref 94–98)

## 2021-02-24 PROCEDURE — 93970 EXTREMITY STUDY: CPT | Mod: 26

## 2021-02-24 PROCEDURE — 99233 SBSQ HOSP IP/OBS HIGH 50: CPT

## 2021-02-24 PROCEDURE — 71045 X-RAY EXAM CHEST 1 VIEW: CPT | Mod: 26

## 2021-02-24 RX ORDER — LISINOPRIL 2.5 MG/1
40 TABLET ORAL DAILY
Refills: 0 | Status: DISCONTINUED | OUTPATIENT
Start: 2021-02-24 | End: 2021-03-08

## 2021-02-24 RX ADMIN — AMLODIPINE BESYLATE 10 MILLIGRAM(S): 2.5 TABLET ORAL at 05:02

## 2021-02-24 RX ADMIN — Medication 1.25 MILLIGRAM(S): at 05:03

## 2021-02-24 RX ADMIN — Medication 0.2 MILLIGRAM(S): at 05:02

## 2021-02-24 RX ADMIN — HEPARIN SODIUM 5000 UNIT(S): 5000 INJECTION INTRAVENOUS; SUBCUTANEOUS at 13:33

## 2021-02-24 RX ADMIN — LACTULOSE 10 GRAM(S): 10 SOLUTION ORAL at 22:12

## 2021-02-24 RX ADMIN — Medication 5 MILLIGRAM(S): at 18:46

## 2021-02-24 RX ADMIN — Medication 1.25 MILLIGRAM(S): at 00:50

## 2021-02-24 RX ADMIN — Medication 100 MILLIGRAM(S): at 13:33

## 2021-02-24 RX ADMIN — SODIUM CHLORIDE 30 MILLILITER(S): 5 INJECTION, SOLUTION INTRAVENOUS at 11:21

## 2021-02-24 RX ADMIN — LACTULOSE 10 GRAM(S): 10 SOLUTION ORAL at 05:03

## 2021-02-24 RX ADMIN — NICARDIPINE HYDROCHLORIDE 75 MG/HR: 30 CAPSULE, EXTENDED RELEASE ORAL at 11:20

## 2021-02-24 RX ADMIN — CHLORHEXIDINE GLUCONATE 1 APPLICATION(S): 213 SOLUTION TOPICAL at 05:04

## 2021-02-24 RX ADMIN — LEVETIRACETAM 420 MILLIGRAM(S): 250 TABLET, FILM COATED ORAL at 05:01

## 2021-02-24 RX ADMIN — SODIUM CHLORIDE 30 MILLILITER(S): 5 INJECTION, SOLUTION INTRAVENOUS at 05:01

## 2021-02-24 RX ADMIN — LEVETIRACETAM 420 MILLIGRAM(S): 250 TABLET, FILM COATED ORAL at 18:47

## 2021-02-24 RX ADMIN — Medication 0.2 MILLIGRAM(S): at 13:33

## 2021-02-24 RX ADMIN — Medication 100 MILLIGRAM(S): at 22:12

## 2021-02-24 RX ADMIN — PANTOPRAZOLE SODIUM 40 MILLIGRAM(S): 20 TABLET, DELAYED RELEASE ORAL at 05:04

## 2021-02-24 RX ADMIN — HEPARIN SODIUM 5000 UNIT(S): 5000 INJECTION INTRAVENOUS; SUBCUTANEOUS at 05:03

## 2021-02-24 RX ADMIN — PANTOPRAZOLE SODIUM 40 MILLIGRAM(S): 20 TABLET, DELAYED RELEASE ORAL at 18:47

## 2021-02-24 RX ADMIN — LISINOPRIL 40 MILLIGRAM(S): 2.5 TABLET ORAL at 11:20

## 2021-02-24 RX ADMIN — LACTULOSE 10 GRAM(S): 10 SOLUTION ORAL at 13:32

## 2021-02-24 RX ADMIN — Medication 0.2 MILLIGRAM(S): at 22:12

## 2021-02-24 RX ADMIN — Medication 30 MG/MIN: at 11:21

## 2021-02-24 RX ADMIN — NICARDIPINE HYDROCHLORIDE 75 MG/HR: 30 CAPSULE, EXTENDED RELEASE ORAL at 05:02

## 2021-02-24 RX ADMIN — SENNA PLUS 2 TABLET(S): 8.6 TABLET ORAL at 22:12

## 2021-02-24 RX ADMIN — Medication 100 MILLIGRAM(S): at 05:02

## 2021-02-24 RX ADMIN — Medication 60 MILLIGRAM(S): at 05:03

## 2021-02-24 RX ADMIN — Medication 30 MG/MIN: at 05:01

## 2021-02-24 RX ADMIN — POLYETHYLENE GLYCOL 3350 17 GRAM(S): 17 POWDER, FOR SOLUTION ORAL at 11:20

## 2021-02-24 RX ADMIN — HEPARIN SODIUM 5000 UNIT(S): 5000 INJECTION INTRAVENOUS; SUBCUTANEOUS at 22:12

## 2021-02-24 RX ADMIN — Medication 5 MILLIGRAM(S): at 05:02

## 2021-02-24 NOTE — PROGRESS NOTE ADULT - SUBJECTIVE AND OBJECTIVE BOX
Patient is a 50y old  Male who presents with a chief complaint of Altered mental status (23 Feb 2021 09:59)        Over Night Events: Remains on Cardene and labetalol gtt, 3% at 30cc      PHYSICAL EXAM    ICU Vital Signs Last 24 Hrs  T(C): 36.8 (24 Feb 2021 08:00), Max: 37.4 (23 Feb 2021 20:00)  T(F): 98.3 (24 Feb 2021 08:00), Max: 99.3 (23 Feb 2021 20:00)  HR: 78 (24 Feb 2021 09:00) (66 - 130)  BP: 150/74 (24 Feb 2021 09:00) (138/80 - 183/104)  BP(mean): 102 (24 Feb 2021 09:00) (97 - 136)  ABP: 153/68 (24 Feb 2021 06:00) (147/64 - 221/105)  ABP(mean): 87 (24 Feb 2021 06:00) (86 - 134)  RR: 15 (24 Feb 2021 09:00) (14 - 24)  SpO2: 98% 3L NC (24 Feb 2021 09:00) (96% - 100%)      CONSTITUTIONAL:  Well nourished.  NAD    ENT:   Airway patent,   Mouth with normal mucosa.   No thrush    EYES:   Pupils equal,   Round and reactive to light.    CARDIAC:   Normal rate,   Regular rhythm.    No edema    Vascular:  Normal systolic impulse  No Carotid bruits    RESPIRATORY:   No wheezing  Bilateral BS  Normal chest expansion  Not tachypneic,  No use of accessory muscles    GASTROINTESTINAL:  Abdomen soft,   No guarding,   + BS    GENITOURINARY  no edema    MUSCULOSKELETAL:   No clubbing, cyanosis    NEUROLOGICAL:   Arousable. Follows simple commands  pertinent DTRs normal    SKIN:   Skin normal color for race  Warm and dry and intact.   No evidence of rash.    PSYCHIATRIC:   No apparent risk to self or others.    HEME LYMPH:   No cervical  lymphadenopathy.  no inguinal lymphadenopathy      02-23-21 @ 07:01  -  02-24-21 @ 07:00  --------------------------------------------------------  IN:    Dexmedetomidine: 225.7 mL    Enteral Tube Flush: 210 mL    Fenoldopam: 11.4 mL    IV PiggyBack: 200 mL    IV PiggyBack: 100 mL    Labetalol: 678 mL    NiCARdipine: 1575 mL    Propofol: 26.7 mL    sodium chloride 3%: 130 mL    sodium chloride 3%: 660 mL    Vital High Protein: 30 mL  Total IN: 3846.8 mL    OUT:    Indwelling Catheter - Urethral (mL): 5126 mL    Rectal Tube (mL): 150 mL  Total OUT: 5276 mL    Total NET: -1429.2 mL      02-24-21 @ 07:01  -  02-24-21 @ 09:39  --------------------------------------------------------  IN:    Labetalol: 144 mL    NiCARdipine: 225 mL    sodium chloride 3%: 90 mL  Total IN: 459 mL    OUT:    Dexmedetomidine: 0 mL    Indwelling Catheter - Urethral (mL): 200 mL    Propofol: 0 mL  Total OUT: 200 mL    Total NET: 259 mL    LABS:                  11.4   6.79  )-----------( 397      ( 23 Feb 2021 04:30 )             35.4                                         02-23    146  |  108  |  10  ----------------------------<  140<H>  3.9   |  24  |  0.6<L>    Ca    8.8      23 Feb 2021 21:57  Phos  3.4     02-23  Mg     1.9     02-23    TPro  6.4  /  Alb  3.2<L>  /  TBili  0.7  /  DBili  x   /  AST  25  /  ALT  30  /  AlkPhos  143<H>  02-23      PT/INR - ( 23 Feb 2021 04:30 )   PT: 14.10 sec;   INR: 1.23 ratio                                                LIVER FUNCTIONS - ( 23 Feb 2021 21:57 )  Alb: 3.2 g/dL / Pro: 6.4 g/dL / ALK PHOS: 143 U/L / ALT: 30 U/L / AST: 25 U/L / GGT: x                                                                                                                               ABG - ( 24 Feb 2021 03:16 )  pH, Arterial: 7.46  pH, Blood: x     /  pCO2: 40    /  pO2: 297   / HCO3: 28    / Base Excess: 4.1   /  SaO2: 98            MEDICATIONS  (STANDING):  amLODIPine   Tablet 10 milliGRAM(s) Oral daily  chlorhexidine 4% Liquid 1 Application(s) Topical <User Schedule>  cloNIDine 0.2 milliGRAM(s) Oral every 8 hours  dexMEDEtomidine Infusion 0.05 MICROgram(s)/kG/Hr (1.25 mL/Hr) IV Continuous <Continuous>  enalaprilat Injectable 1.25 milliGRAM(s) IV Push every 6 hours  heparin   Injectable 5000 Unit(s) SubCutaneous every 8 hours  hydrALAZINE 100 milliGRAM(s) Oral every 8 hours  labetalol Infusion 0.5 mG/Min (30 mL/Hr) IV Continuous <Continuous>  lactulose Syrup 10 Gram(s) Oral every 8 hours  levETIRAcetam  IVPB 500 milliGRAM(s) IV Intermittent every 12 hours  methylPREDNISolone sodium succinate Injectable 60 milliGRAM(s) IV Push every 8 hours  metoclopramide 5 milliGRAM(s) Oral two times a day  mineral oil enema 133 milliLiter(s) Rectal daily  niCARdipine Infusion 15 mG/Hr (75 mL/Hr) IV Continuous <Continuous>  pantoprazole  Injectable 40 milliGRAM(s) IV Push every 12 hours  polyethylene glycol 3350 17 Gram(s) Oral daily  propofol Infusion 20 MICROgram(s)/kG/Min (12 mL/Hr) IV Continuous <Continuous>  senna 2 Tablet(s) Oral at bedtime  sodium chloride 3%. 500 milliLiter(s) (30 mL/Hr) IV Continuous <Continuous>    MEDICATIONS  (PRN):  acetaminophen   Tablet .. 650 milliGRAM(s) Oral every 6 hours PRN Temp greater or equal to 38C (100.4F)  LORazepam   Injectable 2 milliGRAM(s) IV Push every 4 hours PRN Agitation

## 2021-02-24 NOTE — OCCUPATIONAL THERAPY INITIAL EVALUATION ADULT - COGNITIVE, VISUAL PERCEPTUAL, OT EVAL
Pt will be oriented to self and place with yes no answers 50% time, Pt will visually track to midline 50% of time.

## 2021-02-24 NOTE — PROGRESS NOTE ADULT - ASSESSMENT
IMPRESSION:    Large ICB secondary to Hypertensive Emergency s/p EVD and removal  Intubated, for airway protection  Klebsiella Pneumonia pansensitive   Left posterior tibial & peroneal DVT    PLAN:    CNS:  SAT.  Neurology FU     HEENT: Oral care    PULMONARY:  HOB @ 45 degrees.  Aspiration precautions    CARDIOVASCULAR:  Avoid volume overload.  BP control. 3% Hypertonic as per neurosurgery    GI: GI prophylaxis.  NG feeding    RENAL;  FU lytes.  Correct lytes as needed     INFECTIOUS DISEASE:  ABX per ID    HEMATOLOGICAL: DVT prophylaxis.  Repeat LE Duplex in AM     ENDOCRINE:  Follow up FS.  Insulin protocol if needed. D/C solumedrol     MUSCULOSKELETAL:  Bed rest    Prognosis poor   IMPRESSION:    Large ICB secondary to Hypertensive Emergency s/p EVD and removal  Intubated, for airway protection now extubated   Klebsiella Pneumonia pansensitive   Left posterior tibial & peroneal DVT    PLAN:    CNS:  SAT.  Neurology FU     HEENT: Oral care    PULMONARY:  HOB @ 45 degrees.  Aspiration precautions    CARDIOVASCULAR:  Avoid volume overload.  BP control. 3% Hypertonic as per neurosurgery    GI: GI prophylaxis.  NG feeding    RENAL;  FU lytes.  Correct lytes as needed     INFECTIOUS DISEASE:  ABX per ID    HEMATOLOGICAL: DVT prophylaxis.  Repeat LE Duplex      ENDOCRINE:  Follow up FS.  Insulin protocol if needed. D/C solumedrol     MUSCULOSKELETAL:  Bed rest    Prognosis poor

## 2021-02-24 NOTE — PROGRESS NOTE ADULT - SUBJECTIVE AND OBJECTIVE BOX
Neurocritical Care Progress Note: patient is extubated, awake,  awake follows one step command, BP is controlled with IV labetalol and Nicardipine.      1. Brief Presentation: LHP and dysarthria    2. Today's Acute Problems:   Hypertension, global weakness , left hemineglect    3. Relevant brief History:50 years old right handed Male with PMHx of HTN, GI bleed, PUD,  Mrankin score of 0 at baseline was brought in by EMS for  AMS and left sided weakness.   Wife states that patient was having a usual day today went to bathroom and then the next thing she noted was that he was confused and was lying on the bathroom floor, she reports urinary incontinence, patient was confused, had trouble speaking, and could not move his left side.     In the ED, /148, HR 80, saturating well. Code stroke called, NIHSS 24 and GCS 11, CTH showed Moderate to large intraparenchymal right basal ganglia hemorrhage with mild surrounding edema and associated right sulcal effacement as well as decompression into the ventricles. There is approximately 6.3 mm of midline shift. CTA did not show any evidence of active bleeding, AVM or aneurysm or major vascular stenosis or occlusion. Pt became unresponsive in CT scan and had to be intubated and sedated. Patient was intubated in ED for worsening mental status and GCS. Started on Nicardipine drip for sbp in 200s and EVD was placed by neurosurgery at bedside. Pan trauma scan was negative. To be admitted under ICU for further monitoring.  (27 Jan 2021 23:13)        4-Yesterday's Plan:    5. Last 24 hour updates:    6. Medications:   amLODIPine   Tablet 10 milliGRAM(s) Oral daily  chlorhexidine 4% Liquid 1 Application(s) Topical <User Schedule>  cloNIDine 0.2 milliGRAM(s) Oral every 8 hours  dexMEDEtomidine Infusion 0.05 MICROgram(s)/kG/Hr IV Continuous <Continuous>  heparin   Injectable 5000 Unit(s) SubCutaneous every 8 hours  hydrALAZINE 100 milliGRAM(s) Oral every 8 hours  labetalol Infusion 0.5 mG/Min IV Continuous <Continuous>  lactulose Syrup 10 Gram(s) Oral every 8 hours  levETIRAcetam  IVPB 500 milliGRAM(s) IV Intermittent every 12 hours  lisinopril 40 milliGRAM(s) Oral daily  metoclopramide 5 milliGRAM(s) Oral two times a day  mineral oil enema 133 milliLiter(s) Rectal daily  niCARdipine Infusion 15 mG/Hr IV Continuous <Continuous>  pantoprazole  Injectable 40 milliGRAM(s) IV Push every 12 hours  polyethylene glycol 3350 17 Gram(s) Oral daily  propofol Infusion 20 MICROgram(s)/kG/Min IV Continuous <Continuous>  senna 2 Tablet(s) Oral at bedtime  sodium chloride 3%. 500 milliLiter(s) IV Continuous <Continuous>      7. Ancillary Management:   Chest PT[ ]   Head of bed >35 [ ]   Out of bed to chair [ ]   PT/OT/SP Eval [ ]   Spirometry[ ]   DVT prophalaxis[ ]    8.Neuro:   Awake: Spontaneously[x ] Occasionally[ ] To Voice [ ] To painful stimuli [ ]   AIert [ ]. Following commands: 3 steps[ ], 2 steps[ ], 1 step [x ], None [ ]   Orientation: 0[ ], 1[ ], 2[ ], 3[ ]. Tracking objects with eyes: [x ]   Language: mute,   Time off sedation for exam: N/A  Pupils: Right   >3   Left    >3      Corneal: +     Gag reflex:+     EOMI:left gaze palsy  Motor: trace movement on the right, no movement on the left  Sensory: brisk withdrawal on the right        mRS:  0 No symptoms at all  1 No significant disability despite symptoms; able to carry out all usual duties and activities without assistance  2 Slight disability; unable to carry out all previous activities, but able to look after own affairs  3 Moderate disability; requiring some help, but able to walk without assistance  4 Moderately severe disability; unable to walk without assistance and unable to attend to own bodily needs without assistance  5 Severe disability; bedridden, incontinent and requiring constant nursing care and attention  6 Dead    ICHs:  Age >=80  GCS Score: 3-4 (2 pts)   GCS Score: 5-12 (1 pt)   ICH Volume: >30  (+) IVH  (+) Infratentorial    .  Last CTH: < from: CT Head No Cont (02.22.21 @ 16:18) >    EXAM:  CT BRAIN            PROCEDURE DATE:  02/22/2021            INTERPRETATION:  Clinical History / Reason for exam: Follow-up brain edema and intracranial hemorrhage.    CT SCAN OF THE BRAIN WITHOUT CONTRAST    TECHNIQUE:    Multiple transaxial noncontrast CT images of the brain were obtained from base to vertex. Sagittal and coronal reformatted images were submitted.    COMPARISON:    Noncontrast CT scan of the brain dated February 19, 2021.    FINDINGS:    The patient is status post a leftfrontal fish hole. Small hypodensity in the left frontal lobe adjacent to the fish hole site consistent with postoperative changes.    Again demonstrated is a moderate-sized right frontal temporal hemorrhage extending into the right basal ganglia with a large amount of surrounding edema. The etiology of the hemorrhage is uncertain at the present time. The possibility of an underlying lesion should be considered and a follow-up to resolution followed by postcontrast examination is recommended.    There is associated mass effect with effacement of the body of the right lateral ventricle, third ventricle, and cortical sulci in the right cerebral hemisphere. There is mild shift of the third and lateral ventricles to the left of midline. The left lateral ventricle is dilated consistent with mild hydrocephalus.    The fourth ventricle is normal in size and position.    There is questionable diminished attenuation in the right parietal lobe, follow-up examination is recommended to exclude acute ischemic change.    Mucosal thickening in the bilateral sphenoid, bilateral maxillary, and left ethmoid sinuses.    Almost complete opacification of the mastoid air cells and middle ear canals consistent with inflammation or infection.    IMPRESSION:    In comparison with the prior CT scan of the brain dated February 19, 2021:    Again demonstrated is a parenchymal hemorrhage with surrounding edema in the right frontal temporal region and right basal ganglia with associated mass effect and shift, there has been a mild decrease in the overall size of the hemorrhage, surrounding edema, mass effect and shift. The etiology of the hemorrhage is uncertain at the present time and follow-up to resolution followed by postcontrast examination is recommended to exclude an underlying lesion.    Possible interval development of diminished attenuation in the right parietal lobe, the possibility of acute ischemic change should be considered and a follow-up examination is recommended.    Spoke with Dr STOCKTON on 2/22/2021 4:34 PM with readback.      < end of copied text >      Last CTA/MRA:    Last CTP:    Last MRI:    Last TCD:    Last EEG:    EVD: [ ] [place]: [ ]     ICP:     CPP:     Level(cm):     24hr(ml):     CSF:     W:     R:     C:     P:     G:     LA:    9. Cardiovascular:   Vital Signs Last 24 Hrs  T(C): 36.7 (24 Feb 2021 12:00), Max: 37.4 (23 Feb 2021 20:00)  T(F): 98.1 (24 Feb 2021 12:00), Max: 99.3 (23 Feb 2021 20:00)  HR: 80 (24 Feb 2021 12:00) (76 - 112)  BP: 145/76 (24 Feb 2021 12:00) (138/80 - 183/104)  BP(mean): 102 (24 Feb 2021 12:00) (97 - 136)  RR: 20 (24 Feb 2021 12:00) (14 - 22)  SpO2: 97% (24 Feb 2021 12:00) (97% - 100%)     Last Echo:    Last EKG:    CVP   MAP/CPP/SBP target:   CO:      CI:       Enzymes/Trop:    10. Respiratory:   ABG:ABG - ( 24 Feb 2021 03:16 )  pH, Arterial: 7.46  pH, Blood: x     /  pCO2: 40    /  pO2: 297   / HCO3: 28    / Base Excess: 4.1   /  SaO2: 98            VBG:    Chest Xray:        Peak Pressure/[place] Pressure:    11.GI:    Prophalaxis:     Bowel mvt:     Abd distension:   LIVER FUNCTIONS - ( 23 Feb 2021 21:57 )  Alb: 3.2 g/dL / Pro: 6.4 g/dL / ALK PHOS: 143 U/L / ALT: 30 U/L / AST: 25 U/L / GGT: x             12.Renal/Fluids/Electrolytes:    02-23    146  |  108  |  10  ----------------------------<  140<H>  3.9   |  24  |  0.6<L>    Ca    8.8      23 Feb 2021 21:57  Phos  3.4     02-23  Mg     1.9     02-23    TPro  6.4  /  Alb  3.2<L>  /  TBili  0.7  /  DBili  x   /  AST  25  /  ALT  30  /  AlkPhos  143<H>  02-23      I&O's Detail    23 Feb 2021 07:01  -  24 Feb 2021 07:00  --------------------------------------------------------  IN:    Dexmedetomidine: 225.7 mL    Enteral Tube Flush: 210 mL    Fenoldopam: 11.4 mL    IV PiggyBack: 200 mL    IV PiggyBack: 100 mL    Labetalol: 678 mL    NiCARdipine: 1575 mL    Propofol: 26.7 mL    sodium chloride 3%: 130 mL    sodium chloride 3%: 660 mL    Vital High Protein: 30 mL  Total IN: 3846.8 mL    OUT:    Indwelling Catheter - Urethral (mL): 5126 mL    Rectal Tube (mL): 150 mL  Total OUT: 5276 mL    Total NET: -1429.2 mL      24 Feb 2021 07:01  -  24 Feb 2021 19:37  --------------------------------------------------------  IN:    Enteral Tube Flush: 200 mL    Labetalol: 576 mL    NiCARdipine: 865 mL    sodium chloride 3%: 360 mL  Total IN: 2001 mL    OUT:    Dexmedetomidine: 0 mL    Indwelling Catheter - Urethral (mL): 720 mL    Propofol: 0 mL  Total OUT: 720 mL    Total NET: 1281 mL          13.ID:   TMax:   Vital Signs Last 24 Hrs  T(C): 36.7 (24 Feb 2021 12:00), Max: 37.4 (23 Feb 2021 20:00)  T(F): 98.1 (24 Feb 2021 12:00), Max: 99.3 (23 Feb 2021 20:00)  HR: 80 (24 Feb 2021 12:00) (76 - 112)  BP: 145/76 (24 Feb 2021 12:00) (138/80 - 183/104)  BP(mean): 102 (24 Feb 2021 12:00) (97 - 136)  RR: 20 (24 Feb 2021 12:00) (14 - 22)  SpO2: 97% (24 Feb 2021 12:00) (97% - 100%)           Lines: Central[] Date inserted: Peripheral[]    14. Hematology:                         11.4   6.79  )-----------( 397      ( 23 Feb 2021 04:30 )             35.4      02-23    146  |  108  |  10  ----------------------------<  140<H>  3.9   |  24  |  0.6<L>    Ca    8.8      23 Feb 2021 21:57  Phos  3.4     02-23  Mg     1.9     02-23    TPro  6.4  /  Alb  3.2<L>  /  TBili  0.7  /  DBili  x   /  AST  25  /  ALT  30  /  AlkPhos  143<H>  02-23     PT/INR - ( 23 Feb 2021 04:30 )   PT: 14.10 sec;   INR: 1.23 ratio             DVT Prophylaxis Lovenox[ ] Heparin[ ] Venodynes[ ] SCD's[ ]    15. Impression:50/M with HTN, GIB, PUD, with R basal ganglia ICH with IVH and large perihematomal edema.  s/p EVD removal. Extubated, awake follows one step command, BP is controlled with IV labetalol and Nicardipine.    Recommendations:    -continue keppra 500mg BID  -Na: can decrease 3% drip, monitor Na BID, do not allow to fall rapidly or below 140, goal range 140-150 should be adequate at this time  -Continue BP control  -PT/OT orders  -vertical boots to correct foot drop          Plan discussed with neuroattending Dr. Koroma        17. Disposition:

## 2021-02-24 NOTE — OCCUPATIONAL THERAPY INITIAL EVALUATION ADULT - PATIENT PROFILE REVIEW, REHAB EVAL
OT reviewed pt's chart prior to evaluation. Pt agreeable to OT evaluation. Pt seen from 13:15-13:45/yes

## 2021-02-24 NOTE — OCCUPATIONAL THERAPY INITIAL EVALUATION ADULT - GENERAL OBSERVATIONS, REHAB EVAL
Pt received supine in bed with HOB elevated, +call bell, +bed alarm, +Right IV lock, Left central Line, Left radial A-line with Arm board, +tele monitoring, +NG Tube, +[place], +[place], +3L O2 NC, b/l sequentials with heels off loaded. Pt left as received with all lines intact.

## 2021-02-24 NOTE — OCCUPATIONAL THERAPY INITIAL EVALUATION ADULT - PERTINENT HX OF CURRENT PROBLEM, REHAB EVAL
50 years old right handed Male with PMHx of HTN, GI bleed, PUD,  Mrankin score of 0 at baseline was brought in by EMS for  AMS and left sided weakness. In the ED, /148, HR 80, saturating well. Code stroke called, NIHSS 24 and GCS 11, CTH showed Moderate to large intraparenchymal right basal ganglia hemorrhage with mild surrounding edema and associated right sulcal effacement as well as decompression into the ventricles.

## 2021-02-24 NOTE — OCCUPATIONAL THERAPY INITIAL EVALUATION ADULT - LEVEL OF INDEPENDENCE: SUPINE/SIT, REHAB EVAL
2* to presence of multiple lines and tubes preventing/ restricting safe assessment at this time./unable to perform

## 2021-02-25 LAB
ALBUMIN SERPL ELPH-MCNC: 2.8 G/DL — LOW (ref 3.5–5.2)
ALBUMIN SERPL ELPH-MCNC: 3.1 G/DL — LOW (ref 3.5–5.2)
ALP SERPL-CCNC: 104 U/L — SIGNIFICANT CHANGE UP (ref 30–115)
ALP SERPL-CCNC: 105 U/L — SIGNIFICANT CHANGE UP (ref 30–115)
ALT FLD-CCNC: 24 U/L — SIGNIFICANT CHANGE UP (ref 0–41)
ALT FLD-CCNC: 25 U/L — SIGNIFICANT CHANGE UP (ref 0–41)
ANION GAP SERPL CALC-SCNC: 7 MMOL/L — SIGNIFICANT CHANGE UP (ref 7–14)
ANION GAP SERPL CALC-SCNC: 8 MMOL/L — SIGNIFICANT CHANGE UP (ref 7–14)
AST SERPL-CCNC: 16 U/L — SIGNIFICANT CHANGE UP (ref 0–41)
AST SERPL-CCNC: 18 U/L — SIGNIFICANT CHANGE UP (ref 0–41)
BASOPHILS # BLD AUTO: 0.01 K/UL — SIGNIFICANT CHANGE UP (ref 0–0.2)
BASOPHILS NFR BLD AUTO: 0.1 % — SIGNIFICANT CHANGE UP (ref 0–1)
BILIRUB SERPL-MCNC: 0.5 MG/DL — SIGNIFICANT CHANGE UP (ref 0.2–1.2)
BILIRUB SERPL-MCNC: 0.5 MG/DL — SIGNIFICANT CHANGE UP (ref 0.2–1.2)
BUN SERPL-MCNC: 16 MG/DL — SIGNIFICANT CHANGE UP (ref 10–20)
BUN SERPL-MCNC: 17 MG/DL — SIGNIFICANT CHANGE UP (ref 10–20)
CALCIUM SERPL-MCNC: 7.7 MG/DL — LOW (ref 8.5–10.1)
CALCIUM SERPL-MCNC: 8.5 MG/DL — SIGNIFICANT CHANGE UP (ref 8.5–10.1)
CHLORIDE SERPL-SCNC: 109 MMOL/L — SIGNIFICANT CHANGE UP (ref 98–110)
CHLORIDE SERPL-SCNC: 113 MMOL/L — HIGH (ref 98–110)
CO2 SERPL-SCNC: 28 MMOL/L — SIGNIFICANT CHANGE UP (ref 17–32)
CO2 SERPL-SCNC: 29 MMOL/L — SIGNIFICANT CHANGE UP (ref 17–32)
CREAT ?TM UR-MCNC: 38 MG/DL — SIGNIFICANT CHANGE UP
CREAT SERPL-MCNC: 0.6 MG/DL — LOW (ref 0.7–1.5)
CREAT SERPL-MCNC: 0.6 MG/DL — LOW (ref 0.7–1.5)
EOSINOPHIL # BLD AUTO: 0 K/UL — SIGNIFICANT CHANGE UP (ref 0–0.7)
EOSINOPHIL NFR BLD AUTO: 0 % — SIGNIFICANT CHANGE UP (ref 0–8)
GLUCOSE SERPL-MCNC: 141 MG/DL — HIGH (ref 70–99)
GLUCOSE SERPL-MCNC: 163 MG/DL — HIGH (ref 70–99)
HCT VFR BLD CALC: 36.7 % — LOW (ref 42–52)
HGB BLD-MCNC: 12.5 G/DL — LOW (ref 14–18)
IMM GRANULOCYTES NFR BLD AUTO: 1.3 % — HIGH (ref 0.1–0.3)
LYMPHOCYTES # BLD AUTO: 1 K/UL — LOW (ref 1.2–3.4)
LYMPHOCYTES # BLD AUTO: 12.2 % — LOW (ref 20.5–51.1)
MAGNESIUM SERPL-MCNC: 2 MG/DL — SIGNIFICANT CHANGE UP (ref 1.8–2.4)
MCHC RBC-ENTMCNC: 31.2 PG — HIGH (ref 27–31)
MCHC RBC-ENTMCNC: 34.1 G/DL — SIGNIFICANT CHANGE UP (ref 32–37)
MCV RBC AUTO: 91.5 FL — SIGNIFICANT CHANGE UP (ref 80–94)
MONOCYTES # BLD AUTO: 0.74 K/UL — HIGH (ref 0.1–0.6)
MONOCYTES NFR BLD AUTO: 9 % — SIGNIFICANT CHANGE UP (ref 1.7–9.3)
NEUTROPHILS # BLD AUTO: 6.37 K/UL — SIGNIFICANT CHANGE UP (ref 1.4–6.5)
NEUTROPHILS NFR BLD AUTO: 77.4 % — HIGH (ref 42.2–75.2)
NRBC # BLD: 0 /100 WBCS — SIGNIFICANT CHANGE UP (ref 0–0)
OSMOLALITY SERPL: 317 MOS/KG — HIGH (ref 275–300)
OSMOLALITY UR: 587 MOS/KG — SIGNIFICANT CHANGE UP (ref 50–1200)
PHOSPHATE SERPL-MCNC: 2.7 MG/DL — SIGNIFICANT CHANGE UP (ref 2.1–4.9)
PLATELET # BLD AUTO: 426 K/UL — HIGH (ref 130–400)
POTASSIUM SERPL-MCNC: 3.1 MMOL/L — LOW (ref 3.5–5)
POTASSIUM SERPL-MCNC: 3.7 MMOL/L — SIGNIFICANT CHANGE UP (ref 3.5–5)
POTASSIUM SERPL-SCNC: 3.1 MMOL/L — LOW (ref 3.5–5)
POTASSIUM SERPL-SCNC: 3.7 MMOL/L — SIGNIFICANT CHANGE UP (ref 3.5–5)
PROT ?TM UR-MCNC: 13 MG/DLG/24H — SIGNIFICANT CHANGE UP
PROT ?TM UR-MCNC: 13 MG/DLG/24H — SIGNIFICANT CHANGE UP
PROT SERPL-MCNC: 5.4 G/DL — LOW (ref 6–8)
PROT SERPL-MCNC: 5.6 G/DL — LOW (ref 6–8)
PROT/CREAT UR-RTO: 0.3 RATIO — HIGH (ref 0–0.2)
RBC # BLD: 4.01 M/UL — LOW (ref 4.7–6.1)
RBC # FLD: 12.9 % — SIGNIFICANT CHANGE UP (ref 11.5–14.5)
SODIUM SERPL-SCNC: 145 MMOL/L — SIGNIFICANT CHANGE UP (ref 135–146)
SODIUM SERPL-SCNC: 149 MMOL/L — HIGH (ref 135–146)
SODIUM UR-SCNC: 201 MMOL/L — SIGNIFICANT CHANGE UP
UUN UR-MCNC: 457 MG/DL — SIGNIFICANT CHANGE UP
WBC # BLD: 8.23 K/UL — SIGNIFICANT CHANGE UP (ref 4.8–10.8)
WBC # FLD AUTO: 8.23 K/UL — SIGNIFICANT CHANGE UP (ref 4.8–10.8)

## 2021-02-25 PROCEDURE — 71045 X-RAY EXAM CHEST 1 VIEW: CPT | Mod: 26

## 2021-02-25 PROCEDURE — 99233 SBSQ HOSP IP/OBS HIGH 50: CPT

## 2021-02-25 RX ORDER — SODIUM CHLORIDE 5 G/100ML
500 INJECTION, SOLUTION INTRAVENOUS
Refills: 0 | Status: DISCONTINUED | OUTPATIENT
Start: 2021-02-25 | End: 2021-02-26

## 2021-02-25 RX ORDER — POTASSIUM CHLORIDE 20 MEQ
40 PACKET (EA) ORAL EVERY 4 HOURS
Refills: 0 | Status: COMPLETED | OUTPATIENT
Start: 2021-02-25 | End: 2021-02-25

## 2021-02-25 RX ADMIN — LACTULOSE 10 GRAM(S): 10 SOLUTION ORAL at 06:48

## 2021-02-25 RX ADMIN — Medication 5 MILLIGRAM(S): at 18:48

## 2021-02-25 RX ADMIN — PANTOPRAZOLE SODIUM 40 MILLIGRAM(S): 20 TABLET, DELAYED RELEASE ORAL at 05:45

## 2021-02-25 RX ADMIN — PANTOPRAZOLE SODIUM 40 MILLIGRAM(S): 20 TABLET, DELAYED RELEASE ORAL at 18:48

## 2021-02-25 RX ADMIN — LEVETIRACETAM 420 MILLIGRAM(S): 250 TABLET, FILM COATED ORAL at 18:48

## 2021-02-25 RX ADMIN — Medication 100 MILLIGRAM(S): at 21:51

## 2021-02-25 RX ADMIN — HEPARIN SODIUM 5000 UNIT(S): 5000 INJECTION INTRAVENOUS; SUBCUTANEOUS at 14:39

## 2021-02-25 RX ADMIN — Medication 0.2 MILLIGRAM(S): at 21:51

## 2021-02-25 RX ADMIN — Medication 40 MILLIEQUIVALENT(S): at 14:38

## 2021-02-25 RX ADMIN — AMLODIPINE BESYLATE 10 MILLIGRAM(S): 2.5 TABLET ORAL at 05:44

## 2021-02-25 RX ADMIN — Medication 5 MILLIGRAM(S): at 05:45

## 2021-02-25 RX ADMIN — Medication 0.2 MILLIGRAM(S): at 05:44

## 2021-02-25 RX ADMIN — Medication 100 MILLIGRAM(S): at 14:39

## 2021-02-25 RX ADMIN — HEPARIN SODIUM 5000 UNIT(S): 5000 INJECTION INTRAVENOUS; SUBCUTANEOUS at 05:44

## 2021-02-25 RX ADMIN — Medication 40 MILLIEQUIVALENT(S): at 18:48

## 2021-02-25 RX ADMIN — LISINOPRIL 40 MILLIGRAM(S): 2.5 TABLET ORAL at 05:45

## 2021-02-25 RX ADMIN — HEPARIN SODIUM 5000 UNIT(S): 5000 INJECTION INTRAVENOUS; SUBCUTANEOUS at 21:51

## 2021-02-25 RX ADMIN — LACTULOSE 10 GRAM(S): 10 SOLUTION ORAL at 14:38

## 2021-02-25 RX ADMIN — Medication 0.2 MILLIGRAM(S): at 14:39

## 2021-02-25 RX ADMIN — CHLORHEXIDINE GLUCONATE 1 APPLICATION(S): 213 SOLUTION TOPICAL at 05:44

## 2021-02-25 RX ADMIN — Medication 100 MILLIGRAM(S): at 05:44

## 2021-02-25 RX ADMIN — LEVETIRACETAM 420 MILLIGRAM(S): 250 TABLET, FILM COATED ORAL at 05:45

## 2021-02-25 NOTE — PROGRESS NOTE ADULT - ATTENDING COMMENTS
Patient seen today with neurology ACP NP Alex.      I was physically present for the key portions of the evaluation and management (E/M) service provided.  I agree with the above history, physical, and plan with the following additions/modifications     Exam notable for some command following but still complete expressive aphasia, which is unexpected for his lesion location.  Consider CEEG, transition to oral antihypertensives, will need MRI brain w/wo once off drips and more clinically stable.    Thomas Koroma MD  Attending Neurointensivist

## 2021-02-25 NOTE — PROGRESS NOTE ADULT - SUBJECTIVE AND OBJECTIVE BOX
Patient is a 50y old  Male who presents with a chief complaint of Altered mental status (25 Feb 2021 02:47)        Over Night Events: On Cardene and Labetalol gtt. 3% saline 30cc/hr        PHYSICAL EXAM    ICU Vital Signs Last 24 Hrs  T(C): 36.8 (25 Feb 2021 08:00), Max: 37.1 (24 Feb 2021 16:00)  T(F): 98.3 (25 Feb 2021 08:00), Max: 98.7 (24 Feb 2021 16:00)  HR: 67 (25 Feb 2021 08:00) (67 - 81)  BP: 139/67 (25 Feb 2021 08:00) (130/69 - 154/74)  BP(mean): 94 (25 Feb 2021 08:00) (94 - 103)  RR: 14 (25 Feb 2021 08:00) (14 - 20)  SpO2: 98% (25 Feb 2021 05:00) (97% - 99%)      CONSTITUTIONAL:  Well nourished.  NAD    ENT:   Airway patent  Mouth with normal mucosa.   No thrush    EYES:  Pupils equal,   Round and reactive to light.    CARDIAC:   Normal rate,   Regular rhythm.    No edema    Vascular:  Normal systolic impulse  No Carotid bruits    RESPIRATORY:   No wheezing  Bilateral BS  Normal chest expansion  Not tachypneic  No use of accessory muscles    GASTROINTESTINAL:  Abdomen soft,   No guarding,   + BS    GENITOURINARY  no edema    MUSCULOSKELETAL:   No clubbing, cyanosis    NEUROLOGICAL:   Alert not oriented.  Follows simple commands    SKIN:   Skin normal color for race,   Warm and dry and intact.   No evidence of rash.    PSYCHIATRIC:   No apparent risk to self or others.    HEME LYMPH:   No cervical  lymphadenopathy.  no inguinal lymphadenopathy      02-24-21 @ 07:01  -  02-25-21 @ 07:00  --------------------------------------------------------  IN:    Enteral Tube Flush: 200 mL    Labetalol: 996 mL    NiCARdipine: 1345 mL    sodium chloride 3%: 690 mL  Total IN: 3231 mL    OUT:    Dexmedetomidine: 0 mL    Indwelling Catheter - Urethral (mL): 1930 mL    Propofol: 0 mL    Rectal Tube (mL): 0 mL  Total OUT: 1930 mL    Total NET: 1301 mL    LABS:                  12.5   8.23  )-----------( 426      ( 25 Feb 2021 05:30 )             36.7                                               02-25    149<H>  |  113<H>  |  17  ----------------------------<  141<H>  3.1<L>   |  28  |  0.6<L>    Ca    8.5      25 Feb 2021 05:30  Phos  2.7     02-25  Mg     2.0     02-25    TPro  5.6<L>  /  Alb  3.1<L>  /  TBili  0.5  /  DBili  x   /  AST  16  /  ALT  24  /  AlkPhos  105  02-25                                                                                       LIVER FUNCTIONS - ( 25 Feb 2021 05:30 )  Alb: 3.1 g/dL / Pro: 5.6 g/dL / ALK PHOS: 105 U/L / ALT: 24 U/L / AST: 16 U/L / GGT: x                                                                                                                           ABG - ( 24 Feb 2021 03:16 )  pH, Arterial: 7.46  pH, Blood: x     /  pCO2: 40    /  pO2: 297   / HCO3: 28    / Base Excess: 4.1   /  SaO2: 98            MEDICATIONS  (STANDING):  amLODIPine   Tablet 10 milliGRAM(s) Oral daily  chlorhexidine 4% Liquid 1 Application(s) Topical <User Schedule>  cloNIDine 0.2 milliGRAM(s) Oral every 8 hours  dexMEDEtomidine Infusion 0.05 MICROgram(s)/kG/Hr (1.25 mL/Hr) IV Continuous <Continuous>  heparin   Injectable 5000 Unit(s) SubCutaneous every 8 hours  hydrALAZINE 100 milliGRAM(s) Oral every 8 hours  labetalol Infusion 0.5 mG/Min (30 mL/Hr) IV Continuous <Continuous>  lactulose Syrup 10 Gram(s) Oral every 8 hours  levETIRAcetam  IVPB 500 milliGRAM(s) IV Intermittent every 12 hours  lisinopril 40 milliGRAM(s) Oral daily  metoclopramide 5 milliGRAM(s) Oral two times a day  mineral oil enema 133 milliLiter(s) Rectal daily  niCARdipine Infusion 15 mG/Hr (75 mL/Hr) IV Continuous <Continuous>  pantoprazole  Injectable 40 milliGRAM(s) IV Push every 12 hours  polyethylene glycol 3350 17 Gram(s) Oral daily  propofol Infusion 20 MICROgram(s)/kG/Min (12 mL/Hr) IV Continuous <Continuous>  senna 2 Tablet(s) Oral at bedtime  sodium chloride 3%. 500 milliLiter(s) (30 mL/Hr) IV Continuous <Continuous>    MEDICATIONS  (PRN):  acetaminophen   Tablet .. 650 milliGRAM(s) Oral every 6 hours PRN Temp greater or equal to 38C (100.4F)  LORazepam   Injectable 2 milliGRAM(s) IV Push every 4 hours PRN Agitation

## 2021-02-25 NOTE — PHYSICAL THERAPY INITIAL EVALUATION ADULT - PRECAUTIONS/LIMITATIONS, REHAB EVAL
Nurse Notes:

   received report from the night nurse Padma Bell RN, resting in bed.  IV is infusing 
NS at 75 cc per hour.  Patient states takes Librium every 6 hours, medications help the 
pain.  No request for pain medications. no known precautions/limitations

## 2021-02-25 NOTE — PHYSICAL THERAPY INITIAL EVALUATION ADULT - RANGE OF MOTION EXAMINATION, REHAB EVAL
PT unable to complete PROM on all 4 extremities due to pain (patient unable to quantify)./deficits as listed below

## 2021-02-25 NOTE — PHYSICAL THERAPY INITIAL EVALUATION ADULT - TRANSFER TRAINING, PT EVAL
Moderate assistance with supine to sit transfers and tolerate sitting position with minimal support.

## 2021-02-25 NOTE — PROGRESS NOTE ADULT - ASSESSMENT
MELODIE HERNADEZ is a 50y Male with a past medical history HTN, GIB  who presented for weakness and and was found to have HTN emergency a right intraparenchymal bleed. Nephrology consulted to evaluate refractory hypertension.     # REsistant Hypertension, on multiple (6) medications  # Hypernatremia on 3% saline - Na goal ~140 for ICH    ·	 - BP is acceptable, but on Cardene and labetalol drip plus Clonidine, Hydralazine, Acei and Amlodipine  ·	would d/c Amlodipine and use short acting Nifedipine 10 mg q8-hr  ·	w/u for secondary HTN - obtain Aldosterone renin ratio,  cont Nifedipine 90,  ·	RAD neg for LIOR, catecholamines in urine wnl  ·	obtain plasma catecholamines and metanephrines  ·	3% will have a hypertensive effect, and would not start diuretic since it would decrease serum sodium

## 2021-02-25 NOTE — PROGRESS NOTE ADULT - ASSESSMENT
Impression:  50/M with HTN, GIB, PUD, with R basal ganglia ICH with IVH and large perihematomal edema. S/p EVD removal. Extubated, awake follows few simple  commands.  BP is controlled on IV labetalol and Nicardipine drip. No acute overnight events.         Plan:  #Neuro:  - Neuro checks q1hr  - Continue 3% NS at 30cc/hr with goal Serum Na level 140-150  - Trend CMP q6hrs  - Continue Keppra 500mg IV q12hrs  - PT/REHAB EVAL  - Speech and swallow eval      #CV:  - Keep SBP < 160   - Continue labetalol and nicardipine drip for bp control    #Resp:  - HOB > 35 degrees  - Aspiration precautions      #Renal/Fluid/Electolytes:  - Strict I&Os  - Monitor lytes, replete as needed        #GI:  - Continue Protonix 40mg IV q12hrs  - Continue Senna 2 tablets PO q24hrs HS  - Continue fleet enema q24hrs    # ID  - Maintain normothermia      # Endocrine   - Maintain strict glycemic control      #DVT prophylaxis:  - SCS while in bed  - Heparin sq          Disposition: Continue critical care monitoring     Impression:  50/M with HTN, GIB, PUD, with R basal ganglia ICH with IVH and large perihematomal edema. S/p EVD removal. Extubated, awake follows few simple  commands.  BP is controlled on IV labetalol and Nicardipine drip. Patient extubated and is able to move right UE>LE , no mvmts noted on the left side. He follows few simple commands and is now able to track.         Plan:  #Neuro:  - Neuro checks q1hr  - Continue 3% NS at 30cc/hr with goal Serum Na level 140-150  - Trend CMP q6hrs  - Continue Keppra 500mg IV q12hrs  - PT/REHAB EVAL  - Speech and swallow eval      #CV:  - Keep SBP < 160   - Continue labetalol and nicardipine drip for bp control    #Resp:  - HOB > 35 degrees  - Aspiration precautions      #Renal/Fluid/Electolytes:  - Strict I&Os  - Monitor lytes, replete as needed        #GI:  - Continue Protonix 40mg IV q12hrs  - Continue Senna 2 tablets PO q24hrs HS  - Continue fleet enema q24hrs    # ID  - Maintain normothermia      # Endocrine   - Maintain strict glycemic control      #DVT prophylaxis:  - SCS while in bed  - Heparin sq          Disposition: Continue critical care monitoring

## 2021-02-25 NOTE — PROGRESS NOTE ADULT - ASSESSMENT
IMPRESSION:    Large ICB secondary to Hypertensive Emergency s/p EVD and removal  Intubated, for airway protection now extubated   Klebsiella Pneumonia pansensitive  Left posterior tibial & peroneal DVT  Uncontrolled HTN    PLAN:    CNS:  SAT.  Neurology FU    HEENT: Oral care    PULMONARY:  HOB @ 45 degrees.  Aspiration precautions    CARDIOVASCULAR:  Avoid volume overload.  BP control. 3% Hypertonic as per neurosurgery.     GI: GI prophylaxis.  NG feeding    RENAL;  FU lytes.  Correct lytes as needed.    INFECTIOUS DISEASE:  ABX per ID    HEMATOLOGICAL: DVT prophylaxis.    ENDOCRINE:  Follow up FS.  Insulin protocol if needed. Check urine and serum metanephrines    MUSCULOSKELETAL:  Bed rest    Prognosis poor   IMPRESSION:    Large ICB secondary to Hypertensive Emergency s/p EVD and removal  Intubated, for airway protection now extubated   Klebsiella Pneumonia pansensitive  Left posterior tibial & peroneal DVT  Uncontrolled HTN    PLAN:    CNS:  SAT.  Neurology FU    HEENT: Oral care    PULMONARY:  HOB @ 45 degrees.  Aspiration precautions    CARDIOVASCULAR:  Avoid volume overload.  BP control. 3% Hypertonic as per neurosurgery.     GI: GI prophylaxis.  NG feeding    RENAL;  FU lytes.  Correct lytes as needed.  Renal eval     INFECTIOUS DISEASE:  ABX per ID    HEMATOLOGICAL: DVT prophylaxis.    ENDOCRINE:  Follow up FS.  Insulin protocol if needed. Check urine and serum metanephrines    MUSCULOSKELETAL:  Bed rest    Prognosis poor

## 2021-02-25 NOTE — PHYSICAL THERAPY INITIAL EVALUATION ADULT - LEVEL OF CONSCIOUSNESS, REHAB EVAL
Patient gestures yes/no to questions with facial expressions (blinking once or twice for yes or no and occasionally shakes his head)./alert

## 2021-02-25 NOTE — CHART NOTE - NSCHARTNOTEFT_GEN_A_CORE
Registered Dietitian Follow-Up     Patient Profile Reviewed                           Yes [x]   No []     Nutrition History Previously Obtained        Yes []  No [x]--unable to obtain as pt's spouse unreachable via phone at this time despite multiple attempts      Pertinent Medical Interventions: Pt extubated on 2/23 and able to move right UE>LE. He follows few simple commands and is now able to track.     Diet order: Jevity 1.2 @ 50ml/hr (1440kcal, 67gm pro, 968ml free water)--spoke w/ pt's RN, who reports that despite active order, TF hasn't been started yet, however there are plans to initiate it today.      Anthropometrics:  - Ht. 73"  - Wt.: 293#/132.8kg (2/24)--wt fluctuations likely 2/2 bed-scale inaccuracy vs. fluids? Will continue to monitor closely   (2/19): 280#/127kg  (2/17): 297#/134.8kg  (2/16): 289#/131kg  (1/27): 221#/100kg  - %wt change  - BMI: 29.1   - IBW: 184#     Pertinent Lab Data: (2/25): H/H 12.5/36.7, Na 149, K+ 3.1, Cr. 0.6, Gluc 141    Pertinent Meds: heparin, NS, KCl, lisinopril, hydralazine, labetalol, amlodipine, precedex, propofol (currently not running), mineral oil enema, lactulose, reglan, nicardipine, keppra, protonix, miralax, senna      Physical Findings:  - Appearance: obtunded/nonverbal; 1+ generalized edema and 2+ edema to B/L hand noted  - GI function: abd soft/nontender; rectal tube in place; +60ml output 2/24   - Tubes: NGT   - Oral/Mouth cavity: NPO   - Skin: surgical incision      Nutrition Requirements  Weight Used: CBW: 221#/100kg, Ve: 10, Tmax: 37.3; needs continued from RD note on 2/18 as new [id number] comparable      Estimated Energy Needs: 6420-3009 kcal/day (% TO4220q: 2191 d/t borderline obese BMI).  Estimated Protein Needs: 100-120 g/day (1-1.2 g/kg ABW)  Estimated Fluid Needs: per Vent team      Nutrient Intake: not meeting kcal/pro needs 2/2 NPO status      Previous Nutrition Diagnosis: Inadequate protein-energy intake (ongoing)      Nutrition Intervention: enteral nutrition, coordination of care     Recommendations:   1. If pt to be extubated, c/s SLP for speech and swallow eval; diet order to be consistent with their recs  2. If unable to extubate, would c/s Nutrition Support Team for parenteral nutrition assessment as pt w/ abdominal distention and NPO x4 days. All recs discussed with LIP ([phone])     Goal/Expected Outcome: Pt to meet % of estimated nutrient needs within 3days      Indicator/Monitoring: RD to monitor diet order, energy intake, body composition, electrolyte/glucose profiles, nutrition focused physical findings. Registered Dietitian Follow-Up     Patient Profile Reviewed                           Yes [x]   No []     Nutrition History Previously Obtained        Yes []  No [x]--unable to obtain as pt's spouse unreachable via phone at this time despite multiple attempts      Pertinent Medical Interventions: Pt extubated on 2/23 and able to move right UE>LE. He follows few simple commands and is now able to track.     Diet order: Jevity 1.2 @ 50ml/hr (1440kcal, 67gm pro, 968ml free water)--spoke w/ pt's RN, who reports that despite active order, TF hasn't been started yet, however there are plans to initiate it today.      Anthropometrics:  - Ht. 73"  - Wt.: 293#/132.8kg (2/24)--wt fluctuations likely 2/2 bed-scale inaccuracy vs. fluids? Will continue to monitor closely   (2/19): 280#/127kg  (2/17): 297#/134.8kg  (2/16): 289#/131kg  (1/27): 221#/100kg  - %wt change  - BMI: 29.1   - IBW: 184#     Pertinent Lab Data: (2/25): H/H 12.5/36.7, Na 149, K+ 3.1, Cr. 0.6, Gluc 141    Pertinent Meds: heparin, NS, KCl, lisinopril, hydralazine, labetalol, amlodipine, precedex, propofol (currently not running), mineral oil enema, lactulose, reglan, nicardipine, keppra, protonix, miralax, senna      Physical Findings:  - Appearance: obtunded/nonverbal; 1+ generalized edema and 2+ edema to B/L hand noted  - GI function: abd soft/nontender; rectal tube in place; +60ml output 2/24   - Tubes: NGT   - Oral/Mouth cavity: NPO   - Skin: surgical incision      Nutrition Requirements  Weight Used: IBW: 184#/84 used 2/2 borderline obesity + discrepancy in wts; needs readjusted today s/p extubation      Estimated Energy Needs: 9884-3189 kcal/day (25-30 kcal/kg IBW)  Estimated Protein Needs:  g/day (1-1.2 g/kg IBW)  Estimated Fluid Needs: per Vent team      Nutrient Intake: not meeting kcal/pro needs 2/2 NPO status      Previous Nutrition Diagnosis: Inadequate protein-energy intake (ongoing)      Nutrition Intervention: enteral nutrition    Recommendations:   1. Active TF order unable to meet pt's pro/kcal needs, therefore to avoid underfeeding pt, switch to the following: Jevity 1.2 400ml Q6H. TF to provide 1920kcal, 89gm pro, 1291ml free water. All recs discussed with LIP ([phone])     Goal/Expected Outcome: Pt to meet % of estimated nutrient needs within 4 days      Indicator/Monitoring: RD to monitor diet order, energy intake, body composition, electrolyte/glucose profiles, nutrition focused physical findings.

## 2021-02-25 NOTE — PROGRESS NOTE ADULT - SUBJECTIVE AND OBJECTIVE BOX
Nephrology progress note    Patient is seen and examined, events over the last 24 h noted .  Seen for resistant HTN  On Cardene and LAbetalol drip, plus Acei, Hydralazine, Amlodipine and Clonidine  Allergies:  No Known Allergies    Hospital Medications:   MEDICATIONS  (STANDING):  amLODIPine   Tablet 10 milliGRAM(s) Oral daily  chlorhexidine 4% Liquid 1 Application(s) Topical <User Schedule>  cloNIDine 0.2 milliGRAM(s) Oral every 8 hours  dexMEDEtomidine Infusion 0.05 MICROgram(s)/kG/Hr (1.25 mL/Hr) IV Continuous <Continuous>  heparin   Injectable 5000 Unit(s) SubCutaneous every 8 hours  hydrALAZINE 100 milliGRAM(s) Oral every 8 hours  labetalol Infusion 0.5 mG/Min (30 mL/Hr) IV Continuous <Continuous>  lactulose Syrup 10 Gram(s) Oral every 8 hours  levETIRAcetam  IVPB 500 milliGRAM(s) IV Intermittent every 12 hours  lisinopril 40 milliGRAM(s) Oral daily  metoclopramide 5 milliGRAM(s) Oral two times a day  mineral oil enema 133 milliLiter(s) Rectal daily  niCARdipine Infusion 15 mG/Hr (75 mL/Hr) IV Continuous <Continuous>  pantoprazole  Injectable 40 milliGRAM(s) IV Push every 12 hours  polyethylene glycol 3350 17 Gram(s) Oral daily  potassium chloride   Powder 40 milliEquivalent(s) Oral every 4 hours  propofol Infusion 20 MICROgram(s)/kG/Min (12 mL/Hr) IV Continuous <Continuous>  senna 2 Tablet(s) Oral at bedtime  sodium chloride 3%. 500 milliLiter(s) (15 mL/Hr) IV Continuous <Continuous>        VITALS:  T(F): 97.9 (02-25-21 @ 14:00), Max: 98.9 (02-25-21 @ 12:00)  HR: 69 (02-25-21 @ 15:00)  BP: 164/72 (02-25-21 @ 09:00)  RR: 15 (02-25-21 @ 15:00)  SpO2: 97% (02-25-21 @ 15:00)  Wt(kg): --    02-23 @ 07:01  -  02-24 @ 07:00  --------------------------------------------------------  IN: 3846.8 mL / OUT: 5276 mL / NET: -1429.2 mL    02-24 @ 07:01 - 02-25 @ 07:00  --------------------------------------------------------  IN: 3231 mL / OUT: 1930 mL / NET: 1301 mL    02-25 @ 07:01 - 02-25 @ 17:10  --------------------------------------------------------  IN: 845 mL / OUT: 1140 mL / NET: -295 mL          PHYSICAL EXAM:  Constitutional: NAD  Neck: No JVD  Respiratory: CTA  Cardiovascular: S1, S2, RRR  Gastrointestinal: BS+, soft, NT/ND  Extremities: No peripheral edema  Neurological: Lethargic  : Has jamal.   Skin: No rashes  Vascular Access:    LABS:  02-25    149<H>  |  113<H>  |  17  ----------------------------<  141<H>  3.1<L>   |  28  |  0.6<L>    Ca    8.5      25 Feb 2021 05:30  Phos  2.7     02-25  Mg     2.0     02-25    TPro  5.6<L>  /  Alb  3.1<L>  /  TBili  0.5  /  DBili      /  AST  16  /  ALT  24  /  AlkPhos  105  02-25                          12.5   8.23  )-----------( 426      ( 25 Feb 2021 05:30 )             36.7       Urine Studies:    Osmolality, Random Urine: 587 mos/kg (02-25 @ 12:55)  Sodium, Random Urine: 201.0 mmoL/L (02-25 @ 12:55)  Protein/Creatinine Ratio Calculation: 0.3 Ratio (02-25 @ 12:55)  Creatinine, Random Urine: 38 mg/dL (02-25 @ 12:55)  Osmolality, Random Urine: 652 mos/kg (02-19 @ 21:22)  Sodium, Random Urine: 217.0 mmoL/L (02-19 @ 21:22)  Protein/Creatinine Ratio Calculation: 0.4 Ratio (02-19 @ 21:22)  Creatinine, Random Urine: 21 mg/dL (02-19 @ 21:22)    RADIOLOGY & ADDITIONAL STUDIES:

## 2021-02-25 NOTE — PROGRESS NOTE ADULT - SUBJECTIVE AND OBJECTIVE BOX
`  1. Brief Presentation: Left sided weakness and Dysarthria    2. Today's Acute Problems:     -Large right basal ganglia  ICH secondary to Hypertensive Emergency s/p EVD and removal  -Intubated, for airway protection now extubated   -Klebsiella Pneumonia pansensitive   -Left posterior tibial & peroneal DVT        3. Relevant brief History:  50 years old right handed Male with PMHx of HTN, GI bleed, PUD,  Mrankin score of 0 at baseline was brought in by EMS for  AMS and left sided weakness. Wife states that patient was having a usual day today went to bathroom and then the next thing she noted was that he was confused and was lying on the bathroom floor, she reports urinary incontinence, patient was confused, had trouble speaking, and could not move his left side. In the ED, /148, HR 80, saturating well. Code stroke called, NIHSS 24 and GCS 11, CTH showed Moderate to large intraparenchymal right basal ganglia hemorrhage with mild surrounding edema and associated right sulcal effacement as well as decompression into the ventricles. There is approximately 6.3 mm of midline shift. CTA did not show any evidence of active bleeding, AVM or aneurysm or major vascular stenosis or occlusion. Pt became unresponsive in CT scan and had to be intubated and sedated. Patient was intubated in ED for worsening mental status and GCS. Started on Nicardipine drip for sbp in 200s and EVD was placed by neurosurgery at bedside. Pan trauma scan was negative. To be admitted under ICU for further monitoring.  (27 Jan 2021 23:13)        Yesterday's Plan:  -continue Keppra 500mg BID  -Na: can decrease 3% drip, monitor Na BID, do not allow to fall rapidly or below 140, goal range 140-150 should be adequate at this time  -Continue BP control  -PT/OT orders  -Vertical boots to correct foot drop        4.Last 24 hour updates:  Patient examined at bedside , he was extubated and is currently awake alert and follows few simple commands, track and grimaces, moving right side spontaneously and to commands, no mvmts noted on the left. He is on 3% hypertonic saline @30cc/hr. On cardene and labetalol drip for bp control. No acute overnight events.       5.Medications:  amLODIPine   Tablet 10 milliGRAM(s) Oral daily  chlorhexidine 4% Liquid 1 Application(s) Topical <User Schedule>  cloNIDine 0.2 milliGRAM(s) Oral every 8 hours  dexMEDEtomidine Infusion 0.05 MICROgram(s)/kG/Hr IV Continuous <Continuous>  heparin   Injectable 5000 Unit(s) SubCutaneous every 8 hours  hydrALAZINE 100 milliGRAM(s) Oral every 8 hours  labetalol Infusion 0.5 mG/Min IV Continuous <Continuous>  lactulose Syrup 10 Gram(s) Oral every 8 hours  levETIRAcetam  IVPB 500 milliGRAM(s) IV Intermittent every 12 hours  lisinopril 40 milliGRAM(s) Oral daily  metoclopramide 5 milliGRAM(s) Oral two times a day  mineral oil enema 133 milliLiter(s) Rectal daily  niCARdipine Infusion 15 mG/Hr IV Continuous <Continuous>  pantoprazole  Injectable 40 milliGRAM(s) IV Push every 12 hours  polyethylene glycol 3350 17 Gram(s) Oral daily  propofol Infusion 20 MICROgram(s)/kG/Min IV Continuous <Continuous>  senna 2 Tablet(s) Oral at bedtime  sodium chloride 3%. 500 milliLiter(s) IV Continuous <Continuous>      6.Ancillary Mangement:  Chest PT[]  Head of bed >35 [X]  Out of bed to chair []  PT/OT/ST []  Spirometry[]  DVT prophalaxis [X]      7.Neuro Exam:  Patient awake and alert , following few simple commands, not verbal  CN: PUPILS 5mm brisk response bilaterally, eyes gazing to the right , blinks to threat  Moving right arm >leg spontaneously and to commands  No spontaneous mvmts noted on the left side  Responds to pain right >left side        Last CTH:  < from: CT Head No Cont (02.22.21 @ 16:18) >  IMPRESSION:    In comparison with the prior CT scan of the brain dated February 19, 2021:    Again demonstrated is a parenchymal hemorrhage with surrounding edema in the right frontal temporal region and right basal ganglia with associated mass effect and shift, there has been a mild decrease in the overall size of the hemorrhage, surrounding edema, mass effect and shift. The etiology of the hemorrhage is uncertain at the present time and follow-up to resolution followed by postcontrast examination is recommended to exclude an underlying lesion.    Possible interval development of diminished attenuation in the right parietal lobe, the possibility of acute ischemic change should be considered and a follow-up examination is recommended.    Spoke with Dr STOCKTON on 2/22/2021 4:34 PM with readback.      ISABELLA DE LA CRUZ MD; Attending Radiologist  This document has been electronically signed. Feb 22 2021  4:34PM    < end of copied text >            Last EEG:  VEEG in the last 24 hours: sedated and intubated    Background----------------   low amplitude , sleep recording with minimal reactivity    Focal and generalized slowing------------- generalized slowing.  No focal slowing    Interictal activity--------------  none    Events------------------- none    Seizures-------------- none    Impression:   generalized slowing    Plan - discussed with the  [facility] team          Electronic Signatures:  Willian Flores)  (Signed 04-Feb-2021 11:22)  	Authored: EEG REPORT      Last Updated: 04-Feb-2021 11:22 by Willian Flores)          8.CVS:  Vital Signs Last 24 Hrs  T(C): 37.1 (24 Feb 2021 16:00), Max: 37.4 (24 Feb 2021 04:00)  T(F): 98.7 (24 Feb 2021 16:00), Max: 99.3 (24 Feb 2021 04:00)  HR: 69 (25 Feb 2021 02:00) (69 - 87)  BP: 150/77 (25 Feb 2021 02:00) (130/69 - 157/86)  BP(mean): 99 (24 Feb 2021 19:00) (96 - 111)  RR: 18 (25 Feb 2021 02:00) (14 - 20)  SpO2: 98% (25 Feb 2021 02:00) (97% - 100%)          Last EKG:  < from: 12 Lead ECG (02.09.21 @ 15:06) >  Ventricular Rate 65 BPM    Atrial Rate 65 BPM    P-R Interval 208 ms    QRS Duration 112 ms    Q-T Interval 414 ms    QTC Calculation(Bazett) 430 ms    P Axis 76 degrees    R Axis 61 degrees    T Axis 267 degrees    Diagnosis Line Normal sinus rhythm  T wave abnormality, consider inferior ischemia  Abnormal ECG    Confirmed by YOMI ROBIN MD ([id number]) on 2/9/2021 4:52:39 PM    < end of copied text >        9.Respiratory:  ABG:ABG - ( 24 Feb 2021 03:16 )  pH, Arterial: 7.46  pH, Blood: x     /  pCO2: 40    /  pO2: 297   / HCO3: 28    / Base Excess: 4.1   /  SaO2: 98          Chest Xray:  < from: Xray Chest 1 View- PORTABLE-Routine (Xray Chest 1 View- PORTABLE-Routine in AM.) (02.24.21 @ 06:51) >  INTERPRETATION:  CLINICAL INDICATION:  Shortness of breath    COMPARISON: Chest radiograph dated 2/23/2021    TECHNIQUE: Frontal radiograph the chest. Rotated exam.    FINDINGS:    Support devices: Enteric tube and left IJ central venous catheter are stable.    Cardiac/mediastinum/hilum: Stable.    Lung parenchyma/Pleura: Unchanged bilateral opacities. There is no pneumothorax.    Skeleton/soft tissues: Stable.    IMPRESSION:    Unchanged bilateral opacities      < end of copied text >        10.GI:  Prophylaxis    GI:  Protonix 40 mg q 24  LIVER FUNCTIONS - ( 23 Feb 2021 21:57 )  Alb: 3.2 g/dL / Pro: 6.4 g/dL / ALK PHOS: 143 U/L / ALT: 30 U/L / AST: 25 U/L / GGT: x                 11.Renal/Fluids/Electrolytes:    02-23    146  |  108  |  10  ----------------------------<  140<H>  3.9   |  24  |  0.6<L>    Ca    8.8      23 Feb 2021 21:57  Phos  3.4     02-23  Mg     1.9     02-23    TPro  6.4  /  Alb  3.2<L>  /  TBili  0.7  /  DBili  x   /  AST  25  /  ALT  30  /  AlkPhos  143<H>  02-23      I&O's Detail    23 Feb 2021 07:01  -  24 Feb 2021 07:00  --------------------------------------------------------  IN:    Dexmedetomidine: 225.7 mL    Enteral Tube Flush: 210 mL    Fenoldopam: 11.4 mL    IV PiggyBack: 200 mL    IV PiggyBack: 100 mL    Labetalol: 678 mL    NiCARdipine: 1575 mL    Propofol: 26.7 mL    sodium chloride 3%: 130 mL    sodium chloride 3%: 660 mL    Vital High Protein: 30 mL  Total IN: 3846.8 mL    OUT:    Indwelling Catheter - Urethral (mL): 5126 mL    Rectal Tube (mL): 150 mL  Total OUT: 5276 mL    Total NET: -1429.2 mL      24 Feb 2021 07:01  -  25 Feb 2021 02:48  --------------------------------------------------------  IN:    Enteral Tube Flush: 200 mL    Labetalol: 816 mL    NiCARdipine: 1145 mL    sodium chloride 3%: 570 mL  Total IN: 2731 mL    OUT:    Dexmedetomidine: 0 mL    Indwelling Catheter - Urethral (mL): 1505 mL    Propofol: 0 mL  Total OUT: 1505 mL    Total NET: 1226 mL          12.ID:  TMax:  Vital Signs Last 24 Hrs  T(C): 37.1 (24 Feb 2021 16:00), Max: 37.4 (24 Feb 2021 04:00)  T(F): 98.7 (24 Feb 2021 16:00), Max: 99.3 (24 Feb 2021 04:00)  HR: 69 (25 Feb 2021 02:00) (69 - 87)  BP: 150/77 (25 Feb 2021 02:00) (130/69 - 157/86)  BP(mean): 99 (24 Feb 2021 19:00) (96 - 111)  RR: 18 (25 Feb 2021 02:00) (14 - 20)  SpO2: 98% (25 Feb 2021 02:00) (97% - 100%)          13.Hematology:                        11.4   6.79  )-----------( 397      ( 23 Feb 2021 04:30 )             35.4       PT/INR - ( 23 Feb 2021 04:30 )   PT: 14.10 sec;   INR: 1.23 ratio             DVT Prophylaxis  Lovenox[]   Heparin[]   Venodyne []   SCD's[x]       `  1. Brief Presentation: Left sided weakness and Dysarthria    2. Today's Acute Problems:     -Large right basal ganglia  ICH secondary to Hypertensive Emergency s/p EVD and removal  -Intubated, for airway protection now extubated    -Left posterior tibial & peroneal DVT        3. Relevant brief History:  50 years old right handed Male with PMHx of HTN, GI bleed, PUD,  Mrankin score of 0 at baseline was brought in by EMS for  AMS and left sided weakness. Wife states that patient was having a usual day today went to bathroom and then the next thing she noted was that he was confused and was lying on the bathroom floor, she reports urinary incontinence, patient was confused, had trouble speaking, and could not move his left side. In the ED, /148, HR 80, saturating well. Code stroke called, NIHSS 24 and GCS 11, CTH showed Moderate to large intraparenchymal right basal ganglia hemorrhage with mild surrounding edema and associated right sulcal effacement as well as decompression into the ventricles. There is approximately 6.3 mm of midline shift. CTA did not show any evidence of active bleeding, AVM or aneurysm or major vascular stenosis or occlusion. Pt became unresponsive in CT scan and had to be intubated and sedated. Patient was intubated in ED for worsening mental status and GCS. Started on Nicardipine drip for sbp in 200s and EVD was placed by neurosurgery at bedside. Pan trauma scan was negative. To be admitted under ICU for further monitoring.  (27 Jan 2021 23:13)        Yesterday's Plan:  -continue Keppra 500mg BID  -Na: can decrease 3% drip, monitor Na BID, do not allow to fall rapidly or below 140, goal range 140-150 should be adequate at this time  -Continue BP control  -PT/OT orders  -Vertical boots to correct foot drop        4.Last 24 hour updates:  Patient examined at bedside , he was extubated and is currently awake alert and follows few simple commands, track and grimaces, moving right side spontaneously and to commands, no mvmts noted on the left. He is on 3% hypertonic saline @30cc/hr. On cardene and labetalol drip for bp control. No acute overnight events.       5.Medications:  amLODIPine   Tablet 10 milliGRAM(s) Oral daily  chlorhexidine 4% Liquid 1 Application(s) Topical <User Schedule>  cloNIDine 0.2 milliGRAM(s) Oral every 8 hours  dexMEDEtomidine Infusion 0.05 MICROgram(s)/kG/Hr IV Continuous <Continuous>  heparin   Injectable 5000 Unit(s) SubCutaneous every 8 hours  hydrALAZINE 100 milliGRAM(s) Oral every 8 hours  labetalol Infusion 0.5 mG/Min IV Continuous <Continuous>  lactulose Syrup 10 Gram(s) Oral every 8 hours  levETIRAcetam  IVPB 500 milliGRAM(s) IV Intermittent every 12 hours  lisinopril 40 milliGRAM(s) Oral daily  metoclopramide 5 milliGRAM(s) Oral two times a day  mineral oil enema 133 milliLiter(s) Rectal daily  niCARdipine Infusion 15 mG/Hr IV Continuous <Continuous>  pantoprazole  Injectable 40 milliGRAM(s) IV Push every 12 hours  polyethylene glycol 3350 17 Gram(s) Oral daily  propofol Infusion 20 MICROgram(s)/kG/Min IV Continuous <Continuous>  senna 2 Tablet(s) Oral at bedtime  sodium chloride 3%. 500 milliLiter(s) IV Continuous <Continuous>      6.Ancillary Mangement:  Chest PT[]  Head of bed >35 [X]  Out of bed to chair []  PT/OT/ST []  Spirometry[]  DVT prophalaxis [X]      7.Neuro Exam:  Patient awake and alert , following few simple commands, not verbal  CN: PUPILS 5mm brisk response bilaterally, eyes gazing to the right , blinks to threat  Moving right arm >leg spontaneously and to commands  No spontaneous mvmts noted on the left side  Responds to pain right >left side        Last CTH:  < from: CT Head No Cont (02.22.21 @ 16:18) >  IMPRESSION:    In comparison with the prior CT scan of the brain dated February 19, 2021:    Again demonstrated is a parenchymal hemorrhage with surrounding edema in the right frontal temporal region and right basal ganglia with associated mass effect and shift, there has been a mild decrease in the overall size of the hemorrhage, surrounding edema, mass effect and shift. The etiology of the hemorrhage is uncertain at the present time and follow-up to resolution followed by postcontrast examination is recommended to exclude an underlying lesion.    Possible interval development of diminished attenuation in the right parietal lobe, the possibility of acute ischemic change should be considered and a follow-up examination is recommended.    Spoke with Dr STOCKTON on 2/22/2021 4:34 PM with readback.      ISABELLA DE LA CRUZ MD; Attending Radiologist  This document has been electronically signed. Feb 22 2021  4:34PM    < end of copied text >            Last EEG:  VEEG in the last 24 hours: sedated and intubated    Background----------------   low amplitude , sleep recording with minimal reactivity    Focal and generalized slowing------------- generalized slowing.  No focal slowing    Interictal activity--------------  none    Events------------------- none    Seizures-------------- none    Impression:   generalized slowing    Plan - discussed with the  [facility] team          Electronic Signatures:  Willian Flores)  (Signed 04-Feb-2021 11:22)  	Authored: EEG REPORT      Last Updated: 04-Feb-2021 11:22 by Willian Flores)          8.CVS:  Vital Signs Last 24 Hrs  T(C): 37.1 (24 Feb 2021 16:00), Max: 37.4 (24 Feb 2021 04:00)  T(F): 98.7 (24 Feb 2021 16:00), Max: 99.3 (24 Feb 2021 04:00)  HR: 69 (25 Feb 2021 02:00) (69 - 87)  BP: 150/77 (25 Feb 2021 02:00) (130/69 - 157/86)  BP(mean): 99 (24 Feb 2021 19:00) (96 - 111)  RR: 18 (25 Feb 2021 02:00) (14 - 20)  SpO2: 98% (25 Feb 2021 02:00) (97% - 100%)          Last EKG:  < from: 12 Lead ECG (02.09.21 @ 15:06) >  Ventricular Rate 65 BPM    Atrial Rate 65 BPM    P-R Interval 208 ms    QRS Duration 112 ms    Q-T Interval 414 ms    QTC Calculation(Bazett) 430 ms    P Axis 76 degrees    R Axis 61 degrees    T Axis 267 degrees    Diagnosis Line Normal sinus rhythm  T wave abnormality, consider inferior ischemia  Abnormal ECG    Confirmed by YOMI ROBIN MD ([id number]) on 2/9/2021 4:52:39 PM    < end of copied text >        9.Respiratory:  ABG:ABG - ( 24 Feb 2021 03:16 )  pH, Arterial: 7.46  pH, Blood: x     /  pCO2: 40    /  pO2: 297   / HCO3: 28    / Base Excess: 4.1   /  SaO2: 98          Chest Xray:  < from: Xray Chest 1 View- PORTABLE-Routine (Xray Chest 1 View- PORTABLE-Routine in AM.) (02.24.21 @ 06:51) >  INTERPRETATION:  CLINICAL INDICATION:  Shortness of breath    COMPARISON: Chest radiograph dated 2/23/2021    TECHNIQUE: Frontal radiograph the chest. Rotated exam.    FINDINGS:    Support devices: Enteric tube and left IJ central venous catheter are stable.    Cardiac/mediastinum/hilum: Stable.    Lung parenchyma/Pleura: Unchanged bilateral opacities. There is no pneumothorax.    Skeleton/soft tissues: Stable.    IMPRESSION:    Unchanged bilateral opacities      < end of copied text >        10.GI:  Prophylaxis    GI:  Protonix 40 mg q 24  LIVER FUNCTIONS - ( 23 Feb 2021 21:57 )  Alb: 3.2 g/dL / Pro: 6.4 g/dL / ALK PHOS: 143 U/L / ALT: 30 U/L / AST: 25 U/L / GGT: x                 11.Renal/Fluids/Electrolytes:    02-23    146  |  108  |  10  ----------------------------<  140<H>  3.9   |  24  |  0.6<L>    Ca    8.8      23 Feb 2021 21:57  Phos  3.4     02-23  Mg     1.9     02-23    TPro  6.4  /  Alb  3.2<L>  /  TBili  0.7  /  DBili  x   /  AST  25  /  ALT  30  /  AlkPhos  143<H>  02-23      I&O's Detail    23 Feb 2021 07:01  -  24 Feb 2021 07:00  --------------------------------------------------------  IN:    Dexmedetomidine: 225.7 mL    Enteral Tube Flush: 210 mL    Fenoldopam: 11.4 mL    IV PiggyBack: 200 mL    IV PiggyBack: 100 mL    Labetalol: 678 mL    NiCARdipine: 1575 mL    Propofol: 26.7 mL    sodium chloride 3%: 130 mL    sodium chloride 3%: 660 mL    Vital High Protein: 30 mL  Total IN: 3846.8 mL    OUT:    Indwelling Catheter - Urethral (mL): 5126 mL    Rectal Tube (mL): 150 mL  Total OUT: 5276 mL    Total NET: -1429.2 mL      24 Feb 2021 07:01  -  25 Feb 2021 02:48  --------------------------------------------------------  IN:    Enteral Tube Flush: 200 mL    Labetalol: 816 mL    NiCARdipine: 1145 mL    sodium chloride 3%: 570 mL  Total IN: 2731 mL    OUT:    Dexmedetomidine: 0 mL    Indwelling Catheter - Urethral (mL): 1505 mL    Propofol: 0 mL  Total OUT: 1505 mL    Total NET: 1226 mL          12.ID:  TMax:  Vital Signs Last 24 Hrs  T(C): 37.1 (24 Feb 2021 16:00), Max: 37.4 (24 Feb 2021 04:00)  T(F): 98.7 (24 Feb 2021 16:00), Max: 99.3 (24 Feb 2021 04:00)  HR: 69 (25 Feb 2021 02:00) (69 - 87)  BP: 150/77 (25 Feb 2021 02:00) (130/69 - 157/86)  BP(mean): 99 (24 Feb 2021 19:00) (96 - 111)  RR: 18 (25 Feb 2021 02:00) (14 - 20)  SpO2: 98% (25 Feb 2021 02:00) (97% - 100%)          13.Hematology:                        11.4   6.79  )-----------( 397      ( 23 Feb 2021 04:30 )             35.4       PT/INR - ( 23 Feb 2021 04:30 )   PT: 14.10 sec;   INR: 1.23 ratio             DVT Prophylaxis  Lovenox[]   Heparin[]   Venodyne []   SCD's[x]

## 2021-02-25 NOTE — PHYSICAL THERAPY INITIAL EVALUATION ADULT - GENERAL OBSERVATIONS, REHAB EVAL
PT IE 10-10:15am. Pt supine in NAD. +call bell, +bed alarm, +IV, +lines entering pelvic area, +protective boots at B LE, +NG tube, +O2 3-4L.

## 2021-02-26 LAB
ALBUMIN SERPL ELPH-MCNC: 3 G/DL — LOW (ref 3.5–5.2)
ALBUMIN SERPL ELPH-MCNC: 3.1 G/DL — LOW (ref 3.5–5.2)
ALP SERPL-CCNC: 105 U/L — SIGNIFICANT CHANGE UP (ref 30–115)
ALP SERPL-CCNC: 113 U/L — SIGNIFICANT CHANGE UP (ref 30–115)
ALT FLD-CCNC: 55 U/L — HIGH (ref 0–41)
ALT FLD-CCNC: 75 U/L — HIGH (ref 0–41)
ANION GAP SERPL CALC-SCNC: 8 MMOL/L — SIGNIFICANT CHANGE UP (ref 7–14)
ANION GAP SERPL CALC-SCNC: 9 MMOL/L — SIGNIFICANT CHANGE UP (ref 7–14)
AST SERPL-CCNC: 42 U/L — HIGH (ref 0–41)
AST SERPL-CCNC: 49 U/L — HIGH (ref 0–41)
BASOPHILS # BLD AUTO: 0.05 K/UL — SIGNIFICANT CHANGE UP (ref 0–0.2)
BASOPHILS NFR BLD AUTO: 0.6 % — SIGNIFICANT CHANGE UP (ref 0–1)
BILIRUB SERPL-MCNC: 0.5 MG/DL — SIGNIFICANT CHANGE UP (ref 0.2–1.2)
BILIRUB SERPL-MCNC: 0.7 MG/DL — SIGNIFICANT CHANGE UP (ref 0.2–1.2)
BUN SERPL-MCNC: 16 MG/DL — SIGNIFICANT CHANGE UP (ref 10–20)
BUN SERPL-MCNC: 18 MG/DL — SIGNIFICANT CHANGE UP (ref 10–20)
CALCIUM SERPL-MCNC: 8.4 MG/DL — LOW (ref 8.5–10.1)
CALCIUM SERPL-MCNC: 8.5 MG/DL — SIGNIFICANT CHANGE UP (ref 8.5–10.1)
CHLORIDE SERPL-SCNC: 109 MMOL/L — SIGNIFICANT CHANGE UP (ref 98–110)
CHLORIDE SERPL-SCNC: 109 MMOL/L — SIGNIFICANT CHANGE UP (ref 98–110)
CO2 SERPL-SCNC: 27 MMOL/L — SIGNIFICANT CHANGE UP (ref 17–32)
CO2 SERPL-SCNC: 29 MMOL/L — SIGNIFICANT CHANGE UP (ref 17–32)
CORTIS AM PEAK SERPL-MCNC: 15 UG/DL — SIGNIFICANT CHANGE UP (ref 6–18.4)
CREAT SERPL-MCNC: 0.6 MG/DL — LOW (ref 0.7–1.5)
CREAT SERPL-MCNC: 0.6 MG/DL — LOW (ref 0.7–1.5)
EOSINOPHIL # BLD AUTO: 0.05 K/UL — SIGNIFICANT CHANGE UP (ref 0–0.7)
EOSINOPHIL NFR BLD AUTO: 0.6 % — SIGNIFICANT CHANGE UP (ref 0–8)
GLUCOSE SERPL-MCNC: 112 MG/DL — HIGH (ref 70–99)
GLUCOSE SERPL-MCNC: 121 MG/DL — HIGH (ref 70–99)
HCT VFR BLD CALC: 37.7 % — LOW (ref 42–52)
HGB BLD-MCNC: 12.6 G/DL — LOW (ref 14–18)
IMM GRANULOCYTES NFR BLD AUTO: 1 % — HIGH (ref 0.1–0.3)
LYMPHOCYTES # BLD AUTO: 1.44 K/UL — SIGNIFICANT CHANGE UP (ref 1.2–3.4)
LYMPHOCYTES # BLD AUTO: 16.6 % — LOW (ref 20.5–51.1)
MAGNESIUM SERPL-MCNC: 2 MG/DL — SIGNIFICANT CHANGE UP (ref 1.8–2.4)
MCHC RBC-ENTMCNC: 31 PG — SIGNIFICANT CHANGE UP (ref 27–31)
MCHC RBC-ENTMCNC: 33.4 G/DL — SIGNIFICANT CHANGE UP (ref 32–37)
MCV RBC AUTO: 92.9 FL — SIGNIFICANT CHANGE UP (ref 80–94)
MONOCYTES # BLD AUTO: 0.89 K/UL — HIGH (ref 0.1–0.6)
MONOCYTES NFR BLD AUTO: 10.3 % — HIGH (ref 1.7–9.3)
NEUTROPHILS # BLD AUTO: 6.14 K/UL — SIGNIFICANT CHANGE UP (ref 1.4–6.5)
NEUTROPHILS NFR BLD AUTO: 70.9 % — SIGNIFICANT CHANGE UP (ref 42.2–75.2)
NRBC # BLD: 0 /100 WBCS — SIGNIFICANT CHANGE UP (ref 0–0)
PHOSPHATE SERPL-MCNC: 3 MG/DL — SIGNIFICANT CHANGE UP (ref 2.1–4.9)
PLATELET # BLD AUTO: 414 K/UL — HIGH (ref 130–400)
POTASSIUM SERPL-MCNC: 3.8 MMOL/L — SIGNIFICANT CHANGE UP (ref 3.5–5)
POTASSIUM SERPL-MCNC: 4.1 MMOL/L — SIGNIFICANT CHANGE UP (ref 3.5–5)
POTASSIUM SERPL-SCNC: 3.8 MMOL/L — SIGNIFICANT CHANGE UP (ref 3.5–5)
POTASSIUM SERPL-SCNC: 4.1 MMOL/L — SIGNIFICANT CHANGE UP (ref 3.5–5)
PROT SERPL-MCNC: 5.2 G/DL — LOW (ref 6–8)
PROT SERPL-MCNC: 5.5 G/DL — LOW (ref 6–8)
PTH-INTACT FLD-MCNC: 36 PG/ML — SIGNIFICANT CHANGE UP (ref 15–65)
RBC # BLD: 4.06 M/UL — LOW (ref 4.7–6.1)
RBC # FLD: 13.2 % — SIGNIFICANT CHANGE UP (ref 11.5–14.5)
SODIUM SERPL-SCNC: 145 MMOL/L — SIGNIFICANT CHANGE UP (ref 135–146)
SODIUM SERPL-SCNC: 146 MMOL/L — SIGNIFICANT CHANGE UP (ref 135–146)
T4 AB SER-ACNC: 5.8 UG/DL — SIGNIFICANT CHANGE UP (ref 4.6–12)
TSH SERPL-MCNC: 0.53 UIU/ML — SIGNIFICANT CHANGE UP (ref 0.27–4.2)
WBC # BLD: 8.66 K/UL — SIGNIFICANT CHANGE UP (ref 4.8–10.8)
WBC # FLD AUTO: 8.66 K/UL — SIGNIFICANT CHANGE UP (ref 4.8–10.8)

## 2021-02-26 PROCEDURE — 71045 X-RAY EXAM CHEST 1 VIEW: CPT | Mod: 26

## 2021-02-26 PROCEDURE — 95720 EEG PHY/QHP EA INCR W/VEEG: CPT

## 2021-02-26 PROCEDURE — 99233 SBSQ HOSP IP/OBS HIGH 50: CPT

## 2021-02-26 RX ORDER — NIFEDIPINE 30 MG
20 TABLET, EXTENDED RELEASE 24 HR ORAL EVERY 6 HOURS
Refills: 0 | Status: DISCONTINUED | OUTPATIENT
Start: 2021-02-26 | End: 2021-02-27

## 2021-02-26 RX ORDER — NIFEDIPINE 30 MG
10 TABLET, EXTENDED RELEASE 24 HR ORAL EVERY 8 HOURS
Refills: 0 | Status: DISCONTINUED | OUTPATIENT
Start: 2021-02-26 | End: 2021-02-26

## 2021-02-26 RX ADMIN — Medication 100 MILLIGRAM(S): at 11:39

## 2021-02-26 RX ADMIN — CHLORHEXIDINE GLUCONATE 1 APPLICATION(S): 213 SOLUTION TOPICAL at 05:19

## 2021-02-26 RX ADMIN — Medication 0.2 MILLIGRAM(S): at 11:39

## 2021-02-26 RX ADMIN — PANTOPRAZOLE SODIUM 40 MILLIGRAM(S): 20 TABLET, DELAYED RELEASE ORAL at 05:18

## 2021-02-26 RX ADMIN — LEVETIRACETAM 420 MILLIGRAM(S): 250 TABLET, FILM COATED ORAL at 18:38

## 2021-02-26 RX ADMIN — Medication 0.2 MILLIGRAM(S): at 05:18

## 2021-02-26 RX ADMIN — Medication 100 MILLIGRAM(S): at 05:18

## 2021-02-26 RX ADMIN — LISINOPRIL 40 MILLIGRAM(S): 2.5 TABLET ORAL at 05:18

## 2021-02-26 RX ADMIN — SENNA PLUS 2 TABLET(S): 8.6 TABLET ORAL at 22:09

## 2021-02-26 RX ADMIN — Medication 5 MILLIGRAM(S): at 05:22

## 2021-02-26 RX ADMIN — HEPARIN SODIUM 5000 UNIT(S): 5000 INJECTION INTRAVENOUS; SUBCUTANEOUS at 05:19

## 2021-02-26 RX ADMIN — Medication 10 MILLIGRAM(S): at 11:39

## 2021-02-26 RX ADMIN — AMLODIPINE BESYLATE 10 MILLIGRAM(S): 2.5 TABLET ORAL at 05:17

## 2021-02-26 RX ADMIN — PANTOPRAZOLE SODIUM 40 MILLIGRAM(S): 20 TABLET, DELAYED RELEASE ORAL at 18:39

## 2021-02-26 RX ADMIN — HEPARIN SODIUM 5000 UNIT(S): 5000 INJECTION INTRAVENOUS; SUBCUTANEOUS at 14:37

## 2021-02-26 RX ADMIN — Medication 0.3 MILLIGRAM(S): at 22:02

## 2021-02-26 RX ADMIN — Medication 100 MILLIGRAM(S): at 22:02

## 2021-02-26 RX ADMIN — LEVETIRACETAM 420 MILLIGRAM(S): 250 TABLET, FILM COATED ORAL at 05:17

## 2021-02-26 RX ADMIN — Medication 20 MILLIGRAM(S): at 18:38

## 2021-02-26 RX ADMIN — HEPARIN SODIUM 5000 UNIT(S): 5000 INJECTION INTRAVENOUS; SUBCUTANEOUS at 22:07

## 2021-02-26 RX ADMIN — NICARDIPINE HYDROCHLORIDE 75 MG/HR: 30 CAPSULE, EXTENDED RELEASE ORAL at 18:57

## 2021-02-26 RX ADMIN — LACTULOSE 10 GRAM(S): 10 SOLUTION ORAL at 22:03

## 2021-02-26 RX ADMIN — NICARDIPINE HYDROCHLORIDE 75 MG/HR: 30 CAPSULE, EXTENDED RELEASE ORAL at 10:00

## 2021-02-26 RX ADMIN — LACTULOSE 10 GRAM(S): 10 SOLUTION ORAL at 14:38

## 2021-02-26 RX ADMIN — Medication 5 MILLIGRAM(S): at 18:40

## 2021-02-26 NOTE — PROGRESS NOTE ADULT - ATTENDING COMMENTS
Patient seen today 2/26.    I was physically present for the key portions of the evaluation and management (E/M) service provided.  I agree with the above history, physical, and plan per neurology team NP Laurie with the following additions/modifications    Exam imporving: now mouthing faint words such as his name, briskly following commands, continue supportive care per above, transition to PO antihypertensives SBP goal < 160.  Can relax sodium goal to goal normonatremia 135-145    Thomas Koroma MD  Attending Neurointensivist

## 2021-02-26 NOTE — PROGRESS NOTE ADULT - SUBJECTIVE AND OBJECTIVE BOX
Patient is a 50y old  Male who presents with a chief complaint of Altered mental status (26 Feb 2021 09:29)        Over Night Events:  No events overnight.            ROS:     All ROS are negative except HPI         PHYSICAL EXAM    ICU Vital Signs Last 24 Hrs  T(C): 37.1 (26 Feb 2021 12:00), Max: 37.1 (25 Feb 2021 18:00)  T(F): 98.8 (26 Feb 2021 12:00), Max: 98.8 (25 Feb 2021 18:00)  HR: 65 (26 Feb 2021 16:00) (62 - 76)  BP: --  BP(mean): --  ABP: 165/81 (26 Feb 2021 16:00) (136/60 - 182/73)  ABP(mean): 103 (26 Feb 2021 16:00) (77 - 103)  RR: 20 (26 Feb 2021 16:00) (14 - 22)  SpO2: 97% (26 Feb 2021 16:00) (90% - 100%)      CONSTITUTIONAL:  Well nourished.  NAD    ENT:   Airway patent,   Mouth with normal mucosa.   No thrush    EYES:   Pupils equal,   Round and reactive to light.    CARDIAC:   Normal rate,   Regular rhythm.    edema      Vascular:  Normal systolic impulse  No Carotid bruits    RESPIRATORY:   No wheezing  Bilateral BS  Normal chest expansion  Not tachypneic,  No use of accessory muscles    GASTROINTESTINAL:  Abdomen soft,   Non-tender,   No guarding,   + BS    MUSCULOSKELETAL:   Range of motion is not limited,  No clubbing, cyanosis    NEUROLOGICAL:   Alert   Follows simple commands    SKIN:   Skin normal color for race,   Warm and dry   No evidence of rash.    PSYCHIATRIC:   Normal mood and affect.   No apparent risk to self or others.    HEMATOLOGICAL:  No cervical  lymphadenopathy.  no inguinal lymphadenopathy      02-25-21 @ 07:01  -  02-26-21 @ 07:00  --------------------------------------------------------  IN:    Enteral Tube Flush: 320 mL    IV PiggyBack: 200 mL    Jevity 1.2: 1200 mL    Labetalol: 510 mL    NiCARdipine: 765 mL    sodium chloride 3%: 330 mL    sodium chloride 3%: 30 mL  Total IN: 3355 mL    OUT:    Indwelling Catheter - Urethral (mL): 3585 mL    Rectal Tube (mL): 100 mL  Total OUT: 3685 mL    Total NET: -330 mL      02-26-21 @ 07:01  -  02-26-21 @ 17:42  --------------------------------------------------------  IN:    Jevity 1.2: 400 mL    NiCARdipine: 275 mL    sodium chloride 3%: 75 mL  Total IN: 750 mL    OUT:    Indwelling Catheter - Urethral (mL): 1120 mL  Total OUT: 1120 mL    Total NET: -370 mL          LABS:                            12.6   8.66  )-----------( 414      ( 26 Feb 2021 04:30 )             37.7                                               02-26    146  |  109  |  16  ----------------------------<  112<H>  3.8   |  29  |  0.6<L>    Ca    8.4<L>      26 Feb 2021 04:30  Phos  3.0     02-26  Mg     2.0     02-26    TPro  5.5<L>  /  Alb  3.1<L>  /  TBili  0.7  /  DBili  x   /  AST  42<H>  /  ALT  55<H>  /  AlkPhos  113  02-26                                                                                           LIVER FUNCTIONS - ( 26 Feb 2021 04:30 )  Alb: 3.1 g/dL / Pro: 5.5 g/dL / ALK PHOS: 113 U/L / ALT: 55 U/L / AST: 42 U/L / GGT: x                                                                                                                                       MEDICATIONS  (STANDING):  chlorhexidine 4% Liquid 1 Application(s) Topical <User Schedule>  cloNIDine 0.3 milliGRAM(s) Oral every 8 hours  heparin   Injectable 5000 Unit(s) SubCutaneous every 8 hours  hydrALAZINE 100 milliGRAM(s) Oral every 8 hours  labetalol Infusion 0.5 mG/Min (30 mL/Hr) IV Continuous <Continuous>  lactulose Syrup 10 Gram(s) Oral every 8 hours  levETIRAcetam  IVPB 500 milliGRAM(s) IV Intermittent every 12 hours  lisinopril 40 milliGRAM(s) Oral daily  metoclopramide 5 milliGRAM(s) Oral two times a day  mineral oil enema 133 milliLiter(s) Rectal daily  niCARdipine Infusion 15 mG/Hr (75 mL/Hr) IV Continuous <Continuous>  NIFEdipine IR 20 milliGRAM(s) Oral every 6 hours  pantoprazole  Injectable 40 milliGRAM(s) IV Push every 12 hours  polyethylene glycol 3350 17 Gram(s) Oral daily  senna 2 Tablet(s) Oral at bedtime    MEDICATIONS  (PRN):  acetaminophen   Tablet .. 650 milliGRAM(s) Oral every 6 hours PRN Temp greater or equal to 38C (100.4F)  LORazepam   Injectable 2 milliGRAM(s) IV Push every 4 hours PRN Agitation      New X-rays reviewed:                                                                                  ECHO    CXR interpreted by me:

## 2021-02-26 NOTE — PROGRESS NOTE ADULT - ASSESSMENT
MELODIE HERNADEZ is a 50y Male with a past medical history HTN, GIB  who presented for weakness and and was found to have HTN emergency a right intraparenchymal bleed. Nephrology consulted to evaluate refractory hypertension.     # REsistant Hypertension, on multiple (6) medications  # Hypernatremia on 3% saline - Na goal ~140 for ICH    ·	 - BP is acceptable, taper  Cardene and labetalol drip to off  cont  Clonidine, Hydralazine, lisinopril  and nifedipine / can increase nifedipine to 20 q6h and clonidine to 0.3 q8h / need to follow HR   ·	w/u for secondary HTN - obtain Aldosterone renin ratio,  Renin should be high / if suppressed could be c/w hyperaldo   ·	RAD neg for LIOR, catecholamines in urine wnl  ·	obtain plasma catecholamines and metanephrines  ·	3% will have a hypertensive effect, and would not start diuretic since it would decrease serum sodium    will follow on Monday

## 2021-02-26 NOTE — PROGRESS NOTE ADULT - SUBJECTIVE AND OBJECTIVE BOX
Nephrology progress note    THIS IS AN INCOMPLETE NOTE . FULL NOTE TO FOLLOW SHORTLY    Patient is seen and examined, events over the last 24 h noted .    Allergies:  No Known Allergies    Hospital Medications:   MEDICATIONS  (STANDING):  chlorhexidine 4% Liquid 1 Application(s) Topical <User Schedule>  cloNIDine 0.2 milliGRAM(s) Oral every 8 hours  heparin   Injectable 5000 Unit(s) SubCutaneous every 8 hours  hydrALAZINE 100 milliGRAM(s) Oral every 8 hours  labetalol Infusion 0.5 mG/Min (30 mL/Hr) IV Continuous <Continuous>  lactulose Syrup 10 Gram(s) Oral every 8 hours  levETIRAcetam  IVPB 500 milliGRAM(s) IV Intermittent every 12 hours  lisinopril 40 milliGRAM(s) Oral daily  metoclopramide 5 milliGRAM(s) Oral two times a day  mineral oil enema 133 milliLiter(s) Rectal daily  niCARdipine Infusion 15 mG/Hr (75 mL/Hr) IV Continuous <Continuous>  NIFEdipine IR 10 milliGRAM(s) Oral every 8 hours  pantoprazole  Injectable 40 milliGRAM(s) IV Push every 12 hours  polyethylene glycol 3350 17 Gram(s) Oral daily  senna 2 Tablet(s) Oral at bedtime  sodium chloride 3%. 500 milliLiter(s) (15 mL/Hr) IV Continuous <Continuous>        VITALS:  T(F): 98.4 (02-26-21 @ 08:00), Max: 98.9 (02-25-21 @ 12:00)  HR: 69 (02-26-21 @ 09:00)  BP: --  RR: 18 (02-26-21 @ 09:00)  SpO2: 93% (02-26-21 @ 09:00)  Wt(kg): --    02-24 @ 07:01  -  02-25 @ 07:00  --------------------------------------------------------  IN: 3231 mL / OUT: 1930 mL / NET: 1301 mL    02-25 @ 07:01  -  02-26 @ 07:00  --------------------------------------------------------  IN: 3355 mL / OUT: 3685 mL / NET: -330 mL    02-26 @ 07:01  -  02-26 @ 09:29  --------------------------------------------------------  IN: 105 mL / OUT: 195 mL / NET: -90 mL          PHYSICAL EXAM:  Constitutional: NAD  HEENT: anicteric sclera, oropharynx clear, MMM  Neck: No JVD  Respiratory: CTAB, no wheezes, rales or rhonchi  Cardiovascular: S1, S2, RRR  Gastrointestinal: BS+, soft, NT/ND  Extremities: No cyanosis or clubbing. No peripheral edema  :  No berry.   Skin: No rashes    LABS:  02-26    146  |  109  |  16  ----------------------------<  112<H>  3.8   |  29  |  0.6<L>    Ca    8.4<L>      26 Feb 2021 04:30  Phos  3.0     02-26  Mg     2.0     02-26    TPro  5.5<L>  /  Alb  3.1<L>  /  TBili  0.7  /  DBili      /  AST  42<H>  /  ALT  55<H>  /  AlkPhos  113  02-26                          12.6   8.66  )-----------( 414      ( 26 Feb 2021 04:30 )             37.7       Urine Studies:    Osmolality, Random Urine: 587 mos/kg (02-25 @ 12:55)  Sodium, Random Urine: 201.0 mmoL/L (02-25 @ 12:55)  Protein/Creatinine Ratio Calculation: 0.3 Ratio (02-25 @ 12:55)  Creatinine, Random Urine: 38 mg/dL (02-25 @ 12:55)  Osmolality, Random Urine: 652 mos/kg (02-19 @ 21:22)  Sodium, Random Urine: 217.0 mmoL/L (02-19 @ 21:22)  Protein/Creatinine Ratio Calculation: 0.4 Ratio (02-19 @ 21:22)  Creatinine, Random Urine: 21 mg/dL (02-19 @ 21:22)    RADIOLOGY & ADDITIONAL STUDIES:   Nephrology progress note    THIS IS AN INCOMPLETE NOTE . FULL NOTE TO FOLLOW SHORTLY    Patient is seen and examined, events over the last 24 h noted .    Allergies:  No Known Allergies    Hospital Medications:   MEDICATIONS  (STANDING):  cloNIDine 0.2 milliGRAM(s) Oral every 8 hours  heparin   Injectable 5000 Unit(s) SubCutaneous every 8 hours  hydrALAZINE 100 milliGRAM(s) Oral every 8 hours  labetalol Infusion 0.5 mG/Min (30 mL/Hr) IV Continuous <Continuous>  lactulose Syrup 10 Gram(s) Oral every 8 hours  levETIRAcetam  IVPB 500 milliGRAM(s) IV Intermittent every 12 hours  lisinopril 40 milliGRAM(s) Oral daily  metoclopramide 5 milliGRAM(s) Oral two times a day  mineral oil enema 133 milliLiter(s) Rectal daily  niCARdipine Infusion 15 mG/Hr (75 mL/Hr) IV Continuous <Continuous>  NIFEdipine IR 10 milliGRAM(s) Oral every 8 hours  pantoprazole  Injectable 40 milliGRAM(s) IV Push every 12 hours  polyethylene glycol 3350 17 Gram(s) Oral daily  senna 2 Tablet(s) Oral at bedtime  sodium chloride 3%. 500 milliLiter(s) (15 mL/Hr) IV Continuous <Continuous>        VITALS:  T(F): 98.4 (02-26-21 @ 08:00), Max: 98.9 (02-25-21 @ 12:00)  HR: 69 (02-26-21 @ 09:00)  BP: 145/65  RR: 18 (02-26-21 @ 09:00)  SpO2: 93% (02-26-21 @ 09:00)  Wt(kg): --    02-24 @ 07:01  -  02-25 @ 07:00  --------------------------------------------------------  IN: 3231 mL / OUT: 1930 mL / NET: 1301 mL    02-25 @ 07:01  -  02-26 @ 07:00  --------------------------------------------------------  IN: 3355 mL / OUT: 3685 mL / NET: -330 mL    02-26 @ 07:01  -  02-26 @ 09:29  --------------------------------------------------------  IN: 105 mL / OUT: 195 mL / NET: -90 mL          PHYSICAL EXAM  Neck: No JVD  Respiratory: CTAB,  Cardiovascular: S1, S2, RRR  Gastrointestinal: BS+, soft, NT/ND  Extremities: No cyanosis or clubbing. No peripheral edema  Skin: No rashes    LABS:  02-26    146  |  109  |  16  ----------------------------<  112<H>  3.8   |  29  |  0.6<L>    Ca    8.4<L>      26 Feb 2021 04:30  Phos  3.0     02-26  Mg     2.0     02-26    TPro  5.5<L>  /  Alb  3.1<L>  /  TBili  0.7  /  DBili      /  AST  42<H>  /  ALT  55<H>  /  AlkPhos  113  02-26                          12.6   8.66  )-----------( 414      ( 26 Feb 2021 04:30 )             37.7       Urine Studies:    Osmolality, Random Urine: 587 mos/kg (02-25 @ 12:55)  Sodium, Random Urine: 201.0 mmoL/L (02-25 @ 12:55)  Protein/Creatinine Ratio Calculation: 0.3 Ratio (02-25 @ 12:55)  Creatinine, Random Urine: 38 mg/dL (02-25 @ 12:55)  Osmolality, Random Urine: 652 mos/kg (02-19 @ 21:22)  Sodium, Random Urine: 217.0 mmoL/L (02-19 @ 21:22)  Protein/Creatinine Ratio Calculation: 0.4 Ratio (02-19 @ 21:22)  Creatinine, Random Urine: 21 mg/dL (02-19 @ 21:22)    RADIOLOGY & ADDITIONAL STUDIES:

## 2021-02-26 NOTE — PROGRESS NOTE ADULT - SUBJECTIVE AND OBJECTIVE BOX
Neurocritical Care Progress Note:    HPI:  50 years old right handed Male with PMHx of HTN, GI bleed, PUD,  Mrankin score of 0 at baseline was brought in by EMS for  AMS and left sided weakness. Wife states that patient was having a usual day today went to bathroom and then the next thing she noted was that he was confused and was lying on the bathroom floor, she reports urinary incontinence, patient was confused, had trouble speaking, and could not move his left side. In the ED, /148, HR 80, saturating well. Code stroke called, NIHSS 24 and GCS 11, CTH showed Moderate to large intraparenchymal right basal ganglia hemorrhage with mild surrounding edema and associated right sulcal effacement as well as decompression into the ventricles. There is approximately 6.3 mm of midline shift. CTA did not show any evidence of active bleeding, AVM or aneurysm or major vascular stenosis or occlusion. Pt became unresponsive in CT scan and had to be intubated and sedated. Patient was intubated in ED for worsening mental status and GCS. Started on Nicardipine drip for sbp in 200s and EVD was placed by neurosurgery at bedside. Pan trauma scan was negative. To be admitted under ICU for further monitoring.       Medications:   amLODIPine   Tablet 10 milliGRAM(s) Oral daily  chlorhexidine 4% Liquid 1 Application(s) Topical <User Schedule>  cloNIDine 0.2 milliGRAM(s) Oral every 8 hours  heparin   Injectable 5000 Unit(s) SubCutaneous every 8 hours  hydrALAZINE 100 milliGRAM(s) Oral every 8 hours  labetalol Infusion 0.5 mG/Min IV Continuous <Continuous>  lactulose Syrup 10 Gram(s) Oral every 8 hours  levETIRAcetam  IVPB 500 milliGRAM(s) IV Intermittent every 12 hours  lisinopril 40 milliGRAM(s) Oral daily  metoclopramide 5 milliGRAM(s) Oral two times a day  mineral oil enema 133 milliLiter(s) Rectal daily  niCARdipine Infusion 15 mG/Hr IV Continuous <Continuous>  pantoprazole  Injectable 40 milliGRAM(s) IV Push every 12 hours  polyethylene glycol 3350 17 Gram(s) Oral daily  senna 2 Tablet(s) Oral at bedtime  sodium chloride 3%. 500 milliLiter(s) IV Continuous <Continuous>       Ancillary Management:   Chest PT[ ]   Head of bed >35 [x ]   Out of bed to chair [ ]   PT/OT/SP Eval [ ]   Spirometry[ ]   DVT prophalaxis[x ]    Neuro:   Awake: Spontaneously[ x] Occasionally[ ] To Voice [ ] To painful stimuli [ ]   AIert [ ]. Following commands: 3 steps[ ], 2 steps[ ], 1 step [ x], None [ ]   Orientation: 0[x ], 1[ ], 2[ ], 3[ ]. Tracking objects with eyes: [ ]   Language: non verbal  Time off sedation for exam:   Pupils: reluctant to have pupils examined limited cooperation.     Corneal:   +   Gag reflex:  +   EOMI: no gaze    Patient awake. Non verbal  Can close eyes to command  RUE movement greater than LUE to peripheral noxious stimuli.   Minimal movement to bilateral LE    mRS:4  0 No symptoms at all  1 No significant disability despite symptoms; able to carry out all usual duties and activities without assistance  2 Slight disability; unable to carry out all previous activities, but able to look after own affairs  3 Moderate disability; requiring some help, but able to walk without assistance  4 Moderately severe disability; unable to walk without assistance and unable to attend to own bodily needs without assistance  5 Severe disability; bedridden, incontinent and requiring constant nursing care and attention  6 Dead    ICHs:  Age >=80  GCS Score: 3-4 (2 pts)   GCS Score: 5-12 (1 pt)   ICH Volume: >30  (+) IVH  (+) Infratentorial    [place]&Evans:  Grade I = Asymptomatic, mild headache, slight nuchal rigidity  Grade II = Moderate to severe headache, nuchal rigidity, no neurological deficit other than cranial nerve palsy  Grade III = Drowiness, confusion, mild focal neurological deficit  Grade IV = Stupor, moderate to severe hemiparesis  Grade V = Coma, decerebrate posturing    m-[place]:  Grade 0   (No Subarachnoid Hemorrhage (SAH)), No intraventricular hemorrhage (IVH), Incidence of symptomatic vasopasm 0%  Grade 1   (Focal or diffuse, thin SAH), No IVH, incidence of symptomatic vasospasm 24%  Grade 2   (Thin focal or diffuse SAH), IVH present, incidence of symptomatic vasospasm 33%  Grade 3   (Thick focal or diffuse SAH), No IVH, incidence of symptomatic vasospasm 33%  Grade 4   (Thick focal or diffuse SAH), IVH present, incidence of symptomatic vasosasm 40%    Last CTH:  < from: CT Head No Cont (02.22.21 @ 16:18) >    IMPRESSION:    In comparison with the prior CT scan of the brain dated February 19, 2021:    Again demonstrated is a parenchymal hemorrhage with surrounding edema in the right frontal temporal region and right basal ganglia with associated mass effect and shift, there has been a mild decrease in the overall size of the hemorrhage, surrounding edema, mass effect and shift. The etiology of the hemorrhage is uncertain at the present time and follow-up to resolution followed by postcontrast examination is recommended to exclude an underlying lesion.    Possible interval development of diminished attenuation in the right parietal lobe, the possibility of acute ischemic change should be considered and a follow-up examination is recommended.    < end of copied text >    Last CTA/MRA:  `< from: CT Angio Head w/ IV Cont (01.27.21 @ 21:07) >  IMPRESSION    The previously noted area of intracerebral hemorrhage in the right basal ganglia region is again identified. Please see report of the CT scan of the head dated 1/27/2021.    No evidence of active bleeding, AVM or aneurysm.    No evidence of major vascular stenosis or occlusion,    < end of copied text >    Last CTP:    Last MRI:    Last TCD:    Last EEG:  `  VEEG in the last 24 hours: sedated and intubated    Background----------------   low amplitude , sleep recording with minimal reactivity    Focal and generalized slowing------------- generalized slowing.  No focal slowing    Interictal activity--------------  none    Events------------------- none    Seizures-------------- none    Impression:   generalized slowing    Plan - discussed with the  NCC team        EVD: [ ] [place]: [ ]     ICP:     CPP:     Level(cm):     24hr(ml):     CSF:     W:     R:     Cx:     P:     G:     LA:       Gram stain:    9. Cardiovascular:   Vital Signs Last 24 Hrs  T(C): 37 (25 Feb 2021 20:00), Max: 37.2 (25 Feb 2021 12:00)  T(F): 98.6 (25 Feb 2021 20:00), Max: 98.9 (25 Feb 2021 12:00)  HR: 72 (25 Feb 2021 23:00) (64 - 76)  BP: 164/72 (25 Feb 2021 09:00) (130/69 - 164/72)  BP(mean): 94 (25 Feb 2021 08:00) (94 - 94)  RR: 20 (25 Feb 2021 23:00) (14 - 20)  SpO2: 98% (25 Feb 2021 23:00) (96% - 100%)     Last Echo:  `  Last EKG:  `< from: 12 Lead ECG (02.09.21 @ 15:06) >    Diagnosis Line Normal sinus rhythm  T wave abnormality, consider inferior ischemia  Abnormal ECG    Confirmed by YOMI ROBIN MD ([id number]) on 2/9/2021 4:52:39 PM    < end of copied text >    CVP   MAP/CPP/SBP target:   CO:      CI:       Enzymes/Trop:    10. Respiratory:   ABG:ABG - ( 24 Feb 2021 03:16 )  pH, Arterial: 7.46  pH, Blood: x     /  pCO2: 40    /  pO2: 297   / HCO3: 28    / Base Excess: 4.1   /  SaO2: 98                  VBG:    Chest Xray:  `< from: Xray Chest 1 View- PORTABLE-Routine (Xray Chest 1 View- PORTABLE-Routine in AM.) (02.25.21 @ 06:37) >    IMPRESSION:    Unchanged bilateral opacities.      < end of copied text >        Peak Pressure/[place] Pressure:    11.GI:    Prophalaxis:    protonix  Bowel mvt:     Abd distension:   LIVER FUNCTIONS - ( 25 Feb 2021 19:55 )  Alb: 2.8 g/dL / Pro: 5.4 g/dL / ALK PHOS: 104 U/L / ALT: 25 U/L / AST: 18 U/L / GGT: x             12.Renal/Fluids/Electrolytes:    02-25    145  |  109  |  16  ----------------------------<  163<H>  3.7   |  29  |  0.6<L>    Ca    7.7<L>      25 Feb 2021 19:55  Phos  2.7     02-25  Mg     2.0     02-25    TPro  5.4<L>  /  Alb  2.8<L>  /  TBili  0.5  /  DBili  x   /  AST  18  /  ALT  25  /  AlkPhos  104  02-25      I&O's Detail    24 Feb 2021 07:01  -  25 Feb 2021 07:00  --------------------------------------------------------  IN:    Enteral Tube Flush: 200 mL    Labetalol: 996 mL    NiCARdipine: 1345 mL    sodium chloride 3%: 690 mL  Total IN: 3231 mL    OUT:    Dexmedetomidine: 0 mL    Indwelling Catheter - Urethral (mL): 1930 mL    Propofol: 0 mL    Rectal Tube (mL): 0 mL  Total OUT: 1930 mL    Total NET: 1301 mL      25 Feb 2021 07:01  -  26 Feb 2021 00:01  --------------------------------------------------------  IN:    Enteral Tube Flush: 270 mL    IV PiggyBack: 100 mL    Jevity 1.2: 800 mL    Labetalol: 300 mL    NiCARdipine: 455 mL    sodium chloride 3%: 30 mL    sodium chloride 3%: 210 mL  Total IN: 2165 mL    OUT:    Indwelling Catheter - Urethral (mL): 2340 mL    Rectal Tube (mL): 50 mL  Total OUT: 2390 mL    Total NET: -225 mL          13.ID:   TMax: 98.9  Vital Signs Last 24 Hrs  T(C): 37 (25 Feb 2021 20:00), Max: 37.2 (25 Feb 2021 12:00)  T(F): 98.6 (25 Feb 2021 20:00), Max: 98.9 (25 Feb 2021 12:00)  HR: 72 (25 Feb 2021 23:00) (64 - 76)  BP: 164/72 (25 Feb 2021 09:00) (130/69 - 164/72)  BP(mean): 94 (25 Feb 2021 08:00) (94 - 94)  RR: 20 (25 Feb 2021 23:00) (14 - 20)  SpO2: 98% (25 Feb 2021 23:00) (96% - 100%)           Lines: Central[] Date inserted: Peripheral[]    14. Hematology:                         12.5   8.23  )-----------( 426      ( 25 Feb 2021 05:30 )             36.7      02-25    145  |  109  |  16  ----------------------------<  163<H>  3.7   |  29  |  0.6<L>    Ca    7.7<L>      25 Feb 2021 19:55  Phos  2.7     02-25  Mg     2.0     02-25    TPro  5.4<L>  /  Alb  2.8<L>  /  TBili  0.5  /  DBili  x   /  AST  18  /  ALT  25  /  AlkPhos  104  02-25         DVT Prophylaxis Lovenox[ ] Heparin[ ] Venodynes[ ] SCD's[ x]

## 2021-02-26 NOTE — PROGRESS NOTE ADULT - ASSESSMENT
15. Impression:  50/M with HTN, GIB, PUD, with R basal ganglia ICH with IVH and large perihematomal edema. S/p EVD removal. Extubated, awake follows few simple  commands. Patient extubated and is able to move right UE>LE , no movement noted on the left side. He follows few simple commands and is tracking. Sodium 145.        Plan:  #Neuro:  - Goal Serum Na level 140-150  - Trend CMP q6hrs  - Continue Keppra 500mg IV q12hrs  - PT/REHAB EVAL  - Speech and swallow eval    #CV:  - Keep SBP < 160     #Resp:  - HOB > 35 degrees  - Aspiration precautions    #Renal/Fluid/Electolytes:  - Strict I&Os  - Monitor lytes, replete as needed        #GI:  - Continue Protonix 40mg IV q12hrs  - Continue Senna 2 tablets PO q24hrs HS  - Continue fleet enema q24hrs    # ID  - Maintain normothermia      # Endocrine   - Maintain strict glycemic control      #DVT prophylaxis:  - SCS while in bed  - Heparin sq          Disposition: Continue critical care monitoring    Thomas Sullivan NP  [id number]

## 2021-02-26 NOTE — PROGRESS NOTE ADULT - ASSESSMENT
IMPRESSION:    Large ICB secondary to Hypertensive Emergency s/p EVD and removal  Intubated, for airway protection now extubated   Klebsiella Pneumonia pansensitive  Left posterior tibial & peroneal DVT  Uncontrolled HTN    PLAN:    CNS:  Neurology FU    HEENT: Oral care    PULMONARY:  HOB @ 45 degrees.  Aspiration precautions    CARDIOVASCULAR:  Avoid volume overload.  BP control. DC 3%.     GI: GI prophylaxis.  NG feeding    RENAL;  FU lytes.  Correct lytes as needed.       INFECTIOUS DISEASE:  ABX per ID    HEMATOLOGICAL: DVT prophylaxis.    ENDOCRINE:  Follow up FS.  Check urine and serum metanephrines    MUSCULOSKELETAL:  Bed rest.  PT OT     Prognosis poor

## 2021-02-26 NOTE — EEG REPORT - NS EEG TEXT BOX
Epilepsy Attending Note:     MELODIE HERNADEZ    50y Male  MRN [id number]    Vital Signs Last 24 Hrs  T(C): 36.9 (26 Feb 2021 08:00), Max: 37.2 (25 Feb 2021 12:00)  T(F): 98.4 (26 Feb 2021 08:00), Max: 98.9 (25 Feb 2021 12:00)  HR: 66 (26 Feb 2021 11:00) (62 - 76)  BP: --  BP(mean): --  RR: 15 (26 Feb 2021 11:00) (15 - 22)  SpO2: 95% (26 Feb 2021 11:00) (90% - 100%)                          12.6   8.66  )-----------( 414      ( 26 Feb 2021 04:30 )             37.7       02-26    146  |  109  |  16  ----------------------------<  112<H>  3.8   |  29  |  0.6<L>    Ca    8.4<L>      26 Feb 2021 04:30  Phos  3.0     02-26  Mg     2.0     02-26    TPro  5.5<L>  /  Alb  3.1<L>  /  TBili  0.7  /  DBili  x   /  AST  42<H>  /  ALT  55<H>  /  AlkPhos  113  02-26      MEDICATIONS  (STANDING):  chlorhexidine 4% Liquid 1 Application(s) Topical <User Schedule>  cloNIDine 0.2 milliGRAM(s) Oral every 8 hours  heparin   Injectable 5000 Unit(s) SubCutaneous every 8 hours  hydrALAZINE 100 milliGRAM(s) Oral every 8 hours  labetalol Infusion 0.5 mG/Min (30 mL/Hr) IV Continuous <Continuous>  lactulose Syrup 10 Gram(s) Oral every 8 hours  levETIRAcetam  IVPB 500 milliGRAM(s) IV Intermittent every 12 hours  lisinopril 40 milliGRAM(s) Oral daily  metoclopramide 5 milliGRAM(s) Oral two times a day  mineral oil enema 133 milliLiter(s) Rectal daily  niCARdipine Infusion 15 mG/Hr (75 mL/Hr) IV Continuous <Continuous>  NIFEdipine IR 10 milliGRAM(s) Oral every 8 hours  pantoprazole  Injectable 40 milliGRAM(s) IV Push every 12 hours  polyethylene glycol 3350 17 Gram(s) Oral daily  senna 2 Tablet(s) Oral at bedtime  sodium chloride 3%. 500 milliLiter(s) (15 mL/Hr) IV Continuous <Continuous>    MEDICATIONS  (PRN):  acetaminophen   Tablet .. 650 milliGRAM(s) Oral every 6 hours PRN Temp greater or equal to 38C (100.4F)  LORazepam   Injectable 2 milliGRAM(s) IV Push every 4 hours PRN Agitation            VEEG in the last 24 hours:    Background---------- continues , slightly less than optimally organized  and a PDR reaching frequencies in the range of 8-9 hz superimposed by small amount of low amplitude theta    Focal and generalized slowing-----  borderline to mild generalized slowing                                                     borderline right hemispheric focal slowing    Interictal activity---------------- none    Events----------------  none    Seizures-------------- none    Impression:   abnormal as above    Plan - discussed with the NCC team

## 2021-02-27 LAB
ALBUMIN SERPL ELPH-MCNC: 2.9 G/DL — LOW (ref 3.5–5.2)
ALBUMIN SERPL ELPH-MCNC: 3.1 G/DL — LOW (ref 3.5–5.2)
ALP SERPL-CCNC: 113 U/L — SIGNIFICANT CHANGE UP (ref 30–115)
ALP SERPL-CCNC: 117 U/L — HIGH (ref 30–115)
ALT FLD-CCNC: 81 U/L — HIGH (ref 0–41)
ALT FLD-CCNC: 84 U/L — HIGH (ref 0–41)
ANION GAP SERPL CALC-SCNC: 11 MMOL/L — SIGNIFICANT CHANGE UP (ref 7–14)
ANION GAP SERPL CALC-SCNC: 8 MMOL/L — SIGNIFICANT CHANGE UP (ref 7–14)
AST SERPL-CCNC: 38 U/L — SIGNIFICANT CHANGE UP (ref 0–41)
AST SERPL-CCNC: 42 U/L — HIGH (ref 0–41)
BASOPHILS # BLD AUTO: 0.06 K/UL — SIGNIFICANT CHANGE UP (ref 0–0.2)
BASOPHILS NFR BLD AUTO: 0.7 % — SIGNIFICANT CHANGE UP (ref 0–1)
BILIRUB SERPL-MCNC: 0.5 MG/DL — SIGNIFICANT CHANGE UP (ref 0.2–1.2)
BILIRUB SERPL-MCNC: 0.6 MG/DL — SIGNIFICANT CHANGE UP (ref 0.2–1.2)
BUN SERPL-MCNC: 13 MG/DL — SIGNIFICANT CHANGE UP (ref 10–20)
BUN SERPL-MCNC: 14 MG/DL — SIGNIFICANT CHANGE UP (ref 10–20)
CALCIUM SERPL-MCNC: 8.6 MG/DL — SIGNIFICANT CHANGE UP (ref 8.5–10.1)
CALCIUM SERPL-MCNC: 8.8 MG/DL — SIGNIFICANT CHANGE UP (ref 8.5–10.1)
CHLORIDE SERPL-SCNC: 104 MMOL/L — SIGNIFICANT CHANGE UP (ref 98–110)
CHLORIDE SERPL-SCNC: 107 MMOL/L — SIGNIFICANT CHANGE UP (ref 98–110)
CO2 SERPL-SCNC: 26 MMOL/L — SIGNIFICANT CHANGE UP (ref 17–32)
CO2 SERPL-SCNC: 28 MMOL/L — SIGNIFICANT CHANGE UP (ref 17–32)
CREAT SERPL-MCNC: 0.5 MG/DL — LOW (ref 0.7–1.5)
CREAT SERPL-MCNC: 0.6 MG/DL — LOW (ref 0.7–1.5)
EOSINOPHIL # BLD AUTO: 0.41 K/UL — SIGNIFICANT CHANGE UP (ref 0–0.7)
EOSINOPHIL NFR BLD AUTO: 4.7 % — SIGNIFICANT CHANGE UP (ref 0–8)
GLUCOSE SERPL-MCNC: 123 MG/DL — HIGH (ref 70–99)
GLUCOSE SERPL-MCNC: 128 MG/DL — HIGH (ref 70–99)
HCT VFR BLD CALC: 38.5 % — LOW (ref 42–52)
HGB BLD-MCNC: 13.2 G/DL — LOW (ref 14–18)
IMM GRANULOCYTES NFR BLD AUTO: 1.8 % — HIGH (ref 0.1–0.3)
LYMPHOCYTES # BLD AUTO: 1.65 K/UL — SIGNIFICANT CHANGE UP (ref 1.2–3.4)
LYMPHOCYTES # BLD AUTO: 19.1 % — LOW (ref 20.5–51.1)
MAGNESIUM SERPL-MCNC: 2.2 MG/DL — SIGNIFICANT CHANGE UP (ref 1.8–2.4)
MCHC RBC-ENTMCNC: 31.7 PG — HIGH (ref 27–31)
MCHC RBC-ENTMCNC: 34.3 G/DL — SIGNIFICANT CHANGE UP (ref 32–37)
MCV RBC AUTO: 92.3 FL — SIGNIFICANT CHANGE UP (ref 80–94)
MONOCYTES # BLD AUTO: 0.82 K/UL — HIGH (ref 0.1–0.6)
MONOCYTES NFR BLD AUTO: 9.5 % — HIGH (ref 1.7–9.3)
NEUTROPHILS # BLD AUTO: 5.55 K/UL — SIGNIFICANT CHANGE UP (ref 1.4–6.5)
NEUTROPHILS NFR BLD AUTO: 64.2 % — SIGNIFICANT CHANGE UP (ref 42.2–75.2)
NRBC # BLD: 0 /100 WBCS — SIGNIFICANT CHANGE UP (ref 0–0)
PHOSPHATE SERPL-MCNC: 3.9 MG/DL — SIGNIFICANT CHANGE UP (ref 2.1–4.9)
PLATELET # BLD AUTO: 409 K/UL — HIGH (ref 130–400)
POTASSIUM SERPL-MCNC: 3.9 MMOL/L — SIGNIFICANT CHANGE UP (ref 3.5–5)
POTASSIUM SERPL-MCNC: 4.2 MMOL/L — SIGNIFICANT CHANGE UP (ref 3.5–5)
POTASSIUM SERPL-SCNC: 3.9 MMOL/L — SIGNIFICANT CHANGE UP (ref 3.5–5)
POTASSIUM SERPL-SCNC: 4.2 MMOL/L — SIGNIFICANT CHANGE UP (ref 3.5–5)
PROT SERPL-MCNC: 5.4 G/DL — LOW (ref 6–8)
PROT SERPL-MCNC: 5.5 G/DL — LOW (ref 6–8)
RBC # BLD: 4.17 M/UL — LOW (ref 4.7–6.1)
RBC # FLD: 13.3 % — SIGNIFICANT CHANGE UP (ref 11.5–14.5)
SODIUM SERPL-SCNC: 141 MMOL/L — SIGNIFICANT CHANGE UP (ref 135–146)
SODIUM SERPL-SCNC: 143 MMOL/L — SIGNIFICANT CHANGE UP (ref 135–146)
WBC # BLD: 8.65 K/UL — SIGNIFICANT CHANGE UP (ref 4.8–10.8)
WBC # FLD AUTO: 8.65 K/UL — SIGNIFICANT CHANGE UP (ref 4.8–10.8)

## 2021-02-27 PROCEDURE — 71045 X-RAY EXAM CHEST 1 VIEW: CPT | Mod: 26

## 2021-02-27 RX ORDER — NIFEDIPINE 30 MG
20 TABLET, EXTENDED RELEASE 24 HR ORAL EVERY 6 HOURS
Refills: 0 | Status: DISCONTINUED | OUTPATIENT
Start: 2021-02-27 | End: 2021-03-08

## 2021-02-27 RX ORDER — NIFEDIPINE 30 MG
20 TABLET, EXTENDED RELEASE 24 HR ORAL EVERY 6 HOURS
Refills: 0 | Status: DISCONTINUED | OUTPATIENT
Start: 2021-02-27 | End: 2021-02-27

## 2021-02-27 RX ORDER — NIFEDIPINE 30 MG
60 TABLET, EXTENDED RELEASE 24 HR ORAL DAILY
Refills: 0 | Status: DISCONTINUED | OUTPATIENT
Start: 2021-02-27 | End: 2021-02-27

## 2021-02-27 RX ADMIN — NICARDIPINE HYDROCHLORIDE 75 MG/HR: 30 CAPSULE, EXTENDED RELEASE ORAL at 06:44

## 2021-02-27 RX ADMIN — Medication 100 MILLIGRAM(S): at 05:18

## 2021-02-27 RX ADMIN — Medication 0.3 MILLIGRAM(S): at 13:04

## 2021-02-27 RX ADMIN — Medication 100 MILLIGRAM(S): at 21:12

## 2021-02-27 RX ADMIN — NICARDIPINE HYDROCHLORIDE 75 MG/HR: 30 CAPSULE, EXTENDED RELEASE ORAL at 21:48

## 2021-02-27 RX ADMIN — HEPARIN SODIUM 5000 UNIT(S): 5000 INJECTION INTRAVENOUS; SUBCUTANEOUS at 05:19

## 2021-02-27 RX ADMIN — Medication 20 MILLIGRAM(S): at 14:18

## 2021-02-27 RX ADMIN — Medication 20 MILLIGRAM(S): at 23:52

## 2021-02-27 RX ADMIN — Medication 0.3 MILLIGRAM(S): at 05:18

## 2021-02-27 RX ADMIN — PANTOPRAZOLE SODIUM 40 MILLIGRAM(S): 20 TABLET, DELAYED RELEASE ORAL at 17:35

## 2021-02-27 RX ADMIN — SENNA PLUS 2 TABLET(S): 8.6 TABLET ORAL at 21:12

## 2021-02-27 RX ADMIN — POLYETHYLENE GLYCOL 3350 17 GRAM(S): 17 POWDER, FOR SOLUTION ORAL at 13:04

## 2021-02-27 RX ADMIN — HEPARIN SODIUM 5000 UNIT(S): 5000 INJECTION INTRAVENOUS; SUBCUTANEOUS at 13:04

## 2021-02-27 RX ADMIN — LACTULOSE 10 GRAM(S): 10 SOLUTION ORAL at 05:20

## 2021-02-27 RX ADMIN — LEVETIRACETAM 420 MILLIGRAM(S): 250 TABLET, FILM COATED ORAL at 05:19

## 2021-02-27 RX ADMIN — Medication 100 MILLIGRAM(S): at 13:05

## 2021-02-27 RX ADMIN — LISINOPRIL 40 MILLIGRAM(S): 2.5 TABLET ORAL at 08:47

## 2021-02-27 RX ADMIN — Medication 20 MILLIGRAM(S): at 08:47

## 2021-02-27 RX ADMIN — Medication 20 MILLIGRAM(S): at 17:35

## 2021-02-27 RX ADMIN — CHLORHEXIDINE GLUCONATE 1 APPLICATION(S): 213 SOLUTION TOPICAL at 06:41

## 2021-02-27 RX ADMIN — Medication 0.3 MILLIGRAM(S): at 21:12

## 2021-02-27 RX ADMIN — HEPARIN SODIUM 5000 UNIT(S): 5000 INJECTION INTRAVENOUS; SUBCUTANEOUS at 21:49

## 2021-02-27 RX ADMIN — Medication 5 MILLIGRAM(S): at 17:36

## 2021-02-27 RX ADMIN — LEVETIRACETAM 420 MILLIGRAM(S): 250 TABLET, FILM COATED ORAL at 18:45

## 2021-02-27 RX ADMIN — Medication 5 MILLIGRAM(S): at 05:21

## 2021-02-27 RX ADMIN — PANTOPRAZOLE SODIUM 40 MILLIGRAM(S): 20 TABLET, DELAYED RELEASE ORAL at 05:19

## 2021-02-27 RX ADMIN — LACTULOSE 10 GRAM(S): 10 SOLUTION ORAL at 13:06

## 2021-02-27 NOTE — PROGRESS NOTE ADULT - SUBJECTIVE AND OBJECTIVE BOX
Patient is a 50y old  Male who presents with a chief complaint of Altered mental status (26 Feb 2021 17:42)        Over Night Events: No events. Remains on Nicardipine drip.       PHYSICAL EXAM    ICU Vital Signs Last 24 Hrs  T(C): 36.8 (27 Feb 2021 06:00), Max: 37.1 (26 Feb 2021 12:00)  T(F): 98.3 (27 Feb 2021 06:00), Max: 98.8 (26 Feb 2021 12:00)  HR: 75 (27 Feb 2021 06:00) (62 - 85)  ABP: 167/79 (27 Feb 2021 06:00) (141/61 - 177/77)  ABP(mean): 100 (27 Feb 2021 06:00) (77 - 108)  RR: 23 (27 Feb 2021 06:00) (14 - 26)  SpO2: 97% (27 Feb 2021 06:00) (93% - 98%)    CONSTITUTIONAL:  Well nourished.  NAD    ENT:   Airway patent,   Mouth with normal mucosa.   No thrush    EYES:   Pupils equal,   Round and reactive to light.    CARDIAC:   Normal rate,   Regular rhythm.    edema    Vascular:  Normal systolic impulse  No Carotid bruits    RESPIRATORY:   No wheezing  Bilateral BS  Normal chest expansion  Not tachypneic,  No use of accessory muscles    GASTROINTESTINAL:  Abdomen soft,   Non-tender,   No guarding,   + BS    MUSCULOSKELETAL:   No clubbing, cyanosis    NEUROLOGICAL:   Alert   Follows simple commands    SKIN:   Skin normal color for race,   Warm and dry   No evidence of rash.    PSYCHIATRIC:   Normal mood and affect.   No apparent risk to self or others.    HEMATOLOGICAL:  No cervical  lymphadenopathy.  no inguinal lymphadenopathy      02-26-21 @ 07:01  -  02-27-21 @ 07:00  --------------------------------------------------------  IN:    Jevity 1.2: 800 mL    NiCARdipine: 350 mL    sodium chloride 3%: 75 mL  Total IN: 1225 mL    OUT:    Indwelling Catheter - Urethral (mL): 2370 mL  Total OUT: 2370 mL    Total NET: -1145 mL    LABS:                         13.2   8.65  )-----------( 409      ( 27 Feb 2021 06:14 )             38.5                                               02-27    143  |  107  |  14  ----------------------------<  128<H>  4.2   |  28  |  0.6<L>    Ca    8.8      27 Feb 2021 06:14  Phos  3.9     02-27  Mg     2.2     02-27    TPro  5.5<L>  /  Alb  2.9<L>  /  TBili  0.5  /  DBili  x   /  AST  38  /  ALT  84<H>  /  AlkPhos  113  02-27                                                                                           LIVER FUNCTIONS - ( 27 Feb 2021 06:14 )  Alb: 2.9 g/dL / Pro: 5.5 g/dL / ALK PHOS: 113 U/L / ALT: 84 U/L / AST: 38 U/L / GGT: x                                                                                MEDICATIONS  (STANDING):  chlorhexidine 4% Liquid 1 Application(s) Topical <User Schedule>  cloNIDine 0.3 milliGRAM(s) Oral every 8 hours  heparin   Injectable 5000 Unit(s) SubCutaneous every 8 hours  hydrALAZINE 100 milliGRAM(s) Oral every 8 hours  lactulose Syrup 10 Gram(s) Oral every 8 hours  levETIRAcetam  IVPB 500 milliGRAM(s) IV Intermittent every 12 hours  lisinopril 40 milliGRAM(s) Oral daily  metoclopramide 5 milliGRAM(s) Oral two times a day  mineral oil enema 133 milliLiter(s) Rectal daily  niCARdipine Infusion 15 mG/Hr (75 mL/Hr) IV Continuous <Continuous>  NIFEdipine IR 20 milliGRAM(s) Oral every 6 hours  pantoprazole  Injectable 40 milliGRAM(s) IV Push every 12 hours  polyethylene glycol 3350 17 Gram(s) Oral daily  senna 2 Tablet(s) Oral at bedtime    MEDICATIONS  (PRN):  acetaminophen   Tablet .. 650 milliGRAM(s) Oral every 6 hours PRN Temp greater or equal to 38C (100.4F)

## 2021-02-27 NOTE — PROGRESS NOTE ADULT - ASSESSMENT
IMPRESSION:    Large ICB secondary to Hypertensive Emergency s/p EVD and removal  Intubated, for airway protection now extubated   Klebsiella Pneumonia pansensitive  Left posterior tibial & peroneal DVT  Uncontrolled HTN    PLAN:    CNS:  Neurology FU    HEENT: Oral care    PULMONARY:  HOB @ 45 degrees.  Aspiration precautions    CARDIOVASCULAR:  Avoid volume overload.  BP control. Wean off cardene    GI: GI prophylaxis.  NG feeding    RENAL;  FU lytes.  Correct lytes as needed.       INFECTIOUS DISEASE:  ABX per ID    HEMATOLOGICAL: DVT prophylaxis.    ENDOCRINE:  Follow up FS.    MUSCULOSKELETAL:  Bed rest.  PT OT     Prognosis poor   IMPRESSION:    Large ICB secondary to Hypertensive Emergency s/p EVD and removal  Intubated, for airway protection now extubated   Klebsiella Pneumonia pansensitive  Left posterior tibial & peroneal DVT  Uncontrolled HTN    PLAN:    CNS:  Neurology FU    HEENT: Oral care    PULMONARY:  HOB @ 45 degrees.  Aspiration precautions    CARDIOVASCULAR:  Avoid volume overload.  BP control. Wean off cardene    GI: GI prophylaxis.  NG feeding    RENAL;  FU lytes.  Correct lytes as needed.       INFECTIOUS DISEASE:  Off abx    HEMATOLOGICAL: DVT prophylaxis.    ENDOCRINE:  Follow up FS.    MUSCULOSKELETAL:  Bed rest.  PT OT     Prognosis poor   IMPRESSION:    Large ICB secondary to Hypertensive Emergency s/p EVD and removal  Intubated, for airway protection now extubated   Klebsiella Pneumonia pansensitive  Left posterior tibial & peroneal DVT  Uncontrolled HTN    PLAN:    CNS:  Neurology FU    HEENT: Oral care    PULMONARY:  HOB @ 45 degrees.  Aspiration precautions    CARDIOVASCULAR:  Avoid volume overload.  Continue BP control. Wean off cardene    GI: GI prophylaxis.  NG feeding    RENAL;  FU lytes.  Correct lytes as needed.       INFECTIOUS DISEASE:  Off abx    HEMATOLOGICAL: DVT prophylaxis.    ENDOCRINE:  Follow up FS.    MUSCULOSKELETAL:  Bed rest.  PT OT     Prognosis poor

## 2021-02-28 LAB
ALBUMIN SERPL ELPH-MCNC: 3.3 G/DL — LOW (ref 3.5–5.2)
ALBUMIN SERPL ELPH-MCNC: 3.3 G/DL — LOW (ref 3.5–5.2)
ALP SERPL-CCNC: 119 U/L — HIGH (ref 30–115)
ALP SERPL-CCNC: 124 U/L — HIGH (ref 30–115)
ALT FLD-CCNC: 64 U/L — HIGH (ref 0–41)
ALT FLD-CCNC: 71 U/L — HIGH (ref 0–41)
ANION GAP SERPL CALC-SCNC: 10 MMOL/L — SIGNIFICANT CHANGE UP (ref 7–14)
ANION GAP SERPL CALC-SCNC: 9 MMOL/L — SIGNIFICANT CHANGE UP (ref 7–14)
AST SERPL-CCNC: 24 U/L — SIGNIFICANT CHANGE UP (ref 0–41)
AST SERPL-CCNC: 25 U/L — SIGNIFICANT CHANGE UP (ref 0–41)
BASOPHILS # BLD AUTO: 0 K/UL — SIGNIFICANT CHANGE UP (ref 0–0.2)
BASOPHILS NFR BLD AUTO: 0 % — SIGNIFICANT CHANGE UP (ref 0–1)
BILIRUB SERPL-MCNC: 0.5 MG/DL — SIGNIFICANT CHANGE UP (ref 0.2–1.2)
BILIRUB SERPL-MCNC: 0.7 MG/DL — SIGNIFICANT CHANGE UP (ref 0.2–1.2)
BUN SERPL-MCNC: 16 MG/DL — SIGNIFICANT CHANGE UP (ref 10–20)
BUN SERPL-MCNC: 20 MG/DL — SIGNIFICANT CHANGE UP (ref 10–20)
CALCIUM SERPL-MCNC: 8.5 MG/DL — SIGNIFICANT CHANGE UP (ref 8.5–10.1)
CALCIUM SERPL-MCNC: 9 MG/DL — SIGNIFICANT CHANGE UP (ref 8.5–10.1)
CHLORIDE SERPL-SCNC: 102 MMOL/L — SIGNIFICANT CHANGE UP (ref 98–110)
CHLORIDE SERPL-SCNC: 102 MMOL/L — SIGNIFICANT CHANGE UP (ref 98–110)
CO2 SERPL-SCNC: 26 MMOL/L — SIGNIFICANT CHANGE UP (ref 17–32)
CO2 SERPL-SCNC: 27 MMOL/L — SIGNIFICANT CHANGE UP (ref 17–32)
CREAT SERPL-MCNC: 0.6 MG/DL — LOW (ref 0.7–1.5)
CREAT SERPL-MCNC: 0.7 MG/DL — SIGNIFICANT CHANGE UP (ref 0.7–1.5)
EOSINOPHIL # BLD AUTO: 0.86 K/UL — HIGH (ref 0–0.7)
EOSINOPHIL NFR BLD AUTO: 7.8 % — SIGNIFICANT CHANGE UP (ref 0–8)
GIANT PLATELETS BLD QL SMEAR: PRESENT — SIGNIFICANT CHANGE UP
GLUCOSE BLDC GLUCOMTR-MCNC: 121 MG/DL — HIGH (ref 70–99)
GLUCOSE SERPL-MCNC: 107 MG/DL — HIGH (ref 70–99)
GLUCOSE SERPL-MCNC: 129 MG/DL — HIGH (ref 70–99)
HCT VFR BLD CALC: 40.4 % — LOW (ref 42–52)
HGB BLD-MCNC: 13.2 G/DL — LOW (ref 14–18)
LYMPHOCYTES # BLD AUTO: 1.92 K/UL — SIGNIFICANT CHANGE UP (ref 1.2–3.4)
LYMPHOCYTES # BLD AUTO: 17.4 % — LOW (ref 20.5–51.1)
MAGNESIUM SERPL-MCNC: 2.2 MG/DL — SIGNIFICANT CHANGE UP (ref 1.8–2.4)
MANUAL SMEAR VERIFICATION: SIGNIFICANT CHANGE UP
MCHC RBC-ENTMCNC: 30.6 PG — SIGNIFICANT CHANGE UP (ref 27–31)
MCHC RBC-ENTMCNC: 32.7 G/DL — SIGNIFICANT CHANGE UP (ref 32–37)
MCV RBC AUTO: 93.7 FL — SIGNIFICANT CHANGE UP (ref 80–94)
MONOCYTES # BLD AUTO: 0.67 K/UL — HIGH (ref 0.1–0.6)
MONOCYTES NFR BLD AUTO: 6.1 % — SIGNIFICANT CHANGE UP (ref 1.7–9.3)
MYELOCYTES NFR BLD: 1.7 % — HIGH (ref 0–0)
NEUTROPHILS # BLD AUTO: 7.3 K/UL — HIGH (ref 1.4–6.5)
NEUTROPHILS NFR BLD AUTO: 66.1 % — SIGNIFICANT CHANGE UP (ref 42.2–75.2)
PHOSPHATE SERPL-MCNC: 4.1 MG/DL — SIGNIFICANT CHANGE UP (ref 2.1–4.9)
PLAT MORPH BLD: NORMAL — SIGNIFICANT CHANGE UP
PLATELET # BLD AUTO: 400 K/UL — SIGNIFICANT CHANGE UP (ref 130–400)
POLYCHROMASIA BLD QL SMEAR: SLIGHT — SIGNIFICANT CHANGE UP
POTASSIUM SERPL-MCNC: 4.7 MMOL/L — SIGNIFICANT CHANGE UP (ref 3.5–5)
POTASSIUM SERPL-MCNC: 4.8 MMOL/L — SIGNIFICANT CHANGE UP (ref 3.5–5)
POTASSIUM SERPL-SCNC: 4.7 MMOL/L — SIGNIFICANT CHANGE UP (ref 3.5–5)
POTASSIUM SERPL-SCNC: 4.8 MMOL/L — SIGNIFICANT CHANGE UP (ref 3.5–5)
PROT SERPL-MCNC: 5.7 G/DL — LOW (ref 6–8)
PROT SERPL-MCNC: 5.7 G/DL — LOW (ref 6–8)
RBC # BLD: 4.31 M/UL — LOW (ref 4.7–6.1)
RBC # FLD: 13.2 % — SIGNIFICANT CHANGE UP (ref 11.5–14.5)
RBC BLD AUTO: NORMAL — SIGNIFICANT CHANGE UP
SODIUM SERPL-SCNC: 138 MMOL/L — SIGNIFICANT CHANGE UP (ref 135–146)
SODIUM SERPL-SCNC: 138 MMOL/L — SIGNIFICANT CHANGE UP (ref 135–146)
VARIANT LYMPHS # BLD: 0.9 % — SIGNIFICANT CHANGE UP (ref 0–5)
WBC # BLD: 11.04 K/UL — HIGH (ref 4.8–10.8)
WBC # FLD AUTO: 11.04 K/UL — HIGH (ref 4.8–10.8)

## 2021-02-28 PROCEDURE — 71045 X-RAY EXAM CHEST 1 VIEW: CPT | Mod: 26

## 2021-02-28 RX ORDER — FUROSEMIDE 40 MG
60 TABLET ORAL ONCE
Refills: 0 | Status: COMPLETED | OUTPATIENT
Start: 2021-02-28 | End: 2021-02-28

## 2021-02-28 RX ORDER — SODIUM CHLORIDE 5 G/100ML
500 INJECTION, SOLUTION INTRAVENOUS
Refills: 0 | Status: DISCONTINUED | OUTPATIENT
Start: 2021-02-28 | End: 2021-03-01

## 2021-02-28 RX ADMIN — HEPARIN SODIUM 5000 UNIT(S): 5000 INJECTION INTRAVENOUS; SUBCUTANEOUS at 13:41

## 2021-02-28 RX ADMIN — Medication 20 MILLIGRAM(S): at 17:04

## 2021-02-28 RX ADMIN — Medication 5 MILLIGRAM(S): at 17:15

## 2021-02-28 RX ADMIN — HEPARIN SODIUM 5000 UNIT(S): 5000 INJECTION INTRAVENOUS; SUBCUTANEOUS at 05:18

## 2021-02-28 RX ADMIN — LISINOPRIL 40 MILLIGRAM(S): 2.5 TABLET ORAL at 05:20

## 2021-02-28 RX ADMIN — Medication 5 MILLIGRAM(S): at 05:18

## 2021-02-28 RX ADMIN — Medication 100 MILLIGRAM(S): at 05:19

## 2021-02-28 RX ADMIN — LACTULOSE 10 GRAM(S): 10 SOLUTION ORAL at 21:07

## 2021-02-28 RX ADMIN — Medication 100 MILLIGRAM(S): at 13:41

## 2021-02-28 RX ADMIN — LEVETIRACETAM 420 MILLIGRAM(S): 250 TABLET, FILM COATED ORAL at 17:15

## 2021-02-28 RX ADMIN — Medication 20 MILLIGRAM(S): at 23:35

## 2021-02-28 RX ADMIN — SENNA PLUS 2 TABLET(S): 8.6 TABLET ORAL at 21:07

## 2021-02-28 RX ADMIN — LACTULOSE 10 GRAM(S): 10 SOLUTION ORAL at 05:22

## 2021-02-28 RX ADMIN — PANTOPRAZOLE SODIUM 40 MILLIGRAM(S): 20 TABLET, DELAYED RELEASE ORAL at 17:14

## 2021-02-28 RX ADMIN — Medication 100 MILLIGRAM(S): at 21:39

## 2021-02-28 RX ADMIN — Medication 20 MILLIGRAM(S): at 05:22

## 2021-02-28 RX ADMIN — Medication 0.3 MILLIGRAM(S): at 21:08

## 2021-02-28 RX ADMIN — Medication 20 MILLIGRAM(S): at 11:17

## 2021-02-28 RX ADMIN — Medication 0.3 MILLIGRAM(S): at 13:41

## 2021-02-28 RX ADMIN — LACTULOSE 10 GRAM(S): 10 SOLUTION ORAL at 12:38

## 2021-02-28 RX ADMIN — LACTULOSE 10 GRAM(S): 10 SOLUTION ORAL at 05:23

## 2021-02-28 RX ADMIN — HEPARIN SODIUM 5000 UNIT(S): 5000 INJECTION INTRAVENOUS; SUBCUTANEOUS at 21:06

## 2021-02-28 RX ADMIN — Medication 0.3 MILLIGRAM(S): at 05:20

## 2021-02-28 RX ADMIN — LEVETIRACETAM 420 MILLIGRAM(S): 250 TABLET, FILM COATED ORAL at 05:03

## 2021-02-28 RX ADMIN — PANTOPRAZOLE SODIUM 40 MILLIGRAM(S): 20 TABLET, DELAYED RELEASE ORAL at 05:21

## 2021-02-28 RX ADMIN — Medication 60 MILLIGRAM(S): at 11:11

## 2021-02-28 RX ADMIN — SODIUM CHLORIDE 15 MILLILITER(S): 5 INJECTION, SOLUTION INTRAVENOUS at 11:12

## 2021-02-28 NOTE — PROGRESS NOTE ADULT - SUBJECTIVE AND OBJECTIVE BOX
Patient is a 50y old  Male who presents with a chief complaint of Altered mental status (28 Feb 2021 07:23)        Over Night Events:  Remains on Cardene drip.  no other events overnight         ROS:     All ROS are negative except HPI         PHYSICAL EXAM    ICU Vital Signs Last 24 Hrs  T(C): 36.7 (28 Feb 2021 04:00), Max: 37.1 (27 Feb 2021 16:00)  T(F): 98.1 (28 Feb 2021 04:00), Max: 98.8 (27 Feb 2021 16:00)  HR: 65 (28 Feb 2021 07:00) (63 - 78)  BP: --  BP(mean): --  ABP: 143/65 (28 Feb 2021 07:00) (127/66 - 163/74)  ABP(mean): 65 (28 Feb 2021 07:00) (65 - 102)  RR: 20 (28 Feb 2021 07:00) (20 - 29)  SpO2: 96% (27 Feb 2021 20:00) (95% - 97%)      CONSTITUTIONAL:  Well nourished.  NAD    ENT:   Airway patent,   Mouth with normal mucosa.   No thrush    EYES:   Pupils equal,   Round and reactive to light.    CARDIAC:   Normal rate,   Regular rhythm.     edema      Vascular:  Normal systolic impulse  No Carotid bruits    RESPIRATORY:   No wheezing  Bilateral BS  Normal chest expansion  Not tachypneic,  No use of accessory muscles    GASTROINTESTINAL:  Abdomen soft,   Non-tender,   No guarding,   + BS    MUSCULOSKELETAL:   Range of motion is not limited,  No clubbing, cyanosis    NEUROLOGICAL:   Lethargic, Arousable.  Follows simple commands       SKIN:   Skin normal color for race,   Warm and dry   No evidence of rash.    PSYCHIATRIC:   No apparent risk to self or others.    HEMATOLOGICAL:  No cervical  lymphadenopathy.  no inguinal lymphadenopathy      02-27-21 @ 07:01  -  02-28-21 @ 07:00  --------------------------------------------------------  IN:    IV PiggyBack: 100 mL    Jevity 1.2: 1600 mL    NiCARdipine: 1037.5 mL  Total IN: 2737.5 mL    OUT:    Indwelling Catheter - Urethral (mL): 2500 mL  Total OUT: 2500 mL    Total NET: 237.5 mL          LABS:                            13.2   11.04 )-----------( 400      ( 28 Feb 2021 06:20 )             40.4                                               02-28    138  |  102  |  16  ----------------------------<  107<H>  4.7   |  26  |  0.6<L>    Ca    9.0      28 Feb 2021 06:20  Phos  4.1     02-28  Mg     2.2     02-28    TPro  5.7<L>  /  Alb  3.3<L>  /  TBili  0.7  /  DBili  x   /  AST  25  /  ALT  71<H>  /  AlkPhos  124<H>  02-28                                                                                           LIVER FUNCTIONS - ( 28 Feb 2021 06:20 )  Alb: 3.3 g/dL / Pro: 5.7 g/dL / ALK PHOS: 124 U/L / ALT: 71 U/L / AST: 25 U/L / GGT: x                                                                                                                                       MEDICATIONS  (STANDING):  chlorhexidine 4% Liquid 1 Application(s) Topical <User Schedule>  cloNIDine 0.3 milliGRAM(s) Oral every 8 hours  heparin   Injectable 5000 Unit(s) SubCutaneous every 8 hours  hydrALAZINE 100 milliGRAM(s) Oral every 8 hours  lactulose Syrup 10 Gram(s) Oral every 8 hours  levETIRAcetam  IVPB 500 milliGRAM(s) IV Intermittent every 12 hours  lisinopril 40 milliGRAM(s) Oral daily  metoclopramide 5 milliGRAM(s) Oral two times a day  mineral oil enema 133 milliLiter(s) Rectal daily  niCARdipine Infusion 15 mG/Hr (75 mL/Hr) IV Continuous <Continuous>  NIFEdipine IR 20 milliGRAM(s) Oral every 6 hours  pantoprazole  Injectable 40 milliGRAM(s) IV Push every 12 hours  polyethylene glycol 3350 17 Gram(s) Oral daily  senna 2 Tablet(s) Oral at bedtime    MEDICATIONS  (PRN):  acetaminophen   Tablet .. 650 milliGRAM(s) Oral every 6 hours PRN Temp greater or equal to 38C (100.4F)      New X-rays reviewed:                                                                                  ECHO    CXR interpreted by me:

## 2021-02-28 NOTE — PROGRESS NOTE ADULT - ASSESSMENT
IMPRESSION:    Large ICB secondary to Hypertensive Emergency s/p EVD and removal  Intubated, for airway protection now extubated   Klebsiella Pneumonia pansensitive  Left posterior tibial & peroneal DVT  Uncontrolled HTN    PLAN:    CNS:  Neurology FU    HEENT: Oral care    PULMONARY:  HOB @ 45 degrees.  Aspiration precautions    CARDIOVASCULAR:  Avoid volume overload.  restart 3% NaCL 15 cc per hour.  Continue BP control. Wean off Cardene.  One dose Lasix     GI: GI prophylaxis.  NG feeding    RENAL;  FU lytes.  Correct lytes as needed.       INFECTIOUS DISEASE:  Off abx    HEMATOLOGICAL: DVT prophylaxis.    ENDOCRINE:  Follow up FS.    MUSCULOSKELETAL:  Bed rest.  PT OT     Prognosis poor

## 2021-02-28 NOTE — PROGRESS NOTE ADULT - ASSESSMENT
MELODIE HERNADEZ is a 50y Male with a past medical history HTN, GIB  who presented for weakness and and was found to have HTN emergency a right intraparenchymal bleed. Nephrology consulted to evaluate refractory hypertension.     # REsistant Hypertension, on multiple (6) medications  # Hypernatremia on 3% saline - Na goal ~140 for ICH    ·	  BP is acceptable, taper  Cardene and labetalol drip to off  cont  Clonidine, Hydralazine, lisinopril  and nifedipine/ need to follow HR   ·	w/u for secondary HTN - obtain Aldosterone renin ratio,  Renin should be high / if suppressed could be c/w hyperaldo   ·	RAD neg for LIOR, catecholamines in urine wnl  ·	obtain plasma catecholamines and metanephrines    will follow

## 2021-02-28 NOTE — PROGRESS NOTE ADULT - SUBJECTIVE AND OBJECTIVE BOX
Nephrology progress note    THIS IS AN INCOMPLETE NOTE . FULL NOTE TO FOLLOW SHORTLY    Patient is seen and examined, events over the last 24 h noted .    Allergies:  No Known Allergies    Hospital Medications:   MEDICATIONS  (STANDING):  chlorhexidine 4% Liquid 1 Application(s) Topical <User Schedule>  cloNIDine 0.3 milliGRAM(s) Oral every 8 hours  heparin   Injectable 5000 Unit(s) SubCutaneous every 8 hours  hydrALAZINE 100 milliGRAM(s) Oral every 8 hours  lactulose Syrup 10 Gram(s) Oral every 8 hours  levETIRAcetam  IVPB 500 milliGRAM(s) IV Intermittent every 12 hours  lisinopril 40 milliGRAM(s) Oral daily  metoclopramide 5 milliGRAM(s) Oral two times a day  mineral oil enema 133 milliLiter(s) Rectal daily  niCARdipine Infusion 15 mG/Hr (75 mL/Hr) IV Continuous <Continuous>  NIFEdipine IR 20 milliGRAM(s) Oral every 6 hours  pantoprazole  Injectable 40 milliGRAM(s) IV Push every 12 hours  polyethylene glycol 3350 17 Gram(s) Oral daily  senna 2 Tablet(s) Oral at bedtime        VITALS:  T(F): 98.1 (02-28-21 @ 04:00), Max: 98.8 (02-27-21 @ 16:00)  HR: 65 (02-28-21 @ 07:00)  BP: --  RR: 20 (02-28-21 @ 07:00)  SpO2: 96% (02-27-21 @ 20:00)  Wt(kg): --    02-26 @ 07:01  -  02-27 @ 07:00  --------------------------------------------------------  IN: 1225 mL / OUT: 2370 mL / NET: -1145 mL    02-27 @ 07:01  -  02-28 @ 07:00  --------------------------------------------------------  IN: 2737.5 mL / OUT: 2500 mL / NET: 237.5 mL          PHYSICAL EXAM:  Constitutional: NAD  HEENT: anicteric sclera, oropharynx clear, MMM  Neck: No JVD  Respiratory: CTAB, no wheezes, rales or rhonchi  Cardiovascular: S1, S2, RRR  Gastrointestinal: BS+, soft, NT/ND  Extremities: No cyanosis or clubbing. No peripheral edema  :  No berry.   Skin: No rashes    LABS:  02-28    138  |  102  |  16  ----------------------------<  107<H>  4.7   |  26  |  0.6<L>    Ca    9.0      28 Feb 2021 06:20  Phos  4.1     02-28  Mg     2.2     02-28    TPro  5.7<L>  /  Alb  3.3<L>  /  TBili  0.7  /  DBili      /  AST  25  /  ALT  71<H>  /  AlkPhos  124<H>  02-28                          13.2   11.04 )-----------( 400      ( 28 Feb 2021 06:20 )             40.4       Urine Studies:    Osmolality, Random Urine: 587 mos/kg (02-25 @ 12:55)  Sodium, Random Urine: 201.0 mmoL/L (02-25 @ 12:55)  Protein/Creatinine Ratio Calculation: 0.3 Ratio (02-25 @ 12:55)  Creatinine, Random Urine: 38 mg/dL (02-25 @ 12:55)    RADIOLOGY & ADDITIONAL STUDIES:   Nephrology progress note  Patient is seen and examined, events over the last 24 h noted .  Trached on MV     Allergies:  No Known Allergies    Hospital Medications:   MEDICATIONS  (STANDING):  cloNIDine 0.3 milliGRAM(s) Oral every 8 hours  heparin   Injectable 5000 Unit(s) SubCutaneous every 8 hours  hydrALAZINE 100 milliGRAM(s) Oral every 8 hours  lactulose Syrup 10 Gram(s) Oral every 8 hours  levETIRAcetam  IVPB 500 milliGRAM(s) IV Intermittent every 12 hours  lisinopril 40 milliGRAM(s) Oral daily  metoclopramide 5 milliGRAM(s) Oral two times a day  mineral oil enema 133 milliLiter(s) Rectal daily  niCARdipine Infusion 15 mG/Hr (75 mL/Hr) IV Continuous <Continuous>  NIFEdipine IR 20 milliGRAM(s) Oral every 6 hours  pantoprazole  Injectable 40 milliGRAM(s) IV Push every 12 hours  polyethylene glycol 3350 17 Gram(s) Oral daily  senna 2 Tablet(s) Oral at bedtime        VITALS:  T(F): 98.1 (02-28-21 @ 04:00), Max: 98.8 (02-27-21 @ 16:00)  HR: 65 (02-28-21 @ 07:00)  BP: 150/70  RR: 20 (02-28-21 @ 07:00)  SpO2: 96% (02-27-21 @ 20:00)  Wt(kg): --    02-26 @ 07:01  -  02-27 @ 07:00  --------------------------------------------------------  IN: 1225 mL / OUT: 2370 mL / NET: -1145 mL    02-27 @ 07:01  -  02-28 @ 07:00  --------------------------------------------------------  IN: 2737.5 mL / OUT: 2500 mL / NET: 237.5 mL          PHYSICAL EXAM:  Constitutional: NAD  HEENT: trached   Neck: No JVD  Respiratory: CTAB, no wheezes, rales or rhonchi  Cardiovascular: S1, S2, RRR  Gastrointestinal: BS+, soft, NT/ND  Extremities: No cyanosis or clubbing. plus one edema   :  No berry.   Skin: No rashes    LABS:  02-28    138  |  102  |  16  ----------------------------<  107<H>  4.7   |  26  |  0.6<L>    Ca    9.0      28 Feb 2021 06:20  Phos  4.1     02-28  Mg     2.2     02-28    TPro  5.7<L>  /  Alb  3.3<L>  /  TBili  0.7  /  DBili      /  AST  25  /  ALT  71<H>  /  AlkPhos  124<H>  02-28                          13.2   11.04 )-----------( 400      ( 28 Feb 2021 06:20 )             40.4       Urine Studies:    Osmolality, Random Urine: 587 mos/kg (02-25 @ 12:55)  Sodium, Random Urine: 201.0 mmoL/L (02-25 @ 12:55)  Protein/Creatinine Ratio Calculation: 0.3 Ratio (02-25 @ 12:55)  Creatinine, Random Urine: 38 mg/dL (02-25 @ 12:55)    RADIOLOGY & ADDITIONAL STUDIES:

## 2021-03-01 LAB
ALBUMIN SERPL ELPH-MCNC: 3.2 G/DL — LOW (ref 3.5–5.2)
ALDOST SERPL-MCNC: 3.1 NG/DL — SIGNIFICANT CHANGE UP
ALP SERPL-CCNC: 129 U/L — HIGH (ref 30–115)
ALT FLD-CCNC: 61 U/L — HIGH (ref 0–41)
ANION GAP SERPL CALC-SCNC: 11 MMOL/L — SIGNIFICANT CHANGE UP (ref 7–14)
AST SERPL-CCNC: 26 U/L — SIGNIFICANT CHANGE UP (ref 0–41)
BASOPHILS # BLD AUTO: 0.09 K/UL — SIGNIFICANT CHANGE UP (ref 0–0.2)
BASOPHILS NFR BLD AUTO: 0.7 % — SIGNIFICANT CHANGE UP (ref 0–1)
BILIRUB SERPL-MCNC: 0.7 MG/DL — SIGNIFICANT CHANGE UP (ref 0.2–1.2)
BUN SERPL-MCNC: 17 MG/DL — SIGNIFICANT CHANGE UP (ref 10–20)
CALCIUM SERPL-MCNC: 8.9 MG/DL — SIGNIFICANT CHANGE UP (ref 8.5–10.1)
CHLORIDE SERPL-SCNC: 100 MMOL/L — SIGNIFICANT CHANGE UP (ref 98–110)
CO2 SERPL-SCNC: 26 MMOL/L — SIGNIFICANT CHANGE UP (ref 17–32)
CREAT SERPL-MCNC: 0.6 MG/DL — LOW (ref 0.7–1.5)
EOSINOPHIL # BLD AUTO: 0.84 K/UL — HIGH (ref 0–0.7)
EOSINOPHIL NFR BLD AUTO: 6.3 % — SIGNIFICANT CHANGE UP (ref 0–8)
GLUCOSE SERPL-MCNC: 120 MG/DL — HIGH (ref 70–99)
HCT VFR BLD CALC: 41 % — LOW (ref 42–52)
HGB BLD-MCNC: 13.5 G/DL — LOW (ref 14–18)
IMM GRANULOCYTES NFR BLD AUTO: 3.3 % — HIGH (ref 0.1–0.3)
LYMPHOCYTES # BLD AUTO: 1.57 K/UL — SIGNIFICANT CHANGE UP (ref 1.2–3.4)
LYMPHOCYTES # BLD AUTO: 11.8 % — LOW (ref 20.5–51.1)
MAGNESIUM SERPL-MCNC: 2.2 MG/DL — SIGNIFICANT CHANGE UP (ref 1.8–2.4)
MCHC RBC-ENTMCNC: 30.7 PG — SIGNIFICANT CHANGE UP (ref 27–31)
MCHC RBC-ENTMCNC: 32.9 G/DL — SIGNIFICANT CHANGE UP (ref 32–37)
MCV RBC AUTO: 93.2 FL — SIGNIFICANT CHANGE UP (ref 80–94)
MONOCYTES # BLD AUTO: 1.14 K/UL — HIGH (ref 0.1–0.6)
MONOCYTES NFR BLD AUTO: 8.5 % — SIGNIFICANT CHANGE UP (ref 1.7–9.3)
NEUTROPHILS # BLD AUTO: 9.28 K/UL — HIGH (ref 1.4–6.5)
NEUTROPHILS NFR BLD AUTO: 69.4 % — SIGNIFICANT CHANGE UP (ref 42.2–75.2)
NRBC # BLD: 0 /100 WBCS — SIGNIFICANT CHANGE UP (ref 0–0)
PHOSPHATE SERPL-MCNC: 3.8 MG/DL — SIGNIFICANT CHANGE UP (ref 2.1–4.9)
PLATELET # BLD AUTO: 387 K/UL — SIGNIFICANT CHANGE UP (ref 130–400)
POTASSIUM SERPL-MCNC: 4.6 MMOL/L — SIGNIFICANT CHANGE UP (ref 3.5–5)
POTASSIUM SERPL-SCNC: 4.6 MMOL/L — SIGNIFICANT CHANGE UP (ref 3.5–5)
PROT SERPL-MCNC: 5.9 G/DL — LOW (ref 6–8)
RBC # BLD: 4.4 M/UL — LOW (ref 4.7–6.1)
RBC # FLD: 13.3 % — SIGNIFICANT CHANGE UP (ref 11.5–14.5)
RENIN DIRECT, PLASMA: 2.2 PG/ML — SIGNIFICANT CHANGE UP
SODIUM SERPL-SCNC: 137 MMOL/L — SIGNIFICANT CHANGE UP (ref 135–146)
WBC # BLD: 13.36 K/UL — HIGH (ref 4.8–10.8)
WBC # FLD AUTO: 13.36 K/UL — HIGH (ref 4.8–10.8)

## 2021-03-01 PROCEDURE — 99233 SBSQ HOSP IP/OBS HIGH 50: CPT

## 2021-03-01 PROCEDURE — 93970 EXTREMITY STUDY: CPT | Mod: 26

## 2021-03-01 PROCEDURE — 71045 X-RAY EXAM CHEST 1 VIEW: CPT | Mod: 26

## 2021-03-01 RX ORDER — LABETALOL HCL 100 MG
10 TABLET ORAL ONCE
Refills: 0 | Status: COMPLETED | OUTPATIENT
Start: 2021-03-01 | End: 2021-03-01

## 2021-03-01 RX ADMIN — Medication 100 MILLIGRAM(S): at 14:04

## 2021-03-01 RX ADMIN — Medication 0.3 MILLIGRAM(S): at 20:32

## 2021-03-01 RX ADMIN — SODIUM CHLORIDE 15 MILLILITER(S): 5 INJECTION, SOLUTION INTRAVENOUS at 11:11

## 2021-03-01 RX ADMIN — Medication 5 MILLIGRAM(S): at 17:51

## 2021-03-01 RX ADMIN — Medication 100 MILLIGRAM(S): at 20:31

## 2021-03-01 RX ADMIN — POLYETHYLENE GLYCOL 3350 17 GRAM(S): 17 POWDER, FOR SOLUTION ORAL at 20:32

## 2021-03-01 RX ADMIN — Medication 5 MILLIGRAM(S): at 05:18

## 2021-03-01 RX ADMIN — Medication 100 MILLIGRAM(S): at 05:21

## 2021-03-01 RX ADMIN — HEPARIN SODIUM 5000 UNIT(S): 5000 INJECTION INTRAVENOUS; SUBCUTANEOUS at 05:20

## 2021-03-01 RX ADMIN — HEPARIN SODIUM 5000 UNIT(S): 5000 INJECTION INTRAVENOUS; SUBCUTANEOUS at 20:32

## 2021-03-01 RX ADMIN — Medication 0.3 MILLIGRAM(S): at 14:04

## 2021-03-01 RX ADMIN — LISINOPRIL 40 MILLIGRAM(S): 2.5 TABLET ORAL at 05:18

## 2021-03-01 RX ADMIN — Medication 20 MILLIGRAM(S): at 17:52

## 2021-03-01 RX ADMIN — LACTULOSE 10 GRAM(S): 10 SOLUTION ORAL at 05:21

## 2021-03-01 RX ADMIN — PANTOPRAZOLE SODIUM 40 MILLIGRAM(S): 20 TABLET, DELAYED RELEASE ORAL at 17:52

## 2021-03-01 RX ADMIN — Medication 20 MILLIGRAM(S): at 11:10

## 2021-03-01 RX ADMIN — Medication 20 MILLIGRAM(S): at 23:19

## 2021-03-01 RX ADMIN — Medication 10 MILLIGRAM(S): at 02:31

## 2021-03-01 RX ADMIN — PANTOPRAZOLE SODIUM 40 MILLIGRAM(S): 20 TABLET, DELAYED RELEASE ORAL at 05:20

## 2021-03-01 RX ADMIN — NICARDIPINE HYDROCHLORIDE 75 MG/HR: 30 CAPSULE, EXTENDED RELEASE ORAL at 20:31

## 2021-03-01 RX ADMIN — NICARDIPINE HYDROCHLORIDE 75 MG/HR: 30 CAPSULE, EXTENDED RELEASE ORAL at 11:11

## 2021-03-01 RX ADMIN — LEVETIRACETAM 420 MILLIGRAM(S): 250 TABLET, FILM COATED ORAL at 17:51

## 2021-03-01 RX ADMIN — LEVETIRACETAM 420 MILLIGRAM(S): 250 TABLET, FILM COATED ORAL at 05:21

## 2021-03-01 RX ADMIN — Medication 20 MILLIGRAM(S): at 05:21

## 2021-03-01 RX ADMIN — Medication 0.3 MILLIGRAM(S): at 05:19

## 2021-03-01 RX ADMIN — CHLORHEXIDINE GLUCONATE 1 APPLICATION(S): 213 SOLUTION TOPICAL at 06:22

## 2021-03-01 RX ADMIN — SENNA PLUS 2 TABLET(S): 8.6 TABLET ORAL at 20:32

## 2021-03-01 RX ADMIN — HEPARIN SODIUM 5000 UNIT(S): 5000 INJECTION INTRAVENOUS; SUBCUTANEOUS at 14:04

## 2021-03-01 NOTE — PROGRESS NOTE ADULT - SUBJECTIVE AND OBJECTIVE BOX
Neurocritical Care Progress Note:    1. Brief Presentation: LHP, Dysarthria    2. Today's Acute Problems: hypertension, LHP, Diffuse LE myopathy    3. Relevant brief History:50 years old right handed Male with PMHx of HTN, GI bleed, PUD,  Mrankin score of 0 at baseline was brought in by EMS for  AMS and left sided weakness. Wife states that patient was having a usual day today went to bathroom and then the next thing she noted was that he was confused and was lying on the bathroom floor, she reports urinary incontinence, patient was confused, had trouble speaking, and could not move his left side. In the ED, /148, HR 80, saturating well. Code stroke called, NIHSS 24 and GCS 11, CTH showed Moderate to large intraparenchymal right basal ganglia hemorrhage with mild surrounding edema and associated right sulcal effacement as well as decompression into the ventricles. There is approximately 6.3 mm of midline shift. CTA did not show any evidence of active bleeding, AVM or aneurysm or major vascular stenosis or occlusion. Pt became unresponsive in CT scan and had to be intubated and sedated. Patient was intubated in ED for worsening mental status and GCS. Started on Nicardipine drip for sbp in 200s and EVD was placed by neurosurgery at bedside. Pan trauma scan was negative. To be admitted under ICU for further monitoring.  (27 Jan 2021 23:13)      4-Yesterday's Plan:    5. Last 24 hour updates:    6. Medications:   chlorhexidine 4% Liquid 1 Application(s) Topical <User Schedule>  cloNIDine 0.3 milliGRAM(s) Oral every 8 hours  heparin   Injectable 5000 Unit(s) SubCutaneous every 8 hours  hydrALAZINE 100 milliGRAM(s) Oral every 8 hours  levETIRAcetam  IVPB 500 milliGRAM(s) IV Intermittent every 12 hours  lisinopril 40 milliGRAM(s) Oral daily  metoclopramide 5 milliGRAM(s) Oral two times a day  mineral oil enema 133 milliLiter(s) Rectal daily  niCARdipine Infusion 15 mG/Hr IV Continuous <Continuous>  NIFEdipine IR 20 milliGRAM(s) Oral every 6 hours  pantoprazole  Injectable 40 milliGRAM(s) IV Push every 12 hours  polyethylene glycol 3350 17 Gram(s) Oral daily  senna 2 Tablet(s) Oral at bedtime      7. Ancillary Management:   Chest PT[ ]   Head of bed >35 [ ]   Out of bed to chair [ ]   PT/OT/SP Eval [ ]   Spirometry[ ]   DVT prophalaxis[ ]    8.Neuro:   Awake: Spontaneously[x ] Occasionally[ ] To Voice [ ] To painful stimuli [ ]   AIert [ ]. Following commands: 3 steps[ ], 2 steps[x ], 1 step [ ], None [ ]   Orientation: 0[ ], 1[ ], 2[ ], 3[ ]. Tracking objects with eyes: [x ]   Language: mouths words,    Pupils: Right   >2   Left    >2      Corneal:   +   Gag reflex: +    EOMI: +  Motor: some movement on the right, plegic on the left  Sensory: severe myopathy on BL LE, minimal movement          mRS:  0 No symptoms at all  1 No significant disability despite symptoms; able to carry out all usual duties and activities without assistance  2 Slight disability; unable to carry out all previous activities, but able to look after own affairs  3 Moderate disability; requiring some help, but able to walk   without assistance  4 Moderately severe disability; unable to walk without assistance and unable to attend to own bodily needs without assistance  5 Severe disability; bedridden, incontinent and requiring constant nursing care and attention  6 Dead    ICHs:  Age >=80  GCS Score: 3-4 (2 pts)   GCS Score: 5-12 (1 pt)   ICH Volume: >30  (+) IVH  (+) Infratentorial        Last CTH:    Last CTA/MRA:    Last CTP:    Last MRI:    Last TCD:    Last EEG:    EVD: [ ] [place]: [ ]     ICP:     CPP:     Level(cm):     24hr(ml):     CSF:     W:     R:     C:     P:     G:     LA:    9. Cardiovascular:   Vital Signs Last 24 Hrs  T(C): 36.8 (01 Mar 2021 16:00), Max: 37.1 (01 Mar 2021 00:00)  T(F): 98.2 (01 Mar 2021 16:00), Max: 98.8 (01 Mar 2021 00:00)  HR: 80 (01 Mar 2021 19:00) (20 - 99)  BP: --  BP(mean): --  RR: 20 (01 Mar 2021 19:00) (20 - 26)  SpO2: 96% (01 Mar 2021 19:00) (93% - 97%)     Last Echo:    Last EKG:    CVP   MAP/CPP/SBP target:   CO:      CI:       Enzymes/Trop:    10. Respiratory:   ABG:    VBG:    Chest Xray:        Peak Pressure/[place] Pressure:    11.GI:    Prophalaxis:     Bowel mvt:     Abd distension:   LIVER FUNCTIONS - ( 01 Mar 2021 03:30 )  Alb: 3.2 g/dL / Pro: 5.9 g/dL / ALK PHOS: 129 U/L / ALT: 61 U/L / AST: 26 U/L / GGT: x             12.Renal/Fluids/Electrolytes:    03-01    137  |  100  |  17  ----------------------------<  120<H>  4.6   |  26  |  0.6<L>    Ca    8.9      01 Mar 2021 03:30  Phos  3.8     03-01  Mg     2.2     03-01    TPro  5.9<L>  /  Alb  3.2<L>  /  TBili  0.7  /  DBili  x   /  AST  26  /  ALT  61<H>  /  AlkPhos  129<H>  03-01      I&O's Detail    28 Feb 2021 07:01  -  01 Mar 2021 07:00  --------------------------------------------------------  IN:    Jevity 1.2: 1200 mL    NiCARdipine: 510 mL    sodium chloride 3%: 315 mL  Total IN: 2025 mL    OUT:    Indwelling Catheter - Urethral (mL): 3295 mL    Nasogastric/Oral tube (mL): 400 mL    Rectal Tube (mL): 250 mL  Total OUT: 3945 mL    Total NET: -1920 mL      01 Mar 2021 07:01  -  01 Mar 2021 20:02  --------------------------------------------------------  IN:    Enteral Tube Flush: 100 mL    Jevity 1.2: 800 mL    NiCARdipine: 539 mL    sodium chloride 3%: 60 mL  Total IN: 1499 mL    OUT:    Indwelling Catheter - Urethral (mL): 795 mL  Total OUT: 795 mL    Total NET: 704 mL          13.ID:   TMax:   Vital Signs Last 24 Hrs  T(C): 36.8 (01 Mar 2021 16:00), Max: 37.1 (01 Mar 2021 00:00)  T(F): 98.2 (01 Mar 2021 16:00), Max: 98.8 (01 Mar 2021 00:00)  HR: 80 (01 Mar 2021 19:00) (20 - 99)  BP: --  BP(mean): --  RR: 20 (01 Mar 2021 19:00) (20 - 26)  SpO2: 96% (01 Mar 2021 19:00) (93% - 97%)           Lines: Central[] Date inserted: Peripheral[]    14. Hematology:                         13.5   13.36 )-----------( 387      ( 01 Mar 2021 03:30 )             41.0      03-01    137  |  100  |  17  ----------------------------<  120<H>  4.6   |  26  |  0.6<L>    Ca    8.9      01 Mar 2021 03:30  Phos  3.8     03-01  Mg     2.2     03-01    TPro  5.9<L>  /  Alb  3.2<L>  /  TBili  0.7  /  DBili  x   /  AST  26  /  ALT  61<H>  /  AlkPhos  129<H>  03-01         DVT Prophylaxis Lovenox[ ] Heparin[ ] Venodynes[ ] SCD's[ ]      15. Impression: 50/M with HTN, GIB, PUD, with R basal ganglia ICH with IVH and large perihematomal edema. S/p EVD removal. Extubated, awake follows few simple  commands.  BP is controlled on IV labetalol and Nicardipine drip. Patient extubated and is able to move right UE>LE , no mvmts noted on the left side. He follows few simple commands and is now able to track and mouths words        Plan:  #Neuro:  - Neuro checks q 2 hour  - Wean off 3%, switch to salt tablets to maintain normonatremia  - Trend CMP q6hrs  - Continue Keppra 500mg IV q12hrs  - Physiatry, PT, OOB to chair, MOBILIZE Lower extremities      #CV:  - Keep SBP < 160   - Wean off cardene gtt and switch to oral agents only    #Resp:  - HOB > 35 degrees  - Aspiration precautions      #Renal/Fluid/Electolytes:  - Strict I&Os  - Monitor lytes, replete as needed        #GI:  - Continue Protonix 40mg IV q12hrs  - Continue Senna 2 tablets PO q24hrs HS  - Continue fleet enema q24hrs    # ID  - Maintain normothermia      # Endocrine   - Maintain strict glycemic control      #DVT prophylaxis:  - SCS while in bed  - Heparin sq

## 2021-03-01 NOTE — PROGRESS NOTE ADULT - ASSESSMENT
IMPRESSION:    Large ICB secondary to Hypertensive Emergency s/p EVD and removal  Intubated, for airway protection now extubated   Klebsiella Pneumonia pansensitive  Left posterior tibial & peroneal DVT  Uncontrolled HTN    PLAN:    CNS:  Neurosx f/up    HEENT: Oral care    PULMONARY:  HOB @ 45 degrees.  Aspiration precautions    CARDIOVASCULAR:  Avoid volume overload.  restart 3% NaCL 30 cc per hour.  Continue BP control. Wean off Cardene.      GI: GI prophylaxis.  NG feeding    RENAL;  FU lytes.  Correct lytes as needed.       INFECTIOUS DISEASE:  f./upcx    HEMATOLOGICAL: DVT prophylaxis. LE doppler    ENDOCRINE:  Follow up FS.    MUSCULOSKELETAL:  Bed rest.  PT OT     Prognosis poor

## 2021-03-01 NOTE — CHART NOTE - NSCHARTNOTEFT_GEN_A_CORE
As per day team wife stated that patient has metal intracranial fragments; discussed with radiology; MRI canceled for now pending further clarification.

## 2021-03-01 NOTE — PROGRESS NOTE ADULT - SUBJECTIVE AND OBJECTIVE BOX
OVERNIGHT EVENTS: events noted, on 50%, hypertonic 15 cc/h, cardene 6 mg/ h, NGT feediing    Vital Signs Last 24 Hrs  T(C): 37 (01 Mar 2021 04:00), Max: 37.1 (01 Mar 2021 00:00)  T(F): 98.6 (01 Mar 2021 04:00), Max: 98.8 (01 Mar 2021 00:00)  HR: 80 (01 Mar 2021 06:00) (61 - 95)  BP: --  BP(mean): --  RR: 26 (01 Mar 2021 06:00) (20 - 26)  SpO2: 96% (01 Mar 2021 06:00) (93% - 98%)    PHYSICAL EXAMINATION:    GENERAL: ill looking    HEENT: Head is normocephalic and atraumatic.     NECK: Supple.    LUNGS: bl rhconhi    HEART: OLIVE 3..6    ABDOMEN: Soft, nontender, and nondistended.      EXTREMITIES: Without any cyanosis, clubbing, rash, lesions or edema.    NEUROLOGIC: L SIDED wekness    SKIN: No ulceration or induration present.      LABS:                        13.5   13.36 )-----------( 387      ( 01 Mar 2021 03:30 )             41.0     03-01    137  |  100  |  17  ----------------------------<  120<H>  4.6   |  26  |  0.6<L>    Ca    8.9      01 Mar 2021 03:30  Phos  3.8     03-01  Mg     2.2     03-01    TPro  5.9<L>  /  Alb  3.2<L>  /  TBili  0.7  /  DBili  x   /  AST  26  /  ALT  61<H>  /  AlkPhos  129<H>  03-01 02-28-21 @ 07:01  -  03-01-21 @ 07:00  --------------------------------------------------------  IN: 1985 mL / OUT: 3895 mL / NET: -1910 mL        MICROBIOLOGY:      MEDICATIONS  (STANDING):  chlorhexidine 4% Liquid 1 Application(s) Topical <User Schedule>  cloNIDine 0.3 milliGRAM(s) Oral every 8 hours  heparin   Injectable 5000 Unit(s) SubCutaneous every 8 hours  hydrALAZINE 100 milliGRAM(s) Oral every 8 hours  lactulose Syrup 10 Gram(s) Oral every 8 hours  levETIRAcetam  IVPB 500 milliGRAM(s) IV Intermittent every 12 hours  lisinopril 40 milliGRAM(s) Oral daily  metoclopramide 5 milliGRAM(s) Oral two times a day  mineral oil enema 133 milliLiter(s) Rectal daily  niCARdipine Infusion 15 mG/Hr (75 mL/Hr) IV Continuous <Continuous>  NIFEdipine IR 20 milliGRAM(s) Oral every 6 hours  pantoprazole  Injectable 40 milliGRAM(s) IV Push every 12 hours  polyethylene glycol 3350 17 Gram(s) Oral daily  senna 2 Tablet(s) Oral at bedtime  sodium chloride 3%. 500 milliLiter(s) (15 mL/Hr) IV Continuous <Continuous>    MEDICATIONS  (PRN):  acetaminophen   Tablet .. 650 milliGRAM(s) Oral every 6 hours PRN Temp greater or equal to 38C (100.4F)      RADIOLOGY & ADDITIONAL STUDIES:

## 2021-03-01 NOTE — PROGRESS NOTE ADULT - SUBJECTIVE AND OBJECTIVE BOX
MELODIE HERNADEZ 50y Male  MRN#: [id number]   CODE STATUS: Full code      SUBJECTIVE  Patient is a 50y old Male who presents with a chief complaint of Altered mental status (01 Mar 2021 07:56)  Currently admitted to medicine with the primary diagnosis of Intracranial bleed    Today is hospital day 32d, and this morning he is on Nicardipine drip for Hypertension.         OBJECTIVE  PAST MEDICAL & SURGICAL HISTORY  HTN (hypertension)    GI bleed    Gastric ulcer    PUD (peptic ulcer disease)    Arm fracture, left  s/p surgical repair      ALLERGIES:  No Known Allergies    MEDICATIONS:  STANDING MEDICATIONS  chlorhexidine 4% Liquid 1 Application(s) Topical <User Schedule>  cloNIDine 0.3 milliGRAM(s) Oral every 8 hours  heparin   Injectable 5000 Unit(s) SubCutaneous every 8 hours  hydrALAZINE 100 milliGRAM(s) Oral every 8 hours  levETIRAcetam  IVPB 500 milliGRAM(s) IV Intermittent every 12 hours  lisinopril 40 milliGRAM(s) Oral daily  metoclopramide 5 milliGRAM(s) Oral two times a day  mineral oil enema 133 milliLiter(s) Rectal daily  niCARdipine Infusion 15 mG/Hr IV Continuous <Continuous>  NIFEdipine IR 20 milliGRAM(s) Oral every 6 hours  pantoprazole  Injectable 40 milliGRAM(s) IV Push every 12 hours  polyethylene glycol 3350 17 Gram(s) Oral daily  senna 2 Tablet(s) Oral at bedtime    PRN MEDICATIONS  acetaminophen   Tablet .. 650 milliGRAM(s) Oral every 6 hours PRN      VITAL SIGNS: Last 24 Hours  T(C): 36.9 (01 Mar 2021 08:00), Max: 37.1 (01 Mar 2021 00:00)  T(F): 98.5 (01 Mar 2021 08:00), Max: 98.8 (01 Mar 2021 00:00)  HR: 79 (01 Mar 2021 08:00) (66 - 95)  BP: --  BP(mean): --  RR: 20 (01 Mar 2021 08:00) (20 - 26)  SpO2: 94% (01 Mar 2021 08:00) (93% - 98%)    LABS:                        13.5   13.36 )-----------( 387      ( 01 Mar 2021 03:30 )             41.0     03-01    137  |  100  |  17  ----------------------------<  120<H>  4.6   |  26  |  0.6<L>    Ca    8.9      01 Mar 2021 03:30  Phos  3.8     03-01  Mg     2.2     03-01    TPro  5.9<L>  /  Alb  3.2<L>  /  TBili  0.7  /  DBili  x   /  AST  26  /  ALT  61<H>  /  AlkPhos  129<H>  03-01                  RADIOLOGY:      PHYSICAL EXAM:    GENERAL: NAD, well-developed, Alert.   HEENT:  Atraumatic, Normocephalic. EOMI, PERRLA, conjunctiva and sclera clear, No JVD  PULMONARY: Clear to auscultation bilaterally; No wheeze  CARDIOVASCULAR: Regular rate and rhythm; No murmurs, rubs, or gallops  GASTROINTESTINAL: Soft, Nontender, Nondistended; Bowel sounds present  MUSCULOSKELETAL:  2+ Peripheral Pulses, No clubbing, cyanosis, or edema  NEUROLOGY: non-focal  SKIN: No rashes or lesions      ADMISSION SUMMARY  50 year old man admitted to the ICU for  large ICB secondary to Hypertensive Emergency s/p EVD and removal    # large ICB secondary to Hypertensive Emergency:  - s/p EVD and removal  - intubated, for airway protection now extubated   - Neurology and neurosurgery follow up  - on 35 NACL.   - NG feeding.   - PT/OT follow up      # REsistant Hypertension, on multiple (6) medications  # Hypernatremia on 3% saline - Na goal ~140 for ICH  -  BP is acceptable, taper  Cardene and labetalol drip to off  cont  Clonidine, Hydralazine, lisinopril  and nifedipine/ need to follow HR   -w/u for secondary HTN - obtain Aldosterone renin ratio,  Renin should be high / if suppressed could be c/w hyperaldo   -RAD neg for LIOR, catecholamines in urine wnl  -Follow up plasma catecholamines and metanephrines, aldosterone.       #Left posterior tibial & peroneal DVT  - Distal DVT.   - On heparin 5000 sc q8 for DVT ppx.  - no hemodynamic instability.         Prognosis poor  Heparin sc for dvt ppx.   PT/OT

## 2021-03-02 LAB
ANION GAP SERPL CALC-SCNC: 10 MMOL/L — SIGNIFICANT CHANGE UP (ref 7–14)
ANION GAP SERPL CALC-SCNC: 6 MMOL/L — LOW (ref 7–14)
ANION GAP SERPL CALC-SCNC: 7 MMOL/L — SIGNIFICANT CHANGE UP (ref 7–14)
BUN SERPL-MCNC: 18 MG/DL — SIGNIFICANT CHANGE UP (ref 10–20)
BUN SERPL-MCNC: 19 MG/DL — SIGNIFICANT CHANGE UP (ref 10–20)
BUN SERPL-MCNC: 20 MG/DL — SIGNIFICANT CHANGE UP (ref 10–20)
CALCIUM SERPL-MCNC: 8.7 MG/DL — SIGNIFICANT CHANGE UP (ref 8.5–10.1)
CALCIUM SERPL-MCNC: 8.9 MG/DL — SIGNIFICANT CHANGE UP (ref 8.5–10.1)
CALCIUM SERPL-MCNC: 9 MG/DL — SIGNIFICANT CHANGE UP (ref 8.5–10.1)
CHLORIDE SERPL-SCNC: 100 MMOL/L — SIGNIFICANT CHANGE UP (ref 98–110)
CHLORIDE SERPL-SCNC: 101 MMOL/L — SIGNIFICANT CHANGE UP (ref 98–110)
CHLORIDE SERPL-SCNC: 101 MMOL/L — SIGNIFICANT CHANGE UP (ref 98–110)
CO2 SERPL-SCNC: 26 MMOL/L — SIGNIFICANT CHANGE UP (ref 17–32)
CO2 SERPL-SCNC: 27 MMOL/L — SIGNIFICANT CHANGE UP (ref 17–32)
CO2 SERPL-SCNC: 28 MMOL/L — SIGNIFICANT CHANGE UP (ref 17–32)
CREAT SERPL-MCNC: 0.7 MG/DL — SIGNIFICANT CHANGE UP (ref 0.7–1.5)
GLUCOSE SERPL-MCNC: 104 MG/DL — HIGH (ref 70–99)
GLUCOSE SERPL-MCNC: 94 MG/DL — SIGNIFICANT CHANGE UP (ref 70–99)
GLUCOSE SERPL-MCNC: 97 MG/DL — SIGNIFICANT CHANGE UP (ref 70–99)
HCT VFR BLD CALC: 37.2 % — LOW (ref 42–52)
HGB BLD-MCNC: 12.3 G/DL — LOW (ref 14–18)
MAGNESIUM SERPL-MCNC: 2.4 MG/DL — SIGNIFICANT CHANGE UP (ref 1.8–2.4)
MCHC RBC-ENTMCNC: 31 PG — SIGNIFICANT CHANGE UP (ref 27–31)
MCHC RBC-ENTMCNC: 33.1 G/DL — SIGNIFICANT CHANGE UP (ref 32–37)
MCV RBC AUTO: 93.7 FL — SIGNIFICANT CHANGE UP (ref 80–94)
NRBC # BLD: 0 /100 WBCS — SIGNIFICANT CHANGE UP (ref 0–0)
PHOSPHATE SERPL-MCNC: 3.8 MG/DL — SIGNIFICANT CHANGE UP (ref 2.1–4.9)
PLATELET # BLD AUTO: 360 K/UL — SIGNIFICANT CHANGE UP (ref 130–400)
POTASSIUM SERPL-MCNC: 4.6 MMOL/L — SIGNIFICANT CHANGE UP (ref 3.5–5)
POTASSIUM SERPL-MCNC: 4.8 MMOL/L — SIGNIFICANT CHANGE UP (ref 3.5–5)
POTASSIUM SERPL-MCNC: 4.9 MMOL/L — SIGNIFICANT CHANGE UP (ref 3.5–5)
POTASSIUM SERPL-SCNC: 4.6 MMOL/L — SIGNIFICANT CHANGE UP (ref 3.5–5)
POTASSIUM SERPL-SCNC: 4.8 MMOL/L — SIGNIFICANT CHANGE UP (ref 3.5–5)
POTASSIUM SERPL-SCNC: 4.9 MMOL/L — SIGNIFICANT CHANGE UP (ref 3.5–5)
RBC # BLD: 3.97 M/UL — LOW (ref 4.7–6.1)
RBC # FLD: 13.6 % — SIGNIFICANT CHANGE UP (ref 11.5–14.5)
SODIUM SERPL-SCNC: 134 MMOL/L — LOW (ref 135–146)
SODIUM SERPL-SCNC: 135 MMOL/L — SIGNIFICANT CHANGE UP (ref 135–146)
SODIUM SERPL-SCNC: 137 MMOL/L — SIGNIFICANT CHANGE UP (ref 135–146)
WBC # BLD: 12.3 K/UL — HIGH (ref 4.8–10.8)
WBC # FLD AUTO: 12.3 K/UL — HIGH (ref 4.8–10.8)

## 2021-03-02 PROCEDURE — 71045 X-RAY EXAM CHEST 1 VIEW: CPT | Mod: 26

## 2021-03-02 PROCEDURE — 99233 SBSQ HOSP IP/OBS HIGH 50: CPT

## 2021-03-02 PROCEDURE — 70450 CT HEAD/BRAIN W/O DYE: CPT | Mod: 26

## 2021-03-02 RX ORDER — SODIUM CHLORIDE 9 MG/ML
1 INJECTION INTRAMUSCULAR; INTRAVENOUS; SUBCUTANEOUS EVERY 12 HOURS
Refills: 0 | Status: DISCONTINUED | OUTPATIENT
Start: 2021-03-02 | End: 2021-03-03

## 2021-03-02 RX ADMIN — LISINOPRIL 40 MILLIGRAM(S): 2.5 TABLET ORAL at 05:00

## 2021-03-02 RX ADMIN — SODIUM CHLORIDE 1 GRAM(S): 9 INJECTION INTRAMUSCULAR; INTRAVENOUS; SUBCUTANEOUS at 17:25

## 2021-03-02 RX ADMIN — Medication 5 MILLIGRAM(S): at 05:00

## 2021-03-02 RX ADMIN — Medication 100 MILLIGRAM(S): at 20:00

## 2021-03-02 RX ADMIN — Medication 5 MILLIGRAM(S): at 17:33

## 2021-03-02 RX ADMIN — LEVETIRACETAM 420 MILLIGRAM(S): 250 TABLET, FILM COATED ORAL at 05:00

## 2021-03-02 RX ADMIN — Medication 0.3 MILLIGRAM(S): at 05:02

## 2021-03-02 RX ADMIN — PANTOPRAZOLE SODIUM 40 MILLIGRAM(S): 20 TABLET, DELAYED RELEASE ORAL at 05:00

## 2021-03-02 RX ADMIN — Medication 20 MILLIGRAM(S): at 11:16

## 2021-03-02 RX ADMIN — Medication 100 MILLIGRAM(S): at 05:00

## 2021-03-02 RX ADMIN — HEPARIN SODIUM 5000 UNIT(S): 5000 INJECTION INTRAVENOUS; SUBCUTANEOUS at 13:00

## 2021-03-02 RX ADMIN — HEPARIN SODIUM 5000 UNIT(S): 5000 INJECTION INTRAVENOUS; SUBCUTANEOUS at 20:00

## 2021-03-02 RX ADMIN — Medication 100 MILLIGRAM(S): at 12:59

## 2021-03-02 RX ADMIN — Medication 20 MILLIGRAM(S): at 05:00

## 2021-03-02 RX ADMIN — CHLORHEXIDINE GLUCONATE 1 APPLICATION(S): 213 SOLUTION TOPICAL at 05:00

## 2021-03-02 RX ADMIN — Medication 0.3 MILLIGRAM(S): at 20:00

## 2021-03-02 RX ADMIN — Medication 20 MILLIGRAM(S): at 23:53

## 2021-03-02 RX ADMIN — Medication 0.3 MILLIGRAM(S): at 13:01

## 2021-03-02 RX ADMIN — HEPARIN SODIUM 5000 UNIT(S): 5000 INJECTION INTRAVENOUS; SUBCUTANEOUS at 05:00

## 2021-03-02 RX ADMIN — PANTOPRAZOLE SODIUM 40 MILLIGRAM(S): 20 TABLET, DELAYED RELEASE ORAL at 17:25

## 2021-03-02 RX ADMIN — LEVETIRACETAM 420 MILLIGRAM(S): 250 TABLET, FILM COATED ORAL at 18:22

## 2021-03-02 RX ADMIN — Medication 20 MILLIGRAM(S): at 17:25

## 2021-03-02 NOTE — PROGRESS NOTE ADULT - SUBJECTIVE AND OBJECTIVE BOX
Neurocritical Care Progress Note:    Last 24 hour updates:  Patient with small new hemorrhage on CTH.        Medications:   chlorhexidine 4% Liquid 1 Application(s) Topical <User Schedule>  cloNIDine 0.3 milliGRAM(s) Oral every 8 hours  heparin   Injectable 5000 Unit(s) SubCutaneous every 8 hours  hydrALAZINE 100 milliGRAM(s) Oral every 8 hours  levETIRAcetam  IVPB 500 milliGRAM(s) IV Intermittent every 12 hours  lisinopril 40 milliGRAM(s) Oral daily  metoclopramide 5 milliGRAM(s) Oral two times a day  mineral oil enema 133 milliLiter(s) Rectal daily  niCARdipine Infusion 15 mG/Hr IV Continuous <Continuous>  NIFEdipine IR 20 milliGRAM(s) Oral every 6 hours  pantoprazole  Injectable 40 milliGRAM(s) IV Push every 12 hours  polyethylene glycol 3350 17 Gram(s) Oral daily  senna 2 Tablet(s) Oral at bedtime  sodium chloride 1 Gram(s) Oral every 12 hours    Ancillary Management:   Chest PT[ ]   Head of bed >35 [x ]   Out of bed to chair [ ]   PT/OT/SP Eval [ ]   Spirometry[ ]   DVT prophalaxis[x ]    8.Neuro:   Awake: Spontaneously[x ] Occasionally[ ] To Voice [ ] To painful stimuli [ ]   AIert [ ]. Following commands: 3 steps[ ], 2 steps[ ], 1 step [x ], None [ ]   Orientation: 0[ x], 1[ ], 2[ ], 3[ ]. Tracking objects with eyes: [ x]   Language: non verbal  Time off sedation for exam:   Pupils: Right   3>2   Left   3 >2      Corneal:   +   Gag reflex: +    EOMI: no gaze  Non verbal can follow simple commands such as close eyes and move toes to command.    mRS:4  0 No symptoms at all  1 No significant disability despite symptoms; able to carry out all usual duties and activities without assistance  2 Slight disability; unable to carry out all previous activities, but able to look after own affairs  3 Moderate disability; requiring some help, but able to walk without assistance  4 Moderately severe disability; unable to walk without assistance and unable to attend to own bodily needs without assistance  5 Severe disability; bedridden, incontinent and requiring constant nursing care and attention  6 Dead    ICHs:2  Age >=80  GCS Score: 3-4 (2 pts)   GCS Score: 5-12 (1 pt)   ICH Volume: >30  (+) IVH  (+) Infratentorial    [place]ans:  Grade I = Asymptomatic, mild headache, slight nuchal rigidity  Grade II = Moderate to severe headache, nuchal rigidity, no neurological deficit other than cranial nerve palsy  Grade III = Drowiness, confusion, mild focal neurological deficit  Grade IV = Stupor, moderate to severe hemiparesis  Grade V = Coma, decerebrate posturing    m-[place]:  Grade 0   (No Subarachnoid Hemorrhage (SAH)), No intraventricular hemorrhage (IVH), Incidence of symptomatic vasopasm 0%  Grade 1   (Focal or diffuse, thin SAH), No IVH, incidence of symptomatic vasospasm 24%  Grade 2   (Thin focal or diffuse SAH), IVH present, incidence of symptomatic vasospasm 33%  Grade 3   (Thick focal or diffuse SAH), No IVH, incidence of symptomatic vasospasm 33%  Grade 4   (Thick focal or diffuse SAH), IVH present, incidence of symptomatic vasosasm 40%    Last CTH:  CT Head No Cont:   EXAM:  CT BRAIN  IMPRESSION:    Redemonstration of evolving right frontal intraparenchymal hematoma with slightly decreased moderate surrounding edema extending to the right basal ganglia and right frontal operculum. Stable to minimally decreased leftward midline shift measuring 0.4 cm (previously 0.6 cm).    Interval development of 0.6 cm acute hemorrhage in the right subependymal region just lateral to the right frontal intraparenchymal hematoma.    No acute territorial infarct, hydrocephalus, or extra-axial fluid collection    Last CTA/MRA:  < from: CT Angio Neck w/ IV Cont (01.27.21 @ 21:08) >    IMPRESSION    The previously noted area of intracerebral hemorrhage in the right basal ganglia region is again identified. Please see report of the CT scan of the head dated 1/27/2021.    No evidence of active bleeding, AVM or aneurysm.    No evidence of major vascular stenosis or occlusion,      < end of copied text >    Last CTP:    Last MRI:    Last TCD:    Last EEG:  VEEG in the last 24 hours:    Background---------- continues , slightly less than optimally organized  and a PDR reaching frequencies in the range of 8-9 hz superimposed by small amount of low amplitude theta    Focal and generalized slowing-----  borderline to mild generalized slowing                                                     borderline right hemispheric focal slowing    Interictal activity---------------- none    Events----------------  none    Seizures-------------- none    Impression:   abnormal as above  EVD: [ ] [place]: [ ]     ICP:     CPP:     Level(cm):     24hr(ml):     CSF:     W:     R:     Cx:     P:     G:     LA:       Gram stain:    9. Cardiovascular:   Vital Signs Last 24 Hrs  T(C): 37.4 (02 Mar 2021 08:00), Max: 37.4 (02 Mar 2021 04:00)  T(F): 99.4 (02 Mar 2021 08:00), Max: 99.4 (02 Mar 2021 08:00)  HR: 75 (02 Mar 2021 14:00) (20 - 93)  BP: --  BP(mean): --  RR: 21 (02 Mar 2021 14:00) (19 - 27)  SpO2: 97% (02 Mar 2021 14:00) (93% - 98%)     Last Echo:    Last EKG:    CVP   MAP/CPP/SBP target:   CO:      CI:       Enzymes/Trop:    10. Respiratory:   ABG:    VBG:    Chest Xray:  < from: Xray Chest 1 View- PORTABLE-Urgent (Xray Chest 1 View- PORTABLE-Urgent .) (03.02.21 @ 10:01) >    Impression:  Slightly decreased left basilar opacity/effusion.          < end of copied text >        Peak Pressure/[place] Pressure:    11.GI:    Prophalaxis: protonix    Bowel mvt:     Abd distension:   LIVER FUNCTIONS - ( 02 Mar 2021 04:10 )  Alb: 2.8 g/dL / Pro: 5.4 g/dL / ALK PHOS: 116 U/L / ALT: 56 U/L / AST: 26 U/L / GGT: x             12.Renal/Fluids/Electrolytes:    03-02    137  |  101  |  20  ----------------------------<  97  4.8   |  26  |  0.7    Ca    8.7      02 Mar 2021 10:35  Phos  3.8     03-02  Mg     2.4     03-02    TPro  5.4<L>  /  Alb  2.8<L>  /  TBili  0.6  /  DBili  x   /  AST  26  /  ALT  56<H>  /  AlkPhos  116<H>  03-02      I&O's Detail    01 Mar 2021 07:01  -  02 Mar 2021 07:00  --------------------------------------------------------  IN:    Enteral Tube Flush: 260 mL    IV PiggyBack: 100 mL    Jevity 1.2: 1600 mL    NiCARdipine: 731.5 mL    sodium chloride 3%: 60 mL  Total IN: 2751.5 mL    OUT:    Indwelling Catheter - Urethral (mL): 1755 mL  Total OUT: 1755 mL    Total NET: 996.5 mL      02 Mar 2021 07:01  -  02 Mar 2021 15:47  --------------------------------------------------------  IN:  Total IN: 0 mL    OUT:    Voided (mL): 870 mL  Total OUT: 870 mL    Total NET: -870 mL          ID:   TMax: 99.4  Vital Signs Last 24 Hrs  T(C): 37.4 (02 Mar 2021 08:00), Max: 37.4 (02 Mar 2021 04:00)  T(F): 99.4 (02 Mar 2021 08:00), Max: 99.4 (02 Mar 2021 08:00)  HR: 75 (02 Mar 2021 14:00) (20 - 93)  BP: --  BP(mean): --  RR: 21 (02 Mar 2021 14:00) (19 - 27)  SpO2: 97% (02 Mar 2021 14:00) (93% - 98%)           Lines: Central[] Date inserted: Peripheral[]    Hematology:                         12.3   12.30 )-----------( 360      ( 02 Mar 2021 04:10 )             37.2      03-02    137  |  101  |  20  ----------------------------<  97  4.8   |  26  |  0.7    Ca    8.7      02 Mar 2021 10:35  Phos  3.8     03-02  Mg     2.4     03-02    TPro  5.4<L>  /  Alb  2.8<L>  /  TBili  0.6  /  DBili  x   /  AST  26  /  ALT  56<H>  /  AlkPhos  116<H>  03-02         DVT Prophylaxis Lovenox[ ] Heparin[ ] Venodynes[ ] SCD's[ x]

## 2021-03-02 NOTE — PROGRESS NOTE ADULT - ASSESSMENT
15. Impression:  50/M with HTN, GIB, PUD, with R basal ganglia ICH with IVH and large perihematomal edema. S/p EVD removal. Extubated, awake follows few simple  commands. Patient extubated and is able to move right UE>LE , no mvmts noted on the left side. He follows few simple commands such as moving toes and closing eyes. New 0.6 mm hemorrhage on CTH.         Plan:  #Neuro:  - Neuro checks q 2 hour  - Maintain normonatremia  - Continue Keppra 500mg IV q12hrs  - Physiatry, PT, OOB to chair, MOBILIZE Lower extremities    #CV:  - Keep SBP < 160     #Resp:  - HOB > 35 degrees  - Aspiration precautions      #Renal/Fluid/Electolytes:  - Strict I&Os  - Monitor lytes, replete as needed        #GI:  - Continue Protonix 40mg IV q12hrs  - Continue Senna 2 tablets PO q24hrs HS  - Continue fleet enema q24hrs    # ID  - Maintain normothermia      # Endocrine   - Maintain strict glycemic control      #DVT prophylaxis:  - SCS while in bed  - Can continue Heparin sq      Thomas Sullivan NP  [phone]          16. Suggestions:        17. Disposition:

## 2021-03-02 NOTE — PROGRESS NOTE ADULT - ATTENDING COMMENTS
Patient seen today with neurology team NP Laurie.     I was physically present for the key portions of the evaluation and management (E/M) service provided.  I agree with the above history, physical, and plan with the following additions/modifications     Patient with repeat CTH showing small new IPH - not clearly clinically significant, no clear clinical correlate.  continue current supportive care, needs strict BP control SBP<160, has been refractory to multiple agents.   Needs mobilization, PT/OT, verticalization and LE splinting.  Once off drips, would prefer to downgrade to stepdown stroke unit.    Thomas Koroma MD  Attending Neurointensivist Patient seen today with neurology team NP Laurie.     I was physically present for the key portions of the evaluation and management (E/M) service provided.  I agree with the above history, physical, and plan with the following additions/modifications     Patient with repeat CTH showing small new IPH - not clearly clinically significant, no clear clinical correlate.  continue current supportive care, needs strict BP control SBP<160, has been refractory to multiple agents.   Needs mobilization, PT/OT, verticalization and LE splinting.  Consider starting PO labetalol in conjunction with other agents - Once off cardene drip, please downgrade to stepdown stroke unit.    Thomas Koroma MD  Attending Neurointensivist

## 2021-03-02 NOTE — PROGRESS NOTE ADULT - SUBJECTIVE AND OBJECTIVE BOX
MELODIE HERNADEZ 50y Male  MRN#: [id number]   CODE STATUS: Full code      SUBJECTIVE  Patient is a 50y old Male who presents with a chief complaint of Altered mental status (02 Mar 2021 08:35)  Currently admitted to medicine with the primary diagnosis of Intracranial bleed    Today is hospital day 33d, and this morning he is not ion distress.         OBJECTIVE  PAST MEDICAL & SURGICAL HISTORY  HTN (hypertension)    GI bleed    Gastric ulcer    PUD (peptic ulcer disease)    Arm fracture, left  s/p surgical repair      ALLERGIES:  No Known Allergies    MEDICATIONS:  STANDING MEDICATIONS  chlorhexidine 4% Liquid 1 Application(s) Topical <User Schedule>  cloNIDine 0.3 milliGRAM(s) Oral every 8 hours  heparin   Injectable 5000 Unit(s) SubCutaneous every 8 hours  hydrALAZINE 100 milliGRAM(s) Oral every 8 hours  levETIRAcetam  IVPB 500 milliGRAM(s) IV Intermittent every 12 hours  lisinopril 40 milliGRAM(s) Oral daily  metoclopramide 5 milliGRAM(s) Oral two times a day  mineral oil enema 133 milliLiter(s) Rectal daily  niCARdipine Infusion 15 mG/Hr IV Continuous <Continuous>  NIFEdipine IR 20 milliGRAM(s) Oral every 6 hours  pantoprazole  Injectable 40 milliGRAM(s) IV Push every 12 hours  polyethylene glycol 3350 17 Gram(s) Oral daily  senna 2 Tablet(s) Oral at bedtime  sodium chloride 1 Gram(s) Oral every 12 hours    PRN MEDICATIONS  acetaminophen   Tablet .. 650 milliGRAM(s) Oral every 6 hours PRN      VITAL SIGNS: Last 24 Hours  T(C): 37.4 (02 Mar 2021 08:00), Max: 37.4 (02 Mar 2021 04:00)  T(F): 99.4 (02 Mar 2021 08:00), Max: 99.4 (02 Mar 2021 08:00)  HR: 82 (02 Mar 2021 10:00) (20 - 99)  BP: --  BP(mean): --  RR: 22 (02 Mar 2021 10:00) (19 - 27)  SpO2: 95% (02 Mar 2021 10:00) (93% - 97%)    LABS:                        12.3   12.30 )-----------( 360      ( 02 Mar 2021 04:10 )             37.2     03-02    137  |  103  |  21<H>  ----------------------------<  142<H>  4.6   |  27  |  0.7    Ca    8.8      02 Mar 2021 04:10  Phos  3.8     03-02  Mg     2.4     03-02    TPro  5.4<L>  /  Alb  2.8<L>  /  TBili  0.6  /  DBili  x   /  AST  26  /  ALT  56<H>  /  AlkPhos  116<H>  03-02                  RADIOLOGY:      PHYSICAL EXAM:    GENERAL: NAD, well-developed, Alert  HEENT:  Atraumatic, Normocephalic. EOMI, PERRLA, conjunctiva and sclera clear, No JVD  PULMONARY: Clear to auscultation bilaterally; No wheeze  CARDIOVASCULAR: Regular rate and rhythm; No murmurs, rubs, or gallops  GASTROINTESTINAL: Soft, Nontender, Nondistended; Bowel sounds present  MUSCULOSKELETAL:  2+ Peripheral Pulses, No clubbing, cyanosis, or edema  NEUROLOGY: non-focal  SKIN: No rashes or lesions      ADMISSION SUMMARY    50/M with HTN, GIB, PUD, with R basal ganglia ICH with IVH and large perihematomal edema. S/p EVD removal. Extubated, awake follows few simple  commands.  BP is controlled on IV labetalol and Nicardipine drip. Patient extubated and is able to move right UE>LE , no mvmts noted on the left side. He follows few simple commands and is now able to track and mouths words    #Neuro:   # large ICB secondary to Hypertensive Emergency:  - s/p EVD and removal  - intubated, for airway protection now extubated   - Neurology and neurosurgery follow up  - NG feeding.   - PT/OT follow up  - Neuro checks q 2 hour  - Wean off 3%, switch to salt tablets to maintain normonatremia  - Trend CMP q6hrs  - Continue Keppra 500mg IV q12hrs  - Physiatry, PT, OOB to chair, MOBILIZE Lower extremities      # Resistant Hypertension, on multiple (6) medications  -  BP controlled after stopping 3% nacl  - Continue BP meds.   -w/u for secondary HTN - obtain Aldosterone renin ratio,  renin and amarilis low  -RAD neg for LIOR, catecholamines in urine wnl  -Follow up plasma catecholamines and metanephrines, aldosterone.   - Follow up Nephro  - Maintain normonatremia        #Left posterior tibial & peroneal DVT  - Distal DVT.   - On heparin 5000 sc q8 for DVT ppx.  - no hemodynamic instability.         #Renal/Fluid/Electolytes:  - Strict I&Os  - Monitor lytes, replete as needed        #GI:  - Continue Protonix 40mg IV q12hrs  - Continue Senna 2 tablets PO q24hrs HS  - Continue fleet enema q24hrs    # ID  - Maintain normothermia      # Endocrine   - Maintain strict glycemic control      Prognosis poor  Heparin sc for dvt ppx.   PT/OT

## 2021-03-02 NOTE — CONSULT NOTE ADULT - SUBJECTIVE AND OBJECTIVE BOX
HPI:  50 years old right handed Male with PMHx of HTN, GI bleed, PUD,  Mrankin score of 0 at baseline was brought in by EMS for  AMS and left sided weakness.   Wife states that patient was having a usual day today went to bathroom and then the next thing she noted was that he was confused and was lying on the bathroom floor, she reports urinary incontinence, patient was confused, had trouble speaking, and could not move his left side.     In the ED, /148, HR 80, saturating well. Code stroke called, NIHSS 24 and GCS 11, CTH showed Moderate to large intraparenchymal right basal ganglia hemorrhage with mild surrounding edema and associated right sulcal effacement as well as decompression into the ventricles. There is approximately 6.3 mm of midline shift. CTA did not show any evidence of active bleeding, AVM or aneurysm or major vascular stenosis or occlusion. Pt became unresponsive in CT scan and had to be intubated and sedated. Patient was intubated in ED for worsening mental status and GCS. Started on Nicardipine drip for sbp in 200s and EVD was placed by neurosurgery at bedside. Pan trauma scan was negative. To be admitted under ICU for further monitoring.  (27 Jan 2021 23:13)      PAST MEDICAL & SURGICAL HISTORY:  HTN (hypertension)    GI bleed    Gastric ulcer    PUD (peptic ulcer disease)    Arm fracture, left  s/p surgical repair        Hospital Course: pt in vent unit under stroke protocol care    TODAY'S SUBJECTIVE & REVIEW OF systems pt lethargic UTO     MEDICATIONS  (STANDING):  chlorhexidine 4% Liquid 1 Application(s) Topical <User Schedule>  cloNIDine 0.3 milliGRAM(s) Oral every 8 hours  heparin   Injectable 5000 Unit(s) SubCutaneous every 8 hours  hydrALAZINE 100 milliGRAM(s) Oral every 8 hours  levETIRAcetam  IVPB 500 milliGRAM(s) IV Intermittent every 12 hours  lisinopril 40 milliGRAM(s) Oral daily  metoclopramide 5 milliGRAM(s) Oral two times a day  mineral oil enema 133 milliLiter(s) Rectal daily  niCARdipine Infusion 15 mG/Hr (75 mL/Hr) IV Continuous <Continuous>  NIFEdipine IR 20 milliGRAM(s) Oral every 6 hours  pantoprazole  Injectable 40 milliGRAM(s) IV Push every 12 hours  polyethylene glycol 3350 17 Gram(s) Oral daily  senna 2 Tablet(s) Oral at bedtime  sodium chloride 1 Gram(s) Oral every 12 hours    MEDICATIONS  (PRN):  acetaminophen   Tablet .. 650 milliGRAM(s) Oral every 6 hours PRN Temp greater or equal to 38C (100.4F)      FAMILY HISTORY:  Family history of breast cancer (Mother)    Family history of pancreatic cancer (Father)        Allergies    No Known Allergies    Intolerances        SOCIAL HISTORY: UTO see CM notes for below info    [  ] Etoh  [  ] Smoking  [  ] Substance abuse     Home Environment:  [  ] Home Alone  [  ] Lives with Family  [  ] Home Health Aid    Dwelling:  [  ] Apartment  [  ] Private House  [  ] Adult Home  [  ] Skilled Nursing Facility      [  ] Short Term  [  ] Long Term  [  ] Stairs       Elevator [  ]    FUNCTIONAL STATUS PTA: (Check all that apply) seems to have been independent  Ambulation: [   ]Independent   [   ] Requires Assistance   [  ] Dependent     [  ] Non-Ambulatory       Assistive Device: [  ] SA Cane  [  ]  Q Cane  [  ] Walker  [  ]  Wheelchair  ADL : [  ] Independent    [   ] Requires Assistance    [  ]  Dependent       Vital Signs Last 24 Hrs AVSS  T(C): 36.8 (02 Mar 2021 20:00), Max: 37.4 (02 Mar 2021 04:00)  T(F): 98.3 (02 Mar 2021 20:00), Max: 99.4 (02 Mar 2021 08:00)  HR: 68 (02 Mar 2021 21:00) (66 - 93)  BP: --  BP(mean): --  RR: 18 (02 Mar 2021 21:00) (18 - 27)  SpO2: 97% (02 Mar 2021 21:00) (93% - 99%)      PHYSICAL EXAM: lethargic during my eval , unlabored breathing  GENERAL: NAD, well-groomed, well-developed  HEAD:  Atraumatic, Normocephalic, NGT in place  EYES: no discharges  NECK: Supple  CHEST/LUNG: BL chest expansion  HEART: Regular   ABDOMEN: Soft, Nontender, Nondistended  EXTREMITIES:  No clubbing, cyanosis, or edema toe nails  ROM:  [  x ] WFL all extremities  [  ] Abnormal  + berry   + rectal tube  NERVOUS SYSTEM: UTO pt lethargic  as per neuro  8.Neuro:   Awake: Spontaneously[x ] Occasionally[ ] To Voice [ ] To painful stimuli [ ]   AIert [ ]. Following commands: 3 steps[ ], 2 steps[ ], 1 step [x ], None [ ]   Orientation: 0[ x], 1[ ], 2[ ], 3[ ]. Tracking objects with eyes: [ x]   Language: non verbal  Time off sedation for exam:   Pupils: Right   3>2   Left   3 >2      Corneal:   +   Gag reflex: +    EOMI: no gaze  Non verbal can follow simple commands such as close eyes and move toes to command.    mRS:4  0 No symptoms at all  1 No significant disability despite symptoms; able to carry out all usual duties and activities without assistance  2 Slight disability; unable to carry out all previous activities, but able to look after own affairs  3 Moderate disability; requiring some help, but able to walk without assistance  4 Moderately severe disability; unable to walk without assistance and unable to attend to own bodily needs without assistance  5 Severe disability; bedridden, incontinent and requiring constant nursing care and attention  6 Dead  FUNCTIONAL STATUS: see therapy        LABS:                        12.3   12.30 )-----------( 360      ( 02 Mar 2021 04:10 )             37.2     03-02    135  |  100  |  18  ----------------------------<  104<H>  4.6   |  28  |  0.7    Ca    9.0      02 Mar 2021 20:34  Phos  3.8     03-02  Mg     2.4     03-02    TPro  5.4<L>  /  Alb  2.8<L>  /  TBili  0.6  /  DBili  x   /  AST  26  /  ALT  56<H>  /  AlkPhos  116<H>  03-02          RADIOLOGY & ADDITIONAL STUDIES:  < from: CT Head No Cont (02.19.21 @ 13:42) >  PROCEDURE DATE:  02/19/2021            INTERPRETATION:  Clinical History / Reason for exam: Intracranial hemorrhage follow-up    Technique: Noncontrast head CT. Contiguous CT axial images of the head from the base to the vertex.    COMPARISON: CT head 2/17/2021    FINDINGS:    Redemonstrated is a left frontal fish hole with overlying skin staples an underlying prior catheter tract.    There is no significant interval change in ventricular size, not significantly enlarged.    There is no significant interval change in the parenchymal hematoma centered in the right basal ganglia and frontal lobe with surrounding edema.    There is no significant change in right to left midline shift measuring about 8 mm.    No new intracranial hemorrhage or large territory infarct is demonstrated.    There is mucosal thickening throughout the paranasal sinuses, and bilateral mastoid and left middle air opacification.    IMPRESSION:  Since the prior head CT dated 2/17/2021:    Nosignificant interval change in the right basal ganglia/frontal lobe parenchymal hematoma with surrounding edema and mass effect including 8 mm right to left midline shift.    < end of copied text >

## 2021-03-02 NOTE — PROGRESS NOTE ADULT - ASSESSMENT
IMPRESSION:    Large ICB secondary to Hypertensive Emergency s/p EVD and removal  Intubated, for airway protection now extubated   Klebsiella Pneumonia pansensitive  Left posterior tibial & peroneal DVT  Uncontrolled HTN off cardene    PLAN:    CNS:  Neurosx f/up, Neuro f/up    HEENT: Oral care    PULMONARY:  HOB @ 45 degrees.  Aspiration precautions, repeat CXR    CARDIOVASCULAR:  Avoid volume overload.  salt tab    GI: GI prophylaxis.  NG feeding    RENAL;  FU lytes.  Correct lytes as needed.   renal f/up    INFECTIOUS DISEASE:  f./up cx    HEMATOLOGICAL: DVT prophylaxis. LE doppler    ENDOCRINE:  Follow up FS.    MUSCULOSKELETAL:  Bed rest.  PT OT     Prognosis poor  DC tlc/a line/ berry

## 2021-03-02 NOTE — CONSULT NOTE ADULT - ASSESSMENT
IMPRESSION: Rehab of gait disoder due ICH    PRECAUTIONS: [    ] Cardiac  [    ] Respiratory  [    ] Seizures [    ] Contact Isolation  [    ] Droplet Isolation  [ x   ] Other= NGT, PHELAN, rectal tube    Weight Bearing Status: non abbulatory  RECOMMENDATION:    Out of Bed to Chair     DVT/Decubiti Prophylaxis    REHAB PLAN:     [   ] Bedside P/T 3-5 times a week   [    x ] Bedside O/T  2-3 times a week   [     ] No Rehab Therapy Indicated   [  x   ]  Speech Therapy   Conditioning/ROM                                 ADL  Bed Mobility                                            Conditioning/ROM  Transfers                                                  Bed Mobility  Sitting /Standing Balance                      Transfers                                        Gait Training                                            Sitting/Standing Balance  Stair Training [   ]Applicable                 Home equipment Eval                                                                     Splinting  [   ] Only      GOALS:    as per therapy    DISCHARGE PLAN:   [     ]  Good candidate for Intensive Rehabilitation/Hospital based-4A [facility]                                             Will tolerate 3hrs Intensive Rehab Daily                                       [   x   ]  Short Term Rehab in Skilled Nursing Facility                                       [      ]  Home with Outpatient or VN services  VS                                       [   x   ]  Possible Candidate for Intensive Hospital based Rehab if more mobile

## 2021-03-02 NOTE — PROGRESS NOTE ADULT - SUBJECTIVE AND OBJECTIVE BOX
OVERNIGHT EVENTS: events noted, off drips, neuro reviewed    Vital Signs Last 24 Hrs  T(C): 37.4 (02 Mar 2021 08:00), Max: 37.4 (02 Mar 2021 04:00)  T(F): 99.4 (02 Mar 2021 08:00), Max: 99.4 (02 Mar 2021 08:00)  HR: 72 (02 Mar 2021 08:00) (20 - 99)  BP: --  BP(mean): --  RR: 19 (02 Mar 2021 08:00) (19 - 27)  SpO2: 93% (02 Mar 2021 08:00) (93% - 97%)    PHYSICAL EXAMINATION:    GENERAL: awake     HEENT: Head is normocephalic and atraumatic.     NECK: Supple.    LUNGS: dec bs both bases    HEART: Regular rate and rhythm without murmur.    ABDOMEN: Soft, nontender, and nondistended.      EXTREMITIES: Without any cyanosis, clubbing, rash, lesions or edema.    NEUROLOGIC: l sideweakness    SKIN: No ulceration or induration present.      LABS:                        12.3   12.30 )-----------( 360      ( 02 Mar 2021 04:10 )             37.2     03-02    137  |  103  |  21<H>  ----------------------------<  142<H>  4.6   |  27  |  0.7    Ca    8.8      02 Mar 2021 04:10  Phos  3.8     03-02  Mg     2.4     03-02    TPro  5.4<L>  /  Alb  2.8<L>  /  TBili  0.6  /  DBili  x   /  AST  26  /  ALT  56<H>  /  AlkPhos  116<H>  03-02 03-01-21 @ 07:01  -  03-02-21 @ 07:00  --------------------------------------------------------  IN: 2751.5 mL / OUT: 1755 mL / NET: 996.5 mL        MICROBIOLOGY:      MEDICATIONS  (STANDING):  chlorhexidine 4% Liquid 1 Application(s) Topical <User Schedule>  cloNIDine 0.3 milliGRAM(s) Oral every 8 hours  heparin   Injectable 5000 Unit(s) SubCutaneous every 8 hours  hydrALAZINE 100 milliGRAM(s) Oral every 8 hours  levETIRAcetam  IVPB 500 milliGRAM(s) IV Intermittent every 12 hours  lisinopril 40 milliGRAM(s) Oral daily  metoclopramide 5 milliGRAM(s) Oral two times a day  mineral oil enema 133 milliLiter(s) Rectal daily  niCARdipine Infusion 15 mG/Hr (75 mL/Hr) IV Continuous <Continuous>  NIFEdipine IR 20 milliGRAM(s) Oral every 6 hours  pantoprazole  Injectable 40 milliGRAM(s) IV Push every 12 hours  polyethylene glycol 3350 17 Gram(s) Oral daily  senna 2 Tablet(s) Oral at bedtime    MEDICATIONS  (PRN):  acetaminophen   Tablet .. 650 milliGRAM(s) Oral every 6 hours PRN Temp greater or equal to 38C (100.4F)      RADIOLOGY & ADDITIONAL STUDIES:

## 2021-03-03 LAB
ANION GAP SERPL CALC-SCNC: 9 MMOL/L — SIGNIFICANT CHANGE UP (ref 7–14)
ANION GAP SERPL CALC-SCNC: 9 MMOL/L — SIGNIFICANT CHANGE UP (ref 7–14)
BASOPHILS # BLD AUTO: 0.06 K/UL — SIGNIFICANT CHANGE UP (ref 0–0.2)
BASOPHILS NFR BLD AUTO: 0.6 % — SIGNIFICANT CHANGE UP (ref 0–1)
BUN SERPL-MCNC: 19 MG/DL — SIGNIFICANT CHANGE UP (ref 10–20)
BUN SERPL-MCNC: 20 MG/DL — SIGNIFICANT CHANGE UP (ref 10–20)
CALCIUM SERPL-MCNC: 8.8 MG/DL — SIGNIFICANT CHANGE UP (ref 8.5–10.1)
CALCIUM SERPL-MCNC: 8.8 MG/DL — SIGNIFICANT CHANGE UP (ref 8.5–10.1)
CHLORIDE SERPL-SCNC: 100 MMOL/L — SIGNIFICANT CHANGE UP (ref 98–110)
CHLORIDE SERPL-SCNC: 99 MMOL/L — SIGNIFICANT CHANGE UP (ref 98–110)
CO2 SERPL-SCNC: 27 MMOL/L — SIGNIFICANT CHANGE UP (ref 17–32)
CO2 SERPL-SCNC: 27 MMOL/L — SIGNIFICANT CHANGE UP (ref 17–32)
CREAT SERPL-MCNC: 0.6 MG/DL — LOW (ref 0.7–1.5)
CREAT SERPL-MCNC: 0.7 MG/DL — SIGNIFICANT CHANGE UP (ref 0.7–1.5)
CULTURE RESULTS: SIGNIFICANT CHANGE UP
EOSINOPHIL # BLD AUTO: 0.79 K/UL — HIGH (ref 0–0.7)
EOSINOPHIL NFR BLD AUTO: 8 % — SIGNIFICANT CHANGE UP (ref 0–8)
GLUCOSE BLDC GLUCOMTR-MCNC: 99 MG/DL — SIGNIFICANT CHANGE UP (ref 70–99)
GLUCOSE SERPL-MCNC: 102 MG/DL — HIGH (ref 70–99)
GLUCOSE SERPL-MCNC: 132 MG/DL — HIGH (ref 70–99)
HCT VFR BLD CALC: 35.9 % — LOW (ref 42–52)
HGB BLD-MCNC: 11.9 G/DL — LOW (ref 14–18)
IMM GRANULOCYTES NFR BLD AUTO: 2.9 % — HIGH (ref 0.1–0.3)
LYMPHOCYTES # BLD AUTO: 1.16 K/UL — LOW (ref 1.2–3.4)
LYMPHOCYTES # BLD AUTO: 11.8 % — LOW (ref 20.5–51.1)
MAGNESIUM SERPL-MCNC: 2.2 MG/DL — SIGNIFICANT CHANGE UP (ref 1.8–2.4)
MCHC RBC-ENTMCNC: 31.1 PG — HIGH (ref 27–31)
MCHC RBC-ENTMCNC: 33.1 G/DL — SIGNIFICANT CHANGE UP (ref 32–37)
MCV RBC AUTO: 93.7 FL — SIGNIFICANT CHANGE UP (ref 80–94)
MONOCYTES # BLD AUTO: 1.28 K/UL — HIGH (ref 0.1–0.6)
MONOCYTES NFR BLD AUTO: 13 % — HIGH (ref 1.7–9.3)
NEUTROPHILS # BLD AUTO: 6.25 K/UL — SIGNIFICANT CHANGE UP (ref 1.4–6.5)
NEUTROPHILS NFR BLD AUTO: 63.7 % — SIGNIFICANT CHANGE UP (ref 42.2–75.2)
NRBC # BLD: 0 /100 WBCS — SIGNIFICANT CHANGE UP (ref 0–0)
PHOSPHATE SERPL-MCNC: 4.1 MG/DL — SIGNIFICANT CHANGE UP (ref 2.1–4.9)
PLATELET # BLD AUTO: 318 K/UL — SIGNIFICANT CHANGE UP (ref 130–400)
POTASSIUM SERPL-MCNC: 4.5 MMOL/L — SIGNIFICANT CHANGE UP (ref 3.5–5)
POTASSIUM SERPL-MCNC: 4.8 MMOL/L — SIGNIFICANT CHANGE UP (ref 3.5–5)
POTASSIUM SERPL-SCNC: 4.5 MMOL/L — SIGNIFICANT CHANGE UP (ref 3.5–5)
POTASSIUM SERPL-SCNC: 4.8 MMOL/L — SIGNIFICANT CHANGE UP (ref 3.5–5)
RBC # BLD: 3.83 M/UL — LOW (ref 4.7–6.1)
RBC # FLD: 13.5 % — SIGNIFICANT CHANGE UP (ref 11.5–14.5)
SODIUM SERPL-SCNC: 135 MMOL/L — SIGNIFICANT CHANGE UP (ref 135–146)
SODIUM SERPL-SCNC: 136 MMOL/L — SIGNIFICANT CHANGE UP (ref 135–146)
SPECIMEN SOURCE: SIGNIFICANT CHANGE UP
WBC # BLD: 9.82 K/UL — SIGNIFICANT CHANGE UP (ref 4.8–10.8)
WBC # FLD AUTO: 9.82 K/UL — SIGNIFICANT CHANGE UP (ref 4.8–10.8)

## 2021-03-03 PROCEDURE — 99233 SBSQ HOSP IP/OBS HIGH 50: CPT

## 2021-03-03 PROCEDURE — 99232 SBSQ HOSP IP/OBS MODERATE 35: CPT

## 2021-03-03 PROCEDURE — 71045 X-RAY EXAM CHEST 1 VIEW: CPT | Mod: 26

## 2021-03-03 RX ORDER — GUAIFENESIN/DEXTROMETHORPHAN 600MG-30MG
10 TABLET, EXTENDED RELEASE 12 HR ORAL EVERY 8 HOURS
Refills: 0 | Status: DISCONTINUED | OUTPATIENT
Start: 2021-03-03 | End: 2021-03-08

## 2021-03-03 RX ORDER — LABETALOL HCL 100 MG
5 TABLET ORAL ONCE
Refills: 0 | Status: COMPLETED | OUTPATIENT
Start: 2021-03-03 | End: 2021-03-03

## 2021-03-03 RX ORDER — SODIUM CHLORIDE 9 MG/ML
1 INJECTION INTRAMUSCULAR; INTRAVENOUS; SUBCUTANEOUS EVERY 8 HOURS
Refills: 0 | Status: DISCONTINUED | OUTPATIENT
Start: 2021-03-03 | End: 2021-03-08

## 2021-03-03 RX ADMIN — Medication 5 MILLIGRAM(S): at 05:02

## 2021-03-03 RX ADMIN — Medication 100 MILLIGRAM(S): at 14:12

## 2021-03-03 RX ADMIN — Medication 0.3 MILLIGRAM(S): at 14:12

## 2021-03-03 RX ADMIN — LEVETIRACETAM 420 MILLIGRAM(S): 250 TABLET, FILM COATED ORAL at 05:02

## 2021-03-03 RX ADMIN — LEVETIRACETAM 420 MILLIGRAM(S): 250 TABLET, FILM COATED ORAL at 18:39

## 2021-03-03 RX ADMIN — HEPARIN SODIUM 5000 UNIT(S): 5000 INJECTION INTRAVENOUS; SUBCUTANEOUS at 05:02

## 2021-03-03 RX ADMIN — PANTOPRAZOLE SODIUM 40 MILLIGRAM(S): 20 TABLET, DELAYED RELEASE ORAL at 05:02

## 2021-03-03 RX ADMIN — Medication 600 MILLIGRAM(S): at 05:02

## 2021-03-03 RX ADMIN — Medication 0.3 MILLIGRAM(S): at 05:02

## 2021-03-03 RX ADMIN — SENNA PLUS 2 TABLET(S): 8.6 TABLET ORAL at 22:08

## 2021-03-03 RX ADMIN — CHLORHEXIDINE GLUCONATE 1 APPLICATION(S): 213 SOLUTION TOPICAL at 05:01

## 2021-03-03 RX ADMIN — Medication 5 MILLIGRAM(S): at 21:32

## 2021-03-03 RX ADMIN — Medication 20 MILLIGRAM(S): at 05:02

## 2021-03-03 RX ADMIN — HEPARIN SODIUM 5000 UNIT(S): 5000 INJECTION INTRAVENOUS; SUBCUTANEOUS at 14:12

## 2021-03-03 RX ADMIN — Medication 100 MILLIGRAM(S): at 22:08

## 2021-03-03 RX ADMIN — SODIUM CHLORIDE 1 GRAM(S): 9 INJECTION INTRAMUSCULAR; INTRAVENOUS; SUBCUTANEOUS at 14:11

## 2021-03-03 RX ADMIN — SODIUM CHLORIDE 1 GRAM(S): 9 INJECTION INTRAMUSCULAR; INTRAVENOUS; SUBCUTANEOUS at 05:02

## 2021-03-03 RX ADMIN — Medication 20 MILLIGRAM(S): at 11:15

## 2021-03-03 RX ADMIN — HEPARIN SODIUM 5000 UNIT(S): 5000 INJECTION INTRAVENOUS; SUBCUTANEOUS at 22:08

## 2021-03-03 RX ADMIN — SODIUM CHLORIDE 1 GRAM(S): 9 INJECTION INTRAMUSCULAR; INTRAVENOUS; SUBCUTANEOUS at 22:08

## 2021-03-03 RX ADMIN — Medication 100 MILLIGRAM(S): at 05:02

## 2021-03-03 RX ADMIN — LISINOPRIL 40 MILLIGRAM(S): 2.5 TABLET ORAL at 05:01

## 2021-03-03 RX ADMIN — Medication 10 MILLILITER(S): at 22:08

## 2021-03-03 RX ADMIN — Medication 0.3 MILLIGRAM(S): at 22:08

## 2021-03-03 NOTE — SWALLOW BEDSIDE ASSESSMENT ADULT - SLP PERTINENT HISTORY OF CURRENT PROBLEM
pt is a 49 y/o M w/ PMHx: HTN, GI bleed, PUD, BIBEMS for aphasia and L-sided weakness. +stroke code, NIHSS 24; CTH-> Moderate to large intraparenchymal right basal ganglia hemorrhage with mild surrounding edema and associated right sulcal effacement as well as decompression into the ventricles. There is approximately 6.3 mm of midline shift. Pt became unresponsive in CT scan and had to be intubated and sedated. EVD placed by neurosx at bedside. Course further c/b Multilobar bacterial PNA. EVD removed 2/15. Repeat CTH 3/2-> Interval development of 0.6 cm acute hemorrhage in the right subependymal region just lateral to the right frontal intraparenchymal hematoma. Pt s/p extubation 2/23. SLP c/s to assess candidacy for PO diet.

## 2021-03-03 NOTE — CHART NOTE - NSCHARTNOTEFT_GEN_A_CORE
Patient hypertensive systolic 181,     per RN patient sneezed twice and NGT (salem sump) came out. I see no indication for salem sump so will replace with regular NGT.     Gave labetalol 5mg IV push while waiting for CXR to confirm placement.     NGT replaced (not salem sump, regular weighted tip NGT, so can be evaluated by speech/swallow again) CXR confirmed placement.     Can resume PO antihypertensives.

## 2021-03-03 NOTE — PROGRESS NOTE ADULT - SUBJECTIVE AND OBJECTIVE BOX
OVERNIGHT EVENTS: events noted, sp head CT, no cardene, 2 l NC    Vital Signs Last 24 Hrs  T(C): 37.4 (03 Mar 2021 07:00), Max: 37.4 (03 Mar 2021 07:00)  T(F): 99.4 (03 Mar 2021 07:00), Max: 99.4 (03 Mar 2021 07:00)  HR: 73 (03 Mar 2021 07:00) (60 - 82)  BP: --  BP(mean): --  RR: 20 (03 Mar 2021 07:00) (18 - 22)  SpO2: 96% (03 Mar 2021 07:00) (94% - 99%)    PHYSICAL EXAMINATION:    GENERAL: ILL LOOKING    HEENT: Head is normocephalic and atraumatic.    NECK: Supple.    LUNGS: dec bs l side    HEART: Regular rate and rhythm without murmur.    ABDOMEN: Soft, nontender, and nondistended.      EXTREMITIES: Without any cyanosis, clubbing, rash, lesions or edema.    NEUROLOGIC: follows commands    SKIN: No ulceration or induration present.      LABS:                        11.9   9.82  )-----------( 318      ( 03 Mar 2021 05:20 )             35.9     03-03    135  |  99  |  20  ----------------------------<  132<H>  4.5   |  27  |  0.6<L>    Ca    8.8      03 Mar 2021 05:20  Phos  4.1     03-03  Mg     2.2     03-03    TPro  5.4<L>  /  Alb  2.8<L>  /  TBili  0.6  /  DBili  x   /  AST  26  /  ALT  56<H>  /  AlkPhos  116<H>  03-02 03-02-21 @ 07:01  -  03-03-21 @ 07:00  --------------------------------------------------------  IN: 1650 mL / OUT: 2570 mL / NET: -920 mL    03-03-21 @ 07:01  -  03-03-21 @ 08:20  --------------------------------------------------------  IN: 95 mL / OUT: 0 mL / NET: 95 mL        MICROBIOLOGY:      MEDICATIONS  (STANDING):  chlorhexidine 4% Liquid 1 Application(s) Topical <User Schedule>  cloNIDine 0.3 milliGRAM(s) Oral every 8 hours  guaiFENesin  milliGRAM(s) Oral every 12 hours  heparin   Injectable 5000 Unit(s) SubCutaneous every 8 hours  hydrALAZINE 100 milliGRAM(s) Oral every 8 hours  levETIRAcetam  IVPB 500 milliGRAM(s) IV Intermittent every 12 hours  lisinopril 40 milliGRAM(s) Oral daily  metoclopramide 5 milliGRAM(s) Oral two times a day  mineral oil enema 133 milliLiter(s) Rectal daily  niCARdipine Infusion 15 mG/Hr (75 mL/Hr) IV Continuous <Continuous>  NIFEdipine IR 20 milliGRAM(s) Oral every 6 hours  pantoprazole  Injectable 40 milliGRAM(s) IV Push every 12 hours  polyethylene glycol 3350 17 Gram(s) Oral daily  senna 2 Tablet(s) Oral at bedtime  sodium chloride 1 Gram(s) Oral every 12 hours    MEDICATIONS  (PRN):  acetaminophen   Tablet .. 650 milliGRAM(s) Oral every 6 hours PRN Temp greater or equal to 38C (100.4F)      RADIOLOGY & ADDITIONAL STUDIES:

## 2021-03-03 NOTE — PROGRESS NOTE ADULT - ASSESSMENT
IMPRESSION:    Large ICB secondary to Hypertensive Emergency s/p EVD and removal  Intubated, for airway protection now extubated   Klebsiella Pneumonia pansensitive sp ABX  Left posterior tibial & peroneal DVT  Uncontrolled HTN better    PLAN:    CNS:  Neurosx f/up, Neuro f/up    HEENT: Oral care    PULMONARY:  HOB @ 45 degrees.  Aspiration precautions, chest py    CARDIOVASCULAR:  Avoid volume overload.  salt tab increase    GI: GI prophylaxis.  NG feeding    RENAL;  FU lytes.  Correct lytes as needed.   renal f/up    INFECTIOUS DISEASE:  f./up cx    HEMATOLOGICAL: DVT prophylaxis. LE doppler reviewed    ENDOCRINE:  Follow up FS.    MUSCULOSKELETAL:  PT OT     Prognosis poor  DC tlc/a line/ berry  stroke unit

## 2021-03-03 NOTE — PROGRESS NOTE ADULT - NSHPATTENDINGPLANDISCUSS_GEN_ALL_CORE
team
ICU team
team
neurosurgery PA
team
ICU and neurosurgery teams
housestaff
neurosurgery Candi Barahona Dr. Motivala
ICU team
ICU team
Team
care team
team
team
ICU team
TEAM
resident

## 2021-03-04 LAB
ANION GAP SERPL CALC-SCNC: 11 MMOL/L — SIGNIFICANT CHANGE UP (ref 7–14)
BASOPHILS # BLD AUTO: 0.06 K/UL — SIGNIFICANT CHANGE UP (ref 0–0.2)
BASOPHILS NFR BLD AUTO: 0.7 % — SIGNIFICANT CHANGE UP (ref 0–1)
BUN SERPL-MCNC: 18 MG/DL — SIGNIFICANT CHANGE UP (ref 10–20)
CALCIUM SERPL-MCNC: 8.8 MG/DL — SIGNIFICANT CHANGE UP (ref 8.5–10.1)
CHLORIDE SERPL-SCNC: 99 MMOL/L — SIGNIFICANT CHANGE UP (ref 98–110)
CO2 SERPL-SCNC: 25 MMOL/L — SIGNIFICANT CHANGE UP (ref 17–32)
CREAT SERPL-MCNC: 0.7 MG/DL — SIGNIFICANT CHANGE UP (ref 0.7–1.5)
EOSINOPHIL # BLD AUTO: 0.65 K/UL — SIGNIFICANT CHANGE UP (ref 0–0.7)
EOSINOPHIL NFR BLD AUTO: 7.2 % — SIGNIFICANT CHANGE UP (ref 0–8)
GLUCOSE BLDC GLUCOMTR-MCNC: 115 MG/DL — HIGH (ref 70–99)
GLUCOSE SERPL-MCNC: 112 MG/DL — HIGH (ref 70–99)
HCT VFR BLD CALC: 36.3 % — LOW (ref 42–52)
HGB BLD-MCNC: 12 G/DL — LOW (ref 14–18)
IMM GRANULOCYTES NFR BLD AUTO: 3 % — HIGH (ref 0.1–0.3)
LYMPHOCYTES # BLD AUTO: 1.03 K/UL — LOW (ref 1.2–3.4)
LYMPHOCYTES # BLD AUTO: 11.4 % — LOW (ref 20.5–51.1)
MAGNESIUM SERPL-MCNC: 2.1 MG/DL — SIGNIFICANT CHANGE UP (ref 1.8–2.4)
MCHC RBC-ENTMCNC: 30.8 PG — SIGNIFICANT CHANGE UP (ref 27–31)
MCHC RBC-ENTMCNC: 33.1 G/DL — SIGNIFICANT CHANGE UP (ref 32–37)
MCV RBC AUTO: 93.3 FL — SIGNIFICANT CHANGE UP (ref 80–94)
METANEPH UR-MCNC: SIGNIFICANT CHANGE UP
MONOCYTES # BLD AUTO: 1.15 K/UL — HIGH (ref 0.1–0.6)
MONOCYTES NFR BLD AUTO: 12.7 % — HIGH (ref 1.7–9.3)
NEUTROPHILS # BLD AUTO: 5.9 K/UL — SIGNIFICANT CHANGE UP (ref 1.4–6.5)
NEUTROPHILS NFR BLD AUTO: 65 % — SIGNIFICANT CHANGE UP (ref 42.2–75.2)
NRBC # BLD: 0 /100 WBCS — SIGNIFICANT CHANGE UP (ref 0–0)
PHOSPHATE SERPL-MCNC: 4.4 MG/DL — SIGNIFICANT CHANGE UP (ref 2.1–4.9)
PLATELET # BLD AUTO: 302 K/UL — SIGNIFICANT CHANGE UP (ref 130–400)
POTASSIUM SERPL-MCNC: 5.2 MMOL/L — HIGH (ref 3.5–5)
POTASSIUM SERPL-SCNC: 5.2 MMOL/L — HIGH (ref 3.5–5)
RBC # BLD: 3.89 M/UL — LOW (ref 4.7–6.1)
RBC # FLD: 13.5 % — SIGNIFICANT CHANGE UP (ref 11.5–14.5)
SODIUM SERPL-SCNC: 135 MMOL/L — SIGNIFICANT CHANGE UP (ref 135–146)
WBC # BLD: 9.06 K/UL — SIGNIFICANT CHANGE UP (ref 4.8–10.8)
WBC # FLD AUTO: 9.06 K/UL — SIGNIFICANT CHANGE UP (ref 4.8–10.8)

## 2021-03-04 PROCEDURE — 99233 SBSQ HOSP IP/OBS HIGH 50: CPT

## 2021-03-04 PROCEDURE — 71045 X-RAY EXAM CHEST 1 VIEW: CPT | Mod: 26

## 2021-03-04 RX ORDER — LABETALOL HCL 100 MG
200 TABLET ORAL EVERY 6 HOURS
Refills: 0 | Status: DISCONTINUED | OUTPATIENT
Start: 2021-03-04 | End: 2021-03-08

## 2021-03-04 RX ORDER — SODIUM POLYSTYRENE SULFONATE 4.1 MEQ/G
30 POWDER, FOR SUSPENSION ORAL ONCE
Refills: 0 | Status: COMPLETED | OUTPATIENT
Start: 2021-03-04 | End: 2021-03-04

## 2021-03-04 RX ADMIN — HEPARIN SODIUM 5000 UNIT(S): 5000 INJECTION INTRAVENOUS; SUBCUTANEOUS at 05:15

## 2021-03-04 RX ADMIN — CHLORHEXIDINE GLUCONATE 1 APPLICATION(S): 213 SOLUTION TOPICAL at 05:32

## 2021-03-04 RX ADMIN — PANTOPRAZOLE SODIUM 40 MILLIGRAM(S): 20 TABLET, DELAYED RELEASE ORAL at 17:41

## 2021-03-04 RX ADMIN — LISINOPRIL 40 MILLIGRAM(S): 2.5 TABLET ORAL at 05:16

## 2021-03-04 RX ADMIN — SENNA PLUS 2 TABLET(S): 8.6 TABLET ORAL at 21:21

## 2021-03-04 RX ADMIN — Medication 20 MILLIGRAM(S): at 05:14

## 2021-03-04 RX ADMIN — Medication 100 MILLIGRAM(S): at 14:34

## 2021-03-04 RX ADMIN — Medication 200 MILLIGRAM(S): at 12:46

## 2021-03-04 RX ADMIN — SODIUM CHLORIDE 1 GRAM(S): 9 INJECTION INTRAMUSCULAR; INTRAVENOUS; SUBCUTANEOUS at 05:15

## 2021-03-04 RX ADMIN — Medication 0.3 MILLIGRAM(S): at 14:34

## 2021-03-04 RX ADMIN — Medication 10 MILLILITER(S): at 21:20

## 2021-03-04 RX ADMIN — Medication 100 MILLIGRAM(S): at 05:15

## 2021-03-04 RX ADMIN — Medication 10 MILLILITER(S): at 05:18

## 2021-03-04 RX ADMIN — Medication 200 MILLIGRAM(S): at 17:40

## 2021-03-04 RX ADMIN — Medication 133 MILLILITER(S): at 17:41

## 2021-03-04 RX ADMIN — PANTOPRAZOLE SODIUM 40 MILLIGRAM(S): 20 TABLET, DELAYED RELEASE ORAL at 05:15

## 2021-03-04 RX ADMIN — Medication 100 MILLIGRAM(S): at 21:21

## 2021-03-04 RX ADMIN — SODIUM CHLORIDE 1 GRAM(S): 9 INJECTION INTRAMUSCULAR; INTRAVENOUS; SUBCUTANEOUS at 16:10

## 2021-03-04 RX ADMIN — SODIUM POLYSTYRENE SULFONATE 30 GRAM(S): 4.1 POWDER, FOR SUSPENSION ORAL at 12:44

## 2021-03-04 RX ADMIN — LEVETIRACETAM 420 MILLIGRAM(S): 250 TABLET, FILM COATED ORAL at 17:40

## 2021-03-04 RX ADMIN — SODIUM CHLORIDE 1 GRAM(S): 9 INJECTION INTRAMUSCULAR; INTRAVENOUS; SUBCUTANEOUS at 21:22

## 2021-03-04 RX ADMIN — Medication 10 MILLILITER(S): at 14:34

## 2021-03-04 RX ADMIN — Medication 5 MILLIGRAM(S): at 05:14

## 2021-03-04 RX ADMIN — Medication 5 MILLIGRAM(S): at 17:40

## 2021-03-04 RX ADMIN — HEPARIN SODIUM 5000 UNIT(S): 5000 INJECTION INTRAVENOUS; SUBCUTANEOUS at 14:35

## 2021-03-04 RX ADMIN — Medication 0.3 MILLIGRAM(S): at 05:14

## 2021-03-04 RX ADMIN — LEVETIRACETAM 420 MILLIGRAM(S): 250 TABLET, FILM COATED ORAL at 05:13

## 2021-03-04 RX ADMIN — Medication 0.3 MILLIGRAM(S): at 21:20

## 2021-03-04 RX ADMIN — HEPARIN SODIUM 5000 UNIT(S): 5000 INJECTION INTRAVENOUS; SUBCUTANEOUS at 21:21

## 2021-03-04 NOTE — CONSULT NOTE ADULT - REASON FOR ADMISSION
Found down on the bathroom floor ,
Altered mental status
Altered mental status ICH
Altered mental status
Altered mental status

## 2021-03-04 NOTE — CONSULT NOTE ADULT - ASSESSMENT
51 y/o male with hoarseness - Likely secondary to prolonged intubation and stroke  - F/u SLP reccs  - Consider steroids  - Will coordinate with SLP to scope when they are planning FEES.  - Will d/w attending 49 y/o male with hoarseness - Likely secondary to prolonged intubation and stroke  - F/u SLP reccs  - Will coordinate with SLP to scope when they are planning FEES.  - Will d/w attending

## 2021-03-04 NOTE — CONSULT NOTE ADULT - PROVIDER SPECIALTY LIST ADULT
Neurology
Trauma Surgery
ENT
Neurosurgery
Critical Care
Gastroenterology
Nephrology
Physiatry
Infectious Disease

## 2021-03-04 NOTE — SWALLOW BEDSIDE ASSESSMENT ADULT - SLP PERTINENT HISTORY OF CURRENT PROBLEM
pt is a 51 y/o M w/ PMHx: HTN, GI bleed, PUD, BIBEMS for aphasia and L-sided weakness. +stroke code, NIHSS 24; CTH-> Moderate to large intraparenchymal right basal ganglia hemorrhage with mild surrounding edema and associated right sulcal effacement as well as decompression into the ventricles. There is approximately 6.3 mm of midline shift. Pt became unresponsive in CT scan and had to be intubated and sedated. EVD placed by neurosx at bedside. Course further c/b VAP w/ klebsiella. EVD removed 2/15. Repeat CTH 3/2-> Interval development of 0.6 cm acute hemorrhage in the right subependymal region just lateral to the right frontal intraparenchymal hematoma. Pt s/p extubation 2/23. Overnight, salem sump dislodged and replaced w/ flexi-flow NGT. GI c/s for PEG tube placement

## 2021-03-04 NOTE — PROGRESS NOTE ADULT - ASSESSMENT
50/M with HTN, GIB, PUD, with large intraparenchymal right basal ganglia hemorrhage with mild surrounding edema as well as 6.3 mm of midline shift. S/p EVD removal. BP is controlled on labetalol.   Pt is able to move right UE>LE.  Pt follows few simple commands and is now able to track and mouths simple words.     # large ICB secondary to Hypertensive Emergency:  - s/p EVD removal 2/15.  - intubated on admission and then extubated on 2/23   - Neurology and neurosurgery following  - Neuro checks q 2 hour  - Continue Keppra 500mg IV q12hrs  - BP was still uncontrolled, started on labetalol 200 mg Q6H, c/w clonodine 0.3 Q8H, hydralazine 100mg Q8H, lisinoprol 40 mg QD, nifedipine IR- 20 mg Q6H   - c/w NPO w/ NGT  - Advised on PEG tube placement. Wife has not made decision yet if want to proceed with PEG   - c/w Physiatry, PT, OOB to chair  - ENT consult  for Vocal cord dysfunction    # Resistant Hypertension, on multiple medications  - Continue BP meds.   - started on labetalol 200 mg Q6H, c/w clonodine 0.3 Q8H, hydralazine 100mg Q8H, lisinoprol 40 mg QD, nifedipine IR- 20 mg Q6H   - w/u for secondary HTN - obtain Aldosterone renin ratio,  renin and amarilis low  - RAD neg for LIOR, catecholamines in urine wnl  - Follow up plasma catecholamines and metanephrines, aldosterone.   - Follow up Nephro  - Maintain normonatremia    #Left posterior tibial & peroneal DVT  - Distal DVT.   - On heparin 5000 sc q8 for DVT ppx.  - no hemodynamic instability.      # bowel regimen  - Continue Senna 2 tablets PO q24hrs HS  - Continue fleet enema q24hrs    Heparin sc for dvt ppx.   GI ppx: Protonix 40mg IV q12hrs  Code : full code  CHG  Prognosis poor 50/M with HTN, GIB, PUD, with large intraparenchymal right basal ganglia hemorrhage with mild surrounding edema as well as 6.3 mm of midline shift. S/p EVD removal. BP is controlled on labetalol.   Pt is able to move right UE>LE.  Pt follows few simple commands and is now able to track and mouths simple words.     # large ICB secondary to Hypertensive Emergency:  - s/p EVD removal 2/15.  - intubated on admission and then extubated on 2/23   - Neurology and neurosurgery following  - Neuro checks q 2 hour  - Continue Keppra 500mg IV q12hrs  - BP was still uncontrolled, started on labetalol 200 mg Q6H, c/w clonodine 0.3 Q8H, hydralazine 100mg Q8H, lisinoprol 40 mg QD, nifedipine IR- 20 mg Q6H   - c/w NPO w/ NGT  - pt required PEG tube. Wife has not made decision yet if want to proceed with PEG   - c/w Physiatry, PT, OOB to chair  - ENT consult  for Vocal cord dysfunction    # Resistant Hypertension, on multiple medications  - Continue BP meds.   - started on labetalol 200 mg Q6H, c/w clonodine 0.3 Q8H, hydralazine 100mg Q8H, lisinoprol 40 mg QD, nifedipine IR- 20 mg Q6H   - w/u for secondary HTN - obtain Aldosterone renin ratio,  renin and amarilis low  - RAD neg for LIOR, catecholamines in urine wnl  - Follow up plasma catecholamines and metanephrines, aldosterone.   - Follow up Nephro  - Maintain normonatremia    #Left posterior tibial & peroneal DVT  - Distal DVT.   - On heparin 5000 sc q8 for DVT ppx.  - no hemodynamic instability.      # bowel regimen  - Continue Senna 2 tablets PO q24hrs HS  - Continue fleet enema q24hrs    Heparin sc for dvt ppx.   GI ppx: Protonix 40mg IV q12hrs  Code : full code  CHG    Pending:  [facility]

## 2021-03-04 NOTE — PROGRESS NOTE ADULT - SUBJECTIVE AND OBJECTIVE BOX
SUBJECTIVE:    Patient is a 50y old Male who presents with a chief complaint of Altered mental status (03 Mar 2021 08:19)    Currently admitted to medicine with the primary diagnosis of Intracranial bleed       Today is hospital day 35d. This morning he is resting comfortably in bed and reports no new issues or overnight events.     PAST MEDICAL & SURGICAL HISTORY  HTN (hypertension)    GI bleed    Gastric ulcer    PUD (peptic ulcer disease)    Arm fracture, left  s/p surgical repair      SOCIAL HISTORY:  Negative for smoking/alcohol/drug use.     ALLERGIES:  No Known Allergies    MEDICATIONS:  STANDING MEDICATIONS  chlorhexidine 4% Liquid 1 Application(s) Topical <User Schedule>  cloNIDine 0.3 milliGRAM(s) Oral every 8 hours  guaifenesin/dextromethorphan  Syrup 10 milliLiter(s) Oral every 8 hours  heparin   Injectable 5000 Unit(s) SubCutaneous every 8 hours  hydrALAZINE 100 milliGRAM(s) Oral every 8 hours  labetalol 200 milliGRAM(s) Oral every 6 hours  levETIRAcetam  IVPB 500 milliGRAM(s) IV Intermittent every 12 hours  lisinopril 40 milliGRAM(s) Oral daily  metoclopramide 5 milliGRAM(s) Oral two times a day  mineral oil enema 133 milliLiter(s) Rectal daily  niCARdipine Infusion 15 mG/Hr IV Continuous <Continuous>  NIFEdipine IR 20 milliGRAM(s) Oral every 6 hours  pantoprazole  Injectable 40 milliGRAM(s) IV Push every 12 hours  senna 2 Tablet(s) Oral at bedtime  sodium chloride 1 Gram(s) Oral every 8 hours    PRN MEDICATIONS  acetaminophen   Tablet .. 650 milliGRAM(s) Oral every 6 hours PRN    VITALS:   T(F): 98.4  HR: 74  BP: 137/85  RR: 18  SpO2: 99%    LABS:                        12.0   9.06  )-----------( 302      ( 04 Mar 2021 07:06 )             36.3     03-04    135  |  99  |  18  ----------------------------<  112<H>  5.2<H>   |  25  |  0.7    Ca    8.8      04 Mar 2021 07:06  Phos  4.4     03-04  Mg     2.1     03-04    RADIOLOGY:  < from: CT Head No Cont (03.02.21 @ 12:38) >  IMPRESSION:    Redemonstration of evolving right frontal intraparenchymal hematoma with slightly decreased moderate surrounding edema extending to the right basal ganglia and right frontal operculum. Stable to minimally decreased leftward midline shift measuring 0.4 cm (previously 0.6 cm).    Interval development of 0.6 cm acute hemorrhage in the right subependymal region just lateral to the right frontal intraparenchymal hematoma.    No acute territorial infarct, hydrocephalus, or extra-axial fluid collection.      < end of copied text >  < from: Xray Chest 1 View- PORTABLE-Urgent (Xray Chest 1 View- PORTABLE-Urgent .) (03.03.21 @ 22:21) >  IMPRESSION:    NG tube terminating over the left upper quadrant.    Stable cardiomediastinal silhouette. Unchanged lungs. Unchanged osseous structures.    < end of copied text >  < from: [facility] Duplex Lower Ext Vein Scan, Bilat (03.01.21 @ 18:07) >  Impression:    No evidence of deep venous thrombosis or superficial thrombophlebitis in the right lower extremity  Same left posterior tibial vein branch DVT as before    < end of copied text >      PHYSICAL EXAM:  GEN: No acute distress  LUNGS: Clear to auscultation b/l, no wheezes  HEART: Regular rate and rhythm, +s1 s2  ABD: Soft, non-tender, non-distended.  EXT: LE edema   NEURO: Awake, alert, responds to commands, LUE weakness. RUE strength is 4/5    Indwelling Urethral Catheter:     Connect To:  Straight Drainage/Gravity    Indication:  Urine Output Monitoring in Critically Ill (02-19-21 @ 12:42) (not performed) [Active]

## 2021-03-04 NOTE — SWALLOW BEDSIDE ASSESSMENT ADULT - PHARYNGEAL PHASE
increased congestion/Delayed pharyngeal swallow/Cough post oral intake/Delayed cough post oral intake

## 2021-03-04 NOTE — CHART NOTE - NSCHARTNOTEFT_GEN_A_CORE
50 years old right handed Male with PMHx of HTN, GI bleed, PUD,  Mrankin score of 0 at baseline was brought in by EMS for  AMS and left sided weakness.   Wife states that patient was having a usual day today went to bathroom and then the next thing she noted was that he was confused and was lying on the bathroom floor, she reports urinary incontinence, patient was confused, had trouble speaking, and could not move his left side.     In the ED, /148, HR 80, saturating well. Code stroke called, NIHSS 24 and GCS 11, CTH showed Moderate to large intraparenchymal right basal ganglia hemorrhage with mild surrounding edema and associated right sulcal effacement as well as decompression into the ventricles. There is approximately 6.3 mm of midline shift. CTA did not show any evidence of active bleeding, AVM or aneurysm or major vascular stenosis or occlusion. Pt became unresponsive in CT scan and had to be intubated and sedated. Patient was intubated in ED for worsening mental status and GCS. Started on Nicardipine drip for sbp in 200s and EVD was placed by neurosurgery at bedside    admitted to the ICU on 1/27, started on nicardipine drip and started on IV antiepileptics + antibiotics with a close ICP monitoring. neurosurgery were following the patient closely   ICP was kept < 20 and CPP > 70   CTH 2/11/21: Since prior examination of 2/8/2021 there is increased dilation of the left lateral ventricle with left frontal ventriculostomy catheter traversing the left frontal horn in appropriate position.  stay complicated by VAP with KLEBSIELLA , finished course of cefepime for 10 days   LE Duplex 02/08, 02/11: Acute deep vein thrombus of the left posterior tibial vein and left peroneal vein. neurosurgery were agreeable to resume heparin SC as DVT prophylaxis  s/p EVD removal 2/15.  extubated on 2/23   BP was still uncontrolled, able to wean off cardene drip and started on 4 different anti HTN medications  CSF panel and cx turned out to be negative  patient failed speech and swallow several times during his stay   NG tube was inserted for feeding and for medications  patient was stabilized in the ICU and got transferred on 3/3 to the stroke unit  plan is for aggressive PT / ot / REHAB facility   tight BP control   patient's wife is aware about the possibility of PEG insertion. no decision has been taken to date 50 years old right handed Male with PMHx of HTN, GI bleed, PUD,  Mrankin score of 0 at baseline was brought in by EMS for  AMS and left sided weakness.   Wife states that patient was having a usual day today went to bathroom and then the next thing she noted was that he was confused and was lying on the bathroom floor, she reports urinary incontinence, patient was confused, had trouble speaking, and could not move his left side.     In the ED, /148, HR 80, saturating well. Code stroke called, NIHSS 24 and GCS 11, CTH showed Moderate to large intraparenchymal right basal ganglia hemorrhage with mild surrounding edema and associated right sulcal effacement as well as decompression into the ventricles. There is approximately 6.3 mm of midline shift. CTA did not show any evidence of active bleeding, AVM or aneurysm or major vascular stenosis or occlusion. Pt became unresponsive in CT scan and had to be intubated and sedated. Patient was intubated in ED for worsening mental status and GCS. Started on Nicardipine drip for sbp in 200s and EVD was placed by neurosurgery at bedside    admitted to the ICU on 1/27, started on nicardipine drip and started on IV antiepileptics + antibiotics with a close ICP monitoring. neurosurgery were following the patient closely   ICP was kept < 20 and CPP > 70   CTH 2/11/21: Since prior examination of 2/8/2021 there is increased dilation of the left lateral ventricle with left frontal ventriculostomy catheter traversing the left frontal horn in appropriate position.  stay complicated by VAP with KLEBSIELLA , finished course of cefepime for 10 days   LE Duplex 02/08, 02/11: Acute deep vein thrombus of the left posterior tibial vein and left peroneal vein. neurosurgery were agreeable to resume heparin SC as DVT prophylaxis  s/p EVD removal 2/15.  extubated on 2/23   BP was still uncontrolled, able to wean off cardene drip and started on 4 different anti HTN medications  CSF panel and cx turned out to be negative  patient failed speech and swallow several times during his stay   NG tube was inserted for feeding and for medications  patient was stabilized in the ICU and got transferred on 3/3 to the stroke unit  plan is for aggressive PT / ot / REHAB facility   tight BP control   patient's wife is aware about the possibility of PEG insertion. no decision has been taken to date    MEDICATIONS  (STANDING):  chlorhexidine 4% Liquid 1 Application(s) Topical <User Schedule>  cloNIDine 0.3 milliGRAM(s) Oral every 8 hours  guaifenesin/dextromethorphan  Syrup 10 milliLiter(s) Oral every 8 hours  heparin   Injectable 5000 Unit(s) SubCutaneous every 8 hours  hydrALAZINE 100 milliGRAM(s) Oral every 8 hours  labetalol 200 milliGRAM(s) Oral every 6 hours  levETIRAcetam  IVPB 500 milliGRAM(s) IV Intermittent every 12 hours  lisinopril 40 milliGRAM(s) Oral daily  metoclopramide 5 milliGRAM(s) Oral two times a day  mineral oil enema 133 milliLiter(s) Rectal daily  NIFEdipine IR 20 milliGRAM(s) Oral every 6 hours  pantoprazole  Injectable 40 milliGRAM(s) IV Push every 12 hours  senna 2 Tablet(s) Oral at bedtime  sodium chloride 1 Gram(s) Oral every 8 hours    MEDICATIONS  (PRN):  acetaminophen   Tablet .. 650 milliGRAM(s) Oral every 6 hours PRN Temp greater or equal to 38C (100.4F) 50 years old right handed Male with PMHx of HTN, GI bleed, PUD,  Mrankin score of 0 at baseline was brought in by EMS for  AMS and left sided weakness.   Wife states that patient was having a usual day today went to bathroom and then the next thing she noted was that he was confused and was lying on the bathroom floor, she reports urinary incontinence, patient was confused, had trouble speaking, and could not move his left side.     In the ED, /148, HR 80, saturating well. Code stroke called, NIHSS 24 and GCS 11, CTH showed Moderate to large intraparenchymal right basal ganglia hemorrhage with mild surrounding edema and associated right sulcal effacement as well as decompression into the ventricles. There is approximately 6.3 mm of midline shift. CTA did not show any evidence of active bleeding, AVM or aneurysm or major vascular stenosis or occlusion. Pt became unresponsive in CT scan and had to be intubated and sedated. Patient was intubated in ED for worsening mental status and GCS. Started on Nicardipine drip for sbp in 200s and EVD was placed by neurosurgery at bedside    admitted to the ICU on 1/27, started on nicardipine drip and started on IV antiepileptics + antibiotics with a close ICP monitoring. neurosurgery were following the patient closely   ICP was kept < 20 and CPP > 70   CTH 2/11/21: Since prior examination of 2/8/2021 there is increased dilation of the left lateral ventricle with left frontal ventriculostomy catheter traversing the left frontal horn in appropriate position.  stay complicated by VAP with KLEBSIELLA , finished course of cefepime for 10 days   LE Duplex 02/08, 02/11: Acute deep vein thrombus of the left posterior tibial vein and left peroneal vein. neurosurgery were agreeable to resume heparin SC as DVT prophylaxis  urine metanephrines was negative  aldosterone / renin ratio within normal range and cortisol level normal   UDS was not checked   s/p EVD removal 2/15.  extubated on 2/23   BP was still uncontrolled, able to wean off cardene drip and started on 4 different anti HTN medications  CSF panel and cx turned out to be negative  patient failed speech and swallow several times during his stay   NG tube was inserted for feeding and for medications  patient was stabilized in the ICU and got transferred on 3/3 to the stroke unit  plan is for aggressive PT / ot / REHAB facility   tight BP control   patient's wife is aware about the possibility of PEG insertion. no decision has been taken to date    MEDICATIONS  (STANDING):  chlorhexidine 4% Liquid 1 Application(s) Topical <User Schedule>  cloNIDine 0.3 milliGRAM(s) Oral every 8 hours  guaifenesin/dextromethorphan  Syrup 10 milliLiter(s) Oral every 8 hours  heparin   Injectable 5000 Unit(s) SubCutaneous every 8 hours  hydrALAZINE 100 milliGRAM(s) Oral every 8 hours  labetalol 200 milliGRAM(s) Oral every 6 hours  levETIRAcetam  IVPB 500 milliGRAM(s) IV Intermittent every 12 hours  lisinopril 40 milliGRAM(s) Oral daily  metoclopramide 5 milliGRAM(s) Oral two times a day  mineral oil enema 133 milliLiter(s) Rectal daily  NIFEdipine IR 20 milliGRAM(s) Oral every 6 hours  pantoprazole  Injectable 40 milliGRAM(s) IV Push every 12 hours  senna 2 Tablet(s) Oral at bedtime  sodium chloride 1 Gram(s) Oral every 8 hours    MEDICATIONS  (PRN):  acetaminophen   Tablet .. 650 milliGRAM(s) Oral every 6 hours PRN Temp greater or equal to 38C (100.4F)

## 2021-03-04 NOTE — CONSULT NOTE ADULT - CONSULT REQUESTED DATE/TIME
04-Mar-2021 17:50
27-Jan-2021 21:35
15-Feb-2021 14:18
19-Feb-2021 09:56
29-Jan-2021 08:27
02-Mar-2021 21:20
27-Jan-2021 21:05
27-Jan-2021 21:21
01-Feb-2021 14:51
81.6

## 2021-03-04 NOTE — PROGRESS NOTE ADULT - ATTENDING COMMENTS
Patient seen today with medical resident team.       I was physically present for the key portions of the evaluation and management (E/M) service provided.  I agree with the above history, physical, and plan with the following additions/modifications     BP still fluctuating, start labetalol PO q6h, for SBP<160, with hold parameters for HR.  Needs aggressive PT/OT/speech  PEG planning as patient with severe dysarthria, will not be able to safely swallow for many weeks, but optimistic he will recover eventually his swallowing ability.  Wife at bedside has arnulfo requesting transfer to [facility] system; called them today, will plan to transfer as early as tomorrow.    Thomas Koroma MD  Attending Neurointensivist

## 2021-03-04 NOTE — CONSULT NOTE ADULT - SUBJECTIVE AND OBJECTIVE BOX
ENT DAILY PROGRESS NOTE    Overnight events/Interval HPI: HPI:  50 years old right handed Male with PMHx of HTN, GI bleed, PUD,  Mrankin score of 0 at baseline was brought in by EMS for  AMS and left sided weakness.   Wife states that patient was having a usual day today went to bathroom and then the next thing she noted was that he was confused and was lying on the bathroom floor, she reports urinary incontinence, patient was confused, had trouble speaking, and could not move his left side.     In the ED, /148, HR 80, saturating well. Code stroke called, NIHSS 24 and GCS 11, CTH showed Moderate to large intraparenchymal right basal ganglia hemorrhage with mild surrounding edema and associated right sulcal effacement as well as decompression into the ventricles. There is approximately 6.3 mm of midline shift. CTA did not show any evidence of active bleeding, AVM or aneurysm or major vascular stenosis or occlusion. Pt became unresponsive in CT scan and had to be intubated and sedated. Patient was intubated in ED for worsening mental status and GCS. Started on Nicardipine drip for sbp in 200s and EVD was placed by neurosurgery at bedside. Pan trauma scan was negative. To be admitted under ICU for further monitoring.  (27 Jan 2021 23:13)    ENT: Called to evaluate for VC dysfunction and hoarseness. Pt was intubated for an extended period of time and recently extubated on 2/23. Has been hoarse since. Continues to have Left sided weakness s/p stroke. Attempted FFL but patient was unable to tolerate. Currently being followed by SLP. Currently being followed by SLP. NPO on NGT feeds.         REVIEW OF SYSTEMS   [x] A ten-point review of systems was otherwise negative except as noted.    Allergies: No Known Allergies    MEDICATIONS:  Antiinfectives:     IV fluids:  sodium chloride 1 Gram(s) Oral every 8 hours    Hematologic/Anticoagulation:  heparin   Injectable 5000 Unit(s) SubCutaneous every 8 hours    Pain medications/Neuro:  acetaminophen   Tablet .. 650 milliGRAM(s) Oral every 6 hours PRN  levETIRAcetam  IVPB 500 milliGRAM(s) IV Intermittent every 12 hours    Endocrine Medications:     All other standing medications:   chlorhexidine 4% Liquid 1 Application(s) Topical <User Schedule>  cloNIDine 0.3 milliGRAM(s) Oral every 8 hours  guaifenesin/dextromethorphan  Syrup 10 milliLiter(s) Oral every 8 hours  hydrALAZINE 100 milliGRAM(s) Oral every 8 hours  labetalol 200 milliGRAM(s) Oral every 6 hours  lisinopril 40 milliGRAM(s) Oral daily  metoclopramide 5 milliGRAM(s) Oral two times a day  mineral oil enema 133 milliLiter(s) Rectal daily  niCARdipine Infusion 15 mG/Hr IV Continuous <Continuous>  NIFEdipine IR 20 milliGRAM(s) Oral every 6 hours  pantoprazole  Injectable 40 milliGRAM(s) IV Push every 12 hours  senna 2 Tablet(s) Oral at bedtime    All other PRN medications:      Vital Signs Last 24 Hrs  T(C): 37.1 (04 Mar 2021 11:11), Max: 37.5 (04 Mar 2021 00:00)  T(F): 98.7 (04 Mar 2021 11:11), Max: 99.5 (04 Mar 2021 00:00)  HR: 92 (04 Mar 2021 17:00) (74 - 92)  BP: 162/88 (04 Mar 2021 17:00) (137/85 - 181/101)  BP(mean): --  RR: 18 (04 Mar 2021 11:11) (18 - 18)  SpO2: 100% (04 Mar 2021 11:11) (96% - 100%)      03-03 @ 07:01  -  03-04 @ 07:00  --------------------------------------------------------  IN:    Jevity 1.2: 495 mL  Total IN: 495 mL    OUT:    Indwelling Catheter - Urethral (mL): 250 mL    Voided (mL): 1000 mL  Total OUT: 1250 mL    Total NET: -755 mL      PHYSICAL EXAM:    ENT EXAM-   Constitutional: Well-developed, well-nourished.  + hoarseness.   NAD, awake/alert  Head:  normocephalic, atraumatic.   Nose:  NGT in place in right nares  OC/OP: Floor of mouth, buccal mucosa, lips, hard palate, soft palate, uvula, posterior pharyngeal wall normal.  Mucosa moist.  Neck:  Trachea midline.  Thyroid, parotid and submandibular glands normal.  Lymph:  No cervical adenopathy.      LABS:  CBC-                        12.0   9.06  )-----------( 302      ( 04 Mar 2021 07:06 )             36.3     BMP/CMP-  04 Mar 2021 07:06    135    |  99     |  18     ----------------------------<  112    5.2     |  25     |  0.7      Ca    8.8        04 Mar 2021 07:06  Phos  4.4       04 Mar 2021 07:06  Mg     2.1       04 Mar 2021 07:06      Coagulation Studies-    Endocrine Panel-  Calcium, Total Serum: 8.8 mg/dL (03-04 @ 07:06)

## 2021-03-04 NOTE — CONSULT NOTE ADULT - CONSULT REASON
VC dysfunction
ARF ICB
Refractory HTN
abdominal distension
CSF infection?
large IPH
mobility
patient found down
****Stroke code 8.10pm  Last known well time : 7pm

## 2021-03-04 NOTE — SWALLOW BEDSIDE ASSESSMENT ADULT - SWALLOW EVAL: DIAGNOSIS
PO trials not appropriate 2' pt w/ [place] sump, poor secretion management, and significant medical dx.
+suspected pharyngeal dysphagia for puree consistency

## 2021-03-04 NOTE — SWALLOW BEDSIDE ASSESSMENT ADULT - SLP GENERAL OBSERVATIONS
pt received in bed asleep arousable +generalized weakness +dysphonic/low vocal quality; +upper airway/chest congestion noted, suctioning performed by SLP at bedside w/ some improvement. pt ox self only, able to follow some simple 1-step commands. +holger randolph in place
pt received in bed asleep arousable w/o c/o pain. pt leaning towards L-side; +room air +NGT in place; +dysphonic vocal quality w/ increased secretion management

## 2021-03-05 LAB
ALBUMIN SERPL ELPH-MCNC: 3 G/DL — LOW (ref 3.5–5.2)
ALP SERPL-CCNC: 134 U/L — HIGH (ref 30–115)
ALT FLD-CCNC: 51 U/L — HIGH (ref 0–41)
ANION GAP SERPL CALC-SCNC: 13 MMOL/L — SIGNIFICANT CHANGE UP (ref 7–14)
AST SERPL-CCNC: 26 U/L — SIGNIFICANT CHANGE UP (ref 0–41)
BASOPHILS # BLD AUTO: 0.05 K/UL — SIGNIFICANT CHANGE UP (ref 0–0.2)
BASOPHILS NFR BLD AUTO: 0.6 % — SIGNIFICANT CHANGE UP (ref 0–1)
BILIRUB SERPL-MCNC: 0.7 MG/DL — SIGNIFICANT CHANGE UP (ref 0.2–1.2)
BUN SERPL-MCNC: 17 MG/DL — SIGNIFICANT CHANGE UP (ref 10–20)
CALCIUM SERPL-MCNC: 8.8 MG/DL — SIGNIFICANT CHANGE UP (ref 8.5–10.1)
CHLORIDE SERPL-SCNC: 99 MMOL/L — SIGNIFICANT CHANGE UP (ref 98–110)
CO2 SERPL-SCNC: 22 MMOL/L — SIGNIFICANT CHANGE UP (ref 17–32)
CREAT SERPL-MCNC: 0.7 MG/DL — SIGNIFICANT CHANGE UP (ref 0.7–1.5)
EOSINOPHIL # BLD AUTO: 0.54 K/UL — SIGNIFICANT CHANGE UP (ref 0–0.7)
EOSINOPHIL NFR BLD AUTO: 6.3 % — SIGNIFICANT CHANGE UP (ref 0–8)
GLUCOSE BLDC GLUCOMTR-MCNC: 117 MG/DL — HIGH (ref 70–99)
GLUCOSE SERPL-MCNC: 163 MG/DL — HIGH (ref 70–99)
HCT VFR BLD CALC: 36.4 % — LOW (ref 42–52)
HGB BLD-MCNC: 12 G/DL — LOW (ref 14–18)
IMM GRANULOCYTES NFR BLD AUTO: 2 % — HIGH (ref 0.1–0.3)
LYMPHOCYTES # BLD AUTO: 1.27 K/UL — SIGNIFICANT CHANGE UP (ref 1.2–3.4)
LYMPHOCYTES # BLD AUTO: 14.9 % — LOW (ref 20.5–51.1)
MAGNESIUM SERPL-MCNC: 2 MG/DL — SIGNIFICANT CHANGE UP (ref 1.8–2.4)
MCHC RBC-ENTMCNC: 30.9 PG — SIGNIFICANT CHANGE UP (ref 27–31)
MCHC RBC-ENTMCNC: 33 G/DL — SIGNIFICANT CHANGE UP (ref 32–37)
MCV RBC AUTO: 93.8 FL — SIGNIFICANT CHANGE UP (ref 80–94)
MONOCYTES # BLD AUTO: 0.89 K/UL — HIGH (ref 0.1–0.6)
MONOCYTES NFR BLD AUTO: 10.4 % — HIGH (ref 1.7–9.3)
NEUTROPHILS # BLD AUTO: 5.6 K/UL — SIGNIFICANT CHANGE UP (ref 1.4–6.5)
NEUTROPHILS NFR BLD AUTO: 65.8 % — SIGNIFICANT CHANGE UP (ref 42.2–75.2)
NRBC # BLD: 0 /100 WBCS — SIGNIFICANT CHANGE UP (ref 0–0)
PHOSPHATE SERPL-MCNC: 4.1 MG/DL — SIGNIFICANT CHANGE UP (ref 2.1–4.9)
PLATELET # BLD AUTO: 297 K/UL — SIGNIFICANT CHANGE UP (ref 130–400)
POTASSIUM SERPL-MCNC: 4.7 MMOL/L — SIGNIFICANT CHANGE UP (ref 3.5–5)
POTASSIUM SERPL-SCNC: 4.7 MMOL/L — SIGNIFICANT CHANGE UP (ref 3.5–5)
PROT SERPL-MCNC: 5.5 G/DL — LOW (ref 6–8)
RBC # BLD: 3.88 M/UL — LOW (ref 4.7–6.1)
RBC # FLD: 13.3 % — SIGNIFICANT CHANGE UP (ref 11.5–14.5)
SARS-COV-2 RNA SPEC QL NAA+PROBE: SIGNIFICANT CHANGE UP
SODIUM SERPL-SCNC: 134 MMOL/L — LOW (ref 135–146)
WBC # BLD: 8.52 K/UL — SIGNIFICANT CHANGE UP (ref 4.8–10.8)
WBC # FLD AUTO: 8.52 K/UL — SIGNIFICANT CHANGE UP (ref 4.8–10.8)

## 2021-03-05 PROCEDURE — 99222 1ST HOSP IP/OBS MODERATE 55: CPT

## 2021-03-05 PROCEDURE — 99233 SBSQ HOSP IP/OBS HIGH 50: CPT

## 2021-03-05 RX ADMIN — Medication 10 MILLILITER(S): at 05:17

## 2021-03-05 RX ADMIN — HEPARIN SODIUM 5000 UNIT(S): 5000 INJECTION INTRAVENOUS; SUBCUTANEOUS at 21:39

## 2021-03-05 RX ADMIN — Medication 133 MILLILITER(S): at 17:05

## 2021-03-05 RX ADMIN — SODIUM CHLORIDE 1 GRAM(S): 9 INJECTION INTRAMUSCULAR; INTRAVENOUS; SUBCUTANEOUS at 05:18

## 2021-03-05 RX ADMIN — Medication 10 MILLILITER(S): at 21:38

## 2021-03-05 RX ADMIN — PANTOPRAZOLE SODIUM 40 MILLIGRAM(S): 20 TABLET, DELAYED RELEASE ORAL at 05:19

## 2021-03-05 RX ADMIN — Medication 100 MILLIGRAM(S): at 21:38

## 2021-03-05 RX ADMIN — SENNA PLUS 2 TABLET(S): 8.6 TABLET ORAL at 21:38

## 2021-03-05 RX ADMIN — HEPARIN SODIUM 5000 UNIT(S): 5000 INJECTION INTRAVENOUS; SUBCUTANEOUS at 05:18

## 2021-03-05 RX ADMIN — CHLORHEXIDINE GLUCONATE 1 APPLICATION(S): 213 SOLUTION TOPICAL at 05:20

## 2021-03-05 RX ADMIN — Medication 100 MILLIGRAM(S): at 13:43

## 2021-03-05 RX ADMIN — Medication 5 MILLIGRAM(S): at 05:19

## 2021-03-05 RX ADMIN — LEVETIRACETAM 420 MILLIGRAM(S): 250 TABLET, FILM COATED ORAL at 17:11

## 2021-03-05 RX ADMIN — Medication 200 MILLIGRAM(S): at 05:17

## 2021-03-05 RX ADMIN — Medication 0.3 MILLIGRAM(S): at 05:16

## 2021-03-05 RX ADMIN — Medication 5 MILLIGRAM(S): at 17:13

## 2021-03-05 RX ADMIN — SODIUM CHLORIDE 1 GRAM(S): 9 INJECTION INTRAMUSCULAR; INTRAVENOUS; SUBCUTANEOUS at 21:38

## 2021-03-05 RX ADMIN — Medication 20 MILLIGRAM(S): at 23:40

## 2021-03-05 RX ADMIN — LISINOPRIL 40 MILLIGRAM(S): 2.5 TABLET ORAL at 05:16

## 2021-03-05 RX ADMIN — PANTOPRAZOLE SODIUM 40 MILLIGRAM(S): 20 TABLET, DELAYED RELEASE ORAL at 17:12

## 2021-03-05 RX ADMIN — Medication 20 MILLIGRAM(S): at 17:15

## 2021-03-05 RX ADMIN — HEPARIN SODIUM 5000 UNIT(S): 5000 INJECTION INTRAVENOUS; SUBCUTANEOUS at 13:42

## 2021-03-05 RX ADMIN — Medication 20 MILLIGRAM(S): at 11:50

## 2021-03-05 RX ADMIN — Medication 200 MILLIGRAM(S): at 23:39

## 2021-03-05 RX ADMIN — Medication 200 MILLIGRAM(S): at 17:10

## 2021-03-05 RX ADMIN — SODIUM CHLORIDE 1 GRAM(S): 9 INJECTION INTRAMUSCULAR; INTRAVENOUS; SUBCUTANEOUS at 13:42

## 2021-03-05 RX ADMIN — Medication 100 MILLIGRAM(S): at 05:17

## 2021-03-05 RX ADMIN — Medication 200 MILLIGRAM(S): at 11:50

## 2021-03-05 RX ADMIN — LEVETIRACETAM 420 MILLIGRAM(S): 250 TABLET, FILM COATED ORAL at 05:22

## 2021-03-05 RX ADMIN — Medication 10 MILLILITER(S): at 13:42

## 2021-03-05 NOTE — SPEECH LANGUAGE PATHOLOGY EVALUATION - COMMENTS
Pt presents w/ aphonia, SLP provided verbal cues to speak louder, however, not much improvement noted w/ loudness and vocal quality.

## 2021-03-05 NOTE — SPEECH LANGUAGE PATHOLOGY EVALUATION - SLP AUTOMATIC SPEECH
Pt presented w/ difficulty w/ days of the week/months of the year/counting intact for counting; required verbal cueing for months of year and days of week/impaired/days of the week/months of the year

## 2021-03-05 NOTE — SPEECH LANGUAGE PATHOLOGY EVALUATION - SLP PERTINENT HISTORY OF CURRENT PROBLEM
Pt is a 51 y/o M w/ PMHx: HTN, GI bleed, PUD, BIBEMS for aphasia and L-sided weakness. +stroke code, NIHSS 24; CTH-> Moderate to large intraparenchymal right basal ganglia hemorrhage with mild surrounding edema and associated right sulcal effacement as well as decompression into the ventricles. There is approximately 6.3 mm of midline shift. Pt became unresponsive in CT scan and had to be intubated and sedated. EVD placed by neurosx at bedside. Course further c/b Multilobar bacterial PNA. EVD removed 2/15. Repeat CTH 3/2-> Interval development of 0.6 cm acute hemorrhage in the right subependymal region just lateral to the right frontal intraparenchymal hematoma. Pt s/p extubation 2/23 Pt seen for speech and language eval today. pt is a 49 y/o M w/ PMHx: HTN, GI bleed, PUD, BIBEMS for aphasia and L-sided weakness. +stroke code, NIHSS 24; CTH-> Moderate to large intraparenchymal right basal ganglia hemorrhage with mild surrounding edema and associated right sulcal effacement as well as decompression into the ventricles. There is approximately 6.3 mm of midline shift. Pt became unresponsive in CT scan and had to be intubated and sedated. EVD placed by neurosx at bedside. Course further c/b VAP w/ klebsiella. EVD removed 2/15. Repeat CTH 3/2-> Interval development of 0.6 cm acute hemorrhage in the right subependymal region just lateral to the right frontal intraparenchymal hematoma. Pt s/p extubation 2/23. ENT c/s for hoarseness - likely 2' prolonged intubation and stroke.

## 2021-03-05 NOTE — PROGRESS NOTE ADULT - ASSESSMENT
50/M with HTN, GIB, PUD, with large intraparenchymal right basal ganglia hemorrhage with mild surrounding edema as well as 6.3 mm of midline shift. S/p EVD removal. BP is controlled on labetalol.      # large ICB secondary to Hypertensive Emergency:  - s/p EVD removal 2/15.  - intubated on admission and then extubated on 2/23   - Neurology and neurosurgery following  - Neuro checks q 2 hour  - Continue Keppra 500mg IV q12hrs  - BP better controlled today, c/w labetalol 200 mg Q6H, c/w clonodine 0.3 Q8H, hydralazine 100mg Q8H, lisinoprol 40 mg QD, nifedipine IR- 20 mg Q6H   - c/w NPO w/ NGT  - pt requires PEG tube. Wife has not made decision yet if want to proceed with PEG   - ENT consult appreciated  - c/w Physiatry, PT, OOB to chair    # Resistant Hypertension, on multiple medications  - c/w on labetalol 200 mg Q6H, c/w clonodine 0.3 Q8H, hydralazine 100mg Q8H, lisinoprol 40 mg QD, nifedipine IR- 20 mg Q6H   - w/u for secondary HTN - renin and amarilis low  - RAD neg for LIOR, catecholamines in urine wnl  - plasma catecholamines and metanephrines, aldosterone WNL  - Maintain normonatremia    #Left posterior tibial & peroneal DVT  - Distal DVT.   - On heparin 5000 sc q8 for DVT ppx.  - no hemodynamic instability.      # bowel regimen  - Continue Senna 2 tablets PO q24hrs HS  - Continue fleet enema q24hrs    Heparin sc for dvt ppx.   GI ppx: Protonix 40mg IV q12hrs  Code : full code  CHG    [facility]  [facility]

## 2021-03-05 NOTE — SPEECH LANGUAGE PATHOLOGY EVALUATION - SLP DIAGNOSIS
Speech and language assessment administered; mild-moderate receptive/expressive language deficits moderate-severe receptive/expressive language deficits negatively impacted by cognitive-linguistic deficits

## 2021-03-05 NOTE — PROGRESS NOTE ADULT - ATTENDING COMMENTS
Patient seen today with medical resident Dr. Taylor.    I was physically present for the key portions of the evaluation and management (E/M) service provided.  I agree with the above history, physical, and plan with the following additions/modifications     BP better controlled, pending transfer to [facility] per wife's request.  Needs speech and swallow eval, will need PEG    Thomas Koroma MD  Attending Neurointensivist

## 2021-03-05 NOTE — PROGRESS NOTE ADULT - SUBJECTIVE AND OBJECTIVE BOX
SUBJECTIVE:    Patient is a 50y old Male who presents with a chief complaint of Altered mental status (04 Mar 2021 17:48)    Currently admitted to medicine with the primary diagnosis of Intracranial bleed  Today is hospital day 36d. This morning he is resting in bed. No overnight events.   Pt pulled NG tube, which was replaced at night. BP is well controlled.    PAST MEDICAL & SURGICAL HISTORY  HTN (hypertension)    GI bleed    Gastric ulcer    PUD (peptic ulcer disease)    Arm fracture, left  s/p surgical repair    ALLERGIES:  No Known Allergies    MEDICATIONS:  STANDING MEDICATIONS  chlorhexidine 4% Liquid 1 Application(s) Topical <User Schedule>  cloNIDine 0.3 milliGRAM(s) Oral every 8 hours  guaifenesin/dextromethorphan  Syrup 10 milliLiter(s) Oral every 8 hours  heparin   Injectable 5000 Unit(s) SubCutaneous every 8 hours  hydrALAZINE 100 milliGRAM(s) Oral every 8 hours  labetalol 200 milliGRAM(s) Oral every 6 hours  levETIRAcetam  IVPB 500 milliGRAM(s) IV Intermittent every 12 hours  lisinopril 40 milliGRAM(s) Oral daily  metoclopramide 5 milliGRAM(s) Oral two times a day  mineral oil enema 133 milliLiter(s) Rectal daily  niCARdipine Infusion 15 mG/Hr IV Continuous <Continuous>  NIFEdipine IR 20 milliGRAM(s) Oral every 6 hours  pantoprazole  Injectable 40 milliGRAM(s) IV Push every 12 hours  senna 2 Tablet(s) Oral at bedtime  sodium chloride 1 Gram(s) Oral every 8 hours    PRN MEDICATIONS  acetaminophen   Tablet .. 650 milliGRAM(s) Oral every 6 hours PRN    VITALS:   T(F): 99.1  HR: 82  BP: 139/89  RR: 18  SpO2: 100%    LABS:                        12.0   8.52  )-----------( 297      ( 05 Mar 2021 06:49 )             36.4     03-05    134<L>  |  99  |  17  ----------------------------<  163<H>  4.7   |  22  |  0.7    Ca    8.8      05 Mar 2021 06:49  Phos  4.1     03-05  Mg     2.0     03-05    TPro  5.5<L>  /  Alb  3.0<L>  /  TBili  0.7  /  DBili  x   /  AST  26  /  ALT  51<H>  /  AlkPhos  134<H>  03-05      RADIOLOGY:   from: CT Head No Cont (03.02.21 @ 12:38) >  IMPRESSION:    Redemonstration of evolving right frontal intraparenchymal hematoma with slightly decreased moderate surrounding edema extending to the right basal ganglia and right frontal operculum. Stable to minimally decreased leftward midline shift measuring 0.4 cm (previously 0.6 cm).    Interval development of 0.6 cm acute hemorrhage in the right subependymal region just lateral to the right frontal intraparenchymal hematoma.    No acute territorial infarct, hydrocephalus, or extra-axial fluid collection.      < end of copied text >  < from: Xray Chest 1 View- PORTABLE-Urgent (Xray Chest 1 View- PORTABLE-Urgent .) (03.03.21 @ 22:21) >  IMPRESSION:    NG tube terminating over the left upper quadrant.    Stable cardiomediastinal silhouette. Unchanged lungs. Unchanged osseous structures.    < end of copied text >  < from: [facility] Duplex Lower Ext Vein Scan, Bilat (03.01.21 @ 18:07) >  Impression:    No evidence of deep venous thrombosis or superficial thrombophlebitis in the right lower extremity  Same left posterior tibial vein branch DVT as before    < end of copied text >      PHYSICAL EXAM:  GEN: No acute distress  LUNGS: Clear to auscultation b/l, no wheezes  HEART: Regular rate and rhythm, +s1 s2  ABD: Soft, non-tender, non-distended.  EXT: LE edema   NEURO: Awake, alert, responds to commands, LUE weakness. RUE strength is 4/5

## 2021-03-05 NOTE — SPEECH LANGUAGE PATHOLOGY EVALUATION - SLP SUCCESSFUL AUDITORY STRATEGIES
repetition/elimination of environmental distractions repetition/verbal cues/elimination of environmental distractions

## 2021-03-06 LAB
ALBUMIN SERPL ELPH-MCNC: 3.4 G/DL — LOW (ref 3.5–5.2)
ALP SERPL-CCNC: 132 U/L — HIGH (ref 30–115)
ALT FLD-CCNC: 48 U/L — HIGH (ref 0–41)
ANION GAP SERPL CALC-SCNC: 9 MMOL/L — SIGNIFICANT CHANGE UP (ref 7–14)
AST SERPL-CCNC: 22 U/L — SIGNIFICANT CHANGE UP (ref 0–41)
BASOPHILS # BLD AUTO: 0.07 K/UL — SIGNIFICANT CHANGE UP (ref 0–0.2)
BASOPHILS NFR BLD AUTO: 0.8 % — SIGNIFICANT CHANGE UP (ref 0–1)
BILIRUB SERPL-MCNC: 0.6 MG/DL — SIGNIFICANT CHANGE UP (ref 0.2–1.2)
BUN SERPL-MCNC: 17 MG/DL — SIGNIFICANT CHANGE UP (ref 10–20)
CALCIUM SERPL-MCNC: 8.9 MG/DL — SIGNIFICANT CHANGE UP (ref 8.5–10.1)
CHLORIDE SERPL-SCNC: 100 MMOL/L — SIGNIFICANT CHANGE UP (ref 98–110)
CO2 SERPL-SCNC: 24 MMOL/L — SIGNIFICANT CHANGE UP (ref 17–32)
CREAT SERPL-MCNC: 0.6 MG/DL — LOW (ref 0.7–1.5)
EOSINOPHIL # BLD AUTO: 0.5 K/UL — SIGNIFICANT CHANGE UP (ref 0–0.7)
EOSINOPHIL NFR BLD AUTO: 5.5 % — SIGNIFICANT CHANGE UP (ref 0–8)
GLUCOSE BLDC GLUCOMTR-MCNC: 101 MG/DL — HIGH (ref 70–99)
GLUCOSE BLDC GLUCOMTR-MCNC: 113 MG/DL — HIGH (ref 70–99)
GLUCOSE BLDC GLUCOMTR-MCNC: 141 MG/DL — HIGH (ref 70–99)
GLUCOSE BLDC GLUCOMTR-MCNC: 146 MG/DL — HIGH (ref 70–99)
GLUCOSE BLDC GLUCOMTR-MCNC: 93 MG/DL — SIGNIFICANT CHANGE UP (ref 70–99)
GLUCOSE SERPL-MCNC: 111 MG/DL — HIGH (ref 70–99)
HCT VFR BLD CALC: 36.2 % — LOW (ref 42–52)
HGB BLD-MCNC: 11.9 G/DL — LOW (ref 14–18)
IMM GRANULOCYTES NFR BLD AUTO: 2.2 % — HIGH (ref 0.1–0.3)
LYMPHOCYTES # BLD AUTO: 1.12 K/UL — LOW (ref 1.2–3.4)
LYMPHOCYTES # BLD AUTO: 12.2 % — LOW (ref 20.5–51.1)
MAGNESIUM SERPL-MCNC: 2.1 MG/DL — SIGNIFICANT CHANGE UP (ref 1.8–2.4)
MCHC RBC-ENTMCNC: 30.2 PG — SIGNIFICANT CHANGE UP (ref 27–31)
MCHC RBC-ENTMCNC: 32.9 G/DL — SIGNIFICANT CHANGE UP (ref 32–37)
MCV RBC AUTO: 91.9 FL — SIGNIFICANT CHANGE UP (ref 80–94)
MONOCYTES # BLD AUTO: 1.05 K/UL — HIGH (ref 0.1–0.6)
MONOCYTES NFR BLD AUTO: 11.5 % — HIGH (ref 1.7–9.3)
NEUTROPHILS # BLD AUTO: 6.23 K/UL — SIGNIFICANT CHANGE UP (ref 1.4–6.5)
NEUTROPHILS NFR BLD AUTO: 67.8 % — SIGNIFICANT CHANGE UP (ref 42.2–75.2)
NRBC # BLD: 0 /100 WBCS — SIGNIFICANT CHANGE UP (ref 0–0)
PHOSPHATE SERPL-MCNC: 3.9 MG/DL — SIGNIFICANT CHANGE UP (ref 2.1–4.9)
PLATELET # BLD AUTO: 320 K/UL — SIGNIFICANT CHANGE UP (ref 130–400)
POTASSIUM SERPL-MCNC: 4.8 MMOL/L — SIGNIFICANT CHANGE UP (ref 3.5–5)
POTASSIUM SERPL-SCNC: 4.8 MMOL/L — SIGNIFICANT CHANGE UP (ref 3.5–5)
PROT SERPL-MCNC: 5.9 G/DL — LOW (ref 6–8)
RBC # BLD: 3.94 M/UL — LOW (ref 4.7–6.1)
RBC # FLD: 13.2 % — SIGNIFICANT CHANGE UP (ref 11.5–14.5)
SODIUM SERPL-SCNC: 133 MMOL/L — LOW (ref 135–146)
WBC # BLD: 9.17 K/UL — SIGNIFICANT CHANGE UP (ref 4.8–10.8)
WBC # FLD AUTO: 9.17 K/UL — SIGNIFICANT CHANGE UP (ref 4.8–10.8)

## 2021-03-06 PROCEDURE — 99232 SBSQ HOSP IP/OBS MODERATE 35: CPT

## 2021-03-06 RX ADMIN — LISINOPRIL 40 MILLIGRAM(S): 2.5 TABLET ORAL at 05:10

## 2021-03-06 RX ADMIN — CHLORHEXIDINE GLUCONATE 1 APPLICATION(S): 213 SOLUTION TOPICAL at 05:12

## 2021-03-06 RX ADMIN — Medication 100 MILLIGRAM(S): at 14:04

## 2021-03-06 RX ADMIN — PANTOPRAZOLE SODIUM 40 MILLIGRAM(S): 20 TABLET, DELAYED RELEASE ORAL at 05:11

## 2021-03-06 RX ADMIN — Medication 20 MILLIGRAM(S): at 05:06

## 2021-03-06 RX ADMIN — Medication 100 MILLIGRAM(S): at 21:32

## 2021-03-06 RX ADMIN — PANTOPRAZOLE SODIUM 40 MILLIGRAM(S): 20 TABLET, DELAYED RELEASE ORAL at 18:09

## 2021-03-06 RX ADMIN — SODIUM CHLORIDE 1 GRAM(S): 9 INJECTION INTRAMUSCULAR; INTRAVENOUS; SUBCUTANEOUS at 14:04

## 2021-03-06 RX ADMIN — Medication 0.3 MILLIGRAM(S): at 21:32

## 2021-03-06 RX ADMIN — Medication 200 MILLIGRAM(S): at 05:07

## 2021-03-06 RX ADMIN — SENNA PLUS 2 TABLET(S): 8.6 TABLET ORAL at 21:32

## 2021-03-06 RX ADMIN — Medication 5 MILLIGRAM(S): at 05:10

## 2021-03-06 RX ADMIN — SODIUM CHLORIDE 1 GRAM(S): 9 INJECTION INTRAMUSCULAR; INTRAVENOUS; SUBCUTANEOUS at 05:06

## 2021-03-06 RX ADMIN — Medication 10 MILLILITER(S): at 21:33

## 2021-03-06 RX ADMIN — Medication 20 MILLIGRAM(S): at 23:26

## 2021-03-06 RX ADMIN — LEVETIRACETAM 420 MILLIGRAM(S): 250 TABLET, FILM COATED ORAL at 05:07

## 2021-03-06 RX ADMIN — Medication 5 MILLIGRAM(S): at 18:09

## 2021-03-06 RX ADMIN — Medication 100 MILLIGRAM(S): at 05:06

## 2021-03-06 RX ADMIN — SODIUM CHLORIDE 1 GRAM(S): 9 INJECTION INTRAMUSCULAR; INTRAVENOUS; SUBCUTANEOUS at 21:32

## 2021-03-06 RX ADMIN — Medication 20 MILLIGRAM(S): at 12:59

## 2021-03-06 RX ADMIN — Medication 10 MILLILITER(S): at 14:04

## 2021-03-06 RX ADMIN — HEPARIN SODIUM 5000 UNIT(S): 5000 INJECTION INTRAVENOUS; SUBCUTANEOUS at 14:36

## 2021-03-06 RX ADMIN — Medication 200 MILLIGRAM(S): at 12:59

## 2021-03-06 RX ADMIN — HEPARIN SODIUM 5000 UNIT(S): 5000 INJECTION INTRAVENOUS; SUBCUTANEOUS at 05:06

## 2021-03-06 RX ADMIN — Medication 0.3 MILLIGRAM(S): at 14:04

## 2021-03-06 RX ADMIN — LEVETIRACETAM 420 MILLIGRAM(S): 250 TABLET, FILM COATED ORAL at 18:08

## 2021-03-06 RX ADMIN — HEPARIN SODIUM 5000 UNIT(S): 5000 INJECTION INTRAVENOUS; SUBCUTANEOUS at 21:33

## 2021-03-06 RX ADMIN — Medication 10 MILLILITER(S): at 05:06

## 2021-03-06 RX ADMIN — Medication 200 MILLIGRAM(S): at 23:26

## 2021-03-06 NOTE — PROGRESS NOTE ADULT - SUBJECTIVE AND OBJECTIVE BOX
SUBJECTIVE:    Patient is a 50y old Male who presents with a chief complaint of Altered mental status (05 Mar 2021 15:40)    Currently admitted to medicine with the primary diagnosis of Intracranial bleed    Today is hospital day 37d. This morning he is resting comfortably in bed. No overnight events.     PAST MEDICAL & SURGICAL HISTORY  HTN (hypertension)    GI bleed    Gastric ulcer    PUD (peptic ulcer disease)    Arm fracture, left  s/p surgical repair    ALLERGIES:  No Known Allergies    MEDICATIONS:  STANDING MEDICATIONS  chlorhexidine 4% Liquid 1 Application(s) Topical <User Schedule>  cloNIDine 0.3 milliGRAM(s) Oral every 8 hours  guaifenesin/dextromethorphan  Syrup 10 milliLiter(s) Oral every 8 hours  heparin   Injectable 5000 Unit(s) SubCutaneous every 8 hours  hydrALAZINE 100 milliGRAM(s) Oral every 8 hours  labetalol 200 milliGRAM(s) Oral every 6 hours  levETIRAcetam  IVPB 500 milliGRAM(s) IV Intermittent every 12 hours  lisinopril 40 milliGRAM(s) Oral daily  metoclopramide 5 milliGRAM(s) Oral two times a day  mineral oil enema 133 milliLiter(s) Rectal daily  niCARdipine Infusion 15 mG/Hr IV Continuous <Continuous>  NIFEdipine IR 20 milliGRAM(s) Oral every 6 hours  pantoprazole  Injectable 40 milliGRAM(s) IV Push every 12 hours  senna 2 Tablet(s) Oral at bedtime  sodium chloride 1 Gram(s) Oral every 8 hours    PRN MEDICATIONS  acetaminophen   Tablet .. 650 milliGRAM(s) Oral every 6 hours PRN    VITALS:   T(F): 99.4  HR: 76  BP: 152/86  RR: 18  SpO2: 98%    LABS:                        12.0   8.52  )-----------( 297      ( 05 Mar 2021 06:49 )             36.4     03-05    134<L>  |  99  |  17  ----------------------------<  163<H>  4.7   |  22  |  0.7    Ca    8.8      05 Mar 2021 06:49  Phos  4.1     03-05  Mg     2.0     03-05    TPro  5.5<L>  /  Alb  3.0<L>  /  TBili  0.7  /  DBili  x   /  AST  26  /  ALT  51<H>  /  AlkPhos  134<H>  03-05      RADIOLOGY:  rom: CT Head No Cont (03.02.21 @ 12:38) >  IMPRESSION:    Redemonstration of evolving right frontal intraparenchymal hematoma with slightly decreased moderate surrounding edema extending to the right basal ganglia and right frontal operculum. Stable to minimally decreased leftward midline shift measuring 0.4 cm (previously 0.6 cm).    Interval development of 0.6 cm acute hemorrhage in the right subependymal region just lateral to the right frontal intraparenchymal hematoma.    No acute territorial infarct, hydrocephalus, or extra-axial fluid collection.      < end of copied text >  < from: Xray Chest 1 View- PORTABLE-Urgent (Xray Chest 1 View- PORTABLE-Urgent .) (03.03.21 @ 22:21) >  IMPRESSION:    NG tube terminating over the left upper quadrant.    Stable cardiomediastinal silhouette. Unchanged lungs. Unchanged osseous structures.    < end of copied text >  < from: [facility] Duplex Lower Ext Vein Scan, Bilat (03.01.21 @ 18:07) >  Impression:    No evidence of deep venous thrombosis or superficial thrombophlebitis in the right lower extremity  Same left posterior tibial vein branch DVT as before    < end of copied text >      PHYSICAL EXAM:  GEN: No acute distress  LUNGS: Clear to auscultation b/l, no wheezes  HEART: Regular rate and rhythm, +s1 s2  ABD: Soft, non-tender, non-distended. +BS  EXT: LE edema   NEURO: Awake, alert, oriented, responds to commands, LUE weakness. RUE strength is 4/5

## 2021-03-06 NOTE — PROGRESS NOTE ADULT - ASSESSMENT
50/M with HTN, GIB, PUD, with large intraparenchymal right basal ganglia hemorrhage with mild surrounding edema as well as 6.3 mm of midline shift. S/p EVD removal. BP is controlled on labetalol.      # large ICB secondary to Hypertensive Emergency:  - s/p EVD removal 2/15.  - intubated on admission and then extubated on 2/23   - Neurology and neurosurgery following  - Neuro checks q 4 hour  - Continue Keppra 500mg IV q12hrs  - BP better controlled today, c/w labetalol 200 mg Q6H, c/w clonodine 0.3 Q8H, hydralazine 100mg Q8H, lisinoprol 40 mg QD, nifedipine IR- 20 mg Q6H   - c/w NPO w/ NGT  - pt requires PEG tube. Wife has not made decision yet if want to proceed with PEG   - ENT consult appreciated  - c/w Physiatry, PT, OOB to chair    # Resistant Hypertension, on multiple medications  - c/w on labetalol 200 mg Q6H, c/w clonodine 0.3 Q8H, hydralazine 100mg Q8H, lisinoprol 40 mg QD, nifedipine IR- 20 mg Q6H   - w/u for secondary HTN - renin and amarilis low,  RAD neg for LIOR, catecholamines in urine wnl  - plasma catecholamines and metanephrines, aldosterone WNL  - Maintain normonatremia    #Left posterior tibial & peroneal DVT  - Distal DVT.   - On heparin 5000 sc q8 for DVT ppx.  - no hemodynamic instability.      # bowel regimen  - Continue Senna 2 tablets PO q24hrs HS  - Continue fleet enema q24hrs    Heparin sc for dvt ppx.   GI ppx: Protonix 40mg IV q12hrs  Code : full code  CHG    Pending:  [facility]

## 2021-03-07 LAB
ALBUMIN SERPL ELPH-MCNC: 3.5 G/DL — SIGNIFICANT CHANGE UP (ref 3.5–5.2)
ALP SERPL-CCNC: 142 U/L — HIGH (ref 30–115)
ALT FLD-CCNC: 45 U/L — HIGH (ref 0–41)
ANION GAP SERPL CALC-SCNC: 11 MMOL/L — SIGNIFICANT CHANGE UP (ref 7–14)
AST SERPL-CCNC: 21 U/L — SIGNIFICANT CHANGE UP (ref 0–41)
BASOPHILS # BLD AUTO: 0.07 K/UL — SIGNIFICANT CHANGE UP (ref 0–0.2)
BASOPHILS NFR BLD AUTO: 0.7 % — SIGNIFICANT CHANGE UP (ref 0–1)
BILIRUB SERPL-MCNC: 0.7 MG/DL — SIGNIFICANT CHANGE UP (ref 0.2–1.2)
BUN SERPL-MCNC: 16 MG/DL — SIGNIFICANT CHANGE UP (ref 10–20)
CALCIUM SERPL-MCNC: 9.2 MG/DL — SIGNIFICANT CHANGE UP (ref 8.5–10.1)
CHLORIDE SERPL-SCNC: 98 MMOL/L — SIGNIFICANT CHANGE UP (ref 98–110)
CO2 SERPL-SCNC: 24 MMOL/L — SIGNIFICANT CHANGE UP (ref 17–32)
CREAT SERPL-MCNC: 0.6 MG/DL — LOW (ref 0.7–1.5)
EOSINOPHIL # BLD AUTO: 0.62 K/UL — SIGNIFICANT CHANGE UP (ref 0–0.7)
EOSINOPHIL NFR BLD AUTO: 6.1 % — SIGNIFICANT CHANGE UP (ref 0–8)
GLUCOSE BLDC GLUCOMTR-MCNC: 113 MG/DL — HIGH (ref 70–99)
GLUCOSE BLDC GLUCOMTR-MCNC: 113 MG/DL — HIGH (ref 70–99)
GLUCOSE BLDC GLUCOMTR-MCNC: 157 MG/DL — HIGH (ref 70–99)
GLUCOSE BLDC GLUCOMTR-MCNC: 98 MG/DL — SIGNIFICANT CHANGE UP (ref 70–99)
GLUCOSE SERPL-MCNC: 109 MG/DL — HIGH (ref 70–99)
HCT VFR BLD CALC: 36.4 % — LOW (ref 42–52)
HGB BLD-MCNC: 12.3 G/DL — LOW (ref 14–18)
IMM GRANULOCYTES NFR BLD AUTO: 1.9 % — HIGH (ref 0.1–0.3)
LYMPHOCYTES # BLD AUTO: 1.27 K/UL — SIGNIFICANT CHANGE UP (ref 1.2–3.4)
LYMPHOCYTES # BLD AUTO: 12.5 % — LOW (ref 20.5–51.1)
MAGNESIUM SERPL-MCNC: 2 MG/DL — SIGNIFICANT CHANGE UP (ref 1.8–2.4)
MCHC RBC-ENTMCNC: 31.1 PG — HIGH (ref 27–31)
MCHC RBC-ENTMCNC: 33.8 G/DL — SIGNIFICANT CHANGE UP (ref 32–37)
MCV RBC AUTO: 92.2 FL — SIGNIFICANT CHANGE UP (ref 80–94)
MONOCYTES # BLD AUTO: 0.83 K/UL — HIGH (ref 0.1–0.6)
MONOCYTES NFR BLD AUTO: 8.2 % — SIGNIFICANT CHANGE UP (ref 1.7–9.3)
NEUTROPHILS # BLD AUTO: 7.14 K/UL — HIGH (ref 1.4–6.5)
NEUTROPHILS NFR BLD AUTO: 70.6 % — SIGNIFICANT CHANGE UP (ref 42.2–75.2)
NRBC # BLD: 0 /100 WBCS — SIGNIFICANT CHANGE UP (ref 0–0)
PHOSPHATE SERPL-MCNC: 3.5 MG/DL — SIGNIFICANT CHANGE UP (ref 2.1–4.9)
PLATELET # BLD AUTO: 326 K/UL — SIGNIFICANT CHANGE UP (ref 130–400)
POTASSIUM SERPL-MCNC: 4.7 MMOL/L — SIGNIFICANT CHANGE UP (ref 3.5–5)
POTASSIUM SERPL-SCNC: 4.7 MMOL/L — SIGNIFICANT CHANGE UP (ref 3.5–5)
PROT SERPL-MCNC: 6.1 G/DL — SIGNIFICANT CHANGE UP (ref 6–8)
RBC # BLD: 3.95 M/UL — LOW (ref 4.7–6.1)
RBC # FLD: 13 % — SIGNIFICANT CHANGE UP (ref 11.5–14.5)
SODIUM SERPL-SCNC: 133 MMOL/L — LOW (ref 135–146)
WBC # BLD: 10.12 K/UL — SIGNIFICANT CHANGE UP (ref 4.8–10.8)
WBC # FLD AUTO: 10.12 K/UL — SIGNIFICANT CHANGE UP (ref 4.8–10.8)

## 2021-03-07 PROCEDURE — 73562 X-RAY EXAM OF KNEE 3: CPT | Mod: 26,RT

## 2021-03-07 PROCEDURE — 73610 X-RAY EXAM OF ANKLE: CPT | Mod: 26,LT

## 2021-03-07 RX ADMIN — Medication 200 MILLIGRAM(S): at 18:30

## 2021-03-07 RX ADMIN — Medication 100 MILLIGRAM(S): at 21:27

## 2021-03-07 RX ADMIN — LEVETIRACETAM 420 MILLIGRAM(S): 250 TABLET, FILM COATED ORAL at 06:38

## 2021-03-07 RX ADMIN — SODIUM CHLORIDE 1 GRAM(S): 9 INJECTION INTRAMUSCULAR; INTRAVENOUS; SUBCUTANEOUS at 21:28

## 2021-03-07 RX ADMIN — Medication 10 MILLILITER(S): at 21:26

## 2021-03-07 RX ADMIN — CHLORHEXIDINE GLUCONATE 1 APPLICATION(S): 213 SOLUTION TOPICAL at 06:37

## 2021-03-07 RX ADMIN — Medication 10 MILLILITER(S): at 06:37

## 2021-03-07 RX ADMIN — SODIUM CHLORIDE 1 GRAM(S): 9 INJECTION INTRAMUSCULAR; INTRAVENOUS; SUBCUTANEOUS at 05:55

## 2021-03-07 RX ADMIN — Medication 5 MILLIGRAM(S): at 05:55

## 2021-03-07 RX ADMIN — Medication 5 MILLIGRAM(S): at 18:30

## 2021-03-07 RX ADMIN — LISINOPRIL 40 MILLIGRAM(S): 2.5 TABLET ORAL at 06:38

## 2021-03-07 RX ADMIN — HEPARIN SODIUM 5000 UNIT(S): 5000 INJECTION INTRAVENOUS; SUBCUTANEOUS at 06:37

## 2021-03-07 RX ADMIN — Medication 100 MILLIGRAM(S): at 05:54

## 2021-03-07 RX ADMIN — SENNA PLUS 2 TABLET(S): 8.6 TABLET ORAL at 21:26

## 2021-03-07 RX ADMIN — Medication 20 MILLIGRAM(S): at 18:31

## 2021-03-07 RX ADMIN — Medication 20 MILLIGRAM(S): at 23:09

## 2021-03-07 RX ADMIN — Medication 200 MILLIGRAM(S): at 23:09

## 2021-03-07 RX ADMIN — Medication 200 MILLIGRAM(S): at 05:54

## 2021-03-07 RX ADMIN — PANTOPRAZOLE SODIUM 40 MILLIGRAM(S): 20 TABLET, DELAYED RELEASE ORAL at 06:38

## 2021-03-07 RX ADMIN — HEPARIN SODIUM 5000 UNIT(S): 5000 INJECTION INTRAVENOUS; SUBCUTANEOUS at 21:27

## 2021-03-07 RX ADMIN — LEVETIRACETAM 420 MILLIGRAM(S): 250 TABLET, FILM COATED ORAL at 18:30

## 2021-03-07 RX ADMIN — Medication 20 MILLIGRAM(S): at 06:38

## 2021-03-07 RX ADMIN — Medication 10 MILLILITER(S): at 14:49

## 2021-03-07 RX ADMIN — PANTOPRAZOLE SODIUM 40 MILLIGRAM(S): 20 TABLET, DELAYED RELEASE ORAL at 18:30

## 2021-03-07 RX ADMIN — Medication 0.3 MILLIGRAM(S): at 21:26

## 2021-03-07 RX ADMIN — SODIUM CHLORIDE 1 GRAM(S): 9 INJECTION INTRAMUSCULAR; INTRAVENOUS; SUBCUTANEOUS at 14:18

## 2021-03-07 RX ADMIN — HEPARIN SODIUM 5000 UNIT(S): 5000 INJECTION INTRAVENOUS; SUBCUTANEOUS at 14:49

## 2021-03-07 RX ADMIN — Medication 200 MILLIGRAM(S): at 13:03

## 2021-03-07 RX ADMIN — Medication 0.3 MILLIGRAM(S): at 05:54

## 2021-03-07 NOTE — PROGRESS NOTE ADULT - ATTENDING COMMENTS
Patient examined afternoon of 3/6/21.  BP better controlled.  Neurologic exam stable.  Will need to be NPO after midnight 3/7 for PEG.  If BP's remain well controlled on current regimen may move out of stroke unit.

## 2021-03-07 NOTE — PROGRESS NOTE ADULT - SUBJECTIVE AND OBJECTIVE BOX
Stroke Progress Note: patient is examined at the bedside, he continues to have coarse voice, intermittently confused and agitated.    MELODIE HERNADEZ    1. Chief Complaint: LHP, dysarthria    HPI:  50 years old right handed Male with PMHx of HTN, GI bleed, PUD,  Mrankin score of 0 at baseline was brought in by EMS for  AMS and left sided weakness.   Wife states that patient was having a usual day today went to bathroom and then the next thing she noted was that he was confused and was lying on the bathroom floor, she reports urinary incontinence, patient was confused, had trouble speaking, and could not move his left side.     In the ED, /148, HR 80, saturating well. Code stroke called, NIHSS 24 and GCS 11, CTH showed Moderate to large intraparenchymal right basal ganglia hemorrhage with mild surrounding edema and associated right sulcal effacement as well as decompression into the ventricles. There is approximately 6.3 mm of midline shift. CTA did not show any evidence of active bleeding, AVM or aneurysm or major vascular stenosis or occlusion. Pt became unresponsive in CT scan and had to be intubated and sedated. Patient was intubated in ED for worsening mental status and GCS. Started on Nicardipine drip for sbp in 200s and EVD was placed by neurosurgery at bedside. Pan trauma scan was negative. To be admitted under ICU for further monitoring.  (2021 23:13)      2. Relevant PMH:   Prior ischemic stroke/TIA[ ], Afib [ ], CAD [ ], HTN [ ], DLD [ ], DM [ ], PVD [ ], Obesity [ ],   Sedentary lifestyle [ ], CHF [ ], KANG [ ], Cancer Hx [ ].    3. Social History: Smoking [ ], Drug Use [ ], Alcohol Use [ ], Other [ ]    4. Possible Location of Stroke:    5. Relevant Brain Tissue Imagin. Relevant Cerebrovascular Imagin. Relevant blood tests:      8. Relevant cardiac rhythm monitorin. Relevant Cardiac Structure: (TTE/VANESSA +/-):[ ]No intracardiac thrombus/[ ] no vegetation/[ ]no akynesia/EF:    Home Medications:      MEDICATIONS  (STANDING):  chlorhexidine 4% Liquid 1 Application(s) Topical <User Schedule>  cloNIDine 0.3 milliGRAM(s) Oral every 8 hours  guaifenesin/dextromethorphan  Syrup 10 milliLiter(s) Oral every 8 hours  heparin   Injectable 5000 Unit(s) SubCutaneous every 8 hours  hydrALAZINE 100 milliGRAM(s) Oral every 8 hours  labetalol 200 milliGRAM(s) Oral every 6 hours  levETIRAcetam  IVPB 500 milliGRAM(s) IV Intermittent every 12 hours  lisinopril 40 milliGRAM(s) Oral daily  metoclopramide 5 milliGRAM(s) Oral two times a day  mineral oil enema 133 milliLiter(s) Rectal daily  niCARdipine Infusion 15 mG/Hr (75 mL/Hr) IV Continuous <Continuous>  NIFEdipine IR 20 milliGRAM(s) Oral every 6 hours  pantoprazole  Injectable 40 milliGRAM(s) IV Push every 12 hours  senna 2 Tablet(s) Oral at bedtime  sodium chloride 1 Gram(s) Oral every 8 hours      10. PT/OT/Speech/Rehab/S&Sw/ Cognitive eval results and recommendations:    11. Exam:    Vital Signs Last 24 Hrs  T(C): 36.3 (07 Mar 2021 02:00), Max: 37 (06 Mar 2021 14:00)  T(F): 97.3 (07 Mar 2021 02:00), Max: 98.6 (06 Mar 2021 14:00)  HR: 93 (07 Mar 2021 02:00) (68 - 93)  BP: 168/68 (07 Mar 2021 02:00) (104/58 - 184/68)  BP(mean): 91 (06 Mar 2021 21:15) (71 - 112)  RR: 18 (06 Mar 2021 21:15) (16 - 19)  SpO2: 97% (06 Mar 2021 21:15) (97% - 99%)    12.   NIH STROKE SCALE  Item	                                                        Score  1 a.	Level of Consciousness	               	0  1 b. LOC Questions	                                0  1 c.	LOC Commands	                               	0  2.	Best Gaze	                                        0  3.	Visual	                                                0  4.	Facial Palsy	                                        0  5 a.	Motor Arm - Left	                                4  5 b.	Motor Arm - Right	                        0  6 a.	Motor Leg - Left	                                4  6 b.	Motor Leg - Right	                                3  7.	Limb Ataxia	                                        0  8.	Sensory	                                                0  9.	Language	                                        1  10.	Dysarthria	                                        2  11.	Extinction and Inattention  	        0  ______________________________________  TOTAL	                                                        0    Total NIHSS on admission:      NIHSS yesterday:      NIHSS today: 14    mRS:  0 No symptoms at all  1 No significant disability despite symptoms; able to carry out all usual duties and activities without assistance  2 Slight disability; unable to carry out all previous activities, but able to look after own affairs  3 Moderate disability; requiring some help, but able to walk without assistance  4 Moderately severe disability; unable to walk without assistance and unable to attend to own bodily needs without assistance  5 Severe disability; bedridden, incontinent and requiring constant nursing care and attention  6 Dead      13. Impression:50/M with HTN, GIB, PUD, with large intraparenchymal right basal ganglia hemorrhage with mild surrounding edema as well as 6.3 mm of midline shift. S/p EVD removal. BP is controlled on labetalol.      Plan:  - Neuro checks q 4 hour  - Continue Keppra 500mg IV q12hrs  - BP better controlled today, c/w labetalol 200 mg Q6H, c/w clonodine 0.3 Q8H, hydralazine 100mg Q8H, lisinoprol 40 mg QD, nifedipine IR- 20 mg Q6H   - c/w NPO w/ NGT  - pt requires PEG tube.   - c/w Physiatry, PT, OOB to chair

## 2021-03-08 ENCOUNTER — TRANSCRIPTION ENCOUNTER (OUTPATIENT)
Age: 51
End: 2021-03-08

## 2021-03-08 VITALS
SYSTOLIC BLOOD PRESSURE: 112 MMHG | DIASTOLIC BLOOD PRESSURE: 65 MMHG | OXYGEN SATURATION: 98 % | TEMPERATURE: 99 F | RESPIRATION RATE: 18 BRPM | HEART RATE: 75 BPM

## 2021-03-08 LAB
ALBUMIN SERPL ELPH-MCNC: 3.1 G/DL — LOW (ref 3.5–5.2)
ALP SERPL-CCNC: 127 U/L — HIGH (ref 30–115)
ALT FLD-CCNC: 38 U/L — SIGNIFICANT CHANGE UP (ref 0–41)
ANION GAP SERPL CALC-SCNC: 13 MMOL/L — SIGNIFICANT CHANGE UP (ref 7–14)
AST SERPL-CCNC: 19 U/L — SIGNIFICANT CHANGE UP (ref 0–41)
BASOPHILS # BLD AUTO: 0.05 K/UL — SIGNIFICANT CHANGE UP (ref 0–0.2)
BASOPHILS NFR BLD AUTO: 0.6 % — SIGNIFICANT CHANGE UP (ref 0–1)
BILIRUB SERPL-MCNC: 0.5 MG/DL — SIGNIFICANT CHANGE UP (ref 0.2–1.2)
BUN SERPL-MCNC: 20 MG/DL — SIGNIFICANT CHANGE UP (ref 10–20)
CALCIUM SERPL-MCNC: 8.7 MG/DL — SIGNIFICANT CHANGE UP (ref 8.5–10.1)
CHLORIDE SERPL-SCNC: 100 MMOL/L — SIGNIFICANT CHANGE UP (ref 98–110)
CO2 SERPL-SCNC: 22 MMOL/L — SIGNIFICANT CHANGE UP (ref 17–32)
CREAT SERPL-MCNC: 0.7 MG/DL — SIGNIFICANT CHANGE UP (ref 0.7–1.5)
EOSINOPHIL # BLD AUTO: 0.58 K/UL — SIGNIFICANT CHANGE UP (ref 0–0.7)
EOSINOPHIL NFR BLD AUTO: 6.9 % — SIGNIFICANT CHANGE UP (ref 0–8)
GLUCOSE BLDC GLUCOMTR-MCNC: 107 MG/DL — HIGH (ref 70–99)
GLUCOSE BLDC GLUCOMTR-MCNC: 133 MG/DL — HIGH (ref 70–99)
GLUCOSE SERPL-MCNC: 158 MG/DL — HIGH (ref 70–99)
HCT VFR BLD CALC: 33.6 % — LOW (ref 42–52)
HGB BLD-MCNC: 11.1 G/DL — LOW (ref 14–18)
IMM GRANULOCYTES NFR BLD AUTO: 2 % — HIGH (ref 0.1–0.3)
LYMPHOCYTES # BLD AUTO: 1.18 K/UL — LOW (ref 1.2–3.4)
LYMPHOCYTES # BLD AUTO: 14.1 % — LOW (ref 20.5–51.1)
MAGNESIUM SERPL-MCNC: 2 MG/DL — SIGNIFICANT CHANGE UP (ref 1.8–2.4)
MCHC RBC-ENTMCNC: 31 PG — SIGNIFICANT CHANGE UP (ref 27–31)
MCHC RBC-ENTMCNC: 33 G/DL — SIGNIFICANT CHANGE UP (ref 32–37)
MCV RBC AUTO: 93.9 FL — SIGNIFICANT CHANGE UP (ref 80–94)
METANEPHRINE, PL: 30.4 PG/ML — SIGNIFICANT CHANGE UP (ref 0–88)
MONOCYTES # BLD AUTO: 0.58 K/UL — SIGNIFICANT CHANGE UP (ref 0.1–0.6)
MONOCYTES NFR BLD AUTO: 6.9 % — SIGNIFICANT CHANGE UP (ref 1.7–9.3)
NEUTROPHILS # BLD AUTO: 5.8 K/UL — SIGNIFICANT CHANGE UP (ref 1.4–6.5)
NEUTROPHILS NFR BLD AUTO: 69.5 % — SIGNIFICANT CHANGE UP (ref 42.2–75.2)
NORMETANEPHRINE, PL: 251.1 PG/ML — HIGH (ref 0–136.8)
NRBC # BLD: 0 /100 WBCS — SIGNIFICANT CHANGE UP (ref 0–0)
PHOSPHATE SERPL-MCNC: 3.8 MG/DL — SIGNIFICANT CHANGE UP (ref 2.1–4.9)
PLATELET # BLD AUTO: 302 K/UL — SIGNIFICANT CHANGE UP (ref 130–400)
POTASSIUM SERPL-MCNC: 4.5 MMOL/L — SIGNIFICANT CHANGE UP (ref 3.5–5)
POTASSIUM SERPL-SCNC: 4.5 MMOL/L — SIGNIFICANT CHANGE UP (ref 3.5–5)
PROT SERPL-MCNC: 5.5 G/DL — LOW (ref 6–8)
RBC # BLD: 3.58 M/UL — LOW (ref 4.7–6.1)
RBC # FLD: 13.3 % — SIGNIFICANT CHANGE UP (ref 11.5–14.5)
SODIUM SERPL-SCNC: 135 MMOL/L — SIGNIFICANT CHANGE UP (ref 135–146)
WBC # BLD: 8.36 K/UL — SIGNIFICANT CHANGE UP (ref 4.8–10.8)
WBC # FLD AUTO: 8.36 K/UL — SIGNIFICANT CHANGE UP (ref 4.8–10.8)

## 2021-03-08 PROCEDURE — 99238 HOSP IP/OBS DSCHRG MGMT 30/<: CPT | Mod: AI

## 2021-03-08 RX ORDER — NIFEDIPINE 30 MG
2 TABLET, EXTENDED RELEASE 24 HR ORAL
Qty: 0 | Refills: 0 | DISCHARGE
Start: 2021-03-08

## 2021-03-08 RX ORDER — HYDRALAZINE HCL 50 MG
1 TABLET ORAL
Qty: 0 | Refills: 0 | DISCHARGE
Start: 2021-03-08

## 2021-03-08 RX ORDER — LEVETIRACETAM 250 MG/1
5 TABLET, FILM COATED ORAL
Qty: 0 | Refills: 0 | DISCHARGE
Start: 2021-03-08

## 2021-03-08 RX ORDER — LABETALOL HCL 100 MG
1 TABLET ORAL
Qty: 0 | Refills: 0 | DISCHARGE
Start: 2021-03-08

## 2021-03-08 RX ORDER — LISINOPRIL 2.5 MG/1
1 TABLET ORAL
Qty: 0 | Refills: 0 | DISCHARGE
Start: 2021-03-08

## 2021-03-08 RX ORDER — SODIUM CHLORIDE 9 MG/ML
1 INJECTION INTRAMUSCULAR; INTRAVENOUS; SUBCUTANEOUS
Qty: 0 | Refills: 0 | DISCHARGE
Start: 2021-03-08

## 2021-03-08 RX ORDER — METOCLOPRAMIDE HCL 10 MG
1 TABLET ORAL
Qty: 0 | Refills: 0 | DISCHARGE
Start: 2021-03-08

## 2021-03-08 RX ADMIN — Medication 0.3 MILLIGRAM(S): at 05:00

## 2021-03-08 RX ADMIN — Medication 10 MILLILITER(S): at 05:01

## 2021-03-08 RX ADMIN — LEVETIRACETAM 420 MILLIGRAM(S): 250 TABLET, FILM COATED ORAL at 05:05

## 2021-03-08 RX ADMIN — PANTOPRAZOLE SODIUM 40 MILLIGRAM(S): 20 TABLET, DELAYED RELEASE ORAL at 05:04

## 2021-03-08 RX ADMIN — SODIUM CHLORIDE 1 GRAM(S): 9 INJECTION INTRAMUSCULAR; INTRAVENOUS; SUBCUTANEOUS at 14:21

## 2021-03-08 RX ADMIN — Medication 20 MILLIGRAM(S): at 12:17

## 2021-03-08 RX ADMIN — LISINOPRIL 40 MILLIGRAM(S): 2.5 TABLET ORAL at 05:02

## 2021-03-08 RX ADMIN — Medication 200 MILLIGRAM(S): at 05:02

## 2021-03-08 RX ADMIN — Medication 10 MILLILITER(S): at 14:21

## 2021-03-08 RX ADMIN — HEPARIN SODIUM 5000 UNIT(S): 5000 INJECTION INTRAVENOUS; SUBCUTANEOUS at 14:21

## 2021-03-08 RX ADMIN — HEPARIN SODIUM 5000 UNIT(S): 5000 INJECTION INTRAVENOUS; SUBCUTANEOUS at 05:01

## 2021-03-08 RX ADMIN — Medication 100 MILLIGRAM(S): at 14:21

## 2021-03-08 RX ADMIN — Medication 133 MILLILITER(S): at 12:18

## 2021-03-08 RX ADMIN — Medication 5 MILLIGRAM(S): at 05:04

## 2021-03-08 RX ADMIN — Medication 100 MILLIGRAM(S): at 05:01

## 2021-03-08 RX ADMIN — Medication 20 MILLIGRAM(S): at 05:02

## 2021-03-08 RX ADMIN — SODIUM CHLORIDE 1 GRAM(S): 9 INJECTION INTRAMUSCULAR; INTRAVENOUS; SUBCUTANEOUS at 05:03

## 2021-03-08 RX ADMIN — Medication 200 MILLIGRAM(S): at 12:17

## 2021-03-08 RX ADMIN — CHLORHEXIDINE GLUCONATE 1 APPLICATION(S): 213 SOLUTION TOPICAL at 05:05

## 2021-03-08 NOTE — CHART NOTE - NSCHARTNOTEFT_GEN_A_CORE
Limited f/u by HA Orellana    Contacted wife at [phone] this morning to obtain nutr hx  per wife, pt was an excellent eater at home, no vitamin/supplement, NKFA, was an ex[employer]. UBW around 200s, stable. No chew/swallow issue.  Currently pt is AOx2, with slurred speech, seen at bedside, RN spoken with  Feeding Tube feed tolerating  on Jevity 1.2 at 400mL q6hr (NG) since 2/25 = gives a total of 1920 kcal/ 89g protein/ 1291mL free water,  Labs and meds reviewed - Glucose elevated, HgbA1c 5.3  LBM 3/7.  3+ L arm edema, and hand  Wound to skin.     Weight hx:  (3/3): 111kg - weight still elevated, likely bedscale error v.s. edema, will monitor.   (3/1): 119.1kg    (2/24): 132.8kg  (2/19): 127kg  (2/17): 134.8kg  (2/16): 131kg  (1/27): 100kg    P/w AMS, with intracranial bleed. s/p EVD. c/w meds  C/u NPO and NGT. May require PEG. On bowel regimen. Neuro is following.        As d/c instruction, continue Jevity 1.2 at 400mL q6hr (NG) or if patient to receive PEG tube. This regimen gives a total of 1920 kcal/ 89g protein/ 1291mL free water, suggest at least 80mL free water flush each, or per LIP.  F/u with SLP for possible oral intake down the road.

## 2021-03-08 NOTE — PROGRESS NOTE ADULT - REASON FOR ADMISSION
Altered mental status

## 2021-03-08 NOTE — DISCHARGE NOTE PROVIDER - NSDCMRMEDTOKEN_GEN_ALL_CORE_FT
clarithromycin 500 mg oral tablet: 1 tab(s) orally 2 times a day  metroNIDAZOLE 500 mg oral tablet: 1 tab(s) orally 2 times a day  pantoprazole 40 mg oral delayed release tablet: 1 tab(s) orally 2 times a day   cloNIDine 0.3 mg oral tablet: 1 tab(s) orally every 8 hours  hydrALAZINE 100 mg oral tablet: 1 tab(s) orally every 8 hours  labetalol 200 mg oral tablet: 1 tab(s) orally every 6 hours  levETIRAcetam 100 mg/mL intravenous solution: 5 milliliter(s) intravenous every 12 hours  lisinopril 40 mg oral tablet: 1 tab(s) orally once a day  metoclopramide 5 mg oral tablet: 1 tab(s) orally 2 times a day  NIFEdipine 10 mg oral capsule: 2 cap(s) orally every 6 hours  pantoprazole 40 mg oral delayed release tablet: 1 tab(s) orally 2 times a day  sodium chloride 1 g oral tablet: 1 tab(s) orally every 8 hours

## 2021-03-08 NOTE — DISCHARGE NOTE PROVIDER - NSDCCPCAREPLAN_GEN_ALL_CORE_FT
PRINCIPAL DISCHARGE DIAGNOSIS  Diagnosis: Intraparenchymal hemorrhage of brain  Assessment and Plan of Treatment: You were diagnosed with intraparenchymal hemorrhage and were managed through control of blood pressure and supportively in the ICU. Please follow-up with your PCP after discharge from [facility].       PRINCIPAL DISCHARGE DIAGNOSIS  Diagnosis: Intraparenchymal hemorrhage of brain  Assessment and Plan of Treatment: You were diagnosed with intraparenchymal hemorrhage . This condition was caused by elevated blood pressure . You were admitted to the unit then downgraded to stroke unit for close monitor of th bloo pressure.   You will be transferred to [facility] . Continue taking the prescibed  medications and follow up with Neurology and PCP .

## 2021-03-08 NOTE — DISCHARGE NOTE PROVIDER - NSFOLLOWUPCLINICS_GEN_ALL_ED_FT
[facility]  Medicine  [address] [place], [place]   Phone: [phone]  Fax:   Follow Up Time: 1 week    [facility]  Neurology  [address] [place] [address] [address]  [place] [address]  Phone: [phone]  Fax:   Follow Up Time: 2 weeks

## 2021-03-08 NOTE — PROGRESS NOTE ADULT - ATTENDING COMMENTS
Patient seen and examined and agree with above except as noted.  Patients history, notes, labs, imaging, vitals and meds reviewed personally and discussed with medical residents and nurse on rounds this morning.  Patient had no new issues and was recovering from ICH.  Intermittently communicating (says his name) with left hemiparesis 2/5    Plan as above (Likely hypertensive hemorrhage currently BP well controlled)

## 2021-03-08 NOTE — DISCHARGE NOTE NURSING/CASE MANAGEMENT/SOCIAL WORK - PATIENT PORTAL LINK FT
You can access the FollowMyHealth Patient Portal offered by [facility] by registering at the following website: [email or web address]. By joining [email or web address]’s FollowMyHealth portal, you will also be able to view your health information using other applications (apps) compatible with our system.

## 2021-03-08 NOTE — PROGRESS NOTE ADULT - ASSESSMENT
50/M with HTN, GIB, PUD, with large intraparenchymal right basal ganglia hemorrhage with mild surrounding edema as well as 6.3 mm of midline shift. S/p EVD removal. BP is controlled on labetalol.      # large ICB secondary to Hypertensive Emergency:  - s/p EVD removal 2/15.  - intubated on admission and then extubated on 2/23   - Neurology and neurosurgery following  - Neuro checks q 4 hour  - Continue Keppra 500mg IV q12hrs  - BP better controlled today, c/w labetalol 200 mg Q6H, c/w clonodine 0.3 Q8H, hydralazine 100mg Q8H, lisinoprol 40 mg QD, nifedipine IR- 20 mg Q6H   - c/w NPO w/ NGT  - pt requires PEG tube. Wife has not made decision yet if want to proceed with PEG   - ENT consult appreciated  - c/w Physiatry, PT, OOB to chair      # Resistant Hypertension, on multiple medications  - c/w on labetalol 200 mg Q6H, c/w clonodine 0.3 Q8H, hydralazine 100mg Q8H, lisinoprol 40 mg QD, nifedipine IR- 20 mg Q6H   - w/u for secondary HTN - renin and amarilis low,  RAD neg for LIOR, catecholamines in urine wnl  - plasma catecholamines and metanephrines, aldosterone WNL  - Maintain normonatremia  - Monitor blood pressure q4h    #Left posterior tibial & peroneal DVT  - Distal DVT.   - On heparin 5000 sc q8 for DVT ppx.  - no hemodynamic instability.      # bowel regimen  - Continue Senna 2 tablets PO q24hrs HS  - Continue fleet enema q24hrs    Heparin sc for dvt ppx.   GI ppx: Protonix 40mg IV q12hrs  Code : full code  CHG    Pending:  Downgrade to medical floor, pending [facility]

## 2021-03-08 NOTE — DISCHARGE NOTE PROVIDER - HOSPITAL COURSE
50 years old right handed Male with PMHx of HTN, GI bleed, PUD,  Mrankin score of 0 at baseline was brought in by EMS for  AMS and left sided weakness.   Wife states that patient was having a usual day today went to bathroom and then the next thing she noted was that he was confused and was lying on the bathroom floor, she reports urinary incontinence, patient was confused, had trouble speaking, and could not move his left side.     In the ED, /148, HR 80, saturating well. Code stroke called, NIHSS 24 and GCS 11, CTH showed Moderate to large intraparenchymal right basal ganglia hemorrhage with mild surrounding edema and associated right sulcal effacement as well as decompression into the ventricles. There is approximately 6.3 mm of midline shift. CTA did not show any evidence of active bleeding, AVM or aneurysm or major vascular stenosis or occlusion. Pt became unresponsive in CT scan and had to be intubated and sedated. Patient was intubated in ED for worsening mental status and GCS. Started on Nicardipine drip for sbp in 200s and EVD was placed by neurosurgery at bedside. Pan trauma scan was negative.     Patient was managed in the ICU, monitored for blood pressure control. Patient was taken off of nicardipine (2-18-21) in the ICU and was monitored under mechanical ventilation. Patient was extubated in the ICU. Patient was followed in neurology while blood pressure was managed. Patient was downgraded to stroke unit and was continued to be monitored. Patient is to be transferred to [facility] for further management. 50 years old right handed Male with PMHx of HTN, GI bleed, PUD,  Mrankin score of 0 at baseline was brought in by EMS for  AMS and left sided weakness.   Wife states that patient was having a usual day today went to bathroom and then the next thing she noted was that he was confused and was lying on the bathroom floor, she reports urinary incontinence, patient was confused, had trouble speaking, and could not move his left side.     In the ED, /148, HR 80, saturating well. Code stroke called, NIHSS 24 and GCS 11, CTH showed Moderate to large intraparenchymal right basal ganglia hemorrhage with mild surrounding edema and associated right sulcal effacement as well as decompression into the ventricles. There is approximately 6.3 mm of midline shift. CTA did not show any evidence of active bleeding, AVM or aneurysm or major vascular stenosis or occlusion. Pt became unresponsive in CT scan and had to be intubated and sedated. Patient was intubated in ED for worsening mental status and GCS. Started on Nicardipine drip for sbp in 200s and EVD was placed by neurosurgery at bedside. Pan trauma scan was negative.     Patient was intubated on admission and then extubated on 2/23  . Patient was taken off of nicardipine (2-18-21)and treated with  labetalol 200 mg Q6H, c/w clonodine 0.3 Q8H, hydralazine 100mg Q8H, lisinoprol 40 mg QD, nifedipine IR- 20 mg Q6H. Patient is to be transferred to [facility] for further management.

## 2021-03-08 NOTE — PROGRESS NOTE ADULT - SUBJECTIVE AND OBJECTIVE BOX
SUBJECTIVE:    Patient is a 50y old Male who presents with a chief complaint of Altered mental status (07 Mar 2021 04:40)    Patient was resting comfortably in bed with no overnight events.       HPI:  50 years old right handed Male with PMHx of HTN, GI bleed, PUD,  Mrankin score of 0 at baseline was brought in by EMS for  AMS and left sided weakness.   Wife states that patient was having a usual day today went to bathroom and then the next thing she noted was that he was confused and was lying on the bathroom floor, she reports urinary incontinence, patient was confused, had trouble speaking, and could not move his left side.     In the ED, /148, HR 80, saturating well. Code stroke called, NIHSS 24 and GCS 11, CTH showed Moderate to large intraparenchymal right basal ganglia hemorrhage with mild surrounding edema and associated right sulcal effacement as well as decompression into the ventricles. There is approximately 6.3 mm of midline shift. CTA did not show any evidence of active bleeding, AVM or aneurysm or major vascular stenosis or occlusion. Pt became unresponsive in CT scan and had to be intubated and sedated. Patient was intubated in ED for worsening mental status and GCS. Started on Nicardipine drip for sbp in 200s and EVD was placed by neurosurgery at bedside. Pan trauma scan was negative. To be admitted under ICU for further monitoring.  (27 Jan 2021 23:13)      Currently admitted to medicine with the primary diagnosis of Intracranial bleed         Besides the pertinent positives and negatives described above, the ROS was within normal limits.    PAST MEDICAL & SURGICAL HISTORY  HTN (hypertension)    GI bleed    Gastric ulcer    PUD (peptic ulcer disease)    Arm fracture, left  s/p surgical repair      SOCIAL HISTORY:    ALLERGIES:  No Known Allergies    MEDICATIONS:  STANDING MEDICATIONS  chlorhexidine 4% Liquid 1 Application(s) Topical <User Schedule>  cloNIDine 0.3 milliGRAM(s) Oral every 8 hours  guaifenesin/dextromethorphan  Syrup 10 milliLiter(s) Oral every 8 hours  heparin   Injectable 5000 Unit(s) SubCutaneous every 8 hours  hydrALAZINE 100 milliGRAM(s) Oral every 8 hours  labetalol 200 milliGRAM(s) Oral every 6 hours  levETIRAcetam  IVPB 500 milliGRAM(s) IV Intermittent every 12 hours  lisinopril 40 milliGRAM(s) Oral daily  metoclopramide 5 milliGRAM(s) Oral two times a day  mineral oil enema 133 milliLiter(s) Rectal daily  niCARdipine Infusion 15 mG/Hr IV Continuous <Continuous>  NIFEdipine IR 20 milliGRAM(s) Oral every 6 hours  pantoprazole  Injectable 40 milliGRAM(s) IV Push every 12 hours  senna 2 Tablet(s) Oral at bedtime  sodium chloride 1 Gram(s) Oral every 8 hours    PRN MEDICATIONS  acetaminophen   Tablet .. 650 milliGRAM(s) Oral every 6 hours PRN    VITALS:   T(F): 97.9  HR: 60  BP: 121/68  RR: 18  SpO2: 98%    LABS:                        11.1   8.36  )-----------( 302      ( 08 Mar 2021 06:35 )             33.6     03-08    135  |  100  |  20  ----------------------------<  158<H>  4.5   |  22  |  0.7    Ca    8.7      08 Mar 2021 06:35  Phos  3.8     03-08  Mg     2.0     03-08    TPro  5.5<L>  /  Alb  3.1<L>  /  TBili  0.5  /  DBili  x   /  AST  19  /  ALT  38  /  AlkPhos  127<H>  03-08    RADIOLOGY:  rom: CT Head No Cont (03.02.21 @ 12:38) >  IMPRESSION:    Redemonstration of evolving right frontal intraparenchymal hematoma with slightly decreased moderate surrounding edema extending to the right basal ganglia and right frontal operculum. Stable to minimally decreased leftward midline shift measuring 0.4 cm (previously 0.6 cm).    Interval development of 0.6 cm acute hemorrhage in the right subependymal region just lateral to the right frontal intraparenchymal hematoma.    No acute territorial infarct, hydrocephalus, or extra-axial fluid collection.      < end of copied text >  < from: Xray Chest 1 View- PORTABLE-Urgent (Xray Chest 1 View- PORTABLE-Urgent .) (03.03.21 @ 22:21) >  IMPRESSION:    NG tube terminating over the left upper quadrant.    Stable cardiomediastinal silhouette. Unchanged lungs. Unchanged osseous structures.    < end of copied text >  < from: [facility] Duplex Lower Ext Vein Scan, Bilat (03.01.21 @ 18:07) >  Impression:    No evidence of deep venous thrombosis or superficial thrombophlebitis in the right lower extremity  Same left posterior tibial vein branch DVT as before    < end of copied text >      PHYSICAL EXAM:  GEN: No acute distress  LUNGS: Clear to auscultation b/l, no wheezes  HEART: Regular rate and rhythm, +s1 s2  ABD: Soft, non-tender, non-distended. +BS  EXT: LE edema   NEURO: Awake, alert, oriented, responds to commands, LUE weakness. RUE strength is 4/5

## 2021-03-09 LAB — RENIN PLAS-CCNC: 1.8 NG/ML/HR — SIGNIFICANT CHANGE UP (ref 0.17–5.38)

## 2021-03-10 LAB
METANEPHRINE, PL: 58.9 PG/ML — SIGNIFICANT CHANGE UP (ref 0–88)
NORMETANEPHRINE, PL: 134.9 PG/ML — SIGNIFICANT CHANGE UP (ref 0–136.8)

## 2021-03-11 LAB
DOPAMINE SERPL-MCNC: <30 PG/ML — SIGNIFICANT CHANGE UP (ref 0–48)
EPINEPH PLAS-MCNC: 48 PG/ML — SIGNIFICANT CHANGE UP (ref 0–62)
NOREPINEPH PLAS-MCNC: 233 PG/ML — SIGNIFICANT CHANGE UP (ref 0–874)

## 2021-03-12 DIAGNOSIS — G81.94 HEMIPLEGIA, UNSPECIFIED AFFECTING LEFT NONDOMINANT SIDE: ICD-10-CM

## 2021-03-12 DIAGNOSIS — I62.9 NONTRAUMATIC INTRACRANIAL HEMORRHAGE, UNSPECIFIED: ICD-10-CM

## 2021-03-12 DIAGNOSIS — R47.81 SLURRED SPEECH: ICD-10-CM

## 2021-03-12 DIAGNOSIS — R40.2353: ICD-10-CM

## 2021-03-12 DIAGNOSIS — J15.0 PNEUMONIA DUE TO KLEBSIELLA PNEUMONIAE: ICD-10-CM

## 2021-03-12 DIAGNOSIS — E87.0 HYPEROSMOLALITY AND HYPERNATREMIA: ICD-10-CM

## 2021-03-12 DIAGNOSIS — G93.2 BENIGN INTRACRANIAL HYPERTENSION: ICD-10-CM

## 2021-03-12 DIAGNOSIS — R29.810 FACIAL WEAKNESS: ICD-10-CM

## 2021-03-12 DIAGNOSIS — I82.442 ACUTE EMBOLISM AND THROMBOSIS OF LEFT TIBIAL VEIN: ICD-10-CM

## 2021-03-12 DIAGNOSIS — I10 ESSENTIAL (PRIMARY) HYPERTENSION: ICD-10-CM

## 2021-03-12 DIAGNOSIS — G93.6 CEREBRAL EDEMA: ICD-10-CM

## 2021-03-12 DIAGNOSIS — I61.0 NONTRAUMATIC INTRACEREBRAL HEMORRHAGE IN HEMISPHERE, SUBCORTICAL: ICD-10-CM

## 2021-03-12 DIAGNOSIS — I82.452 ACUTE EMBOLISM AND THROMBOSIS OF LEFT PERONEAL VEIN: ICD-10-CM

## 2021-03-12 DIAGNOSIS — R40.2113 COMA SCALE, EYES OPEN, NEVER, AT HOSPITAL ADMISSION: ICD-10-CM

## 2021-03-12 DIAGNOSIS — Z78.1 PHYSICAL RESTRAINT STATUS: ICD-10-CM

## 2021-03-12 DIAGNOSIS — R40.2213 COMA SCALE, BEST VERBAL RESPONSE, NONE, AT HOSPITAL ADMISSION: ICD-10-CM

## 2021-03-12 DIAGNOSIS — I16.1 HYPERTENSIVE EMERGENCY: ICD-10-CM

## 2021-03-12 DIAGNOSIS — Z79.82 LONG TERM (CURRENT) USE OF ASPIRIN: ICD-10-CM

## 2021-03-12 DIAGNOSIS — N17.9 ACUTE KIDNEY FAILURE, UNSPECIFIED: ICD-10-CM

## 2021-03-12 DIAGNOSIS — K56.7 ILEUS, UNSPECIFIED: ICD-10-CM

## 2021-03-12 DIAGNOSIS — G93.5 COMPRESSION OF BRAIN: ICD-10-CM

## 2021-03-12 DIAGNOSIS — R47.1 DYSARTHRIA AND ANARTHRIA: ICD-10-CM

## 2021-03-12 DIAGNOSIS — G91.9 HYDROCEPHALUS, UNSPECIFIED: ICD-10-CM

## 2021-03-12 DIAGNOSIS — I61.5 NONTRAUMATIC INTRACEREBRAL HEMORRHAGE, INTRAVENTRICULAR: ICD-10-CM

## 2021-03-12 DIAGNOSIS — R47.01 APHASIA: ICD-10-CM

## 2021-03-24 LAB
CULTURE RESULTS: SIGNIFICANT CHANGE UP
SPECIMEN SOURCE: SIGNIFICANT CHANGE UP

## 2021-04-28 ENCOUNTER — OUTPATIENT (OUTPATIENT)
Dept: OUTPATIENT SERVICES | Facility: HOSPITAL | Age: 51
LOS: 1 days | Discharge: HOME | End: 2021-04-28
Payer: COMMERCIAL

## 2021-04-28 DIAGNOSIS — S42.302A UNSPECIFIED FRACTURE OF SHAFT OF HUMERUS, LEFT ARM, INITIAL ENCOUNTER FOR CLOSED FRACTURE: Chronic | ICD-10-CM

## 2021-04-28 DIAGNOSIS — I61.9 NONTRAUMATIC INTRACEREBRAL HEMORRHAGE, UNSPECIFIED: ICD-10-CM

## 2021-04-28 PROCEDURE — 70460 CT HEAD/BRAIN W/DYE: CPT | Mod: 26

## 2021-04-28 PROCEDURE — 70491 CT SOFT TISSUE NECK W/DYE: CPT | Mod: 26

## 2021-05-03 ENCOUNTER — OUTPATIENT (OUTPATIENT)
Dept: OUTPATIENT SERVICES | Facility: HOSPITAL | Age: 51
LOS: 1 days | Discharge: HOME | End: 2021-05-03
Payer: COMMERCIAL

## 2021-05-03 DIAGNOSIS — S42.302A UNSPECIFIED FRACTURE OF SHAFT OF HUMERUS, LEFT ARM, INITIAL ENCOUNTER FOR CLOSED FRACTURE: Chronic | ICD-10-CM

## 2021-05-03 DIAGNOSIS — J38.01 PARALYSIS OF VOCAL CORDS AND LARYNX, UNILATERAL: ICD-10-CM

## 2021-05-03 PROCEDURE — 71260 CT THORAX DX C+: CPT | Mod: 26

## 2021-06-30 NOTE — DIETITIAN INITIAL EVALUATION ADULT. - DOB: +DATEOFBIRTH
----- Message from Sierra Raya MD sent at 6/30/2021  9:57 AM CDT -----  Please contact patient with results. Everything is normal    
Pt informed of normal results. Pt Verbalized understanding, no further questions at this time.    Pt requested lab work for thyroid cancer to be done. Writer informed her labs were added and could be done off of the blood the other day. Pt verbalized understanding, no further questions at this time.   
Statement Selected

## 2021-07-13 NOTE — PATIENT PROFILE ADULT - FUNCTIONAL SCREEN CURRENT LEVEL: BATHING, MLM
Patient states he is following with sleep center, they will be getting a CPAP  for him.  He states that he is still feeling lightheaded and weak in the legs since his 7/1 visit. He is using his walker, and mostly feels this way when he is standing up.  Encouraged fall precautions, slow position changes, adequate hydration, adequate diet.       0 = independent

## 2021-11-18 ENCOUNTER — EMERGENCY (EMERGENCY)
Facility: HOSPITAL | Age: 51
LOS: 0 days | Discharge: HOME | End: 2021-11-18
Attending: STUDENT IN AN ORGANIZED HEALTH CARE EDUCATION/TRAINING PROGRAM | Admitting: STUDENT IN AN ORGANIZED HEALTH CARE EDUCATION/TRAINING PROGRAM
Payer: COMMERCIAL

## 2021-11-18 VITALS
HEART RATE: 73 BPM | OXYGEN SATURATION: 98 % | DIASTOLIC BLOOD PRESSURE: 89 MMHG | SYSTOLIC BLOOD PRESSURE: 143 MMHG | TEMPERATURE: 98 F | RESPIRATION RATE: 18 BRPM

## 2021-11-18 VITALS
SYSTOLIC BLOOD PRESSURE: 147 MMHG | OXYGEN SATURATION: 99 % | HEIGHT: 73 IN | HEART RATE: 87 BPM | RESPIRATION RATE: 17 BRPM | TEMPERATURE: 99 F | DIASTOLIC BLOOD PRESSURE: 89 MMHG

## 2021-11-18 DIAGNOSIS — M62.81 MUSCLE WEAKNESS (GENERALIZED): ICD-10-CM

## 2021-11-18 DIAGNOSIS — Z87.891 PERSONAL HISTORY OF NICOTINE DEPENDENCE: ICD-10-CM

## 2021-11-18 DIAGNOSIS — R56.9 UNSPECIFIED CONVULSIONS: ICD-10-CM

## 2021-11-18 DIAGNOSIS — I10 ESSENTIAL (PRIMARY) HYPERTENSION: ICD-10-CM

## 2021-11-18 DIAGNOSIS — S42.302A UNSPECIFIED FRACTURE OF SHAFT OF HUMERUS, LEFT ARM, INITIAL ENCOUNTER FOR CLOSED FRACTURE: Chronic | ICD-10-CM

## 2021-11-18 LAB
ALBUMIN SERPL ELPH-MCNC: 4.2 G/DL — SIGNIFICANT CHANGE UP (ref 3.5–5.2)
ALP SERPL-CCNC: 82 U/L — SIGNIFICANT CHANGE UP (ref 30–115)
ALT FLD-CCNC: 11 U/L — SIGNIFICANT CHANGE UP (ref 0–41)
ANION GAP SERPL CALC-SCNC: 17 MMOL/L — HIGH (ref 7–14)
AST SERPL-CCNC: 15 U/L — SIGNIFICANT CHANGE UP (ref 0–41)
BASOPHILS # BLD AUTO: 0.08 K/UL — SIGNIFICANT CHANGE UP (ref 0–0.2)
BASOPHILS NFR BLD AUTO: 0.6 % — SIGNIFICANT CHANGE UP (ref 0–1)
BILIRUB SERPL-MCNC: 0.4 MG/DL — SIGNIFICANT CHANGE UP (ref 0.2–1.2)
BUN SERPL-MCNC: 22 MG/DL — HIGH (ref 10–20)
CALCIUM SERPL-MCNC: 9.1 MG/DL — SIGNIFICANT CHANGE UP (ref 8.5–10.1)
CHLORIDE SERPL-SCNC: 101 MMOL/L — SIGNIFICANT CHANGE UP (ref 98–110)
CO2 SERPL-SCNC: 19 MMOL/L — SIGNIFICANT CHANGE UP (ref 17–32)
CREAT SERPL-MCNC: 1.2 MG/DL — SIGNIFICANT CHANGE UP (ref 0.7–1.5)
EOSINOPHIL # BLD AUTO: 0.21 K/UL — SIGNIFICANT CHANGE UP (ref 0–0.7)
EOSINOPHIL NFR BLD AUTO: 1.6 % — SIGNIFICANT CHANGE UP (ref 0–8)
GLUCOSE SERPL-MCNC: 97 MG/DL — SIGNIFICANT CHANGE UP (ref 70–99)
HCT VFR BLD CALC: 40 % — LOW (ref 42–52)
HGB BLD-MCNC: 13.4 G/DL — LOW (ref 14–18)
IMM GRANULOCYTES NFR BLD AUTO: 2 % — HIGH (ref 0.1–0.3)
LYMPHOCYTES # BLD AUTO: 1.01 K/UL — LOW (ref 1.2–3.4)
LYMPHOCYTES # BLD AUTO: 7.9 % — LOW (ref 20.5–51.1)
MCHC RBC-ENTMCNC: 29.9 PG — SIGNIFICANT CHANGE UP (ref 27–31)
MCHC RBC-ENTMCNC: 33.5 G/DL — SIGNIFICANT CHANGE UP (ref 32–37)
MCV RBC AUTO: 89.3 FL — SIGNIFICANT CHANGE UP (ref 80–94)
MONOCYTES # BLD AUTO: 0.73 K/UL — HIGH (ref 0.1–0.6)
MONOCYTES NFR BLD AUTO: 5.7 % — SIGNIFICANT CHANGE UP (ref 1.7–9.3)
NEUTROPHILS # BLD AUTO: 10.53 K/UL — HIGH (ref 1.4–6.5)
NEUTROPHILS NFR BLD AUTO: 82.2 % — HIGH (ref 42.2–75.2)
NRBC # BLD: 0 /100 WBCS — SIGNIFICANT CHANGE UP (ref 0–0)
PLATELET # BLD AUTO: 339 K/UL — SIGNIFICANT CHANGE UP (ref 130–400)
POTASSIUM SERPL-MCNC: 4.8 MMOL/L — SIGNIFICANT CHANGE UP (ref 3.5–5)
POTASSIUM SERPL-SCNC: 4.8 MMOL/L — SIGNIFICANT CHANGE UP (ref 3.5–5)
PROT SERPL-MCNC: 6.2 G/DL — SIGNIFICANT CHANGE UP (ref 6–8)
RBC # BLD: 4.48 M/UL — LOW (ref 4.7–6.1)
RBC # FLD: 14.2 % — SIGNIFICANT CHANGE UP (ref 11.5–14.5)
SODIUM SERPL-SCNC: 137 MMOL/L — SIGNIFICANT CHANGE UP (ref 135–146)
WBC # BLD: 12.82 K/UL — HIGH (ref 4.8–10.8)
WBC # FLD AUTO: 12.82 K/UL — HIGH (ref 4.8–10.8)

## 2021-11-18 PROCEDURE — 93010 ELECTROCARDIOGRAM REPORT: CPT

## 2021-11-18 PROCEDURE — 99282 EMERGENCY DEPT VISIT SF MDM: CPT

## 2021-11-18 PROCEDURE — 99285 EMERGENCY DEPT VISIT HI MDM: CPT

## 2021-11-18 PROCEDURE — 70450 CT HEAD/BRAIN W/O DYE: CPT | Mod: 26,MA

## 2021-11-18 RX ORDER — LEVETIRACETAM 250 MG/1
1 TABLET, FILM COATED ORAL
Qty: 60 | Refills: 0
Start: 2021-11-18 | End: 2021-12-17

## 2021-11-18 RX ORDER — LEVETIRACETAM 250 MG/1
1000 TABLET, FILM COATED ORAL ONCE
Refills: 0 | Status: COMPLETED | OUTPATIENT
Start: 2021-11-18 | End: 2021-11-18

## 2021-11-18 RX ADMIN — LEVETIRACETAM 1000 MILLIGRAM(S): 250 TABLET, FILM COATED ORAL at 18:16

## 2021-11-18 NOTE — ED PROVIDER NOTE - OBJECTIVE STATEMENT
50 yo male PMH HTN, PUD, brain hemorrhage, left sided hemiplegia , tremors on Gabapentin ( ran out 2 weeks ago), brought for evaluation of seizure like activity 30 min PTA.   per nursing aid patient was getting out of bed went unconscious with tonic clonic activity for 10 min that self resolved by the time EMS reached patient.  patient states he had dried blood form his mouth due to tongue biting.  patient was more confused than normal since seizure but mental status is returning to baseline.  patient is AAOx3 on presentation speaking in full sentences with no new complaints.  patient does not remember event.

## 2021-11-18 NOTE — CONSULT NOTE ADULT - ATTENDING COMMENTS
I have personally seen and examined this patient on 11/18. I have fully participated in the care of this patient.  I have reviewed all pertinent clinical information, including history, physical exam, plan and note. Patient with history of ICH presented with one episode of witnessed seizure. 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 Recommend to resume Keppra( patient was on Keppra during his recent admission). Load with 1500 mg once and 750 mg  BID. Could obtain REEG as an outpatient.   I have reviewed all pertinent clinical information and reviewed all relevant imaging and diagnostic studies personally.  Recommendations as above.  Agree with above assessment except as noted.

## 2021-11-18 NOTE — ED PROVIDER NOTE - PHYSICAL EXAMINATION
PHYSICAL EXAM: I have reviewed current vital signs.  GENERAL: NAD, well-nourished; well-developed.  HEAD:  Normocephalic, atraumatic.  EYES: EOMI, PERRL, conjunctiva and sclera clear.  ENT: MMM, no erythema/exudates.  NECK: Supple, no JVD.  CHEST/LUNG: Clear to auscultation bilaterally; no wheezes, rales, or rhonchi.  HEART: Regular rate and rhythm, normal S1 and S2; no murmurs, rubs, or gallops.  ABDOMEN: Soft, nontender, nondistended.  EXTREMITIES:  2+ peripheral pulses; no clubbing, cyanosis, or edema.  PSYCH: Cooperative, appropriate, normal mood and affect.  NEUROLOGY: A&O x 3. Motor 5/5 on right and 3/5 on left arm not different from baseline.  5/5 motor strength in left leg. Sensory intact. No focal neurological deficits. CN II - XII intact. (-) dysmetria, facial droop, pronator drift.  SKIN: Warm and dry.

## 2021-11-18 NOTE — ED PROVIDER NOTE - PATIENT PORTAL LINK FT
You can access the FollowMyHealth Patient Portal offered by [facility] by registering at the following website: [email or web address]. By joining [employer]’s FollowMyHealth portal, you will also be able to view your health information using other applications (apps) compatible with our system.

## 2021-11-18 NOTE — CONSULT NOTE ADULT - SUBJECTIVE AND OBJECTIVE BOX
Neurology Consult    Patient is a 51y old  Male who presents with a chief complaint of seizure-like activity.    HPI:      PAST MEDICAL & SURGICAL HISTORY:  PUD (peptic ulcer disease)    Gastric ulcer    GI bleed    HTN (hypertension)    Arm fracture, left  s/p surgical repair        FAMILY HISTORY:  Family history of pancreatic cancer (Father)    Family history of breast cancer (Mother)    No history of seizures in family    Social History:   27 year cigarette smoking, half ppd  Social drinking  No other drug use    Allergies    No Known Allergies    Intolerances        MEDICATIONS  (STANDING):    MEDICATIONS  (PRN):      Review of systems:    Constitutional: No recent weight changes or fevers  Eyes: No eye pain; Blurry vision b/l in post-ictal period  ENMT:  No difficulty hearing; Ringing in ears b/l in post-ictal period  Neck: No pain  Respiratory: No cough, chills  Cardiovascular: No palpitations, shortness of breath  Gastrointestinal: No abdominal pain, nausea, vomiting; No diarrhea or constipation.   Genitourinary: No incontinence  Neurological: No headaches, no new weakness or sensory loss, tremors of extremities for 2 weeks  Skin: No rashes  Endocrine: No heat intolerance; feels cold at times  Musculoskeletal: No joint pain or swelling  Psychiatric: No depression, anxiety, mood swings  Heme/Lymph: No easy bruising    Vital Signs Last 24 Hrs  T(C): 37.1 (18 Nov 2021 08:55), Max: 37.1 (18 Nov 2021 08:55)  T(F): 98.7 (18 Nov 2021 08:55), Max: 98.7 (18 Nov 2021 08:55)  HR: 87 (18 Nov 2021 08:55) (87 - 87)  BP: 147/89 (18 Nov 2021 08:55) (147/89 - 147/89)  BP(mean): --  RR: 17 (18 Nov 2021 08:55) (17 - 17)  SpO2: 99% (18 Nov 2021 08:55) (99% - 99%)      Neurological Examination:  General:  Appearance is consistent with chronologic age.  No abnormal facies.  Gross skin survey within normal limits. Lesion on L side of tongue after biting during seizure episode.  Cognitive/Language:  Awake, alert, and oriented to person, place, time and date.  Recent and remote memory intact.  Fund of knowledge is appropriate. Comprehension intact. Nondysarthric.    Cranial Nerves  - Eyes: Visual acuity intact, Visual fields full.  EOMI w/o nystagmus, skew or reported double vision.  PERRL.  No ptosis/weakness of eyelid closure.    - Face:  Facial sensation normal V1 - 3, no facial asymmetry.    - Ears/Nose/Throat:  Hearing grossly intact b/l to finger rub.  Palate elevates midline.  Tongue and uvula midline.   Motor examination: 5/5 RUE and RLE; 5/5 LLE, 5/5 L deltoid and biceps, 0/5 wrist and fingers.  No observable drift. Normal tone and bulk of all extremities, except LUE which demonstrates reduced muscle bulk. No tenderness, twitching, tremors or involuntary movements.  Sensory examination: Intact to light touch in all extremities.  Reflexes:  2+ patella and achilles, not elicited in b/l biceps, triceps, brachioradialis.    Cerebellum:   FTN/HKS intact.  No dysmetria.      Labs:   CBC Full  -  ( 18 Nov 2021 10:10 )  WBC Count : 12.82 K/uL  RBC Count : 4.48 M/uL  Hemoglobin : 13.4 g/dL  Hematocrit : 40.0 %  Platelet Count - Automated : 339 K/uL  Mean Cell Volume : 89.3 fL  Mean Cell Hemoglobin : 29.9 pg  Mean Cell Hemoglobin Concentration : 33.5 g/dL  Auto Neutrophil # : 10.53 K/uL  Auto Lymphocyte # : 1.01 K/uL  Auto Monocyte # : 0.73 K/uL  Auto Eosinophil # : 0.21 K/uL  Auto Basophil # : 0.08 K/uL  Auto Neutrophil % : 82.2 %  Auto Lymphocyte % : 7.9 %  Auto Monocyte % : 5.7 %  Auto Eosinophil % : 1.6 %  Auto Basophil % : 0.6 %    11-18    137  |  101  |  22<H>  ----------------------------<  97  4.8   |  19  |  1.2    Ca    9.1      18 Nov 2021 10:10    TPro  6.2  /  Alb  4.2  /  TBili  0.4  /  DBili  x   /  AST  15  /  ALT  11  /  AlkPhos  82  11-18    LIVER FUNCTIONS - ( 18 Nov 2021 10:10 )  Alb: 4.2 g/dL / Pro: 6.2 g/dL / ALK PHOS: 82 U/L / ALT: 11 U/L / AST: 15 U/L / GGT: x                   Neuroimaging:  NCHCT: CT Head No Cont:   EXAM:  CT BRAIN            PROCEDURE DATE:  11/18/2021            INTERPRETATION:  Clinical History / Reason for exam: Seizures. Evaluate for acute hemorrhage.    Technique: Noncontrast head CT. Contiguous CT axial images of the head from the base to the vertex.    COMPARISON: CT head 4/28/2021.    FINDINGS:    The previously seen right frontal/basal ganglia hematoma has resolved with gliosis and volume loss noted. There is ex vacuo dilatation of the right frontal horn.    The ventricles and cortical sulci are within normal limits and stable.    Stable patchy lucencies within the cerebral hemispheric white matter consistent with chronic microvascular change.    There is no evidence of new intracranial hemorrhage. No extra-axial fluid collection or midline shift.    Partial right mastoid opacification, decreased since the prior exam. The visualized paranasal sinuses and the left mastoid air cells are clear.    IMPRESSION:    1.  No evidence of acute intracranial hemorrhage.    2.  The previously seen right frontal lobe and basal ganglia hematoma has resolved.    --- End of Report ---              SANDY URBAN MD; Attending Radiologist  This document has been electronically signed. Nov 18 2021 10:56AM (11-18-21 @ 10:41)      11-18-21 @ 13:00

## 2021-11-18 NOTE — ED ADULT TRIAGE NOTE - CHIEF COMPLAINT QUOTE
pt aaox3 here s/p seizure episode at home. pt awake and alert currently. blood noted to tongue. aide present states pt at baseline

## 2021-11-18 NOTE — ED PROVIDER NOTE - PROGRESS NOTE DETAILS
neurology consulted. ahy- discussed patient with neuro.  will D/c with keppra and out patient follow up.  patient will be advised to avoid driving and avoid operating heavy machinery until evaluation by outpatient neurology.

## 2021-11-18 NOTE — ED PROVIDER NOTE - CLINICAL SUMMARY MEDICAL DECISION MAKING FREE TEXT BOX
52 yo male PMH HTN, PUD, brain hemorrhage, left sided hemiplegia , tremors on Gabapentin ( ran out 2 weeks ago), brought for evaluation of seizure like activity, bit his tongue this AM, does not have any recollection of  the event.  Denies any recent illness, states he had a normal morning, drove his wife to work..  well-appearing, NAD, PERRL, + tongue abrasion, supple neck, nml work of breathing, speaking full sentences, abdomen soft, NT/ND, no leg edema or calf ttp, + left sided weakness, awake and alert.  Plan: labs, CT head, neurology consult. 52 yo male PMH HTN, PUD, brain hemorrhage, left sided hemiplegia , tremors on Gabapentin ( ran out 2 weeks ago), brought for evaluation of seizure like activity, bit his tongue this AM, does not have any recollection of  the event.  Denies any recent illness, states he had a normal morning, drove his wife to work..  well-appearing, NAD, PERRL, + tongue abrasion, supple neck, nml work of breathing, speaking full sentences, abdomen soft, NT/ND, no leg edema or calf ttp, + left sided weakness, awake and alert.  Plan: labs, CT head, neurology consult.    neurology evaluated patient at bedside  [facility] outpatient script  to follow up with neurology

## 2021-11-18 NOTE — ED ADULT NURSE NOTE - NSIMPLEMENTINTERV_GEN_ALL_ED
Implemented All Fall with Harm Risk Interventions:  [place] to call system. Call bell, personal items and telephone within reach. Instruct patient to call for assistance. Room bathroom lighting operational. Non-slip footwear when patient is off stretcher. Physically safe environment: no spills, clutter or unnecessary equipment. Stretcher in lowest position, wheels locked, appropriate side rails in place. Provide visual cue, wrist band, yellow gown, etc. Monitor gait and stability. Monitor for mental status changes and reorient to person, place, and time. Review medications for side effects contributing to fall risk. Reinforce activity limits and safety measures with patient and family. Provide visual clues: red socks.

## 2021-11-18 NOTE — CONSULT NOTE ADULT - ASSESSMENT
50 yo male, pmh HLD, hemorrhagic stroke in 2021 involving R basil ganglia and frontal lobe presented  50 yo male, pmh HLD, intraparenchymal hemorrhagic stroke in 1/ 2021 involving R basil ganglia and frontal lobe presented for an episode of generalized body shaking observed by care giver lasting 10 minutes, self terminating, 30 minute post ictal episode and subsequent return to baseline. Neuro consult for seizure like episode.     plan:  50 yo male, pmh HLD, intraparenchymal hemorrhagic stroke in 1/ 2021 involving R basil ganglia and frontal lobe presented for an episode of generalized body shaking observed by care giver lasting 10 minutes, self terminating, 30 minute post ictal episode and subsequent return to baseline. Given hx of hemorrhagic stroke, patient at increased risk of seizures. Given likely episode above, patient would benefit from initiation of Keppra and continued outpatient neuro follow up      plan:   - Keppra 1000mg po once now  - Discharge on Keppra 750mg q12H  - follow up w/ outpatient neurology  - No driving, no swimming, no climbing for 6 months until cleared by outpatient neurology.

## 2021-11-18 NOTE — ED PROVIDER NOTE - ATTENDING CONTRIBUTION TO CARE
52 yo male PMH HTN, PUD, brain hemorrhage, left sided hemiplegia , tremors on Gabapentin ( ran out 2 weeks ago), brought for evaluation of seizure like activity, bit his tongue this AM, does not have any recollection of  the event.  Denies any recent illness, states he had a normal morning, drove his wife to work..  well-appearing, NAD, PERRL, + tongue abrasion, supple neck, nml work of breathing, speaking full sentences, abdomen soft, NT/ND, no leg edema or calf ttp, + left sided weakness, awake and alert.  Plan: labs, CT head, neurology consult.

## 2021-11-18 NOTE — CONSULT NOTE ADULT - SUBJECTIVE AND OBJECTIVE BOX
Neurology Consult    Patient is a 51y old  Male who presents with a chief complaint of seizure like activity.     Simeon is a 51 year old male, pmh HLD, stroke march 2021 with right basal ganglia hemorrhage >30cc and 6.3mm Midline shift but no acute bleed was BIBEMS for seizure like activity. Patient reported that he dropped off his wife, got home and then "things get blurry" reporting that the next thing he remembers is the paramedics arriving at his home and bringing him here. He reports that he bit his tongue but did not defecate self.     Spoke with Wife sharif [phone] who reports that the care taker told her that the patient was shaking, ems was activated and patient brought in to ED. Sharif reports that patient was observed to fall on the floor and have generalized shaking of all 4 limbs. She reports that shaking lasted for about 10 minutes and self terminated prior to EMS arrival. Sharif states that patient remained confused for about 30 minutes after episode.       HPI:      PAST MEDICAL & SURGICAL HISTORY:  PUD (peptic ulcer disease)    Gastric ulcer    GI bleed    HTN (hypertension)    Arm fracture, left  s/p surgical repair        FAMILY HISTORY:  Family history of pancreatic cancer (Father)    Family history of breast cancer (Mother)        Social History: (-) x 3    Allergies    No Known Allergies    Intolerances        MEDICATIONS  (STANDING):    MEDICATIONS  (PRN):      Review of systems:    Constitutional: as per HPI  Eyes: No eye pain or discharge  ENMT:  No difficulty hearing; No sinus or throat pain  Neck: No pain or stiffness  Respiratory: No cough, wheezing, chills or hemoptysis  Cardiovascular: No chest pain, palpitations, shortness of breath, dyspnea on exertion  Gastrointestinal: No abdominal pain, nausea, vomiting or hematemesis; No diarrhea or constipation.   Genitourinary: No dysuria, frequency, hematuria or incontinence  Neurological: As per HPI  Skin: No rashes or lesions   Endocrine: No heat or cold intolerance; No hair loss  Musculoskeletal: No joint pain or swelling  Psychiatric: No depression, anxiety, mood swings  Heme/Lymph: No easy bruising or bleeding gums    Vital Signs Last 24 Hrs  T(C): 37.1 (18 Nov 2021 08:55), Max: 37.1 (18 Nov 2021 08:55)  T(F): 98.7 (18 Nov 2021 08:55), Max: 98.7 (18 Nov 2021 08:55)  HR: 87 (18 Nov 2021 08:55) (87 - 87)  BP: 147/89 (18 Nov 2021 08:55) (147/89 - 147/89)  BP(mean): --  RR: 17 (18 Nov 2021 08:55) (17 - 17)  SpO2: 99% (18 Nov 2021 08:55) (99% - 99%)    Examination: INCOMPLETE   General:  Appearance is consistent with chronologic age.  No abnormal facies.  Gross skin survey within normal limits.    Cognitive/Language:  The patient is oriented to person, place, time and date.  Recent and remote memory intact.  Fund of knowledge is intact and normal.  Language with normal repetition, comprehension and naming.  Nondysarthric.    Eyes: intact VA, VFF.  EOMI w/o nystagmus, skew or reported double vision.  PERRL.  No ptosis/weakness of eyelid closure.    Face:  Facial sensation normal V1 - 3, no facial asymmetry.    Ears/Nose/Throat:  Hearing grossly intact b/l.  Palate elevates midline.  Tongue and uvula midline.   Motor examination:   Normal tone, bulk and range of motion.  No tenderness, twitching, tremors or involuntary movements.  Formal Muscle Strength Testing: (MRC grade R/L) 5/5 UE; 5/5 LE.  No observable drift.  Reflexes:   2+ b/l pectoralis, biceps, triceps, brachioradialis, patella and Achilles.  Plantar response downgoing b/l.  Jaw jerk, Andrey, clonus absent.  Sensory examination:   Intact to light touch and pinprick, pain, temperature and proprioception and vibration in all extremities.  Cerebellum:   FTN/HKS intact with normal CELESTE in all limbs.  No dysmetria or dysdiadokinesia.  Gait narrow based and normal.    Respiratory:  no audible wheezing or inspiratory stridor.  no use of accessory muscles.   Cardiac: pulse palpable, no audible bruits  Abdomen: supple, no guarding, no TTP    Labs:   CBC Full  -  ( 18 Nov 2021 10:10 )  WBC Count : 12.82 K/uL  RBC Count : 4.48 M/uL  Hemoglobin : 13.4 g/dL  Hematocrit : 40.0 %  Platelet Count - Automated : 339 K/uL  Mean Cell Volume : 89.3 fL  Mean Cell Hemoglobin : 29.9 pg  Mean Cell Hemoglobin Concentration : 33.5 g/dL  Auto Neutrophil # : 10.53 K/uL  Auto Lymphocyte # : 1.01 K/uL  Auto Monocyte # : 0.73 K/uL  Auto Eosinophil # : 0.21 K/uL  Auto Basophil # : 0.08 K/uL  Auto Neutrophil % : 82.2 %  Auto Lymphocyte % : 7.9 %  Auto Monocyte % : 5.7 %  Auto Eosinophil % : 1.6 %  Auto Basophil % : 0.6 %    11-18    137  |  101  |  22<H>  ----------------------------<  97  4.8   |  19  |  1.2    Ca    9.1      18 Nov 2021 10:10    TPro  6.2  /  Alb  4.2  /  TBili  0.4  /  DBili  x   /  AST  15  /  ALT  11  /  AlkPhos  82  11-18    LIVER FUNCTIONS - ( 18 Nov 2021 10:10 )  Alb: 4.2 g/dL / Pro: 6.2 g/dL / ALK PHOS: 82 U/L / ALT: 11 U/L / AST: 15 U/L / GGT: x                   Neuroimaging:  NCHCT: CT Head No Cont:   EXAM:  CT BRAIN            PROCEDURE DATE:  11/18/2021            INTERPRETATION:  Clinical History / Reason for exam: Seizures. Evaluate for acute hemorrhage.    Technique: Noncontrast head CT. Contiguous CT axial images of the head from the base to the vertex.    COMPARISON: CT head 4/28/2021.    FINDINGS:    The previously seen right frontal/basal ganglia hematoma has resolved with gliosis and volume loss noted. There is ex vacuo dilatation of the right frontal horn.    The ventricles and cortical sulci are within normal limits and stable.    Stable patchy lucencies within the cerebral hemispheric white matter consistent with chronic microvascular change.    There is no evidence of new intracranial hemorrhage. No extra-axial fluid collection or midline shift.    Partial right mastoid opacification, decreased since the prior exam. The visualized paranasal sinuses and the left mastoid air cells are clear.    IMPRESSION:    1.  No evidence of acute intracranial hemorrhage.    2.  The previously seen right frontal lobe and basal ganglia hematoma has resolved.    --- End of Report ---              SANDY URBAN MD; Attending Radiologist  This document has been electronically signed. Nov 18 2021 10:56AM (11-18-21 @ 10:41)      11-18-21 @ 12:02       Neurology Consult    Patient is a 51y old  Male who presents with a chief complaint of seizure like activity.     Simeon is a 51 year old male, pmh HLD, stroke march 2021 with right basal ganglia hemorrhage >30cc and 6.3mm Midline shift but no acute bleed was BIBEMS for seizure like activity. Patient reported that he dropped off his wife, got home and then "things get blurry" reporting that the next thing he remembers is the paramedics arriving at his home and bringing him here. He reports that he bit his tongue but did not defecate self.     Spoke with Wife sharif [phone] who reports that the care taker told her that the patient was shaking, ems was activated and patient brought in to ED. Sharif reports that patient was observed to fall on the floor and have generalized shaking of all 4 limbs. She reports that shaking lasted for about 10 minutes and self terminated prior to EMS arrival. Sharif states that patient remained confused for about 30 minutes after episode.     Patient reports he has not taken his gabapentin in 2 weeks as his pmd has not sent in a refill.       PAST MEDICAL & SURGICAL HISTORY:  PUD (peptic ulcer disease)  Gastric ulcer  GI bleed  HTN (hypertension)  Arm fracture, left  s/p surgical repair      FAMILY HISTORY:  Family history of pancreatic cancer (Father)  Family history of breast cancer (Mother)      Social History:   reported pack year history of smoking.   Denied alcohol use  denied other substance use.       Allergies  No Known Allergies    MEDICATIONS  (STANDING):    MEDICATIONS  (PRN):      Review of systems:    Constitutional: as per HPI  Eyes: No eye pain or discharge  ENMT:  No difficulty hearing; No sinus or throat pain  Neck: No pain or stiffness  Respiratory: No cough, wheezing, chills or hemoptysis  Cardiovascular: No chest pain, palpitations, shortness of breath, dyspnea on exertion  Gastrointestinal: No abdominal pain, nausea, vomiting or hematemesis; No diarrhea or constipation.   Genitourinary: No dysuria, frequency, hematuria or incontinence  Neurological: As per HPI  Skin: No rashes or lesions   Endocrine: No heat or cold intolerance; No hair loss  Musculoskeletal: No joint pain or swelling  Psychiatric: No depression, anxiety, mood swings  Heme/Lymph: No easy bruising or bleeding gums    Vital Signs Last 24 Hrs  T(C): 37.1 (18 Nov 2021 08:55), Max: 37.1 (18 Nov 2021 08:55)  T(F): 98.7 (18 Nov 2021 08:55), Max: 98.7 (18 Nov 2021 08:55)  HR: 87 (18 Nov 2021 08:55) (87 - 87)  BP: 147/89 (18 Nov 2021 08:55) (147/89 - 147/89)  BP(mean): --  RR: 17 (18 Nov 2021 08:55) (17 - 17)  SpO2: 99% (18 Nov 2021 08:55) (99% - 99%)    Examination: INCOMPLETE   General:  Appearance is consistent with chronologic age.  No abnormal facies.  Gross skin survey within normal limits.    Cognitive/Language:  The patient is oriented to person, place, time and date.  Recent and remote memory intact.  Fund of knowledge is intact and normal.  Language with normal repetition, comprehension and naming.  Nondysarthric.    Eyes: intact VA, VFF.  EOMI w/o nystagmus, skew or reported double vision.  PERRL.  No ptosis/weakness of eyelid closure.    Face:  Facial sensation normal V1 - 3, no facial asymmetry.    Ears/Nose/Throat:  Hearing grossly intact b/l.  Palate elevates midline.  Tongue and uvula midline.   Motor examination:   Normal tone, bulk and range of motion.  No tenderness, twitching, tremors or involuntary movements.  Formal Muscle Strength Testing: (MRC grade R/L) 5/5 UE; 5/5 LE.  No observable drift.  Reflexes:   2+ b/l pectoralis, biceps, triceps, brachioradialis, patella and Achilles.  Plantar response downgoing b/l.  Jaw jerk, Andrey, clonus absent.  Sensory examination:   Intact to light touch and pinprick, pain, temperature and proprioception and vibration in all extremities.  Cerebellum:   FTN/HKS intact with normal CELESTE in all limbs.  No dysmetria or dysdiadokinesia.  Gait narrow based and normal.    Respiratory:  no audible wheezing or inspiratory stridor.  no use of accessory muscles.   Cardiac: pulse palpable, no audible bruits  Abdomen: supple, no guarding, no TTP    Labs:   CBC Full  -  ( 18 Nov 2021 10:10 )  WBC Count : 12.82 K/uL  RBC Count : 4.48 M/uL  Hemoglobin : 13.4 g/dL  Hematocrit : 40.0 %  Platelet Count - Automated : 339 K/uL  Mean Cell Volume : 89.3 fL  Mean Cell Hemoglobin : 29.9 pg  Mean Cell Hemoglobin Concentration : 33.5 g/dL  Auto Neutrophil # : 10.53 K/uL  Auto Lymphocyte # : 1.01 K/uL  Auto Monocyte # : 0.73 K/uL  Auto Eosinophil # : 0.21 K/uL  Auto Basophil # : 0.08 K/uL  Auto Neutrophil % : 82.2 %  Auto Lymphocyte % : 7.9 %  Auto Monocyte % : 5.7 %  Auto Eosinophil % : 1.6 %  Auto Basophil % : 0.6 %    11-18    137  |  101  |  22<H>  ----------------------------<  97  4.8   |  19  |  1.2    Ca    9.1      18 Nov 2021 10:10    TPro  6.2  /  Alb  4.2  /  TBili  0.4  /  DBili  x   /  AST  15  /  ALT  11  /  AlkPhos  82  11-18    LIVER FUNCTIONS - ( 18 Nov 2021 10:10 )  Alb: 4.2 g/dL / Pro: 6.2 g/dL / ALK PHOS: 82 U/L / ALT: 11 U/L / AST: 15 U/L / GGT: x           Neuroimaging:  NCHCT: CT Head No Cont:   EXAM:  CT BRAIN          PROCEDURE DATE:  11/18/2021      INTERPRETATION:  Clinical History / Reason for exam: Seizures. Evaluate for acute hemorrhage.    Technique: Noncontrast head CT. Contiguous CT axial images of the head from the base to the vertex.    COMPARISON: CT head 4/28/2021.    FINDINGS:    The previously seen right frontal/basal ganglia hematoma has resolved with gliosis and volume loss noted. There is ex vacuo dilatation of the right frontal horn.    The ventricles and cortical sulci are within normal limits and stable.    Stable patchy lucencies within the cerebral hemispheric white matter consistent with chronic microvascular change.    There is no evidence of new intracranial hemorrhage. No extra-axial fluid collection or midline shift.    Partial right mastoid opacification, decreased since the prior exam. The visualized paranasal sinuses and the left mastoid air cells are clear.    IMPRESSION:    1.  No evidence of acute intracranial hemorrhage.    2.  The previously seen right frontal lobe and basal ganglia hematoma has resolved.    --- End of Report ---      SANDY URBAN MD; Attending Radiologist  This document has been electronically signed. Nov 18 2021 10:56AM (11-18-21 @ 10:41)      11-18-21 @ 12:02      < from: CT Head w/ IV Cont (04.28.21 @ 11:27) >    Peripherally enhancing residual hematoma in the right frontal lobe extending the basal ganglia with significantly decreased size and surrounding edema compared to the prior CT from 3/2/2021.    Interval resolution of the focal intraparenchymal hemorrhage in the right frontal subependymal region.    Stable complete opacification of the right mastoid and partial opacification of the left mastoid, nonspecific.      < end of copied text >       Neurology Consult    Patient is a 51y old  Male who presents with a chief complaint of seizure like activity.     Simeon is a 51 year old male, pmh HLD, stroke Jan 2021 with intraparenchymal right basal ganglia hemorrhage, with extension superiorly in the right frontal region and 6.3mm Midline shift was BIBEMS for seizure like activity. Patient reported that he dropped off his wife, got home and then "things get blurry" reporting that the next thing he remembers is the paramedics arriving at his home and bringing him here. He reports that he bit his tongue but did not defecate self.     Spoke with Wife sharif [phone] who reports that the care taker told her that the patient was shaking, ems was activated and patient brought in to ED. Sharif reports that patient was observed to fall on the floor and have generalized shaking of all 4 limbs. She reports that shaking lasted for about 10 minutes and self terminated prior to EMS arrival. Sharif states that patient remained confused for about 30 minutes after episode. Sharif could not provide further information about current episode but reported that patient was in induced come for 3 months after his stroke.     Patient reports he has not taken his gabapentin in 2 weeks as his pmd has not sent in a refill. Patient reporting that gabapentin is for restless legs.       PAST MEDICAL & SURGICAL HISTORY:  PUD (peptic ulcer disease)  Gastric ulcer  GI bleed  HTN (hypertension)  Arm fracture, left  s/p surgical repair      FAMILY HISTORY:  Family history of pancreatic cancer (Father)  Family history of breast cancer (Mother)      Social History:   reported 27 years smoking x1/2ppd   Denied alcohol use  denied other substance use.       Allergies  No Known Allergies    MEDICATIONS  (STANDING):    MEDICATIONS  (PRN):      Review of systems:    Constitutional: as per HPI  Eyes: No eye pain or discharge, reported blurry vision after shaking episode  ENMT:  No difficulty hearing; No sinus or throat pain, reported mild ringing in ears after shaking episode.   Neck: No pain or stiffness  Respiratory: No cough, wheezing, chills or hemoptysis  Cardiovascular: No chest pain, palpitations, shortness of breath, dyspnea on exertion  Gastrointestinal: No abdominal pain, nausea, vomiting or hematemesis; No diarrhea or constipation.   Genitourinary: No dysuria, frequency, hematuria or incontinence  Neurological: As per HPI  Skin: No rashes or lesions   Endocrine: No heat or cold intolerance; No hair loss  Musculoskeletal: No joint pain or swelling  Psychiatric: No depression, anxiety, mood swings  Heme/Lymph: No easy bruising or bleeding gums    Vital Signs Last 24 Hrs  T(C): 37.1 (18 Nov 2021 08:55), Max: 37.1 (18 Nov 2021 08:55)  T(F): 98.7 (18 Nov 2021 08:55), Max: 98.7 (18 Nov 2021 08:55)  HR: 87 (18 Nov 2021 08:55) (87 - 87)  BP: 147/89 (18 Nov 2021 08:55) (147/89 - 147/89)  BP(mean): --  RR: 17 (18 Nov 2021 08:55) (17 - 17)  SpO2: 99% (18 Nov 2021 08:55) (99% - 99%)    Examination: INCOMPLETE   General:  Appearance is consistent with chronologic age.  No abnormal facies.  Gross skin survey within normal limits.    Cognitive/Language:  The patient is oriented to person, place, time and date.  Recent and remote memory intact.  Fund of knowledge is intact and normal.  Language with normal repetition, comprehension and naming.  Nondysarthric.    Eyes: intact VA, VFF.  EOMI w/o nystagmus, skew or reported double vision.  PERRL.  No ptosis/weakness of eyelid closure.    Face:  Facial sensation normal V1 - 3, no facial asymmetry.    Ears/Nose/Throat:  Hearing grossly intact b/l.  Palate elevates midline.  Tongue and uvula midline.   Motor examination:   Normal tone, bulk and range of motion.  No tenderness, twitching, tremors or involuntary movements.  Formal Muscle Strength Testing: (MRC grade R/L) 5/5 UE; 5/5 LE.  No observable drift.  Reflexes:   2+ b/l pectoralis, biceps, triceps, brachioradialis, patella and Achilles.  Plantar response downgoing b/l.  Jaw jerk, Andrey, clonus absent.  Sensory examination:   Intact to light touch and pinprick, pain, temperature and proprioception and vibration in all extremities.  Cerebellum:   FTN/HKS intact with normal CELESTE in all limbs.  No dysmetria or dysdiadokinesia.  Gait narrow based and normal.    Respiratory:  no audible wheezing or inspiratory stridor.  no use of accessory muscles.   Cardiac: pulse palpable, no audible bruits  Abdomen: supple, no guarding, no TTP    Labs:   CBC Full  -  ( 18 Nov 2021 10:10 )  WBC Count : 12.82 K/uL  RBC Count : 4.48 M/uL  Hemoglobin : 13.4 g/dL  Hematocrit : 40.0 %  Platelet Count - Automated : 339 K/uL  Mean Cell Volume : 89.3 fL  Mean Cell Hemoglobin : 29.9 pg  Mean Cell Hemoglobin Concentration : 33.5 g/dL  Auto Neutrophil # : 10.53 K/uL  Auto Lymphocyte # : 1.01 K/uL  Auto Monocyte # : 0.73 K/uL  Auto Eosinophil # : 0.21 K/uL  Auto Basophil # : 0.08 K/uL  Auto Neutrophil % : 82.2 %  Auto Lymphocyte % : 7.9 %  Auto Monocyte % : 5.7 %  Auto Eosinophil % : 1.6 %  Auto Basophil % : 0.6 %    11-18    137  |  101  |  22<H>  ----------------------------<  97  4.8   |  19  |  1.2    Ca    9.1      18 Nov 2021 10:10    TPro  6.2  /  Alb  4.2  /  TBili  0.4  /  DBili  x   /  AST  15  /  ALT  11  /  AlkPhos  82  11-18    LIVER FUNCTIONS - ( 18 Nov 2021 10:10 )  Alb: 4.2 g/dL / Pro: 6.2 g/dL / ALK PHOS: 82 U/L / ALT: 11 U/L / AST: 15 U/L / GGT: x           Neuroimaging:  NCHCT: CT Head No Cont:   EXAM:  CT BRAIN          PROCEDURE DATE:  11/18/2021      INTERPRETATION:  Clinical History / Reason for exam: Seizures. Evaluate for acute hemorrhage.    Technique: Noncontrast head CT. Contiguous CT axial images of the head from the base to the vertex.    COMPARISON: CT head 4/28/2021.    FINDINGS:    The previously seen right frontal/basal ganglia hematoma has resolved with gliosis and volume loss noted. There is ex vacuo dilatation of the right frontal horn.    The ventricles and cortical sulci are within normal limits and stable.    Stable patchy lucencies within the cerebral hemispheric white matter consistent with chronic microvascular change.    There is no evidence of new intracranial hemorrhage. No extra-axial fluid collection or midline shift.    Partial right mastoid opacification, decreased since the prior exam. The visualized paranasal sinuses and the left mastoid air cells are clear.    IMPRESSION:    1.  No evidence of acute intracranial hemorrhage.    2.  The previously seen right frontal lobe and basal ganglia hematoma has resolved.    --- End of Report ---      SANDY URBAN MD; Attending Radiologist  This document has been electronically signed. Nov 18 2021 10:56AM (11-18-21 @ 10:41)      11-18-21 @ 12:02      < from: CT Brain Stroke Protocol (01.27.21 @ 20:37) >  FINDINGS:    There is a moderate to large intraparenchymal right basal ganglia hemorrhage, with extension superiorly in the right frontal region. There is mild surrounding edema and associated right sulcal effacement as well as decompression into the ventricles. Intraventricular blood layers primarily in the bilateral frontal horns, left occipital horn, third ventricle and fourth ventricle (faintly opacified). There is approximately 6.3 mm of midline shift.    The orbital contents, soft tissues, and osseous structures are within normal limits.    Minimal mucosal thickening is noted within the paranasal sinuses. Mastoid air cavities are well aerated.    IMPRESSION:    Moderate to large intraparenchymal right basal ganglia hemorrhage with mild surrounding edema and associated right sulcal effacement as well as decompression into the ventricles. There is approximately 6.3 mm of midline shift.    < end of copied text >           Neurology Consult    Patient is a 51y old  Male who presents with a chief complaint of seizure like activity.     Simeon is a 51 year old male, pmh HLD, stroke Jan 2021 with intraparenchymal right basal ganglia hemorrhage, with extension superiorly in the right frontal region and 6.3mm Midline shift w/ residual L sided weakness, was BIBEMS for seizure like activity. Patient reported that he dropped off his wife, got home and then "things get blurry" reporting that the next thing he remembers is the paramedics arriving at his home and bringing him here. He reports that he bit his tongue but did not defecate self.     Spoke with Wife sharif [phone] who reports that the care taker told her that the patient was shaking, ems was activated and patient brought in to ED. Sharif reports that patient was observed to fall on the floor and have generalized shaking of all 4 limbs. She reports that shaking lasted for about 10 minutes and self terminated prior to EMS arrival. Sharif states that patient remained confused for about 30 minutes after episode. Sharif could not provide further information about current episode but reported that patient was in induced come for 3 months after his stroke.     Patient reports he has not taken his gabapentin in 2 weeks as his pmd has not sent in a refill. Patient reporting that gabapentin is for restless legs.       PAST MEDICAL & SURGICAL HISTORY:  PUD (peptic ulcer disease)  Gastric ulcer  GI bleed  HTN (hypertension)  Arm fracture, left  s/p surgical repair      FAMILY HISTORY:  Family history of pancreatic cancer (Father)  Family history of breast cancer (Mother)      Social History:   reported 27 years smoking x1/2ppd   Denied alcohol use  denied other substance use.       Allergies  No Known Allergies    MEDICATIONS  (STANDING):    MEDICATIONS  (PRN):      Review of systems:    Constitutional: as per HPI  Eyes: No eye pain or discharge, reported blurry vision after shaking episode  ENMT:  No difficulty hearing; No sinus or throat pain, reported mild ringing in ears after shaking episode.   Neck: No pain or stiffness  Respiratory: No cough, wheezing, chills or hemoptysis  Cardiovascular: No chest pain, palpitations, shortness of breath, dyspnea on exertion  Gastrointestinal: No abdominal pain, nausea, vomiting or hematemesis; No diarrhea or constipation.   Genitourinary: No dysuria, frequency, hematuria or incontinence  Neurological: As per HPI  Skin: No rashes or lesions   Endocrine: No heat or cold intolerance; No hair loss  Musculoskeletal: No joint pain or swelling  Psychiatric: No depression, anxiety, mood swings  Heme/Lymph: No easy bruising or bleeding gums    Vital Signs Last 24 Hrs  T(C): 37.1 (18 Nov 2021 08:55), Max: 37.1 (18 Nov 2021 08:55)  T(F): 98.7 (18 Nov 2021 08:55), Max: 98.7 (18 Nov 2021 08:55)  HR: 87 (18 Nov 2021 08:55) (87 - 87)  BP: 147/89 (18 Nov 2021 08:55) (147/89 - 147/89)  BP(mean): --  RR: 17 (18 Nov 2021 08:55) (17 - 17)  SpO2: 99% (18 Nov 2021 08:55) (99% - 99%)    Examination:  General:  Appearance is consistent with chronologic age.  No abnormal facies.  Gross skin survey within normal limits. Lesion on L side of tongue after biting during seizure episode.  Cognitive/Language:  Awake, alert, and oriented to person, place, time and date.  Recent and remote memory intact.  Fund of knowledge is appropriate. Comprehension intact. Nondysarthric.    Cranial Nerves  - Eyes: Visual acuity intact, Visual fields full.  EOMI w/o nystagmus, reported double vision.  PERRL.  No ptosis/weakness of eyelid closure.    - Face:  Facial sensation normal V1 - 3, no facial asymmetry.    - Ears/Nose/Throat:  Hearing grossly intact b/l to finger rub.  Palate elevates midline.  Tongue and uvula midline.   Motor examination:   Normal tone, bulk and range of motion of R extremities. LUE with contracture due to previous stroke but able to move with effort, RLE with lmited mobility due to torn meniscus.  No tenderness, twitching, tremors or involuntary movements.  Formal Muscle Strength Testing: (MRC grade R/L) 5/5 UE; 5/5 LE.  No observable drift of RUE.   Reflexes:   2+ b/l biceps, triceps, brachioradialis,  Achilles.  Plantar response downgoing b/l.    Sensory examination:   Intact to light touch and pinprick, pain, temperature and proprioception and vibration in all extremities.  Cerebellum:   FTN/HKS intact with normal CELESTE in all limbs.  No dysmetria or dysdiadokinesia.  Gait narrow based and normal.    Labs:   CBC Full  -  ( 18 Nov 2021 10:10 )  WBC Count : 12.82 K/uL  RBC Count : 4.48 M/uL  Hemoglobin : 13.4 g/dL  Hematocrit : 40.0 %  Platelet Count - Automated : 339 K/uL  Mean Cell Volume : 89.3 fL  Mean Cell Hemoglobin : 29.9 pg  Mean Cell Hemoglobin Concentration : 33.5 g/dL  Auto Neutrophil # : 10.53 K/uL  Auto Lymphocyte # : 1.01 K/uL  Auto Monocyte # : 0.73 K/uL  Auto Eosinophil # : 0.21 K/uL  Auto Basophil # : 0.08 K/uL  Auto Neutrophil % : 82.2 %  Auto Lymphocyte % : 7.9 %  Auto Monocyte % : 5.7 %  Auto Eosinophil % : 1.6 %  Auto Basophil % : 0.6 %    11-18    137  |  101  |  22<H>  ----------------------------<  97  4.8   |  19  |  1.2    Ca    9.1      18 Nov 2021 10:10    TPro  6.2  /  Alb  4.2  /  TBili  0.4  /  DBili  x   /  AST  15  /  ALT  11  /  AlkPhos  82  11-18    LIVER FUNCTIONS - ( 18 Nov 2021 10:10 )  Alb: 4.2 g/dL / Pro: 6.2 g/dL / ALK PHOS: 82 U/L / ALT: 11 U/L / AST: 15 U/L / GGT: x           Neuroimaging:  NCHCT: CT Head No Cont:   EXAM:  CT BRAIN          PROCEDURE DATE:  11/18/2021      INTERPRETATION:  Clinical History / Reason for exam: Seizures. Evaluate for acute hemorrhage.    Technique: Noncontrast head CT. Contiguous CT axial images of the head from the base to the vertex.    COMPARISON: CT head 4/28/2021.    FINDINGS:    The previously seen right frontal/basal ganglia hematoma has resolved with gliosis and volume loss noted. There is ex vacuo dilatation of the right frontal horn.    The ventricles and cortical sulci are within normal limits and stable.    Stable patchy lucencies within the cerebral hemispheric white matter consistent with chronic microvascular change.    There is no evidence of new intracranial hemorrhage. No extra-axial fluid collection or midline shift.    Partial right mastoid opacification, decreased since the prior exam. The visualized paranasal sinuses and the left mastoid air cells are clear.    IMPRESSION:    1.  No evidence of acute intracranial hemorrhage.    2.  The previously seen right frontal lobe and basal ganglia hematoma has resolved.    --- End of Report ---      SANDY URBAN MD; Attending Radiologist  This document has been electronically signed. Nov 18 2021 10:56AM (11-18-21 @ 10:41)      11-18-21 @ 12:02      < from: CT Brain Stroke Protocol (01.27.21 @ 20:37) >  FINDINGS:    There is a moderate to large intraparenchymal right basal ganglia hemorrhage, with extension superiorly in the right frontal region. There is mild surrounding edema and associated right sulcal effacement as well as decompression into the ventricles. Intraventricular blood layers primarily in the bilateral frontal horns, left occipital horn, third ventricle and fourth ventricle (faintly opacified). There is approximately 6.3 mm of midline shift.    The orbital contents, soft tissues, and osseous structures are within normal limits.    Minimal mucosal thickening is noted within the paranasal sinuses. Mastoid air cavities are well aerated.    IMPRESSION:    Moderate to large intraparenchymal right basal ganglia hemorrhage with mild surrounding edema and associated right sulcal effacement as well as decompression into the ventricles. There is approximately 6.3 mm of midline shift.    < end of copied text >           Neurology Consult    Patient is a 51y old  Male who presents with a chief complaint of seizure like activity.     Simeon is a 51 year old male, pmh HLD, stroke Jan 2021 with intraparenchymal right basal ganglia hemorrhage, with extension superiorly in the right frontal region and 6.3mm Midline shift w/ residual L sided weakness, was BIBEMS for seizure like activity. Patient reported that he dropped off his wife, got home and then "things get blurry" reporting that the next thing he remembers is the paramedics arriving at his home and bringing him here. He reports that he bit his tongue but did not defecate self.     Spoke with Wife sharif [phone] who reports that the care taker told her that the patient was shaking, ems was activated and patient brought in to ED. Sharif reports that patient was observed to fall on the floor and have generalized shaking of all 4 limbs. She reports that shaking lasted for about 10 minutes and self terminated prior to EMS arrival. Sharif states that patient remained confused for about 30 minutes after episode. Sharif could not provide further information about current episode but reported that patient was in induced come for 3 months after his stroke.     Patient reports he has not taken his gabapentin in 2 weeks as his pmd has not sent in a refill. Patient reporting that gabapentin is for restless legs.       PAST MEDICAL & SURGICAL HISTORY:  PUD (peptic ulcer disease)  Gastric ulcer  GI bleed  HTN (hypertension)  Arm fracture, left  s/p surgical repair      FAMILY HISTORY:  Family history of pancreatic cancer (Father)  Family history of breast cancer (Mother)      Social History:   reported 27 years smoking x1/2ppd   Denied alcohol use  denied other substance use.       Allergies  No Known Allergies    MEDICATIONS  (STANDING):    MEDICATIONS  (PRN):      Review of systems:    Constitutional: as per HPI  Eyes: No eye pain or discharge, reported blurry vision after shaking episode  ENMT:  No difficulty hearing; No sinus or throat pain, reported mild ringing in ears after shaking episode.   Neck: No pain or stiffness  Respiratory: No cough, wheezing, chills or hemoptysis  Cardiovascular: No chest pain, palpitations, shortness of breath, dyspnea on exertion  Gastrointestinal: No abdominal pain, nausea, vomiting or hematemesis; No diarrhea or constipation.   Genitourinary: No dysuria, frequency, hematuria or incontinence  Neurological: As per HPI  Skin: No rashes or lesions   Endocrine: No heat or cold intolerance; No hair loss  Musculoskeletal: No joint pain or swelling  Psychiatric: No depression, anxiety, mood swings  Heme/Lymph: No easy bruising or bleeding gums    Vital Signs Last 24 Hrs  T(C): 37.1 (18 Nov 2021 08:55), Max: 37.1 (18 Nov 2021 08:55)  T(F): 98.7 (18 Nov 2021 08:55), Max: 98.7 (18 Nov 2021 08:55)  HR: 87 (18 Nov 2021 08:55) (87 - 87)  BP: 147/89 (18 Nov 2021 08:55) (147/89 - 147/89)  BP(mean): --  RR: 17 (18 Nov 2021 08:55) (17 - 17)  SpO2: 99% (18 Nov 2021 08:55) (99% - 99%)    Examination:  General:  Appearance is consistent with chronologic age.  No abnormal facies.  Gross skin survey within normal limits. Lesion on L side of tongue after biting during seizure episode.  Cognitive/Language:  Awake, alert, and oriented to person, place, time and date.  Recent and remote memory intact.  Fund of knowledge is appropriate. Comprehension intact. Nondysarthric.    Cranial Nerves  - Eyes: Visual acuity intact, Visual fields full.  EOMI w/o nystagmus, reported double vision.  PERRL.  No ptosis/weakness of eyelid closure.    - Face:  Facial sensation normal V1 - 3, no facial asymmetry.    - Ears/Nose/Throat:  Hearing grossly intact b/l to finger rub.  Palate elevates midline.  Tongue and uvula midline.   Motor examination:   Normal tone, bulk and range of motion of R extremities. LUE with contracture due to previous stroke but able to move with effort, RLE with lmited mobility due to torn meniscus.  No tenderness, twitching, tremors or involuntary movements.  Formal Muscle Strength Testing: (MRC grade R/L) 5/5 UE; 5/5 LE.  No observable drift of RUE.   Reflexes:   2+ b/l biceps, triceps, brachioradialis,  Achilles.  Plantar response downgoing b/l.    Sensory examination:   Intact to light touch and pinprick, pain, temperature and proprioception and vibration in all extremities.  Cerebellum:   FTN/HKS intact with normal CELESTE in all limbs.  No dysmetria or dysdiadokinesia.       Labs:   CBC Full  -  ( 18 Nov 2021 10:10 )  WBC Count : 12.82 K/uL  RBC Count : 4.48 M/uL  Hemoglobin : 13.4 g/dL  Hematocrit : 40.0 %  Platelet Count - Automated : 339 K/uL  Mean Cell Volume : 89.3 fL  Mean Cell Hemoglobin : 29.9 pg  Mean Cell Hemoglobin Concentration : 33.5 g/dL  Auto Neutrophil # : 10.53 K/uL  Auto Lymphocyte # : 1.01 K/uL  Auto Monocyte # : 0.73 K/uL  Auto Eosinophil # : 0.21 K/uL  Auto Basophil # : 0.08 K/uL  Auto Neutrophil % : 82.2 %  Auto Lymphocyte % : 7.9 %  Auto Monocyte % : 5.7 %  Auto Eosinophil % : 1.6 %  Auto Basophil % : 0.6 %    11-18    137  |  101  |  22<H>  ----------------------------<  97  4.8   |  19  |  1.2    Ca    9.1      18 Nov 2021 10:10    TPro  6.2  /  Alb  4.2  /  TBili  0.4  /  DBili  x   /  AST  15  /  ALT  11  /  AlkPhos  82  11-18    LIVER FUNCTIONS - ( 18 Nov 2021 10:10 )  Alb: 4.2 g/dL / Pro: 6.2 g/dL / ALK PHOS: 82 U/L / ALT: 11 U/L / AST: 15 U/L / GGT: x           Neuroimaging:  NCHCT: CT Head No Cont:   EXAM:  CT BRAIN          PROCEDURE DATE:  11/18/2021      INTERPRETATION:  Clinical History / Reason for exam: Seizures. Evaluate for acute hemorrhage.    Technique: Noncontrast head CT. Contiguous CT axial images of the head from the base to the vertex.    COMPARISON: CT head 4/28/2021.    FINDINGS:    The previously seen right frontal/basal ganglia hematoma has resolved with gliosis and volume loss noted. There is ex vacuo dilatation of the right frontal horn.    The ventricles and cortical sulci are within normal limits and stable.    Stable patchy lucencies within the cerebral hemispheric white matter consistent with chronic microvascular change.    There is no evidence of new intracranial hemorrhage. No extra-axial fluid collection or midline shift.    Partial right mastoid opacification, decreased since the prior exam. The visualized paranasal sinuses and the left mastoid air cells are clear.    IMPRESSION:    1.  No evidence of acute intracranial hemorrhage.    2.  The previously seen right frontal lobe and basal ganglia hematoma has resolved.    --- End of Report ---      SANDY URBAN MD; Attending Radiologist  This document has been electronically signed. Nov 18 2021 10:56AM (11-18-21 @ 10:41)      11-18-21 @ 12:02      < from: CT Brain Stroke Protocol (01.27.21 @ 20:37) >  FINDINGS:    There is a moderate to large intraparenchymal right basal ganglia hemorrhage, with extension superiorly in the right frontal region. There is mild surrounding edema and associated right sulcal effacement as well as decompression into the ventricles. Intraventricular blood layers primarily in the bilateral frontal horns, left occipital horn, third ventricle and fourth ventricle (faintly opacified). There is approximately 6.3 mm of midline shift.    The orbital contents, soft tissues, and osseous structures are within normal limits.    Minimal mucosal thickening is noted within the paranasal sinuses. Mastoid air cavities are well aerated.    IMPRESSION:    Moderate to large intraparenchymal right basal ganglia hemorrhage with mild surrounding edema and associated right sulcal effacement as well as decompression into the ventricles. There is approximately 6.3 mm of midline shift.    < end of copied text >           Neurology Consult    Patient is a 51y old  Male who presents with a chief complaint of seizure like activity.     Simeon is a 51 year old male, pmh HLD, stroke Jan 2021 with intraparenchymal right basal ganglia hemorrhage, with extension superiorly in the right frontal region and 6.3mm Midline shift w/ residual L sided weakness, was BIBEMS for seizure like activity. Patient reported that he dropped off his wife, got home and then "things get blurry" reporting that the next thing he remembers is the paramedics arriving at his home and bringing him here. He reports that he bit his tongue but did not defecate self.     Spoke with Wife sharif [phone] who reports that the care taker told her that the patient was shaking, ems was activated and patient brought in to ED. Sharif reports that patient was observed to fall on the floor and have generalized shaking of all 4 limbs. She reports that shaking lasted for about 10 minutes and self terminated prior to EMS arrival. Sharif states that patient remained confused for about 30 minutes after episode. Sharif could not provide further information about current episode but reported that patient was in induced come for 3 months after his stroke.     Patient reports he has not taken his gabapentin in 2 weeks as his pmd has not sent in a refill. Patient reporting that gabapentin is for restless legs.       PAST MEDICAL & SURGICAL HISTORY:  PUD (peptic ulcer disease)  Gastric ulcer  GI bleed  HTN (hypertension)  Arm fracture, left  s/p surgical repair      FAMILY HISTORY:  Family history of pancreatic cancer (Father)  Family history of breast cancer (Mother)      Social History:   reported 27 years smoking x1/2ppd   Denied alcohol use  denied other substance use.       Allergies  No Known Allergies    MEDICATIONS  (STANDING):    MEDICATIONS  (PRN):      Review of systems:    Constitutional: as per HPI  Eyes: No eye pain or discharge, reported blurry vision after shaking episode  ENMT:  No difficulty hearing; No sinus or throat pain, reported mild ringing in ears after shaking episode.   Neck: No pain or stiffness  Respiratory: No cough, wheezing, chills or hemoptysis  Cardiovascular: No chest pain, palpitations, shortness of breath, dyspnea on exertion  Gastrointestinal: No abdominal pain, nausea, vomiting or hematemesis; No diarrhea or constipation.   Genitourinary: No dysuria, frequency, hematuria or incontinence  Neurological: As per HPI  Skin: No rashes or lesions   Endocrine: No heat or cold intolerance; No hair loss  Musculoskeletal: No joint pain or swelling  Psychiatric: No depression, anxiety, mood swings  Heme/Lymph: No easy bruising or bleeding gums    Vital Signs Last 24 Hrs  T(C): 37.1 (18 Nov 2021 08:55), Max: 37.1 (18 Nov 2021 08:55)  T(F): 98.7 (18 Nov 2021 08:55), Max: 98.7 (18 Nov 2021 08:55)  HR: 87 (18 Nov 2021 08:55) (87 - 87)  BP: 147/89 (18 Nov 2021 08:55) (147/89 - 147/89)  BP(mean): --  RR: 17 (18 Nov 2021 08:55) (17 - 17)  SpO2: 99% (18 Nov 2021 08:55) (99% - 99%)    Examination:  General:  Appearance is consistent with chronologic age.  No abnormal facies.  Gross skin survey within normal limits. Lesion on L side of tongue after biting during seizure episode.  Cognitive/Language:  Awake, alert, and oriented to person, place, time and date.  Recent and remote memory intact.  Fund of knowledge is appropriate. Comprehension intact. Nondysarthric.    Cranial Nerves:  - Eyes: Visual acuity intact, Visual fields full.  EOMI w/o nystagmus, reported double vision.  PERRL.  No ptosis/weakness of eyelid closure.    - Face:  Facial sensation normal V1 - 3, no facial asymmetry.    - Ears/Nose/Throat:  Hearing grossly intact b/l to finger rub.  Palate elevates midline.  Tongue and uvula midline.   Motor examination:   Normal tone, bulk and range of motion of R extremities. LUE with contracture due to previous stroke but able to move with effort, RLE with lmited mobility due to torn meniscus.  No tenderness, twitching, tremors or involuntary movements.  Formal Muscle Strength Testing: (MRC grade R/L) 5/5 UE; 5/5 LE.  No observable drift of RUE.   Reflexes:   2+ b/l biceps, triceps, brachioradialis,  Achilles.  Plantar response downgoing b/l.    Sensory examination:   Intact to light touch and pinprick, pain, temperature and proprioception and vibration in all extremities.  Cerebellum:   FTN/HKS intact with normal CELESTE in all limbs.  No dysmetria or dysdiadokinesia.       Labs:   CBC Full  -  ( 18 Nov 2021 10:10 )  WBC Count : 12.82 K/uL  RBC Count : 4.48 M/uL  Hemoglobin : 13.4 g/dL  Hematocrit : 40.0 %  Platelet Count - Automated : 339 K/uL  Mean Cell Volume : 89.3 fL  Mean Cell Hemoglobin : 29.9 pg  Mean Cell Hemoglobin Concentration : 33.5 g/dL  Auto Neutrophil # : 10.53 K/uL  Auto Lymphocyte # : 1.01 K/uL  Auto Monocyte # : 0.73 K/uL  Auto Eosinophil # : 0.21 K/uL  Auto Basophil # : 0.08 K/uL  Auto Neutrophil % : 82.2 %  Auto Lymphocyte % : 7.9 %  Auto Monocyte % : 5.7 %  Auto Eosinophil % : 1.6 %  Auto Basophil % : 0.6 %    11-18    137  |  101  |  22<H>  ----------------------------<  97  4.8   |  19  |  1.2    Ca    9.1      18 Nov 2021 10:10    TPro  6.2  /  Alb  4.2  /  TBili  0.4  /  DBili  x   /  AST  15  /  ALT  11  /  AlkPhos  82  11-18    LIVER FUNCTIONS - ( 18 Nov 2021 10:10 )  Alb: 4.2 g/dL / Pro: 6.2 g/dL / ALK PHOS: 82 U/L / ALT: 11 U/L / AST: 15 U/L / GGT: x           Neuroimaging:  NCHCT: CT Head No Cont:   EXAM:  CT BRAIN          PROCEDURE DATE:  11/18/2021      INTERPRETATION:  Clinical History / Reason for exam: Seizures. Evaluate for acute hemorrhage.    Technique: Noncontrast head CT. Contiguous CT axial images of the head from the base to the vertex.    COMPARISON: CT head 4/28/2021.    FINDINGS:    The previously seen right frontal/basal ganglia hematoma has resolved with gliosis and volume loss noted. There is ex vacuo dilatation of the right frontal horn.    The ventricles and cortical sulci are within normal limits and stable.    Stable patchy lucencies within the cerebral hemispheric white matter consistent with chronic microvascular change.    There is no evidence of new intracranial hemorrhage. No extra-axial fluid collection or midline shift.    Partial right mastoid opacification, decreased since the prior exam. The visualized paranasal sinuses and the left mastoid air cells are clear.    IMPRESSION:    1.  No evidence of acute intracranial hemorrhage.    2.  The previously seen right frontal lobe and basal ganglia hematoma has resolved.    --- End of Report ---      SANDY URBAN MD; Attending Radiologist  This document has been electronically signed. Nov 18 2021 10:56AM (11-18-21 @ 10:41)      11-18-21 @ 12:02      < from: CT Brain Stroke Protocol (01.27.21 @ 20:37) >  FINDINGS:    There is a moderate to large intraparenchymal right basal ganglia hemorrhage, with extension superiorly in the right frontal region. There is mild surrounding edema and associated right sulcal effacement as well as decompression into the ventricles. Intraventricular blood layers primarily in the bilateral frontal horns, left occipital horn, third ventricle and fourth ventricle (faintly opacified). There is approximately 6.3 mm of midline shift.    The orbital contents, soft tissues, and osseous structures are within normal limits.    Minimal mucosal thickening is noted within the paranasal sinuses. Mastoid air cavities are well aerated.    IMPRESSION:    Moderate to large intraparenchymal right basal ganglia hemorrhage with mild surrounding edema and associated right sulcal effacement as well as decompression into the ventricles. There is approximately 6.3 mm of midline shift.    < end of copied text >           Neurology Consult    Patient is a 51y old  Male who presents with a chief complaint of seizure like activity.     Simeon is a 51 year old male, pmh HLD, stroke Jan 2021 with intraparenchymal right basal ganglia hemorrhage, with extension superiorly in the right frontal region and 6.3mm Midline shift w/ residual L sided weakness, was BIBEMS for seizure like activity. Patient reported that he dropped off his wife, got home and then "things get blurry" reporting that the next thing he remembers is the paramedics arriving at his home and bringing him here. He reports that he bit his tongue but did not defecate self.     Spoke with Wife sharif [phone] who reports that the care taker told her that the patient was shaking, ems was activated and patient brought in to ED. Sharif reports that patient was observed to fall on the floor and have generalized shaking of all 4 limbs. She reports that shaking lasted for about 10 minutes and self terminated prior to EMS arrival. Sharif states that patient remained confused for about 30 minutes after episode. Sharif could not provide further information about current episode but reported that patient was in induced come for 3 months after his stroke.     Patient reports he has not taken his gabapentin in 2 weeks as his pmd has not sent in a refill. Patient reporting that gabapentin is for restless legs.       PAST MEDICAL & SURGICAL HISTORY:  PUD (peptic ulcer disease)  Gastric ulcer  GI bleed  HTN (hypertension)  Arm fracture, left  s/p surgical repair      FAMILY HISTORY:  Family history of pancreatic cancer (Father)  Family history of breast cancer (Mother)      Social History:   reported 27 years smoking x1/2ppd   Denied alcohol use  denied other substance use.       Allergies  No Known Allergies    MEDICATIONS  (STANDING):    MEDICATIONS  (PRN):      Review of systems:    Constitutional: as per HPI  Eyes: No eye pain or discharge, reported blurry vision after shaking episode  ENMT:  No difficulty hearing; No sinus or throat pain, reported mild ringing in ears after shaking episode.   Neck: No pain or stiffness  Respiratory: No cough, wheezing, chills or hemoptysis  Cardiovascular: No chest pain, palpitations, shortness of breath, dyspnea on exertion  Gastrointestinal: No abdominal pain, nausea, vomiting or hematemesis; No diarrhea or constipation.   Genitourinary: No dysuria, frequency, hematuria or incontinence  Neurological: As per HPI  Skin: No rashes or lesions   Endocrine: No heat or cold intolerance; No hair loss  Musculoskeletal: No joint pain or swelling  Psychiatric: No depression, anxiety, mood swings  Heme/Lymph: No easy bruising or bleeding gums    Vital Signs Last 24 Hrs  T(C): 37.1 (18 Nov 2021 08:55), Max: 37.1 (18 Nov 2021 08:55)  T(F): 98.7 (18 Nov 2021 08:55), Max: 98.7 (18 Nov 2021 08:55)  HR: 87 (18 Nov 2021 08:55) (87 - 87)  BP: 147/89 (18 Nov 2021 08:55) (147/89 - 147/89)  BP(mean): --  RR: 17 (18 Nov 2021 08:55) (17 - 17)  SpO2: 99% (18 Nov 2021 08:55) (99% - 99%)    Examination:  General:  Appearance is consistent with chronologic age.  No abnormal facies.  Gross skin survey within normal limits. Lesion on L side of tongue after biting during seizure episode.  Cognitive/Language:  Awake, alert, and oriented to person, place, time and date.  Recent and remote memory intact.  Fund of knowledge is appropriate. Comprehension intact. Nondysarthric.    Cranial Nerves:  - Eyes: Visual acuity intact, Visual fields full.  EOMI w/o nystagmus, reported double vision.  PERRL.  No ptosis/weakness of eyelid closure.    - Face:  Facial sensation normal V1 - 3, no facial asymmetry.    - Ears/Nose/Throat:  Hearing grossly intact b/l to finger rub.  Palate elevates midline.  Tongue and uvula midline.   Motor examination:   Normal tone, bulk and range of motion of R extremities. LUE with contracture due to previous stroke but able to move with effort, RLE with lmited mobility due to torn meniscus.  No tenderness, twitching, tremors or involuntary movements.  Formal Muscle Strength Testing: (MRC grade R/L) 5/5 UE; 5/5 LE.  No observable drift of RUE. Weakness and rigidity on the left arm.,   Reflexes:   2+ b/l biceps, triceps, brachioradialis,  Achilles.  Plantar response downgoing b/l.    Sensory examination:   Intact to light touch and pinprick, pain, temperature and proprioception and vibration in all extremities.  Cerebellum:   FTN/HKS intact with normal CELESTE in all limbs.  No dysmetria or dysdiadokinesia.       Labs:   CBC Full  -  ( 18 Nov 2021 10:10 )  WBC Count : 12.82 K/uL  RBC Count : 4.48 M/uL  Hemoglobin : 13.4 g/dL  Hematocrit : 40.0 %  Platelet Count - Automated : 339 K/uL  Mean Cell Volume : 89.3 fL  Mean Cell Hemoglobin : 29.9 pg  Mean Cell Hemoglobin Concentration : 33.5 g/dL  Auto Neutrophil # : 10.53 K/uL  Auto Lymphocyte # : 1.01 K/uL  Auto Monocyte # : 0.73 K/uL  Auto Eosinophil # : 0.21 K/uL  Auto Basophil # : 0.08 K/uL  Auto Neutrophil % : 82.2 %  Auto Lymphocyte % : 7.9 %  Auto Monocyte % : 5.7 %  Auto Eosinophil % : 1.6 %  Auto Basophil % : 0.6 %    11-18    137  |  101  |  22<H>  ----------------------------<  97  4.8   |  19  |  1.2    Ca    9.1      18 Nov 2021 10:10    TPro  6.2  /  Alb  4.2  /  TBili  0.4  /  DBili  x   /  AST  15  /  ALT  11  /  AlkPhos  82  11-18    LIVER FUNCTIONS - ( 18 Nov 2021 10:10 )  Alb: 4.2 g/dL / Pro: 6.2 g/dL / ALK PHOS: 82 U/L / ALT: 11 U/L / AST: 15 U/L / GGT: x           Neuroimaging:  NCHCT: CT Head No Cont:   EXAM:  CT BRAIN          PROCEDURE DATE:  11/18/2021      INTERPRETATION:  Clinical History / Reason for exam: Seizures. Evaluate for acute hemorrhage.    Technique: Noncontrast head CT. Contiguous CT axial images of the head from the base to the vertex.    COMPARISON: CT head 4/28/2021.    FINDINGS:    The previously seen right frontal/basal ganglia hematoma has resolved with gliosis and volume loss noted. There is ex vacuo dilatation of the right frontal horn.    The ventricles and cortical sulci are within normal limits and stable.    Stable patchy lucencies within the cerebral hemispheric white matter consistent with chronic microvascular change.    There is no evidence of new intracranial hemorrhage. No extra-axial fluid collection or midline shift.    Partial right mastoid opacification, decreased since the prior exam. The visualized paranasal sinuses and the left mastoid air cells are clear.    IMPRESSION:    1.  No evidence of acute intracranial hemorrhage.    2.  The previously seen right frontal lobe and basal ganglia hematoma has resolved.    --- End of Report ---      SANDY URBAN MD; Attending Radiologist  This document has been electronically signed. Nov 18 2021 10:56AM (11-18-21 @ 10:41)      11-18-21 @ 12:02      < from: CT Brain Stroke Protocol (01.27.21 @ 20:37) >  FINDINGS:    There is a moderate to large intraparenchymal right basal ganglia hemorrhage, with extension superiorly in the right frontal region. There is mild surrounding edema and associated right sulcal effacement as well as decompression into the ventricles. Intraventricular blood layers primarily in the bilateral frontal horns, left occipital horn, third ventricle and fourth ventricle (faintly opacified). There is approximately 6.3 mm of midline shift.    The orbital contents, soft tissues, and osseous structures are within normal limits.    Minimal mucosal thickening is noted within the paranasal sinuses. Mastoid air cavities are well aerated.    IMPRESSION:    Moderate to large intraparenchymal right basal ganglia hemorrhage with mild surrounding edema and associated right sulcal effacement as well as decompression into the ventricles. There is approximately 6.3 mm of midline shift.    < end of copied text >

## 2021-11-18 NOTE — ED PROVIDER NOTE - NS ED ROS FT
Constitutional:  No fevers or chills.  Eyes:  No visual changes, eye pain, or discharge.  ENT:  No hearing changes. No sore throat.  Neck:  No neck pain or stiffness.  Cardiac:  No CP or edema.  Resp:  No cough or SOB. No hemoptysis.   GI:  No nausea, vomiting, diarrhea, or abdominal pain.  :  No dysuria, frequency, or hematuria.  MSK:  No myalgias or joint pain/swelling.  Neuro: (+) seizure No headache, dizziness, or weakness.  Skin:  No skin rash.

## 2021-11-18 NOTE — ED ADULT NURSE NOTE - NSFALLRSKHRMRISKTYPE_ED_ALL_ED
The patient is doing well today and has no problems with her arthritis. She would like to get off prednisone but recognizes that she still has significant underlying joint activity. We discussed a trial of skipping prednisone on Sundays only. She agrees to that approach and will begin avoiding the prednisone tablet each Sunday for at least the next month. I have asked her to call me in 3-4 weeks with an update on how she has tolerated that. If she is able to tolerate 24 hours without prednisone, I will then decrease her dose to 5 mg every other day.   coagulation(Bleeding disorder R/T clinical cond/anti-coags)/other

## 2021-11-18 NOTE — ED PROVIDER NOTE - CARE PROVIDER_API CALL
Nick Delgado)  Neurology  [address] [place] [address]  Phone: [phone]  Fax: [phone]  Follow Up Time: Urgent
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
## 2022-11-03 ENCOUNTER — INPATIENT (INPATIENT)
Facility: HOSPITAL | Age: 52
LOS: 1 days | Discharge: LEFT BEFORE DISCHARGE | End: 2022-11-05
Attending: STUDENT IN AN ORGANIZED HEALTH CARE EDUCATION/TRAINING PROGRAM | Admitting: STUDENT IN AN ORGANIZED HEALTH CARE EDUCATION/TRAINING PROGRAM
Payer: MEDICAID

## 2022-11-03 VITALS
SYSTOLIC BLOOD PRESSURE: 145 MMHG | OXYGEN SATURATION: 98 % | DIASTOLIC BLOOD PRESSURE: 102 MMHG | HEART RATE: 95 BPM | RESPIRATION RATE: 18 BRPM | TEMPERATURE: 98 F

## 2022-11-03 DIAGNOSIS — G40.909 EPILEPSY, UNSPECIFIED, NOT INTRACTABLE, WITHOUT STATUS EPILEPTICUS: ICD-10-CM
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
## 2022-11-03 DIAGNOSIS — R25.1 TREMOR, UNSPECIFIED: ICD-10-CM

## 2022-11-03 DIAGNOSIS — I10 ESSENTIAL (PRIMARY) HYPERTENSION: ICD-10-CM

## 2022-11-03 DIAGNOSIS — Z87.11 PERSONAL HISTORY OF PEPTIC ULCER DISEASE: ICD-10-CM

## 2022-11-03 DIAGNOSIS — H11.32 CONJUNCTIVAL HEMORRHAGE, LEFT EYE: ICD-10-CM

## 2022-11-03 DIAGNOSIS — Z20.822 CONTACT WITH AND (SUSPECTED) EXPOSURE TO COVID-19: ICD-10-CM

## 2022-11-03 DIAGNOSIS — F17.200 NICOTINE DEPENDENCE, UNSPECIFIED, UNCOMPLICATED: ICD-10-CM

## 2022-11-03 DIAGNOSIS — I69.334 MONOPLEGIA OF UPPER LIMB FOLLOWING CEREBRAL INFARCTION AFFECTING LEFT NON-DOMINANT SIDE: ICD-10-CM

## 2022-11-03 DIAGNOSIS — F10.90 ALCOHOL USE, UNSPECIFIED, UNCOMPLICATED: ICD-10-CM

## 2022-11-03 DIAGNOSIS — R29.703 NIHSS SCORE 3: ICD-10-CM

## 2022-11-03 DIAGNOSIS — Z87.19 PERSONAL HISTORY OF OTHER DISEASES OF THE DIGESTIVE SYSTEM: ICD-10-CM
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
## 2022-11-03 DIAGNOSIS — S42.302A UNSPECIFIED FRACTURE OF SHAFT OF HUMERUS, LEFT ARM, INITIAL ENCOUNTER FOR CLOSED FRACTURE: Chronic | ICD-10-CM

## 2022-11-03 DIAGNOSIS — I69.398 OTHER SEQUELAE OF CEREBRAL INFARCTION: ICD-10-CM

## 2022-11-03 LAB
ALBUMIN SERPL ELPH-MCNC: 5.1 G/DL — SIGNIFICANT CHANGE UP (ref 3.5–5.2)
ALP SERPL-CCNC: 81 U/L — SIGNIFICANT CHANGE UP (ref 30–115)
ALT FLD-CCNC: 21 U/L — SIGNIFICANT CHANGE UP (ref 0–41)
ANION GAP SERPL CALC-SCNC: 17 MMOL/L — HIGH (ref 7–14)
APTT BLD: 28 SEC — SIGNIFICANT CHANGE UP (ref 27–39.2)
AST SERPL-CCNC: 26 U/L — SIGNIFICANT CHANGE UP (ref 0–41)
BASOPHILS # BLD AUTO: 0.08 K/UL — SIGNIFICANT CHANGE UP (ref 0–0.2)
BASOPHILS NFR BLD AUTO: 0.9 % — SIGNIFICANT CHANGE UP (ref 0–1)
BILIRUB SERPL-MCNC: 0.4 MG/DL — SIGNIFICANT CHANGE UP (ref 0.2–1.2)
BUN SERPL-MCNC: 19 MG/DL — SIGNIFICANT CHANGE UP (ref 10–20)
CALCIUM SERPL-MCNC: 9.6 MG/DL — SIGNIFICANT CHANGE UP (ref 8.4–10.5)
CHLORIDE SERPL-SCNC: 98 MMOL/L — SIGNIFICANT CHANGE UP (ref 98–110)
CO2 SERPL-SCNC: 22 MMOL/L — SIGNIFICANT CHANGE UP (ref 17–32)
CREAT SERPL-MCNC: 1.5 MG/DL — SIGNIFICANT CHANGE UP (ref 0.7–1.5)
EGFR: 55 ML/MIN/1.73M2 — LOW
EOSINOPHIL # BLD AUTO: 0.25 K/UL — SIGNIFICANT CHANGE UP (ref 0–0.7)
EOSINOPHIL NFR BLD AUTO: 2.8 % — SIGNIFICANT CHANGE UP (ref 0–8)
ETHANOL SERPL-MCNC: <10 MG/DL — SIGNIFICANT CHANGE UP
GLUCOSE SERPL-MCNC: 92 MG/DL — SIGNIFICANT CHANGE UP (ref 70–99)
HCT VFR BLD CALC: 42 % — SIGNIFICANT CHANGE UP (ref 42–52)
HGB BLD-MCNC: 14.2 G/DL — SIGNIFICANT CHANGE UP (ref 14–18)
IMM GRANULOCYTES NFR BLD AUTO: 0.9 % — HIGH (ref 0.1–0.3)
INR BLD: 0.95 RATIO — SIGNIFICANT CHANGE UP (ref 0.65–1.3)
LACTATE SERPL-SCNC: 0.9 MMOL/L — SIGNIFICANT CHANGE UP (ref 0.7–2)
LACTATE SERPL-SCNC: 7.3 MMOL/L — CRITICAL HIGH (ref 0.7–2)
LIDOCAIN IGE QN: 46 U/L — SIGNIFICANT CHANGE UP (ref 7–60)
LYMPHOCYTES # BLD AUTO: 2.38 K/UL — SIGNIFICANT CHANGE UP (ref 1.2–3.4)
LYMPHOCYTES # BLD AUTO: 26.9 % — SIGNIFICANT CHANGE UP (ref 20.5–51.1)
MAGNESIUM SERPL-MCNC: 2 MG/DL — SIGNIFICANT CHANGE UP (ref 1.8–2.4)
MCHC RBC-ENTMCNC: 31.6 PG — HIGH (ref 27–31)
MCHC RBC-ENTMCNC: 33.8 G/DL — SIGNIFICANT CHANGE UP (ref 32–37)
MCV RBC AUTO: 93.3 FL — SIGNIFICANT CHANGE UP (ref 80–94)
MONOCYTES # BLD AUTO: 0.63 K/UL — HIGH (ref 0.1–0.6)
MONOCYTES NFR BLD AUTO: 7.1 % — SIGNIFICANT CHANGE UP (ref 1.7–9.3)
NEUTROPHILS # BLD AUTO: 5.42 K/UL — SIGNIFICANT CHANGE UP (ref 1.4–6.5)
NEUTROPHILS NFR BLD AUTO: 61.4 % — SIGNIFICANT CHANGE UP (ref 42.2–75.2)
NRBC # BLD: 0 /100 WBCS — SIGNIFICANT CHANGE UP (ref 0–0)
PLATELET # BLD AUTO: 340 K/UL — SIGNIFICANT CHANGE UP (ref 130–400)
POTASSIUM SERPL-MCNC: 4.5 MMOL/L — SIGNIFICANT CHANGE UP (ref 3.5–5)
POTASSIUM SERPL-SCNC: 4.5 MMOL/L — SIGNIFICANT CHANGE UP (ref 3.5–5)
PROT SERPL-MCNC: 7.2 G/DL — SIGNIFICANT CHANGE UP (ref 6–8)
PROTHROM AB SERPL-ACNC: 10.8 SEC — SIGNIFICANT CHANGE UP (ref 9.95–12.87)
RBC # BLD: 4.5 M/UL — LOW (ref 4.7–6.1)
RBC # FLD: 12.3 % — SIGNIFICANT CHANGE UP (ref 11.5–14.5)
SARS-COV-2 RNA SPEC QL NAA+PROBE: SIGNIFICANT CHANGE UP
SODIUM SERPL-SCNC: 137 MMOL/L — SIGNIFICANT CHANGE UP (ref 135–146)
VALPROATE SERPL-MCNC: <3 UG/ML — LOW (ref 50–100)
WBC # BLD: 8.84 K/UL — SIGNIFICANT CHANGE UP (ref 4.8–10.8)
WBC # FLD AUTO: 8.84 K/UL — SIGNIFICANT CHANGE UP (ref 4.8–10.8)

## 2022-11-03 PROCEDURE — 72170 X-RAY EXAM OF PELVIS: CPT | Mod: 26

## 2022-11-03 PROCEDURE — 99222 1ST HOSP IP/OBS MODERATE 55: CPT

## 2022-11-03 PROCEDURE — 74177 CT ABD & PELVIS W/CONTRAST: CPT | Mod: 26,MA

## 2022-11-03 PROCEDURE — 70496 CT ANGIOGRAPHY HEAD: CPT | Mod: 26,MA

## 2022-11-03 PROCEDURE — 76705 ECHO EXAM OF ABDOMEN: CPT | Mod: 26

## 2022-11-03 PROCEDURE — 99285 EMERGENCY DEPT VISIT HI MDM: CPT | Mod: 25

## 2022-11-03 PROCEDURE — 70450 CT HEAD/BRAIN W/O DYE: CPT | Mod: 26,MA,59

## 2022-11-03 PROCEDURE — 0042T: CPT | Mod: MA

## 2022-11-03 PROCEDURE — 93308 TTE F-UP OR LMTD: CPT | Mod: 26

## 2022-11-03 PROCEDURE — 70486 CT MAXILLOFACIAL W/O DYE: CPT | Mod: 26,MA

## 2022-11-03 PROCEDURE — 71260 CT THORAX DX C+: CPT | Mod: 26,MA

## 2022-11-03 PROCEDURE — 71045 X-RAY EXAM CHEST 1 VIEW: CPT | Mod: 26

## 2022-11-03 PROCEDURE — 70498 CT ANGIOGRAPHY NECK: CPT | Mod: 26,MA

## 2022-11-03 PROCEDURE — 72125 CT NECK SPINE W/O DYE: CPT | Mod: 26,MA

## 2022-11-03 RX ORDER — LEVETIRACETAM 250 MG/1
1500 TABLET, FILM COATED ORAL EVERY 12 HOURS
Refills: 0 | Status: DISCONTINUED | OUTPATIENT
Start: 2022-11-03 | End: 2022-11-05

## 2022-11-03 RX ADMIN — LEVETIRACETAM 400 MILLIGRAM(S): 250 TABLET, FILM COATED ORAL at 20:28
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
## 2022-11-03 NOTE — H&P ADULT - NSHPLABSRESULTS_GEN_ALL_CORE
14.2   8.84  )-----------( 340      ( 03 Nov 2022 15:54 )             42.0       11-03    137  |  98  |  19  ----------------------------<  92  4.5   |  22  |  1.5    Ca    9.6      03 Nov 2022 15:54  Mg     2.0     11-03    TPro  7.2  /  Alb  5.1  /  TBili  0.4  /  DBili  x   /  AST  26  /  ALT  21  /  AlkPhos  81  11-03                  PT/INR - ( 03 Nov 2022 15:54 )   PT: 10.80 sec;   INR: 0.95 ratio         PTT - ( 03 Nov 2022 15:54 )  PTT:28.0 sec    Lactate Trend  11-03 @ 19:36 Lactate:0.9   11-03 @ 15:54 Lactate:7.3             CAPILLARY BLOOD GLUCOSE
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
## 2022-11-03 NOTE — H&P ADULT - NSHPPHYSICALEXAM_GEN_ALL_CORE
GENERAL: NAD, speaks in full sentences, no signs of respiratory distress  HEAD:  Atraumatic, Normocephalic  EYES: EOMI, PERRLA, conjunctiva and sclera clear  NECK: Supple, No JVD  CHEST/LUNG: Clear to auscultation bilaterally; No wheeze; No crackles; No accessory muscles used  HEART: Regular rate and rhythm; No murmurs;   ABDOMEN: Soft, Nontender, Nondistended; Bowel sounds present; No guarding  EXTREMITIES:  2+ Peripheral Pulses, No cyanosis or edema  PSYCH: AAOx3  NEUROLOGY: non-focal  SKIN: No rashes or lesions GENERAL: NAD, speaks in full sentences, no signs of respiratory distress  HEAD:  Atraumatic, Normocephalic, left periorbital bruise and abrasion  EYES: EOMI, PERRLA, left eye conjunctival ecchymosis   NECK: Supple, No JVD  CHEST/LUNG: Clear to auscultation bilaterally; No wheeze; No crackles; No accessory muscles used  HEART: Regular rate and rhythm; No murmurs;   ABDOMEN: Soft, Nontender, Nondistended; Bowel sounds present; No guarding  EXTREMITIES:  2+ Peripheral Pulses, No cyanosis or edema  PSYCH: AAOx3  NEUROLOGY: left side upper and lower residual paralysis  SKIN: No rashes or lesions GENERAL: NAD, speaks in full sentences, no signs of respiratory distress  HEAD:  Atraumatic, Normocephalic, left periorbital bruise and abrasion  EYES: EOMI, PERRLA, left eye conjunctival ecchymosis   NECK: Supple, No JVD  CHEST/LUNG: Clear to auscultation bilaterally; No wheeze; No crackles; No accessory muscles used  HEART: Regular rate and rhythm; No murmurs;   ABDOMEN: Soft, Nontender, Nondistended; Bowel sounds present; No guarding  EXTREMITIES:  2+ Peripheral Pulses, No cyanosis or edema  PSYCH: AAOx3  NEUROLOGY: left side upper and lower residual paralysis; odd affect   SKIN: No rashes or lesions
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
## 2022-11-03 NOTE — H&P ADULT - NSICDXPASTMEDICALHX_GEN_ALL_CORE_FT
PAST MEDICAL HISTORY:  No pertinent past medical history      PAST MEDICAL HISTORY:  H/O: CVA (cerebrovascular accident) hemorrhagic  Jan 2021    H/O: GI bleed     History of seizure     Hypertension     Peptic ulcer disease
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
## 2022-11-03 NOTE — ED PROVIDER NOTE - PROGRESS NOTE DETAILS
Pt signed out to Dr. Cook, follow up CT scans, neuro/trauma consults, reassess and dispo. TJY: per bed management, no EMU beds available. Will endorse patient to medicine service while patient in holding awaiting bed placement.  Neuro made aware, unlikely VEEG will be a possibility while on hold. TJY: pt with a self-limited 1 minute seizure, post-ictal period lasting <5 minutes. Gave 2mg of ativan and 1500mg Keppra, in coordination with Neuro resident bedside.
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
## 2022-11-03 NOTE — H&P ADULT - NSHPSOCIALHISTORY_GEN_ALL_CORE
smokes 1 pack per week, smoking for "many years"  admits to drinking 2 "big beers" twice a week, but does not endorse drinking recently   denies drugs

## 2022-11-03 NOTE — H&P ADULT - TIME BILLING
Total time spent to complete patient's bedside assessment, physical examination, review medical chart including labs & imaging, discuss medical plan of care with housestaff was more than 70 minutes

## 2022-11-03 NOTE — ED PROVIDER NOTE - PHYSICAL EXAMINATION
CONSTITUTIONAL: WDWN. NAD. Speaking in full sentences, moving all extremities.  SKIN: No lacerations or abrasions.  HEAD: NCAT  EYES: PERRLA, EOMI  ENT: TMs WNL. No hemotympanum noted.  NECK: No posterior midline cervical tenderness  CARD: +S1, S2 no murmurs, gallops, or rubs. Regular rate and rhythm. Radial, DP, PT 2+/4 bilaterally  RESP: LCTAB. No wheezes, rales or rhonchi.  ABD: Abdomen soft, nontender, nondistended.  NEURO: Alert, oriented, grossly unremarkable. Strength 4/5 in LUE which is contracted, 5/5 in RUE and LEs. CN 2-12 grossly intact. Follows commands.  MSK: No TTP in UE and LEs. No chest wall tenderness or crepitus  BACK: No posterior midline tenderness  PSYCH: Cooperative, appropriate.
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
## 2022-11-04 LAB
ALBUMIN SERPL ELPH-MCNC: 4.4 G/DL — SIGNIFICANT CHANGE UP (ref 3.5–5.2)
ALP SERPL-CCNC: 74 U/L — SIGNIFICANT CHANGE UP (ref 30–115)
ALT FLD-CCNC: 17 U/L — SIGNIFICANT CHANGE UP (ref 0–41)
ANION GAP SERPL CALC-SCNC: 11 MMOL/L — SIGNIFICANT CHANGE UP (ref 7–14)
AST SERPL-CCNC: 31 U/L — SIGNIFICANT CHANGE UP (ref 0–41)
BASOPHILS # BLD AUTO: 0.07 K/UL — SIGNIFICANT CHANGE UP (ref 0–0.2)
BASOPHILS NFR BLD AUTO: 0.7 % — SIGNIFICANT CHANGE UP (ref 0–1)
BILIRUB SERPL-MCNC: 0.6 MG/DL — SIGNIFICANT CHANGE UP (ref 0.2–1.2)
BUN SERPL-MCNC: 13 MG/DL — SIGNIFICANT CHANGE UP (ref 10–20)
CALCIUM SERPL-MCNC: 9.1 MG/DL — SIGNIFICANT CHANGE UP (ref 8.4–10.5)
CHLORIDE SERPL-SCNC: 102 MMOL/L — SIGNIFICANT CHANGE UP (ref 98–110)
CO2 SERPL-SCNC: 26 MMOL/L — SIGNIFICANT CHANGE UP (ref 17–32)
CREAT SERPL-MCNC: 1.2 MG/DL — SIGNIFICANT CHANGE UP (ref 0.7–1.5)
EGFR: 72 ML/MIN/1.73M2 — SIGNIFICANT CHANGE UP
EOSINOPHIL # BLD AUTO: 0.15 K/UL — SIGNIFICANT CHANGE UP (ref 0–0.7)
EOSINOPHIL NFR BLD AUTO: 1.5 % — SIGNIFICANT CHANGE UP (ref 0–8)
GLUCOSE SERPL-MCNC: 89 MG/DL — SIGNIFICANT CHANGE UP (ref 70–99)
HCT VFR BLD CALC: 41.2 % — LOW (ref 42–52)
HGB BLD-MCNC: 14 G/DL — SIGNIFICANT CHANGE UP (ref 14–18)
IMM GRANULOCYTES NFR BLD AUTO: 0.7 % — HIGH (ref 0.1–0.3)
LYMPHOCYTES # BLD AUTO: 1.93 K/UL — SIGNIFICANT CHANGE UP (ref 1.2–3.4)
LYMPHOCYTES # BLD AUTO: 18.8 % — LOW (ref 20.5–51.1)
MAGNESIUM SERPL-MCNC: 2 MG/DL — SIGNIFICANT CHANGE UP (ref 1.8–2.4)
MCHC RBC-ENTMCNC: 31.5 PG — HIGH (ref 27–31)
MCHC RBC-ENTMCNC: 34 G/DL — SIGNIFICANT CHANGE UP (ref 32–37)
MCV RBC AUTO: 92.6 FL — SIGNIFICANT CHANGE UP (ref 80–94)
MONOCYTES # BLD AUTO: 0.8 K/UL — HIGH (ref 0.1–0.6)
MONOCYTES NFR BLD AUTO: 7.8 % — SIGNIFICANT CHANGE UP (ref 1.7–9.3)
NEUTROPHILS # BLD AUTO: 7.24 K/UL — HIGH (ref 1.4–6.5)
NEUTROPHILS NFR BLD AUTO: 70.5 % — SIGNIFICANT CHANGE UP (ref 42.2–75.2)
NRBC # BLD: 0 /100 WBCS — SIGNIFICANT CHANGE UP (ref 0–0)
PLATELET # BLD AUTO: 299 K/UL — SIGNIFICANT CHANGE UP (ref 130–400)
POTASSIUM SERPL-MCNC: 4.1 MMOL/L — SIGNIFICANT CHANGE UP (ref 3.5–5)
POTASSIUM SERPL-SCNC: 4.1 MMOL/L — SIGNIFICANT CHANGE UP (ref 3.5–5)
PROT SERPL-MCNC: 6 G/DL — SIGNIFICANT CHANGE UP (ref 6–8)
RBC # BLD: 4.45 M/UL — LOW (ref 4.7–6.1)
RBC # FLD: 12.3 % — SIGNIFICANT CHANGE UP (ref 11.5–14.5)
SODIUM SERPL-SCNC: 139 MMOL/L — SIGNIFICANT CHANGE UP (ref 135–146)
WBC # BLD: 10.26 K/UL — SIGNIFICANT CHANGE UP (ref 4.8–10.8)
WBC # FLD AUTO: 10.26 K/UL — SIGNIFICANT CHANGE UP (ref 4.8–10.8)

## 2022-11-04 PROCEDURE — 99223 1ST HOSP IP/OBS HIGH 75: CPT

## 2022-11-04 RX ORDER — PANTOPRAZOLE SODIUM 20 MG/1
40 TABLET, DELAYED RELEASE ORAL
Refills: 0 | Status: DISCONTINUED | OUTPATIENT
Start: 2022-11-04 | End: 2022-11-05

## 2022-11-04 RX ORDER — THIAMINE MONONITRATE (VIT B1) 100 MG
100 TABLET ORAL DAILY
Refills: 0 | Status: DISCONTINUED | OUTPATIENT
Start: 2022-11-04 | End: 2022-11-05

## 2022-11-04 RX ORDER — ENOXAPARIN SODIUM 100 MG/ML
40 INJECTION SUBCUTANEOUS EVERY 24 HOURS
Refills: 0 | Status: DISCONTINUED | OUTPATIENT
Start: 2022-11-04 | End: 2022-11-05

## 2022-11-04 RX ORDER — LISINOPRIL 2.5 MG/1
40 TABLET ORAL ONCE
Refills: 0 | Status: COMPLETED | OUTPATIENT
Start: 2022-11-04 | End: 2022-11-04

## 2022-11-04 RX ORDER — AMLODIPINE BESYLATE 2.5 MG/1
10 TABLET ORAL DAILY
Refills: 0 | Status: DISCONTINUED | OUTPATIENT
Start: 2022-11-04 | End: 2022-11-05

## 2022-11-04 RX ORDER — LABETALOL HCL 100 MG
100 TABLET ORAL DAILY
Refills: 0 | Status: DISCONTINUED | OUTPATIENT
Start: 2022-11-04 | End: 2022-11-04

## 2022-11-04 RX ORDER — FOLIC ACID 0.8 MG
1 TABLET ORAL DAILY
Refills: 0 | Status: DISCONTINUED | OUTPATIENT
Start: 2022-11-04 | End: 2022-11-05

## 2022-11-04 RX ORDER — AMLODIPINE BESYLATE 2.5 MG/1
5 TABLET ORAL DAILY
Refills: 0 | Status: DISCONTINUED | OUTPATIENT
Start: 2022-11-04 | End: 2022-11-04

## 2022-11-04 RX ORDER — LISINOPRIL 2.5 MG/1
40 TABLET ORAL DAILY
Refills: 0 | Status: DISCONTINUED | OUTPATIENT
Start: 2022-11-04 | End: 2022-11-05

## 2022-11-04 RX ADMIN — LISINOPRIL 40 MILLIGRAM(S): 2.5 TABLET ORAL at 00:50

## 2022-11-04 RX ADMIN — LISINOPRIL 40 MILLIGRAM(S): 2.5 TABLET ORAL at 06:23

## 2022-11-04 RX ADMIN — ENOXAPARIN SODIUM 40 MILLIGRAM(S): 100 INJECTION SUBCUTANEOUS at 06:24

## 2022-11-04 RX ADMIN — AMLODIPINE BESYLATE 10 MILLIGRAM(S): 2.5 TABLET ORAL at 06:24

## 2022-11-04 RX ADMIN — Medication 100 MILLIGRAM(S): at 14:50

## 2022-11-04 RX ADMIN — LEVETIRACETAM 400 MILLIGRAM(S): 250 TABLET, FILM COATED ORAL at 18:21

## 2022-11-04 RX ADMIN — LEVETIRACETAM 400 MILLIGRAM(S): 250 TABLET, FILM COATED ORAL at 06:22

## 2022-11-04 RX ADMIN — Medication 1 TABLET(S): at 14:50

## 2022-11-04 RX ADMIN — Medication 1 MILLIGRAM(S): at 14:49

## 2022-11-04 NOTE — STROKE CODE NOTE - NIH STROKE SCALE: 2. BEST GAZE, QM
(1) Partial gaze palsy; gaze is abnormal in one or both eyes, but forced deviation or total gaze paresis is not present
(1) Partial gaze palsy; gaze is abnormal in one or both eyes, but forced deviation or total gaze paresis is not present

## 2022-11-04 NOTE — PATIENT PROFILE ADULT - FALL HARM RISK - RISK INTERVENTIONS
Assistance OOB with selected safe patient handling equipment/Assistance with ambulation/Communicate Fall Risk and Risk Factors to all staff, patient, and family/Discuss with provider need for PT consult/Monitor gait and stability/Reinforce activity limits and safety measures with patient and family/Visual Cue: Yellow wristband/Bed in lowest position, wheels locked, appropriate side rails in place/Call bell, personal items and telephone in reach/Instruct patient to call for assistance before getting out of bed or chair/Non-slip footwear when patient is out of bed/[place] to call system/Physically safe environment - no spills, clutter or unnecessary equipment/Purposeful Proactive Rounding/Room/bathroom lighting operational, light cord in reach

## 2022-11-04 NOTE — STROKE CODE NOTE - IV ALTEPLASE EXCLUSION ABS OTHER
Patient last known well known and prior ICH not a candidate for IV thrombolytics. No LVO on CTA not a candidate for IA intervention.
Not a candidate for IV thrombolytics, and prior ICH. Patient without LVO on CTA not a candidate for IA intervention.
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
## 2022-11-05 ENCOUNTER — EMERGENCY (EMERGENCY)
Facility: HOSPITAL | Age: 52
LOS: 0 days | Discharge: LEFT BEFORE DISCHARGE | End: 2022-11-05
Attending: EMERGENCY MEDICINE | Admitting: EMERGENCY MEDICINE

## 2022-11-05 VITALS
TEMPERATURE: 97 F | RESPIRATION RATE: 18 BRPM | SYSTOLIC BLOOD PRESSURE: 140 MMHG | WEIGHT: 190.04 LBS | HEIGHT: 78 IN | DIASTOLIC BLOOD PRESSURE: 84 MMHG | HEART RATE: 72 BPM | OXYGEN SATURATION: 99 %

## 2022-11-05 VITALS
DIASTOLIC BLOOD PRESSURE: 77 MMHG | TEMPERATURE: 97 F | RESPIRATION RATE: 18 BRPM | HEART RATE: 71 BPM | SYSTOLIC BLOOD PRESSURE: 127 MMHG

## 2022-11-05 DIAGNOSIS — Z53.29 PROCEDURE AND TREATMENT NOT CARRIED OUT BECAUSE OF PATIENT'S DECISION FOR OTHER REASONS: ICD-10-CM

## 2022-11-05 DIAGNOSIS — Z86.73 PERSONAL HISTORY OF TRANSIENT ISCHEMIC ATTACK (TIA), AND CEREBRAL INFARCTION WITHOUT RESIDUAL DEFICITS: ICD-10-CM

## 2022-11-05 DIAGNOSIS — Z87.19 PERSONAL HISTORY OF OTHER DISEASES OF THE DIGESTIVE SYSTEM: ICD-10-CM

## 2022-11-05 DIAGNOSIS — Z02.9 ENCOUNTER FOR ADMINISTRATIVE EXAMINATIONS, UNSPECIFIED: ICD-10-CM

## 2022-11-05 DIAGNOSIS — I10 ESSENTIAL (PRIMARY) HYPERTENSION: ICD-10-CM

## 2022-11-05 DIAGNOSIS — R56.9 UNSPECIFIED CONVULSIONS: ICD-10-CM

## 2022-11-05 PROCEDURE — 95720 EEG PHY/QHP EA INCR W/VEEG: CPT

## 2022-11-05 PROCEDURE — 99284 EMERGENCY DEPT VISIT MOD MDM: CPT

## 2022-11-05 PROCEDURE — 99283 EMERGENCY DEPT VISIT LOW MDM: CPT

## 2022-11-05 PROCEDURE — 99238 HOSP IP/OBS DSCHRG MGMT 30/<: CPT

## 2022-11-05 RX ORDER — FOLIC ACID 0.8 MG
1 TABLET ORAL
Qty: 0 | Refills: 0 | DISCHARGE
Start: 2022-11-05

## 2022-11-05 RX ORDER — THIAMINE MONONITRATE (VIT B1) 100 MG
1 TABLET ORAL
Qty: 0 | Refills: 0 | DISCHARGE
Start: 2022-11-05

## 2022-11-05 RX ADMIN — LEVETIRACETAM 400 MILLIGRAM(S): 250 TABLET, FILM COATED ORAL at 06:06

## 2022-11-05 RX ADMIN — AMLODIPINE BESYLATE 10 MILLIGRAM(S): 2.5 TABLET ORAL at 06:02

## 2022-11-05 RX ADMIN — LISINOPRIL 40 MILLIGRAM(S): 2.5 TABLET ORAL at 06:03

## 2022-11-05 RX ADMIN — PANTOPRAZOLE SODIUM 40 MILLIGRAM(S): 20 TABLET, DELAYED RELEASE ORAL at 07:55

## 2022-11-05 NOTE — ED PROVIDER NOTE - PHYSICAL EXAMINATION
Physical Exam    Vital Signs: I have reviewed the initial vital signs.  Constitutional: appears stated age, no acute distress  Eyes: Conjunctiva pink, Sclera clear,   Cardiovascular: S1 and S2, regular rate, regular rhythm, well-perfused extremities, radial pulses equal and 2+, pedal pulses 2+ and equal  Respiratory: unlabored respiratory effort, clear to auscultation bilaterally no wheezing, rales and rhonchi  Gastrointestinal: soft, non-tender abdomen, no pulsatile mass, normal bowl sounds  Musculoskeletal: supple neck, no lower extremity edema, no midline tenderness  Integumentary: warm, dry, no rash  Neurologic: awake, alert, cranial nerves II-XII grossly intact, extremities’ motor and sensory functions grossly intact

## 2022-11-05 NOTE — CONSULT NOTE ADULT - ASSESSMENT
Seizures  Increase keppra to 1500 mg bid  Must not drive.  emphasize compliance     EEG showed right temporal slowing  Follow outpatient

## 2022-11-05 NOTE — ED ADULT NURSE NOTE - NSICDXPASTMEDICALHX_GEN_ALL_CORE_FT
PAST MEDICAL HISTORY:  H/O: CVA (cerebrovascular accident) hemorrhagic  Jan 2021    H/O: GI bleed     History of seizure     Hypertension     Peptic ulcer disease
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
## 2022-11-05 NOTE — ED ADULT NURSE NOTE - NS ED NURSE ELOPE COMMENTS
pt ambulating with steady gait, eating, drinking, no complaints, seen by neuro and then walked out, stated" he said my brain was good".
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
## 2022-11-05 NOTE — DISCHARGE NOTE PROVIDER - CARE PROVIDER_API CALL
Aftab Herring)  Neurology  [address] [place] [address]  Phone: [phone]  Fax: [phone]  Follow Up Time: 1-3 days

## 2022-11-05 NOTE — CONSULT NOTE ADULT - SUBJECTIVE AND OBJECTIVE BOX
Neurology Consult    Patient is a 52y old  Male who presents with a chief complaint of admitted due to possible seizure. Patient no-compliant with meds  On keppra 1000 mg bid, no levels yet.  Patient left AMA and returned to ED/.     HPI:      PAST MEDICAL & SURGICAL HISTORY:  Peptic ulcer disease      H/O: GI bleed      Hypertension      H/O: CVA (cerebrovascular accident)  hemorrhagic  Jan 2021      History of seizure      No significant past surgical history          FAMILY HISTORY:  No pertinent family history in first degree relatives        Social History: (-) x 3    Allergies    No Known Allergies    Intolerances        MEDICATIONS  (STANDING):    MEDICATIONS  (PRN):      Review of systems:    Constitutional: as per HPI  Eyes: No eye pain or discharge  ENMT:  No difficulty hearing; No sinus or throat pain  Neck: No pain or stiffness  Respiratory: No cough, wheezing, chills or hemoptysis  Cardiovascular: No chest pain, palpitations, shortness of breath, dyspnea on exertion  Gastrointestinal: No abdominal pain, nausea, vomiting or hematemesis; No diarrhea or constipation.   Genitourinary: No dysuria, frequency, hematuria or incontinence  Neurological: As per HPI  Skin: No rashes or lesions   Endocrine: No heat or cold intolerance; No hair loss  Musculoskeletal: No joint pain or swelling  Psychiatric: No depression, anxiety, mood swings  Heme/Lymph: No easy bruising or bleeding gums    Vital Signs Last 24 Hrs  T(C): 36 (05 Nov 2022 12:40), Max: 36.3 (04 Nov 2022 20:16)  T(F): 96.8 (05 Nov 2022 12:40), Max: 97.4 (04 Nov 2022 20:16)  HR: 72 (05 Nov 2022 12:40) (71 - 75)  BP: 140/84 (05 Nov 2022 12:40) (127/77 - 140/84)  BP(mean): --  RR: 18 (05 Nov 2022 12:40) (18 - 18)  SpO2: 99% (05 Nov 2022 12:40) (99% - 99%)    Parameters below as of 05 Nov 2022 12:40  Patient On (Oxygen Delivery Method): room air        Examination:  General:  Appearance is consistent with chronologic age.  No abnormal facies.  Gross skin survey within normal limits.    Cognitive/Language:  The patient is oriented to person, place, time and date.  Recent and remote memory intact.  Fund of knowledge is intact and normal.  Language with normal repetition, comprehension and naming.  Nondysarthric.    Eyes: intact VA, VFF.  EOMI w/o nystagmus, skew or reported double vision.  PERRL.  No ptosis/weakness of eyelid closure.    Face:  Facial sensation normal V1 - 3, no facial asymmetry.    Ears/Nose/Throat:  Hearing grossly intact b/l.  Palate elevates midline.  Tongue and uvula midline.   Motor examination:   Normal tone, bulk and range of motion.  No tenderness, twitching, tremors or involuntary movements.  Formal Muscle Strength Testing: (MRC grade R/L) 5/5 UE; 5/5 LE.  No observable drift.  Reflexes:   2+ b/l pectoralis, biceps, triceps, brachioradialis, patella and Achilles.  Plantar response downgoing b/l.  Jaw jerk, Andrey, clonus absent.  Sensory examination:   Intact to light touch and pinprick, pain, temperature and proprioception and vibration in all extremities.  Cerebellum:   FTN/HKS intact with normal CELESTE in all limbs.  No dysmetria or dysdiadokinesia.  Gait narrow based and normal.    Respiratory:  no audible wheezing or inspiratory stridor.  no use of accessory muscles.   Cardiac: pulse palpable, no audible bruits  Abdomen: supple, no guarding, no TTP    Labs:   CBC Full  -  ( 04 Nov 2022 07:00 )  WBC Count : 10.26 K/uL  RBC Count : 4.45 M/uL  Hemoglobin : 14.0 g/dL  Hematocrit : 41.2 %  Platelet Count - Automated : 299 K/uL  Mean Cell Volume : 92.6 fL  Mean Cell Hemoglobin : 31.5 pg  Mean Cell Hemoglobin Concentration : 34.0 g/dL  Auto Neutrophil # : 7.24 K/uL  Auto Lymphocyte # : 1.93 K/uL  Auto Monocyte # : 0.80 K/uL  Auto Eosinophil # : 0.15 K/uL  Auto Basophil # : 0.07 K/uL  Auto Neutrophil % : 70.5 %  Auto Lymphocyte % : 18.8 %  Auto Monocyte % : 7.8 %  Auto Eosinophil % : 1.5 %  Auto Basophil % : 0.7 %    11-04    139  |  102  |  13  ----------------------------<  89  4.1   |  26  |  1.2    Ca    9.1      04 Nov 2022 07:00  Mg     2.0     11-04    TPro  6.0  /  Alb  4.4  /  TBili  0.6  /  DBili  x   /  AST  31  /  ALT  17  /  AlkPhos  74  11-04    LIVER FUNCTIONS - ( 04 Nov 2022 07:00 )  Alb: 4.4 g/dL / Pro: 6.0 g/dL / ALK PHOS: 74 U/L / ALT: 17 U/L / AST: 31 U/L / GGT: x           PT/INR - ( 03 Nov 2022 15:54 )   PT: 10.80 sec;   INR: 0.95 ratio         PTT - ( 03 Nov 2022 15:54 )  PTT:28.0 sec        Neuroimaging:  [facility]:     11-05-22 @ 14:15
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
## 2022-11-05 NOTE — ED PROVIDER NOTE - PROGRESS NOTE DETAILS
neuro saw pt in ed, eeg wnl, pt offered antiepileptics sent to pharmacy by neuro refused and eloped.

## 2022-11-05 NOTE — ED ADULT TRIAGE NOTE - CHIEF COMPLAINT QUOTE
pt recently left AMA on vEEG, pt states he would now stay to continue observation. no complaints at this time

## 2022-11-07 LAB — LEVETIRACETAM SERPL-MCNC: 12.8 UG/ML — SIGNIFICANT CHANGE UP (ref 10–40)
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
## 2022-11-17 NOTE — CDI QUERY NOTE - NSCDIOTHERTXTBX2_GEN_ALL_CORE_FT
Documentation:  ** 11/3 H&P:  Lactate initially 7.3 but rpt 0.9,       Labs and results:         Lactate Trend          11-03 @ 19:36 Lactate:0.9           11-03 @ 15:54 Lactate:7.3   ** 11/3 Consult Neurology:       Attestation:  Presenting lactic acid >7, quickly resolved                                            Lab:  Lactate, Blood: 0.9 mmol/L (11-03-22 @ 19:36)  Lactate, Blood: 7.3 mmol/L (11-03-22 @ 15:54)    Query  Based on your clinical evaluation of the patient and consideration of the above clinical indicators, please clarify the significance of the above lactate lab values:  • Lactic acidosis resolved  • Insignificant lactate lab values  • Other (please specify)  • Unable  to determine the significance of the above lactate lab values

## 2022-12-10 NOTE — DISCHARGE NOTE ADULT - PATIENT PORTAL LINK FT
Cardiology / [place]  Subjective:  No acute events overnight.    Appears pleasant. Denies chest pain, shortness of breath, or dizziness.      Tele reviewed: sinus rhythm    Medications  Medications Prior to Admission   Medication Sig Dispense Refill   • gabapentin (NEURONTIN) 300 MG capsule Take 300 mg by mouth every morning.     • gabapentin (NEURONTIN) 300 MG capsule Take 600 mg by mouth nightly.     • acetaminophen (TYLENOL) 500 MG tablet Take 500 mg by mouth nightly.     • metoPROLOL succinate (TOPROL-XL) 100 MG 24 hr tablet Take 100 mg by mouth in the morning and 100 mg in the evening.     • bimatoprost (Lumigan) 0.01 % ophthalmic solution 1 drop every morning.     • dorzolamide-timolol (COSOPT) 22.3-6.8 MG/ML ophthalmic solution 1 drop every morning.     • atorvastatin (LIPITOR) 80 MG tablet Take 80 mg by mouth daily. At 4pm     • amLODIPine (NORVASC) 5 MG tablet Take 5 mg by mouth daily. At 4pm     • celecoxib (CeleBREX) 200 MG capsule Take 200 mg by mouth every morning.     • nystatin (MYCOSTATIN) 009642 UNIT/GM cream Apply 1 application topically 2 times daily.     • valsartan (DIOVAN) 320 MG tablet Take 320 mg by mouth daily. At 4pm     • furosemide (LASIX) 20 MG tablet Take 20 mg by mouth daily. At 4pm     • cefdinir (OMNICEF) 300 MG capsule 300 mg every 48 hours.     • oxybutynin (DITROPAN-XL) 10 MG 24 hr tablet Take 10 mg by mouth daily. At 4pm     • hydroxychloroquine (PLAQUENIL) 200 MG tablet Take by mouth daily. At 4pm     • lansoprazole (PREVACID) 30 MG capsule Take 30 mg by mouth daily. At 4pm     • hydrOXYzine (ATARAX) 10 MG tablet Take 10 mg by mouth daily. At 4pm     • metFORMIN (GLUCOPHAGE) 500 MG tablet Take 500 mg by mouth daily (with breakfast).     • Acetaminophen 500 MG Cap Take 500 mg by mouth nightly.           Last Recorded Vitals  Blood pressure 129/82, pulse 91, temperature 97.5 °F (36.4 °C), temperature source Oral, resp. rate 18, height 5' 4\" (1.626 m), weight 94.5 kg (208 lb 5.4  oz), SpO2 93 %.    Physical Exam  General appearance - awake, alert, NAD  Skin: no rashes, no pallor.  Psych - calm, cooperative, no psychosis  Eyes - conjunctive clear. No eye drainage.   Mouth - mucous membranes moist. No erythema. No oral lesions.  Neck - soft, supple. No thyromegaly. No LAD. No jvd.   Pulmonary - clear to auscultation, breathing non-labored. No w/r/r.  CV -  normal S1, S2, distant. No s3/4. No rub. Non displaced pmi.   GI - bowel sounds present, soft, nontender, nondistended, no hsm.   Neurological - alert and oriented x3  Musculoskeletal - perfused and warm, no deformity  Ext: legs wrapped in ACE wrap.       Relevant Results  WBC (K/mcL)   Date Value   12/10/2022 5.9     RBC (mil/mcL)   Date Value   12/10/2022 4.83     HCT (%)   Date Value   12/10/2022 42.6     HGB (g/dL)   Date Value   12/10/2022 14.6     PLT (K/mcL)   Date Value   12/10/2022 153     Sodium (mmol/L)   Date Value   12/10/2022 140     Potassium (mmol/L)   Date Value   12/10/2022 3.8     Chloride (mmol/L)   Date Value   12/10/2022 107     Glucose (mg/dL)   Date Value   12/10/2022 153 (H)     Calcium (mg/dL)   Date Value   12/10/2022 9.3     Carbon Dioxide (mmol/L)   Date Value   12/10/2022 25     BUN (mg/dL)   Date Value   12/10/2022 15     Creatinine (mg/dL)   Date Value   12/10/2022 0.58      No results found for: BNP     US VASC RENAL DUPLEX   Final Result   No evidence of any hemodynamically significant renal arterial stenosis is   seen.            Electronically Signed by: MARYANNE TRAN M.D.    Signed on: 12/8/2022 2:32 PM          US VASC LOWER EXTREMITY VENOUS DUPLEX BILATERAL   Final Result      1.   Normal lower extremity venous duplex study. No evidence of any acute   DVT.      Selvin Haile DO, MS   Cardiology Fellow PGY6      I, MARYANNE TRAN M.D., have reviewed the images and report and concur   with these findings interpreted by Selvin Haile.      Electronically Signed by: MARYANNE TRAN M.D.    Signed  on: 12/9/2022 10:20 AM          XR TIBIA FIBULA 2 VIEWS BILATERAL   Final Result       As above.         Electronically Signed by: MARVIN REYES M.D.    Signed on: 12/6/2022 10:53 PM          XR HIPS 2 BILATERAL VIEWS EACH HIP   Final Result       Intact bony pelvis.      Intact bilateral hips.         Electronically Signed by: MARVIN REYES M.D.    Signed on: 12/6/2022 10:52 PM          XR FEMUR MIN 2 VIEW BILATERAL   Final Result       Intact bilateral femurs.         Electronically Signed by: MARVIN REYES M.D.    Signed on: 12/6/2022 10:55 PM          XR ANKLE MIN 3 VIEWS BILATERAL   Final Result      Intact ankles.      Electronically Signed by: MARVIN REYES M.D.    Signed on: 12/6/2022 10:54 PM          CT HEAD WO CONTRAST   Final Result      Atrophy and chronic small vessel ischemic changes.  No acute intracranial   findings identified.      Electronically Signed by: BILL ALVAREZ M.D.    Signed on: 12/6/2022 3:26 AM          CT CERVICAL SPINE WO CONTRAST   Final Result      Advanced degenerative changes with no acute fracture.      Electronically Signed by: BILL ALVAREZ M.D.    Signed on: 12/6/2022 3:28 AM          XR FOOT MIN 3 VIEWS RIGHT   Final Result      Mildly limited exam due to positioning shows no definite acute fracture.      Osteopenia and soft tissue swelling.      Electronically Signed by: BILL ALVAREZ M.D.    Signed on: 12/6/2022 3:05 AM          XR CHEST PA OR AP 1 VIEW   Final Result      Patchy basilar atelectasis or infiltrates.      Electronically Signed by: BILL ALVAREZ M.D.    Signed on: 12/6/2022 2:49 AM          XR FOOT MIN 3 VIEWS LEFT   Final Result      Cortical lucency along the proximal aspect of the proximal 1st phalanx   suggestive of age-indeterminate fracture.         Electronically Signed by: BILL ALVAREZ M.D.    Signed on: 12/6/2022 2:58 AM                        Assessment & Plan     Patient Active Problem List   Diagnosis   • Fall at home, initial  encounter       86 year old female with past history significant for hypertension, T2DM, and hyperlipidemia presenting s/p fall. Patient found to have UTI this admission, ID following and patient currently on antibiotics.      Diagnosis:  - Type II NSTEMI  -- Syncope  - Leg swelling  - Hypertensive urgency  - Hyperlipidemia     Plan:     Type II NSTEMI  - Elevated troponin in setting of due to increased myocardial demand in setting of hypertensive urgency and possible trauma.  - Troponin peaked at 53, then downtrended. Stop trending.   - Will defer ischemic eval outpatient.   - Continue on atorvastatin and ASA 81 mg daily.      Syncope  - Differential includes mechanical fall versus orthostatic hypotension versus arrhythmogenic  - Monitoring telemetry  - Orthostatic vital signs negative.  - TTE (12/7/2022) reveals EF 65%, mild MR, trace CO.  - 7-day event monitor upon discharge.     Leg swelling  - Euvolemic. Patient becoming dehydrated as she not drinking enough fluids. NT proBNP normal.  Chest x-ray without pulmonary vascular congestion.  Likely component of venous insufficiency.  - Continue lasix 20 mg PO QDaily     Hypertensive urgency  - SBP improved to 120s  -- Continue valsartan 320 mg daily, metoprolol succinate 100 mg PO BID and hydrochlorothiazide 12.5 mg PO BID.    -- PRN labetalol 10 mg Q6h for SBP > 170.     Stable for discharge from CV standpoint.     Code Status    Code Status: Full Resuscitation    Discussed this case with Attending Physician, Dr. Krystal Gaines, and he agrees with the plan.    ALINE Callejas-BC  Cardiology  Office:  [phone] or [facility]         You can access the [facility] Patient Portal, offered by [facility], by registering with the following website: [email or web address]

## 2023-01-19 NOTE — PATIENT PROFILE ADULT - NSPROGENPREVTRANSF_GEN_A_NUR
no Pt presents with decreased UE and LE strength, decreased endurance and high pain levels limiting functional mobility.

## 2023-05-26 ENCOUNTER — OUTPATIENT (OUTPATIENT)
Dept: OUTPATIENT SERVICES | Facility: HOSPITAL | Age: 53
LOS: 1 days | End: 2023-05-26
Payer: MEDICAID

## 2023-05-26 DIAGNOSIS — S42.302A UNSPECIFIED FRACTURE OF SHAFT OF HUMERUS, LEFT ARM, INITIAL ENCOUNTER FOR CLOSED FRACTURE: Chronic | ICD-10-CM

## 2023-05-26 DIAGNOSIS — Z00.8 ENCOUNTER FOR OTHER GENERAL EXAMINATION: ICD-10-CM

## 2023-05-26 PROBLEM — Z87.19 PERSONAL HISTORY OF OTHER DISEASES OF THE DIGESTIVE SYSTEM: Chronic | Status: ACTIVE | Noted: 2022-11-04

## 2023-05-26 PROBLEM — I10 ESSENTIAL (PRIMARY) HYPERTENSION: Chronic | Status: ACTIVE | Noted: 2022-11-04

## 2023-05-26 PROBLEM — Z86.73 PERSONAL HISTORY OF TRANSIENT ISCHEMIC ATTACK (TIA), AND CEREBRAL INFARCTION WITHOUT RESIDUAL DEFICITS: Chronic | Status: ACTIVE | Noted: 2022-11-04

## 2023-05-26 PROBLEM — Z87.898 PERSONAL HISTORY OF OTHER SPECIFIED CONDITIONS: Chronic | Status: ACTIVE | Noted: 2022-11-04

## 2023-05-26 PROBLEM — K27.9 PEPTIC ULCER, SITE UNSPECIFIED, UNSPECIFIED AS ACUTE OR CHRONIC, WITHOUT HEMORRHAGE OR PERFORATION: Chronic | Status: ACTIVE | Noted: 2022-11-04

## 2023-05-26 PROCEDURE — 97165 OT EVAL LOW COMPLEX 30 MIN: CPT | Mod: GO

## 2023-05-26 PROCEDURE — L3906: CPT

## 2023-05-26 PROCEDURE — 97760 ORTHOTIC MGMT&TRAING 1ST ENC: CPT | Mod: GO

## 2023-05-27 DIAGNOSIS — Z00.8 ENCOUNTER FOR OTHER GENERAL EXAMINATION: ICD-10-CM

## 2023-05-30 ENCOUNTER — OUTPATIENT (OUTPATIENT)
Dept: OUTPATIENT SERVICES | Facility: HOSPITAL | Age: 53
LOS: 1 days | End: 2023-05-30
Payer: MEDICAID

## 2023-05-30 DIAGNOSIS — S42.302A UNSPECIFIED FRACTURE OF SHAFT OF HUMERUS, LEFT ARM, INITIAL ENCOUNTER FOR CLOSED FRACTURE: Chronic | ICD-10-CM

## 2023-05-30 DIAGNOSIS — Z00.8 ENCOUNTER FOR OTHER GENERAL EXAMINATION: ICD-10-CM

## 2023-05-30 PROCEDURE — 97763 ORTHC/PROSTC MGMT SBSQ ENC: CPT | Mod: GO

## 2023-05-30 PROCEDURE — 97110 THERAPEUTIC EXERCISES: CPT | Mod: GO

## 2023-06-01 ENCOUNTER — OUTPATIENT (OUTPATIENT)
Dept: OUTPATIENT SERVICES | Facility: HOSPITAL | Age: 53
LOS: 1 days | End: 2023-06-01
Payer: MEDICAID

## 2023-06-01 DIAGNOSIS — S42.302A UNSPECIFIED FRACTURE OF SHAFT OF HUMERUS, LEFT ARM, INITIAL ENCOUNTER FOR CLOSED FRACTURE: Chronic | ICD-10-CM

## 2023-06-01 DIAGNOSIS — Z00.8 ENCOUNTER FOR OTHER GENERAL EXAMINATION: ICD-10-CM

## 2023-06-01 PROCEDURE — 97110 THERAPEUTIC EXERCISES: CPT | Mod: GO

## 2023-06-05 ENCOUNTER — OUTPATIENT (OUTPATIENT)
Dept: OUTPATIENT SERVICES | Facility: HOSPITAL | Age: 53
LOS: 1 days | End: 2023-06-05
Payer: MEDICAID

## 2023-06-05 DIAGNOSIS — S42.302A UNSPECIFIED FRACTURE OF SHAFT OF HUMERUS, LEFT ARM, INITIAL ENCOUNTER FOR CLOSED FRACTURE: Chronic | ICD-10-CM

## 2023-06-05 DIAGNOSIS — Z00.8 ENCOUNTER FOR OTHER GENERAL EXAMINATION: ICD-10-CM

## 2023-06-05 PROCEDURE — 97110 THERAPEUTIC EXERCISES: CPT | Mod: GO

## 2023-06-05 PROCEDURE — 97763 ORTHC/PROSTC MGMT SBSQ ENC: CPT | Mod: GO

## 2023-06-08 ENCOUNTER — OUTPATIENT (OUTPATIENT)
Dept: OUTPATIENT SERVICES | Facility: HOSPITAL | Age: 53
LOS: 1 days | End: 2023-06-08
Payer: MEDICAID

## 2023-06-08 DIAGNOSIS — S42.302A UNSPECIFIED FRACTURE OF SHAFT OF HUMERUS, LEFT ARM, INITIAL ENCOUNTER FOR CLOSED FRACTURE: Chronic | ICD-10-CM

## 2023-06-08 DIAGNOSIS — Z00.8 ENCOUNTER FOR OTHER GENERAL EXAMINATION: ICD-10-CM

## 2023-06-08 PROCEDURE — 97140 MANUAL THERAPY 1/> REGIONS: CPT | Mod: GO

## 2023-06-12 ENCOUNTER — OUTPATIENT (OUTPATIENT)
Dept: OUTPATIENT SERVICES | Facility: HOSPITAL | Age: 53
LOS: 1 days | End: 2023-06-12
Payer: MEDICAID

## 2023-06-12 DIAGNOSIS — S42.302A UNSPECIFIED FRACTURE OF SHAFT OF HUMERUS, LEFT ARM, INITIAL ENCOUNTER FOR CLOSED FRACTURE: Chronic | ICD-10-CM

## 2023-06-12 DIAGNOSIS — Z00.8 ENCOUNTER FOR OTHER GENERAL EXAMINATION: ICD-10-CM

## 2023-06-12 PROCEDURE — 97140 MANUAL THERAPY 1/> REGIONS: CPT | Mod: GO

## 2023-06-12 PROCEDURE — 97763 ORTHC/PROSTC MGMT SBSQ ENC: CPT | Mod: GO

## 2023-06-15 ENCOUNTER — OUTPATIENT (OUTPATIENT)
Dept: OUTPATIENT SERVICES | Facility: HOSPITAL | Age: 53
LOS: 1 days | End: 2023-06-15
Payer: MEDICAID

## 2023-06-15 DIAGNOSIS — Z00.8 ENCOUNTER FOR OTHER GENERAL EXAMINATION: ICD-10-CM

## 2023-06-15 DIAGNOSIS — S42.302A UNSPECIFIED FRACTURE OF SHAFT OF HUMERUS, LEFT ARM, INITIAL ENCOUNTER FOR CLOSED FRACTURE: Chronic | ICD-10-CM

## 2023-06-15 PROCEDURE — 97140 MANUAL THERAPY 1/> REGIONS: CPT | Mod: GO

## 2023-06-22 ENCOUNTER — OUTPATIENT (OUTPATIENT)
Dept: OUTPATIENT SERVICES | Facility: HOSPITAL | Age: 53
LOS: 1 days | End: 2023-06-22
Payer: MEDICAID

## 2023-06-22 DIAGNOSIS — Z00.8 ENCOUNTER FOR OTHER GENERAL EXAMINATION: ICD-10-CM

## 2023-06-22 DIAGNOSIS — S42.302A UNSPECIFIED FRACTURE OF SHAFT OF HUMERUS, LEFT ARM, INITIAL ENCOUNTER FOR CLOSED FRACTURE: Chronic | ICD-10-CM

## 2023-06-22 PROCEDURE — 97140 MANUAL THERAPY 1/> REGIONS: CPT | Mod: GO

## 2023-07-28 PROBLEM — Z00.00 ENCOUNTER FOR PREVENTIVE HEALTH EXAMINATION: Status: ACTIVE | Noted: 2023-07-28

## 2023-08-09 NOTE — PROCEDURE NOTE - ADDITIONAL PROCEDURE DETAILS
PROCEDURE: [place] hole and insertion of external ventricular drain catheter.    ANESTHESIA: Local     HISTORY:     50yMale with a pmhx of ^MED EVAL    Family history of breast cancer (Mother)    Family history of pancreatic cancer (Father)    MEWS Score    HTN (hypertension)    GI bleed    Gastric ulcer    PUD (peptic ulcer disease)    Intraparenchymal hemorrhage of brain    Insertion, external ventricular drain    Ventriculostomy, for ventricular catheter insertion    Arm fracture, left    MED EVAL    90+    SysAdmin_VisitLink        I reviewed risks of procedure which include but are not limited to bleeding, infection and possibliltiy of needing to replace the cathter in the future.  Alternatives to procedure are not available short of foregoing life saving CSF diversion.  Patient/HCP consented to procedure and had opportunity to ask questions.    PROCEDURE: Scalp Hair was clipped. The patient was prepped with  Betadine. Incision and tunneling tract were infiltrated with 1% Xylocaine with epinephrine [id number]. He did receive antibiotics post procedure. He was draped in a sterile manner.  Incision made just to the right of the right mid pupillary line 10 cm behind the nasion. A self-retaining retractor was placed. Apryl hole was drilled with the cranial twist drill. The dura was punctured with a spinal needle. A ventricular catheter with the stylet was passed using the ipsilateral medial canthus and tragus as land marks.  The catheter was advanced to 6cm at the cortex and 7cm to the skull. CSF egress was identified.  A trochar was used to tunnel the catheter 4-5cm posterior to the insertion site.  Catheter was secured to the scalp with 3.0 nylon and staples.   Incision was closed with staples and sutures.  The ventricular catheter was attached to the external ventricular drainage chamber in a sterile fashion. The patient tolerated the procedure well. No complications. Sponge and needle counts were correct. Blood loss is minimal. None replaced. Tissue Cultured Epidermal Autograft Text: The defect edges were debeveled with a #15 scalpel blade.  Given the location of the defect, shape of the defect and the proximity to free margins a tissue cultured epidermal autograft was deemed most appropriate.  The graft was then trimmed to fit the size of the defect.  The graft was then placed in the primary defect and oriented appropriately.

## 2023-08-17 NOTE — STROKE CODE NOTE - NS AS PRENOTIFICATION HOSPITAL
Chief Complaint   Patient presents with   • Eye Exam     AMD, AODM;Here for a dilated exam with OCT-MAC   • Office Visit     LB Zostavax 03/17/14, Shingrix 10-7-22, 03/07/23.   Technicians note reviewed 8/17/2023, Sarah Izaguirre MD.       HISTORY OF PRESENT ILLNESS: 73 year old male with a h/o HTN, CAD, BPH (on floamx and proscar) , AODM (on po meds since about 2019), cataracts,hyperopia,  age-related macular degeneration,  and is here for a dilated exam with OCT MAC to check on age-related macular degeneration and check for diabetic retinopathy.  He denies having any new problems/concerns with the eyes/vision today.  He does well with current glasses and is not interested in an updated glasses Rx today (aware of refraction fees).  He checks the Amsler Grid every-other-day at home and takes AREDS 2 BID PO.  he denies having any new floaters, loss of side vision or flashes in either eye.  He doesn't check his BS at home.     [facility] reviewed     Blood Sugar:     Glucose (mg/dL)   Date Value   08/02/2023 113 (H)    HA1C:    Hemoglobin A1C (%)   Date Value   08/02/2023 6.5 (H)     No results found for: \"BWTMWPYP5L\"    SH:   [place]  reports that he has been smoking cigarettes. He has a 26.6 pack-year smoking history. He has never used smokeless tobacco. He reports current alcohol use. He reports that he does not use drugs..    ROS, Mood, Affect:  Alert and oriented x 3, happy. See full ROS sheet patient completed and smart chart (I reviewed).  ROS:   General: No fevers, chills, sweats,   Skin: No rash, bruising, bleeding   Head:  no changes in hearing   Eyes: see above   Chest: No cough, No shortness of breath, chest pain, palpations   GI/: No abdominal pain, diarrhea, constipation, dysuria, increased frequency of urination, urgency to urinate,   Muscle/Skeletal: No fatigue   Psych: No anxiety, depression, thoughts of hurting self or others   Neuro: No loss of conscienceness, numbness or tingling in extremities, 
  Endocrine: Denies weight changes, changes in thirst, chills, hot or cold intolerance    POH:     hyperopia  eye injury RIGHT EYE metal debris from mower late 1970s/early 1980s -  - Rx drops and patching 14 days  Fist exam here 7-29-21  AMD BOTH EYES   BPH on flomax   Cataracts BOTH EYES   Denies LASIK, PRK,  Amblyopia, eye trauma       Past Medical History:   Diagnosis Date   • Abnormal stress test    • Back pain    • Cataract    • Chest pain    • Coronary atherosclerosis of native coronary artery 5/16/2013    Has LAD lesion   • Disc herniation     L5-S1   • HTN (hypertension)    • Macular degeneration    • Old myocardial infarction    • Other and unspecified hyperlipidemia    • Smoker unmotivated to quit    • Wears glasses    CAD MI 2013  borderline AODM (on po meds since about 2019,  BPH on flomax and proscar     Past Surgical History:   Procedure Laterality Date   • Cardiac catheterization/possible ptca/possible stent  8/26/2013   • Colonoscopy remove lesions by snare  04/19/2007   • Esophagogastroduodenoscopy transoral flex w/bx single or mult  06/06/2007    EGD with Bx   • Esophagogastroduodenoscopy transoral flex w/bx single or mult  04/19/2007    EGD with Bx   • Hernia repair  1/12/2009   • Oral surgery procedure      multiple tooth extraction   • Ptca  5/2013    stenting to RCA  post MI     PMD:  Carlos Falcon DO    ALLERGIES:   Allergen Reactions   • Dye [Contrast Media] HIVES, CARDIAC DISTURBANCES and Other (See Comments)     PALPITATIONS, FAINTING   • Sulfa Drugs Cross Reactors HIVES, CARDIAC DISTURBANCES and Other (See Comments)     PALPITATIONS, FAINTING       Pertinent systemic meds: See EPIC for full med list  Flomax, Proscar    FH:  I reviewed the technicians history as outlined in EPIC.; there are no additions.  FAMILY OCULAR HX    Glaucoma: no    Retinal detachment: no    Macular degeneration:  no    Amblyopia (lazy eye), strabismus (ET, XT, etc.): no    Eye surgery: no    Other eye 
diseases: no    Eye meds: AREDS 2 BID PO     EXAM:    Base Eye Exam     Visual Acuity (Snellen - Linear)       Right Left    Dist cc 20/20 -1 20/20 -1    Correction: Glasses          Tonometry (Applanation, 7:39 AM)       Right Left    Pressure 16 16          Pupils       Pupils Dark Light Shape APD    Right PERRL 2.5 2 Round Negative    Left PERRL 2.5 2 Round Negative          Visual Fields       Left Right     Full Full          Extraocular Movement       Right Left     Full, Ortho Full, Ortho          Neuro/Psych     Oriented x3: Yes    Mood/Affect: Normal          Dilation     Both eyes: 1.0% Tropicamide @ 7:39 AM            Additional Tests     Amsler       Right Left     Normal Normal          Keratometry (Automated)       K1 Axis K2 Axis    Right 43.50 097 44.50 007    Left 43.25 072 44.25 162            Additional Notes    OCT INTERPRETATION:  MACULA SCAN   CENTRAL MACULAR THICKNESS:    Signal strength  8/10  Right eye:  251 uM central thickness drusen, No evidence of subretinal neovascular membrane      Signal strength  9/10  Left eye:  252 uM central thickness drusen, No evidence of subretinal neovascular membrane         Slit Lamp and Fundus Exam     External Exam       Right Left    External Normal Normal          Slit Lamp Exam       Right Left    Lids/Lashes Normal Normal    Conjunctiva/Sclera Clear Clear    Cornea Clear Clear    Anterior Chamber Deep and quiet Deep and quiet    Iris Round and regular, No neovascularization Round and regular, No neovascularization    Lens 1-2+ Nuclear sclerosis 1-2+ Nuclear sclerosis    Anterior Vitreous Clear Clear          Fundus Exam       Right Left    Posterior Vitreous Clear Clear    Disc Flat, pink with a healthy rim, No neovascularization Flat, pink with a healthy rim, No neovascularization    C/D Ratio 0.3 0.3    Macula several  Drusen- some soft some fine, mild mottling superiorly, No clinicically significant macular edema, microaneurysms, hemorrhage, cotton 
wool spots or exudates, No evidence of subretinal neovascular membrane several  Drusen- some soft some fine, mild mottling superiorly, No clinicically significant macular edema, microaneurysms, hemorrhage, cotton wool spots or exudates, , No evidence of subretinal neovascular membrane    Vessels Artery-Vein ratio 1-2  Artery-Vein ratio 1-2     Periphery Mid reticular degeneration- especially nasally, Flat, without tears or detachments Mid reticular degeneration- especially nasally, Flat, without tears or detachments            Refraction     Wearing Rx       Sphere Cylinder Axis Add    Right +0.75 +1.75 180 +2.25    Left +1.25 +1.75 180 +2.25    Age: 2022    Type: Progressive          Manifest Refraction #2 (Auto, Over)       Sphere Cylinder Axis    Right +1.25 +1.50 173    Left +1.50 +1.50 180    Pupillary Distance: 66.5              Last Dilated  08/17/23  -- Dilates to 3.5 mm both eyes   Last OCT-MAC 08/07/23  Amsler Grid 07/29/21 right eye normal, left eye - wavy vertical lines - entire grid    ASSESSMENT/PLAN:  1. Moderate dry age related macular degeneration, BOTH EYES:  He still smokes - I again advised cessation as smoking is known to worsen age-related macular degeneration. Continue AREDS 2 and amsler grid daily one eye at a time, and  report immediately with any changes.     2. AODM excellent control: There are no signs of diabetic retinopathy in either eye today, 8/17/2023.  He was counseled in detail on the natural history of diabetic eye disease, and was instructed on the importance of optimizing blood sugar control/A1c's blood pressure, cholesterol, and their effect on the course of diabetic retinopathy I advised him to follow his doctor's  recommendations regarding cholesterol/BP/BS  control, cardiovascular screening, and anticoagulation.  I emphasized the risk of total permanent blindness as well as the  importance of annual dilated eye exams.      3. Smoking disorder:  I once again  recommend 
smoking cessation and  reviewed the risks of smoking including macular degeneration, blindness, death, lung cancer, cardiovascular disease, etc.    4. Nuclear sclerotic Cataracts both eyes. Observe. On Flomax- dilates  to 3.5mm both eyes    I reviewed the risk of floppy iris syndrome which alpha blockers can cause as well as the implications for cataract surgery (complications).    5.  General eye care was reviewed (sun protection/safety glasses) . He had Zostavax 03/17/14, Shingrix 10/7/22 and 03/07/23.          RTC 6 months dilate OCT-mac, or immediately prn as instructed.   Note sent to PCP, 8/17/2023.    Thank you for allowing me to participate in your patient's care. Please call our office at  [phone] if you have any questions.  
Yes
Yes

## 2023-11-15 NOTE — ED PROVIDER NOTE - CHIEF COMPLAINT
[facility] [place]  Nurses Note -- 4 Eyes      11/14/2023       Skin assessed on: Q Shift      [x] No Pressure Injuries Present    [x]Prevention Measures Documented    [] Yes LDA  for Pressure Injury Previously documented     [] Yes New Pressure Injury Discovered   [] LDA for New Pressure Injury Added      Attending RN:  Zulay Edwards RN     Second RN:  ARIA Blancas       The patient is a 52y Male complaining of

## 2023-12-22 ENCOUNTER — EMERGENCY (EMERGENCY)
Facility: HOSPITAL | Age: 53
LOS: 0 days | Discharge: HOME | End: 2023-12-22
Attending: EMERGENCY MEDICINE
Payer: MEDICARE

## 2023-12-22 VITALS
WEIGHT: 197.09 LBS | RESPIRATION RATE: 18 BRPM | DIASTOLIC BLOOD PRESSURE: 84 MMHG | TEMPERATURE: 99 F | HEART RATE: 55 BPM | SYSTOLIC BLOOD PRESSURE: 120 MMHG

## 2023-12-22 DIAGNOSIS — F10.129 ALCOHOL ABUSE WITH INTOXICATION, UNSPECIFIED: ICD-10-CM

## 2023-12-22 DIAGNOSIS — I69.954 HEMIPLEGIA AND HEMIPARESIS FOLLOWING UNSPECIFIED CEREBROVASCULAR DISEASE AFFECTING LEFT NON-DOMINANT SIDE: ICD-10-CM

## 2023-12-22 DIAGNOSIS — S42.302A UNSPECIFIED FRACTURE OF SHAFT OF HUMERUS, LEFT ARM, INITIAL ENCOUNTER FOR CLOSED FRACTURE: Chronic | ICD-10-CM

## 2023-12-22 DIAGNOSIS — I10 ESSENTIAL (PRIMARY) HYPERTENSION: ICD-10-CM

## 2023-12-22 PROCEDURE — 82962 GLUCOSE BLOOD TEST: CPT

## 2023-12-22 PROCEDURE — 99283 EMERGENCY DEPT VISIT LOW MDM: CPT

## 2023-12-22 PROCEDURE — 99285 EMERGENCY DEPT VISIT HI MDM: CPT

## 2023-12-22 NOTE — ED PROVIDER NOTE - OBJECTIVE STATEMENT
53-year-old male past medical history HTN, GIB, PUD, hemorrhagic stroke in 2021 with residual weakness of the left side coming in intoxicated.  Patient responding to questions, denying any acute complaints, no medical issues currently.  Just reporting that he is tired.

## 2023-12-22 NOTE — ED ADULT TRIAGE NOTE - CHIEF COMPLAINT QUOTE
ALEYDA JOYCE  : [id number]  ACCOUNT:  [id number]  [phone]  PCP: Dr. Tony Freeman     SELVIN Garcia - DD: 03/09/2017 - DT: 03/10/2017 - Job ID: [id number]                   pt biba for intox. denies medical complaints

## 2023-12-22 NOTE — ED PROVIDER NOTE - ATTENDING CONTRIBUTION TO CARE
I personally evaluated the patient. I reviewed the Resident’s or Physician Assistant’s note (as assigned above), and agree with the findings and plan except as documented in my note.  53-year-old male with alcohol use disorder presenting for evaluation due to alcohol intoxication.  Patient denies any trauma, no fall.  Currently has no complaints.  VSS, non toxic appearing, NAD, Head NCAT, MMM, neck supple, normal ROM, normal s1s2, lungs ctab, abd s/nt/nd, no guarding or rebound, extremities chronic contracture of the left wrist, remainder of extremities within normal limits, AAO x 3, GCS 15, neuro grossly normal. No acute skin lesions. Plan is ED observation and reassess.

## 2023-12-22 NOTE — ED PROVIDER NOTE - PATIENT PORTAL LINK FT
You can access the FollowMyHealth Patient Portal offered by [facility] by registering at the following website: [email or web address]. By joining [employer]’s FollowMyHealth portal, you will also be able to view your health information using other applications (apps) compatible with our system. You can access the FollowMyHealth Patient Portal offered by [facility] by registering at the following website: [email or web address]. By joining [employer]’s FollowMyHealth portal, you will also be able to view your health information using other applications (apps) compatible with our system.

## 2023-12-22 NOTE — ED PROVIDER NOTE - PHYSICAL EXAMINATION
CONSTITUTIONAL: NAD, sleepy  SKIN: Warm dry  HEAD: NCAT  EYES: NL inspection  ENT: MMM  CARD: RRR  RESP: CTAB  ABD: Soft, non tender, no rebound or guarding

## 2023-12-22 NOTE — ED PROVIDER NOTE - CLINICAL SUMMARY MEDICAL DECISION MAKING FREE TEXT BOX
Patient presented for evaluation of alcohol intoxication.  Patient was observed in the ED until clinical sobriety.  Will be discharged with outpatient follow-up.

## 2024-03-11 NOTE — SWALLOW BEDSIDE ASSESSMENT ADULT - SWALLOW EVAL: ORAL MUSCULATURE
+slight L-sided facial asymmetry; oral mech exam limited by ?oral apraxia/anomalies present
11-Mar-2024 10:31

## 2024-05-10 NOTE — DIETITIAN INITIAL EVALUATION ADULT. - NUTRITION DIAGNOSITC TERMINOLOGY #1
Psychiatric/Behavioral: Negative.         HISTORIES  Current Outpatient Medications on File Prior to Visit   Medication Sig Dispense Refill    butalbital-acetaminophen-caffeine (FIORICET, ESGIC) -40 MG per tablet Take 1 tablet by mouth every 6 hours as needed for Migraine or Headaches      Ascorbic Acid (VITAMIN C) 500 MG CAPS Take 500 mg by mouth daily      GRAPE SEED EXTRACT PO Take by mouth every 48 hours      Green Tea, Mariangel sinensis, (GREEN TEA PO) Take by mouth      Multiple Vitamin (MULTI VITAMIN DAILY PO) Take by mouth      VITAMIN D, CHOLECALCIFEROL, PO Take by mouth      CHROMIUM PO Take by mouth      cyclobenzaprine (FLEXERIL) 5 MG tablet Take 1 tablet by mouth nightly 90 tablet 1    fluticasone (FLONASE) 50 MCG/ACT nasal spray 1 spray by Each Nostril route daily 16 g 5    hydroCHLOROthiazide (HYDRODIURIL) 25 MG tablet Take 1 tablet by mouth every morning Prn swelling 30 tablet 1    medroxyPROGESTERone (PROVERA) 2.5 MG tablet Take 1 tablet by mouth daily 30 tablet 0    OMEPRAZOLE PO Take 20 mg by mouth      dextromethorphan-guaiFENesin (MUCINEX DM)  MG per extended release tablet Take 1 tablet by mouth every 12 hours as needed      Naproxen Sodium 220 MG CAPS Take by mouth      aspirin 81 MG tablet Take 1 tablet by mouth daily      Lactobacillus (PROBIOTIC ACIDOPHILUS PO) Take by mouth as needed        No current facility-administered medications on file prior to visit.      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

## 2024-06-29 NOTE — ED ADULT TRIAGE NOTE - WEIGHT IN LBS
Problem: At Risk for Falls  Goal: Patient does not fall  Outcome: Monitoring/Evaluating progress  Goal: Patient takes action to control fall-related risks  Outcome: Monitoring/Evaluating progress     Problem: At Risk for Injury Due to Fall  Goal: Patient does not fall  Outcome: Monitoring/Evaluating progress  Goal: Takes action to control condition specific risks  Outcome: Monitoring/Evaluating progress  Goal: Verbalizes understanding of fall-related injury personal risks  Description: Document education using the patient education activity  Outcome: Monitoring/Evaluating progress     Problem: Skin Integrity Alteration  Goal: Skin remains intact with no new/deterioration of wound or pressure injury  Outcome: Monitoring/Evaluating progress  Goal: Participates in wound care activities  Outcome: Monitoring/Evaluating progress  Goal: Comfort optimized with pressure injury prevention strategies guided by patient/family preference. (Hospice)  Outcome: Monitoring/Evaluating progress  Goal: Wound care provided to promote comfort needs (Hospice)  Outcome: Monitoring/Evaluating progress     Problem: Pain  Goal: Acceptable pain level achieved/maintained at rest using appropriate pain scale for the patient  Outcome: Monitoring/Evaluating progress  Goal: Acceptable pain level achieved/maintained with activity using appropriate pain scale for the patient  Outcome: Monitoring/Evaluating progress  Goal: Acceptable pain level achieved/maintained without oversedation  Outcome: Monitoring/Evaluating progress      190

## 2025-04-07 NOTE — ED PROVIDER NOTE - TOBACCO USE
Ambulatory Care Coordination Note     2025 10:37 AM     Patient Current Location:  [place]     ACM contacted the patient by telephone. Verified name and  with patient as identifiers.         ACM: Tiara Richter RN     Challenges to be reviewed by the provider   Additional needs identified to be addressed with provider Yes  Left message with pcp office regarding er admission               Method of communication with provider: staff message.    Utilization: Has the patient been seen in the ED since your last call? Yes,   Discharge Date: 25   Discharge Facility: [facility]  Reason for ED Visit: Pharyngoesophageal dysphagia   Visit Diagnosis: Pharyngoesophageal dysphagia     Number of ED visits in the last 6 months: 3      Do you have any ongoing symptoms? No  Did you call your PCP prior to going to the ED? No, did not call the PCP office.     Review of Discharge Instructions:   [x] AVS discharge instructions  [] Right Care, Right Place, Right Time document  [x] Medication changes  [x] Follow up appointments  [] Referral follow up   []        Care Summary Note: ccm outreached to patient's wife. Wife states patient was seen in the er last week for dysphagia. Wife states patient is scheduled for Thursday for feeding tube placement. Wife is requesting Iv infusions and is working with GI on this. Wife states no questions or concerns. Ccm discussed with wife that I would outreach next week. Wife agreeable.          Assessments Completed:   No changes since last call    Medications Reviewed:   Patient denies any changes with medications and reports taking all medications as prescribed.    Advance Care Planning:   Not reviewed during this call     Care Planning:   Education Documentation  No documentation found.  Education Comments  No comments found.     ,    Goals Addressed                   This Visit's Progress     Conditions and Symptoms   On track     I will schedule office visits, as  Never smoker

## 2025-04-08 NOTE — PATIENT PROFILE ADULT - ARE SIGNIFICANT INDICATORS COMPLETE.
Call placed to patient to follow up regarding rescheduling advanced directive appointment.  Patient stated she would like to meet after her PCP appointment on 4-16-25; informed patient writer will plan to meet patient then.    Yes

## 2025-04-11 NOTE — ED PROVIDER NOTE - WR ORDER ID 1
Trace Estes M.D.       Office: [phone]   [address] [place] [address]  FAX: [phone]     [id number]